# Patient Record
Sex: FEMALE | Race: BLACK OR AFRICAN AMERICAN | Employment: OTHER | URBAN - METROPOLITAN AREA
[De-identification: names, ages, dates, MRNs, and addresses within clinical notes are randomized per-mention and may not be internally consistent; named-entity substitution may affect disease eponyms.]

---

## 2017-12-03 ENCOUNTER — APPOINTMENT (OUTPATIENT)
Dept: GENERAL RADIOLOGY | Age: 52
DRG: 313 | End: 2017-12-03
Payer: MEDICARE

## 2017-12-03 ENCOUNTER — APPOINTMENT (OUTPATIENT)
Dept: NUCLEAR MEDICINE | Age: 52
DRG: 313 | End: 2017-12-03
Payer: MEDICARE

## 2017-12-03 ENCOUNTER — APPOINTMENT (OUTPATIENT)
Dept: ULTRASOUND IMAGING | Age: 52
DRG: 313 | End: 2017-12-03
Payer: MEDICARE

## 2017-12-03 ENCOUNTER — HOSPITAL ENCOUNTER (INPATIENT)
Age: 52
LOS: 2 days | Discharge: HOME | DRG: 313 | End: 2017-12-05
Attending: EMERGENCY MEDICINE | Admitting: FAMILY MEDICINE
Payer: MEDICARE

## 2017-12-03 DIAGNOSIS — R45.851 SUICIDAL IDEATION: ICD-10-CM

## 2017-12-03 DIAGNOSIS — R07.9 CHEST PAIN, UNSPECIFIED TYPE: Primary | ICD-10-CM

## 2017-12-03 DIAGNOSIS — E86.0 DEHYDRATION: ICD-10-CM

## 2017-12-03 DIAGNOSIS — N17.9 ACUTE RENAL FAILURE, UNSPECIFIED ACUTE RENAL FAILURE TYPE (HCC): ICD-10-CM

## 2017-12-03 DIAGNOSIS — E87.6 HYPOKALEMIA: ICD-10-CM

## 2017-12-03 LAB
-: NORMAL
ABSOLUTE EOS #: 0.17 K/UL (ref 0–0.44)
ABSOLUTE IMMATURE GRANULOCYTE: 0.03 K/UL (ref 0–0.3)
ABSOLUTE LYMPH #: 1.83 K/UL (ref 1.1–3.7)
ABSOLUTE MONO #: 0.43 K/UL (ref 0.1–1.2)
AMORPHOUS: NORMAL
AMPHETAMINE SCREEN URINE: NEGATIVE
ANION GAP SERPL CALCULATED.3IONS-SCNC: 15 MMOL/L (ref 9–17)
ANION GAP SERPL CALCULATED.3IONS-SCNC: 17 MMOL/L (ref 9–17)
BACTERIA: NORMAL
BARBITURATE SCREEN URINE: NEGATIVE
BASOPHILS # BLD: 0 % (ref 0–2)
BASOPHILS ABSOLUTE: <0.03 K/UL (ref 0–0.2)
BENZODIAZEPINE SCREEN, URINE: NEGATIVE
BILIRUBIN URINE: NEGATIVE
BNP INTERPRETATION: ABNORMAL
BNP INTERPRETATION: ABNORMAL
BUN BLDV-MCNC: 29 MG/DL (ref 6–20)
BUN BLDV-MCNC: 30 MG/DL (ref 6–20)
BUN/CREAT BLD: ABNORMAL (ref 9–20)
BUN/CREAT BLD: ABNORMAL (ref 9–20)
BUPRENORPHINE URINE: ABNORMAL
CALCIUM SERPL-MCNC: 8.1 MG/DL (ref 8.6–10.4)
CALCIUM SERPL-MCNC: 8.1 MG/DL (ref 8.6–10.4)
CANNABINOID SCREEN URINE: NEGATIVE
CASTS UA: NORMAL /LPF (ref 0–8)
CHLORIDE BLD-SCNC: 92 MMOL/L (ref 98–107)
CHLORIDE BLD-SCNC: 96 MMOL/L (ref 98–107)
CO2: 24 MMOL/L (ref 20–31)
CO2: 29 MMOL/L (ref 20–31)
COCAINE METABOLITE, URINE: NEGATIVE
COLOR: YELLOW
CREAT SERPL-MCNC: 1.71 MG/DL (ref 0.5–0.9)
CREAT SERPL-MCNC: 2.56 MG/DL (ref 0.5–0.9)
CREATININE URINE: 52.9 MG/DL (ref 28–217)
CRYSTALS, UA: NORMAL /HPF
D-DIMER QUANTITATIVE: 1.11 MG/L FEU
DIFFERENTIAL TYPE: ABNORMAL
EKG ATRIAL RATE: 70 BPM
EKG P AXIS: 55 DEGREES
EKG P-R INTERVAL: 160 MS
EKG Q-T INTERVAL: 460 MS
EKG QRS DURATION: 132 MS
EKG QTC CALCULATION (BAZETT): 496 MS
EKG R AXIS: -28 DEGREES
EKG T AXIS: -58 DEGREES
EKG VENTRICULAR RATE: 70 BPM
EOSINOPHILS RELATIVE PERCENT: 2 % (ref 1–4)
EPITHELIAL CELLS UA: NORMAL /HPF (ref 0–5)
GFR AFRICAN AMERICAN: 24 ML/MIN
GFR AFRICAN AMERICAN: 38 ML/MIN
GFR NON-AFRICAN AMERICAN: 20 ML/MIN
GFR NON-AFRICAN AMERICAN: 31 ML/MIN
GFR SERPL CREATININE-BSD FRML MDRD: ABNORMAL ML/MIN/{1.73_M2}
GLUCOSE BLD-MCNC: 103 MG/DL (ref 70–99)
GLUCOSE BLD-MCNC: 118 MG/DL (ref 70–99)
GLUCOSE URINE: NEGATIVE
HCT VFR BLD CALC: 32.2 % (ref 36.3–47.1)
HCT VFR BLD CALC: 34.8 % (ref 36.3–47.1)
HEMOGLOBIN: 10.8 G/DL (ref 11.9–15.1)
HEMOGLOBIN: 9.7 G/DL (ref 11.9–15.1)
IMMATURE GRANULOCYTES: 0 %
INR BLD: 0.9
KETONES, URINE: NEGATIVE
LACTIC ACID, WHOLE BLOOD: 2 MMOL/L (ref 0.7–2.1)
LEUKOCYTE ESTERASE, URINE: NEGATIVE
LYMPHOCYTES # BLD: 24 % (ref 24–43)
MCH RBC QN AUTO: 27.8 PG (ref 25.2–33.5)
MCH RBC QN AUTO: 27.9 PG (ref 25.2–33.5)
MCHC RBC AUTO-ENTMCNC: 30.1 G/DL (ref 28.4–34.8)
MCHC RBC AUTO-ENTMCNC: 31 G/DL (ref 28.4–34.8)
MCV RBC AUTO: 89.9 FL (ref 82.6–102.9)
MCV RBC AUTO: 92.3 FL (ref 82.6–102.9)
MDMA URINE: ABNORMAL
METHADONE SCREEN, URINE: NEGATIVE
METHAMPHETAMINE, URINE: ABNORMAL
MONOCYTES # BLD: 6 % (ref 3–12)
MUCUS: NORMAL
MYOGLOBIN: 35 NG/ML (ref 25–58)
MYOGLOBIN: 38 NG/ML (ref 25–58)
NITRITE, URINE: NEGATIVE
OPIATES, URINE: NEGATIVE
OTHER OBSERVATIONS UA: NORMAL
OXYCODONE SCREEN URINE: POSITIVE
PARTIAL THROMBOPLASTIN TIME: 24.2 SEC (ref 21.3–31.3)
PARTIAL THROMBOPLASTIN TIME: 33.7 SEC (ref 21.3–31.3)
PARTIAL THROMBOPLASTIN TIME: >120 SEC (ref 21.3–31.3)
PDW BLD-RTO: 15.9 % (ref 11.8–14.4)
PDW BLD-RTO: 16.1 % (ref 11.8–14.4)
PH UA: 5.5 (ref 5–8)
PHENCYCLIDINE, URINE: NEGATIVE
PLATELET # BLD: 245 K/UL (ref 138–453)
PLATELET # BLD: 267 K/UL (ref 138–453)
PLATELET ESTIMATE: ABNORMAL
PMV BLD AUTO: 10.6 FL (ref 8.1–13.5)
PMV BLD AUTO: 10.9 FL (ref 8.1–13.5)
POC TROPONIN I: 0 NG/ML (ref 0–0.1)
POC TROPONIN I: 0.03 NG/ML (ref 0–0.1)
POC TROPONIN INTERP: NORMAL
POC TROPONIN INTERP: NORMAL
POTASSIUM SERPL-SCNC: 3.2 MMOL/L (ref 3.7–5.3)
POTASSIUM SERPL-SCNC: 4.3 MMOL/L (ref 3.7–5.3)
PRO-BNP: 1141 PG/ML
PRO-BNP: 626 PG/ML
PROPOXYPHENE, URINE: ABNORMAL
PROTEIN UA: NEGATIVE
PROTHROMBIN TIME: 9.8 SEC (ref 9.4–12.6)
RBC # BLD: 3.49 M/UL (ref 3.95–5.11)
RBC # BLD: 3.87 M/UL (ref 3.95–5.11)
RBC # BLD: ABNORMAL 10*6/UL
RBC UA: NORMAL /HPF (ref 0–4)
RENAL EPITHELIAL, UA: NORMAL /HPF
SEG NEUTROPHILS: 68 % (ref 36–65)
SEGMENTED NEUTROPHILS ABSOLUTE COUNT: 5.03 K/UL (ref 1.5–8.1)
SODIUM BLD-SCNC: 136 MMOL/L (ref 135–144)
SODIUM BLD-SCNC: 137 MMOL/L (ref 135–144)
SODIUM,UR: 28 MMOL/L
SPECIFIC GRAVITY UA: 1.01 (ref 1–1.03)
TEST INFORMATION: ABNORMAL
TRICHOMONAS: NORMAL
TRICYCLIC ANTIDEPRESSANTS, UR: ABNORMAL
TROPONIN INTERP: NORMAL
TROPONIN T: <0.03 NG/ML
TURBIDITY: CLEAR
UREA NITROGEN, UR: 285 MG/DL
URINE HGB: NEGATIVE
UROBILINOGEN, URINE: NORMAL
WBC # BLD: 7.5 K/UL (ref 3.5–11.3)
WBC # BLD: 8.6 K/UL (ref 3.5–11.3)
WBC # BLD: ABNORMAL 10*3/UL
WBC UA: NORMAL /HPF (ref 0–5)
YEAST: NORMAL

## 2017-12-03 PROCEDURE — 85730 THROMBOPLASTIN TIME PARTIAL: CPT

## 2017-12-03 PROCEDURE — A9538 TC99M PYROPHOSPHATE: HCPCS | Performed by: FAMILY MEDICINE

## 2017-12-03 PROCEDURE — 71020 XR CHEST STANDARD TWO VW: CPT

## 2017-12-03 PROCEDURE — 80048 BASIC METABOLIC PNL TOTAL CA: CPT

## 2017-12-03 PROCEDURE — 99285 EMERGENCY DEPT VISIT HI MDM: CPT

## 2017-12-03 PROCEDURE — 81001 URINALYSIS AUTO W/SCOPE: CPT

## 2017-12-03 PROCEDURE — 2060000000 HC ICU INTERMEDIATE R&B

## 2017-12-03 PROCEDURE — 85025 COMPLETE CBC W/AUTO DIFF WBC: CPT

## 2017-12-03 PROCEDURE — 6370000000 HC RX 637 (ALT 250 FOR IP): Performed by: NURSE PRACTITIONER

## 2017-12-03 PROCEDURE — 83880 ASSAY OF NATRIURETIC PEPTIDE: CPT

## 2017-12-03 PROCEDURE — 83605 ASSAY OF LACTIC ACID: CPT

## 2017-12-03 PROCEDURE — 6360000002 HC RX W HCPCS: Performed by: EMERGENCY MEDICINE

## 2017-12-03 PROCEDURE — 36415 COLL VENOUS BLD VENIPUNCTURE: CPT

## 2017-12-03 PROCEDURE — 6360000002 HC RX W HCPCS: Performed by: FAMILY MEDICINE

## 2017-12-03 PROCEDURE — 2580000003 HC RX 258: Performed by: NURSE PRACTITIONER

## 2017-12-03 PROCEDURE — 78582 LUNG VENTILAT&PERFUS IMAGING: CPT

## 2017-12-03 PROCEDURE — 2580000003 HC RX 258: Performed by: EMERGENCY MEDICINE

## 2017-12-03 PROCEDURE — 94762 N-INVAS EAR/PLS OXIMTRY CONT: CPT

## 2017-12-03 PROCEDURE — 84300 ASSAY OF URINE SODIUM: CPT

## 2017-12-03 PROCEDURE — 93005 ELECTROCARDIOGRAM TRACING: CPT

## 2017-12-03 PROCEDURE — 80307 DRUG TEST PRSMV CHEM ANLYZR: CPT

## 2017-12-03 PROCEDURE — 84540 ASSAY OF URINE/UREA-N: CPT

## 2017-12-03 PROCEDURE — 84484 ASSAY OF TROPONIN QUANT: CPT

## 2017-12-03 PROCEDURE — 3430000000 HC RX DIAGNOSTIC RADIOPHARMACEUTICAL: Performed by: FAMILY MEDICINE

## 2017-12-03 PROCEDURE — 85610 PROTHROMBIN TIME: CPT

## 2017-12-03 PROCEDURE — 6370000000 HC RX 637 (ALT 250 FOR IP): Performed by: EMERGENCY MEDICINE

## 2017-12-03 PROCEDURE — 76770 US EXAM ABDO BACK WALL COMP: CPT

## 2017-12-03 PROCEDURE — 85027 COMPLETE CBC AUTOMATED: CPT

## 2017-12-03 PROCEDURE — 85379 FIBRIN DEGRADATION QUANT: CPT

## 2017-12-03 PROCEDURE — A9540 TC99M MAA: HCPCS | Performed by: FAMILY MEDICINE

## 2017-12-03 PROCEDURE — 99222 1ST HOSP IP/OBS MODERATE 55: CPT | Performed by: FAMILY MEDICINE

## 2017-12-03 PROCEDURE — 83874 ASSAY OF MYOGLOBIN: CPT

## 2017-12-03 PROCEDURE — 82570 ASSAY OF URINE CREATININE: CPT

## 2017-12-03 PROCEDURE — 6370000000 HC RX 637 (ALT 250 FOR IP): Performed by: FAMILY MEDICINE

## 2017-12-03 RX ORDER — SODIUM CHLORIDE 0.9 % (FLUSH) 0.9 %
10 SYRINGE (ML) INJECTION PRN
Status: DISCONTINUED | OUTPATIENT
Start: 2017-12-03 | End: 2017-12-05 | Stop reason: HOSPADM

## 2017-12-03 RX ORDER — BUMETANIDE 1 MG/1
4 TABLET ORAL 2 TIMES DAILY
Status: DISCONTINUED | OUTPATIENT
Start: 2017-12-03 | End: 2017-12-03

## 2017-12-03 RX ORDER — HEPARIN SODIUM 1000 [USP'U]/ML
10000 INJECTION, SOLUTION INTRAVENOUS; SUBCUTANEOUS ONCE
Status: COMPLETED | OUTPATIENT
Start: 2017-12-03 | End: 2017-12-03

## 2017-12-03 RX ORDER — OXYCODONE HYDROCHLORIDE 5 MG/1
15 TABLET ORAL EVERY 6 HOURS PRN
Status: DISCONTINUED | OUTPATIENT
Start: 2017-12-03 | End: 2017-12-05 | Stop reason: HOSPADM

## 2017-12-03 RX ORDER — HYDROCODONE BITARTRATE AND ACETAMINOPHEN 5; 325 MG/1; MG/1
1 TABLET ORAL EVERY 4 HOURS PRN
Status: DISCONTINUED | OUTPATIENT
Start: 2017-12-03 | End: 2017-12-05 | Stop reason: HOSPADM

## 2017-12-03 RX ORDER — SODIUM CHLORIDE 9 MG/ML
INJECTION, SOLUTION INTRAVENOUS CONTINUOUS
Status: DISCONTINUED | OUTPATIENT
Start: 2017-12-03 | End: 2017-12-03

## 2017-12-03 RX ORDER — BUMETANIDE 1 MG/1
4 TABLET ORAL 2 TIMES DAILY
Status: ON HOLD | COMMUNITY
End: 2017-12-05

## 2017-12-03 RX ORDER — TRAZODONE HYDROCHLORIDE 50 MG/1
TABLET ORAL NIGHTLY
COMMUNITY
End: 2019-04-09 | Stop reason: SDUPTHER

## 2017-12-03 RX ORDER — POTASSIUM CHLORIDE 20MEQ/15ML
40 LIQUID (ML) ORAL PRN
Status: DISCONTINUED | OUTPATIENT
Start: 2017-12-03 | End: 2017-12-05 | Stop reason: HOSPADM

## 2017-12-03 RX ORDER — HEPARIN SODIUM 5000 [USP'U]/ML
5000 INJECTION, SOLUTION INTRAVENOUS; SUBCUTANEOUS EVERY 8 HOURS SCHEDULED
Status: DISCONTINUED | OUTPATIENT
Start: 2017-12-03 | End: 2017-12-05 | Stop reason: HOSPADM

## 2017-12-03 RX ORDER — HYDRALAZINE HYDROCHLORIDE 25 MG/1
25 TABLET, FILM COATED ORAL 2 TIMES DAILY
COMMUNITY
End: 2019-04-09 | Stop reason: SDUPTHER

## 2017-12-03 RX ORDER — ONDANSETRON 2 MG/ML
4 INJECTION INTRAMUSCULAR; INTRAVENOUS EVERY 6 HOURS PRN
Status: DISCONTINUED | OUTPATIENT
Start: 2017-12-03 | End: 2017-12-05 | Stop reason: HOSPADM

## 2017-12-03 RX ORDER — PANTOPRAZOLE SODIUM 20 MG/1
20 TABLET, DELAYED RELEASE ORAL DAILY
Status: DISCONTINUED | OUTPATIENT
Start: 2017-12-03 | End: 2017-12-05 | Stop reason: HOSPADM

## 2017-12-03 RX ORDER — METOPROLOL TARTRATE 50 MG/1
50 TABLET, FILM COATED ORAL DAILY
COMMUNITY
End: 2019-04-09 | Stop reason: SDUPTHER

## 2017-12-03 RX ORDER — 0.9 % SODIUM CHLORIDE 0.9 %
1000 INTRAVENOUS SOLUTION INTRAVENOUS ONCE
Status: COMPLETED | OUTPATIENT
Start: 2017-12-03 | End: 2017-12-03

## 2017-12-03 RX ORDER — HEPARIN SODIUM 10000 [USP'U]/100ML
2100 INJECTION, SOLUTION INTRAVENOUS CONTINUOUS
Status: DISCONTINUED | OUTPATIENT
Start: 2017-12-03 | End: 2017-12-03

## 2017-12-03 RX ORDER — HEPARIN SODIUM 1000 [USP'U]/ML
5000 INJECTION, SOLUTION INTRAVENOUS; SUBCUTANEOUS PRN
Status: DISCONTINUED | OUTPATIENT
Start: 2017-12-03 | End: 2017-12-03

## 2017-12-03 RX ORDER — BISACODYL 10 MG
10 SUPPOSITORY, RECTAL RECTAL DAILY PRN
Status: DISCONTINUED | OUTPATIENT
Start: 2017-12-03 | End: 2017-12-05 | Stop reason: HOSPADM

## 2017-12-03 RX ORDER — ALBUTEROL SULFATE 90 UG/1
2 AEROSOL, METERED RESPIRATORY (INHALATION) EVERY 4 HOURS PRN
COMMUNITY
End: 2019-04-09 | Stop reason: SDUPTHER

## 2017-12-03 RX ORDER — ISOSORBIDE DINITRATE 10 MG/1
10 TABLET ORAL 2 TIMES DAILY
Status: DISCONTINUED | OUTPATIENT
Start: 2017-12-03 | End: 2017-12-05 | Stop reason: HOSPADM

## 2017-12-03 RX ORDER — MORPHINE SULFATE 4 MG/ML
4 INJECTION, SOLUTION INTRAMUSCULAR; INTRAVENOUS
Status: DISCONTINUED | OUTPATIENT
Start: 2017-12-03 | End: 2017-12-05 | Stop reason: HOSPADM

## 2017-12-03 RX ORDER — ALBUTEROL SULFATE 90 UG/1
2 AEROSOL, METERED RESPIRATORY (INHALATION) EVERY 4 HOURS PRN
Status: DISCONTINUED | OUTPATIENT
Start: 2017-12-03 | End: 2017-12-05 | Stop reason: HOSPADM

## 2017-12-03 RX ORDER — NITROGLYCERIN 0.4 MG/1
0.4 TABLET SUBLINGUAL EVERY 5 MIN PRN
Status: DISCONTINUED | OUTPATIENT
Start: 2017-12-03 | End: 2017-12-05 | Stop reason: HOSPADM

## 2017-12-03 RX ORDER — HEPARIN SODIUM 1000 [USP'U]/ML
80 INJECTION, SOLUTION INTRAVENOUS; SUBCUTANEOUS PRN
Status: DISCONTINUED | OUTPATIENT
Start: 2017-12-03 | End: 2017-12-03 | Stop reason: SDUPTHER

## 2017-12-03 RX ORDER — HEPARIN SODIUM 1000 [USP'U]/ML
80 INJECTION, SOLUTION INTRAVENOUS; SUBCUTANEOUS ONCE
Status: DISCONTINUED | OUTPATIENT
Start: 2017-12-03 | End: 2017-12-03

## 2017-12-03 RX ORDER — ISOSORBIDE DINITRATE 10 MG/1
10 TABLET ORAL 2 TIMES DAILY
COMMUNITY
End: 2019-04-09 | Stop reason: SDUPTHER

## 2017-12-03 RX ORDER — HEPARIN SODIUM 1000 [USP'U]/ML
10000 INJECTION, SOLUTION INTRAVENOUS; SUBCUTANEOUS PRN
Status: DISCONTINUED | OUTPATIENT
Start: 2017-12-03 | End: 2017-12-03

## 2017-12-03 RX ORDER — METOPROLOL TARTRATE 50 MG/1
50 TABLET, FILM COATED ORAL DAILY
Status: DISCONTINUED | OUTPATIENT
Start: 2017-12-03 | End: 2017-12-05 | Stop reason: HOSPADM

## 2017-12-03 RX ORDER — TIZANIDINE 4 MG/1
4 TABLET ORAL EVERY 8 HOURS PRN
COMMUNITY
End: 2019-04-09

## 2017-12-03 RX ORDER — POTASSIUM CHLORIDE 7.45 MG/ML
10 INJECTION INTRAVENOUS PRN
Status: DISCONTINUED | OUTPATIENT
Start: 2017-12-03 | End: 2017-12-05 | Stop reason: HOSPADM

## 2017-12-03 RX ORDER — FUROSEMIDE 40 MG/1
40 TABLET ORAL DAILY
Status: ON HOLD | COMMUNITY
End: 2017-12-03 | Stop reason: CLARIF

## 2017-12-03 RX ORDER — DOCUSATE SODIUM 100 MG/1
100 CAPSULE, LIQUID FILLED ORAL 2 TIMES DAILY
Status: DISCONTINUED | OUTPATIENT
Start: 2017-12-03 | End: 2017-12-05 | Stop reason: HOSPADM

## 2017-12-03 RX ORDER — CLOPIDOGREL BISULFATE 75 MG/1
75 TABLET ORAL DAILY
Status: DISCONTINUED | OUTPATIENT
Start: 2017-12-04 | End: 2017-12-05 | Stop reason: HOSPADM

## 2017-12-03 RX ORDER — POTASSIUM CHLORIDE 20 MEQ/1
40 TABLET, EXTENDED RELEASE ORAL ONCE
Status: COMPLETED | OUTPATIENT
Start: 2017-12-03 | End: 2017-12-03

## 2017-12-03 RX ORDER — HYDRALAZINE HYDROCHLORIDE 25 MG/1
25 TABLET, FILM COATED ORAL 2 TIMES DAILY
Status: DISCONTINUED | OUTPATIENT
Start: 2017-12-03 | End: 2017-12-05 | Stop reason: HOSPADM

## 2017-12-03 RX ORDER — POTASSIUM CHLORIDE 20 MEQ/1
40 TABLET, EXTENDED RELEASE ORAL PRN
Status: DISCONTINUED | OUTPATIENT
Start: 2017-12-03 | End: 2017-12-05 | Stop reason: HOSPADM

## 2017-12-03 RX ORDER — TIZANIDINE 4 MG/1
4 TABLET ORAL EVERY 8 HOURS PRN
Status: DISCONTINUED | OUTPATIENT
Start: 2017-12-03 | End: 2017-12-05 | Stop reason: HOSPADM

## 2017-12-03 RX ORDER — ACETAMINOPHEN 325 MG/1
650 TABLET ORAL EVERY 4 HOURS PRN
Status: DISCONTINUED | OUTPATIENT
Start: 2017-12-03 | End: 2017-12-05 | Stop reason: HOSPADM

## 2017-12-03 RX ORDER — SPIRONOLACTONE 25 MG/1
25 TABLET ORAL DAILY
COMMUNITY
End: 2019-04-09 | Stop reason: SDUPTHER

## 2017-12-03 RX ORDER — MORPHINE SULFATE 2 MG/ML
2 INJECTION, SOLUTION INTRAMUSCULAR; INTRAVENOUS
Status: DISCONTINUED | OUTPATIENT
Start: 2017-12-03 | End: 2017-12-05 | Stop reason: HOSPADM

## 2017-12-03 RX ORDER — PANTOPRAZOLE SODIUM 40 MG/1
40 TABLET, DELAYED RELEASE ORAL DAILY
COMMUNITY
End: 2019-04-09 | Stop reason: SDUPTHER

## 2017-12-03 RX ORDER — OXYCODONE HYDROCHLORIDE AND ACETAMINOPHEN 5; 325 MG/1; MG/1
1 TABLET ORAL EVERY 6 HOURS PRN
Status: ON HOLD | COMMUNITY
End: 2017-12-03 | Stop reason: CLARIF

## 2017-12-03 RX ORDER — TRAZODONE HYDROCHLORIDE 50 MG/1
50 TABLET ORAL NIGHTLY
Status: DISCONTINUED | OUTPATIENT
Start: 2017-12-03 | End: 2017-12-03

## 2017-12-03 RX ORDER — HEPARIN SODIUM 10000 [USP'U]/100ML
18 INJECTION, SOLUTION INTRAVENOUS CONTINUOUS
Status: DISCONTINUED | OUTPATIENT
Start: 2017-12-03 | End: 2017-12-03 | Stop reason: SDUPTHER

## 2017-12-03 RX ORDER — CLOPIDOGREL BISULFATE 75 MG/1
75 TABLET ORAL DAILY
COMMUNITY
End: 2019-04-09 | Stop reason: SDUPTHER

## 2017-12-03 RX ORDER — HEPARIN SODIUM 1000 [USP'U]/ML
40 INJECTION, SOLUTION INTRAVENOUS; SUBCUTANEOUS PRN
Status: DISCONTINUED | OUTPATIENT
Start: 2017-12-03 | End: 2017-12-03 | Stop reason: SDUPTHER

## 2017-12-03 RX ORDER — SPIRONOLACTONE 25 MG/1
25 TABLET ORAL DAILY
Status: DISCONTINUED | OUTPATIENT
Start: 2017-12-03 | End: 2017-12-03

## 2017-12-03 RX ORDER — ASPIRIN 81 MG/1
81 TABLET, CHEWABLE ORAL DAILY
Status: DISCONTINUED | OUTPATIENT
Start: 2017-12-04 | End: 2017-12-05 | Stop reason: HOSPADM

## 2017-12-03 RX ORDER — SODIUM CHLORIDE 0.9 % (FLUSH) 0.9 %
10 SYRINGE (ML) INJECTION EVERY 12 HOURS SCHEDULED
Status: DISCONTINUED | OUTPATIENT
Start: 2017-12-03 | End: 2017-12-05 | Stop reason: HOSPADM

## 2017-12-03 RX ORDER — OXYCODONE HYDROCHLORIDE 15 MG/1
15 TABLET ORAL EVERY 6 HOURS PRN
COMMUNITY
End: 2019-04-09 | Stop reason: ALTCHOICE

## 2017-12-03 RX ADMIN — Medication 10 ML: at 23:09

## 2017-12-03 RX ADMIN — BUMETANIDE 4 MG: 1 TABLET ORAL at 12:47

## 2017-12-03 RX ADMIN — TRAZODONE HYDROCHLORIDE 50 MG: 50 TABLET ORAL at 23:09

## 2017-12-03 RX ADMIN — HEPARIN SODIUM 5000 UNITS: 5000 INJECTION, SOLUTION INTRAVENOUS; SUBCUTANEOUS at 23:09

## 2017-12-03 RX ADMIN — HEPARIN SODIUM 5000 UNITS: 1000 INJECTION, SOLUTION INTRAVENOUS; SUBCUTANEOUS at 14:18

## 2017-12-03 RX ADMIN — HEPARIN SODIUM AND DEXTROSE 21 ML/HR: 10000; 5 INJECTION INTRAVENOUS at 04:58

## 2017-12-03 RX ADMIN — PANTOPRAZOLE SODIUM 20 MG: 20 TABLET, DELAYED RELEASE ORAL at 12:47

## 2017-12-03 RX ADMIN — HEPARIN SODIUM 5000 UNITS: 5000 INJECTION, SOLUTION INTRAVENOUS; SUBCUTANEOUS at 17:22

## 2017-12-03 RX ADMIN — HEPARIN SODIUM 10000 UNITS: 1000 INJECTION INTRAVENOUS; SUBCUTANEOUS at 04:58

## 2017-12-03 RX ADMIN — SPIRONOLACTONE 25 MG: 25 TABLET ORAL at 12:47

## 2017-12-03 RX ADMIN — SODIUM CHLORIDE: 9 INJECTION, SOLUTION INTRAVENOUS at 10:49

## 2017-12-03 RX ADMIN — ISOSORBIDE DINITRATE 10 MG: 10 TABLET ORAL at 12:46

## 2017-12-03 RX ADMIN — HYDROCODONE BITARTRATE AND ACETAMINOPHEN 1 TABLET: 5; 325 TABLET ORAL at 07:50

## 2017-12-03 RX ADMIN — HYDRALAZINE HYDROCHLORIDE 25 MG: 25 TABLET, FILM COATED ORAL at 12:47

## 2017-12-03 RX ADMIN — METOPROLOL TARTRATE 50 MG: 50 TABLET, FILM COATED ORAL at 12:47

## 2017-12-03 RX ADMIN — ASPIRIN 325 MG: 325 TABLET, COATED ORAL at 10:11

## 2017-12-03 RX ADMIN — Medication 40 MILLICURIE: at 11:45

## 2017-12-03 RX ADMIN — POTASSIUM CHLORIDE 40 MEQ: 1500 TABLET, EXTENDED RELEASE ORAL at 03:22

## 2017-12-03 RX ADMIN — HYDRALAZINE HYDROCHLORIDE 25 MG: 25 TABLET, FILM COATED ORAL at 17:28

## 2017-12-03 RX ADMIN — DOCUSATE SODIUM 100 MG: 100 CAPSULE ORAL at 10:13

## 2017-12-03 RX ADMIN — HEPARIN SODIUM AND DEXTROSE 16 UNITS/KG/HR: 10000; 5 INJECTION INTRAVENOUS at 14:19

## 2017-12-03 RX ADMIN — OXYCODONE HYDROCHLORIDE 15 MG: 5 TABLET ORAL at 21:36

## 2017-12-03 RX ADMIN — SODIUM CHLORIDE, PRESERVATIVE FREE 10 ML: 5 INJECTION INTRAVENOUS at 12:00

## 2017-12-03 RX ADMIN — Medication 6 MILLICURIE: at 12:00

## 2017-12-03 RX ADMIN — HYDROCODONE BITARTRATE AND ACETAMINOPHEN 1 TABLET: 5; 325 TABLET ORAL at 15:20

## 2017-12-03 RX ADMIN — SODIUM CHLORIDE 1000 ML: 9 INJECTION, SOLUTION INTRAVENOUS at 03:22

## 2017-12-03 ASSESSMENT — PAIN SCALES - GENERAL
PAINLEVEL_OUTOF10: 9
PAINLEVEL_OUTOF10: 10
PAINLEVEL_OUTOF10: 9
PAINLEVEL_OUTOF10: 10
PAINLEVEL_OUTOF10: 10
PAINLEVEL_OUTOF10: 5

## 2017-12-03 ASSESSMENT — ENCOUNTER SYMPTOMS
NAUSEA: 0
BACK PAIN: 1
RHINORRHEA: 0
COUGH: 0
ORTHOPNEA: 1
ABDOMINAL PAIN: 0
SHORTNESS OF BREATH: 1
VOMITING: 0

## 2017-12-03 ASSESSMENT — PAIN DESCRIPTION - LOCATION
LOCATION: GENERALIZED

## 2017-12-03 ASSESSMENT — PAIN DESCRIPTION - PAIN TYPE
TYPE: CHRONIC PAIN

## 2017-12-03 NOTE — ED NOTES
Pt resting in bed w/ rr even and unlabored w/ sitter at bedside. Will continue to monitor.      Dyllan Munoz RN  12/03/17 4765

## 2017-12-03 NOTE — PROGRESS NOTES
Pt admitted to room. Ambulatory from stretcher, pt connected to monitor and vitals recorded. Pt not complaining of any chest pain at this time.
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
## 2017-12-03 NOTE — ED NOTES
Pt resting in bed w/ rr even and unlabored and in NAD, sitter at bedside. Will continue to monitor.      Isaac Oconnor RN  12/03/17 4332

## 2017-12-03 NOTE — ED PROVIDER NOTES
[facility] ED  Emergency Department Encounter  Emergency Medicine Resident     Pt Name: Samuel Prajapati  MRN: [id number]  [place] 1965  Date of evaluation: 12/3/17  PCP:  No primary care provider on file. CHIEF COMPLAINT       Chief Complaint   Patient presents with    Chest Pain    Suicidal    Homicidal       HISTORY OF PRESENT ILLNESS  (Location/Symptom, Timing/Onset, Context/Setting, Quality, Duration, Modifying Factors, Severity.)      Samuel Prajapati is a 45 y.o. female who presents With complaints of sudden onset chest pain and a little after 11 PM tonight. Patient states she was resting when the pain came on. States it's a heavy pressure in the middle of her chest radiating to her right arm as well as her back. Has never had chest pain like this. Patient states pain has gotten slightly better since that time. Patient did receive 324 mg of aspirin from EMS. Patient states she had an NSTEMIthis past July. States she had a stress test that was negative and ended up not getting a catheterization. This was done in [place]. Patient is currently visiting family. Patient also states she believes is she is on some type of blood thinner but is unsure of what. Patient denies any diaphoresis, nausea, vomiting, short of breath, fever, chills, dull pain, dysuria, hematuria or any other complaints. Denies any history of PE or DVT. REVIEW OF SYSTEMS    (2-9 systems for level 4, 10 or more for level 5)      Review of Systems   Constitutional: Negative for chills and fever. HENT: Negative for congestion and rhinorrhea. Respiratory: Positive for shortness of breath. Negative for cough. Cardiovascular: Positive for chest pain. Negative for leg swelling. Gastrointestinal: Negative for abdominal pain, nausea and vomiting. Genitourinary: Negative for difficulty urinating and dysuria. Musculoskeletal: Positive for back pain and myalgias. Negative for neck pain. Skin: Negative for rash and wound. Neurological: Positive for numbness. Negative for weakness, light-headedness and headaches. Hematological: Negative for adenopathy. Does not bruise/bleed easily. Psychiatric/Behavioral: Negative for behavioral problems and confusion. PAST MEDICAL / SURGICAL / SOCIAL / FAMILY HISTORY      has no past medical history on file. has no past surgical history on file. Social History     Social History    Marital status: Single     Spouse name: N/A    Number of children: N/A    Years of education: N/A     Occupational History    Not on file. Social History Main Topics    Smoking status: Never Smoker    Smokeless tobacco: Never Used    Alcohol use No    Drug use:      Types: Marijuana    Sexual activity: Not on file     Other Topics Concern    Not on file     Social History Narrative    No narrative on file         History reviewed. No pertinent family history. Portions of the past medical history, surgical history, social history, and family history were discussed and reviewed with the patient/family and is included here or in HPI if pertinent. ALLERGIES / IMMUNIZATIONS / HOME MEDICATIONS       Allergies:  Tetracyclines & related      IMMUNIZATIONS      There is no immunization history on file for this patient. Home Medications:  Prior to Admission medications    Not on File         PHYSICAL EXAM   (up to 7 for level 4, 8 or more for level 5)      INITIAL VITALS:    height is 5' 8\" (1.727 m) and weight is 285 lb (129.3 kg). Her oral temperature is 98 °F (36.7 °C). Her blood pressure is 109/66 and her pulse is 67. Her respiration is 12 and oxygen saturation is 95%. Physical Exam   Constitutional: She is oriented to person, place, and time. She appears well-developed and well-nourished. No distress. HENT:   Head: Normocephalic and atraumatic. Eyes: Conjunctivae and EOM are normal.   Neck: Normal range of motion. Neck supple. placed or performed during the hospital encounter of 12/03/17   CBC WITH AUTO DIFFERENTIAL   Result Value Ref Range    WBC 7.5 3.5 - 11.3 k/uL    RBC 3.87 (L) 3.95 - 5.11 m/uL    Hemoglobin 10.8 (L) 11.9 - 15.1 g/dL    Hematocrit 34.8 (L) 36.3 - 47.1 %    MCV 89.9 82.6 - 102.9 fL    MCH 27.9 25.2 - 33.5 pg    MCHC 31.0 28.4 - 34.8 g/dL    RDW 15.9 (H) 11.8 - 14.4 %    Platelets 960 679 - 865 k/uL    MPV 10.6 8.1 - 13.5 fL    Differential Type NOT REPORTED     Seg Neutrophils 68 (H) 36 - 65 %    Lymphocytes 24 24 - 43 %    Monocytes 6 3 - 12 %    Eosinophils % 2 1 - 4 %    Basophils 0 0 - 2 %    Immature Granulocytes 0 0 %    Segs Absolute 5.03 1.50 - 8.10 k/uL    Absolute Lymph # 1.83 1.10 - 3.70 k/uL    Absolute Mono # 0.43 0.10 - 1.20 k/uL    Absolute Eos # 0.17 0.00 - 0.44 k/uL    Basophils # <0.03 0.00 - 0.20 k/uL    Absolute Immature Granulocyte 0.03 0.00 - 0.30 k/uL    WBC Morphology NOT REPORTED     RBC Morphology ANISOCYTOSIS PRESENT     Platelet Estimate NOT REPORTED    BASIC METABOLIC PANEL   Result Value Ref Range    Glucose 103 (H) 70 - 99 mg/dL    BUN 30 (H) 6 - 20 mg/dL    CREATININE 2.56 (H) 0.50 - 0.90 mg/dL    Bun/Cre Ratio NOT REPORTED 9 - 20    Calcium 8.1 (L) 8.6 - 10.4 mg/dL    Sodium 136 135 - 144 mmol/L    Potassium 3.2 (L) 3.7 - 5.3 mmol/L    Chloride 92 (L) 98 - 107 mmol/L    CO2 29 20 - 31 mmol/L    Anion Gap 15 9 - 17 mmol/L    GFR Non-African American 20 (L) >60 mL/min    GFR  24 (L) >60 mL/min    GFR Comment          GFR Staging NOT REPORTED    Brain Natriuretic Peptide   Result Value Ref Range    Pro-BNP 1,141 (H) <300 pg/mL    BNP Interpretation         PROTIME-INR   Result Value Ref Range    Protime 9.8 9.4 - 12.6 sec    INR 0.9    APTT   Result Value Ref Range    PTT 24.2 21.3 - 31.3 sec   D-DIMER, QUANTITATIVE   Result Value Ref Range    D-Dimer, Quant 1.11 mg/L FEU   POCT troponin   Result Value Ref Range    POC Troponin I 0.00 0.00 - 0.10 ng/mL    POC Troponin Interp       The Troponin-I (POC) results cannot be compared to the Troponin-T results. Labs Reviewed   CBC WITH AUTO DIFFERENTIAL - Abnormal; Notable for the following:        Result Value    RBC 3.87 (*)     Hemoglobin 10.8 (*)     Hematocrit 34.8 (*)     RDW 15.9 (*)     Seg Neutrophils 68 (*)     All other components within normal limits   BASIC METABOLIC PANEL - Abnormal; Notable for the following:     Glucose 103 (*)     BUN 30 (*)     CREATININE 2.56 (*)     Calcium 8.1 (*)     Potassium 3.2 (*)     Chloride 92 (*)     GFR Non- 20 (*)     GFR  24 (*)     All other components within normal limits   BRAIN NATRIURETIC PEPTIDE - Abnormal; Notable for the following:     Pro-BNP 1,141 (*)     All other components within normal limits   PROTIME-INR   APTT   D-DIMER, QUANTITATIVE   URINALYSIS WITH MICROSCOPIC   APTT   APTT   POCT TROPONIN   POCT TROPONIN       RADIOLOGY:  Xr Chest Standard (2 Vw)    Result Date: 12/3/2017  EXAMINATION: TWO VIEWS OF THE CHEST 12/3/2017 2:41 am COMPARISON: None. HISTORY: ORDERING SYSTEM PROVIDED HISTORY: cp TECHNOLOGIST PROVIDED HISTORY: Reason for exam:->cp FINDINGS: Enlarged cardiac silhouette. Elevated right hemidiaphragm. No focal lung consolidation. No pneumothorax or significant pleural effusion. Thoracic spondylosis present. Cardiomegaly. Elevated right hemidiaphragm, correlate for phrenic nerve palsy. No radiographic evidence of acute pulmonary disease. EKG  EKG Interpretation    Interpreted by me    Rhythm: normal sinus   Rate: normal, 70  Axis: normal  Ectopy: none  Conduction: normal  ST Segments: no acute change  T Waves: no acute change  Q Waves: none    Clinical Impression: normal sinus rhythm with likely left atrial enlargement and LVH with QRS widening repolarization abnormalities. Patient also with mild ST depression in leads V4 through V6 as well as nonspecific ST and T-wave abnormalities.   No prior EKG to compare to.    All EKG's are interpreted by the Emergency Department Physician who either signs or Co-signs this chart in the absence of a cardiologist.    ED BEDSIDE ULTRASOUND:   Not indicated      DIFFERENTIAL DX / [facility] / [facility]     DIFFERENTIAL DIAGNOSIS:  Emergent: ACS/NSTEMI/STEMI/angina, arrhythmia, trauma, aortic dissection,  PE, PNA, pneumothroax, esophageal rupture, tamponade, Cocaine use  Nonemergent: pneumonia, pericarditis, GERD, MSK, Endocarditis, anxiety     Evaluate for: diaphoresis, present chest pain, tachypnea, BP both arms, heart sounds, JVD, tender chest wall, wheezing      EMERGENCY DEPARTMENT COURSE/[facility]  Patient presents with chest pain radiating to the right side and back. Pain currently better. Patient appears comfortable this time. History of recent MI in the past year. No catheterization history. High-risk patient. we'll obtain chest pain workup, d-dimer to rule out possible PE given recent travel. .  Bilobed blood pressures. Plantar reassessment afterwards. Upon reassessment patient now stating that she is suicidal and has been feeling depressed. Patient does not currently have a plan. States she doesn't like any of her family. Patient placed on suicide watch. Patient found to be in acute renal failure with creatinine of 2.6. Patient states that she has never had any kidney problems before. D-dimer returning elevated at 1.1. Unable to obtain CT PE due to elevation in creatinine at this time. Patient hemodynamically stable. We will go ahead and start the patient on high-dose heparin and plan admitting the patient for acute renal failure and chest pain with T-wave inversions laterally concerning for myocardial ischemia. Patient also will require a VQ scan or CT PE if her kidney function returns. We'll give her a liter bolus in the ER. Patient also found to be hypokalemic and was given 40 of K-Dur.     Spoke with Fili who accepted the patient for admission. Plan for cardiology consult as well as possible nephrology consulted. Patient will require psychiatric consult once medical problems are improved. PROCEDURES:  Procedures    CONSULTS:  IP CONSULT TO HOSPITALIST  IP CONSULT TO NEPHROLOGY  IP CONSULT TO PSYCHIATRY    CRITICAL CARE:  See attending note    FINAL IMPRESSION      1. Chest pain, unspecified type    2. Acute renal failure, unspecified acute renal failure type ([facility] [address]    3. Suicidal ideation    4. Hypokalemia    5. Dehydration              DISPOSITION / PLAN     DISPOSITION Admitted        PATIENT REFERRED TO:  No follow-up provider specified.     DISCHARGE MEDICATIONS:  New Prescriptions    No medications on file       Fei Duarte DO  Emergency Medicine Resident    (Please note that portions of this note were completed with a voice recognition program.  Efforts were made to edit the dictations but occasionally words are mis-transcribed.)        Fei Duarte DO  12/03/17 0740

## 2017-12-03 NOTE — ED NOTES
[] Alix    [] [address]    [x]  One [facility] ASSESSMENT      Y  N     [x] [] In the past two weeks have you had thoughts of hurting yourself in any way? [x] [] In the past two weeks have you had thoughts that you would be better off dead? [] [x] Have you made a suicide attempt in the past two months? [] [x] Do you have a plan for hurting yourself or suicide? [] [x] Presence of hallucinations/voices related to hurting himself or herself or someone else. SUICIDE/SECURITY WATCH PRECAUTION CHECKLIST     Orders    [x]  Suicide/Security Watch Precautions initiated as checked below:   12/3/17 1:59 AM 16/16    [x] Notified physician:  Benitez Hannon MD  12/3/17 1:59 AM    [x] Orders obtained as appropriate:     [x] 1:1 Observer     [] Psych Consult     [] Psych Consult    Name:  Date:  Time:    [x] 1:1 Observer, Notified by:  Irish Decker    Contact Nurse Supervisor    [x] Remove all personal clothes from room and place in snap/paper gown/pants. Slipper only    [x] Remove all personal belongings from room and secured away from patient. Documentation    [x] Initiate Suicide/Security Watch Precaution Flow Sheet    [x] Initiate individualized Care Plan/Problem    [x] Document why precautions initiated on flow sheet (Initiate Nursing Care Plan/Problem)    [x] 1:1 Observer in place; instructions provided. Suicide precautions require observer be within arms length. [x] Nurse-Observer Communication Hand-off initiated by RN, reviewed with Observer. Subsequently used as Hand Off between Observers. [x] Initiate every 15 minute observations per observer as delegated by the RN.     [x] Initiate RN assessment and documentation    Environmental Scan  Search Criteria and Process: OPTIONAL, see Search Policy    [x] Reason for search:suicidal/homicidal     [x] Nursing in presence of second person to search patient    [x] Patient notified of reason for body assessment and belongings search:     Persons present during search: security    Results of search and disposition:       Searchers Name: security      These items or items similar should be removed from the room:   [x] Chairs   [x] Telephone   [x] Trash cans and liners   [x] Plastic utensils (order Patient Safety tray)   [x] Empty or remove Sharps containers   [x] All personal clothing/belongings removed   [x] All unnecessary lead wires, electrical cords, draw cords, etc.   [x] Flowers and plants   [x] Double check for lighters, matches, razors, any glass items etc that can be used as weapons. Person completing Checklist: Kathy Dennis       GENERAL INFORMATION     Y  N     [x] [] Has the patient been informed that they are on a watch and what that means? [x] [] Can the patient get out of Bed without nursing assistance? [x] [] Can the patient use the restroom without nursing assistance? [x] [] Can the patient walk the halls to [place] their legs? \"   [] [x] Does the patient have metal utensils? [x] [] Have the patient's belongings been placed out of control of the patient? [x] [] Have the patient and his/her belongings been checked for contraband? [] [x] Is the patient under any visitor restrictions? If Yes, explain:   [] [x] Is the patient under an alias? [place] [id number] Name:   Authorized visitors (no more than two are to be on the list)   Name/Relationship:   Name/Relationship:    Name of Staff member that you  Received this information from?:     General Description:    Jersey Schafer 16/16 female 45 y.o. Admission weight: 285 lb (129.3 kg) Height: 5' 8\" (172.7 cm)  Race: []  [x] Black  []   []   [] [place] [] Other  Facial Hair:  [] Yes  [x] No  If yes, please describe: Identifying Marks (i.e. Visible tattoos, scars, etc... ):     NURSING CARE PLAN    Nursing Diagnosis: Risk of Self Directed Harm  [] Actual  [x] Potential  Date Started: 12/3/17      Etiological Factors: (related to)  [x] Expressed or implied suicidal ideation/behavior  [x] Depression  [] Suicide attempt      [x] Low self-esteem  [] Hallucinations      [x] Feeling of Hopelessness  [] Substance abuse or withdrawal    [x] Dysfunctional family  [x] Major traumatic event, eg., divorce, etc   [x] Excessive stress/anxiety    12/3/17    Expected Outcomes    Patient will:   [x] Patient will remain safe for the duration of their stay   [x] Patient's environment will be safe, eg. Free of potential suicide weapons   [] Verbalize Recovery from suicidal episode and improvement in self-worth   [x] Discuss feeling that precipitated suicide attempt/thoughts/behavior   [] Will describe available resources for crisis prevention and management   [] Will verbalize positive coping skills     Nursing Intervention   [x] Assessment and Observations hourly   [x] Suicide Precautions implemented with patient, should be 1:1 observation   [x] Document observation [id number] and RN assessment hourly   [] Consult physician for:    [] Psychiatric consult    [] Pharmacological therapy    [] Other:    [x] Patient search completed by security   [x] Initiated appropriate safety protocols by removing from the patient's environment anything that could be used to inflict self injury, eg. Order safe tray, snap gown, etc   [x] Maintain open, warm, caring, non-judgmental attitude/manner towards patient   [] Discuss advantages and disadvantages of existing coping methods/skills   [x] Assist and educate patient with identifying present strengths and coping skills   [x] Keep patient informed regarding plan of care and provide clear concise explanations. Provide the patient/family education information as well as telephone numbers and other information about crisis centers, hot lines, and counselors.     Discharge Planning:   [] Referral  [] Groups [] Health agencies  [] Other:          Sue Grant RN  12/03/17 0200

## 2017-12-03 NOTE — H&P
she has never smoked. She has never used smokeless tobacco.  Alcohol:      reports that she does not drink alcohol. Drug Use:  reports that she does not use drugs. Family History:     Family History   Problem Relation Age of Onset    Other Mother     Diabetes Mother     Heart Disease Mother     Arthritis Mother     Kidney Disease Mother     Arthritis Sister     Diabetes Brother     Asthma Brother        Review of Systems:     Positive and Negative as described in HPI. Review of Systems   Constitutional: Negative for chills and fever. Respiratory: Positive for shortness of breath. Negative for cough. Cardiovascular: Positive for chest pain and orthopnea. Negative for leg swelling. Gastrointestinal: Negative for abdominal pain, nausea and vomiting. Genitourinary: Negative for dysuria (had episode of dysuria but resolved). Musculoskeletal: Positive for back pain, joint pain and myalgias. Neurological: Positive for headaches. Negative for dizziness and loss of consciousness. Endo/Heme/Allergies: Bruises/bleeds easily. Psychiatric/Behavioral: Positive for depression. Physical Exam:   BP (!) 97/53   Pulse 63   Temp 97.7 °F (36.5 °C) (Oral)   Resp 15   Ht 5' 8\" (1.727 m)   Wt 292 lb 5.3 oz (132.6 kg)   SpO2 97%   BMI 44.45 kg/m²   Temp (24hrs), Av.7 °F (36.5 °C), Min:97.3 °F (36.3 °C), Max:98 °F (36.7 °C)    No results for input(s): POCGLU in the last 72 hours. No intake or output data in the 24 hours ending 17 [id number]    Physical Exam   Constitutional: She is oriented to person, place, and time. No distress. HENT:   Head: Normocephalic and atraumatic. Cardiovascular: Normal rate and regular rhythm. Exam reveals no friction rub. No murmur heard. Pulmonary/Chest: Effort normal and breath sounds normal. No respiratory distress. She has no wheezes. She has no rales. She exhibits no tenderness. Abdominal: Soft. Bowel sounds are normal. She exhibits no distension. There is no tenderness. There is no rebound and no guarding. Musculoskeletal: She exhibits tenderness (left calf. ). She exhibits no edema. Neurological: She is alert and oriented to person, place, and time. Skin: Skin is warm and dry. She is not diaphoretic. No erythema.        Investigations:      Laboratory Testing:  Recent Results (from the past 24 hour(s))   CBC WITH AUTO DIFFERENTIAL    Collection Time: 12/03/17  1:37 AM   Result Value Ref Range    WBC 7.5 3.5 - 11.3 k/uL    RBC 3.87 (L) 3.95 - 5.11 m/uL    Hemoglobin 10.8 (L) 11.9 - 15.1 g/dL    Hematocrit 34.8 (L) 36.3 - 47.1 %    MCV 89.9 82.6 - 102.9 fL    MCH 27.9 25.2 - 33.5 pg    MCHC 31.0 28.4 - 34.8 g/dL    RDW 15.9 (H) 11.8 - 14.4 %    Platelets 842 824 - 987 k/uL    MPV 10.6 8.1 - 13.5 fL    Differential Type NOT REPORTED     Seg Neutrophils 68 (H) 36 - 65 %    Lymphocytes 24 24 - 43 %    Monocytes 6 3 - 12 %    Eosinophils % 2 1 - 4 %    Basophils 0 0 - 2 %    Immature Granulocytes 0 0 %    Segs Absolute 5.03 1.50 - 8.10 k/uL    Absolute Lymph # 1.83 1.10 - 3.70 k/uL    Absolute Mono # 0.43 0.10 - 1.20 k/uL    Absolute Eos # 0.17 0.00 - 0.44 k/uL    Basophils # <0.03 0.00 - 0.20 k/uL    Absolute Immature Granulocyte 0.03 0.00 - 0.30 k/uL    WBC Morphology NOT REPORTED     RBC Morphology ANISOCYTOSIS PRESENT     Platelet Estimate NOT REPORTED    BASIC METABOLIC PANEL    Collection Time: 12/03/17  1:37 AM   Result Value Ref Range    Glucose 103 (H) 70 - 99 mg/dL    BUN 30 (H) 6 - 20 mg/dL    CREATININE 2.56 (H) 0.50 - 0.90 mg/dL    Bun/Cre Ratio NOT REPORTED 9 - 20    Calcium 8.1 (L) 8.6 - 10.4 mg/dL    Sodium 136 135 - 144 mmol/L    Potassium 3.2 (L) 3.7 - 5.3 mmol/L    Chloride 92 (L) 98 - 107 mmol/L    CO2 29 20 - 31 mmol/L    Anion Gap 15 9 - 17 mmol/L    GFR Non-African American 20 (L) >60 mL/min    GFR  24 (L) >60 mL/min    GFR Comment          GFR Staging NOT REPORTED    Brain Natriuretic Peptide    Collection Time: exam:->cp FINDINGS: Enlarged cardiac silhouette. Elevated right hemidiaphragm. No focal lung consolidation. No pneumothorax or significant pleural effusion. Thoracic spondylosis present. Cardiomegaly. Elevated right hemidiaphragm, correlate for phrenic nerve palsy. No radiographic evidence of acute pulmonary disease. Nm Lung Vent/perfusion (vq)    Result Date: 12/3/2017  EXAMINATION: NUCLEAR MEDICINE VENTILATION PERFUSION SCAN. 12/3/2017 TECHNIQUE: 40 millicuries aerosolized Tc99m PYP was administered via mask prior to planar imaging of the lungs in multiple projections. Then, 6 millicuries of Tc 65J MAA was administered intravenously prior to planar imaging of the lungs in similar projections. COMPARISON: Chest radiograph . HISTORY: ORDERING SYSTEM PROVIDED HISTORY: CHEST PAIN, ACUTE, PULMONARY EMBOLISM SUSPECTED TECHNOLOGIST PROVIDED HISTORY: Ordering Physician Provided Reason for Exam: chest pain, short of breath, bilat leg pain, May 2017 back surg, nonsmoker, CHF FINDINGS: PERFUSION: Distribution of radiotracer is homogenous. No segmental defects identified. VENTILATION: Ventilation images are unremarkable. CHEST RADIOGRAPH: No focal areas of consolidation or significant effusions on recent chest radiograph. Low Probability for Pulmonary Embolus. Us Retroperitoneal Complete    Result Date: 12/3/2017  EXAMINATION: RETROPERITONEAL ULTRASOUND OF THE KIDNEYS AND URINARY BLADDER 12/3/2017 COMPARISON: None HISTORY: ORDERING SYSTEM PROVIDED HISTORY: RENAL FAILURE, ACUTE (KIDNEY INJURY) FINDINGS: Per technologist note, examination was technically difficult and limited due to patient body habitus and patient positioning. Kidneys: The right kidney measures 10.1 cm in length and the left kidney measures 15.8 cm in length. Kidneys demonstrate normal cortical echogenicity. No evidence of hydronephrosis or intrarenal stones.   There is a cystic lesion within the lower pole of the left kidney measuring 2.8 x 3.1 x 3.0 cm. There is a prominent column of Deepak versus duplicated collecting system on the right. Bladder: Patient was unable to void for the examination. The urinary bladder demonstrates no discrete abnormality. No evidence for intrarenal calculi or hydronephrosis. Left renal cyst measuring 3.1 cm in greatest dimension. Asymmetric renal size with the left kidney appearing larger than the right. Assessment :      Primary Problem  Chest pain    Active Hospital Problems    Diagnosis Date Noted    Chest pain [R07.9] 12/03/2017    SUSU (acute kidney injury) ([facility] [address] [N17.9] 12/03/2017    Suicidal ideation [R45.851] 12/03/2017       Plan:     Patient status Admit as inpatient in the  Progressive Unit/Step down    1. Atypical chest pain  - trop negative x1   - will trend trop 2 more times  - will need to obtain record from her cardiology     2. SUSU   - UA WNL, renal US unremarkable   - on bumex 4 mg bid. Also on hydralazine, isosorbid dinitrate, entresto, lopressor, aldactone. - likely prerenal : patient was started on BP meds and BP dropped to 97/53.   - hold bumex, entresto, aldactone. - will repeat bmp as pt received bolus in ED and was put on gentle hydration @ 50ml/hr   - nephrology consulted     3. Elevated d- dimer  - was on high dose heparin gtt   - VQ scan negative   - will stop heparin gtt, start heparin sq   - venous doppler ordered. 4. Suicidal ideation  - depression, PTSD, chronic pain  - continue home meds  - psychiatry consulted   - continue 1:1 for now. Consultations:   IP CONSULT TO HOSPITALIST  IP CONSULT TO NEPHROLOGY  IP CONSULT TO PSYCHIATRY     Patient is admitted as inpatient status because of co-morbidities listed above, severity of signs and symptoms as outlined, requirement for current medical therapies and most importantly because of direct risk to patient if care not provided in a hospital setting.     Greer Church MD  12/3/2017  3:48 PM    Copy sent to Dr. Desiree Hughes

## 2017-12-04 LAB
ALBUMIN SERPL-MCNC: 3.2 G/DL (ref 3.5–5.2)
ALBUMIN/GLOBULIN RATIO: 1 (ref 1–2.5)
ALP BLD-CCNC: 138 U/L (ref 35–104)
ALT SERPL-CCNC: 11 U/L (ref 5–33)
ANION GAP SERPL CALCULATED.3IONS-SCNC: 13 MMOL/L (ref 9–17)
AST SERPL-CCNC: 14 U/L
BILIRUB SERPL-MCNC: 0.26 MG/DL (ref 0.3–1.2)
BUN BLDV-MCNC: 25 MG/DL (ref 6–20)
BUN/CREAT BLD: ABNORMAL (ref 9–20)
CALCIUM SERPL-MCNC: 8.2 MG/DL (ref 8.6–10.4)
CHLORIDE BLD-SCNC: 101 MMOL/L (ref 98–107)
CHOLESTEROL/HDL RATIO: 4.2
CHOLESTEROL: 122 MG/DL
CO2: 29 MMOL/L (ref 20–31)
COMPLEMENT C3: 160 MG/DL (ref 90–180)
COMPLEMENT C4: 39 MG/DL (ref 10–40)
CREAT SERPL-MCNC: 1.45 MG/DL (ref 0.5–0.9)
FREE KAPPA/LAMBDA RATIO: 2.26 (ref 0.26–1.65)
GFR AFRICAN AMERICAN: 46 ML/MIN
GFR NON-AFRICAN AMERICAN: 38 ML/MIN
GFR SERPL CREATININE-BSD FRML MDRD: ABNORMAL ML/MIN/{1.73_M2}
GFR SERPL CREATININE-BSD FRML MDRD: ABNORMAL ML/MIN/{1.73_M2}
GLUCOSE BLD-MCNC: 101 MG/DL (ref 70–99)
HCT VFR BLD CALC: 30.2 % (ref 36.3–47.1)
HDLC SERPL-MCNC: 29 MG/DL
HEMOGLOBIN: 9.3 G/DL (ref 11.9–15.1)
KAPPA FREE LIGHT CHAINS QNT: 5.43 MG/DL (ref 0.37–1.94)
LAMBDA FREE LIGHT CHAINS QNT: 2.4 MG/DL (ref 0.57–2.63)
LDL CHOLESTEROL: 63 MG/DL (ref 0–130)
MCH RBC QN AUTO: 28 PG (ref 25.2–33.5)
MCHC RBC AUTO-ENTMCNC: 30.8 G/DL (ref 28.4–34.8)
MCV RBC AUTO: 91 FL (ref 82.6–102.9)
MYOGLOBIN: 62 NG/ML (ref 25–58)
PDW BLD-RTO: 16.1 % (ref 11.8–14.4)
PLATELET # BLD: 219 K/UL (ref 138–453)
PMV BLD AUTO: 10.8 FL (ref 8.1–13.5)
POTASSIUM SERPL-SCNC: 3.5 MMOL/L (ref 3.7–5.3)
RBC # BLD: 3.32 M/UL (ref 3.95–5.11)
SODIUM BLD-SCNC: 143 MMOL/L (ref 135–144)
TOTAL PROTEIN: 6.5 G/DL (ref 6.4–8.3)
TRIGL SERPL-MCNC: 149 MG/DL
TROPONIN INTERP: ABNORMAL
TROPONIN T: <0.03 NG/ML
VLDLC SERPL CALC-MCNC: ABNORMAL MG/DL (ref 1–30)
WBC # BLD: 4.9 K/UL (ref 3.5–11.3)

## 2017-12-04 PROCEDURE — 83874 ASSAY OF MYOGLOBIN: CPT

## 2017-12-04 PROCEDURE — 6370000000 HC RX 637 (ALT 250 FOR IP): Performed by: INTERNAL MEDICINE

## 2017-12-04 PROCEDURE — 86160 COMPLEMENT ANTIGEN: CPT

## 2017-12-04 PROCEDURE — 6370000000 HC RX 637 (ALT 250 FOR IP): Performed by: FAMILY MEDICINE

## 2017-12-04 PROCEDURE — 36415 COLL VENOUS BLD VENIPUNCTURE: CPT

## 2017-12-04 PROCEDURE — 6370000000 HC RX 637 (ALT 250 FOR IP): Performed by: NURSE PRACTITIONER

## 2017-12-04 PROCEDURE — 80053 COMPREHEN METABOLIC PANEL: CPT

## 2017-12-04 PROCEDURE — 99232 SBSQ HOSP IP/OBS MODERATE 35: CPT | Performed by: INTERNAL MEDICINE

## 2017-12-04 PROCEDURE — 6360000002 HC RX W HCPCS: Performed by: FAMILY MEDICINE

## 2017-12-04 PROCEDURE — 84156 ASSAY OF PROTEIN URINE: CPT

## 2017-12-04 PROCEDURE — G0008 ADMIN INFLUENZA VIRUS VAC: HCPCS | Performed by: FAMILY MEDICINE

## 2017-12-04 PROCEDURE — 94762 N-INVAS EAR/PLS OXIMTRY CONT: CPT

## 2017-12-04 PROCEDURE — 2580000003 HC RX 258: Performed by: NURSE PRACTITIONER

## 2017-12-04 PROCEDURE — 83883 ASSAY NEPHELOMETRY NOT SPEC: CPT

## 2017-12-04 PROCEDURE — 2060000000 HC ICU INTERMEDIATE R&B

## 2017-12-04 PROCEDURE — 84484 ASSAY OF TROPONIN QUANT: CPT

## 2017-12-04 PROCEDURE — 83516 IMMUNOASSAY NONANTIBODY: CPT

## 2017-12-04 PROCEDURE — 86038 ANTINUCLEAR ANTIBODIES: CPT

## 2017-12-04 PROCEDURE — 85027 COMPLETE CBC AUTOMATED: CPT

## 2017-12-04 PROCEDURE — 82570 ASSAY OF URINE CREATININE: CPT

## 2017-12-04 PROCEDURE — 80061 LIPID PANEL: CPT

## 2017-12-04 PROCEDURE — 93970 EXTREMITY STUDY: CPT

## 2017-12-04 PROCEDURE — 90686 IIV4 VACC NO PRSV 0.5 ML IM: CPT | Performed by: FAMILY MEDICINE

## 2017-12-04 RX ORDER — BUMETANIDE 1 MG/1
2 TABLET ORAL DAILY
Status: DISCONTINUED | OUTPATIENT
Start: 2017-12-04 | End: 2017-12-05 | Stop reason: HOSPADM

## 2017-12-04 RX ADMIN — INFLUENZA VIRUS VACCINE 0.5 ML: 15; 15; 15; 15 SUSPENSION INTRAMUSCULAR at 11:03

## 2017-12-04 RX ADMIN — BUMETANIDE 2 MG: 1 TABLET ORAL at 18:21

## 2017-12-04 RX ADMIN — HYDROCODONE BITARTRATE AND ACETAMINOPHEN 1 TABLET: 5; 325 TABLET ORAL at 16:01

## 2017-12-04 RX ADMIN — PANTOPRAZOLE SODIUM 20 MG: 20 TABLET, DELAYED RELEASE ORAL at 09:05

## 2017-12-04 RX ADMIN — CLOPIDOGREL 75 MG: 75 TABLET, FILM COATED ORAL at 09:05

## 2017-12-04 RX ADMIN — HYDRALAZINE HYDROCHLORIDE 25 MG: 25 TABLET, FILM COATED ORAL at 18:22

## 2017-12-04 RX ADMIN — HYDROCODONE BITARTRATE AND ACETAMINOPHEN 1 TABLET: 5; 325 TABLET ORAL at 05:24

## 2017-12-04 RX ADMIN — Medication 10 ML: at 09:06

## 2017-12-04 RX ADMIN — OXYCODONE HYDROCHLORIDE 15 MG: 5 TABLET ORAL at 09:05

## 2017-12-04 RX ADMIN — ISOSORBIDE DINITRATE 10 MG: 10 TABLET ORAL at 00:25

## 2017-12-04 RX ADMIN — ISOSORBIDE DINITRATE 10 MG: 10 TABLET ORAL at 20:45

## 2017-12-04 RX ADMIN — POTASSIUM CHLORIDE 40 MEQ: 20 TABLET, EXTENDED RELEASE ORAL at 09:05

## 2017-12-04 RX ADMIN — ASPIRIN 81 MG 81 MG: 81 TABLET ORAL at 09:05

## 2017-12-04 RX ADMIN — HEPARIN SODIUM 5000 UNITS: 5000 INJECTION, SOLUTION INTRAVENOUS; SUBCUTANEOUS at 05:23

## 2017-12-04 RX ADMIN — HEPARIN SODIUM 5000 UNITS: 5000 INJECTION, SOLUTION INTRAVENOUS; SUBCUTANEOUS at 15:10

## 2017-12-04 RX ADMIN — ISOSORBIDE DINITRATE 10 MG: 10 TABLET ORAL at 09:05

## 2017-12-04 RX ADMIN — Medication 10 ML: at 20:46

## 2017-12-04 RX ADMIN — TRAZODONE HYDROCHLORIDE 150 MG: 100 TABLET ORAL at 20:45

## 2017-12-04 RX ADMIN — HEPARIN SODIUM 5000 UNITS: 5000 INJECTION, SOLUTION INTRAVENOUS; SUBCUTANEOUS at 20:45

## 2017-12-04 RX ADMIN — HYDRALAZINE HYDROCHLORIDE 25 MG: 25 TABLET, FILM COATED ORAL at 09:05

## 2017-12-04 RX ADMIN — METOPROLOL TARTRATE 50 MG: 50 TABLET, FILM COATED ORAL at 09:05

## 2017-12-04 RX ADMIN — TRAZODONE HYDROCHLORIDE 100 MG: 100 TABLET ORAL at 01:11

## 2017-12-04 ASSESSMENT — PAIN SCALES - GENERAL
PAINLEVEL_OUTOF10: 9
PAINLEVEL_OUTOF10: 8
PAINLEVEL_OUTOF10: 9

## 2017-12-04 NOTE — CONSULTS
Nephrology Consult Note    Reason for Consult:  Acute renal failure  Requesting Physician:  Shanelle Mcgovern    Chief Complaint:  Chest pain, weakness tingling on left side    History Obtained From:  patient    History of Present Illness: This is a 45 y.o. female with history of HTN, NSTEMI in July 2017 who presented to the hospital for evaluation of  Chest pain. Sudden onset squeezing, couldn't describe pain, central, 10/10 chest pain radiating to back. Patient was resting at that time. Associated with SOB, palpitations. Drove for over 4 hours from [place] yesterday. Did not get CTA chest to r/o PE as creatinine is elevated. Received 324 mg aspirin via EMS. Also complained of weakness, tingling, numbness on right side, which improved upon arrival. Chest pain also improved upon arrival. Denied any nausea, vomiting, diarrhea, abdominal pain, fever, chills, symptoms of URTI. No h/o of cath as stress test was negative at that time. Creatinine upon arrival is 2.56, received 1 liter bolus in ER. VQ scan low probability for PE, USG retoperitoneum no hydronephrosis or intra renal calculi. Pt has hx of heavy or prolonged NSAID use for many years, stopped taking ibuprofen 800 mg this summer. Urine output decreased for past couple of days as per patient. She is on fluid restriction at home but doesn't remember how much it is. She takes bumex 4 mg at home, aldactone 25 mg, entresto, hydralazine 25 mg.  BP have been low in hospital.    There is no history of blood or bone marrow disorders. There is no hx of jaundice or hepatitis or sexually transmitted disease. Pt has no hx of collagen vascular disease or vasculitis. No history of dysuria or frequency. No recent procedures involving IV contrast. There is no hx of paraprotein disease. Pt denies any hx of recurrent UTI , incontinence or recurrent nephrolithiasis.  Denied any kidney disease or seeing a nephrologist in the past    Past Medical History: Diagnosis Date    Arthritis     [facility]F [facility]congest[facility] heart failure) ([facility] [address]     Hypertension        Past Surgical History:        Procedure Laterality Date    BACK SURGERY      CHOLECYSTECTOMY, LAPAROSCOPIC      HYSTERECTOMY      KNEE SURGERY Left 2009    SHOULDER SURGERY Right 2009       Current Medications:      traZODone (DESYREL) tablet 150 mg Nightly   sodium chloride flush 0.9 % injection 10 mL 2 times per day   sodium chloride flush 0.9 % injection 10 mL PRN   acetaminophen (TYLENOL) tablet 650 mg Q4H PRN   HYDROcodone-acetaminophen (NORCO) 5-325 MG per tablet 1 tablet Q4H PRN   morphine injection 2 mg Q2H PRN   Or    morphine (PF) injection 4 mg Q2H PRN   docusate sodium (COLACE) capsule 100 mg BID   bisacodyl (DULCOLAX) suppository 10 mg Daily PRN   ondansetron (ZOFRAN) injection 4 mg Q6H PRN   nitroGLYCERIN (NITROSTAT) SL tablet 0.4 mg Q5 Min PRN   potassium chloride (KLOR-CON M) extended release tablet 40 mEq PRN   Or    potassium chloride 20 MEQ/15ML (10%) oral solution 40 mEq PRN   Or    potassium chloride 10 mEq/100 mL IVPB (Peripheral Line) PRN   hydrALAZINE (APRESOLINE) tablet 25 mg BID   isosorbide dinitrate (ISORDIL) tablet 10 mg BID   metoprolol tartrate (LOPRESSOR) tablet 50 mg Daily   oxyCODONE (ROXICODONE) immediate release tablet 15 mg Q6H PRN   pantoprazole (PROTONIX) tablet 20 mg Daily   tiZANidine (ZANAFLEX) tablet 4 mg Q8H PRN   albuterol sulfate  (90 Base) MCG/ACT inhaler 2 puff Q4H PRN   aspirin chewable tablet 81 mg Daily   clopidogrel (PLAVIX) tablet 75 mg Daily   heparin (porcine) injection 5,000 Units 3 times per day       Allergies:  Food; Gabapentin; and Tetracyclines & related    Social History:   Social History     Social History    Marital status: Single     Spouse name: N/A    Number of children: N/A    Years of education: N/A     Occupational History    Not on file.      Social History Main Topics    Smoking status: Never Smoker    Smokeless tobacco: Never anicteric  ENT: :no thrush no pharyngeal congestion    Neck: no carotid bruit ,no  JVD,no carotid Lymphadenopathy, noThyromegaly Pulmonary: no wheezing or rhonchi. No rales heard. Cardiovascular: Normal S1 & S2,  No S3 or  S4, no Pericardial Rub no Murmur   Abdomen: soft nontender, bowel sounds present, no organomegaly,  no ascites  Extremities:no cyanosis, clubbing ++ edema  Labs:   CBC:  Recent Labs      12/03/17   0137  12/03/17   0812  12/04/17   0643   WBC  7.5  8.6  4.9   RBC  3.87*  3.49*  3.32*   HGB  10.8*  9.7*  9.3*   HCT  34.8*  32.2*  30.2*   MCV  89.9  92.3  91.0   MCH  27.9  27.8  28.0   MCHC  31.0  30.1  30.8   RDW  15.9*  16.1*  16.1*   PLT  267  245  219   MPV  10.6  10.9  10.8      BMP: Recent Labs      12/03/17   0137 12/03/17   1913  12/04/17   0643   NA  136  137  143   K  3.2*  4.3  3.5*   CL  92*  96*  101   CO2  29  24  29   BUN  30*  29*  25*   CREATININE  2.56*  1.71*  1.45*   GLUCOSE  103*  118*  101*   CALCIUM  8.1*  8.1*  8.2*        Phosphorus:  No results for input(s): PHOS in the last 72 hours. Magnesium: No results for input(s): MG in the last 72 hours. Albumin:   Recent Labs      12/04/17   0643   LABALBU  3.2*       IRON:  No results found for: IRON  Iron Saturation:  No components found for: PERCENTFE  TIBC:  No results found for: TIBC  FERRITIN:  No results found for: FERRITIN  SPEP: Lab Results   Component Value Date    PROT 6.5 12/04/2017     UPEP: No results found for: TPU     C3: No results found for: C3  C4: No results found for: C4  MPO ANCA:  No results found for: MPO .   PR3 ANCA:  No results found for: PR3  Urine Sodium:    Lab Results   Component Value Date    OSVALDO 28 12/03/2017      Urine Creatinine:    Lab Results   Component Value Date    LABCREA 52.9 12/03/2017     Urine Eosinophils: No results found for: UREO  Urine Protein:  No results found for: TPU  Urinalysis:  U/A: Lab Results   Component Value Date    NITRU NEGATIVE 12/03/2017    COLORU YELLOW 12/03/2017

## 2017-12-04 NOTE — CONSULTS
Psychiatry Consult  Note     Reason for consult            \"Suicidal Ideation\"        Alvin Olivar is a 45 y.o. female admitted due to chest pain. Reportedly, during admission process patient answered screening questions in a way to trigger guard/suicide precautions. She explains that she was asked if she ever has had thoughts of ending her life and she answered honestly \"yes\" but states she tried to explain that this was years ago. Denies any current pervasive depressive symptoms indicating biologic mood illness. She describes frustration with medical issues, managing health care system, and workers comp. She denies adamantly any recent thoughts of harming herself or not wanting to be alive. Denied symptoms consistent with episodes of elizabeth/hypomania. Denied hallucinations or paranoia. Past Medical History:   Diagnosis Date    Arthritis     [facility] heart failure) ([facility] [address]     Hypertension           History of Substance Abuse     Patient denies recent use of alcohol or illicit drugs. MENTAL STATUS EXAMINATION  Behavior and Cooperation: Cooperative  Appearance: stated age      Eye Contact: good  Psychomotor Agitation:  no,  Speech:  fluent and spontaneous without dysarthric features  Mood:  Denies depression  Affect: bright, smiling, appropriately reactive  Thought process Organized  Suicide Ideations: denies  Homicide Ideations:denies  Orientation: person place time   Memory: recent and remote memory intact       DIAGNOSTIC IMPRESSION     PTSD      Treatment Recommendations    There are no indications for psychotropic medications. I discontinued orders for suicide precautions    Patient does not need inpatient psychiatric hospitalization. Patient can be discharged when medically stable. Patient could benefit from  assistance in getting linked for talk therapy.      traZODone  150 mg Oral Nightly    sodium chloride flush  10 mL Intravenous 2 times per day    docusate sodium  100 mg Oral BID    influenza virus vaccine  0.5 mL Intramuscular Once    hydrALAZINE  25 mg Oral BID    isosorbide dinitrate  10 mg Oral BID    metoprolol tartrate  50 mg Oral Daily    pantoprazole  20 mg Oral Daily    aspirin  81 mg Oral Daily    clopidogrel  75 mg Oral Daily    heparin (porcine)  5,000 Units Subcutaneous 3 times per day     sodium chloride flush, acetaminophen, HYDROcodone 5 mg - acetaminophen, morphine **OR** morphine, bisacodyl, ondansetron, nitroGLYCERIN, potassium chloride **OR** potassium chloride **OR** potassium chloride, oxyCODONE, tiZANidine, albuterol sulfate HFA    Brandi Yuan MD  Psychiatrist.

## 2017-12-04 NOTE — PLAN OF CARE
Problem: Falls - Risk of  Goal: Absence of falls  Outcome: Ongoing      Problem: Pain:  Goal: Pain level will decrease  Pain level will decrease   Outcome: Ongoing      Problem: Suicide risk  Goal: Provide patient with safe environment  Provide patient with safe environment   Outcome: Ongoing  Pt resting with eyes closed. Guard at bedside. No injuries at this time. Will continue to monitor.

## 2017-12-04 NOTE — PROGRESS NOTES
Smoking Cessation - topics covered   []  Health Risks  []  Benefits of Quitting   []  Smoking Cessation  [x]  Patient has no history of tobacco use  []  Patient is former smoker. [x]  No need for tobacco cessation education. []  Booklet given  []  Patient verbalizes understanding. []  Patient denies need for tobacco cessation education.   Alejandro Aguilar  8:35 AM

## 2017-12-05 VITALS
TEMPERATURE: 97.8 F | OXYGEN SATURATION: 98 % | HEIGHT: 68 IN | DIASTOLIC BLOOD PRESSURE: 50 MMHG | BODY MASS INDEX: 44.41 KG/M2 | WEIGHT: 293 LBS | SYSTOLIC BLOOD PRESSURE: 112 MMHG | HEART RATE: 54 BPM | RESPIRATION RATE: 18 BRPM

## 2017-12-05 PROBLEM — R45.851 SUICIDAL IDEATION: Status: RESOLVED | Noted: 2017-12-03 | Resolved: 2017-12-05

## 2017-12-05 PROBLEM — R07.9 CHEST PAIN: Status: RESOLVED | Noted: 2017-12-03 | Resolved: 2017-12-05

## 2017-12-05 LAB
ABSOLUTE EOS #: 0.16 K/UL (ref 0–0.44)
ABSOLUTE IMMATURE GRANULOCYTE: <0.03 K/UL (ref 0–0.3)
ABSOLUTE LYMPH #: 1.99 K/UL (ref 1.1–3.7)
ABSOLUTE MONO #: 0.3 K/UL (ref 0.1–1.2)
ANION GAP SERPL CALCULATED.3IONS-SCNC: 12 MMOL/L (ref 9–17)
ANTI-NUCLEAR ANTIBODY (ANA): NEGATIVE
BASOPHILS # BLD: 0 % (ref 0–2)
BASOPHILS ABSOLUTE: <0.03 K/UL (ref 0–0.2)
BUN BLDV-MCNC: 23 MG/DL (ref 6–20)
BUN/CREAT BLD: ABNORMAL (ref 9–20)
CALCIUM SERPL-MCNC: 8.3 MG/DL (ref 8.6–10.4)
CHLORIDE BLD-SCNC: 103 MMOL/L (ref 98–107)
CO2: 29 MMOL/L (ref 20–31)
CREAT SERPL-MCNC: 1.27 MG/DL (ref 0.5–0.9)
CREATININE URINE: 37.3 MG/DL (ref 28–217)
DIFFERENTIAL TYPE: ABNORMAL
EOSINOPHILS RELATIVE PERCENT: 3 % (ref 1–4)
GFR AFRICAN AMERICAN: 53 ML/MIN
GFR NON-AFRICAN AMERICAN: 44 ML/MIN
GFR SERPL CREATININE-BSD FRML MDRD: ABNORMAL ML/MIN/{1.73_M2}
GFR SERPL CREATININE-BSD FRML MDRD: ABNORMAL ML/MIN/{1.73_M2}
GLUCOSE BLD-MCNC: 114 MG/DL (ref 70–99)
HCT VFR BLD CALC: 30.4 % (ref 36.3–47.1)
HEMOGLOBIN: 9.3 G/DL (ref 11.9–15.1)
IMMATURE GRANULOCYTES: 0 %
LYMPHOCYTES # BLD: 35 % (ref 24–43)
MAGNESIUM: 1.8 MG/DL (ref 1.6–2.6)
MCH RBC QN AUTO: 27.4 PG (ref 25.2–33.5)
MCHC RBC AUTO-ENTMCNC: 30.6 G/DL (ref 28.4–34.8)
MCV RBC AUTO: 89.7 FL (ref 82.6–102.9)
MONOCYTES # BLD: 5 % (ref 3–12)
PDW BLD-RTO: 16.3 % (ref 11.8–14.4)
PLATELET # BLD: 215 K/UL (ref 138–453)
PLATELET ESTIMATE: ABNORMAL
PMV BLD AUTO: 10.5 FL (ref 8.1–13.5)
POTASSIUM SERPL-SCNC: 3.3 MMOL/L (ref 3.7–5.3)
RBC # BLD: 3.39 M/UL (ref 3.95–5.11)
RBC # BLD: ABNORMAL 10*6/UL
SEG NEUTROPHILS: 57 % (ref 36–65)
SEGMENTED NEUTROPHILS ABSOLUTE COUNT: 3.22 K/UL (ref 1.5–8.1)
SODIUM BLD-SCNC: 144 MMOL/L (ref 135–144)
TOTAL PROTEIN, URINE: 5 MG/DL
WBC # BLD: 5.7 K/UL (ref 3.5–11.3)
WBC # BLD: ABNORMAL 10*3/UL

## 2017-12-05 PROCEDURE — 83735 ASSAY OF MAGNESIUM: CPT

## 2017-12-05 PROCEDURE — 99239 HOSP IP/OBS DSCHRG MGMT >30: CPT | Performed by: INTERNAL MEDICINE

## 2017-12-05 PROCEDURE — 6370000000 HC RX 637 (ALT 250 FOR IP): Performed by: FAMILY MEDICINE

## 2017-12-05 PROCEDURE — 80048 BASIC METABOLIC PNL TOTAL CA: CPT

## 2017-12-05 PROCEDURE — 6370000000 HC RX 637 (ALT 250 FOR IP): Performed by: NURSE PRACTITIONER

## 2017-12-05 PROCEDURE — 36415 COLL VENOUS BLD VENIPUNCTURE: CPT

## 2017-12-05 PROCEDURE — 6360000002 HC RX W HCPCS: Performed by: FAMILY MEDICINE

## 2017-12-05 PROCEDURE — 6370000000 HC RX 637 (ALT 250 FOR IP): Performed by: INTERNAL MEDICINE

## 2017-12-05 PROCEDURE — 85025 COMPLETE CBC W/AUTO DIFF WBC: CPT

## 2017-12-05 RX ORDER — BUMETANIDE 1 MG/1
2 TABLET ORAL DAILY
Qty: 30 TABLET | Refills: 3 | Status: SHIPPED | OUTPATIENT
Start: 2017-12-05 | End: 2019-04-09

## 2017-12-05 RX ADMIN — HYDROCODONE BITARTRATE AND ACETAMINOPHEN 1 TABLET: 5; 325 TABLET ORAL at 06:59

## 2017-12-05 RX ADMIN — BUMETANIDE 2 MG: 1 TABLET ORAL at 08:26

## 2017-12-05 RX ADMIN — POTASSIUM CHLORIDE 40 MEQ: 20 TABLET, EXTENDED RELEASE ORAL at 06:55

## 2017-12-05 RX ADMIN — DOCUSATE SODIUM 100 MG: 100 CAPSULE ORAL at 08:26

## 2017-12-05 RX ADMIN — PANTOPRAZOLE SODIUM 20 MG: 20 TABLET, DELAYED RELEASE ORAL at 08:26

## 2017-12-05 RX ADMIN — ASPIRIN 81 MG 81 MG: 81 TABLET ORAL at 08:26

## 2017-12-05 RX ADMIN — HYDRALAZINE HYDROCHLORIDE 25 MG: 25 TABLET, FILM COATED ORAL at 08:26

## 2017-12-05 RX ADMIN — HEPARIN SODIUM 5000 UNITS: 5000 INJECTION, SOLUTION INTRAVENOUS; SUBCUTANEOUS at 05:30

## 2017-12-05 RX ADMIN — CLOPIDOGREL 75 MG: 75 TABLET, FILM COATED ORAL at 08:26

## 2017-12-05 RX ADMIN — ISOSORBIDE DINITRATE 10 MG: 10 TABLET ORAL at 08:26

## 2017-12-05 RX ADMIN — METOPROLOL TARTRATE 50 MG: 50 TABLET, FILM COATED ORAL at 08:26

## 2017-12-05 RX ADMIN — HYDROCODONE BITARTRATE AND ACETAMINOPHEN 1 TABLET: 5; 325 TABLET ORAL at 02:34

## 2017-12-05 ASSESSMENT — PAIN SCALES - GENERAL
PAINLEVEL_OUTOF10: 8
PAINLEVEL_OUTOF10: 8

## 2017-12-05 NOTE — PLAN OF CARE
[facility]   [address]    Second Visit Note  For more detailed information please refer to the progress note of the day      12/4/2017    8:05 PM    Name:   Karo Storm  MRN:     [id number]     Royal:      [de-identified]   Room:   [facility]25-01   Day:  1  Admit Date:  12/3/2017  1:05 AM    PCP:   No primary care provider on file. Code Status:  Full Code        Pt vitals were reviewed   New labs were reviewed   Patient was seen    Updated plan :     1. Awaiting patient's B/L LE duplex U/S results (ordered as patient's D Dimer was elevated). 2. Changed diet to regular/general from cardiac, as per patient's request.  3. No further complaints at this time.          Tristan Tran MD  12/4/2017  8:05 PM

## 2017-12-05 NOTE — PROGRESS NOTES
Discharge instruction given to patient. Copies of discharge instructions given to patient  And outpatient pharmacy called regarding RX for bumex. Message left for outpatient pharmacy.   Primary RN update that discharged instructions given and still needs bumex RX

## 2017-12-05 NOTE — DISCHARGE SUMMARY
requires Bumex 4 mg bid to keep her weight down. Patient states she has been on bumex 4 mg bid since August.       Patient is on 1:1 as patient mentioned that she does think about hurting herself as she has PTSD and in chronic pain. Significant therapeutic interventions:     Atypical chest pain, likely 2/2 Coronary Vasospasm brought on by stress. Trop negative x 3. EKG unremarkable. Spoke with cardiologist, Dr. Luanne Lee, in [place], [place]. He was kind enough to review her cardiac history with me. According to Dr. Kd Hooker, no history of CAD/cardiac ischemia. No stent placement in past. According to him, he did not prescribe Plavix, but she was on this medication at the time of evaluation in October. She had a stress test in March, which was normal. She has a h/o of low EF in the past and may have been considered for PM placement, however, she had a MUGA scan in October, which showed normal EF, with no perfusion defects. As trop has been negative, EKG show no ischemic changes, and chest pain has resolved, patient is cleared in terms of her chest pain to follow up with Dr. Kd Hooker in 1-2 weeks upon return to [place]. She may consider CCB therapy at that time, if indicated.     SUSU, improving. Was likely pre-renal. UA WNL, Renal ultrasound - No evidence for intrarenal calculi or hydronephrosis. Left renal cyst measuring 3.1 cm in greatest dimension. Asymmetric renal size with the left kidney appearing larger than the right. At home, was on bumex 4 mg bid. Also on hydralazine, isosorbid dinitrate, entresto, lopressor, aldactone. Of note, patient was started on BP meds and BP dropped to 97/53. For this reason, bumex, entresto, and aldactone were held. She was given a bolus in ED and gentle hydration was started at 50ml/hr. Nephrology on board. Avoid nephrotoxic agents. Will d/c entresto on discharge.      Elevated d-dimer. Was initially on high dose heparin gtt. VQ scan negative. Heparin gtt discontinued.  Venous 12/05/2017    CREATININE 1.27 12/05/2017    ANIONGAP 12 12/05/2017    ALKPHOS 138 12/04/2017    ALT 11 12/04/2017    AST 14 12/04/2017    BILITOT 0.26 12/04/2017    LABALBU 3.2 12/04/2017    ALBUMIN 1.0 12/04/2017    LABGLOM 44 12/05/2017    GFRAA 53 12/05/2017    GFR      12/05/2017    GFR NOT REPORTED 12/05/2017    PROT 6.5 12/04/2017    CALCIUM 8.3 12/05/2017     PT/INR:  No results found for: PTINR  U/A:    Lab Results   Component Value Date    COLORU YELLOW 12/03/2017    TURBIDITY CLEAR 12/03/2017    SPECGRAV 1.006 12/03/2017    HGBUR NEGATIVE 12/03/2017    PHUR 5.5 12/03/2017    PROTEINU NEGATIVE 12/03/2017    GLUCOSEU NEGATIVE 12/03/2017    KETUA NEGATIVE 12/03/2017    BILIRUBINUR NEGATIVE 12/03/2017    UROBILINOGEN Normal 12/03/2017    NITRU NEGATIVE 12/03/2017    LEUKOCYTESUR NEGATIVE 12/03/2017       Radiology:    EXAMINATION: TWO VIEWS OF THE CHEST   12/3/2017 2:41 am  Impression Cardiomegaly.   Elevated right hemidiaphragm, correlate for phrenic nerve palsy.   No radiographic evidence of acute pulmonary disease.       EXAMINATION: RETROPERITONEAL ULTRASOUND OF THE KIDNEYS AND URINARY BLADDER   12/3/2017  Impression No evidence for intrarenal calculi or hydronephrosis.   Left renal cyst measuring 3.1 cm in greatest dimension.   Asymmetric renal size with the left kidney appearing larger than the right.     EXAMINATION: NUCLEAR MEDICINE VENTILATION PERFUSION SCAN. 12/3/2017    Impression Low Probability for Pulmonary Embolus. Vl Dup Lower Extremity Venous Bilateral    Result Date: 12/4/2017    [facility]  Vascular Lower Extremities DVT Study   Conclusions   Summary   Simultaneous real time imaging utilizing B-Mode, color doppler and  spectral waveform analysis was performed on bilateral lower extremities  for venous examination of the deep and superficial systems. Findings are:   No evidence of venous thrombus in the deep and superficial systems.          Consultations:    Consults:     Final on discharge is  31 mins in patient examination, evaluation, counseling as well as medication reconciliation, prescriptions for required medications, discharge plan and follow up. Electronically signed by   Debbie Lombardi MD  12/5/2017  3:32 PM      Thank you Dr. Aung Dykes primary care provider on file. for the opportunity to be involved in this patient's care.

## 2017-12-05 NOTE — PROGRESS NOTES
traZODone (DESYREL) tablet 150 mg Nightly   bumetanide (BUMEX) tablet 2 mg Daily   sodium chloride flush 0.9 % injection 10 mL 2 times per day   sodium chloride flush 0.9 % injection 10 mL PRN   acetaminophen (TYLENOL) tablet 650 mg Q4H PRN   HYDROcodone-acetaminophen (NORCO) 5-325 MG per tablet 1 tablet Q4H PRN   morphine injection 2 mg Q2H PRN   Or    morphine (PF) injection 4 mg Q2H PRN   docusate sodium (COLACE) capsule 100 mg BID   bisacodyl (DULCOLAX) suppository 10 mg Daily PRN   ondansetron (ZOFRAN) injection 4 mg Q6H PRN   nitroGLYCERIN (NITROSTAT) SL tablet 0.4 mg Q5 Min PRN   potassium chloride (KLOR-CON M) extended release tablet 40 mEq PRN   Or    potassium chloride 20 MEQ/15ML (10%) oral solution 40 mEq PRN   Or    potassium chloride 10 mEq/100 mL IVPB (Peripheral Line) PRN   hydrALAZINE (APRESOLINE) tablet 25 mg BID   isosorbide dinitrate (ISORDIL) tablet 10 mg BID   metoprolol tartrate (LOPRESSOR) tablet 50 mg Daily   oxyCODONE (ROXICODONE) immediate release tablet 15 mg Q6H PRN   pantoprazole (PROTONIX) tablet 20 mg Daily   tiZANidine (ZANAFLEX) tablet 4 mg Q8H PRN   albuterol sulfate  (90 Base) MCG/ACT inhaler 2 puff Q4H PRN   aspirin chewable tablet 81 mg Daily   clopidogrel (PLAVIX) tablet 75 mg Daily   heparin (porcine) injection 5,000 Units 3 times per day         LABS      CBC: Recent Labs      12/03/17   0812  12/04/17   0643  12/05/17   0529   WBC  8.6  4.9  5.7   RBC  3.49*  3.32*  3.39*   HGB  9.7*  9.3*  9.3*   HCT  32.2*  30.2*  30.4*   MCV  92.3  91.0  89.7   MCH  27.8  28.0  27.4   MCHC  30.1  30.8  30.6   RDW  16.1*  16.1*  16.3*   PLT  245  219  215   MPV  10.9  10.8  10.5      BMP: Recent Labs      12/03/17   1913  12/04/17   0643  12/05/17   0529   NA  137  143  144   K  4.3  3.5*  3.3*   CL  96*  101  103   CO2  24  29  29   BUN  29*  25*  23*   CREATININE  1.71*  1.45*  1.27*   GLUCOSE  118*  101*  114*   CALCIUM  8.1*  8.2*  8.3*      BNP:No results found for: 1. Restart home Aldactone dose   2. Keep off Entresto  3. Stable lower dose of Bumex to be continued as ordered  4. Follow up with PCP and Cardiologist  In [place] after discharge. She likely does not need a Nephrology follow up post discharge. Please do not hesitate to call with questions.     Electronically signed by Melia Shay MD on 12/5/2017 at 1:32 PM
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
## 2017-12-08 LAB
ANCA MYELOPEROXIDASE: 12 AU/ML
ANCA PROTEINASE 3: 12 AU/ML

## 2019-04-09 ENCOUNTER — OFFICE VISIT (OUTPATIENT)
Dept: FAMILY MEDICINE CLINIC | Age: 54
End: 2019-04-09
Payer: MEDICARE

## 2019-04-09 VITALS
BODY MASS INDEX: 47.09 KG/M2 | HEART RATE: 95 BPM | HEIGHT: 66 IN | OXYGEN SATURATION: 96 % | RESPIRATION RATE: 16 BRPM | SYSTOLIC BLOOD PRESSURE: 130 MMHG | WEIGHT: 293 LBS | DIASTOLIC BLOOD PRESSURE: 78 MMHG | TEMPERATURE: 97.3 F

## 2019-04-09 DIAGNOSIS — M54.5 CHRONIC MIDLINE LOW BACK PAIN, WITH SCIATICA PRESENCE UNSPECIFIED: ICD-10-CM

## 2019-04-09 DIAGNOSIS — J45.20 MILD INTERMITTENT ASTHMA WITHOUT COMPLICATION: ICD-10-CM

## 2019-04-09 DIAGNOSIS — Z13.0 SCREENING FOR DEFICIENCY ANEMIA: ICD-10-CM

## 2019-04-09 DIAGNOSIS — Z11.59 NEED FOR HEPATITIS C SCREENING TEST: ICD-10-CM

## 2019-04-09 DIAGNOSIS — Z11.4 SCREENING FOR HIV (HUMAN IMMUNODEFICIENCY VIRUS): ICD-10-CM

## 2019-04-09 DIAGNOSIS — G89.29 CHRONIC MIDLINE LOW BACK PAIN, WITH SCIATICA PRESENCE UNSPECIFIED: ICD-10-CM

## 2019-04-09 DIAGNOSIS — M19.90 CHRONIC OSTEOARTHRITIS: ICD-10-CM

## 2019-04-09 DIAGNOSIS — F11.982: ICD-10-CM

## 2019-04-09 DIAGNOSIS — K21.9 GASTROESOPHAGEAL REFLUX DISEASE, ESOPHAGITIS PRESENCE NOT SPECIFIED: ICD-10-CM

## 2019-04-09 DIAGNOSIS — K59.03 THERAPEUTIC OPIOID-INDUCED CONSTIPATION (OIC): ICD-10-CM

## 2019-04-09 DIAGNOSIS — J30.9 ALLERGIC RHINITIS, UNSPECIFIED SEASONALITY, UNSPECIFIED TRIGGER: ICD-10-CM

## 2019-04-09 DIAGNOSIS — I50.9 CHRONIC CONGESTIVE HEART FAILURE, UNSPECIFIED HEART FAILURE TYPE (HCC): Primary | ICD-10-CM

## 2019-04-09 DIAGNOSIS — Z13.29 SCREENING FOR THYROID DISORDER: ICD-10-CM

## 2019-04-09 DIAGNOSIS — T40.2X5A THERAPEUTIC OPIOID-INDUCED CONSTIPATION (OIC): ICD-10-CM

## 2019-04-09 DIAGNOSIS — E78.5 HYPERLIPIDEMIA, UNSPECIFIED HYPERLIPIDEMIA TYPE: ICD-10-CM

## 2019-04-09 DIAGNOSIS — F11.20 OPIOID DEPENDENCE, UNCOMPLICATED (HCC): ICD-10-CM

## 2019-04-09 DIAGNOSIS — F43.10 PTSD (POST-TRAUMATIC STRESS DISORDER): ICD-10-CM

## 2019-04-09 PROBLEM — M50.30 BULGE OF CERVICAL DISC WITHOUT MYELOPATHY: Status: ACTIVE | Noted: 2017-12-28

## 2019-04-09 PROBLEM — M54.50 LOW BACK PAIN: Status: ACTIVE | Noted: 2017-12-03

## 2019-04-09 PROBLEM — N17.9 AKI (ACUTE KIDNEY INJURY) (HCC): Status: RESOLVED | Noted: 2017-12-03 | Resolved: 2019-04-09

## 2019-04-09 PROCEDURE — 1036F TOBACCO NON-USER: CPT | Performed by: INTERNAL MEDICINE

## 2019-04-09 PROCEDURE — G8417 CALC BMI ABV UP PARAM F/U: HCPCS | Performed by: INTERNAL MEDICINE

## 2019-04-09 PROCEDURE — 3017F COLORECTAL CA SCREEN DOC REV: CPT | Performed by: INTERNAL MEDICINE

## 2019-04-09 PROCEDURE — 99203 OFFICE O/P NEW LOW 30 MIN: CPT | Performed by: INTERNAL MEDICINE

## 2019-04-09 PROCEDURE — G8427 DOCREV CUR MEDS BY ELIG CLIN: HCPCS | Performed by: INTERNAL MEDICINE

## 2019-04-09 RX ORDER — BUPROPION HYDROCHLORIDE 150 MG/1
150 TABLET ORAL EVERY MORNING
Qty: 90 TABLET | Refills: 1 | Status: SHIPPED | OUTPATIENT
Start: 2019-04-09 | End: 2019-10-26 | Stop reason: SDUPTHER

## 2019-04-09 RX ORDER — HYDRALAZINE HYDROCHLORIDE 25 MG/1
25 TABLET, FILM COATED ORAL 2 TIMES DAILY
Qty: 180 TABLET | Refills: 1 | Status: SHIPPED | OUTPATIENT
Start: 2019-04-09 | End: 2019-06-26 | Stop reason: DRUGHIGH

## 2019-04-09 RX ORDER — PANTOPRAZOLE SODIUM 40 MG/1
40 TABLET, DELAYED RELEASE ORAL DAILY
Qty: 90 TABLET | Refills: 1 | Status: SHIPPED | OUTPATIENT
Start: 2019-04-09 | End: 2019-07-16 | Stop reason: SDUPTHER

## 2019-04-09 RX ORDER — DULOXETIN HYDROCHLORIDE 60 MG/1
60 CAPSULE, DELAYED RELEASE ORAL DAILY
COMMUNITY
End: 2019-04-09 | Stop reason: SDUPTHER

## 2019-04-09 RX ORDER — OXYCODONE AND ACETAMINOPHEN 10; 325 MG/1; MG/1
1 TABLET ORAL EVERY 6 HOURS PRN
COMMUNITY
End: 2019-04-15 | Stop reason: SDUPTHER

## 2019-04-09 RX ORDER — TRAZODONE HYDROCHLORIDE 50 MG/1
50 TABLET ORAL NIGHTLY
Qty: 270 TABLET | Refills: 0 | Status: SHIPPED | OUTPATIENT
Start: 2019-04-09 | End: 2019-07-16 | Stop reason: SDUPTHER

## 2019-04-09 RX ORDER — BUMETANIDE 2 MG/1
4 TABLET ORAL 2 TIMES DAILY
COMMUNITY
End: 2019-04-09 | Stop reason: SDUPTHER

## 2019-04-09 RX ORDER — ATORVASTATIN CALCIUM 80 MG/1
80 TABLET, FILM COATED ORAL DAILY
Qty: 90 TABLET | Refills: 1 | Status: SHIPPED | OUTPATIENT
Start: 2019-04-09 | End: 2019-10-26 | Stop reason: SDUPTHER

## 2019-04-09 RX ORDER — DULOXETIN HYDROCHLORIDE 60 MG/1
60 CAPSULE, DELAYED RELEASE ORAL DAILY
Qty: 90 CAPSULE | Refills: 1 | Status: SHIPPED | OUTPATIENT
Start: 2019-04-09 | End: 2019-10-26 | Stop reason: SDUPTHER

## 2019-04-09 RX ORDER — CLOPIDOGREL BISULFATE 75 MG/1
75 TABLET ORAL DAILY
Qty: 90 TABLET | Refills: 1 | Status: SHIPPED | OUTPATIENT
Start: 2019-04-09 | End: 2019-09-23 | Stop reason: SDUPTHER

## 2019-04-09 RX ORDER — ATORVASTATIN CALCIUM 80 MG/1
80 TABLET, FILM COATED ORAL DAILY
COMMUNITY
End: 2019-04-09 | Stop reason: SDUPTHER

## 2019-04-09 RX ORDER — METOPROLOL TARTRATE 50 MG/1
50 TABLET, FILM COATED ORAL DAILY
Qty: 90 TABLET | Refills: 1 | Status: SHIPPED | OUTPATIENT
Start: 2019-04-09 | End: 2019-10-26 | Stop reason: SDUPTHER

## 2019-04-09 RX ORDER — BUMETANIDE 1 MG/1
2 TABLET ORAL DAILY
Qty: 360 TABLET | Refills: 1 | Status: CANCELLED | OUTPATIENT
Start: 2019-04-09

## 2019-04-09 RX ORDER — CYCLOBENZAPRINE HCL 10 MG
10 TABLET ORAL EVERY 8 HOURS
Qty: 90 TABLET | Refills: 2 | Status: SHIPPED | OUTPATIENT
Start: 2019-04-09 | End: 2019-07-16

## 2019-04-09 RX ORDER — CETIRIZINE HYDROCHLORIDE 10 MG/1
10 TABLET ORAL DAILY
Qty: 14 TABLET | Refills: 0 | Status: SHIPPED | OUTPATIENT
Start: 2019-04-09 | End: 2019-04-23

## 2019-04-09 RX ORDER — OXYCODONE AND ACETAMINOPHEN 10; 325 MG/1; MG/1
1 TABLET ORAL EVERY 6 HOURS PRN
Status: CANCELLED | OUTPATIENT
Start: 2019-04-09

## 2019-04-09 RX ORDER — BUPROPION HYDROCHLORIDE 150 MG/1
150 TABLET ORAL EVERY MORNING
COMMUNITY
End: 2019-04-09 | Stop reason: SDUPTHER

## 2019-04-09 RX ORDER — CYCLOBENZAPRINE HCL 10 MG
10 TABLET ORAL EVERY 8 HOURS
COMMUNITY
End: 2019-04-09 | Stop reason: SDUPTHER

## 2019-04-09 RX ORDER — SPIRONOLACTONE 25 MG/1
25 TABLET ORAL DAILY
Qty: 90 TABLET | Refills: 1 | Status: SHIPPED | OUTPATIENT
Start: 2019-04-09 | End: 2019-10-26 | Stop reason: SDUPTHER

## 2019-04-09 RX ORDER — BUMETANIDE 2 MG/1
2 TABLET ORAL DAILY
Qty: 90 TABLET | Refills: 1 | Status: SHIPPED | OUTPATIENT
Start: 2019-04-09 | End: 2019-11-04 | Stop reason: SDUPTHER

## 2019-04-09 RX ORDER — ISOSORBIDE DINITRATE 10 MG/1
10 TABLET ORAL 2 TIMES DAILY
Qty: 180 TABLET | Refills: 1 | Status: SHIPPED | OUTPATIENT
Start: 2019-04-09 | End: 2020-09-15 | Stop reason: SDUPTHER

## 2019-04-09 RX ORDER — ALBUTEROL SULFATE 90 UG/1
2 AEROSOL, METERED RESPIRATORY (INHALATION) EVERY 4 HOURS PRN
Qty: 1 INHALER | Refills: 5 | Status: SHIPPED | OUTPATIENT
Start: 2019-04-09 | End: 2019-09-16 | Stop reason: SDUPTHER

## 2019-04-09 ASSESSMENT — PATIENT HEALTH QUESTIONNAIRE - PHQ9
SUM OF ALL RESPONSES TO PHQ9 QUESTIONS 1 & 2: 0
2. FEELING DOWN, DEPRESSED OR HOPELESS: 0
1. LITTLE INTEREST OR PLEASURE IN DOING THINGS: 0
SUM OF ALL RESPONSES TO PHQ QUESTIONS 1-9: 0
SUM OF ALL RESPONSES TO PHQ QUESTIONS 1-9: 0

## 2019-04-09 NOTE — PROGRESS NOTES
Patient is present to [facility]. She recently moved back from [place]. Medications and pharmacy reviewed. Pt is unsure if she should be taking metoprolol. She has not been taking it. No questions or concerns at this time. Visit Information    Have you changed or started any medications since your last visit including any over-the-counter medicines, vitamins, or herbal medicines? no   Have you stopped taking any of your medications? Is so, why? -  no  Are you having any side effects from any of your medications? - no    Have you seen any other physician or provider since your last visit?  no   Have you had any other diagnostic tests since your last visit?  no   Have you been seen in the emergency room and/or had an admission in a hospital since we last saw you?  no   Have you had your routine dental cleaning in the past 6 months? No     Do you have an active MyChart account? If no, what is the barrier?   No: declined     Patient Care Team:  Marlon White MD as PCP - General (Internal Medicine)    Medical History Review  Past Medical, Family, and Social History reviewed and does not contribute to the patient presenting condition    Health Maintenance   Topic Date Due    Hepatitis C screen  1965    HIV screen  06/29/1978    DTaP/Tdap/Td vaccine (1 - Tdap) 06/29/1982    Cervical cancer screen  06/29/1984    Breast cancer screen  06/29/2013    Shingles Vaccine (1 of 2) 06/29/2013    Colon cancer screen colonoscopy  06/29/2013    Potassium monitoring  12/05/2018    Creatinine monitoring  12/05/2018    Flu vaccine (Season Ended) 09/01/2019    Lipid screen  12/04/2022    Pneumococcal 0-64 years Vaccine  Aged Out
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
## 2019-04-15 DIAGNOSIS — M50.20 DISPLACEMENT OF CERVICAL INTERVERTEBRAL DISC: ICD-10-CM

## 2019-04-15 DIAGNOSIS — M51.26 DISPLACEMENT OF LUMBAR INTERVERTEBRAL DISC WITHOUT MYELOPATHY: Primary | ICD-10-CM

## 2019-04-15 NOTE — TELEPHONE ENCOUNTER
Last visit 4/9/19  Next Visit Date:  5/7/19  Future Appointments   Date Time Provider Christiano Griffithsi   4/19/2019 10:20 AM DO KATHERINE KhannaIA [facility]   5/7/2019  2:15 PM Heather Kirby  [address] Maintenance   Topic Date Due    Hepatitis C screen  1965    Pneumococcal 0-64 years Vaccine (1 of 1 - PPSV23) 06/29/1969    HIV screen  06/29/1978    Cervical cancer screen  06/29/1984    Diabetes screen  06/29/2003    Breast cancer screen  06/29/2013    Colon cancer screen colonoscopy  06/29/2013    Potassium monitoring  12/05/2018    Creatinine monitoring  12/05/2018    DTaP/Tdap/Td vaccine (1 - Tdap) 04/09/2020 (Originally 6/29/1982)    Shingles Vaccine (1 of 2) 04/09/2020 (Originally 6/29/2013)    Flu vaccine (Season Ended) 09/01/2019    Lipid screen  12/04/2022       No results found for: LABA1C          ( goal A1C is < 7)   No results found for: LABMICR  LDL Cholesterol (mg/dL)   Date Value   12/04/2017 63       (goal LDL is <100)   AST (U/L)   Date Value   12/04/2017 14     ALT (U/L)   Date Value   12/04/2017 11     BUN (mg/dL)   Date Value   12/05/2017 23 (H)     BP Readings from Last 3 Encounters:   04/09/19 130/78   12/05/17 (!) 112/50          (goal 120/80)    All Future Testing planned in [facility]  Lab Frequency Next Occurrence   Lipid, Fasting Once 04/09/2019   Comprehensive Metabolic Panel Once 29/22/9583   CBC With Auto Differential Once 04/09/2019   TSH With Reflex Ft4 Once 04/09/2019   HIV Screen Once 07/09/2019   Hepatitis C Antibody Once 04/09/2019               Patient Active Problem List:     Low back pain     Therapeutic opioid-induced constipation (OIC)     Spondylosis of lumbar region without myelopathy or radiculopathy     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

## 2019-04-16 ENCOUNTER — HOSPITAL ENCOUNTER (OUTPATIENT)
Age: 54
Setting detail: SPECIMEN
Discharge: HOME | End: 2019-04-16
Payer: MEDICARE

## 2019-04-16 DIAGNOSIS — F11.90 CHRONIC, CONTINUOUS USE OF OPIOIDS: ICD-10-CM

## 2019-04-16 DIAGNOSIS — Z79.899 MEDICATION MANAGEMENT: ICD-10-CM

## 2019-04-16 DIAGNOSIS — Z79.899 MEDICATION MANAGEMENT: Primary | ICD-10-CM

## 2019-04-16 RX ORDER — OXYCODONE AND ACETAMINOPHEN 10; 325 MG/1; MG/1
1 TABLET ORAL EVERY 6 HOURS PRN
Qty: 120 TABLET | Refills: 0 | Status: SHIPPED | OUTPATIENT
Start: 2019-04-16 | End: 2019-05-16

## 2019-04-16 NOTE — TELEPHONE ENCOUNTER
Please call her pain management office and request records.  Reviewed with patient during visit that I need some kind of record before I can dispense her pain meds

## 2019-04-16 NOTE — TELEPHONE ENCOUNTER
Previous pain management records reviewed, medication to be prescribed according to previous records   She will need to contact Workman's comp to get a new pain management provider - I will refill her pain meds for 90 days total to allow her time to do this.    She will need to sign a pain med contract and provide UDS today - Rx signed

## 2019-04-18 DIAGNOSIS — M25.561 ACUTE PAIN OF RIGHT KNEE: Primary | ICD-10-CM

## 2019-04-18 LAB
6-ACETYLMORPHINE, UR: NOT DETECTED
7-AMINOCLONAZEPAM, URINE: NOT DETECTED
ALPHA-OH-ALPRAZ, URINE: NOT DETECTED
ALPRAZOLAM, URINE: NOT DETECTED
AMPHETAMINES, URINE: NOT DETECTED
BARBITURATES, URINE: NOT DETECTED
BENZOYLECGONINE, UR: NOT DETECTED
BUPRENORPHINE URINE: NOT DETECTED
CARISOPRODOL, UR: NOT DETECTED
CLONAZEPAM, URINE: NOT DETECTED
CODEINE, URINE: NOT DETECTED
CREATININE URINE: 312.8 MG/DL (ref 20–400)
DIAZEPAM, URINE: NOT DETECTED
EER PAIN MGT DRUG PANEL, HIGH RES/EMIT U: NORMAL
ETHYL GLUCURONIDE UR: NOT DETECTED
FENTANYL URINE: NOT DETECTED
HYDROCODONE, URINE: NOT DETECTED
HYDROMORPHONE, URINE: NOT DETECTED
LORAZEPAM, URINE: NOT DETECTED
MARIJUANA METAB, UR: NOT DETECTED
MDA, UR: NOT DETECTED
MDEA, EVE, UR: NOT DETECTED
MDMA URINE: NOT DETECTED
MEPERIDINE METAB, UR: NOT DETECTED
METHADONE, URINE: NOT DETECTED
METHAMPHETAMINE, URINE: NOT DETECTED
METHYLPHENIDATE: NOT DETECTED
MIDAZOLAM, URINE: NOT DETECTED
MORPHINE URINE: NOT DETECTED
NORBUPRENORPHINE, URINE: NOT DETECTED
NORDIAZEPAM, URINE: PRESENT
NORFENTANYL, URINE: NOT DETECTED
NORHYDROCODONE, URINE: NOT DETECTED
NOROXYCODONE, URINE: NOT DETECTED
NOROXYMORPHONE, URINE: NOT DETECTED
OXAZEPAM, URINE: PRESENT
OXYCODONE URINE: NOT DETECTED
OXYMORPHONE, URINE: NOT DETECTED
PAIN MGT DRUG PANEL, HI RES, UR: NORMAL
PCP,URINE: NOT DETECTED
PHENTERMINE, UR: NOT DETECTED
PROPOXYPHENE, URINE: NOT DETECTED
TAPENTADOL, URINE: NOT DETECTED
TAPENTADOL-O-SULFATE, URINE: NOT DETECTED
TEMAZEPAM, URINE: PRESENT
TRAMADOL, URINE: NOT DETECTED
ZOLPIDEM, URINE: NOT DETECTED

## 2019-04-19 ENCOUNTER — OFFICE VISIT (OUTPATIENT)
Dept: ORTHOPEDIC SURGERY | Age: 54
End: 2019-04-19
Payer: MEDICARE

## 2019-04-19 VITALS — WEIGHT: 293 LBS | BODY MASS INDEX: 47.09 KG/M2 | HEIGHT: 66 IN

## 2019-04-19 DIAGNOSIS — M17.11 ARTHRITIS OF RIGHT KNEE: Primary | ICD-10-CM

## 2019-04-19 PROCEDURE — G8417 CALC BMI ABV UP PARAM F/U: HCPCS | Performed by: ORTHOPAEDIC SURGERY

## 2019-04-19 PROCEDURE — 99203 OFFICE O/P NEW LOW 30 MIN: CPT | Performed by: ORTHOPAEDIC SURGERY

## 2019-04-19 PROCEDURE — G8427 DOCREV CUR MEDS BY ELIG CLIN: HCPCS | Performed by: ORTHOPAEDIC SURGERY

## 2019-04-19 ASSESSMENT — ENCOUNTER SYMPTOMS
COUGH: 0
CHEST TIGHTNESS: 0
VOMITING: 0
APNEA: 0
ABDOMINAL PAIN: 0

## 2019-04-19 NOTE — PROGRESS NOTES
[facility]  [facility]  [address] [place], [place] [address]y [address] [place] [address]  Dept: [phone]    Ambulatory Orthopedic Consult      CHIEF COMPLAINT:    Chief Complaint   Patient presents with    Knee Pain     right       HISTORY OF PRESENT ILLNESS:      The patient is a 52 y.o. female who is being seen at the request of  Sunshine Oh MD for consultation and evaluation of right knee pain. Emani Alonzo  presents for right knee pain that has been present for approximately 15 years. The patient does not recall a specific injury. The pain improves with  Resting and activity modification. The pain worsens with  stair climbing and arising from a seated position. Instability is  noted. The patient has had a previous corticosteroid injections as well as Visco injections. The patient has had previous physical therapy for this problem. The patient has tried oral NSAIDs for this problem previously. Patient takes oxycodone that is prescribed by PCP until she sees pain management. Left knee is currently under a Worker's Compensation case in [place]. The patient is having her records sent to the office to have them reviewed. The patient mentions that her  states that they will pay for a knee replacement. Past Medical History:    Past Medical History:   Diagnosis Date    Arthritis     CHF (congestive heart failure) ([facility] [address]     Hypertension        Past Surgical History:    Past Surgical History:   Procedure Laterality Date    BACK SURGERY      CHOLECYSTECTOMY, LAPAROSCOPIC      HYSTERECTOMY      KNEE ARTHROSCOPY Right     KNEE SURGERY Left 2009    SHOULDER SURGERY Right 2009       Current Medications:   Current Outpatient Medications   Medication Sig Dispense Refill    oxyCODONE-acetaminophen (PERCOCET)  MG per tablet Take 1 tablet by mouth every 6 hours as needed for Pain for up to 30 days.  120 tablet 0    atorvastatin (LIPITOR) 80 MG tablet Take 1 tablet by mouth daily 90 tablet 1    bumetanide (BUMEX) 2 MG tablet Take 1 tablet by mouth daily 90 tablet 1    DULoxetine (CYMBALTA) 60 MG extended release capsule Take 1 capsule by mouth daily 90 capsule 1    cyclobenzaprine (FLEXERIL) 10 MG tablet Take 1 tablet by mouth every 8 hours 90 tablet 2    buPROPion (WELLBUTRIN XL) 150 MG extended release tablet Take 1 tablet by mouth every morning 90 tablet 1    traZODone (DESYREL) 50 MG tablet Take 1 tablet by mouth nightly Indications: take 2-3 tablets by nouth at bedtime prn for insomnia 270 tablet 0    hydrALAZINE (APRESOLINE) 25 MG tablet Take 1 tablet by mouth 2 times daily 180 tablet 1    isosorbide dinitrate (ISORDIL) 10 MG tablet Take 1 tablet by mouth 2 times daily 180 tablet 1    pantoprazole (PROTONIX) 40 MG tablet Take 1 tablet by mouth daily 90 tablet 1    spironolactone (ALDACTONE) 25 MG tablet Take 1 tablet by mouth daily 90 tablet 1    albuterol sulfate  (90 Base) MCG/ACT inhaler Inhale 2 puffs into the lungs every 4 hours as needed for Wheezing 1 Inhaler 5    clopidogrel (PLAVIX) 75 MG tablet Take 1 tablet by mouth daily 90 tablet 1    metoprolol tartrate (LOPRESSOR) 50 MG tablet Take 1 tablet by mouth daily 90 tablet 1    cetirizine (ZYRTEC) 10 MG tablet Take 1 tablet by mouth daily for 14 days 14 tablet 0    aspirin 81 MG tablet Take 81 mg by mouth daily       No current facility-administered medications for this visit.         Allergies:    Food; Gabapentin; and Tetracyclines & related    Social History:   Social History     Socioeconomic History    Marital status: Single     Spouse name: Not on file    Number of children: Not on file    Years of education: Not on file    Highest education level: Not on file   Occupational History    Not on file   Social Needs    Financial resource strain: Not on file    Food insecurity:     Worry: Not on file     Inability: Not on file    Transportation needs:     Medical: Not on file Non-medical: Not on file   Tobacco Use    Smoking status: Never Smoker    Smokeless tobacco: Never Used   Substance and Sexual Activity    Alcohol use: No    Drug use: No    Sexual activity: Not on file   Lifestyle    Physical activity:     Days per week: Not on file     Minutes per session: Not on file    Stress: Not on file   Relationships    Social connections:     Talks on phone: Not on file     Gets together: Not on file     Attends Sikh service: Not on file     Active member of club or organization: Not on file     Attends meetings of clubs or organizations: Not on file     Relationship status: Not on file    Intimate partner violence:     Fear of current or ex partner: Not on file     Emotionally abused: Not on file     Physically abused: Not on file     Forced sexual activity: Not on file   Other Topics Concern    Not on file   Social History Narrative    Not on file       Family History:  Family History   Problem Relation Age of Onset    Other Mother     Diabetes Mother     Heart Disease Mother     Arthritis Mother     Kidney Disease Mother     Arthritis Sister     Diabetes Brother     Asthma Brother          REVIEW OF SYSTEMS:  Review of Systems   Constitutional: Negative for chills and fever. Respiratory: Negative for apnea, cough and chest tightness. Cardiovascular: Negative for chest pain and palpitations. Gastrointestinal: Negative for abdominal pain and vomiting. Genitourinary: Negative for difficulty urinating. Musculoskeletal: Positive for arthralgias (right knee). Negative for gait problem, joint swelling and myalgias. Neurological: Negative for dizziness, weakness and numbness. I have reviewed the CC, HPI, ROS, PMH, FHX, Social History, and if not present in this note, I have reviewed in the patient's chart.    I agree with the documentation provided by other staff and have reviewed their documentation prior to providing my signature indicating those of syntax and sound a like substitutions which may escape proof reading.  In such instances, actual meaning can be extrapolated by contextual diversion      Electronically signed by Pancho El DO, FAOAO on 4/21/2019 at 1:46 PM

## 2019-04-23 ENCOUNTER — HOSPITAL ENCOUNTER (OUTPATIENT)
Age: 54
Setting detail: SPECIMEN
Discharge: HOME | End: 2019-04-23
Payer: MEDICARE

## 2019-04-23 DIAGNOSIS — Z11.4 SCREENING FOR HIV (HUMAN IMMUNODEFICIENCY VIRUS): ICD-10-CM

## 2019-04-23 DIAGNOSIS — Z11.59 NEED FOR HEPATITIS C SCREENING TEST: ICD-10-CM

## 2019-04-23 DIAGNOSIS — Z13.29 SCREENING FOR THYROID DISORDER: ICD-10-CM

## 2019-04-23 DIAGNOSIS — E78.5 HYPERLIPIDEMIA, UNSPECIFIED HYPERLIPIDEMIA TYPE: ICD-10-CM

## 2019-04-23 DIAGNOSIS — I50.9 CHRONIC CONGESTIVE HEART FAILURE, UNSPECIFIED HEART FAILURE TYPE (HCC): ICD-10-CM

## 2019-04-23 DIAGNOSIS — Z13.0 SCREENING FOR DEFICIENCY ANEMIA: ICD-10-CM

## 2019-04-23 LAB
ABSOLUTE EOS #: 0.32 K/UL (ref 0–0.44)
ABSOLUTE IMMATURE GRANULOCYTE: 0.03 K/UL (ref 0–0.3)
ABSOLUTE LYMPH #: 2.56 K/UL (ref 1.1–3.7)
ABSOLUTE MONO #: 0.42 K/UL (ref 0.1–1.2)
ALBUMIN SERPL-MCNC: 4.1 G/DL (ref 3.5–5.2)
ALBUMIN/GLOBULIN RATIO: 1.1 (ref 1–2.5)
ALP BLD-CCNC: 172 U/L (ref 35–104)
ALT SERPL-CCNC: 13 U/L (ref 5–33)
ANION GAP SERPL CALCULATED.3IONS-SCNC: 17 MMOL/L (ref 9–17)
AST SERPL-CCNC: 14 U/L
BASOPHILS # BLD: 0 % (ref 0–2)
BASOPHILS ABSOLUTE: 0.03 K/UL (ref 0–0.2)
BILIRUB SERPL-MCNC: 0.34 MG/DL (ref 0.3–1.2)
BUN BLDV-MCNC: 17 MG/DL (ref 6–20)
BUN/CREAT BLD: ABNORMAL (ref 9–20)
CALCIUM SERPL-MCNC: 8.8 MG/DL (ref 8.6–10.4)
CHLORIDE BLD-SCNC: 105 MMOL/L (ref 98–107)
CHOLESTEROL, FASTING: 119 MG/DL
CHOLESTEROL/HDL RATIO: 4.1
CO2: 24 MMOL/L (ref 20–31)
CREAT SERPL-MCNC: 1.31 MG/DL (ref 0.5–0.9)
DIFFERENTIAL TYPE: ABNORMAL
EOSINOPHILS RELATIVE PERCENT: 4 % (ref 1–4)
GFR AFRICAN AMERICAN: 51 ML/MIN
GFR NON-AFRICAN AMERICAN: 42 ML/MIN
GFR SERPL CREATININE-BSD FRML MDRD: ABNORMAL ML/MIN/{1.73_M2}
GFR SERPL CREATININE-BSD FRML MDRD: ABNORMAL ML/MIN/{1.73_M2}
GLUCOSE BLD-MCNC: 99 MG/DL (ref 70–99)
HCT VFR BLD CALC: 37.7 % (ref 36.3–47.1)
HDLC SERPL-MCNC: 29 MG/DL
HEMOGLOBIN: 11.3 G/DL (ref 11.9–15.1)
HEPATITIS C ANTIBODY: NONREACTIVE
HIV AG/AB: NONREACTIVE
IMMATURE GRANULOCYTES: 0 %
LDL CHOLESTEROL: 52 MG/DL (ref 0–130)
LYMPHOCYTES # BLD: 32 % (ref 24–43)
MCH RBC QN AUTO: 27.2 PG (ref 25.2–33.5)
MCHC RBC AUTO-ENTMCNC: 30 G/DL (ref 28.4–34.8)
MCV RBC AUTO: 90.8 FL (ref 82.6–102.9)
MONOCYTES # BLD: 5 % (ref 3–12)
NRBC AUTOMATED: 0 PER 100 WBC
PDW BLD-RTO: 16.2 % (ref 11.8–14.4)
PLATELET # BLD: 279 K/UL (ref 138–453)
PLATELET ESTIMATE: ABNORMAL
PMV BLD AUTO: 11 FL (ref 8.1–13.5)
POTASSIUM SERPL-SCNC: 4.4 MMOL/L (ref 3.7–5.3)
RBC # BLD: 4.15 M/UL (ref 3.95–5.11)
RBC # BLD: ABNORMAL 10*6/UL
SEG NEUTROPHILS: 59 % (ref 36–65)
SEGMENTED NEUTROPHILS ABSOLUTE COUNT: 4.53 K/UL (ref 1.5–8.1)
SODIUM BLD-SCNC: 146 MMOL/L (ref 135–144)
THYROXINE, FREE: 0.87 NG/DL (ref 0.93–1.7)
TOTAL PROTEIN: 7.7 G/DL (ref 6.4–8.3)
TRIGLYCERIDE, FASTING: 188 MG/DL
TSH SERPL DL<=0.05 MIU/L-ACNC: 6.92 MIU/L (ref 0.3–5)
VLDLC SERPL CALC-MCNC: ABNORMAL MG/DL (ref 1–30)
WBC # BLD: 7.9 K/UL (ref 3.5–11.3)
WBC # BLD: ABNORMAL 10*3/UL

## 2019-05-07 ENCOUNTER — OFFICE VISIT (OUTPATIENT)
Dept: FAMILY MEDICINE CLINIC | Age: 54
End: 2019-05-07
Payer: MEDICARE

## 2019-05-07 ENCOUNTER — HOSPITAL ENCOUNTER (OUTPATIENT)
Age: 54
Setting detail: SPECIMEN
Discharge: HOME | End: 2019-05-07
Payer: MEDICARE

## 2019-05-07 VITALS
DIASTOLIC BLOOD PRESSURE: 84 MMHG | RESPIRATION RATE: 16 BRPM | TEMPERATURE: 97.1 F | OXYGEN SATURATION: 97 % | SYSTOLIC BLOOD PRESSURE: 128 MMHG | WEIGHT: 293 LBS | BODY MASS INDEX: 51.06 KG/M2 | HEART RATE: 108 BPM

## 2019-05-07 DIAGNOSIS — J45.30 MILD PERSISTENT ASTHMA WITHOUT COMPLICATION: ICD-10-CM

## 2019-05-07 DIAGNOSIS — R74.8 ELEVATED ALKALINE PHOSPHATASE LEVEL: ICD-10-CM

## 2019-05-07 DIAGNOSIS — R94.6 ABNORMAL FINDING ON THYROID FUNCTION TEST: Primary | ICD-10-CM

## 2019-05-07 DIAGNOSIS — J30.2 SEASONAL ALLERGIC RHINITIS, UNSPECIFIED TRIGGER: ICD-10-CM

## 2019-05-07 DIAGNOSIS — R94.6 ABNORMAL FINDING ON THYROID FUNCTION TEST: ICD-10-CM

## 2019-05-07 LAB
THYROXINE, FREE: 0.93 NG/DL (ref 0.93–1.7)
TSH SERPL DL<=0.05 MIU/L-ACNC: 7.77 MIU/L (ref 0.3–5)

## 2019-05-07 PROCEDURE — 1036F TOBACCO NON-USER: CPT | Performed by: INTERNAL MEDICINE

## 2019-05-07 PROCEDURE — 3017F COLORECTAL CA SCREEN DOC REV: CPT | Performed by: INTERNAL MEDICINE

## 2019-05-07 PROCEDURE — 99214 OFFICE O/P EST MOD 30 MIN: CPT | Performed by: INTERNAL MEDICINE

## 2019-05-07 PROCEDURE — G8427 DOCREV CUR MEDS BY ELIG CLIN: HCPCS | Performed by: INTERNAL MEDICINE

## 2019-05-07 PROCEDURE — G8417 CALC BMI ABV UP PARAM F/U: HCPCS | Performed by: INTERNAL MEDICINE

## 2019-05-07 RX ORDER — MONTELUKAST SODIUM 10 MG/1
10 TABLET ORAL DAILY
Qty: 30 TABLET | Refills: 3 | Status: SHIPPED | OUTPATIENT
Start: 2019-05-07 | End: 2019-07-24

## 2019-05-07 NOTE — PROGRESS NOTES
responding to internal stimuli. Prior to Visit Medications    Medication Sig Taking? Authorizing Provider   oxyCODONE-acetaminophen (PERCOCET)  MG per tablet Take 1 tablet by mouth every 6 hours as needed for Pain for up to 30 days. Yes Heather Rasmussen MD   atorvastatin (LIPITOR) 80 MG tablet Take 1 tablet by mouth daily Yes Heather Rasmussen MD   bumetanide (BUMEX) 2 MG tablet Take 1 tablet by mouth daily Yes Koby Saldaña MD   DULoxetine (CYMBALTA) 60 MG extended release capsule Take 1 capsule by mouth daily Yes Koby Saldaña MD   cyclobenzaprine (FLEXERIL) 10 MG tablet Take 1 tablet by mouth every 8 hours Yes Koby Saldaña MD   buPROPion (WELLBUTRIN XL) 150 MG extended release tablet Take 1 tablet by mouth every morning Yes Heather Rasmussen MD   traZODone (DESYREL) 50 MG tablet Take 1 tablet by mouth nightly Indications: take 2-3 tablets by nouth at bedtime prn for insomnia Yes Heather Rasmussen MD   hydrALAZINE (APRESOLINE) 25 MG tablet Take 1 tablet by mouth 2 times daily Yes Heather Rasmussen MD   isosorbide dinitrate (ISORDIL) 10 MG tablet Take 1 tablet by mouth 2 times daily Yes Heather Rasmussen MD   pantoprazole (PROTONIX) 40 MG tablet Take 1 tablet by mouth daily Yes Heather Rasmussen MD   spironolactone (ALDACTONE) 25 MG tablet Take 1 tablet by mouth daily Yes Heather Rasmussen MD   albuterol sulfate  (90 Base) MCG/ACT inhaler Inhale 2 puffs into the lungs every 4 hours as needed for Wheezing Yes Heather Rasmussen MD   clopidogrel (PLAVIX) 75 MG tablet Take 1 tablet by mouth daily Yes Heather Rasmussen MD   metoprolol tartrate (LOPRESSOR) 50 MG tablet Take 1 tablet by mouth daily Yes Koby Saldaña MD   aspirin 81 MG tablet Take 81 mg by mouth daily Yes Historical Provider, MD       Data Review limited data in care Everywhere, admission data from 12/2017 reviewed      Assessment/Plan:     1.  Abnormal finding on thyroid function test  - TSH With Reflex Ft4; Future    2. Elevated alkaline phosphatase level  - Alkaline Phosphatase, Isoenzymes; Future    3. Mild persistent asthma without complication  - Respiratory Therapy Supplies (NEBULIZER COMPRESSOR) KIT; 1 kit by Does not apply route once for 1 dose  Dispense: 1 kit; Refill: 0  - albuterol (PROVENTIL) (5 MG/ML) 0.5% nebulizer solution; Take 0.5 mLs by nebulization every 6 hours as needed for Wheezing  Dispense: 20 mL; Refill: 3    4. Seasonal allergic rhinitis, unspecified trigger  - montelukast (SINGULAIR) 10 MG tablet; Take 1 tablet by mouth daily  Dispense: 30 tablet;  Refill: 3    Pt is very upset that she cant get her pain meds here anymore - >10 min discussing this   She did not want to talk any more or about anything so visit was ended     Electronically signed by Angela Faulkner MD on 5/7/2019 at 2:50 PM

## 2019-05-10 LAB
ALK PHOS BONE SPECIFIC: 56 U/L (ref 0–55)
ALK PHOS OTHER CALC: 0 U/L
ALK PHOSPHATASE: 169 U/L (ref 40–120)
ALKALINE PHOSPHATASE LIVER FRACTION: 113 U/L (ref 0–94)

## 2019-05-15 ENCOUNTER — TELEPHONE (OUTPATIENT)
Dept: FAMILY MEDICINE CLINIC | Age: 54
End: 2019-05-15

## 2019-05-15 NOTE — TELEPHONE ENCOUNTER
Patient called wanting to know if you would send in her pain medication. Patient states diazepam showed in her drug screen because she took it before an MRI several months ago. Informed patient that there was a referral placed for pain management and she will need to go there.

## 2019-05-16 DIAGNOSIS — E03.8 SUBCLINICAL HYPOTHYROIDISM: Primary | ICD-10-CM

## 2019-05-16 RX ORDER — LEVOTHYROXINE SODIUM 0.03 MG/1
25 TABLET ORAL DAILY
Qty: 30 TABLET | Refills: 5 | Status: SHIPPED | OUTPATIENT
Start: 2019-05-16 | End: 2019-06-26

## 2019-06-11 DIAGNOSIS — M25.552 LEFT HIP PAIN: Primary | ICD-10-CM

## 2019-06-26 ENCOUNTER — OFFICE VISIT (OUTPATIENT)
Dept: PRIMARY CARE CLINIC | Age: 54
End: 2019-06-26
Payer: MEDICARE

## 2019-06-26 VITALS
SYSTOLIC BLOOD PRESSURE: 118 MMHG | DIASTOLIC BLOOD PRESSURE: 88 MMHG | BODY MASS INDEX: 50.83 KG/M2 | TEMPERATURE: 97.5 F | HEART RATE: 80 BPM | WEIGHT: 293 LBS

## 2019-06-26 DIAGNOSIS — J30.2 SEASONAL ALLERGIC RHINITIS, UNSPECIFIED TRIGGER: Primary | ICD-10-CM

## 2019-06-26 DIAGNOSIS — F11.20 OPIOID DEPENDENCE, UNCOMPLICATED (HCC): ICD-10-CM

## 2019-06-26 DIAGNOSIS — M50.30 BULGE OF CERVICAL DISC WITHOUT MYELOPATHY: ICD-10-CM

## 2019-06-26 DIAGNOSIS — E03.9 HYPOTHYROIDISM, UNSPECIFIED TYPE: ICD-10-CM

## 2019-06-26 DIAGNOSIS — M53.3 SACROILIAC JOINT DYSFUNCTION OF BOTH SIDES: ICD-10-CM

## 2019-06-26 DIAGNOSIS — G89.4 CHRONIC PAIN DISORDER: ICD-10-CM

## 2019-06-26 DIAGNOSIS — Z11.59 ENCOUNTER FOR SCREENING FOR OTHER VIRAL DISEASES: ICD-10-CM

## 2019-06-26 DIAGNOSIS — R74.8 ELEVATED LIVER ENZYMES: ICD-10-CM

## 2019-06-26 PROCEDURE — G8417 CALC BMI ABV UP PARAM F/U: HCPCS | Performed by: NURSE PRACTITIONER

## 2019-06-26 PROCEDURE — 99202 OFFICE O/P NEW SF 15 MIN: CPT | Performed by: NURSE PRACTITIONER

## 2019-06-26 PROCEDURE — 3017F COLORECTAL CA SCREEN DOC REV: CPT | Performed by: NURSE PRACTITIONER

## 2019-06-26 PROCEDURE — G8427 DOCREV CUR MEDS BY ELIG CLIN: HCPCS | Performed by: NURSE PRACTITIONER

## 2019-06-26 PROCEDURE — 1036F TOBACCO NON-USER: CPT | Performed by: NURSE PRACTITIONER

## 2019-06-26 RX ORDER — LEVOTHYROXINE SODIUM 0.03 MG/1
25 TABLET ORAL DAILY
Qty: 30 TABLET | Refills: 1 | Status: SHIPPED | OUTPATIENT
Start: 2019-06-26 | End: 2019-07-16 | Stop reason: SDUPTHER

## 2019-06-26 RX ORDER — OXYCODONE AND ACETAMINOPHEN 10; 325 MG/1; MG/1
TABLET ORAL
Refills: 0 | Status: ON HOLD | COMMUNITY
Start: 2019-06-03 | End: 2019-10-02

## 2019-06-26 RX ORDER — LORATADINE 10 MG/1
10 TABLET, ORALLY DISINTEGRATING ORAL DAILY
Qty: 30 TABLET | Refills: 1 | Status: SHIPPED | OUTPATIENT
Start: 2019-06-26 | End: 2019-07-16

## 2019-06-26 RX ORDER — HYDRALAZINE HYDROCHLORIDE 50 MG/1
1 TABLET, FILM COATED ORAL 2 TIMES DAILY
Refills: 0 | COMMUNITY
Start: 2019-06-03 | End: 2020-06-26

## 2019-06-26 ASSESSMENT — ENCOUNTER SYMPTOMS
COUGH: 0
ABDOMINAL PAIN: 0
SHORTNESS OF BREATH: 0
SINUS PAIN: 0
DIARRHEA: 0
BACK PAIN: 1
SORE THROAT: 0
VOMITING: 0
NAUSEA: 0

## 2019-06-26 NOTE — PROGRESS NOTES
Visit Information    Have you changed or started any medications since your last visit including any over-the-counter medicines, vitamins, or herbal medicines? yes -    Are you having any side effects from any of your medications? -  no  Have you stopped taking any of your medications? Is so, why? -  yes - not refilled     Have you seen any other physician or provider since your last visit? No  Have you had any other diagnostic tests since your last visit? No  Have you been seen in the emergency room and/or had an admission to a hospital since we last saw you? Yes  Have you had your routine dental cleaning in the past 6 months? no    Have you activated your [employer] account? If not, what are your barriers?  No: declined      Patient Care Team:  Staci Ahumada MD as PCP - General (Internal Medicine)  Staci Ahumada MD as PCP - [facility]    Medical History Review  Past Medical, Family, and Social History reviewed and does contribute to the patient presenting condition    Health Maintenance   Topic Date Due    Pneumococcal 0-64 years Vaccine (1 of 1 - PPSV23) 06/29/1969    Cervical cancer screen  06/29/1984    Breast cancer screen  06/29/2013    Colon cancer screen colonoscopy  06/29/2013    DTaP/Tdap/Td vaccine (1 - Tdap) 04/09/2020 (Originally 6/29/1982)    Shingles Vaccine (1 of 2) 04/09/2020 (Originally 6/29/2013)    Flu vaccine (Season Ended) 09/01/2019    Potassium monitoring  04/23/2020    Creatinine monitoring  04/23/2020    Lipid screen  04/23/2024    Hepatitis C screen  Completed    HIV screen  Completed

## 2019-06-26 NOTE — PROGRESS NOTES
[facility]  [address]  [address] [facility] [address]  Dept: [phone]  Dept Fax: [phone]    Kb Aguirre is a 52 y.o. female who presents to the urgent care today for her medicalconditions/complaints as noted below. Kb Aguirre is c/o of Medication Refill and Discuss Labs      HPI:       55-year-old female presents with complaint of medication refills. Patient describes that she has seasonal allergies and has been taking Zyrtec without significant relief. Patient describes that she has had thyroid levels which were consistent with hypothyroidism, multiple years prior had thyroidectomy due to growths on the thyroid. Denies taking any thyroid medication for the past several years. There was an order placed for the patient's previous PCP, patient states that the pharmacy did not receive this and needs a new order. Additionally patient is concerned about the elevated liver enzyme level. Additionally patient's is requesting refills on her oral pain medication Percocet tens which she treats her chronic pain to her neck, back, bilateral knees. Patient reports she has had multiple surgeries to both areas. Patient was seen her PCP who gave her an initial supply however after the patient's urine drug screen showed positive for benzos this was discontinued. Discussed the patient to seek a new PCP, was late for her PCP appointment today. Denies saddle anesthesia, numbness or tingling of lower extremities, loss of bladder or bowel control. Denies abdominal pain, nausea, vomiting, diarrhea. Denies fevers, chills. Denies chest pain, shortness of breath. Relieving factors include none. Worsening factors include none.       Past Medical History:   Diagnosis Date    Arthritis     CHF (congestive heart failure) (HCC)     Hypertension         Current Outpatient Medications   Medication Sig Dispense Refill    hydrALAZINE (APRESOLINE) 50 MG tablet Take 1 tablet by mouth 2 times daily  0    oxyCODONE-acetaminophen (PERCOCET)  MG per tablet TAKE 1 TABLET BY MOUTH EVERY 6 HOURS AS NEEDED FOR PAIN FOR 5 DAYS  0    loratadine (CLARITIN REDITABS) 10 MG dissolvable tablet Take 1 tablet by mouth daily 30 tablet 1    levothyroxine (SYNTHROID) 25 MCG tablet Take 1 tablet by mouth daily 30 tablet 1    albuterol (PROVENTIL) (5 MG/ML) 0.5% nebulizer solution Take 0.5 mLs by nebulization every 6 hours as needed for Wheezing 20 mL 3    atorvastatin (LIPITOR) 80 MG tablet Take 1 tablet by mouth daily 90 tablet 1    bumetanide (BUMEX) 2 MG tablet Take 1 tablet by mouth daily 90 tablet 1    DULoxetine (CYMBALTA) 60 MG extended release capsule Take 1 capsule by mouth daily 90 capsule 1    cyclobenzaprine (FLEXERIL) 10 MG tablet Take 1 tablet by mouth every 8 hours 90 tablet 2    buPROPion (WELLBUTRIN XL) 150 MG extended release tablet Take 1 tablet by mouth every morning 90 tablet 1    traZODone (DESYREL) 50 MG tablet Take 1 tablet by mouth nightly Indications: take 2-3 tablets by nouth at bedtime prn for insomnia 270 tablet 0    isosorbide dinitrate (ISORDIL) 10 MG tablet Take 1 tablet by mouth 2 times daily 180 tablet 1    pantoprazole (PROTONIX) 40 MG tablet Take 1 tablet by mouth daily 90 tablet 1    spironolactone (ALDACTONE) 25 MG tablet Take 1 tablet by mouth daily 90 tablet 1    albuterol sulfate  (90 Base) MCG/ACT inhaler Inhale 2 puffs into the lungs every 4 hours as needed for Wheezing 1 Inhaler 5    clopidogrel (PLAVIX) 75 MG tablet Take 1 tablet by mouth daily 90 tablet 1    metoprolol tartrate (LOPRESSOR) 50 MG tablet Take 1 tablet by mouth daily 90 tablet 1    aspirin 81 MG tablet Take 81 mg by mouth daily      Respiratory Therapy Supplies (NEBULIZER COMPRESSOR) KIT 1 kit by Does not apply route once for 1 dose 1 kit 0    montelukast (SINGULAIR) 10 MG tablet Take 1 tablet by mouth daily 30 tablet 3     No current facility-administered medications for this visit. Allergies   Allergen Reactions    Food Swelling     peaches    Gabapentin Swelling    Tetracyclines & Related        Subjective:      Review of Systems   Constitutional: Negative for chills and fever. HENT: Positive for congestion. Negative for ear pain, sinus pain and sore throat. Respiratory: Negative for cough and shortness of breath. Cardiovascular: Negative for chest pain and palpitations. Gastrointestinal: Negative for abdominal pain, diarrhea, nausea and vomiting. Musculoskeletal: Positive for arthralgias (bilat knees), back pain and neck pain. Neurological: Negative for dizziness and headaches. All other systems reviewed and are negative. Objective:     Physical Exam   Constitutional: She appears well-developed and well-nourished. HENT:   Head: Atraumatic. Nose: Nose normal.   Mouth/Throat: Oropharynx is clear and moist.   Cardiovascular: Normal rate. Pulmonary/Chest: Effort normal and breath sounds normal.   Musculoskeletal:        Cervical back: She exhibits tenderness and bony tenderness. Lumbar back: She exhibits tenderness and bony tenderness. Skin: Skin is warm and dry. Psychiatric: She has a normal mood and affect. Her behavior is normal.   Nursing note and vitals reviewed. /88 (Site: Left Upper Arm, Position: Sitting, Cuff Size: Large Adult)   Pulse 80   Temp 97.5 °F (36.4 °C) (Oral)   Wt (!) 314 lb 12.8 oz (142.8 kg)   BMI 50.83 kg/m²   Lab Review not applicable    Assessment:       Diagnosis Orders   1. Seasonal allergic rhinitis, unspecified trigger  loratadine (CLARITIN REDITABS) 10 MG dissolvable tablet   2. Hypothyroidism, unspecified type  levothyroxine (SYNTHROID) 25 MCG tablet   3. Elevated liver enzymes  Hepatitis C Antibody    Hepatitis B Surface Antigen   4. Sacroiliac joint dysfunction of both sides  Melissa Nielsen MD, Pain Management, [facility]   5.  Bulge of cervical disc without myelopathy  Melissa Latham MD, Pain Management, [facility]   6. Opioid dependence, uncomplicated [facility])  Chelo Puente MD, Pain Management, [facility]   7. Chronic pain disorder  Melissa Latham MD, Pain Management, [facility]   8. Encounter for screening for other viral diseases   Hepatitis B Surface Antigen       Plan:      Return if symptoms worsen or fail to improve. Orders Placed This Encounter   Medications    loratadine (CLARITIN REDITABS) 10 MG dissolvable tablet     Sig: Take 1 tablet by mouth daily     Dispense:  30 tablet     Refill:  1    levothyroxine (SYNTHROID) 25 MCG tablet     Sig: Take 1 tablet by mouth daily     Dispense:  30 tablet     Refill:  1     Alternative allergy medication prescribed. Discussed dose/duration  Refill on thyroid medication prescribed. Discussed dose/duration  Discussed labs to screen for possible liver disease given age and elevated liver enzyme. Discussed referral to pain management based on the findings of a previous UA which was positive for benzos which she did not admit to taking. Patient states the benzos can stay in her system for 90 days, and this was from a previous prescription. Discussed to follow up here or with pcp if sx worsen or persist.  Pt agreeable to plan. Patient given educational materials - see patient instructions. Discussed use, benefit, and side effects of prescribed medications. All patientquestions answered. Pt voiced understanding. This note was transcribed using dictation with Dragon services. Efforts were made to correct any errors but some words may be misinterpreted.      Electronically signed by ASA Johansen CNP on 6/26/2019at 11:48 AM

## 2019-06-26 NOTE — PATIENT INSTRUCTIONS
Patient Education        Allergies: Care Instructions  Your Care Instructions    Allergies occur when your body's defense system (immune system) overreacts to certain substances. The immune system treats a harmless substance as if it were a harmful germ or virus. Many things can cause this overreaction, including pollens, medicine, food, dust, animal dander, and mold. Allergies can be mild or severe. Mild allergies can be managed with home treatment. But medicine may be needed to prevent problems. Managing your allergies is an important part of staying healthy. Your doctor may suggest that you have allergy testing to help find out what is causing your allergies. When you know what things trigger your symptoms, you can avoid them. This can prevent allergy symptoms and other health problems. For severe allergies that cause reactions that affect your whole body (anaphylactic reactions), your doctor may prescribe a shot of epinephrine to carry with you in case you have a severe reaction. Learn how to give yourself the shot and keep it with you at all times. Make sure it is not . Follow-up care is a key part of your treatment and safety. Be sure to make and go to all appointments, and call your doctor if you are having problems. It's also a good idea to know your test results and keep a list of the medicines you take. How can you care for yourself at home? · If you have been told by your doctor that dust or dust mites are causing your allergy, decrease the dust around your bed:  ? Wash sheets, pillowcases, and other bedding in hot water every week. ? Use dust-proof covers for pillows, duvets, and mattresses. Avoid plastic covers because they tear easily and do not \"breathe. \" Wash as instructed on the label. ? Do not use any blankets and pillows that you do not need. ? Use blankets that you can wash in your washing machine. ?  Consider removing drapes and carpets, which attract and hold dust, from your bedroom. · If you are allergic to house dust and mites, do not use home humidifiers. Your doctor can suggest ways you can control dust and mites. · Look for signs of cockroaches. Cockroaches cause allergic reactions. Use cockroach baits to get rid of them. Then, clean your home well. Cockroaches like areas where grocery bags, newspapers, empty bottles, or cardboard boxes are stored. Do not keep these inside your home, and keep trash and food containers sealed. Seal off any spots where cockroaches might enter your home. · If you are allergic to mold, get rid of furniture, rugs, and drapes that smell musty. Check for mold in the bathroom. · If you are allergic to outdoor pollen or mold spores, use air-conditioning. Change or clean all filters every month. Keep windows closed. · If you are allergic to pollen, stay inside when pollen counts are high. Use a vacuum  with a HEPA filter or a double-thickness filter at least two times each week. · Stay inside when air pollution is bad. Avoid paint fumes, perfumes, and other strong odors. · Avoid conditions that make your allergies worse. Stay away from smoke. Do not smoke or let anyone else smoke in your house. Do not use fireplaces or wood-burning stoves. · If you are allergic to your pets, change the air filter in your furnace every month. Use high-efficiency filters. · If you are allergic to pet dander, keep pets outside or out of your bedroom. Old carpet and cloth furniture can hold a lot of animal dander. You may need to replace them. When should you call for help? Give an epinephrine shot if:    · You think you are having a severe allergic reaction.     · You have symptoms in more than one body area, such as mild nausea and an itchy mouth.    After giving an epinephrine shot call 911, even if you feel better.   Call 911 if:    · You have symptoms of a severe allergic reaction.  These may include:  ? Sudden raised, red areas (hives) all over your body.  ? Swelling of the throat, mouth, lips, or tongue. ? Trouble breathing. ? Passing out (losing consciousness). Or you may feel very lightheaded or suddenly feel weak, confused, or restless.     · You have been given an epinephrine shot, even if you feel better.    Call your doctor now or seek immediate medical care if:    · You have symptoms of an allergic reaction, such as:  ? A rash or hives (raised, red areas on the skin). ? Itching. ? Swelling. ? Belly pain, nausea, or vomiting.    Watch closely for changes in your health, and be sure to contact your doctor if:    · You do not get better as expected. Where can you learn more? Go to https://[employer]. org and sign in to your [employer] account. Enter F235 in the Joognu box to learn more about \"Allergies: Care Instructions. \"     If you do not have an account, please click on the \"Sign Up Now\" link. Current as of: June 27, 2018  Content Version: 12.0  © 2079-4994 [employer], Incorporated. Care instructions adapted under license by [facility] [place] ([facility]). If you have questions about a medical condition or this instruction, always ask your healthcare professional. [place] [address] any warranty or liability for your use of this information.

## 2019-06-27 ENCOUNTER — OFFICE VISIT (OUTPATIENT)
Dept: ORTHOPEDIC SURGERY | Age: 54
End: 2019-06-27
Payer: MEDICARE

## 2019-06-27 VITALS — HEIGHT: 68 IN | BODY MASS INDEX: 44.41 KG/M2 | WEIGHT: 293 LBS

## 2019-06-27 DIAGNOSIS — M17.11 ARTHRITIS OF RIGHT KNEE: Primary | ICD-10-CM

## 2019-06-27 DIAGNOSIS — M16.12 ARTHRITIS OF LEFT HIP: ICD-10-CM

## 2019-06-27 PROCEDURE — G8417 CALC BMI ABV UP PARAM F/U: HCPCS | Performed by: ORTHOPAEDIC SURGERY

## 2019-06-27 PROCEDURE — 1036F TOBACCO NON-USER: CPT | Performed by: ORTHOPAEDIC SURGERY

## 2019-06-27 PROCEDURE — 99214 OFFICE O/P EST MOD 30 MIN: CPT | Performed by: ORTHOPAEDIC SURGERY

## 2019-06-27 PROCEDURE — 3017F COLORECTAL CA SCREEN DOC REV: CPT | Performed by: ORTHOPAEDIC SURGERY

## 2019-06-27 PROCEDURE — G8427 DOCREV CUR MEDS BY ELIG CLIN: HCPCS | Performed by: ORTHOPAEDIC SURGERY

## 2019-06-27 ASSESSMENT — ENCOUNTER SYMPTOMS
BACK PAIN: 0
SHORTNESS OF BREATH: 0
WHEEZING: 0

## 2019-06-27 NOTE — PROGRESS NOTES
[address] [place] [address]  Dept: [phone]  Dept Fax: [phone]        Ambulatory Follow Up      Subjective:   Emani Alonzo is a 62y.o. year old female who presents to our office today for routine followup regarding her   1. Arthritis of right knee    2. Arthritis of left hip    . Chief Complaint   Patient presents with    Knee Pain     right    Hip Pain     left       HPI Emani Alonzo  is a 62 y.o. female who presents today in follow for right knee pain and left hip pain. The patient was last seen on 4/19/2019 and underwent treatment in the form of physical therapy. The patient notes little improvement with the previous treatment. The patient is trying to lose weight and is requesting aqua therapy for her right knee and left hip. Her current BMI is 48.05 and her last BMI 49.90 she is making progress. She notes taking care of her grandson helps he keep her mind off the pain. Review of Systems   Constitutional: Negative for chills, diaphoresis and fever. Respiratory: Negative for shortness of breath and wheezing. Cardiovascular: Negative for chest pain, palpitations and leg swelling. Musculoskeletal: Positive for arthralgias (right knee- left hip). Negative for back pain, gait problem, joint swelling, myalgias, neck pain and neck stiffness. Neurological: Negative for weakness and numbness. I have reviewed the CC, HPI, ROS, PMH, FHX, Social History, and if not present in this note, I have reviewed in the patient's chart. I agree with the documentation provided by other staff and have reviewed their documentation prior to providing my signature indicating agreement. Objective :   Ht 5' 8\" (1.727 m)   Wt (!) 316 lb (143.3 kg)   BMI 48.05 kg/m²  Body mass index is 48.05 kg/m². General: Emani Alonzo is a 62 y.o. female who is alert and oriented and sitting comfortably in our office.   Ortho Exam  MS: advanced imaging, activity modification, physical therapy and/or surgical intervention. We discussed that we should continue with weight loss and physical therapy before we discuss surgery. The patient would like to proceed with formal aquatic physical therapy. The patient will follow up in 6-8 weeks. We discussed that the patient should call us with any concerns or questions. Follow up:Return in about 8 weeks (around 8/22/2019). No orders of the defined types were placed in this encounter. Orders Placed This Encounter   Procedures    Ambulatory referral to Physical Therapy     Referral Priority:   Routine     Referral Type:   Eval and Treat     Referral Reason:   Specialty Services Required     Requested Specialty:   Physical Therapy     Number of Visits Requested:   1     IKaleigh RMA am scribing for and in the presence of Dr. Na Hull. 6/30/2019 7:28 PM    I have reviewed and made changes accordingly to the work scribed by Kaleigh Bella, [facility]. The documentation accurately reflects work and decisions made by me. I have also reviewed documentation completed by clinical staff.     Na Hull DO, [facility]  6/30/2019 7:28 PM    This note is created with the assistance of a speech recognition program.  While intending to generate a document that actually reflects the content of the visit, the document can still have some errors including those of syntax and sound a like substitutions which may escape proof reading.  In such instances, actual meaning can be extrapolated by contextual diversion      Electronically signed by Abdullahi Turner DO, FAOAO on 6/30/2019 at 7:28 PM

## 2019-07-02 ENCOUNTER — HOSPITAL ENCOUNTER (OUTPATIENT)
Dept: PHYSICAL THERAPY | Age: 54
Setting detail: THERAPIES SERIES
Discharge: HOME | End: 2019-07-02
Payer: MEDICARE

## 2019-07-02 ENCOUNTER — HOSPITAL ENCOUNTER (OUTPATIENT)
Age: 54
Setting detail: SPECIMEN
Discharge: HOME | End: 2019-07-02
Payer: MEDICARE

## 2019-07-02 DIAGNOSIS — Z11.59 ENCOUNTER FOR SCREENING FOR OTHER VIRAL DISEASES: ICD-10-CM

## 2019-07-02 DIAGNOSIS — R74.8 ELEVATED LIVER ENZYMES: ICD-10-CM

## 2019-07-02 PROCEDURE — 97162 PT EVAL MOD COMPLEX 30 MIN: CPT

## 2019-07-02 ASSESSMENT — PAIN DESCRIPTION - FREQUENCY: FREQUENCY: CONTINUOUS

## 2019-07-02 ASSESSMENT — PAIN DESCRIPTION - ORIENTATION
ORIENTATION: LEFT
ORIENTATION_2: RIGHT

## 2019-07-02 ASSESSMENT — PAIN DESCRIPTION - INTENSITY: RATING_2: 9

## 2019-07-02 ASSESSMENT — PAIN DESCRIPTION - ONSET
ONSET: ON-GOING
ONSET_2: ON-GOING

## 2019-07-02 ASSESSMENT — PAIN DESCRIPTION - LOCATION
LOCATION: HIP
LOCATION_2: KNEE

## 2019-07-02 ASSESSMENT — PAIN DESCRIPTION - PROGRESSION
CLINICAL_PROGRESSION: GRADUALLY WORSENING
CLINICAL_PROGRESSION_2: GRADUALLY WORSENING

## 2019-07-02 ASSESSMENT — PAIN DESCRIPTION - PAIN TYPE
TYPE_2: CHRONIC PAIN
TYPE: CHRONIC PAIN

## 2019-07-02 ASSESSMENT — PAIN SCALES - GENERAL: PAINLEVEL_OUTOF10: 9

## 2019-07-02 ASSESSMENT — PAIN DESCRIPTION - DURATION: DURATION_2: CONTINUOUS

## 2019-07-02 NOTE — PROGRESS NOTES
Physical Therapy      [facility] Services Exercise Log  Aquatic, Hip & DLS Program- Phase 1    Date of Eval:      7/2/19                          Primary PT: Julian HOOD  Diagnosis: Arthritis L hip and R knee  Things to Focus On (goals): dec pain, inc endurance, inc flexibility  Surgical Precautions:  Medical Precautions:  [] C-9 dates  [] Occ Med   [x] Medicare       Date        Visit #        Walk F/L/R        Marching        Squats        Step-Ups F/L        Heel-toe raises        SLR F/L/R        Knee/Flex/Ext        F/L Lunges        Kickboard Ex. Iso Abd. Push-pull        Paddling                Balance                        Stretches        Achllies        Hamstring                                .                 Pain Rating

## 2019-07-03 LAB
HEPATITIS B SURFACE ANTIGEN: NONREACTIVE
HEPATITIS C ANTIBODY: NONREACTIVE

## 2019-07-08 ENCOUNTER — HOSPITAL ENCOUNTER (OUTPATIENT)
Dept: PHYSICAL THERAPY | Age: 54
Setting detail: THERAPIES SERIES
End: 2019-07-08
Payer: MEDICARE

## 2019-07-10 ENCOUNTER — APPOINTMENT (OUTPATIENT)
Dept: PHYSICAL THERAPY | Age: 54
End: 2019-07-10
Payer: MEDICARE

## 2019-07-15 ENCOUNTER — APPOINTMENT (OUTPATIENT)
Dept: PHYSICAL THERAPY | Age: 54
End: 2019-07-15
Payer: MEDICARE

## 2019-07-16 ENCOUNTER — OFFICE VISIT (OUTPATIENT)
Dept: PRIMARY CARE CLINIC | Age: 54
End: 2019-07-16
Payer: MEDICARE

## 2019-07-16 VITALS
TEMPERATURE: 98.8 F | SYSTOLIC BLOOD PRESSURE: 127 MMHG | WEIGHT: 293 LBS | DIASTOLIC BLOOD PRESSURE: 85 MMHG | OXYGEN SATURATION: 98 % | BODY MASS INDEX: 47.71 KG/M2 | HEART RATE: 88 BPM

## 2019-07-16 DIAGNOSIS — I10 ESSENTIAL HYPERTENSION: ICD-10-CM

## 2019-07-16 DIAGNOSIS — J06.9 URI WITH COUGH AND CONGESTION: Primary | ICD-10-CM

## 2019-07-16 DIAGNOSIS — M47.816 SPONDYLOSIS OF LUMBAR REGION WITHOUT MYELOPATHY OR RADICULOPATHY: ICD-10-CM

## 2019-07-16 DIAGNOSIS — J45.30 MILD PERSISTENT ASTHMA WITHOUT COMPLICATION: ICD-10-CM

## 2019-07-16 DIAGNOSIS — R07.81 RIB PAIN: ICD-10-CM

## 2019-07-16 DIAGNOSIS — K21.9 GASTROESOPHAGEAL REFLUX DISEASE, ESOPHAGITIS PRESENCE NOT SPECIFIED: ICD-10-CM

## 2019-07-16 DIAGNOSIS — E83.51 HYPOCALCEMIA: ICD-10-CM

## 2019-07-16 DIAGNOSIS — E03.9 HYPOTHYROIDISM, UNSPECIFIED TYPE: ICD-10-CM

## 2019-07-16 DIAGNOSIS — Z76.89 ENCOUNTER TO ESTABLISH CARE: ICD-10-CM

## 2019-07-16 DIAGNOSIS — F43.10 PTSD (POST-TRAUMATIC STRESS DISORDER): ICD-10-CM

## 2019-07-16 PROCEDURE — 1036F TOBACCO NON-USER: CPT | Performed by: PHYSICIAN ASSISTANT

## 2019-07-16 PROCEDURE — 99215 OFFICE O/P EST HI 40 MIN: CPT | Performed by: PHYSICIAN ASSISTANT

## 2019-07-16 PROCEDURE — G8417 CALC BMI ABV UP PARAM F/U: HCPCS | Performed by: PHYSICIAN ASSISTANT

## 2019-07-16 PROCEDURE — G8427 DOCREV CUR MEDS BY ELIG CLIN: HCPCS | Performed by: PHYSICIAN ASSISTANT

## 2019-07-16 PROCEDURE — 3017F COLORECTAL CA SCREEN DOC REV: CPT | Performed by: PHYSICIAN ASSISTANT

## 2019-07-16 RX ORDER — PREDNISONE 10 MG/1
10 TABLET ORAL 2 TIMES DAILY
Qty: 10 TABLET | Refills: 0 | Status: SHIPPED | OUTPATIENT
Start: 2019-07-16 | End: 2019-07-21

## 2019-07-16 RX ORDER — LEVOTHYROXINE SODIUM 0.03 MG/1
25 TABLET ORAL DAILY
Qty: 30 TABLET | Refills: 1 | Status: SHIPPED | OUTPATIENT
Start: 2019-07-16 | End: 2019-09-16 | Stop reason: SDUPTHER

## 2019-07-16 RX ORDER — TRAZODONE HYDROCHLORIDE 50 MG/1
50 TABLET ORAL NIGHTLY
Qty: 270 TABLET | Refills: 0 | Status: SHIPPED | OUTPATIENT
Start: 2019-07-16 | End: 2019-10-24 | Stop reason: SDUPTHER

## 2019-07-16 RX ORDER — ALBUTEROL SULFATE 2.5 MG/3ML
2.5 SOLUTION RESPIRATORY (INHALATION) EVERY 6 HOURS PRN
Qty: 120 EACH | Refills: 3 | Status: SHIPPED | OUTPATIENT
Start: 2019-07-16 | End: 2020-10-15 | Stop reason: SDUPTHER

## 2019-07-16 RX ORDER — BENZONATATE 100 MG/1
100 CAPSULE ORAL 3 TIMES DAILY
Qty: 15 CAPSULE | Refills: 0 | Status: SHIPPED | OUTPATIENT
Start: 2019-07-16 | End: 2019-07-21

## 2019-07-16 RX ORDER — AZITHROMYCIN 250 MG/1
TABLET, FILM COATED ORAL
Qty: 8 TABLET | Refills: 0 | Status: SHIPPED | OUTPATIENT
Start: 2019-07-16 | End: 2019-07-23

## 2019-07-16 RX ORDER — PANTOPRAZOLE SODIUM 40 MG/1
40 TABLET, DELAYED RELEASE ORAL DAILY
Qty: 90 TABLET | Refills: 1 | Status: SHIPPED | OUTPATIENT
Start: 2019-07-16 | End: 2020-08-13 | Stop reason: SDUPTHER

## 2019-07-16 RX ORDER — TIZANIDINE 4 MG/1
4 TABLET ORAL 2 TIMES DAILY PRN
Qty: 60 TABLET | Refills: 2 | Status: ON HOLD | OUTPATIENT
Start: 2019-07-16 | End: 2019-10-02

## 2019-07-16 ASSESSMENT — ENCOUNTER SYMPTOMS
CHEST TIGHTNESS: 1
SHORTNESS OF BREATH: 1
RHINORRHEA: 0
VOMITING: 1
BACK PAIN: 1
WHEEZING: 1
DIARRHEA: 1
NAUSEA: 0
SORE THROAT: 0
COUGH: 1

## 2019-07-16 NOTE — PROGRESS NOTES
Visit Information    Have you changed or started any medications since your last visit including any over-the-counter medicines, vitamins, or herbal medicines? no   Are you having any side effects from any of your medications? -  no  Have you stopped taking any of your medications? Is so, why? -  yes - need refills     Have you seen any other physician or provider since your last visit? Yes - Records Obtained  Have you had any other diagnostic tests since your last visit? Yes - Records Obtained  Have you been seen in the emergency room and/or had an admission to a hospital since we last saw you? No  Have you had your routine dental cleaning in the past 6 months? no    Have you activated your [employer] account? If not, what are your barriers?  No: declined      Patient Care Team:  Ricky Baires MD as PCP - General (Internal Medicine)  Ricky Baires MD as PCP - [facility]    Medical History Review  Past Medical, Family, and Social History reviewed and does contribute to the patient presenting condition    Health Maintenance   Topic Date Due    Pneumococcal 0-64 years Vaccine (1 of 1 - PPSV23) 06/29/1969    Cervical cancer screen  06/29/1984    Breast cancer screen  06/29/2013    Colon cancer screen colonoscopy  06/29/2013    DTaP/Tdap/Td vaccine (1 - Tdap) 04/09/2020 (Originally 6/29/1982)    Shingles Vaccine (1 of 2) 04/09/2020 (Originally 6/29/2013)    Flu vaccine (1) 09/01/2019    Potassium monitoring  04/23/2020    Creatinine monitoring  04/23/2020    Lipid screen  04/23/2024    Hepatitis C screen  Completed    HIV screen  Completed

## 2019-07-18 ENCOUNTER — APPOINTMENT (OUTPATIENT)
Dept: PHYSICAL THERAPY | Age: 54
End: 2019-07-18
Payer: MEDICARE

## 2019-07-22 ENCOUNTER — APPOINTMENT (OUTPATIENT)
Dept: PHYSICAL THERAPY | Age: 54
End: 2019-07-22
Payer: MEDICARE

## 2019-07-24 ENCOUNTER — HOSPITAL ENCOUNTER (OUTPATIENT)
Dept: PAIN MANAGEMENT | Age: 54
Discharge: HOME | End: 2019-07-24
Payer: MEDICARE

## 2019-07-24 VITALS
SYSTOLIC BLOOD PRESSURE: 124 MMHG | BODY MASS INDEX: 44.41 KG/M2 | RESPIRATION RATE: 15 BRPM | HEART RATE: 72 BPM | HEIGHT: 68 IN | TEMPERATURE: 98.1 F | DIASTOLIC BLOOD PRESSURE: 68 MMHG | WEIGHT: 293 LBS

## 2019-07-24 DIAGNOSIS — M54.16 LUMBAR RADICULOPATHY: ICD-10-CM

## 2019-07-24 DIAGNOSIS — Z98.1 STATUS POST LUMBAR SPINAL FUSION: ICD-10-CM

## 2019-07-24 DIAGNOSIS — M51.36 DDD (DEGENERATIVE DISC DISEASE), LUMBAR: Primary | ICD-10-CM

## 2019-07-24 DIAGNOSIS — E66.01 MORBID OBESITY (HCC): ICD-10-CM

## 2019-07-24 DIAGNOSIS — Z98.1 STATUS POST CERVICAL SPINAL FUSION: ICD-10-CM

## 2019-07-24 DIAGNOSIS — M96.1 FAILED BACK SYNDROME OF CERVICAL SPINE: ICD-10-CM

## 2019-07-24 DIAGNOSIS — M47.817 LUMBOSACRAL SPONDYLOSIS WITHOUT MYELOPATHY: ICD-10-CM

## 2019-07-24 DIAGNOSIS — M47.22 CERVICAL SPONDYLOSIS WITH RADICULOPATHY: ICD-10-CM

## 2019-07-24 DIAGNOSIS — M96.1 FAILED BACK SYNDROME OF LUMBAR SPINE: ICD-10-CM

## 2019-07-24 PROCEDURE — 80307 DRUG TEST PRSMV CHEM ANLYZR: CPT

## 2019-07-24 PROCEDURE — 99204 OFFICE O/P NEW MOD 45 MIN: CPT | Performed by: PAIN MEDICINE

## 2019-07-24 PROCEDURE — 99203 OFFICE O/P NEW LOW 30 MIN: CPT

## 2019-07-24 ASSESSMENT — ENCOUNTER SYMPTOMS
BACK PAIN: 1
BOWEL INCONTINENCE: 0
ALLERGIC/IMMUNOLOGIC NEGATIVE: 1
CHOKING: 0
EYES NEGATIVE: 1
SINUS PRESSURE: 0
SORE THROAT: 0
ABDOMINAL PAIN: 0
CONSTIPATION: 1
NAUSEA: 0
PHOTOPHOBIA: 0
ANAL BLEEDING: 0
SHORTNESS OF BREATH: 1

## 2019-07-24 ASSESSMENT — PAIN DESCRIPTION - ONSET: ONSET: ON-GOING

## 2019-07-24 ASSESSMENT — PAIN DESCRIPTION - PROGRESSION: CLINICAL_PROGRESSION: GRADUALLY WORSENING

## 2019-07-24 ASSESSMENT — PAIN DESCRIPTION - FREQUENCY: FREQUENCY: CONTINUOUS

## 2019-07-24 ASSESSMENT — PAIN SCALES - GENERAL: PAINLEVEL_OUTOF10: 9

## 2019-07-24 ASSESSMENT — PAIN DESCRIPTION - ORIENTATION: ORIENTATION: RIGHT;LEFT;LOWER;UPPER

## 2019-07-24 ASSESSMENT — PAIN DESCRIPTION - PAIN TYPE: TYPE: CHRONIC PAIN

## 2019-07-24 NOTE — PROGRESS NOTES
pain is worse during the night. The symptoms are aggravated by bending, standing, twisting and position (walking, lifting, ADLs). Stiffness is present in the morning. Associated symptoms include chest pain, headaches, leg pain, numbness, tingling and weakness. Pertinent negatives include no abdominal pain, bladder incontinence, bowel incontinence or dysuria. Risk factors include lack of exercise and obesity. Neck Pain    This is a chronic problem. The current episode started more than 1 year ago. The problem occurs constantly. The problem has been unchanged. The pain is associated with nothing. The pain is present in the left side, midline and right side. The quality of the pain is described as aching (sharp,throbbing). The pain is at a severity of 5/10. The pain is moderate. The symptoms are aggravated by position, twisting, bending and coughing. Associated symptoms include chest pain, headaches, leg pain, numbness, tingling and weakness. Pertinent negatives include no photophobia. RX Monitoring 7/24/2019   Attestation -   Periodic Controlled Substance Monitoring No signs of potential drug abuse or diversion identified. ;Random urine drug screen sent today.    Chronic Pain > 80 MEDD -   Chronic Pain > 120 MEDD (historical values) -               Current Pain Assessment  Pain Assessment  Pain Assessment: 0-10  Pain Level: 9  Patient's Stated Pain Goal: 4(Decrease pain and increase acti vity)  Pain Type: Chronic pain  Pain Location: Leg, Back, Arm, Neck, Knee  Pain Orientation: Right, Left, Lower, Upper  Pain Radiating Towards: both legs and both arms  Pain Descriptors: Constant, Burning, Shooting, Spasm, Tightness, Stabbing, Numbness, Tingling  Pain Frequency: Continuous  Pain Onset: On-going  Clinical Progression: Gradually worsening  Non-Pharmaceutical Pain Intervention(s): Rest, Repositioned, Heat applied                    ADVERSE MEDICATION EFFECTS:   Constipation: yes  Bowel Regimen: Yes  Diet: Tetracyclines & related    Family History  family history includes Arthritis in her mother and sister; Asthma in her brother; Breast Cancer in her niece and sister; Cancer in her maternal grandfather; Diabetes in her brother and mother; Heart Disease in her mother; Hypertension in her sister; Kidney Disease in her mother; Lupus in her mother; Other in her mother. Social History  Social History     Socioeconomic History    Marital status: Single     Spouse name: None    Number of children: None    Years of education: None    Highest education level: None   Occupational History    None   Social Needs    Financial resource strain: None    Food insecurity:     Worry: None     Inability: None    Transportation needs:     Medical: None     Non-medical: None   Tobacco Use    Smoking status: Never Smoker    Smokeless tobacco: Never Used   Substance and Sexual Activity    Alcohol use: No    Drug use: No    Sexual activity: None   Lifestyle    Physical activity:     Days per week: None     Minutes per session: None    Stress: None   Relationships    Social connections:     Talks on phone: None     Gets together: None     Attends Hoahaoism service: None     Active member of club or organization: None     Attends meetings of clubs or organizations: None     Relationship status: None    Intimate partner violence:     Fear of current or ex partner: None     Emotionally abused: None     Physically abused: None     Forced sexual activity: None   Other Topics Concern    None   Social History Narrative    None      reports that she does not use drugs. REVIEW OF SYSTEMS:  Review of Systems   Constitutional: Negative. Negative for activity change, appetite change, diaphoresis and unexpected weight change. HENT: Negative. Negative for congestion, dental problem, hearing loss, sinus pressure and sore throat. Eyes: Negative. Negative for photophobia and visual disturbance.    Respiratory: Positive for Reflex Scores:       Patellar reflexes are 1+ on the right side and 1+ on the left side. Achilles reflexes are 1+ on the right side and 1+ on the left side. Uses cane   Skin: Skin is warm and dry. Psychiatric: She has a normal mood and affect. Her speech is normal and behavior is normal. Judgment and thought content normal. Cognition and memory are normal.    Right Ankle Exam     Muscle Strength   The patient has normal right ankle strength. Left Ankle Exam     Muscle Strength   The patient has normal left ankle strength. Right Knee Exam     Muscle Strength   The patient has normal right knee strength. Left Knee Exam     Muscle Strength   The patient has normal left knee strength. Right Hip Exam     Muscle Strength   The patient has normal right hip strength. Left Hip Exam     Tenderness   The patient is experiencing tenderness in the anterior, greater trochanter, lateral and posterior. Range of Motion   Left hip abduction: Somewhat limited and painful. Muscle Strength   The patient has normal left hip strength. Back Exam     Tenderness   The patient is experiencing tenderness in the lumbar, sacroiliac and cervical.    Range of Motion   Back extension: Limited and painful. Back flexion: Limited and painful. Back lateral bend right: Limited and painful. Back lateral bend left: Limited and painful. Back rotation right: Limited and painful. Back rotation left: Limited and painful. Tests   Straight leg raise right: positive  Straight leg raise left: positive    Other   Sensation: normal  Gait: antalgic   Erythema: no back redness  Scars: absent (Patient has a midline scar in the lower anterior abdominal wall as well as there is a small scar in the lumbar region.)    Comments:  Examination of cervical spine revealed loss of cervical lordosis with slight flexion at the cervicothoracic junction.   There is some paraspinal muscle spasm bilaterally in the

## 2019-07-25 ENCOUNTER — APPOINTMENT (OUTPATIENT)
Dept: PHYSICAL THERAPY | Age: 54
End: 2019-07-25
Payer: MEDICARE

## 2019-07-28 LAB
6-ACETYLMORPHINE, UR: NOT DETECTED
7-AMINOCLONAZEPAM, URINE: NOT DETECTED
ALPHA-OH-ALPRAZ, URINE: NOT DETECTED
ALPRAZOLAM, URINE: NOT DETECTED
AMPHETAMINES, URINE: NOT DETECTED
BARBITURATES, URINE: NOT DETECTED
BENZOYLECGONINE, UR: NOT DETECTED
BUPRENORPHINE URINE: NOT DETECTED
CARISOPRODOL, UR: NOT DETECTED
CLONAZEPAM, URINE: NOT DETECTED
CODEINE, URINE: NOT DETECTED
CREATININE URINE: 172 MG/DL (ref 20–400)
DIAZEPAM, URINE: NOT DETECTED
DRUGS EXPECTED, UR: NORMAL
EER HI RES INTERP UR: NORMAL
ETHYL GLUCURONIDE UR: NOT DETECTED
FENTANYL URINE: NOT DETECTED
HYDROCODONE, URINE: NOT DETECTED
HYDROMORPHONE, URINE: NOT DETECTED
LORAZEPAM, URINE: NOT DETECTED
MARIJUANA METAB, UR: NOT DETECTED
MDA, UR: NOT DETECTED
MDEA, EVE, UR: NOT DETECTED
MDMA URINE: NOT DETECTED
MEPERIDINE METAB, UR: NOT DETECTED
METHADONE, URINE: NOT DETECTED
METHAMPHETAMINE, URINE: NOT DETECTED
METHYLPHENIDATE: NOT DETECTED
MIDAZOLAM, URINE: NOT DETECTED
MORPHINE URINE: NOT DETECTED
NORBUPRENORPHINE, URINE: NOT DETECTED
NORDIAZEPAM, URINE: NOT DETECTED
NORFENTANYL, URINE: NOT DETECTED
NORHYDROCODONE, URINE: NOT DETECTED
NOROXYCODONE, URINE: NOT DETECTED
NOROXYMORPHONE, URINE: NOT DETECTED
OXAZEPAM, URINE: NOT DETECTED
OXYCODONE URINE: NOT DETECTED
OXYMORPHONE, URINE: NOT DETECTED
PAIN MANAGEMENT DRUG PANEL INTERP, URINE: NORMAL
PAIN MGT DRUG PANEL, HI RES, UR: NORMAL
PCP,URINE: NOT DETECTED
PHENTERMINE, UR: NOT DETECTED
PROPOXYPHENE, URINE: NOT DETECTED
TAPENTADOL, URINE: NOT DETECTED
TAPENTADOL-O-SULFATE, URINE: NOT DETECTED
TEMAZEPAM, URINE: NOT DETECTED
TRAMADOL, URINE: NOT DETECTED
ZOLPIDEM, URINE: NOT DETECTED

## 2019-07-28 ASSESSMENT — ENCOUNTER SYMPTOMS
ABDOMINAL PAIN: 0
CONSTIPATION: 0

## 2019-08-16 ENCOUNTER — TELEPHONE (OUTPATIENT)
Dept: PRIMARY CARE CLINIC | Age: 54
End: 2019-08-16

## 2019-08-16 ENCOUNTER — OFFICE VISIT (OUTPATIENT)
Dept: PRIMARY CARE CLINIC | Age: 54
End: 2019-08-16
Payer: MEDICARE

## 2019-08-16 VITALS
OXYGEN SATURATION: 97 % | WEIGHT: 293 LBS | BODY MASS INDEX: 48.05 KG/M2 | HEART RATE: 68 BPM | SYSTOLIC BLOOD PRESSURE: 122 MMHG | DIASTOLIC BLOOD PRESSURE: 73 MMHG

## 2019-08-16 DIAGNOSIS — J20.9 ACUTE BRONCHITIS, UNSPECIFIED ORGANISM: Primary | ICD-10-CM

## 2019-08-16 PROCEDURE — G8417 CALC BMI ABV UP PARAM F/U: HCPCS | Performed by: NURSE PRACTITIONER

## 2019-08-16 PROCEDURE — G8427 DOCREV CUR MEDS BY ELIG CLIN: HCPCS | Performed by: NURSE PRACTITIONER

## 2019-08-16 PROCEDURE — 99213 OFFICE O/P EST LOW 20 MIN: CPT | Performed by: NURSE PRACTITIONER

## 2019-08-16 PROCEDURE — 1036F TOBACCO NON-USER: CPT | Performed by: NURSE PRACTITIONER

## 2019-08-16 PROCEDURE — 3017F COLORECTAL CA SCREEN DOC REV: CPT | Performed by: NURSE PRACTITIONER

## 2019-08-16 RX ORDER — LEVOFLOXACIN 500 MG/1
500 TABLET, FILM COATED ORAL DAILY
Qty: 7 TABLET | Refills: 0 | Status: SHIPPED | OUTPATIENT
Start: 2019-08-16 | End: 2019-08-23

## 2019-08-16 ASSESSMENT — ENCOUNTER SYMPTOMS
SORE THROAT: 0
SHORTNESS OF BREATH: 0
WHEEZING: 0
COUGH: 1
SINUS PRESSURE: 0

## 2019-08-16 NOTE — PROGRESS NOTES
[address] [facility]  [address] [facility] [address]  Dept: [phone]  Dept Fax: [phone]    Raymon Blackmon is a 62 y.o. female who presents to the urgent care today for her medicalconditions/complaints as noted below. Raymon Blackmon is c/o of Cough (Green phlem) and Congestion      HPI:       49-year-old female patient presents with complaint of continued cough. Patient describes that she was seen roughly 1 month ago for similar symptoms. Patient was given Z-Aron, prednisone, Tessalon and diagnosed with bronchitis. Patient describes that she had partial relief without resolution. Patient denies history of COPD, asthma, smoker. Patient describes continued symptoms of cough, productive of yellow mucus. Describes mild chest congestion. Denies sinus pressure, nasal congestion, ear pain, sore throat. Denies fevers, chills. Denies chest pain, shortness of breath. Relieving factors include none.   Worsening factors include none      Past Medical History:   Diagnosis Date    Arthritis     CHF (congestive heart failure) (HCC)     GERD (gastroesophageal reflux disease)     History of heart attack     HLD (hyperlipidemia)     Hypertension     Insomnia     Osteoarthritis         Current Outpatient Medications   Medication Sig Dispense Refill    levofloxacin (LEVAQUIN) 500 MG tablet Take 1 tablet by mouth daily for 7 days 7 tablet 0    pantoprazole (PROTONIX) 40 MG tablet Take 1 tablet by mouth daily 90 tablet 1    levothyroxine (SYNTHROID) 25 MCG tablet Take 1 tablet by mouth daily 30 tablet 1    traZODone (DESYREL) 50 MG tablet Take 1 tablet by mouth nightly Indications: take 2-3 tablets by nouth at bedtime prn for insomnia 270 tablet 0    tiZANidine (ZANAFLEX) 4 MG tablet Take 1 tablet by mouth 2 times daily as needed (Myalgia) 60 tablet 2    albuterol (PROVENTIL) (2.5 MG/3ML) 0.083% nebulizer solution Take 3 mLs by nebulization

## 2019-08-21 ENCOUNTER — HOSPITAL ENCOUNTER (OUTPATIENT)
Dept: GENERAL RADIOLOGY | Facility: CLINIC | Age: 54
Discharge: HOME | End: 2019-08-23
Payer: MEDICARE

## 2019-08-21 ENCOUNTER — HOSPITAL ENCOUNTER (OUTPATIENT)
Facility: CLINIC | Age: 54
Discharge: HOME | End: 2019-08-23
Payer: MEDICARE

## 2019-08-21 DIAGNOSIS — J20.9 ACUTE BRONCHITIS, UNSPECIFIED ORGANISM: ICD-10-CM

## 2019-08-21 DIAGNOSIS — R07.81 RIB PAIN: ICD-10-CM

## 2019-08-21 PROCEDURE — 71111 X-RAY EXAM RIBS/CHEST4/> VWS: CPT

## 2019-08-21 PROCEDURE — 71046 X-RAY EXAM CHEST 2 VIEWS: CPT

## 2019-09-04 ENCOUNTER — HOSPITAL ENCOUNTER (OUTPATIENT)
Dept: PAIN MANAGEMENT | Age: 54
Discharge: HOME | End: 2019-09-04
Payer: MEDICARE

## 2019-09-04 VITALS
RESPIRATION RATE: 16 BRPM | BODY MASS INDEX: 44.41 KG/M2 | OXYGEN SATURATION: 97 % | HEIGHT: 68 IN | DIASTOLIC BLOOD PRESSURE: 99 MMHG | SYSTOLIC BLOOD PRESSURE: 181 MMHG | HEART RATE: 86 BPM | WEIGHT: 293 LBS | TEMPERATURE: 98.2 F

## 2019-09-04 DIAGNOSIS — E66.01 MORBID OBESITY (HCC): ICD-10-CM

## 2019-09-04 DIAGNOSIS — M47.22 CERVICAL SPONDYLOSIS WITH RADICULOPATHY: ICD-10-CM

## 2019-09-04 DIAGNOSIS — M54.16 LUMBAR RADICULOPATHY: ICD-10-CM

## 2019-09-04 DIAGNOSIS — Z98.1 STATUS POST LUMBAR SPINAL FUSION: ICD-10-CM

## 2019-09-04 DIAGNOSIS — M96.1 FAILED BACK SYNDROME OF CERVICAL SPINE: Primary | ICD-10-CM

## 2019-09-04 DIAGNOSIS — M50.30 BULGE OF CERVICAL DISC WITHOUT MYELOPATHY: ICD-10-CM

## 2019-09-04 DIAGNOSIS — Z98.1 STATUS POST CERVICAL SPINAL FUSION: ICD-10-CM

## 2019-09-04 DIAGNOSIS — M51.36 DDD (DEGENERATIVE DISC DISEASE), LUMBAR: ICD-10-CM

## 2019-09-04 DIAGNOSIS — M53.3 SACROILIAC JOINT DYSFUNCTION OF BOTH SIDES: ICD-10-CM

## 2019-09-04 DIAGNOSIS — M47.816 SPONDYLOSIS OF LUMBAR REGION WITHOUT MYELOPATHY OR RADICULOPATHY: ICD-10-CM

## 2019-09-04 PROCEDURE — 99213 OFFICE O/P EST LOW 20 MIN: CPT

## 2019-09-04 PROCEDURE — 99214 OFFICE O/P EST MOD 30 MIN: CPT | Performed by: PAIN MEDICINE

## 2019-09-04 RX ORDER — POLYETHYLENE GLYCOL 3350 17 G/17G
POWDER, FOR SOLUTION ORAL
COMMUNITY
End: 2021-05-11 | Stop reason: SDUPTHER

## 2019-09-04 RX ORDER — OXYCODONE AND ACETAMINOPHEN 7.5; 325 MG/1; MG/1
1 TABLET ORAL EVERY 6 HOURS PRN
Qty: 120 TABLET | Refills: 0 | Status: ON HOLD | OUTPATIENT
Start: 2019-09-04 | End: 2019-10-03 | Stop reason: SDUPTHER

## 2019-09-04 ASSESSMENT — PAIN DESCRIPTION - ONSET: ONSET: ON-GOING

## 2019-09-04 ASSESSMENT — ENCOUNTER SYMPTOMS
CONSTIPATION: 1
RESPIRATORY NEGATIVE: 1
ALLERGIC/IMMUNOLOGIC NEGATIVE: 1
EYES NEGATIVE: 1
BACK PAIN: 1

## 2019-09-04 ASSESSMENT — PAIN DESCRIPTION - FREQUENCY: FREQUENCY: CONTINUOUS

## 2019-09-04 ASSESSMENT — PAIN DESCRIPTION - PAIN TYPE: TYPE: CHRONIC PAIN

## 2019-09-04 ASSESSMENT — PAIN SCALES - GENERAL: PAINLEVEL_OUTOF10: 9

## 2019-09-04 ASSESSMENT — PAIN DESCRIPTION - PROGRESSION: CLINICAL_PROGRESSION: NOT CHANGED

## 2019-09-04 ASSESSMENT — PAIN DESCRIPTION - ORIENTATION: ORIENTATION: RIGHT;LEFT;LOWER;UPPER

## 2019-09-04 NOTE — PROGRESS NOTES
[facility] Pain Management  Patient Pain Assessment  RECHECK - Dr. Julia Dillon    Primary Care Physician: Vera Huddleston PA-C    Chief complaint:   Chief Complaint   Patient presents with    Lower Back Pain    Neck Pain   . HISTORY OF PRESENT ILLNESS:  Sg Hamilton is 62 y.o. female with    Patient returned to the pain clinic with a chief complaint of pain involving the cervical region as well as in the low back. Patient reports she has neck pain of about 15 years duration. She had undergone surgery in her neck about 15 years ago and again in October 2018. Her neck pain is somewhat better after surgery. She had undergone interventions in the past without improvement in the pain. Patient also has pain in the low back and had undergone interventions without much improvement and had undergone surgery for 3 years ago. She moved to [facility] in January and is referred to this pain clinic for pain control as her pain is not under good control at this time. She was apparently getting Percocet 10/3/2025 every 6 hours for pain control the past.  Patient reports there is not much change in her back pain or neck pain since her last visit. Patient has been without her medications for a while  Back Pain   This is a chronic problem. The current episode started more than 1 year ago. The problem occurs constantly. The problem is unchanged. The pain is present in the lumbar spine and sacro-iliac. The quality of the pain is described as aching, burning, cramping and stabbing. The pain is at a severity of 9/10 (4-10). The pain is severe. The pain is worse during the night. The symptoms are aggravated by bending, standing, twisting and position (walking, lifting, ADLs). Stiffness is present in the morning. Associated symptoms include chest pain, headaches, leg pain, numbness, tingling and weakness.  Pertinent negatives include no abdominal pain, bladder incontinence, bowel incontinence, dysuria or fever. Risk factors include lack of exercise and obesity. Neck Pain    This is a chronic problem. The current episode started more than 1 year ago. The problem occurs constantly. The problem has been unchanged. The pain is associated with nothing. The pain is present in the left side, midline and right side. The quality of the pain is described as aching (sharp,throbbing). The pain is at a severity of 8/10. The pain is severe. The symptoms are aggravated by position, twisting, bending and coughing. Stiffness is present in the morning. Associated symptoms include chest pain, headaches, leg pain, numbness, tingling and weakness. Pertinent negatives include no fever, photophobia or syncope. RX Monitoring 9/4/2019   Attestation -   Periodic Controlled Substance Monitoring Possible medication side effects, risk of tolerance/dependence & alternative treatments discussed. ;No signs of potential drug abuse or diversion identified. ;Assessed functional status.    Chronic Pain > 80 MEDD -   Chronic Pain > 120 MEDD (historical values) -       Current Pain Assessment  Pain Assessment  Pain Assessment: 0-10  Pain Level: 9  Patient's Stated Pain Goal: (Decrease pain and increase activity)  Pain Type: Chronic pain  Pain Location: Arm, Back, Leg, Knee, Neck  Pain Orientation: Right, Left, Lower, Upper  Pain Radiating Towards: both legs and both arms  Pain Descriptors: Burning, Constant, Numbness, Shooting, Spasm, Tightness, Stabbing, Tingling  Pain Frequency: Continuous  Pain Onset: On-going  Clinical Progression: Not changed  Non-Pharmaceutical Pain Intervention(s): Repositioned, Relaxation techniques, Heat applied                    ADVERSE MEDICATION EFFECTS:   Constipation: yes  Bowel Regimen: Yes  Diet: common adult  Appetite:  ok  Sedation:  no  Urinary Retention: no    FOCUSED PAINSCALE:  Highest : 10  Lowest :10  Average: Range-10  When and What  was your last procedure:   Bilateral knee injections  Was your procedure effective:  no    ACTIVITY/SOCIAL/EMOTIONAL:  Sleep Pattern: 2 hours per night. nightime awakenings and difficulty falling back asleep if awakened  Energy Level:  Tired/Fatigued  Currently attending Physical Therapy:  No  Home Exercises: rarely no regular exercise  Mobility: Walking and standing increases pain   Do you use assistive devices? Yes a cane and rolling walker  Have you fallen in the last 30 days?:  No  Currently seeing a Psychiatrist or Psychologist:  No  Emotional Issues: normal   Mood: appropriate     ABERRANT BEHAVIORS SINCE LAST VISIT:  Have you ever been treated in another Pain Clinic yes [facility] for prescriptions appropriate: not applicable  Lost rx/pills:not applicable  Taking more medication than prescribed:  not applicable  Are you receiving PAIN medications from  other doctors: not applicable  Last Urine/Serum Drug Screen : 7/24/19  Was Serum/UDS as anticipated? yes  Brought pill bottles in :not applicable   Was Pill count appropriate? :not applicable   Are currently pregnant?not applicable  Recent ER visits: No             Past Medical History      Diagnosis Date    Arthritis     CHF (congestive heart failure) (HCC)     GERD (gastroesophageal reflux disease)     History of heart attack     HLD (hyperlipidemia)     Hypertension     Insomnia     Osteoarthritis        Surgical History  Past Surgical History:   Procedure Laterality Date    BACK SURGERY      CHOLECYSTECTOMY, LAPAROSCOPIC      HYSTERECTOMY, TOTAL ABDOMINAL      KNEE ARTHROSCOPY Right     KNEE SURGERY Left 2009    NECK SURGERY      SHOULDER SURGERY Right 2009       Medications  Current Outpatient Medications   Medication Sig Dispense Refill    oxyCODONE-acetaminophen (PERCOCET) 7.5-325 MG per tablet Take 1 tablet by mouth every 6 hours as needed for Pain for up to 30 days.  Intended supply: 30 days 120 tablet 0    pantoprazole (PROTONIX) 40 MG tablet Take 1 tablet by mouth daily 90 tablet 1    levothyroxine (SYNTHROID) 25 MCG tablet Take 1 tablet by mouth daily 30 tablet 1    traZODone (DESYREL) 50 MG tablet Take 1 tablet by mouth nightly Indications: take 2-3 tablets by nouth at bedtime prn for insomnia 270 tablet 0    tiZANidine (ZANAFLEX) 4 MG tablet Take 1 tablet by mouth 2 times daily as needed (Myalgia) 60 tablet 2    albuterol (PROVENTIL) (2.5 MG/3ML) 0.083% nebulizer solution Take 3 mLs by nebulization every 6 hours as needed for Wheezing 120 each 3    hydrALAZINE (APRESOLINE) 50 MG tablet Take 1 tablet by mouth 2 times daily  0    atorvastatin (LIPITOR) 80 MG tablet Take 1 tablet by mouth daily 90 tablet 1    bumetanide (BUMEX) 2 MG tablet Take 1 tablet by mouth daily 90 tablet 1    DULoxetine (CYMBALTA) 60 MG extended release capsule Take 1 capsule by mouth daily 90 capsule 1    buPROPion (WELLBUTRIN XL) 150 MG extended release tablet Take 1 tablet by mouth every morning 90 tablet 1    isosorbide dinitrate (ISORDIL) 10 MG tablet Take 1 tablet by mouth 2 times daily 180 tablet 1    spironolactone (ALDACTONE) 25 MG tablet Take 1 tablet by mouth daily 90 tablet 1    albuterol sulfate  (90 Base) MCG/ACT inhaler Inhale 2 puffs into the lungs every 4 hours as needed for Wheezing 1 Inhaler 5    clopidogrel (PLAVIX) 75 MG tablet Take 1 tablet by mouth daily 90 tablet 1    metoprolol tartrate (LOPRESSOR) 50 MG tablet Take 1 tablet by mouth daily 90 tablet 1    aspirin 81 MG tablet Take 81 mg by mouth daily      polyethylene glycol (GLYCOLAX) packet polyethylene glycol 3350 17 gram/dose oral powder      Respiratory Therapy Supplies (NEBULIZER COMPRESSOR) KIT 1 kit by Does not apply route once for 1 dose 1 kit 0    oxyCODONE-acetaminophen (PERCOCET)  MG per tablet TAKE 1 TABLET BY MOUTH EVERY 6 HOURS AS NEEDED FOR PAIN FOR 5 DAYS  0     No current facility-administered medications for this encounter.         Allergies  Food; Gabapentin; and Tetracyclines & related    Family swelling. Negative for syncope. Gastrointestinal: Positive for constipation. Negative for abdominal pain, bowel incontinence, nausea and vomiting. Endocrine: Negative. Negative for cold intolerance and polyphagia. Genitourinary: Negative. Negative for bladder incontinence, dysuria and hematuria. Musculoskeletal: Positive for back pain and neck pain. Skin: Negative. Negative for rash. Allergic/Immunologic: Negative. Neurological: Positive for dizziness, tingling, weakness, numbness and headaches. Hematological: Bruises/bleeds easily. Plavix and aspirin therapy   Psychiatric/Behavioral: Positive for sleep disturbance. Negative for confusion. GENERAL PHYSICAL EXAM:  Vitals: BP (!) 181/99   Pulse 86   Temp 98.2 °F (36.8 °C) (Oral)   Resp 16   Ht 5' 8\" (1.727 m)   Wt (!) 316 lb (143.3 kg)   SpO2 97%   BMI 48.05 kg/m² , Body mass index is 48.05 kg/m². Physical Exam   Constitutional: She is oriented to person, place, and time. She appears well-developed and well-nourished. HENT:   Head: Normocephalic and atraumatic. Eyes: Pupils are equal, round, and reactive to light. Conjunctivae and EOM are normal.   Neck: Normal range of motion. Neck supple. No JVD present. No tracheal deviation present. No thyromegaly present. Cardiovascular: Normal rate and regular rhythm. Pulmonary/Chest: Effort normal and breath sounds normal. No apnea. No respiratory distress. Abdominal: Soft. Bowel sounds are normal. She exhibits no distension. Musculoskeletal: She exhibits no edema. Neurological: She is alert and oriented to person, place, and time. She has normal strength. She displays no atrophy and no tremor. No cranial nerve deficit or sensory deficit. She exhibits normal muscle tone. Coordination normal.   Reflex Scores:       Patellar reflexes are 1+ on the right side and 1+ on the left side. Achilles reflexes are 1+ on the right side and 1+ on the left side.   Uses cane Skin: Skin is warm, dry and intact. Capillary refill takes less than 2 seconds. No rash noted. Psychiatric: She has a normal mood and affect. Her speech is normal and behavior is normal. Judgment and thought content normal. Cognition and memory are normal.    Right Ankle Exam     Muscle Strength   The patient has normal right ankle strength. Left Ankle Exam     Muscle Strength   The patient has normal left ankle strength. Right Knee Exam     Muscle Strength   The patient has normal right knee strength. Left Knee Exam     Muscle Strength   The patient has normal left knee strength. Right Hip Exam     Muscle Strength   The patient has normal right hip strength. Left Hip Exam     Muscle Strength   The patient has normal left hip strength. Back Exam     Tenderness   The patient is experiencing tenderness in the lumbar, sacroiliac, cervical and thoracic. Range of Motion   Back extension: Limited and painful. Back flexion: Limited and painful. Back lateral bend right: Limited and painful. Back lateral bend left: Limited and painful. Back rotation right: Limited and painful. Back rotation left: Limited and painful. Tests   Straight leg raise right: positive  Straight leg raise left: positive    Other   Sensation: normal  Gait: antalgic   Erythema: no back redness  Scars: present    Comments:  Surgical Scar --present the lower abdomen lumbar area as well as in the cervical region-well-healed  Examination of the cervical spine revealed loss of cervical lordosis. Movements of the neck are limited and painful. Facet loading is positive bilaterally. Spurling's maneuver produced pain bilaterally. Nurses Notes and Vital Signs reviewed.     DATA  Labs:  Benzodiazepine Screen, Urine   Date Value Ref Range Status   12/03/2017 NEGATIVE NEG Final     Comment:           (Positive cutoff 200 ng/mL)                  7/28/2019 12:59 AM - [facility] Incoming Lab Results

## 2019-09-05 ASSESSMENT — ENCOUNTER SYMPTOMS
PHOTOPHOBIA: 0
COUGH: 0
NAUSEA: 0
CHOKING: 0
EYE PAIN: 0
ABDOMINAL PAIN: 0
SHORTNESS OF BREATH: 0
VOMITING: 0
BOWEL INCONTINENCE: 0

## 2019-09-16 ENCOUNTER — OFFICE VISIT (OUTPATIENT)
Dept: PRIMARY CARE CLINIC | Age: 54
End: 2019-09-16
Payer: MEDICARE

## 2019-09-16 VITALS
HEIGHT: 68 IN | OXYGEN SATURATION: 98 % | SYSTOLIC BLOOD PRESSURE: 120 MMHG | TEMPERATURE: 97.2 F | DIASTOLIC BLOOD PRESSURE: 73 MMHG | BODY MASS INDEX: 44.41 KG/M2 | WEIGHT: 293 LBS | HEART RATE: 68 BPM | RESPIRATION RATE: 20 BRPM

## 2019-09-16 DIAGNOSIS — I25.2 HISTORY OF HEART ATTACK: ICD-10-CM

## 2019-09-16 DIAGNOSIS — M79.10 MYALGIA: ICD-10-CM

## 2019-09-16 DIAGNOSIS — Z12.31 ENCOUNTER FOR SCREENING MAMMOGRAM FOR BREAST CANCER: ICD-10-CM

## 2019-09-16 DIAGNOSIS — N39.41 URGE INCONTINENCE: ICD-10-CM

## 2019-09-16 DIAGNOSIS — J45.30 MILD PERSISTENT ASTHMA WITHOUT COMPLICATION: Primary | ICD-10-CM

## 2019-09-16 DIAGNOSIS — E03.9 HYPOTHYROIDISM, UNSPECIFIED TYPE: ICD-10-CM

## 2019-09-16 DIAGNOSIS — Z12.11 SCREENING FOR COLON CANCER: ICD-10-CM

## 2019-09-16 DIAGNOSIS — Z82.69 FAMILY HISTORY OF SYSTEMIC LUPUS ERYTHEMATOSUS: ICD-10-CM

## 2019-09-16 DIAGNOSIS — E66.01 CLASS 3 SEVERE OBESITY DUE TO EXCESS CALORIES WITH SERIOUS COMORBIDITY AND BODY MASS INDEX (BMI) OF 45.0 TO 49.9 IN ADULT (HCC): ICD-10-CM

## 2019-09-16 DIAGNOSIS — Z23 NEEDS FLU SHOT: ICD-10-CM

## 2019-09-16 PROCEDURE — 3017F COLORECTAL CA SCREEN DOC REV: CPT | Performed by: PHYSICIAN ASSISTANT

## 2019-09-16 PROCEDURE — 90686 IIV4 VACC NO PRSV 0.5 ML IM: CPT | Performed by: PHYSICIAN ASSISTANT

## 2019-09-16 PROCEDURE — 1036F TOBACCO NON-USER: CPT | Performed by: PHYSICIAN ASSISTANT

## 2019-09-16 PROCEDURE — G0008 ADMIN INFLUENZA VIRUS VAC: HCPCS | Performed by: PHYSICIAN ASSISTANT

## 2019-09-16 PROCEDURE — 99215 OFFICE O/P EST HI 40 MIN: CPT | Performed by: PHYSICIAN ASSISTANT

## 2019-09-16 PROCEDURE — G8417 CALC BMI ABV UP PARAM F/U: HCPCS | Performed by: PHYSICIAN ASSISTANT

## 2019-09-16 PROCEDURE — G8427 DOCREV CUR MEDS BY ELIG CLIN: HCPCS | Performed by: PHYSICIAN ASSISTANT

## 2019-09-16 RX ORDER — LEVOTHYROXINE SODIUM 0.03 MG/1
25 TABLET ORAL DAILY
Qty: 30 TABLET | Refills: 3 | Status: SHIPPED | OUTPATIENT
Start: 2019-09-16 | End: 2020-05-28 | Stop reason: SDUPTHER

## 2019-09-16 RX ORDER — MONTELUKAST SODIUM 10 MG/1
10 TABLET ORAL NIGHTLY
Qty: 30 TABLET | Refills: 3 | Status: SHIPPED | OUTPATIENT
Start: 2019-09-16 | End: 2019-11-04 | Stop reason: SDUPTHER

## 2019-09-16 RX ORDER — ORPHENADRINE CITRATE 100 MG/1
100 TABLET, EXTENDED RELEASE ORAL 2 TIMES DAILY PRN
Qty: 60 TABLET | Refills: 0 | Status: SHIPPED | OUTPATIENT
Start: 2019-09-16 | End: 2019-10-24 | Stop reason: SDUPTHER

## 2019-09-16 RX ORDER — ALBUTEROL SULFATE 90 UG/1
2 AEROSOL, METERED RESPIRATORY (INHALATION) EVERY 4 HOURS PRN
Qty: 1 INHALER | Refills: 5 | Status: SHIPPED | OUTPATIENT
Start: 2019-09-16 | End: 2020-07-29 | Stop reason: SDUPTHER

## 2019-09-16 RX ORDER — OXYBUTYNIN CHLORIDE 5 MG/1
5 TABLET ORAL 2 TIMES DAILY
Qty: 60 TABLET | Refills: 0 | Status: SHIPPED | OUTPATIENT
Start: 2019-09-16 | End: 2019-10-24 | Stop reason: SDUPTHER

## 2019-09-16 ASSESSMENT — ENCOUNTER SYMPTOMS
CONSTIPATION: 1
COUGH: 1
BACK PAIN: 1
SHORTNESS OF BREATH: 1

## 2019-09-17 ENCOUNTER — TELEPHONE (OUTPATIENT)
Dept: GASTROENTEROLOGY | Age: 54
End: 2019-09-17

## 2019-09-20 ENCOUNTER — OFFICE VISIT (OUTPATIENT)
Dept: ORTHOPEDIC SURGERY | Age: 54
End: 2019-09-20
Payer: MEDICARE

## 2019-09-20 VITALS — HEIGHT: 68 IN | WEIGHT: 293 LBS | BODY MASS INDEX: 44.41 KG/M2

## 2019-09-20 DIAGNOSIS — G89.4 CHRONIC PAIN DISORDER: ICD-10-CM

## 2019-09-20 DIAGNOSIS — M17.11 ARTHRITIS OF RIGHT KNEE: Primary | ICD-10-CM

## 2019-09-20 DIAGNOSIS — I50.9 CHRONIC CONGESTIVE HEART FAILURE, UNSPECIFIED HEART FAILURE TYPE (HCC): ICD-10-CM

## 2019-09-20 DIAGNOSIS — M47.816 SPONDYLOSIS OF LUMBAR REGION WITHOUT MYELOPATHY OR RADICULOPATHY: Primary | ICD-10-CM

## 2019-09-20 DIAGNOSIS — M16.12 ARTHRITIS OF LEFT HIP: ICD-10-CM

## 2019-09-20 PROCEDURE — 99213 OFFICE O/P EST LOW 20 MIN: CPT | Performed by: ORTHOPAEDIC SURGERY

## 2019-09-20 PROCEDURE — 1036F TOBACCO NON-USER: CPT | Performed by: ORTHOPAEDIC SURGERY

## 2019-09-20 PROCEDURE — G8427 DOCREV CUR MEDS BY ELIG CLIN: HCPCS | Performed by: ORTHOPAEDIC SURGERY

## 2019-09-20 PROCEDURE — 3017F COLORECTAL CA SCREEN DOC REV: CPT | Performed by: ORTHOPAEDIC SURGERY

## 2019-09-20 PROCEDURE — G8417 CALC BMI ABV UP PARAM F/U: HCPCS | Performed by: ORTHOPAEDIC SURGERY

## 2019-09-20 ASSESSMENT — ENCOUNTER SYMPTOMS
BACK PAIN: 0
SHORTNESS OF BREATH: 0
WHEEZING: 0

## 2019-09-20 NOTE — TELEPHONE ENCOUNTER
Pt called in requesting a handicapped placard.        Health Maintenance   Topic Date Due    Pneumococcal 0-64 years Vaccine (1 of 1 - PPSV23) 06/29/1969    Breast cancer screen  06/29/2003    Colon cancer screen colonoscopy  06/29/2013    Annual Wellness Visit (AWV)  05/29/2019    DTaP/Tdap/Td vaccine (1 - Tdap) 04/09/2020 (Originally 6/29/1982)    Shingles Vaccine (1 of 2) 04/09/2020 (Originally 6/29/2013)    Potassium monitoring  04/23/2020    Creatinine monitoring  04/23/2020    Lipid screen  04/23/2024    Flu vaccine  Completed    Hepatitis C screen  Completed    HIV screen  Completed             (applicable per patient's age: Cancer Screenings, Depression Screening, Fall Risk Screening, Immunizations)    LDL Cholesterol (mg/dL)   Date Value   04/23/2019 52     AST (U/L)   Date Value   04/23/2019 14     ALT (U/L)   Date Value   04/23/2019 13     BUN (mg/dL)   Date Value   04/23/2019 17      (goal A1C is < 7)   (goal LDL is <100) need 30-50% reduction from baseline     BP Readings from Last 3 Encounters:   09/16/19 120/73   09/04/19 (!) 181/99   08/16/19 122/73    (goal /80)      All Future Testing planned in [facility]:  Lab Frequency Next Occurrence   Comprehensive Metabolic Panel Once 04/74/3167   TSH With Reflex Ft4 Once 07/16/2020   Vitamin D 25 Hydroxy Once 07/16/2020   PT eval and treat Once 09/04/2019   PT aquatic therapy Once 09/04/2019   CHERY Digital Screen Bilateral [[id number]] Once 10/16/2019   JV Screen With Reflex Once 09/16/2019   Rheumatoid Factor Once 58/68/8104   Cyclic Citrul Peptide Antibody, IgG Once 09/16/2019       Next Visit Date:  Future Appointments   Date Time Provider Christiano Wren   10/3/2019  9:00 AM Neymar Saenz DO [address] [place] [address]   10/3/2019 11:30 AM ASA Garland - CNP [address]   11/14/2019  1:20 PM THOMAS Cameron [facility] W[facility] IN [facility]   11/21/2019  1:00 PM Neymar Saenz DO [address] [address]

## 2019-09-23 RX ORDER — CLOPIDOGREL BISULFATE 75 MG/1
75 TABLET ORAL DAILY
Qty: 90 TABLET | Refills: 1 | Status: SHIPPED | OUTPATIENT
Start: 2019-09-23 | End: 2020-05-28 | Stop reason: SDUPTHER

## 2019-09-26 DIAGNOSIS — M25.562 ACUTE PAIN OF LEFT KNEE: Primary | ICD-10-CM

## 2019-09-30 ENCOUNTER — OFFICE VISIT (OUTPATIENT)
Dept: PRIMARY CARE CLINIC | Age: 54
End: 2019-09-30
Payer: MEDICARE

## 2019-09-30 VITALS
DIASTOLIC BLOOD PRESSURE: 85 MMHG | WEIGHT: 293 LBS | BODY MASS INDEX: 49.12 KG/M2 | HEART RATE: 79 BPM | TEMPERATURE: 97.7 F | SYSTOLIC BLOOD PRESSURE: 123 MMHG

## 2019-09-30 DIAGNOSIS — M10.072 ACUTE IDIOPATHIC GOUT INVOLVING TOE OF LEFT FOOT: Primary | ICD-10-CM

## 2019-09-30 PROCEDURE — G8427 DOCREV CUR MEDS BY ELIG CLIN: HCPCS | Performed by: INTERNAL MEDICINE

## 2019-09-30 PROCEDURE — 99213 OFFICE O/P EST LOW 20 MIN: CPT | Performed by: INTERNAL MEDICINE

## 2019-09-30 PROCEDURE — 1036F TOBACCO NON-USER: CPT | Performed by: INTERNAL MEDICINE

## 2019-09-30 PROCEDURE — G8417 CALC BMI ABV UP PARAM F/U: HCPCS | Performed by: INTERNAL MEDICINE

## 2019-09-30 PROCEDURE — 3017F COLORECTAL CA SCREEN DOC REV: CPT | Performed by: INTERNAL MEDICINE

## 2019-09-30 RX ORDER — COLCHICINE 0.6 MG/1
0.6 TABLET ORAL 2 TIMES DAILY
Qty: 30 TABLET | Refills: 0 | Status: SHIPPED | OUTPATIENT
Start: 2019-09-30 | End: 2019-10-15 | Stop reason: SDUPTHER

## 2019-09-30 NOTE — PROGRESS NOTES
Visit Information    Have you changed or started any medications since your last visit including any over-the-counter medicines, vitamins, or herbal medicines? no   Are you having any side effects from any of your medications? -  no  Have you stopped taking any of your medications? Is so, why? -  no    Have you seen any other physician or provider since your last visit? No  Have you had any other diagnostic tests since your last visit? No  Have you been seen in the emergency room and/or had an admission to a hospital since we last saw you? No  Have you had your routine dental cleaning in the past 6 months? no    Have you activated your [employer] account? If not, what are your barriers?  No: declined      Patient Care Team:  Vanita Marcos PA-C as PCP - General (Physician Assistant)  Vanita Marcos PA-C as PCP - [facility]    Medical History Review  Past Medical, Family, and Social History reviewed and does not contribute to the patient presenting condition    Health Maintenance   Topic Date Due    Pneumococcal 0-64 years Vaccine (1 of 1 - PPSV23) 06/29/1969    Breast cancer screen  06/29/2003    Colon cancer screen colonoscopy  06/29/2013    Annual Wellness Visit (AWV)  05/29/2019    DTaP/Tdap/Td vaccine (1 - Tdap) 04/09/2020 (Originally 6/29/1982)    Shingles Vaccine (1 of 2) 04/09/2020 (Originally 6/29/2013)    Lipid screen  04/23/2020    Potassium monitoring  04/23/2020    Creatinine monitoring  04/23/2020    Flu vaccine  Completed    Hepatitis C screen  Completed    HIV screen  Completed
understanding.     Electronically signed by Pippa Chicas MD on 9/30/2019at 5:52 PM

## 2019-10-02 ENCOUNTER — APPOINTMENT (OUTPATIENT)
Dept: CT IMAGING | Age: 54
End: 2019-10-02
Payer: MEDICARE

## 2019-10-02 ENCOUNTER — APPOINTMENT (OUTPATIENT)
Dept: ULTRASOUND IMAGING | Age: 54
End: 2019-10-02
Payer: MEDICARE

## 2019-10-02 ENCOUNTER — APPOINTMENT (OUTPATIENT)
Dept: GENERAL RADIOLOGY | Age: 54
End: 2019-10-02
Payer: MEDICARE

## 2019-10-02 ENCOUNTER — HOSPITAL ENCOUNTER (OUTPATIENT)
Age: 54
Setting detail: OBSERVATION
Discharge: HOME | End: 2019-10-03
Attending: EMERGENCY MEDICINE | Admitting: EMERGENCY MEDICINE
Payer: MEDICARE

## 2019-10-02 DIAGNOSIS — R07.9 CHEST PAIN, UNSPECIFIED TYPE: Primary | ICD-10-CM

## 2019-10-02 DIAGNOSIS — E04.1 THYROID NODULE: ICD-10-CM

## 2019-10-02 LAB
ABSOLUTE EOS #: 0.19 K/UL (ref 0–0.44)
ABSOLUTE IMMATURE GRANULOCYTE: <0.03 K/UL (ref 0–0.3)
ABSOLUTE LYMPH #: 2.17 K/UL (ref 1.1–3.7)
ABSOLUTE MONO #: 0.5 K/UL (ref 0.1–1.2)
ANION GAP SERPL CALCULATED.3IONS-SCNC: 14 MMOL/L (ref 9–17)
BASOPHILS # BLD: 0 % (ref 0–2)
BASOPHILS ABSOLUTE: 0.03 K/UL (ref 0–0.2)
BUN BLDV-MCNC: 11 MG/DL (ref 6–20)
BUN/CREAT BLD: ABNORMAL (ref 9–20)
CALCIUM SERPL-MCNC: 8.6 MG/DL (ref 8.6–10.4)
CHLORIDE BLD-SCNC: 106 MMOL/L (ref 98–107)
CO2: 21 MMOL/L (ref 20–31)
CREAT SERPL-MCNC: 0.99 MG/DL (ref 0.5–0.9)
D-DIMER QUANTITATIVE: 1.66 MG/L FEU
DIFFERENTIAL TYPE: ABNORMAL
EOSINOPHILS RELATIVE PERCENT: 2 % (ref 1–4)
GFR AFRICAN AMERICAN: >60 ML/MIN
GFR NON-AFRICAN AMERICAN: 58 ML/MIN
GFR SERPL CREATININE-BSD FRML MDRD: ABNORMAL ML/MIN/{1.73_M2}
GFR SERPL CREATININE-BSD FRML MDRD: ABNORMAL ML/MIN/{1.73_M2}
GLUCOSE BLD-MCNC: 112 MG/DL (ref 70–99)
HCT VFR BLD CALC: 37 % (ref 36.3–47.1)
HEMOGLOBIN: 11 G/DL (ref 11.9–15.1)
IMMATURE GRANULOCYTES: 0 %
LV EF: 42 %
LVEF MODALITY: NORMAL
LYMPHOCYTES # BLD: 28 % (ref 24–43)
MCH RBC QN AUTO: 26.7 PG (ref 25.2–33.5)
MCHC RBC AUTO-ENTMCNC: 29.7 G/DL (ref 28.4–34.8)
MCV RBC AUTO: 89.8 FL (ref 82.6–102.9)
MONOCYTES # BLD: 6 % (ref 3–12)
NRBC AUTOMATED: 0 PER 100 WBC
PDW BLD-RTO: 15.7 % (ref 11.8–14.4)
PLATELET # BLD: 258 K/UL (ref 138–453)
PLATELET ESTIMATE: ABNORMAL
PMV BLD AUTO: 10.5 FL (ref 8.1–13.5)
POTASSIUM SERPL-SCNC: 4.5 MMOL/L (ref 3.7–5.3)
RBC # BLD: 4.12 M/UL (ref 3.95–5.11)
RBC # BLD: ABNORMAL 10*6/UL
SEG NEUTROPHILS: 64 % (ref 36–65)
SEGMENTED NEUTROPHILS ABSOLUTE COUNT: 4.97 K/UL (ref 1.5–8.1)
SODIUM BLD-SCNC: 141 MMOL/L (ref 135–144)
TROPONIN INTERP: NORMAL
TROPONIN INTERP: NORMAL
TROPONIN T: NORMAL NG/ML
TROPONIN T: NORMAL NG/ML
TROPONIN, HIGH SENSITIVITY: 14 NG/L (ref 0–14)
TROPONIN, HIGH SENSITIVITY: 14 NG/L (ref 0–14)
TSH SERPL DL<=0.05 MIU/L-ACNC: 4.32 MIU/L (ref 0.3–5)
URIC ACID: 7.1 MG/DL (ref 2.4–5.7)
WBC # BLD: 7.9 K/UL (ref 3.5–11.3)
WBC # BLD: ABNORMAL 10*3/UL

## 2019-10-02 PROCEDURE — 71260 CT THORAX DX C+: CPT

## 2019-10-02 PROCEDURE — G0378 HOSPITAL OBSERVATION PER HR: HCPCS

## 2019-10-02 PROCEDURE — 71046 X-RAY EXAM CHEST 2 VIEWS: CPT

## 2019-10-02 PROCEDURE — 76536 US EXAM OF HEAD AND NECK: CPT

## 2019-10-02 PROCEDURE — 85025 COMPLETE CBC W/AUTO DIFF WBC: CPT

## 2019-10-02 PROCEDURE — 84484 ASSAY OF TROPONIN QUANT: CPT

## 2019-10-02 PROCEDURE — 2500000003 HC RX 250 WO HCPCS: Performed by: STUDENT IN AN ORGANIZED HEALTH CARE EDUCATION/TRAINING PROGRAM

## 2019-10-02 PROCEDURE — 93306 TTE W/DOPPLER COMPLETE: CPT

## 2019-10-02 PROCEDURE — 6360000004 HC RX CONTRAST MEDICATION: Performed by: EMERGENCY MEDICINE

## 2019-10-02 PROCEDURE — 6370000000 HC RX 637 (ALT 250 FOR IP): Performed by: STUDENT IN AN ORGANIZED HEALTH CARE EDUCATION/TRAINING PROGRAM

## 2019-10-02 PROCEDURE — 93005 ELECTROCARDIOGRAM TRACING: CPT | Performed by: STUDENT IN AN ORGANIZED HEALTH CARE EDUCATION/TRAINING PROGRAM

## 2019-10-02 PROCEDURE — 80048 BASIC METABOLIC PNL TOTAL CA: CPT

## 2019-10-02 PROCEDURE — 99285 EMERGENCY DEPT VISIT HI MDM: CPT

## 2019-10-02 PROCEDURE — 84443 ASSAY THYROID STIM HORMONE: CPT

## 2019-10-02 PROCEDURE — 93005 ELECTROCARDIOGRAM TRACING: CPT | Performed by: EMERGENCY MEDICINE

## 2019-10-02 PROCEDURE — 85379 FIBRIN DEGRADATION QUANT: CPT

## 2019-10-02 PROCEDURE — 84550 ASSAY OF BLOOD/URIC ACID: CPT

## 2019-10-02 PROCEDURE — 96374 THER/PROPH/DIAG INJ IV PUSH: CPT

## 2019-10-02 RX ORDER — SPIRONOLACTONE 25 MG/1
25 TABLET ORAL DAILY
Status: DISCONTINUED | OUTPATIENT
Start: 2019-10-02 | End: 2019-10-03 | Stop reason: HOSPADM

## 2019-10-02 RX ORDER — ISOSORBIDE DINITRATE 10 MG/1
10 TABLET ORAL 2 TIMES DAILY
Status: DISCONTINUED | OUTPATIENT
Start: 2019-10-02 | End: 2019-10-03 | Stop reason: HOSPADM

## 2019-10-02 RX ORDER — IBUPROFEN 800 MG/1
800 TABLET ORAL ONCE
Status: COMPLETED | OUTPATIENT
Start: 2019-10-02 | End: 2019-10-02

## 2019-10-02 RX ORDER — ATORVASTATIN CALCIUM 80 MG/1
80 TABLET, FILM COATED ORAL DAILY
Status: DISCONTINUED | OUTPATIENT
Start: 2019-10-02 | End: 2019-10-03 | Stop reason: HOSPADM

## 2019-10-02 RX ORDER — HYDRALAZINE HYDROCHLORIDE 50 MG/1
50 TABLET, FILM COATED ORAL 2 TIMES DAILY
Status: DISCONTINUED | OUTPATIENT
Start: 2019-10-02 | End: 2019-10-03 | Stop reason: HOSPADM

## 2019-10-02 RX ORDER — CLOPIDOGREL BISULFATE 75 MG/1
75 TABLET ORAL DAILY
Status: DISCONTINUED | OUTPATIENT
Start: 2019-10-02 | End: 2019-10-03 | Stop reason: HOSPADM

## 2019-10-02 RX ORDER — TIZANIDINE 4 MG/1
4 TABLET ORAL 2 TIMES DAILY PRN
Status: DISCONTINUED | OUTPATIENT
Start: 2019-10-02 | End: 2019-10-03 | Stop reason: HOSPADM

## 2019-10-02 RX ORDER — OXYBUTYNIN CHLORIDE 5 MG/1
5 TABLET ORAL 2 TIMES DAILY
Status: DISCONTINUED | OUTPATIENT
Start: 2019-10-02 | End: 2019-10-03 | Stop reason: HOSPADM

## 2019-10-02 RX ORDER — NITROGLYCERIN 0.4 MG/1
0.4 TABLET SUBLINGUAL EVERY 5 MIN PRN
Status: DISCONTINUED | OUTPATIENT
Start: 2019-10-02 | End: 2019-10-03 | Stop reason: HOSPADM

## 2019-10-02 RX ORDER — METOPROLOL TARTRATE 50 MG/1
50 TABLET, FILM COATED ORAL DAILY
Status: DISCONTINUED | OUTPATIENT
Start: 2019-10-02 | End: 2019-10-03 | Stop reason: HOSPADM

## 2019-10-02 RX ORDER — ORPHENADRINE CITRATE 100 MG/1
100 TABLET, EXTENDED RELEASE ORAL 2 TIMES DAILY PRN
Status: DISCONTINUED | OUTPATIENT
Start: 2019-10-02 | End: 2019-10-03 | Stop reason: HOSPADM

## 2019-10-02 RX ORDER — MAGNESIUM HYDROXIDE/ALUMINUM HYDROXICE/SIMETHICONE 120; 1200; 1200 MG/30ML; MG/30ML; MG/30ML
30 SUSPENSION ORAL ONCE
Status: COMPLETED | OUTPATIENT
Start: 2019-10-02 | End: 2019-10-02

## 2019-10-02 RX ORDER — MONTELUKAST SODIUM 10 MG/1
10 TABLET ORAL NIGHTLY
Status: DISCONTINUED | OUTPATIENT
Start: 2019-10-02 | End: 2019-10-03 | Stop reason: HOSPADM

## 2019-10-02 RX ORDER — ASPIRIN 81 MG/1
81 TABLET ORAL DAILY
Status: DISCONTINUED | OUTPATIENT
Start: 2019-10-02 | End: 2019-10-03 | Stop reason: HOSPADM

## 2019-10-02 RX ORDER — ACETAMINOPHEN 325 MG/1
650 TABLET ORAL ONCE
Status: COMPLETED | OUTPATIENT
Start: 2019-10-02 | End: 2019-10-02

## 2019-10-02 RX ORDER — OXYCODONE AND ACETAMINOPHEN 10; 325 MG/1; MG/1
1 TABLET ORAL EVERY 6 HOURS PRN
Status: DISCONTINUED | OUTPATIENT
Start: 2019-10-02 | End: 2019-10-02 | Stop reason: CLARIF

## 2019-10-02 RX ORDER — DULOXETIN HYDROCHLORIDE 30 MG/1
60 CAPSULE, DELAYED RELEASE ORAL DAILY
Status: DISCONTINUED | OUTPATIENT
Start: 2019-10-02 | End: 2019-10-03 | Stop reason: HOSPADM

## 2019-10-02 RX ORDER — OXYCODONE HYDROCHLORIDE 5 MG/1
5 TABLET ORAL EVERY 6 HOURS PRN
Status: DISCONTINUED | OUTPATIENT
Start: 2019-10-02 | End: 2019-10-03 | Stop reason: HOSPADM

## 2019-10-02 RX ORDER — LEVOTHYROXINE SODIUM 0.03 MG/1
25 TABLET ORAL DAILY
Status: DISCONTINUED | OUTPATIENT
Start: 2019-10-02 | End: 2019-10-03 | Stop reason: HOSPADM

## 2019-10-02 RX ORDER — TRAZODONE HYDROCHLORIDE 50 MG/1
50 TABLET ORAL NIGHTLY
Status: DISCONTINUED | OUTPATIENT
Start: 2019-10-02 | End: 2019-10-03 | Stop reason: HOSPADM

## 2019-10-02 RX ORDER — OXYCODONE HYDROCHLORIDE AND ACETAMINOPHEN 5; 325 MG/1; MG/1
1 TABLET ORAL EVERY 6 HOURS PRN
Status: DISCONTINUED | OUTPATIENT
Start: 2019-10-02 | End: 2019-10-03 | Stop reason: HOSPADM

## 2019-10-02 RX ORDER — PANTOPRAZOLE SODIUM 40 MG/1
40 TABLET, DELAYED RELEASE ORAL DAILY
Status: DISCONTINUED | OUTPATIENT
Start: 2019-10-02 | End: 2019-10-03 | Stop reason: HOSPADM

## 2019-10-02 RX ORDER — BUPROPION HYDROCHLORIDE 150 MG/1
150 TABLET ORAL EVERY MORNING
Status: DISCONTINUED | OUTPATIENT
Start: 2019-10-02 | End: 2019-10-03 | Stop reason: HOSPADM

## 2019-10-02 RX ORDER — BUMETANIDE 1 MG/1
2 TABLET ORAL DAILY
Status: DISCONTINUED | OUTPATIENT
Start: 2019-10-02 | End: 2019-10-03 | Stop reason: HOSPADM

## 2019-10-02 RX ORDER — COLCHICINE 0.6 MG/1
0.6 TABLET ORAL 2 TIMES DAILY
Status: DISCONTINUED | OUTPATIENT
Start: 2019-10-02 | End: 2019-10-03 | Stop reason: HOSPADM

## 2019-10-02 RX ADMIN — COLCHICINE 0.6 MG: 0.6 TABLET, FILM COATED ORAL at 21:16

## 2019-10-02 RX ADMIN — OXYCODONE HYDROCHLORIDE AND ACETAMINOPHEN 1 TABLET: 5; 325 TABLET ORAL at 10:11

## 2019-10-02 RX ADMIN — COLCHICINE 0.6 MG: 0.6 TABLET, FILM COATED ORAL at 14:36

## 2019-10-02 RX ADMIN — ATORVASTATIN CALCIUM 80 MG: 80 TABLET, FILM COATED ORAL at 14:35

## 2019-10-02 RX ADMIN — HYDRALAZINE HYDROCHLORIDE 50 MG: 50 TABLET, FILM COATED ORAL at 14:36

## 2019-10-02 RX ADMIN — OXYCODONE HYDROCHLORIDE 5 MG: 5 TABLET ORAL at 16:57

## 2019-10-02 RX ADMIN — BUPROPION HYDROCHLORIDE 150 MG: 150 TABLET, EXTENDED RELEASE ORAL at 14:35

## 2019-10-02 RX ADMIN — OXYBUTYNIN CHLORIDE 5 MG: 5 TABLET ORAL at 21:16

## 2019-10-02 RX ADMIN — METOPROLOL TARTRATE 50 MG: 50 TABLET, FILM COATED ORAL at 14:36

## 2019-10-02 RX ADMIN — MONTELUKAST SODIUM 10 MG: 10 TABLET, FILM COATED ORAL at 21:16

## 2019-10-02 RX ADMIN — OXYCODONE HYDROCHLORIDE 5 MG: 5 TABLET ORAL at 23:35

## 2019-10-02 RX ADMIN — TRAZODONE HYDROCHLORIDE 50 MG: 50 TABLET ORAL at 22:22

## 2019-10-02 RX ADMIN — FAMOTIDINE 20 MG: 10 INJECTION, SOLUTION INTRAVENOUS at 07:09

## 2019-10-02 RX ADMIN — ALUMINUM HYDROXIDE, MAGNESIUM HYDROXIDE, AND SIMETHICONE 30 ML: 200; 200; 20 SUSPENSION ORAL at 07:09

## 2019-10-02 RX ADMIN — PANTOPRAZOLE SODIUM 40 MG: 40 TABLET, DELAYED RELEASE ORAL at 14:37

## 2019-10-02 RX ADMIN — OXYCODONE HYDROCHLORIDE AND ACETAMINOPHEN 1 TABLET: 5; 325 TABLET ORAL at 16:57

## 2019-10-02 RX ADMIN — ACETAMINOPHEN 650 MG: 325 TABLET ORAL at 07:09

## 2019-10-02 RX ADMIN — HYDRALAZINE HYDROCHLORIDE 50 MG: 50 TABLET, FILM COATED ORAL at 21:16

## 2019-10-02 RX ADMIN — Medication 81 MG: at 14:35

## 2019-10-02 RX ADMIN — CLOPIDOGREL 75 MG: 75 TABLET, FILM COATED ORAL at 14:35

## 2019-10-02 RX ADMIN — LEVOTHYROXINE SODIUM 25 MCG: 25 TABLET ORAL at 14:36

## 2019-10-02 RX ADMIN — IOVERSOL 75 ML: 741 INJECTION INTRA-ARTERIAL; INTRAVENOUS at 08:40

## 2019-10-02 RX ADMIN — ISOSORBIDE DINITRATE 10 MG: 10 TABLET ORAL at 19:08

## 2019-10-02 RX ADMIN — OXYCODONE HYDROCHLORIDE 5 MG: 5 TABLET ORAL at 10:11

## 2019-10-02 RX ADMIN — IBUPROFEN 800 MG: 800 TABLET ORAL at 07:51

## 2019-10-02 RX ADMIN — OXYCODONE HYDROCHLORIDE AND ACETAMINOPHEN 1 TABLET: 5; 325 TABLET ORAL at 23:35

## 2019-10-02 RX ADMIN — DULOXETINE HYDROCHLORIDE 60 MG: 30 CAPSULE, DELAYED RELEASE ORAL at 14:36

## 2019-10-02 ASSESSMENT — ENCOUNTER SYMPTOMS
COUGH: 0
ABDOMINAL PAIN: 0
VOMITING: 0
DIARRHEA: 0
EYE ITCHING: 0
NAUSEA: 0
EYE REDNESS: 0
SHORTNESS OF BREATH: 0
RHINORRHEA: 0
BACK PAIN: 0

## 2019-10-02 ASSESSMENT — PAIN SCALES - GENERAL
PAINLEVEL_OUTOF10: 8
PAINLEVEL_OUTOF10: 6
PAINLEVEL_OUTOF10: 8
PAINLEVEL_OUTOF10: 6
PAINLEVEL_OUTOF10: 10
PAINLEVEL_OUTOF10: 5
PAINLEVEL_OUTOF10: 8
PAINLEVEL_OUTOF10: 7
PAINLEVEL_OUTOF10: 8

## 2019-10-02 ASSESSMENT — PAIN DESCRIPTION - ORIENTATION
ORIENTATION: MID
ORIENTATION: LEFT

## 2019-10-02 ASSESSMENT — PAIN DESCRIPTION - PAIN TYPE
TYPE: CHRONIC PAIN
TYPE: CHRONIC PAIN;ACUTE PAIN

## 2019-10-02 ASSESSMENT — PAIN DESCRIPTION - LOCATION
LOCATION: FOOT
LOCATION: CHEST

## 2019-10-02 ASSESSMENT — PAIN DESCRIPTION - DESCRIPTORS
DESCRIPTORS: PRESSURE
DESCRIPTORS: SHARP;CONSTANT

## 2019-10-02 ASSESSMENT — PAIN DESCRIPTION - FREQUENCY: FREQUENCY: CONTINUOUS

## 2019-10-03 ENCOUNTER — TELEPHONE (OUTPATIENT)
Dept: PAIN MANAGEMENT | Age: 54
End: 2019-10-03

## 2019-10-03 VITALS
HEART RATE: 51 BPM | OXYGEN SATURATION: 98 % | RESPIRATION RATE: 18 BRPM | TEMPERATURE: 97.4 F | SYSTOLIC BLOOD PRESSURE: 130 MMHG | WEIGHT: 293 LBS | BODY MASS INDEX: 44.41 KG/M2 | DIASTOLIC BLOOD PRESSURE: 74 MMHG | HEIGHT: 68 IN

## 2019-10-03 LAB
EKG ATRIAL RATE: 71 BPM
EKG ATRIAL RATE: 92 BPM
EKG P AXIS: 66 DEGREES
EKG P AXIS: 68 DEGREES
EKG P-R INTERVAL: 138 MS
EKG P-R INTERVAL: 146 MS
EKG Q-T INTERVAL: 398 MS
EKG Q-T INTERVAL: 468 MS
EKG QRS DURATION: 104 MS
EKG QRS DURATION: 116 MS
EKG QTC CALCULATION (BAZETT): 492 MS
EKG QTC CALCULATION (BAZETT): 508 MS
EKG R AXIS: -35 DEGREES
EKG R AXIS: -42 DEGREES
EKG T AXIS: 53 DEGREES
EKG T AXIS: 64 DEGREES
EKG VENTRICULAR RATE: 71 BPM
EKG VENTRICULAR RATE: 92 BPM

## 2019-10-03 PROCEDURE — 97530 THERAPEUTIC ACTIVITIES: CPT

## 2019-10-03 PROCEDURE — G0378 HOSPITAL OBSERVATION PER HR: HCPCS

## 2019-10-03 PROCEDURE — 93010 ELECTROCARDIOGRAM REPORT: CPT | Performed by: INTERNAL MEDICINE

## 2019-10-03 PROCEDURE — 97166 OT EVAL MOD COMPLEX 45 MIN: CPT

## 2019-10-03 PROCEDURE — 97535 SELF CARE MNGMENT TRAINING: CPT

## 2019-10-03 PROCEDURE — 97162 PT EVAL MOD COMPLEX 30 MIN: CPT

## 2019-10-03 PROCEDURE — 6370000000 HC RX 637 (ALT 250 FOR IP): Performed by: STUDENT IN AN ORGANIZED HEALTH CARE EDUCATION/TRAINING PROGRAM

## 2019-10-03 RX ORDER — COLCHICINE 0.6 MG/1
0.6 TABLET ORAL DAILY
Qty: 20 TABLET | Refills: 0 | Status: SHIPPED | OUTPATIENT
Start: 2019-10-03 | End: 2019-10-15

## 2019-10-03 RX ORDER — OXYCODONE AND ACETAMINOPHEN 7.5; 325 MG/1; MG/1
1 TABLET ORAL EVERY 6 HOURS PRN
Qty: 120 TABLET | Refills: 0 | Status: SHIPPED | OUTPATIENT
Start: 2019-10-04 | End: 2019-10-29 | Stop reason: SDUPTHER

## 2019-10-03 RX ADMIN — LEVOTHYROXINE SODIUM 25 MCG: 25 TABLET ORAL at 12:05

## 2019-10-03 RX ADMIN — OXYCODONE HYDROCHLORIDE AND ACETAMINOPHEN 1 TABLET: 5; 325 TABLET ORAL at 05:35

## 2019-10-03 RX ADMIN — CLOPIDOGREL 75 MG: 75 TABLET, FILM COATED ORAL at 12:05

## 2019-10-03 RX ADMIN — BUPROPION HYDROCHLORIDE 150 MG: 150 TABLET, EXTENDED RELEASE ORAL at 12:05

## 2019-10-03 RX ADMIN — OXYBUTYNIN CHLORIDE 5 MG: 5 TABLET ORAL at 12:05

## 2019-10-03 RX ADMIN — BUMETANIDE 2 MG: 1 TABLET ORAL at 12:06

## 2019-10-03 RX ADMIN — METOPROLOL TARTRATE 50 MG: 50 TABLET, FILM COATED ORAL at 12:05

## 2019-10-03 RX ADMIN — ATORVASTATIN CALCIUM 80 MG: 80 TABLET, FILM COATED ORAL at 12:06

## 2019-10-03 RX ADMIN — OXYCODONE HYDROCHLORIDE 5 MG: 5 TABLET ORAL at 12:21

## 2019-10-03 RX ADMIN — OXYCODONE HYDROCHLORIDE 5 MG: 5 TABLET ORAL at 05:35

## 2019-10-03 RX ADMIN — OXYCODONE HYDROCHLORIDE AND ACETAMINOPHEN 1 TABLET: 5; 325 TABLET ORAL at 12:21

## 2019-10-03 RX ADMIN — ISOSORBIDE DINITRATE 10 MG: 10 TABLET ORAL at 12:06

## 2019-10-03 RX ADMIN — PANTOPRAZOLE SODIUM 40 MG: 40 TABLET, DELAYED RELEASE ORAL at 12:05

## 2019-10-03 RX ADMIN — HYDRALAZINE HYDROCHLORIDE 50 MG: 50 TABLET, FILM COATED ORAL at 12:05

## 2019-10-03 RX ADMIN — Medication 81 MG: at 12:06

## 2019-10-03 RX ADMIN — COLCHICINE 0.6 MG: 0.6 TABLET, FILM COATED ORAL at 12:04

## 2019-10-03 RX ADMIN — DULOXETINE HYDROCHLORIDE 60 MG: 30 CAPSULE, DELAYED RELEASE ORAL at 12:05

## 2019-10-03 RX ADMIN — SPIRONOLACTONE 25 MG: 25 TABLET ORAL at 12:05

## 2019-10-03 ASSESSMENT — PAIN DESCRIPTION - PAIN TYPE
TYPE: CHRONIC PAIN;ACUTE PAIN
TYPE: ACUTE PAIN;CHRONIC PAIN
TYPE: CHRONIC PAIN;ACUTE PAIN
TYPE: CHRONIC PAIN;ACUTE PAIN

## 2019-10-03 ASSESSMENT — PAIN SCALES - GENERAL
PAINLEVEL_OUTOF10: 8

## 2019-10-03 ASSESSMENT — PAIN DESCRIPTION - LOCATION
LOCATION: GENERALIZED;FOOT
LOCATION: GENERALIZED;CHEST

## 2019-10-03 ASSESSMENT — PAIN DESCRIPTION - ONSET
ONSET: ON-GOING
ONSET: ON-GOING

## 2019-10-03 ASSESSMENT — PAIN DESCRIPTION - ORIENTATION
ORIENTATION: OTHER (COMMENT)
ORIENTATION: LEFT

## 2019-10-03 ASSESSMENT — PAIN DESCRIPTION - FREQUENCY
FREQUENCY: CONTINUOUS
FREQUENCY: CONTINUOUS

## 2019-10-03 ASSESSMENT — PAIN DESCRIPTION - DESCRIPTORS
DESCRIPTORS: CONSTANT;SHARP
DESCRIPTORS: SHARP;CONSTANT

## 2019-10-04 PROBLEM — E66.01 CLASS 3 SEVERE OBESITY DUE TO EXCESS CALORIES WITH SERIOUS COMORBIDITY AND BODY MASS INDEX (BMI) OF 45.0 TO 49.9 IN ADULT (HCC): Status: ACTIVE | Noted: 2019-10-04

## 2019-10-04 PROBLEM — E66.813 CLASS 3 SEVERE OBESITY DUE TO EXCESS CALORIES WITH SERIOUS COMORBIDITY AND BODY MASS INDEX (BMI) OF 45.0 TO 49.9 IN ADULT: Status: ACTIVE | Noted: 2019-10-04

## 2019-10-04 ASSESSMENT — ENCOUNTER SYMPTOMS
WHEEZING: 0
NAUSEA: 0
DIARRHEA: 0
VOMITING: 0

## 2019-10-11 ENCOUNTER — TELEPHONE (OUTPATIENT)
Dept: PRIMARY CARE CLINIC | Age: 54
End: 2019-10-11

## 2019-10-15 ENCOUNTER — OFFICE VISIT (OUTPATIENT)
Dept: PRIMARY CARE CLINIC | Age: 54
End: 2019-10-15
Payer: MEDICARE

## 2019-10-15 ENCOUNTER — HOSPITAL ENCOUNTER (OUTPATIENT)
Age: 54
Setting detail: SPECIMEN
Discharge: HOME | End: 2019-10-15
Payer: MEDICARE

## 2019-10-15 VITALS
HEART RATE: 84 BPM | SYSTOLIC BLOOD PRESSURE: 139 MMHG | BODY MASS INDEX: 48.81 KG/M2 | OXYGEN SATURATION: 97 % | DIASTOLIC BLOOD PRESSURE: 94 MMHG | TEMPERATURE: 97.9 F | WEIGHT: 293 LBS

## 2019-10-15 DIAGNOSIS — M10.072 ACUTE IDIOPATHIC GOUT INVOLVING TOE OF LEFT FOOT: Primary | ICD-10-CM

## 2019-10-15 DIAGNOSIS — I10 ESSENTIAL HYPERTENSION: ICD-10-CM

## 2019-10-15 DIAGNOSIS — E03.9 HYPOTHYROIDISM, UNSPECIFIED TYPE: ICD-10-CM

## 2019-10-15 DIAGNOSIS — E83.51 HYPOCALCEMIA: ICD-10-CM

## 2019-10-15 DIAGNOSIS — M10.072 ACUTE IDIOPATHIC GOUT INVOLVING TOE OF LEFT FOOT: ICD-10-CM

## 2019-10-15 DIAGNOSIS — Z82.69 FAMILY HISTORY OF SYSTEMIC LUPUS ERYTHEMATOSUS: ICD-10-CM

## 2019-10-15 PROCEDURE — 1111F DSCHRG MED/CURRENT MED MERGE: CPT | Performed by: INTERNAL MEDICINE

## 2019-10-15 PROCEDURE — 99214 OFFICE O/P EST MOD 30 MIN: CPT | Performed by: INTERNAL MEDICINE

## 2019-10-15 RX ORDER — ALLOPURINOL 300 MG/1
300 TABLET ORAL DAILY
Qty: 30 TABLET | Refills: 1 | Status: SHIPPED | OUTPATIENT
Start: 2019-10-15 | End: 2020-05-28 | Stop reason: SDUPTHER

## 2019-10-15 RX ORDER — COLCHICINE 0.6 MG/1
0.6 TABLET ORAL 2 TIMES DAILY
Qty: 30 TABLET | Refills: 0 | Status: ON HOLD | OUTPATIENT
Start: 2019-10-15 | End: 2020-09-09 | Stop reason: HOSPADM

## 2019-10-15 RX ORDER — NAPROXEN 500 MG/1
500 TABLET ORAL 2 TIMES DAILY WITH MEALS
Qty: 40 TABLET | Refills: 0 | Status: SHIPPED | OUTPATIENT
Start: 2019-10-15 | End: 2020-09-17

## 2019-10-16 LAB
ALBUMIN SERPL-MCNC: 4.1 G/DL (ref 3.5–5.2)
ALBUMIN/GLOBULIN RATIO: 1 (ref 1–2.5)
ALP BLD-CCNC: 165 U/L (ref 35–104)
ALT SERPL-CCNC: 11 U/L (ref 5–33)
ANA REFERENCE RANGE:: ABNORMAL
ANION GAP SERPL CALCULATED.3IONS-SCNC: 22 MMOL/L (ref 9–17)
ANTI DNA DOUBLE STRANDED: 0 IU/ML
ANTI JO-1 IGG: 11 U/ML
ANTI RNP AB: 328 U/ML
ANTI SSA: 115 U/ML
ANTI SSB: 16 U/ML
ANTI-CENTROMERE: 8 U/ML
ANTI-NUCLEAR ANTIBODY (ANA): POSITIVE
ANTI-SCLERODERMA: 7 U/ML
ANTI-SMITH: 16 U/ML
AST SERPL-CCNC: 17 U/L
BILIRUB SERPL-MCNC: 0.31 MG/DL (ref 0.3–1.2)
BUN BLDV-MCNC: 17 MG/DL (ref 6–20)
BUN/CREAT BLD: ABNORMAL (ref 9–20)
CALCIUM SERPL-MCNC: 9.7 MG/DL (ref 8.6–10.4)
CCP IGG ANTIBODIES: <1.5 U/ML
CHLORIDE BLD-SCNC: 110 MMOL/L (ref 98–107)
CO2: 18 MMOL/L (ref 20–31)
CREAT SERPL-MCNC: 1.13 MG/DL (ref 0.5–0.9)
GFR AFRICAN AMERICAN: >60 ML/MIN
GFR NON-AFRICAN AMERICAN: 50 ML/MIN
GFR SERPL CREATININE-BSD FRML MDRD: ABNORMAL ML/MIN/{1.73_M2}
GFR SERPL CREATININE-BSD FRML MDRD: ABNORMAL ML/MIN/{1.73_M2}
GLUCOSE BLD-MCNC: 81 MG/DL (ref 70–99)
HISTONE ANTIBODY: 16 U/ML
POTASSIUM SERPL-SCNC: 4.4 MMOL/L (ref 3.7–5.3)
RHEUMATOID FACTOR: <10 IU/ML
SODIUM BLD-SCNC: 150 MMOL/L (ref 135–144)
TOTAL PROTEIN: 8.2 G/DL (ref 6.4–8.3)
TSH SERPL DL<=0.05 MIU/L-ACNC: 4.41 MIU/L (ref 0.3–5)
URIC ACID: 3.2 MG/DL (ref 2.4–5.7)
VITAMIN D 25-HYDROXY: 30 NG/ML (ref 30–100)

## 2019-10-17 ENCOUNTER — TELEPHONE (OUTPATIENT)
Dept: PRIMARY CARE CLINIC | Age: 54
End: 2019-10-17

## 2019-10-25 DIAGNOSIS — F43.10 PTSD (POST-TRAUMATIC STRESS DISORDER): ICD-10-CM

## 2019-10-25 DIAGNOSIS — E78.5 HYPERLIPIDEMIA, UNSPECIFIED HYPERLIPIDEMIA TYPE: ICD-10-CM

## 2019-10-25 DIAGNOSIS — I50.9 CHRONIC CONGESTIVE HEART FAILURE, UNSPECIFIED HEART FAILURE TYPE (HCC): ICD-10-CM

## 2019-10-26 RX ORDER — METOPROLOL TARTRATE 50 MG/1
50 TABLET, FILM COATED ORAL DAILY
Qty: 90 TABLET | Refills: 0 | Status: SHIPPED | OUTPATIENT
Start: 2019-10-26 | End: 2019-11-04 | Stop reason: DRUGHIGH

## 2019-10-26 RX ORDER — SPIRONOLACTONE 25 MG/1
25 TABLET ORAL DAILY
Qty: 90 TABLET | Refills: 0 | Status: SHIPPED | OUTPATIENT
Start: 2019-10-26 | End: 2020-05-28 | Stop reason: SDUPTHER

## 2019-10-26 RX ORDER — BUPROPION HYDROCHLORIDE 150 MG/1
150 TABLET ORAL EVERY MORNING
Qty: 90 TABLET | Refills: 0 | Status: SHIPPED | OUTPATIENT
Start: 2019-10-26 | End: 2020-05-28 | Stop reason: SDUPTHER

## 2019-10-26 RX ORDER — DULOXETIN HYDROCHLORIDE 60 MG/1
60 CAPSULE, DELAYED RELEASE ORAL DAILY
Qty: 90 CAPSULE | Refills: 0 | Status: SHIPPED | OUTPATIENT
Start: 2019-10-26 | End: 2020-05-28 | Stop reason: SDUPTHER

## 2019-10-26 RX ORDER — ATORVASTATIN CALCIUM 80 MG/1
80 TABLET, FILM COATED ORAL DAILY
Qty: 90 TABLET | Refills: 0 | Status: SHIPPED | OUTPATIENT
Start: 2019-10-26 | End: 2020-05-28 | Stop reason: SDUPTHER

## 2019-10-29 ENCOUNTER — HOSPITAL ENCOUNTER (OUTPATIENT)
Dept: PAIN MANAGEMENT | Age: 54
Discharge: HOME | End: 2019-10-29
Payer: MEDICARE

## 2019-10-29 VITALS
DIASTOLIC BLOOD PRESSURE: 94 MMHG | WEIGHT: 293 LBS | TEMPERATURE: 98.8 F | OXYGEN SATURATION: 99 % | SYSTOLIC BLOOD PRESSURE: 140 MMHG | BODY MASS INDEX: 44.41 KG/M2 | RESPIRATION RATE: 16 BRPM | HEART RATE: 72 BPM | HEIGHT: 68 IN

## 2019-10-29 DIAGNOSIS — E66.01 MORBID OBESITY (HCC): ICD-10-CM

## 2019-10-29 DIAGNOSIS — M96.1 FAILED BACK SYNDROME OF CERVICAL SPINE: ICD-10-CM

## 2019-10-29 DIAGNOSIS — Z98.1 STATUS POST CERVICAL SPINAL FUSION: ICD-10-CM

## 2019-10-29 DIAGNOSIS — M50.30 BULGE OF CERVICAL DISC WITHOUT MYELOPATHY: ICD-10-CM

## 2019-10-29 DIAGNOSIS — M53.3 SACROILIAC JOINT DYSFUNCTION OF BOTH SIDES: ICD-10-CM

## 2019-10-29 DIAGNOSIS — G89.29 CHRONIC MIDLINE LOW BACK PAIN, UNSPECIFIED WHETHER SCIATICA PRESENT: ICD-10-CM

## 2019-10-29 DIAGNOSIS — M47.816 SPONDYLOSIS OF LUMBAR REGION WITHOUT MYELOPATHY OR RADICULOPATHY: ICD-10-CM

## 2019-10-29 DIAGNOSIS — M54.50 CHRONIC MIDLINE LOW BACK PAIN, UNSPECIFIED WHETHER SCIATICA PRESENT: ICD-10-CM

## 2019-10-29 DIAGNOSIS — M54.16 LUMBAR RADICULOPATHY: ICD-10-CM

## 2019-10-29 DIAGNOSIS — M51.36 DDD (DEGENERATIVE DISC DISEASE), LUMBAR: Primary | ICD-10-CM

## 2019-10-29 DIAGNOSIS — Z98.1 STATUS POST LUMBAR SPINAL FUSION: ICD-10-CM

## 2019-10-29 DIAGNOSIS — M47.22 CERVICAL SPONDYLOSIS WITH RADICULOPATHY: ICD-10-CM

## 2019-10-29 PROCEDURE — 99213 OFFICE O/P EST LOW 20 MIN: CPT | Performed by: NURSE PRACTITIONER

## 2019-10-29 PROCEDURE — 99213 OFFICE O/P EST LOW 20 MIN: CPT

## 2019-10-29 RX ORDER — OXYCODONE AND ACETAMINOPHEN 7.5; 325 MG/1; MG/1
1 TABLET ORAL EVERY 6 HOURS PRN
Qty: 120 TABLET | Refills: 0 | Status: SHIPPED | OUTPATIENT
Start: 2019-11-03 | End: 2019-11-25 | Stop reason: SDUPTHER

## 2019-10-29 ASSESSMENT — ENCOUNTER SYMPTOMS
BACK PAIN: 1
RESPIRATORY NEGATIVE: 1
HEARTBURN: 1
CONSTIPATION: 1

## 2019-11-04 ENCOUNTER — OFFICE VISIT (OUTPATIENT)
Dept: PRIMARY CARE CLINIC | Age: 54
End: 2019-11-04
Payer: MEDICARE

## 2019-11-04 VITALS
SYSTOLIC BLOOD PRESSURE: 163 MMHG | TEMPERATURE: 97.3 F | HEART RATE: 78 BPM | WEIGHT: 293 LBS | BODY MASS INDEX: 49.2 KG/M2 | DIASTOLIC BLOOD PRESSURE: 102 MMHG

## 2019-11-04 DIAGNOSIS — R76.8 ANTI-RNP ANTIBODIES PRESENT: ICD-10-CM

## 2019-11-04 DIAGNOSIS — J45.30 MILD PERSISTENT ASTHMA WITHOUT COMPLICATION: ICD-10-CM

## 2019-11-04 DIAGNOSIS — R74.8 ALKALINE PHOSPHATASE ELEVATION: ICD-10-CM

## 2019-11-04 DIAGNOSIS — E66.01 CLASS 3 SEVERE OBESITY DUE TO EXCESS CALORIES WITH SERIOUS COMORBIDITY AND BODY MASS INDEX (BMI) OF 45.0 TO 49.9 IN ADULT (HCC): ICD-10-CM

## 2019-11-04 DIAGNOSIS — R76.8 POSITIVE ANA (ANTINUCLEAR ANTIBODY): ICD-10-CM

## 2019-11-04 DIAGNOSIS — I10 ESSENTIAL HYPERTENSION: Primary | ICD-10-CM

## 2019-11-04 DIAGNOSIS — Z76.0 MEDICATION REFILL: ICD-10-CM

## 2019-11-04 DIAGNOSIS — R76.8 SS-A ANTIBODY POSITIVE: ICD-10-CM

## 2019-11-04 DIAGNOSIS — I50.9 CHRONIC CONGESTIVE HEART FAILURE, UNSPECIFIED HEART FAILURE TYPE (HCC): ICD-10-CM

## 2019-11-04 PROCEDURE — 3017F COLORECTAL CA SCREEN DOC REV: CPT | Performed by: PHYSICIAN ASSISTANT

## 2019-11-04 PROCEDURE — 99214 OFFICE O/P EST MOD 30 MIN: CPT | Performed by: PHYSICIAN ASSISTANT

## 2019-11-04 PROCEDURE — G8427 DOCREV CUR MEDS BY ELIG CLIN: HCPCS | Performed by: PHYSICIAN ASSISTANT

## 2019-11-04 PROCEDURE — 1036F TOBACCO NON-USER: CPT | Performed by: PHYSICIAN ASSISTANT

## 2019-11-04 PROCEDURE — G8417 CALC BMI ABV UP PARAM F/U: HCPCS | Performed by: PHYSICIAN ASSISTANT

## 2019-11-04 PROCEDURE — G8482 FLU IMMUNIZE ORDER/ADMIN: HCPCS | Performed by: PHYSICIAN ASSISTANT

## 2019-11-04 RX ORDER — BUMETANIDE 2 MG/1
2 TABLET ORAL DAILY
Qty: 90 TABLET | Refills: 1 | Status: SHIPPED | OUTPATIENT
Start: 2019-11-04 | End: 2020-05-28 | Stop reason: SDUPTHER

## 2019-11-04 RX ORDER — IBUPROFEN 800 MG/1
800 TABLET ORAL EVERY 8 HOURS PRN
Qty: 90 TABLET | Refills: 2 | Status: SHIPPED | OUTPATIENT
Start: 2019-11-04 | End: 2020-07-29 | Stop reason: SDUPTHER

## 2019-11-04 RX ORDER — MONTELUKAST SODIUM 10 MG/1
10 TABLET ORAL NIGHTLY
Qty: 30 TABLET | Refills: 0 | Status: SHIPPED | OUTPATIENT
Start: 2019-11-04 | End: 2020-05-28 | Stop reason: SDUPTHER

## 2019-11-04 RX ORDER — METOPROLOL TARTRATE 50 MG/1
50 TABLET, FILM COATED ORAL 2 TIMES DAILY
Qty: 180 TABLET | Refills: 0 | Status: SHIPPED | OUTPATIENT
Start: 2019-11-04 | End: 2020-05-28 | Stop reason: SDUPTHER

## 2019-11-04 ASSESSMENT — ENCOUNTER SYMPTOMS
CONSTIPATION: 1
WHEEZING: 1
BACK PAIN: 1

## 2019-11-20 ASSESSMENT — ENCOUNTER SYMPTOMS
VOMITING: 0
NAUSEA: 0
SHORTNESS OF BREATH: 0
DIARRHEA: 0
COUGH: 0

## 2019-11-25 ENCOUNTER — HOSPITAL ENCOUNTER (OUTPATIENT)
Dept: PAIN MANAGEMENT | Age: 54
Discharge: HOME | End: 2019-11-25
Payer: MEDICARE

## 2019-11-25 VITALS
HEART RATE: 80 BPM | RESPIRATION RATE: 20 BRPM | BODY MASS INDEX: 44.41 KG/M2 | DIASTOLIC BLOOD PRESSURE: 74 MMHG | OXYGEN SATURATION: 96 % | HEIGHT: 68 IN | WEIGHT: 293 LBS | SYSTOLIC BLOOD PRESSURE: 124 MMHG

## 2019-11-25 DIAGNOSIS — M54.50 CHRONIC MIDLINE LOW BACK PAIN, UNSPECIFIED WHETHER SCIATICA PRESENT: ICD-10-CM

## 2019-11-25 DIAGNOSIS — Z98.1 STATUS POST CERVICAL SPINAL FUSION: ICD-10-CM

## 2019-11-25 DIAGNOSIS — Z98.1 STATUS POST LUMBAR SPINAL FUSION: ICD-10-CM

## 2019-11-25 DIAGNOSIS — G89.29 CHRONIC MIDLINE LOW BACK PAIN, UNSPECIFIED WHETHER SCIATICA PRESENT: ICD-10-CM

## 2019-11-25 DIAGNOSIS — M47.816 SPONDYLOSIS OF LUMBAR REGION WITHOUT MYELOPATHY OR RADICULOPATHY: ICD-10-CM

## 2019-11-25 DIAGNOSIS — M54.16 LUMBAR RADICULOPATHY: ICD-10-CM

## 2019-11-25 DIAGNOSIS — M51.36 DDD (DEGENERATIVE DISC DISEASE), LUMBAR: Primary | ICD-10-CM

## 2019-11-25 DIAGNOSIS — M96.1 FAILED BACK SYNDROME OF CERVICAL SPINE: ICD-10-CM

## 2019-11-25 DIAGNOSIS — E66.01 MORBID OBESITY (HCC): ICD-10-CM

## 2019-11-25 DIAGNOSIS — M47.22 CERVICAL SPONDYLOSIS WITH RADICULOPATHY: ICD-10-CM

## 2019-11-25 PROCEDURE — 99213 OFFICE O/P EST LOW 20 MIN: CPT

## 2019-11-25 PROCEDURE — 99213 OFFICE O/P EST LOW 20 MIN: CPT | Performed by: NURSE PRACTITIONER

## 2019-11-25 RX ORDER — OXYCODONE AND ACETAMINOPHEN 7.5; 325 MG/1; MG/1
1 TABLET ORAL EVERY 6 HOURS PRN
Qty: 120 TABLET | Refills: 0 | Status: SHIPPED | OUTPATIENT
Start: 2019-12-03 | End: 2020-01-07 | Stop reason: SDUPTHER

## 2019-11-25 ASSESSMENT — ENCOUNTER SYMPTOMS
NAUSEA: 1
BACK PAIN: 1
SHORTNESS OF BREATH: 1

## 2020-01-02 ENCOUNTER — HOSPITAL ENCOUNTER (OUTPATIENT)
Dept: PAIN MANAGEMENT | Age: 55
Discharge: HOME | End: 2020-01-02

## 2020-01-07 DIAGNOSIS — E66.01 MORBID OBESITY (HCC): ICD-10-CM

## 2020-01-07 DIAGNOSIS — Z98.1 STATUS POST LUMBAR SPINAL FUSION: ICD-10-CM

## 2020-01-07 DIAGNOSIS — M47.22 CERVICAL SPONDYLOSIS WITH RADICULOPATHY: ICD-10-CM

## 2020-01-07 DIAGNOSIS — M47.816 SPONDYLOSIS OF LUMBAR REGION WITHOUT MYELOPATHY OR RADICULOPATHY: ICD-10-CM

## 2020-01-07 DIAGNOSIS — M54.16 LUMBAR RADICULOPATHY: ICD-10-CM

## 2020-01-07 DIAGNOSIS — Z98.1 STATUS POST CERVICAL SPINAL FUSION: ICD-10-CM

## 2020-01-07 DIAGNOSIS — M96.1 FAILED BACK SYNDROME OF CERVICAL SPINE: ICD-10-CM

## 2020-01-07 RX ORDER — OXYCODONE AND ACETAMINOPHEN 7.5; 325 MG/1; MG/1
1 TABLET ORAL EVERY 6 HOURS PRN
Qty: 120 TABLET | Refills: 0 | Status: SHIPPED | OUTPATIENT
Start: 2020-01-07 | End: 2020-06-01 | Stop reason: SDUPTHER

## 2020-02-04 ENCOUNTER — HOSPITAL ENCOUNTER (OUTPATIENT)
Dept: PAIN MANAGEMENT | Age: 55
Discharge: HOME | End: 2020-02-04
Payer: MEDICARE

## 2020-05-28 ENCOUNTER — VIRTUAL VISIT (OUTPATIENT)
Dept: INTERNAL MEDICINE | Age: 55
End: 2020-05-28
Payer: MEDICARE

## 2020-05-28 VITALS — WEIGHT: 293 LBS | BODY MASS INDEX: 51.82 KG/M2

## 2020-05-28 PROCEDURE — 99214 OFFICE O/P EST MOD 30 MIN: CPT | Performed by: PHYSICIAN ASSISTANT

## 2020-05-28 RX ORDER — METOPROLOL TARTRATE 50 MG/1
50 TABLET, FILM COATED ORAL 2 TIMES DAILY
Qty: 180 TABLET | Refills: 0 | Status: SHIPPED | OUTPATIENT
Start: 2020-05-28 | End: 2020-08-13 | Stop reason: SDUPTHER

## 2020-05-28 RX ORDER — BUMETANIDE 2 MG/1
2 TABLET ORAL DAILY
Qty: 90 TABLET | Refills: 0 | Status: SHIPPED | OUTPATIENT
Start: 2020-05-28 | End: 2020-08-13 | Stop reason: SDUPTHER

## 2020-05-28 RX ORDER — CLOPIDOGREL BISULFATE 75 MG/1
75 TABLET ORAL DAILY
Qty: 90 TABLET | Refills: 0 | Status: SHIPPED | OUTPATIENT
Start: 2020-05-28 | End: 2020-08-13 | Stop reason: SDUPTHER

## 2020-05-28 RX ORDER — BACLOFEN 10 MG/1
10 TABLET ORAL 3 TIMES DAILY
Qty: 30 TABLET | Refills: 0 | Status: SHIPPED | OUTPATIENT
Start: 2020-05-28 | End: 2020-06-26 | Stop reason: SDUPTHER

## 2020-05-28 RX ORDER — SPIRONOLACTONE 25 MG/1
25 TABLET ORAL DAILY
Qty: 90 TABLET | Refills: 0 | Status: SHIPPED | OUTPATIENT
Start: 2020-05-28 | End: 2020-08-13 | Stop reason: SDUPTHER

## 2020-05-28 RX ORDER — LEVOTHYROXINE SODIUM 0.03 MG/1
25 TABLET ORAL DAILY
Qty: 30 TABLET | Refills: 2 | Status: SHIPPED | OUTPATIENT
Start: 2020-05-28 | End: 2020-08-13 | Stop reason: SDUPTHER

## 2020-05-28 RX ORDER — BUPROPION HYDROCHLORIDE 150 MG/1
150 TABLET ORAL EVERY MORNING
Qty: 90 TABLET | Refills: 0 | Status: SHIPPED | OUTPATIENT
Start: 2020-05-28 | End: 2020-08-13 | Stop reason: SDUPTHER

## 2020-05-28 RX ORDER — MONTELUKAST SODIUM 10 MG/1
10 TABLET ORAL NIGHTLY
Qty: 30 TABLET | Refills: 2 | Status: SHIPPED | OUTPATIENT
Start: 2020-05-28 | End: 2020-08-13 | Stop reason: SDUPTHER

## 2020-05-28 RX ORDER — ALLOPURINOL 300 MG/1
300 TABLET ORAL DAILY
Qty: 30 TABLET | Refills: 2 | Status: SHIPPED | OUTPATIENT
Start: 2020-05-28 | End: 2020-08-13 | Stop reason: SDUPTHER

## 2020-05-28 RX ORDER — DULOXETIN HYDROCHLORIDE 60 MG/1
60 CAPSULE, DELAYED RELEASE ORAL DAILY
Qty: 90 CAPSULE | Refills: 0 | Status: SHIPPED | OUTPATIENT
Start: 2020-05-28 | End: 2020-08-13 | Stop reason: SDUPTHER

## 2020-05-28 RX ORDER — PREDNISONE 20 MG/1
20 TABLET ORAL 2 TIMES DAILY
Qty: 10 TABLET | Refills: 0 | Status: SHIPPED | OUTPATIENT
Start: 2020-05-28 | End: 2020-06-02

## 2020-05-28 RX ORDER — ATORVASTATIN CALCIUM 80 MG/1
80 TABLET, FILM COATED ORAL DAILY
Qty: 90 TABLET | Refills: 0 | Status: SHIPPED | OUTPATIENT
Start: 2020-05-28 | End: 2020-08-13 | Stop reason: SDUPTHER

## 2020-05-28 ASSESSMENT — ENCOUNTER SYMPTOMS
SPUTUM PRODUCTION: 0
SHORTNESS OF BREATH: 1
RHINORRHEA: 0
WHEEZING: 1
SORE THROAT: 0
COUGH: 0

## 2020-05-28 ASSESSMENT — PATIENT HEALTH QUESTIONNAIRE - PHQ9
1. LITTLE INTEREST OR PLEASURE IN DOING THINGS: 0
SUM OF ALL RESPONSES TO PHQ QUESTIONS 1-9: 2
2. FEELING DOWN, DEPRESSED OR HOPELESS: 2
SUM OF ALL RESPONSES TO PHQ QUESTIONS 1-9: 2
SUM OF ALL RESPONSES TO PHQ9 QUESTIONS 1 & 2: 2

## 2020-05-28 NOTE — PATIENT INSTRUCTIONS
· Contact insurance for rheumatology in coverage, contact PCP for referral  · Take oral steroid for 5 days to help improve wheezing  · Contact PCP office to update on symptoms after finishing medication, Baclofen  · Get labs completed before follow-up with PCP  · Call and schedule mammogram to assess breast pain  · Call and schedule appointment with GI to schedule colonoscopy    [facility]  [address], [address] [facility], [address]  [facility] AMW VISIT IN 2 MONTHS  MAIL AMW QUESTIONNAIRE TO PT  MAIL LABS TO PT  PROVIDE SCHEDULING NUMBER  PROVIDE GI OFFICE INFO

## 2020-05-28 NOTE — PROGRESS NOTES
Visit Information    Have you changed or started any medications since your last visit including any over-the-counter medicines, vitamins, or herbal medicines? no   Are you having any side effects from any of your medications? -  no  Have you stopped taking any of your medications? Is so, why? -  no    Have you seen any other physician or provider since your last visit? No  Have you had any other diagnostic tests since your last visit? No  Have you been seen in the emergency room and/or had an admission to a hospital since we last saw you? No  Have you had your routine dental cleaning in the past 6 months? no    Have you activated your [employer] account? If not, what are your barriers?  Yes     Patient Care Team:  Nawaf Mccain PA-C as PCP - General (Physician Assistant)  Nawaf Mccain PA-C as PCP - [facility]    Medical History Review  Past Medical, Family, and Social History reviewed and does contribute to the patient presenting condition    Health Maintenance   Topic Date Due    Pneumococcal 0-64 years Vaccine (1 of 1 - PPSV23) 06/29/1969    DTaP/Tdap/Td vaccine (1 - Tdap) 06/29/1982    Breast cancer screen  06/29/2003    Shingles Vaccine (1 of 2) 06/29/2013    Colon cancer screen colonoscopy  06/29/2013    Annual Wellness Visit (AWV)  05/29/2019    Lipid screen  04/23/2020    Potassium monitoring  10/15/2020    Creatinine monitoring  10/15/2020    Flu vaccine  Completed    Hepatitis C screen  Completed    HIV screen  Completed    Hepatitis A vaccine  Aged Out    Hepatitis B vaccine  Aged Out    Hib vaccine  Aged Out    Meningococcal (ACWY) vaccine  Aged Out

## 2020-05-28 NOTE — PROGRESS NOTES
Catrina Mueller is a 62 y.o. female evaluated via telephone on 2020. Consent:  She and/or health care decision maker is aware that that she may receive a bill for this telephone service, depending on her insurance coverage, and has provided verbal consent to proceed: Yes      Documentation:  I communicated with the patient and/or health care decision maker about breast pain, asthma, HTN, thyroid disease, myalgia. Details of this discussion including any medical advice provided: below      I affirm this is a Patient Initiated Episode with a Patient who has not had a related appointment within my department in the past 7 days or scheduled within the next 24 hours. Patient identification was verified at the start of the visit: Yes    Total Time: minutes: 21-30 minutes    Note: not billable if this call serves to triage the patient into an appointment for the relevant concern      Steffany Joshi                 2020    TELEHEALTH EVALUATION -- Audio/Visual (During ARMDP-42 public health emergency)    Patient and physician are located in their individual homes    HPI:    Catrina Mueller (:  1965) has requested an audio only/video evaluation for the following concern(s):    Patient is here for virtual visit via telephone for follow-up for asthma, myalgia, thyroid disease, High blood pressure, HLD. Patient voiced concern concern with breast pain, \"on and off for the last 3 months\", mammogram was order in 10/2019, patient never had completed. Instructed patient to call to schedule mammogram to evaluate breast pain, patient voiced understanding. Wheezing     The current episode started more than 1 month ago. Associated symptoms include shortness of breath. Pertinent negatives include no chest pain, chills, coughing, diarrhea, fever, headaches, neck pain, rhinorrhea, sore throat, sputum production or vomiting. Her past medical history is significant for asthma.      Patient voiced increasing wheezing, \"has not been taking Singulair\", off for the last 2 months. Discussed asthma, wheezing, med in depth with patient, informed patient we will restart medication and prescribe her oral steroid to take for 5 days, encourage patient to take medication as prescribed, patient voiced understanding. Patient states \"muscle relaxer did not help\" for myalgia. Patient was referred to rheumatology but \"was not in insurance\". Discussed myalgia, medications in depth with patient, informed patient she will be prescribed another muscle relaxer, baclofen, to take the next 10 days, instructed patient to contact PCP office in 7 days to provide update on symptoms, instructed patient to contact insurance for another rheumatologist and she will be referred, patient voiced understanding. Discussed hypothyroidism in depth with patient, stable, patient requested medication refill. Discussed HLD in depth with patient, stable, patient requesting medication refill. Discussed high blood pressure in depth with patient, patient requested medication refill. Patient reported her weight to be 340.8 pounds. Discussed weight loss in depth with patient, encourage patient make changes in diet and the importance of physical activity by exercising multiple times weekly. Patient requesting additional medication refills. Labs reviewed, informed patient labs will be ordered and to have completed before follow-up appointment, patient voiced understanding. Health maintenance reviewed, discussed annual Medicare wellness visit in depth with patient, patient scheduled for visit. Medications reviewed, prescribed baclofen 10 mg 3 times daily for 10 days, prednisone 20 mg twice daily for 5 days, refilled Zyloprim 300 mg, Lipitor 80 mg, Bumex 2 mg, Wellbutrin 150 mg, Plavix 75 mg, Cymbalta 60 mg, Synthroid 25 mcg, metoprolol 50 mg, Singulair 10 mg, spironolactone 25 mg.     HPI    Review of Systems   Constitutional: Negative for MG tablet TAKE 2 TO 3 TABLETS BY MOUTH NIGHTLY AT BEDTIME AS NEEDED FOR INSOMNIA Yes Melyssa Vega PA-C   oxybutynin (DITROPAN) 5 MG tablet TAKE 1 TABLET BY MOUTH TWICE DAILY Yes Melyssa Vega PA-C   colchicine (COLCRYS) 0.6 MG tablet Take 1 tablet by mouth 2 times daily Yes Armida Calderon MD   naproxen (NAPROSYN) 500 MG tablet Take 1 tablet by mouth 2 times daily (with meals) Yes Armida Calderon MD   Handicatiti [place] MISC by Does not apply route Yes Melyssa Vega PA-C   Respiratory Therapy Supplies (NEBULIZER COMPRESSOR) KIT Provide insurance covered nebulizer, use daily as needed Yes Melyssa Vega PA-C   albuterol sulfate  (90 Base) MCG/ACT inhaler Inhale 2 puffs into the lungs every 4 hours as needed for Wheezing Yes Melyssa Vega PA-C   polyethylene glycol (GLYCOLAX) packet polyethylene glycol 3350 17 gram/dose oral powder Yes Historical Provider, MD   pantoprazole (PROTONIX) 40 MG tablet Take 1 tablet by mouth daily Yes Melyssa Vega PA-C   albuterol (PROVENTIL) (2.5 MG/3ML) 0.083% nebulizer solution Take 3 mLs by nebulization every 6 hours as needed for Wheezing Yes Mleyssa Vega PA-C   hydrALAZINE (APRESOLINE) 50 MG tablet Take 1 tablet by mouth 2 times daily Yes Historical Provider, MD   isosorbide dinitrate (ISORDIL) 10 MG tablet Take 1 tablet by mouth 2 times daily Yes Elizabeth Cerna MD   aspirin 81 MG tablet Take 81 mg by mouth daily Yes Historical Provider, MD   orphenadrine (NORFLEX) 100 MG extended release tablet TAKE 1 TABLET BY MOUTH TWICE DAILY AS NEEDED FOR MUSCLE SPASM  Melyssa Vega PA-C       Social History     Tobacco Use    Smoking status: Never Smoker    Smokeless tobacco: Never Used   Substance Use Topics    Alcohol use: No    Drug use: No        Past Medical History:   Diagnosis Date    Arthritis     CHF [facility]congestive heart failure) ([facility] [address]     GERD (gastroesophageal reflux disease)     Gout 10/2019    History of heart attack     HLD (hyperlipidemia)     Hypertension     Insomnia     Osteoarthritis               RECORD REVIEW: Previous medical records were reviewed at today's visit. PHYSICAL EXAMINATION:    Vital Signs: (As obtained by patient/caregiver at home)  There were no vitals filed for this visit. Physical Exam  Constitutional:       General: She is awake. Neurological:      Mental Status: She is alert and oriented to person, place, and time. Psychiatric:         Speech: Speech normal.         Behavior: Behavior is cooperative. Thought Content: Thought content normal.         Cognition and Memory: Cognition normal.         Judgment: Judgment normal.           Other pertinent observable physical exam findings:-     RECORD REVIEW: Previous medical records were reviewed at today's visit. The past family, medical and social histories were reviewed and unchanged with the exceptions of what is mentioned in this note. Due to this being a TeleHealth encounter, evaluation of the following organ systems is limited: Vitals/Constitutional/EENT/Resp/CV/GI//MS/Neuro/Skin/Heme-Lymph-Imm. ASSESSMENT/PLAN:  Amber Prado was seen today for wheezing, check-up and breast pain. Diagnoses and all orders for this visit:    Mild persistent asthma without complication  -     montelukast (SINGULAIR) 10 MG tablet; Take 1 tablet by mouth nightly  -     predniSONE (DELTASONE) 20 MG tablet; Take 1 tablet by mouth 2 times daily for 5 days    Acute breast pain  -     CHERY DIGITAL DIAGNOSTIC W OR WO CAD BILATERAL; Future    Myalgia  -     baclofen (LIORESAL) 10 MG tablet; Take 1 tablet by mouth 3 times daily for 10 days    Hypothyroidism, unspecified type  -     levothyroxine (SYNTHROID) 25 MCG tablet; Take 1 tablet by mouth daily  -     TSH without Reflex; Future  -     T4, Free; Future    Essential hypertension  -     bumetanide (BUMEX) 2 MG tablet; Take 1 tablet by mouth daily  -     metoprolol tartrate (LOPRESSOR) 50 MG tablet;  Take

## 2020-05-31 ASSESSMENT — ENCOUNTER SYMPTOMS
NAUSEA: 0
BACK PAIN: 0
CONSTIPATION: 0
DIARRHEA: 0
VOMITING: 0

## 2020-06-01 ENCOUNTER — HOSPITAL ENCOUNTER (OUTPATIENT)
Dept: PAIN MANAGEMENT | Age: 55
Discharge: HOME | End: 2020-06-01
Payer: MEDICARE

## 2020-06-01 PROBLEM — M25.562 CHRONIC PAIN OF BOTH KNEES: Chronic | Status: ACTIVE | Noted: 2020-06-01

## 2020-06-01 PROBLEM — G89.29 CHRONIC PAIN OF BOTH KNEES: Chronic | Status: ACTIVE | Noted: 2020-06-01

## 2020-06-01 PROBLEM — M25.561 CHRONIC PAIN OF BOTH KNEES: Chronic | Status: ACTIVE | Noted: 2020-06-01

## 2020-06-01 PROCEDURE — 99213 OFFICE O/P EST LOW 20 MIN: CPT | Performed by: NURSE PRACTITIONER

## 2020-06-01 PROCEDURE — 99213 OFFICE O/P EST LOW 20 MIN: CPT

## 2020-06-01 RX ORDER — OXYCODONE AND ACETAMINOPHEN 7.5; 325 MG/1; MG/1
1 TABLET ORAL EVERY 6 HOURS PRN
Qty: 120 TABLET | Refills: 0 | Status: SHIPPED | OUTPATIENT
Start: 2020-06-01 | End: 2020-06-26 | Stop reason: SDUPTHER

## 2020-06-01 ASSESSMENT — ENCOUNTER SYMPTOMS
CONSTIPATION: 0
COUGH: 0
SHORTNESS OF BREATH: 0
BACK PAIN: 1

## 2020-06-01 NOTE — PROGRESS NOTES
Date    Arthritis     CHF (congestive heart failure) (HCC)     GERD (gastroesophageal reflux disease)     Gout 10/2019    History of heart attack     HLD (hyperlipidemia)     Hypertension     Insomnia     Osteoarthritis        Past Surgical History:   Procedure Laterality Date    BACK SURGERY      CHOLECYSTECTOMY, LAPAROSCOPIC      HYSTERECTOMY, TOTAL ABDOMINAL      KNEE ARTHROSCOPY Right     KNEE SURGERY Left 2009    NECK SURGERY      SHOULDER SURGERY Right 2009       Allergies   Allergen Reactions    Entresto [Sacubitril-Valsartan] Other (See Comments)     Acute kidney injury    Food Swelling     peaches    Gabapentin Swelling    Tetracyclines & Related          Current Outpatient Medications:     ELDERBERRY PO, Take by mouth, Disp: , Rfl:     montelukast (SINGULAIR) 10 MG tablet, Take 1 tablet by mouth nightly, Disp: 30 tablet, Rfl: 2    allopurinol (ZYLOPRIM) 300 MG tablet, Take 1 tablet by mouth daily, Disp: 30 tablet, Rfl: 2    levothyroxine (SYNTHROID) 25 MCG tablet, Take 1 tablet by mouth daily, Disp: 30 tablet, Rfl: 2    predniSONE (DELTASONE) 20 MG tablet, Take 1 tablet by mouth 2 times daily for 5 days, Disp: 10 tablet, Rfl: 0    bumetanide (BUMEX) 2 MG tablet, Take 1 tablet by mouth daily, Disp: 90 tablet, Rfl: 0    metoprolol tartrate (LOPRESSOR) 50 MG tablet, Take 1 tablet by mouth 2 times daily, Disp: 180 tablet, Rfl: 0    atorvastatin (LIPITOR) 80 MG tablet, Take 1 tablet by mouth daily, Disp: 90 tablet, Rfl: 0    DULoxetine (CYMBALTA) 60 MG extended release capsule, Take 1 capsule by mouth daily, Disp: 90 capsule, Rfl: 0    buPROPion (WELLBUTRIN XL) 150 MG extended release tablet, Take 1 tablet by mouth every morning, Disp: 90 tablet, Rfl: 0    spironolactone (ALDACTONE) 25 MG tablet, Take 1 tablet by mouth daily, Disp: 90 tablet, Rfl: 0    clopidogrel (PLAVIX) 75 MG tablet, Take 1 tablet by mouth daily, Disp: 90 tablet, Rfl: 0    baclofen (LIORESAL) 10 MG tablet, Take 1 tablet by mouth 3 times daily for 10 days, Disp: 30 tablet, Rfl: 0    ibuprofen (ADVIL;MOTRIN) 800 MG tablet, Take 1 tablet by mouth every 8 hours as needed for Pain, Disp: 90 tablet, Rfl: 2    traZODone (DESYREL) 50 MG tablet, TAKE 2 TO 3 TABLETS BY MOUTH NIGHTLY AT BEDTIME AS NEEDED FOR INSOMNIA, Disp: 270 tablet, Rfl: 2    oxybutynin (DITROPAN) 5 MG tablet, TAKE 1 TABLET BY MOUTH TWICE DAILY, Disp: 60 tablet, Rfl: 1    colchicine (COLCRYS) 0.6 MG tablet, Take 1 tablet by mouth 2 times daily, Disp: 30 tablet, Rfl: 0    naproxen (NAPROSYN) 500 MG tablet, Take 1 tablet by mouth 2 times daily (with meals), Disp: 40 tablet, Rfl: 0    Handicap Placard MISC, by Does not apply route, Disp: 1 each, Rfl: 0    Respiratory Therapy Supplies (NEBULIZER COMPRESSOR) KIT, Provide insurance covered nebulizer, use daily as needed, Disp: 1 kit, Rfl: 0    albuterol sulfate  (90 Base) MCG/ACT inhaler, Inhale 2 puffs into the lungs every 4 hours as needed for Wheezing, Disp: 1 Inhaler, Rfl: 5    polyethylene glycol (GLYCOLAX) packet, polyethylene glycol 3350 17 gram/dose oral powder, Disp: , Rfl:     pantoprazole (PROTONIX) 40 MG tablet, Take 1 tablet by mouth daily, Disp: 90 tablet, Rfl: 1    albuterol (PROVENTIL) (2.5 MG/3ML) 0.083% nebulizer solution, Take 3 mLs by nebulization every 6 hours as needed for Wheezing, Disp: 120 each, Rfl: 3    hydrALAZINE (APRESOLINE) 50 MG tablet, Take 1 tablet by mouth 2 times daily, Disp: , Rfl: 0    isosorbide dinitrate (ISORDIL) 10 MG tablet, Take 1 tablet by mouth 2 times daily, Disp: 180 tablet, Rfl: 1    aspirin 81 MG tablet, Take 81 mg by mouth daily, Disp: , Rfl:     Family History   Problem Relation Age of Onset    Other Mother     Diabetes Mother     Heart Disease Mother     Arthritis Mother     Kidney Disease Mother     Lupus Mother     Arthritis Sister     Diabetes Brother     Asthma Brother     Cancer Maternal Grandfather     Hypertension Sister

## 2020-06-08 RX ORDER — TRAZODONE HYDROCHLORIDE 50 MG/1
TABLET ORAL
Qty: 270 TABLET | Refills: 0 | Status: SHIPPED | OUTPATIENT
Start: 2020-06-08 | End: 2020-09-15 | Stop reason: SDUPTHER

## 2020-06-08 NOTE — TELEPHONE ENCOUNTER
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)     Cervical spondylosis with radiculopathy     Status post cervical spinal fusion     Status post lumbar spinal fusion     Lumbar radiculopathy     Morbid obesity (HCC)     Chronic pain of both knees

## 2020-06-09 ENCOUNTER — TELEPHONE (OUTPATIENT)
Dept: PRIMARY CARE CLINIC | Age: 55
End: 2020-06-09

## 2020-06-09 NOTE — TELEPHONE ENCOUNTER
Patient called, stated she needs a new nebulizer machine, she has been using her sisters and she needs it back. Patient would like the referral to be mailed to her.  Please advise      Health Maintenance   Topic Date Due    Pneumococcal 0-64 years Vaccine (1 of 1 - PPSV23) 06[birth year]29/1969    DTaP/Tdap/Td vaccine (1 - Tdap) 06/29/1982    Breast cancer screen  06/29/2003    Shingles Vaccine (1 of 2) 06/29/2013    Colon cancer screen colonoscopy  06/29/2013    Annual Wellness Visit (AWV)  05/29/2019    Lipid screen  04/23/2020    Potassium monitoring  10/15/2020    Creatinine monitoring  10/15/2020    Flu vaccine  Completed    Hepatitis C screen  Completed    HIV screen  Completed    Hepatitis A vaccine  Aged Out    Hepatitis B vaccine  Aged Out    Hib vaccine  Aged Out    Meningococcal (ACWY) vaccine  Aged Out             (applicable per patient's age: Cancer Screenings, Depression Screening, Fall Risk Screening, Immunizations)    LDL Cholesterol (mg/dL)   Date Value   04/23/2019 52     AST (U/L)   Date Value   10/15/2019 17     ALT (U/L)   Date Value   10/15/2019 11     BUN (mg/dL)   Date Value   10/15/2019 17      (goal A1C is < 7)   (goal LDL is <100) need 30-50% reduction from baseline     BP Readings from Last 3 Encounters:   11/25/19 124/74   11/04/19 (!) 163/102   10/29/19 (!) 140/94    (goal /80)      All Future Testing planned in [facility]:  Lab Frequency Next Occurrence   PT eval and treat Once 09/04/2019   PT aquatic therapy Once 09/04/2019   XR KNEE LEFT (MIN 4 VIEWS) Once 10/17/2019   Comprehensive Metabolic Panel Once 57/49/0833   Lipid Panel Once 09/05/2020   Hemoglobin A1C Once 09/05/2020   CBC Once 09/05/2020   CHERY DIGITAL DIAGNOSTIC W OR WO CAD BILATERAL Once 05/28/2020   TSH without Reflex Once 09/05/2020   T4, Free Once 09/05/2020   Uric Acid Once 11/24/2020       Next Visit Date:  Future Appointments   Date Time Provider Christiano Wren   6/26/2020  4:00 PM Luisana Jordan

## 2020-06-26 ENCOUNTER — HOSPITAL ENCOUNTER (OUTPATIENT)
Dept: PAIN MANAGEMENT | Age: 55
Discharge: HOME | End: 2020-06-26
Payer: MEDICARE

## 2020-06-26 ENCOUNTER — TELEPHONE (OUTPATIENT)
Dept: PRIMARY CARE CLINIC | Age: 55
End: 2020-06-26

## 2020-06-26 PROCEDURE — 99213 OFFICE O/P EST LOW 20 MIN: CPT

## 2020-06-26 PROCEDURE — 99214 OFFICE O/P EST MOD 30 MIN: CPT | Performed by: NURSE PRACTITIONER

## 2020-06-26 RX ORDER — OXYCODONE AND ACETAMINOPHEN 7.5; 325 MG/1; MG/1
1 TABLET ORAL EVERY 6 HOURS PRN
Qty: 120 TABLET | Refills: 0 | Status: SHIPPED | OUTPATIENT
Start: 2020-07-09 | End: 2020-07-24 | Stop reason: SDUPTHER

## 2020-06-26 RX ORDER — BACLOFEN 10 MG/1
10 TABLET ORAL 3 TIMES DAILY
Qty: 90 TABLET | Refills: 0 | Status: SHIPPED | OUTPATIENT
Start: 2020-06-26 | End: 2020-07-24 | Stop reason: SDUPTHER

## 2020-06-26 ASSESSMENT — ENCOUNTER SYMPTOMS
CONSTIPATION: 0
SHORTNESS OF BREATH: 0
COUGH: 0

## 2020-06-26 NOTE — PROGRESS NOTES
equivalent: 45    Periodic Controlled Substance Monitoring: Possible medication side effects, risk of tolerance/dependence & alternative treatments discussed., No signs of potential drug abuse or diversion identified., Obtaining appropriate analgesic effect of treatment. ASA Stevens - CNP)      Past Medical History:   Diagnosis Date    Arthritis     [facility]est[facility] heart failure) ([facility] [address]     GERD (gastroesophageal reflux disease)     Gout 10/2019    History of heart attack     HLD (hyperlipidemia)     Hypertension     Insomnia     Osteoarthritis        Past Surgical History:   Procedure Laterality Date    BACK SURGERY      CHOLECYSTECTOMY, LAPAROSCOPIC      HYSTERECTOMY, TOTAL ABDOMINAL      KNEE ARTHROSCOPY Right     KNEE SURGERY Left 2009    NECK SURGERY      SHOULDER SURGERY Right 2009       Allergies   Allergen Reactions    Entresto [Sacubitril-Valsartan] Other (See Comments)     Acute kidney injury    Food Swelling     peaches    Gabapentin Swelling    Tetracyclines & Related          Current Outpatient Medications:     traZODone (DESYREL) 50 MG tablet, TAKE 2 TO 3 TABLETS BY MOUTH NIGHTLY AT BEDTIME AS NEEDED FOR INSOMNIA, Disp: 270 tablet, Rfl: 0    ELDERBERRY PO, Take by mouth, Disp: , Rfl:     oxyCODONE-acetaminophen (PERCOCET) 7.5-325 MG per tablet, Take 1 tablet by mouth every 6 hours as needed for Pain for up to 30 days.  Intended supply: 30 days, Disp: 120 tablet, Rfl: 0    montelukast (SINGULAIR) 10 MG tablet, Take 1 tablet by mouth nightly, Disp: 30 tablet, Rfl: 2    allopurinol (ZYLOPRIM) 300 MG tablet, Take 1 tablet by mouth daily, Disp: 30 tablet, Rfl: 2    levothyroxine (SYNTHROID) 25 MCG tablet, Take 1 tablet by mouth daily, Disp: 30 tablet, Rfl: 2    bumetanide (BUMEX) 2 MG tablet, Take 1 tablet by mouth daily, Disp: 90 tablet, Rfl: 0    metoprolol tartrate (LOPRESSOR) 50 MG tablet, Take 1 tablet by mouth 2 times daily, Disp: 180 tablet, Rfl: 0   atorvastatin (LIPITOR) 80 MG tablet, Take 1 tablet by mouth daily, Disp: 90 tablet, Rfl: 0    DULoxetine (CYMBALTA) 60 MG extended release capsule, Take 1 capsule by mouth daily, Disp: 90 capsule, Rfl: 0    buPROPion (WELLBUTRIN XL) 150 MG extended release tablet, Take 1 tablet by mouth every morning, Disp: 90 tablet, Rfl: 0    spironolactone (ALDACTONE) 25 MG tablet, Take 1 tablet by mouth daily, Disp: 90 tablet, Rfl: 0    clopidogrel (PLAVIX) 75 MG tablet, Take 1 tablet by mouth daily, Disp: 90 tablet, Rfl: 0    ibuprofen (ADVIL;MOTRIN) 800 MG tablet, Take 1 tablet by mouth every 8 hours as needed for Pain, Disp: 90 tablet, Rfl: 2    oxybutynin (DITROPAN) 5 MG tablet, TAKE 1 TABLET BY MOUTH TWICE DAILY, Disp: 60 tablet, Rfl: 1    colchicine (COLCRYS) 0.6 MG tablet, Take 1 tablet by mouth 2 times daily, Disp: 30 tablet, Rfl: 0    naproxen (NAPROSYN) 500 MG tablet, Take 1 tablet by mouth 2 times daily (with meals), Disp: 40 tablet, Rfl: 0    Handicap Placard MISC, by Does not apply route, Disp: 1 each, Rfl: 0    Respiratory Therapy Supplies (NEBULIZER COMPRESSOR) KIT, Provide insurance covered nebulizer, use daily as needed, Disp: 1 kit, Rfl: 0    albuterol sulfate  (90 Base) MCG/ACT inhaler, Inhale 2 puffs into the lungs every 4 hours as needed for Wheezing, Disp: 1 Inhaler, Rfl: 5    polyethylene glycol (GLYCOLAX) packet, polyethylene glycol 3350 17 gram/dose oral powder, Disp: , Rfl:     pantoprazole (PROTONIX) 40 MG tablet, Take 1 tablet by mouth daily, Disp: 90 tablet, Rfl: 1    albuterol (PROVENTIL) (2.5 MG/3ML) 0.083% nebulizer solution, Take 3 mLs by nebulization every 6 hours as needed for Wheezing, Disp: 120 each, Rfl: 3    hydrALAZINE (APRESOLINE) 50 MG tablet, Take 1 tablet by mouth 2 times daily, Disp: , Rfl: 0    isosorbide dinitrate (ISORDIL) 10 MG tablet, Take 1 tablet by mouth 2 times daily, Disp: 180 tablet, Rfl: 1    aspirin 81 MG tablet, Take 81 mg by mouth daily, Disp: , (and/or legal guardian's) consent, to reduce the patient's risk of exposure to COVID-19 and provide necessary medical care. The patient (and/or legal guardian) has also been advised to contact this office for worsening conditions or problems, and seek emergency medical treatment and/or call 911 if deemed necessary. Services were provided through a video synchronous discussion virtually to substitute for in-person clinic visit. Patient and provider were located at their individual homes. --ASA Kim CNP on 6/26/2020 at 2:00 PM    An electronic signature was used to authenticate this note.

## 2020-07-14 ENCOUNTER — HOSPITAL ENCOUNTER (OUTPATIENT)
Dept: PHYSICAL THERAPY | Age: 55
Setting detail: THERAPIES SERIES
Discharge: HOME | End: 2020-07-14
Payer: MEDICARE

## 2020-07-14 PROCEDURE — 97162 PT EVAL MOD COMPLEX 30 MIN: CPT

## 2020-07-14 PROCEDURE — 97110 THERAPEUTIC EXERCISES: CPT

## 2020-07-14 ASSESSMENT — PAIN DESCRIPTION - ORIENTATION: ORIENTATION: RIGHT

## 2020-07-14 ASSESSMENT — PAIN DESCRIPTION - ONSET: ONSET: ON-GOING

## 2020-07-14 ASSESSMENT — PAIN - FUNCTIONAL ASSESSMENT: PAIN_FUNCTIONAL_ASSESSMENT: PREVENTS OR INTERFERES SOME ACTIVE ACTIVITIES AND ADLS

## 2020-07-14 ASSESSMENT — PAIN DESCRIPTION - FREQUENCY: FREQUENCY: CONTINUOUS

## 2020-07-14 ASSESSMENT — PAIN DESCRIPTION - LOCATION: LOCATION: KNEE

## 2020-07-14 ASSESSMENT — PAIN DESCRIPTION - PAIN TYPE: TYPE: CHRONIC PAIN

## 2020-07-14 ASSESSMENT — PAIN DESCRIPTION - PROGRESSION: CLINICAL_PROGRESSION: GRADUALLY WORSENING

## 2020-07-14 ASSESSMENT — PAIN SCALES - GENERAL: PAINLEVEL_OUTOF10: 9

## 2020-07-14 NOTE — PROGRESS NOTES
[facility] Services Exercise Log  Aquatic Knee phase 1    Date of Eval:      7/14/2020                          Primary PT: GENESIS  Diagnosis: Chronic R knee pain  Things to Focus On (goals): Improve gait, ROM, strength , add trunk stabilization ex., also with L knee pain, Fibromyalgia  Surgical Precautions: lumbar fusion  Medical Precautions:  [] C-9 dates  [] Occ Med   [x] Medicare       Date        Visit #        Walk F/L/R        Marching        Squats        Heel toe raises        SLR F/L/R        HS Curl        Step-Ups F/L        Knee/Flex /Ext                Kickboard Ex.         Iso Abd. verticle        Push-pull        Paddling                Balance        SLS        DEEP H2O        Cycling                Stretches        Achllies        Hamstring        Psoas        Quad                                                                                Pain Rating

## 2020-07-14 NOTE — PROGRESS NOTES
Vicky Fall Risk Assessment    Risk Factor Scale  Score   History of Falls [] Yes  [] No 25  0 25   Secondary Diagnosis [] Yes  [] No 15  0 0   Ambulatory Aid [] Furniture  [] Crutches/cane/walker  [] None/bedrest/wheelchair/nurse 30  15  0 15   IV/Heparin Lock [] Yes  [] No 20  0 0   Gait/Transferring [] Impaired  [] Weak  [] Normal/bedrest/immobile 20  10  0 10   Mental Status [] Forgets limitations  [] Oriented to own ability 15  0 0      Total:50     Based on the Assessment score: check the appropriate box. []  No intervention needed   Low =   Score of 0-24    []  Use standard prevention interventions Moderate =  Score of 24-44   [] Give patient handout and discuss fall prevention strategies   [] Establish goal of education for patient/family RE: fall prevention strategies    [x]  Use high risk prevention interventions High = Score of 45 and higher   [x] Give patient handout and discuss fall prevention strategies   [x] Establish goal of education for patient/family Re: fall prevention strategies   [] Discuss lifeline / other resources    Electronically signed by:    Mary Cheng  Date: 7/14/2020
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
## 2020-07-21 ENCOUNTER — HOSPITAL ENCOUNTER (OUTPATIENT)
Dept: PHYSICAL THERAPY | Age: 55
Setting detail: THERAPIES SERIES
Discharge: HOME | End: 2020-07-21
Payer: MEDICARE

## 2020-07-21 PROCEDURE — 97113 AQUATIC THERAPY/EXERCISES: CPT

## 2020-07-21 ASSESSMENT — PAIN DESCRIPTION - PAIN TYPE: TYPE: CHRONIC PAIN

## 2020-07-21 ASSESSMENT — PAIN DESCRIPTION - LOCATION: LOCATION: KNEE

## 2020-07-21 ASSESSMENT — PAIN DESCRIPTION - ORIENTATION: ORIENTATION: RIGHT

## 2020-07-21 ASSESSMENT — PAIN SCALES - GENERAL: PAINLEVEL_OUTOF10: 10

## 2020-07-21 NOTE — PROGRESS NOTES
knee    Assessment: Body structures, Functions, Activity limitations: Decreased endurance;Decreased ADL status; Decreased functional mobility ; Decreased ROM; Decreased strength;Decreased posture; Increased pain  Treatment Diagnosis: Pain M25.561  Difficulty walking R26.2 NEC   Stiffness M25.661  Weakness M62.561   Effusion M25.461  Prognosis: Fair;Good  REQUIRES PT FOLLOW UP: Yes  Activity Tolerance: Patient limited by pain  Comments: Patient very limited due to pain and fearful in water. Noted increased lower back spasms during exercise- especially squats.  Did not tolerated kickboard exercise due to pain; too fearful this date to walk at rail Required extra time to complete program- cues throughout for technique    Plan:  Plan: Continue with current plan(Progress as symptoms allow)    Therapy Time:  Time In: 1433  Time Out: [address] 52  Minutes: 24       Treatment Charges: Minutes Units   []  Ultrasound     []  Electrical-Stim     []  Iontophoresis     []  Traction     []  Massage       []  Eval     []  Gait     []  Vasopneumatic Device     []  Ther Exercise       []  Manual Therapy       []  Ther Activities       [x]  Aquatics 24 2   []  Neuro Re-Ed       []  Other       Total Treatment Time: 24 2       Sharath Miller, PTA

## 2020-07-24 ENCOUNTER — HOSPITAL ENCOUNTER (OUTPATIENT)
Dept: PHYSICAL THERAPY | Age: 55
Setting detail: THERAPIES SERIES
Discharge: HOME | End: 2020-07-24
Payer: MEDICARE

## 2020-07-24 ENCOUNTER — HOSPITAL ENCOUNTER (OUTPATIENT)
Age: 55
Setting detail: SPECIMEN
Discharge: HOME | End: 2020-07-24
Payer: MEDICARE

## 2020-07-24 ENCOUNTER — HOSPITAL ENCOUNTER (OUTPATIENT)
Dept: PAIN MANAGEMENT | Age: 55
Discharge: HOME | End: 2020-07-24
Payer: MEDICARE

## 2020-07-24 PROBLEM — Z79.891 ENCOUNTER FOR LONG-TERM OPIATE ANALGESIC USE: Chronic | Status: ACTIVE | Noted: 2020-07-24

## 2020-07-24 LAB
ALBUMIN SERPL-MCNC: 4 G/DL (ref 3.5–5.2)
ALBUMIN/GLOBULIN RATIO: 1.2 (ref 1–2.5)
ALP BLD-CCNC: 153 U/L (ref 35–104)
ALT SERPL-CCNC: 11 U/L (ref 5–33)
ANION GAP SERPL CALCULATED.3IONS-SCNC: 15 MMOL/L (ref 9–17)
AST SERPL-CCNC: 17 U/L
BILIRUB SERPL-MCNC: 0.32 MG/DL (ref 0.3–1.2)
BUN BLDV-MCNC: 15 MG/DL (ref 6–20)
BUN/CREAT BLD: ABNORMAL (ref 9–20)
CALCIUM SERPL-MCNC: 8.7 MG/DL (ref 8.6–10.4)
CHLORIDE BLD-SCNC: 107 MMOL/L (ref 98–107)
CHOLESTEROL/HDL RATIO: 4.8
CHOLESTEROL: 158 MG/DL
CO2: 22 MMOL/L (ref 20–31)
CREAT SERPL-MCNC: 0.99 MG/DL (ref 0.5–0.9)
ESTIMATED AVERAGE GLUCOSE: 128 MG/DL
GFR AFRICAN AMERICAN: >60 ML/MIN
GFR NON-AFRICAN AMERICAN: 58 ML/MIN
GFR SERPL CREATININE-BSD FRML MDRD: ABNORMAL ML/MIN/{1.73_M2}
GFR SERPL CREATININE-BSD FRML MDRD: ABNORMAL ML/MIN/{1.73_M2}
GLUCOSE BLD-MCNC: 102 MG/DL (ref 70–99)
HBA1C MFR BLD: 6.1 % (ref 4–6)
HCT VFR BLD CALC: 36.8 % (ref 36.3–47.1)
HDLC SERPL-MCNC: 33 MG/DL
HEMOGLOBIN: 10.7 G/DL (ref 11.9–15.1)
LDL CHOLESTEROL: 95 MG/DL (ref 0–130)
MCH RBC QN AUTO: 26.6 PG (ref 25.2–33.5)
MCHC RBC AUTO-ENTMCNC: 29.1 G/DL (ref 28.4–34.8)
MCV RBC AUTO: 91.3 FL (ref 82.6–102.9)
NRBC AUTOMATED: 0 PER 100 WBC
PDW BLD-RTO: 16.9 % (ref 11.8–14.4)
PLATELET # BLD: 261 K/UL (ref 138–453)
PMV BLD AUTO: 10.9 FL (ref 8.1–13.5)
POTASSIUM SERPL-SCNC: 4.5 MMOL/L (ref 3.7–5.3)
RBC # BLD: 4.03 M/UL (ref 3.95–5.11)
SODIUM BLD-SCNC: 144 MMOL/L (ref 135–144)
THYROXINE, FREE: 0.81 NG/DL (ref 0.93–1.7)
TOTAL PROTEIN: 7.3 G/DL (ref 6.4–8.3)
TRIGL SERPL-MCNC: 149 MG/DL
TSH SERPL DL<=0.05 MIU/L-ACNC: 4.81 MIU/L (ref 0.3–5)
URIC ACID: 6 MG/DL (ref 2.4–5.7)
VLDLC SERPL CALC-MCNC: ABNORMAL MG/DL (ref 1–30)
WBC # BLD: 5.9 K/UL (ref 3.5–11.3)

## 2020-07-24 PROCEDURE — 97113 AQUATIC THERAPY/EXERCISES: CPT

## 2020-07-24 PROCEDURE — 99213 OFFICE O/P EST LOW 20 MIN: CPT

## 2020-07-24 PROCEDURE — 99213 OFFICE O/P EST LOW 20 MIN: CPT | Performed by: NURSE PRACTITIONER

## 2020-07-24 RX ORDER — OXYCODONE AND ACETAMINOPHEN 7.5; 325 MG/1; MG/1
1 TABLET ORAL EVERY 6 HOURS PRN
Qty: 120 TABLET | Refills: 0 | Status: ON HOLD | OUTPATIENT
Start: 2020-08-07 | End: 2020-09-09 | Stop reason: HOSPADM

## 2020-07-24 RX ORDER — BACLOFEN 10 MG/1
10 TABLET ORAL 3 TIMES DAILY
Qty: 90 TABLET | Refills: 0 | Status: SHIPPED | OUTPATIENT
Start: 2020-07-24 | End: 2020-09-17 | Stop reason: SDUPTHER

## 2020-07-24 ASSESSMENT — PAIN DESCRIPTION - LOCATION: LOCATION: KNEE

## 2020-07-24 ASSESSMENT — PAIN SCALES - GENERAL: PAINLEVEL_OUTOF10: 10

## 2020-07-24 ASSESSMENT — PAIN DESCRIPTION - PAIN TYPE: TYPE: CHRONIC PAIN

## 2020-07-24 ASSESSMENT — ENCOUNTER SYMPTOMS
SHORTNESS OF BREATH: 0
COUGH: 0
CONSTIPATION: 0

## 2020-07-24 ASSESSMENT — PAIN DESCRIPTION - DESCRIPTORS: DESCRIPTORS: ACHING;BURNING;CONSTANT;SHARP;SHOOTING

## 2020-07-24 ASSESSMENT — PAIN DESCRIPTION - ORIENTATION: ORIENTATION: RIGHT

## 2020-07-24 ASSESSMENT — PAIN DESCRIPTION - FREQUENCY: FREQUENCY: CONTINUOUS

## 2020-07-24 NOTE — FLOWSHEET NOTE
[address]   Outpatient Physical Therapy  [address] [facility]. [address]  Phone: [phone]  Fax: [phone]  Daily Progress Note    Date: 20    Patient Name: Alison Humphrey        MRN: [id number]  Account: [de-identified] : 1965      General Information:  Referring Practitioner: JESUS ALBERTO Zendejas  Referral Date : 20  Diagnosis: Status post lumbar spinal fusion Z98.1 ;  Chronic pain R knee M25.561, G89.29 ( Main complaint )  Follows Commands: Within Functional Limits  Onset Date: 10/01/16  PT Insurance Information: [facility] Medicare  Total # of Visits Approved: 12  Total # of Visits to Date: 3  No Show: 0  Canceled Appointment: 0    Subjective:  Subjective: Pt reports some increased pain after last visit.  states Right knee has really been hurting all week. did not sleep well last night due to increased pain. Pain:  Patient Currently in Pain: Yes  Pain Assessment: 0-10  Pain Level: 10  Pain Type: Chronic pain  Pain Location: Knee  Pain Orientation: Right  Pain Descriptors: Aching;Burning;Constant; Louretta Mynor; Shooting  Pain Frequency: Continuous       Objective:  [address] Services Exercise Log  Aquatic Knee phase 1     Date of Eval:      2020                          Primary PT: GENESIS  Diagnosis: Chronic R knee pain  Things to Focus On (goals): Improve gait, ROM, strength , add trunk stabilization ex., also with L knee pain, Fibromyalgia  Surgical Precautions: lumbar fusion  Medical Precautions:  []? C-9 dates  []? Occ Med   [x]? Medicare      **Fearful of water**  Date 20         Visit # 2/12  3         Walk F/L/R NT  NT         Marching 10x  10x         Squats 8x5\"  10x5\"         Heel toe raises 10x  10x         SLR F/L/R 10x  10x         HS Curl 10x  10x         Step-Ups F/L             Knee/Flex /Ext                           Kickboard Ex.  Unable with Small  Unable with small         Iso Abd. verticle             Push-pull             Paddling                           Balance             SLS             DEEP H2O             Cycling                           Stretches             Achllies             Hamstring             Psoas             Quad                                                       Pain Rating 10  10            Assessment: Body structures, Functions, Activity limitations: Decreased endurance;Decreased ADL status; Decreased functional mobility ; Decreased ROM; Decreased strength;Decreased posture; Increased pain  Treatment Diagnosis: Pain M25.561  Difficulty walking R26.2 NEC   Stiffness M25.661  Weakness M62.561   Effusion M25.461  Prognosis: Fair;Good  REQUIRES PT FOLLOW UP: Yes  Activity Tolerance: Patient limited by pain  Comments: No changes made today due to high pain and pain after last visit. No spasms with exercise today. .  fearful of water and walking.     Plan:  Plan: Continue with current plan    Therapy Time:  Time In: 1105  Time Out: 1135  Minutes: 30  Timed Code Treatment Minutes: 25 Minutes    Treatment Charges: Minutes Units   []  Ultrasound     []  Electrical-Stim     []  Iontophoresis     []  Traction     []  Massage       []  Eval     []  Gait     []  Vasopneumatic Device     []  Ther Exercise       []  Manual Therapy       []  Ther Activities       [x]  Aquatics 25 2   []  Neuro Re-Ed       []  Other       Total Treatment Time: 25 2       Mychal Macdonald PTA
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
## 2020-07-28 ENCOUNTER — APPOINTMENT (OUTPATIENT)
Dept: PHYSICAL THERAPY | Age: 55
End: 2020-07-28
Payer: MEDICARE

## 2020-07-28 ENCOUNTER — HOSPITAL ENCOUNTER (OUTPATIENT)
Dept: PHYSICAL THERAPY | Age: 55
Setting detail: THERAPIES SERIES
Discharge: HOME | End: 2020-07-28
Payer: MEDICARE

## 2020-07-28 PROCEDURE — 97113 AQUATIC THERAPY/EXERCISES: CPT

## 2020-07-28 ASSESSMENT — PAIN DESCRIPTION - ORIENTATION: ORIENTATION: RIGHT

## 2020-07-28 ASSESSMENT — PAIN SCALES - GENERAL: PAINLEVEL_OUTOF10: 10

## 2020-07-28 ASSESSMENT — PAIN DESCRIPTION - LOCATION: LOCATION: KNEE

## 2020-07-28 ASSESSMENT — PAIN DESCRIPTION - PAIN TYPE: TYPE: CHRONIC PAIN

## 2020-07-28 NOTE — FLOWSHEET NOTE
[address]   Outpatient Physical Therapy  [address] [facility]. [address]  Phone: [phone]  Fax: [phone]  Daily Progress Note    Date: 20    Patient Name: Jhon Frost        MRN: [id number]  Account: [de-identified] : 1965      General Information:  Additional Pertinent Hx: Spinal Fusion ; L knee and shoulder all work injury related  Referring Practitioner: JESUS ALBERTO Kaba  Referral Date : 20  Diagnosis:  Chronic pain R knee M25.561, G89.29 ( Main complaint )  Onset Date: 10/01/16  PT Insurance Information: [employer] Medicare  Total # of Visits Approved: 12  Total # of Visits to Date: 4  No Show: 0  Canceled Appointment: 0    Subjective:  Subjective: Continues with high pain in R knee and throughout entire body- states some exercise bothers lower back but feels a little more comfortable. Feels back is sore from trying to stand up straighter. Pain:  Patient Currently in Pain: Yes  Pain Assessment: 0-10  Pain Level: 10  Pain Type: Chronic pain  Pain Location: Knee  Pain Orientation: Right       Objective:  [address] [facility] Exercise Log  Aquatic Knee phase 1     Date of Eval:      2020                          Primary PT: GENESIS  Diagnosis: Chronic R knee pain  Things to Focus On (goals): Improve gait, ROM, strength , add trunk stabilization ex., also with L knee pain, Fibromyalgia  Surgical Precautions: lumbar fusion  Medical Precautions:  []? C-9 dates  []? Occ Med   [x]? Medicare      **Fearful of water**  Date 20        Visit # 2/12  3/12  4/12       Walk F/L/R NT  NT NT       Marching 10x  10x 10x        Squats 8x5\"  10x5\" 10x5\"        Heel toe raises 10x  10x  10x       SLR F/L/R 10x  10x  10x F/L Only       HS Curl 10x  10x  10x       Step-Ups F/L             Knee/Flex /Ext     10x                      Kickboard Ex.  Unable with Small  Unable with small         Iso Abd. verticle             Push-pull             Paddling                           Balance             SLS             DEEP H2O             Cycling                           Stretches             Achllies             Hamstring     2x30\"   1st Step        Psoas             Quad                                                       Pain Rating 10  10  10          Assessment: Body structures, Functions, Activity limitations: Decreased endurance;Decreased ADL status; Decreased functional mobility ; Decreased ROM; Decreased strength;Decreased posture; Increased pain  Treatment Diagnosis: Pain M25.561  Difficulty walking R26.2 NEC   Stiffness M25.661  Weakness M62.561   Effusion M25.461  Prognosis: Fair;Good  REQUIRES PT FOLLOW UP: Yes  Activity Tolerance: Patient limited by pain  Comments: Pain with hip extension this date in lower back- held exercise    Plan:  Plan: Continue with current plan(Progress as pain allows)    Therapy Time:  Time In: 1525  Time Out: 1550  Minutes: 25       Treatment Charges: Minutes Units   []  Ultrasound     []  Electrical-Stim     []  Iontophoresis     []  Traction     []  Massage       []  Eval     []  Gait     []  Vasopneumatic Device     []  Ther Exercise       []  Manual Therapy       []  Ther Activities       [x]  Aquatics 25 2   []  Neuro Re-Ed       []  Other       Total Treatment Time: 25 2       Dorinda Mancuso, PTA

## 2020-07-29 RX ORDER — IBUPROFEN 800 MG/1
800 TABLET ORAL EVERY 8 HOURS PRN
Qty: 90 TABLET | Refills: 1 | Status: SHIPPED | OUTPATIENT
Start: 2020-07-29 | End: 2020-10-17

## 2020-07-29 RX ORDER — ALBUTEROL SULFATE 90 UG/1
2 AEROSOL, METERED RESPIRATORY (INHALATION) EVERY 4 HOURS PRN
Qty: 1 INHALER | Refills: 1 | Status: SHIPPED | OUTPATIENT
Start: 2020-07-29 | End: 2020-10-08 | Stop reason: SDUPTHER

## 2020-07-29 NOTE — RESULT ENCOUNTER NOTE
Improve liver and kidney function, hyperglycemia, slightly decreased thyroid hormone, slightly elevated uric acid. Informed patient screen for diabetes with hemoglobin A1c which was 6.1, which places her in the prediabetic range. Instruct patient to increase water intake to better improve kidney function and uric acid levels, instruct patient to monitor sugar and carb intake to better control blood sugars.   Reschedule patient missed appointment in the next 6 weeks

## 2020-07-30 ENCOUNTER — APPOINTMENT (OUTPATIENT)
Dept: PHYSICAL THERAPY | Age: 55
End: 2020-07-30
Payer: MEDICARE

## 2020-07-30 ENCOUNTER — HOSPITAL ENCOUNTER (OUTPATIENT)
Dept: PHYSICAL THERAPY | Age: 55
Setting detail: THERAPIES SERIES
Discharge: HOME | End: 2020-07-30
Payer: MEDICARE

## 2020-07-30 PROCEDURE — 97113 AQUATIC THERAPY/EXERCISES: CPT

## 2020-07-30 ASSESSMENT — PAIN DESCRIPTION - ORIENTATION: ORIENTATION: RIGHT

## 2020-07-30 ASSESSMENT — PAIN SCALES - GENERAL: PAINLEVEL_OUTOF10: 10

## 2020-07-30 ASSESSMENT — PAIN DESCRIPTION - LOCATION: LOCATION: KNEE

## 2020-07-30 ASSESSMENT — PAIN DESCRIPTION - PAIN TYPE: TYPE: CHRONIC PAIN

## 2020-07-30 NOTE — PROGRESS NOTES
[address]   Outpatient Physical Therapy  [address] [facility]. [address]  Phone: [phone]  Fax: [phone]  Daily Progress Note    Date: 20    Patient Name: Georgie Limon        MRN: [id number]  Account: [de-identified] : 1965      General Information:  Additional Pertinent Hx: Spinal Fusion ; L knee and shoulder all work injury related  Referring Practitioner: JESUS ALBERTO Rodrigues  Referral Date : 20  Diagnosis:  Chronic pain R knee M25.561, G89.29 ( Main complaint )  Onset Date: 10/01/16  PT Insurance Information: [facility] Medicare  Total # of Visits Approved: 12  Total # of Visits to Date: 5  No Show: 0  Canceled Appointment: 0    Subjective:  Subjective: Patient continues to report hgih pain levels making it difficult for her to function     Pain:  Patient Currently in Pain: Yes  Pain Assessment: 0-10  Pain Level: 10  Pain Type: Chronic pain  Pain Location: Knee  Pain Orientation: Right       Objective:  [address] [facility] Exercise Log  Aquatic Knee phase 1     Date of Eval:      2020                          Primary PT: GENESIS  Diagnosis: Chronic R knee pain  Things to Focus On (goals): Improve gait, ROM, strength , add trunk stabilization ex., also with L knee pain, Fibromyalgia  Surgical Precautions: lumbar fusion  Medical Precautions:  []?? C-9 dates  []? ? Occ Med   [x]? ? Medicare      **Fearful of water**  Date 20      Visit # 2/12  3/12  4/12 5/12      Walk F/L/R NT  NT NT NT      Marching 10x  10x 10x  10x      Squats 8x5\"  10x5\" 10x5\"  10x5\"       Heel toe raises 10x  10x  10x  10x      SLR F/L/R 10x  10x  10x F/L Only NT      HS Curl 10x  10x  10x  10x      Step-Ups F/L             Knee/Flex /Ext     10x   10x                    Kickboard Ex.  Unable with Small  Unable with small         Iso Abd. verticle             Push-pull             Paddling                           Balance             SLS

## 2020-08-04 ENCOUNTER — HOSPITAL ENCOUNTER (OUTPATIENT)
Dept: PHYSICAL THERAPY | Age: 55
Setting detail: THERAPIES SERIES
Discharge: HOME | End: 2020-08-04
Payer: MEDICARE

## 2020-08-04 ENCOUNTER — APPOINTMENT (OUTPATIENT)
Dept: PHYSICAL THERAPY | Age: 55
End: 2020-08-04
Payer: MEDICARE

## 2020-08-04 PROCEDURE — 97113 AQUATIC THERAPY/EXERCISES: CPT

## 2020-08-04 ASSESSMENT — PAIN DESCRIPTION - FREQUENCY: FREQUENCY: CONTINUOUS

## 2020-08-04 ASSESSMENT — PAIN DESCRIPTION - PAIN TYPE: TYPE: CHRONIC PAIN

## 2020-08-04 ASSESSMENT — PAIN DESCRIPTION - DESCRIPTORS: DESCRIPTORS: ACHING;BURNING

## 2020-08-04 ASSESSMENT — PAIN SCALES - GENERAL: PAINLEVEL_OUTOF10: 10

## 2020-08-04 ASSESSMENT — PAIN DESCRIPTION - ORIENTATION: ORIENTATION: RIGHT

## 2020-08-04 ASSESSMENT — PAIN DESCRIPTION - LOCATION: LOCATION: KNEE

## 2020-08-04 NOTE — FLOWSHEET NOTE
[address]   Outpatient Physical Therapy  [address] [facility]. [address]  Phone: [phone]  Fax: [phone]  Daily Progress Note    Date: 20    Patient Name: Mary Sousa        MRN: [id number]  Account: [de-identified] : 1965      General Information:  Additional Pertinent Hx: Spinal Fusion ; L knee and shoulder all work injury related  Referring Practitioner: JESUS ALBERTO Prieto  Referral Date : 20  Diagnosis:  Chronic pain R knee M25.561, G89.29 ( Main complaint )  Onset Date: 10/01/16  PT Insurance Information: [facility] Medicare  Total # of Visits Approved: 12  Total # of Visits to Date: 6  No Show: 0  Canceled Appointment: 0    Subjective:  Subjective: Patient reports increased pain all weekend and yesterday. R knee pain very high this afternoon. Nots \"burning\" all over body yesterday. Pain:  Patient Currently in Pain: Yes  Pain Assessment: 0-10  Pain Level: 10(\"15\")  Pain Type: Chronic pain  Pain Location: Knee  Pain Orientation: Right  Pain Descriptors: Aching;Burning  Pain Frequency: Continuous       Objective:    [address] [facility] Exercise Log  Aquatic Knee phase 1     Date of Eval:      2020                          Primary PT: GENESIS  Diagnosis: Chronic R knee pain  Things to Focus On (goals): Improve gait, ROM, strength , add trunk stabilization ex., also with L knee pain, Fibromyalgia  Surgical Precautions: lumbar fusion  Medical Precautions:  []??? C-9 dates  []??? Occ Med   [x]? ?? Medicare      **Fearful of water**  Date 20   Visit # 2/12  3/12  4/12 5/12   6/12   Walk F/L/R NT  NT NT NT   NT   Marching 10x  10x 10x  10x   10x   Squats 8x5\"  10x5\" 10x5\"  10x5\"    10x5\"   Heel toe raises 10x  10x  10x  10x  15x   SLR F/L/R 10x  10x  10x F/L Only NT      HS Curl 10x  10x  10x  10x  NT painful   Step-Ups F/L             Knee/Flex /Ext     10x   10x  10x                 Kickboard Ex.

## 2020-08-06 ENCOUNTER — HOSPITAL ENCOUNTER (OUTPATIENT)
Dept: PHYSICAL THERAPY | Age: 55
Setting detail: THERAPIES SERIES
Discharge: HOME | End: 2020-08-06
Payer: MEDICARE

## 2020-08-06 PROCEDURE — 97113 AQUATIC THERAPY/EXERCISES: CPT

## 2020-08-06 PROCEDURE — 97110 THERAPEUTIC EXERCISES: CPT

## 2020-08-06 ASSESSMENT — PAIN DESCRIPTION - FREQUENCY: FREQUENCY: CONTINUOUS

## 2020-08-06 ASSESSMENT — PAIN DESCRIPTION - ORIENTATION
ORIENTATION: RIGHT
ORIENTATION: RIGHT

## 2020-08-06 ASSESSMENT — PAIN SCALES - GENERAL
PAINLEVEL_OUTOF10: 10
PAINLEVEL_OUTOF10: 10

## 2020-08-06 ASSESSMENT — PAIN DESCRIPTION - DESCRIPTORS
DESCRIPTORS: CONSTANT;BURNING
DESCRIPTORS: CONSTANT

## 2020-08-06 ASSESSMENT — PAIN DESCRIPTION - LOCATION
LOCATION: BACK;KNEE
LOCATION: BACK;KNEE

## 2020-08-06 ASSESSMENT — PAIN DESCRIPTION - PAIN TYPE
TYPE: CHRONIC PAIN
TYPE: CHRONIC PAIN

## 2020-08-06 ASSESSMENT — PAIN DESCRIPTION - PROGRESSION: CLINICAL_PROGRESSION: NOT CHANGED

## 2020-08-06 NOTE — PROGRESS NOTES
Physical Therapy  Progress Note  Date: 2020  Patient Name: Ubaldo Cruz  MRN: [id number]     :   1965    Subjective:   General  Additional Pertinent Hx: Spinal Fusion ; L knee and shoulder all work injury related  Referring Practitioner: JESUS ALBERTO Carvajal  PT Visit Information  Onset Date: 10/01/16  PT Insurance Information: [facility] Medicare  Total # of Visits Approved: 12  Total # of Visits to Date: 7  No Show: 0  Canceled Appointment: 0  Subjective  Subjective: Patient reports that she lost 8# since she started in [address] [place] [address]. However, pain is still about the same. Unable to sleep last night  Pain Screening  Patient Currently in Pain: Yes  Pain Assessment  Pain Assessment: 0-10  Pain Level: 10(Just shoot me, more like a 25)  Pain Type: Chronic pain  Pain Location: Back;Knee  Pain Orientation: Right  Pain Descriptors: Constant  Clinical Progression: Not changed  Vital Signs  Patient Currently in Pain: Yes       Treatment Activities:    Ambulation 1  Surface: level tile  Device: Rollator  Assistance: Independent  Quality of Gait: Decreased stance, rollator low for her height, at its highest position, trunk bent forward. Noted putting her weight mainly on R toes, too painful to bear weight on heel as she can't straighten R knee. Gait Deviations: Slow Fior;Decreased step length; Increased EDILBERTO; Decreased step height  Comments: Timed up and go 41 sec. ( NOW 33 SEC )  Difficulty initiating steps after getting up from chair. AROM RLE (degrees)  RLE General AROM: R knee : -40 ext/ 75 flex, with severe pain, in supine. ( NOW -30/ 65 with pain, done in sitting, as patient can lay in supine )  Spine  Lumbar: Unable to test due to pain and can't  erect position  Strength RLE  Comment: Patient declined MT on RLE due to increased pain  Strength LLE  Comment: Done in sitting, unable to lay flat in bed. Limited MMT due to pain. Grossly 3+-4/5 L hip flex, Quads/ hams, 4/5 ant. tib.   Assessment: Conditions Requiring Skilled Therapeutic Intervention  Body structures, Functions, Activity limitations: Decreased endurance;Decreased ADL status; Decreased functional mobility ; Decreased ROM; Decreased strength;Decreased posture; Increased pain  Assessment: Unable to do a full reassessment on RLE due to pain, patient declined to test RLE as she still has to go to Aquatics after. Reviewed HEP, reports tries to do some but there are days pain is so severe, unable to do anything. Treatment Diagnosis: Pain M25.561  Difficulty walking R26.2 NEC   Stiffness M25.661  Weakness M62.561   Effusion M25.461  Prognosis: Fair;Good  REQUIRES PT FOLLOW UP: Yes    OutComes Score  LEFS Total Score: 9 (08/06/20 1259)   Goals:  Short term goals  Time Frame for Short term goals: 6 visits  Short term goal 1: Decrease pain to 6/10 to be able to tolerate increase in activities and improve sleep - ONGOING  Short term goal 2: Decrease effusion by 1 cm or better to be able to improve mobility of R knee - PARTLY MET  Short term goal 3: Improve ROM by 10 degrees or better to be able to get in and out of car, shower with less difficulty - ONGOING  Short term goal 4:  Independent with HEP, fall prevention - MET  Long term goals  Time Frame for Long term goals : 12 visits  Long term goal 1: Improve timed up and go by 10 sec to decrease fall risk - PARTLY  MET  Long term goal 2: Improve strength of LE's by 1/2 grade to be able to do sit to stand and vice versa with less difficulty and increase tolerance to standing and walking - ONGOING  Long term goal 3: Improve functional score in LEFS by 10 points from 9 = 89% impairment - ONGOING  Patient Goals   Patient goals : \" To lose weight to be able to have surgery on her knee \"  Plan:    Plan  Times per week: Script: 2x/wk for 12 visits  Current Treatment Recommendations: Strengthening, Home Exercise Program, Aquatics, ROM, Patient/Caregiver Education & Training, Gait Training, Modalities  Plan Comment:

## 2020-08-06 NOTE — FLOWSHEET NOTE
[address]   Outpatient Physical Therapy  [address] [facility]. [address]  Phone: [phone]  Fax: [phone]  Daily Progress Note    Date: 20    Patient Name: Cecily Barnhart        MRN: [id number]  Account: [de-identified] : 1965      General Information:  Additional Pertinent Hx: Spinal Fusion ; L knee and shoulder all work injury related  Referring Practitioner: JESUS ALBERTO Burkett  Referral Date : 20  Diagnosis:  Chronic pain R knee M25.561, G89.29 ( Main complaint )  Follows Commands: Within Functional Limits  Onset Date: 10/01/16  PT Insurance Information: [facility] Medicare  Total # of Visits Approved: 12  Total # of Visits to Date: 7  No Show: 0  Canceled Appointment: 0    Subjective:  Subjective: Pt reports very high pain in Right knee upon arrival.  states back and left knee hurt but right is the worst.     Pain:  Patient Currently in Pain: Yes  Pain Assessment: 0-10  Pain Level: 10(25)  Pain Type: Chronic pain  Pain Location: Back;Knee  Pain Orientation: Right  Pain Descriptors: Constant;Burning  Pain Frequency: Continuous       Objective:      [address] [facility] Exercise Log  Aquatic Knee phase 1     Date of Eval:      2020                          Primary PT: GENESIS  Diagnosis: Chronic R knee pain  Things to Focus On (goals): Improve gait, ROM, strength , add trunk stabilization ex., also with L knee pain, Fibromyalgia  Surgical Precautions: lumbar fusion  Medical Precautions:  []???? C-9 dates  []???? Occ Med   [x]???? Medicare      **Fearful of water**  Date 20   Visit #     Walk F/L/R NT NT   NT NT   Marching 10x  10x   10x 10x   Squats 10x5\"  10x5\"    10x5\" 10x5\"   Heel toe raises  10x  10x  15x 15x   SLR F/L/R  10x F/L Only NT    10x F/L   HS Curl  10x  10x  NT painful NT   Step-Ups F/L          Knee/Flex /Ext 10x   10x  10x 10x              Kickboard Ex.           Iso Abd. verticle          Push-pull          Paddling                     Balance          SLS          DEEP H2O          Arms on Rail     3'  3'              Stretches          Achllies          Hamstring 2x30\"   1st Step  NT  2x30\"  1st step 2x30\"   1st step   Psoas          Quad                                           Pain Rating  10 10   10 10+      Assessment: Body structures, Functions, Activity limitations: Decreased endurance;Decreased ADL status; Decreased functional mobility ; Decreased ROM; Decreased strength;Decreased posture; Increased pain  Treatment Diagnosis: Pain M25.561  Difficulty walking R26.2 NEC   Stiffness M25.661  Weakness M62.561   Effusion M25.461  Prognosis: Fair;Good  REQUIRES PT FOLLOW UP: Yes  Activity Tolerance: Patient limited by pain  Comments: limited exercises due to right knee pain. unable to perform exercises listed above. Plan:  Plan: Continue with current plan    Therapy Time:  Time In: 1335  Time Out: 1410  Minutes: 35  Timed Code Treatment Minutes: 30 Minutes    Physical therapy treatment completed today in part by John Calderon physical therapist assistant (PTA). Treatment Charges: Minutes Units   []  Ultrasound     []  Electrical-Stim     []  Iontophoresis     []  Traction     []  Massage       []  Eval     []  Gait     []  Vasopneumatic Device     [x]  Ther Exercise (PT) 15  1    []  Manual Therapy       []  Ther Activities       [x]  Aquatics 25 2   []  Neuro Re-Ed       []  Other       Total Treatment Time: 40  3   Treatment times reflect total treatment time combined by both PT and John Calderon PTA (25 minutes) for today's service.     Hanna Chacko PTA

## 2020-08-13 ENCOUNTER — APPOINTMENT (OUTPATIENT)
Dept: PHYSICAL THERAPY | Age: 55
End: 2020-08-13
Payer: MEDICARE

## 2020-08-13 ENCOUNTER — OFFICE VISIT (OUTPATIENT)
Dept: PRIMARY CARE CLINIC | Age: 55
End: 2020-08-13
Payer: MEDICARE

## 2020-08-13 VITALS
BODY MASS INDEX: 53.22 KG/M2 | SYSTOLIC BLOOD PRESSURE: 144 MMHG | DIASTOLIC BLOOD PRESSURE: 84 MMHG | TEMPERATURE: 98.1 F | WEIGHT: 293 LBS | HEART RATE: 90 BPM | OXYGEN SATURATION: 96 %

## 2020-08-13 PROCEDURE — 3017F COLORECTAL CA SCREEN DOC REV: CPT | Performed by: PHYSICIAN ASSISTANT

## 2020-08-13 PROCEDURE — G0438 PPPS, INITIAL VISIT: HCPCS | Performed by: PHYSICIAN ASSISTANT

## 2020-08-13 RX ORDER — ALLOPURINOL 300 MG/1
300 TABLET ORAL DAILY
Qty: 90 TABLET | Refills: 1 | Status: SHIPPED | OUTPATIENT
Start: 2020-08-13 | End: 2021-02-23 | Stop reason: SDUPTHER

## 2020-08-13 RX ORDER — SUCRALFATE 1 G/1
1 TABLET ORAL 3 TIMES DAILY
Qty: 21 TABLET | Refills: 0 | Status: SHIPPED | OUTPATIENT
Start: 2020-08-13 | End: 2020-09-15 | Stop reason: SDUPTHER

## 2020-08-13 RX ORDER — SPIRONOLACTONE 25 MG/1
25 TABLET ORAL DAILY
Qty: 90 TABLET | Refills: 1 | Status: SHIPPED | OUTPATIENT
Start: 2020-08-13 | End: 2021-02-23 | Stop reason: SDUPTHER

## 2020-08-13 RX ORDER — ATORVASTATIN CALCIUM 80 MG/1
80 TABLET, FILM COATED ORAL DAILY
Qty: 90 TABLET | Refills: 1 | Status: SHIPPED | OUTPATIENT
Start: 2020-08-13 | End: 2021-02-23 | Stop reason: SDUPTHER

## 2020-08-13 RX ORDER — CLOPIDOGREL BISULFATE 75 MG/1
75 TABLET ORAL DAILY
Qty: 90 TABLET | Refills: 1 | Status: SHIPPED | OUTPATIENT
Start: 2020-08-13 | End: 2021-02-23 | Stop reason: SDUPTHER

## 2020-08-13 RX ORDER — METOPROLOL TARTRATE 50 MG/1
50 TABLET, FILM COATED ORAL 2 TIMES DAILY
Qty: 180 TABLET | Refills: 1 | Status: SHIPPED | OUTPATIENT
Start: 2020-08-13 | End: 2021-02-23 | Stop reason: SDUPTHER

## 2020-08-13 RX ORDER — PANTOPRAZOLE SODIUM 40 MG/1
40 TABLET, DELAYED RELEASE ORAL DAILY
Qty: 90 TABLET | Refills: 1 | Status: SHIPPED | OUTPATIENT
Start: 2020-08-13 | End: 2021-02-23 | Stop reason: SDUPTHER

## 2020-08-13 RX ORDER — BUMETANIDE 2 MG/1
2 TABLET ORAL DAILY
Qty: 90 TABLET | Refills: 1 | Status: ON HOLD | OUTPATIENT
Start: 2020-08-13 | End: 2020-09-09 | Stop reason: HOSPADM

## 2020-08-13 RX ORDER — MONTELUKAST SODIUM 10 MG/1
10 TABLET ORAL NIGHTLY
Qty: 90 TABLET | Refills: 1 | Status: SHIPPED | OUTPATIENT
Start: 2020-08-13 | End: 2021-02-23 | Stop reason: SDUPTHER

## 2020-08-13 RX ORDER — LEVOTHYROXINE SODIUM 0.03 MG/1
25 TABLET ORAL DAILY
Qty: 30 TABLET | Refills: 2 | Status: SHIPPED | OUTPATIENT
Start: 2020-08-13 | End: 2020-09-15 | Stop reason: SDUPTHER

## 2020-08-13 RX ORDER — BUPROPION HYDROCHLORIDE 150 MG/1
150 TABLET ORAL EVERY MORNING
Qty: 90 TABLET | Refills: 1 | Status: SHIPPED | OUTPATIENT
Start: 2020-08-13 | End: 2021-02-23 | Stop reason: SDUPTHER

## 2020-08-13 RX ORDER — DULOXETIN HYDROCHLORIDE 60 MG/1
60 CAPSULE, DELAYED RELEASE ORAL DAILY
Qty: 90 CAPSULE | Refills: 1 | Status: SHIPPED | OUTPATIENT
Start: 2020-08-13 | End: 2021-02-23 | Stop reason: SDUPTHER

## 2020-08-13 ASSESSMENT — LIFESTYLE VARIABLES: HOW OFTEN DO YOU HAVE A DRINK CONTAINING ALCOHOL: 0

## 2020-08-13 ASSESSMENT — PATIENT HEALTH QUESTIONNAIRE - PHQ9
SUM OF ALL RESPONSES TO PHQ QUESTIONS 1-9: 1
SUM OF ALL RESPONSES TO PHQ QUESTIONS 1-9: 1

## 2020-08-13 NOTE — PATIENT INSTRUCTIONS
· Call angelia castro [facility], [phone]  · Call and schedule appointment with GI for colon cancer screening  · Get thyroid lab done in Sept. 2020  · Contact PCP office to update on symptoms after finishing medication, Sucralfate  Personalized Preventive Plan for Brionna Tee - 8/13/2020  Medicare offers a range of preventive health benefits. Some of the tests and screenings are paid in full while other may be subject to a deductible, co-insurance, and/or copay. Some of these benefits include a comprehensive review of your medical history including lifestyle, illnesses that may run in your family, and various assessments and screenings as appropriate. After reviewing your medical record and screening and assessments performed today your provider may have ordered immunizations, labs, imaging, and/or referrals for you. A list of these orders (if applicable) as well as your Preventive Care list are included within your After Visit Summary for your review. Other Preventive Recommendations:    A preventive eye exam performed by an eye specialist is recommended every 1-2 years to screen for glaucoma; cataracts, macular degeneration, and other eye disorders. A preventive dental visit is recommended every 6 months. Try to get at least 150 minutes of exercise per week or 10,000 steps per day on a pedometer . Order or download the FREE \"Exercise & Physical Activity: Your Everyday Guide\" from [employer]. Call 5-387.603.4953 or search [employer] online. You need 5264-8401 mg of calcium and 1273-6391 IU of vitamin D per day. It is possible to meet your calcium requirement with diet alone, but a vitamin D supplement is usually necessary to meet this goal.  When exposed to the sun, use a sunscreen that protects against both UVA and UVB radiation with an SPF of 30 or greater. Reapply every 2 to 3 hours or after sweating, drying off with a towel, or swimming.   Always wear a seat belt when traveling in a car. Always wear a helmet when riding a bicycle or motorcycle.

## 2020-08-13 NOTE — LETTER
[address] [facility]  [facility] [place] [address]  Phone: [phone]  Fax: [phone]    Ct Godwin PA-C        August 13, 2020     Patient: Porter Rust   YOB: 1965   Date of Visit: 8/13/2020       To Whom It May Concern: It is my medical opinion that Porter Rust requires a disability parking placard for the following reasons:  She cannot walk 200 feet without stopping to rest.  She has limited walking ability due to an arthritic condition. Duration of need: 2 years    If you have any questions or concerns, please don't hesitate to call.     Sincerely,            Ct Godwin PA-C

## 2020-08-13 NOTE — PROGRESS NOTES
Medicare Annual Wellness Visit  Name: Carolina Husbands Date: 2020   MRN: [id number] Sex: Female   Age: 60 y.o. Ethnicity: Non-/Non    : 1965 Race: Black      Porter Rust is here for Medicare AWV    Screenings for behavioral, psychosocial and functional/safety risks, and cognitive dysfunction are all negative except as indicated below. These results, as well as other patient data from the [address] form, are documented in Flowsheets linked to this Encounter. Allergies   Allergen Reactions    Entresto [Sacubitril-Valsartan] Other (See Comments)     Acute kidney injury    Food Swelling     peaches    Gabapentin Swelling    Tetracyclines & Related          Prior to Visit Medications    Medication Sig Taking?  Authorizing Provider   sucralfate (CARAFATE) 1 GM tablet Take 1 tablet by mouth 3 times daily for 7 days Yes Ct Godwin PA-C   metoprolol tartrate (LOPRESSOR) 50 MG tablet Take 1 tablet by mouth 2 times daily Yes Ct Godwin PA-C   atorvastatin (LIPITOR) 80 MG tablet Take 1 tablet by mouth daily Yes Ct Godwin PA-C   DULoxetine (CYMBALTA) 60 MG extended release capsule Take 1 capsule by mouth daily Yes Ct Godwin PA-C   buPROPion (WELLBUTRIN XL) 150 MG extended release tablet Take 1 tablet by mouth every morning Yes Ct Godwin PA-C   spironolactone (ALDACTONE) 25 MG tablet Take 1 tablet by mouth daily Yes Ct Godwin PA-C   bumetanide (BUMEX) 2 MG tablet Take 1 tablet by mouth daily Yes Ct Godwin PA-C   clopidogrel (PLAVIX) 75 MG tablet Take 1 tablet by mouth daily Yes Ct Godwin PA-C   pantoprazole (PROTONIX) 40 MG tablet Take 1 tablet by mouth daily Yes Ct Godwin PA-C   levothyroxine (SYNTHROID) 25 MCG tablet Take 1 tablet by mouth daily Yes Ct Godwin PA-C   montelukast (SINGULAIR) 10 MG tablet Take 1 tablet by mouth nightly Yes Ct Godwin PA-C   allopurinol (ZYLOPRIM) 300 MG tablet Take 1 tablet by mouth daily Yes Lei Mitchell PA-C   Handicap Placard MISC by Does not apply route 2 years Yes Lei Mitchell PA-C   albuterol sulfate  (90 Base) MCG/ACT inhaler Inhale 2 puffs into the lungs every 4 hours as needed for Wheezing Yes Lei Mitchell PA-C   ibuprofen (ADVIL;MOTRIN) 800 MG tablet Take 1 tablet by mouth every 8 hours as needed for Pain Yes Lei Mitchell PA-C   oxyCODONE-acetaminophen (PERCOCET) 7.5-325 MG per tablet Take 1 tablet by mouth every 6 hours as needed for Pain for up to 30 days.  Intended supply: 30 days Yes ASA Connors CNP   baclofen (LIORESAL) 10 MG tablet Take 1 tablet by mouth 3 times daily Yes ASA Connors CNP   traZODone (DESYREL) 50 MG tablet TAKE 2 TO 3 TABLETS BY MOUTH NIGHTLY AT BEDTIME AS NEEDED FOR INSOMNIA Yes Lei Mitchell PA-C   oxybutynin (DITROPAN) 5 MG tablet TAKE 1 TABLET BY MOUTH TWICE DAILY Yes Lei Mitchell PA-C   colchicine (COLCRYS) 0.6 MG tablet Take 1 tablet by mouth 2 times daily Yes Elsy Pillai MD   Respiratory Therapy Supplies (NEBULIZER COMPRESSOR) KIT Provide insurance covered nebulizer, use daily as needed Yes Lei Mitchell PA-C   polyethylene glycol (GLYCOLAX) packet polyethylene glycol 3350 17 gram/dose oral powder Yes Historical Provider, MD   albuterol (PROVENTIL) (2.5 MG/3ML) 0.083% nebulizer solution Take 3 mLs by nebulization every 6 hours as needed for Wheezing Yes Lei Mitchell PA-C   isosorbide dinitrate (ISORDIL) 10 MG tablet Take 1 tablet by mouth 2 times daily Yes Geraldo Charles MD   aspirin 81 MG tablet Take 81 mg by mouth daily Yes Historical Provider, MD COCHRAN PO Take by mouth  Historical Provider, MD   naproxen (NAPROSYN) 500 MG tablet Take 1 tablet by mouth 2 times daily (with meals)  Patient not taking: Reported on 8/13/2020  Elsy Pillai MD         Past Medical History:   Diagnosis Date    Arthritis     CHF (congestive heart failure) (HCC)     GERD (gastroesophageal reflux disease)     Gout 10/2019    History of heart attack     HLD (hyperlipidemia)     Hypertension     Insomnia     Osteoarthritis        Past Surgical History:   Procedure Laterality Date    BACK SURGERY      CHOLECYSTECTOMY, LAPAROSCOPIC      HYSTERECTOMY, TOTAL ABDOMINAL      KNEE ARTHROSCOPY Right     KNEE SURGERY Left 2009    NECK SURGERY      SHOULDER SURGERY Right 2009         Family History   Problem Relation Age of Onset    Other Mother     Diabetes Mother     Heart Disease Mother     Arthritis Mother     Kidney Disease Mother    [place] Lupus Mother     Arthritis Sister     Diabetes Brother     Asthma Brother     Cancer Maternal Grandfather     Hypertension Sister     Breast Cancer Sister         28s    Breast Cancer Niece         30-35       CareTeam (Including outside providers/suppliers regularly involved in providing care):   Patient Care Team:  Forrest Hall PA-C as PCP - General (Physician Assistant)  Forrest Hall PA-C as PCP - [facility] Empaneled Provider    Wt Readings from Last 3 Encounters:   08/13/20 (!) 350 lb (158.8 kg)   05/28/20 (!) 340 lb 12.8 oz (154.6 kg)   11/25/19 (!) 323 lb (146.5 kg)     Vitals:    08/13/20 1316 08/13/20 1423   BP: (!) 143/87 (!) 144/84   Site: Left Lower Arm    Position: Sitting    Cuff Size: Large Adult    Pulse: 90    Temp: 98.1 °F (36.7 °C)    SpO2: 96%    Weight: (!) 350 lb (158.8 kg)      Body mass index is 53.22 kg/m². Based upon direct observation of the patient, evaluation of cognition reveals recent and remote memory intact.     General Appearance: alert and oriented to person, place and time, well-developed and well-nourished, in no acute distress  Skin: warm and dry, no rash or erythema  Head: normocephalic and atraumatic  Eyes: extraocular eye movements intact and conjunctivae normal  Pulmonary/Chest: clear to auscultation bilaterally- no wheezes, rales or rhonchi, normal air movement, no respiratory distress  Cardiovascular: normal rate, normal S1 and S2 and no gallops  Abdomen: soft, non-tender and non-distended  Extremities: no cyanosis and no clubbing  Musculoskeletal: normal range of motion, no joint swelling, deformity or tenderness  Neurologic: gait and coordination normal and speech normal    Patient's complete Health Risk Assessment and screening values have been reviewed and are found in Flowsheets. The following problems were reviewed today and where indicated follow up appointments were made and/or referrals ordered. Patient is currently going to aquatic physical therapy for chronic lower back pain. Patient states \"pain management instructed patient to request handicap from PCP\". Patient states GERD symptoms have \"worsened\", currently taking PPI, Protonix daily. Discussed GERD, medications in depth with patient, informed patient she will be prescribed another GERD medication, Carafate, to take for the next 7 days, instructed patient to contact PCP office update on symptoms, patient voiced understanding. Patient blood pressure is elevated in office. Discussed elevated blood pressure and risk in depth with patient, patient requested medication refill. Patient requesting additional medication refills. Labs reviewed, patient had lab work completed, elevated alkaline phosphatase, A1c is 6.1, decreased free T4, otherwise unremarkable. Discussed hypothyroidism, decreased free T4 in depth with patient, informed patient we will recheck thyroid hormone level in 1 month if still abnormal will adjust medication if needed, patient voiced understanding, patient requested medication refill. Positive Risk Factor Screenings with Interventions:     Fall Risk:  Timed Up and Go Test > 12 seconds?  (Complete if either Fall Risk answers are Yes): no  2 or more falls in past year?: (!) yes  Fall with injury in past year?: no  Fall Risk Interventions:    · Home dentist    Hearing/Vision:  No exam data present  Hearing/Vision  Do you or your family notice any trouble with your hearing?: (!) Yes  Do you have difficulty driving, watching TV, or doing any of your daily activities because of your eyesight?: No  Have you had an eye exam within the past year?: (!) No  Hearing/Vision Interventions:  · Hearing concerns:  patient declines any further evaluation/treatment for hearing issues  · Vision concerns:  patient encouraged to make appointment with his/her eye specialist    Safety:  Safety  Do you have working smoke detectors?: Yes  Have all throw rugs been removed or fastened?: (!) No(no rugs)  Do you have non-slip mats or surfaces in all bathtubs/showers?: Yes  Do all of your stairways have a railing or banister?: (!) No(no stairs)  Do you always fasten your seatbelt when you are in a car?: Yes  Safety Interventions:  · Home safety tips provided    ADL:  ADLs  In the past 7 days, did you need help from others to perform any of the following everyday activities? Eating, dressing, grooming, bathing, toileting, or walking/balance?: None  In the past 7 days, did you need help from others to take care of any of the following?  Laundry, housekeeping, banking/finances, shopping, telephone use, food preparation, transportation, or taking medications?: [employer], Housekeeping, Shopping, Transportation, Taking Medications  ADL Interventions:  · Pt will follow up with  for possible home health aide    Personalized Preventive Plan   Current Health Maintenance Status  Immunization History   Administered Date(s) Administered    Influenza, Quadv, IM, PF (6 mo and older Fluzone, Flulaval, Fluarix, and 3 yrs and older Afluria) 12/04/2017, 09/16/2019        Health Maintenance   Topic Date Due    Pneumococcal 0-64 years Vaccine (1 of 1 - PPSV23) 06/29/1969    DTaP/Tdap/Td vaccine (1 - Tdap) 06/29/1982    Breast cancer screen  06/29/2003    Shingles Vaccine (1 of 2) 06/29/2013    Colon cancer screen colonoscopy  06/29/2013    Annual Wellness Visit (AWV)  05/29/2019    Flu vaccine (1) 09/01/2020    A1C test (Diabetic or Prediabetic)  07/24/2021    Lipid screen  07/24/2021    Potassium monitoring  07/24/2021    Creatinine monitoring  07/24/2021    Hepatitis C screen  Completed    HIV screen  Completed    Hepatitis A vaccine  Aged Out    Hepatitis B vaccine  Aged Out    Hib vaccine  Aged Out    Meningococcal (ACWY) vaccine  Aged Out     Recommendations for [employer] Due: see orders and patient instructions/AVS.  . Recommended screening schedule for the next 5-10 years is provided to the patient in written form: see Patient Stiven Giles was seen today for medicare awv. Diagnoses and all orders for this visit:    Routine general medical examination at a health care facility    Essential hypertension  -     metoprolol tartrate (LOPRESSOR) 50 MG tablet; Take 1 tablet by mouth 2 times daily  -     spironolactone (ALDACTONE) 25 MG tablet; Take 1 tablet by mouth daily  -     bumetanide (BUMEX) 2 MG tablet; Take 1 tablet by mouth daily  -     clopidogrel (PLAVIX) 75 MG tablet; Take 1 tablet by mouth daily    Gastroesophageal reflux disease, esophagitis presence not specified  -     sucralfate (CARAFATE) 1 GM tablet; Take 1 tablet by mouth 3 times daily for 7 days  -     pantoprazole (PROTONIX) 40 MG tablet; Take 1 tablet by mouth daily    Hypothyroidism, unspecified type  -     levothyroxine (SYNTHROID) 25 MCG tablet; Take 1 tablet by mouth daily  -     T4, Free; Future    Chronic pain disorder  -     Handicap Placard MISC; by Does not apply route 2 years    Medication refill  -     atorvastatin (LIPITOR) 80 MG tablet; Take 1 tablet by mouth daily  -     DULoxetine (CYMBALTA) 60 MG extended release capsule; Take 1 capsule by mouth daily  -     buPROPion (WELLBUTRIN XL) 150 MG extended release tablet;  Take 1 tablet by mouth every morning  -     montelukast (SINGULAIR) 10 MG tablet; Take 1 tablet by mouth nightly  -     allopurinol (ZYLOPRIM) 300 MG tablet; Take 1 tablet by mouth daily         Advance Care Planning   Advanced Care Planning: Discussed the patients choices for care and treatment in case of a health event that adversely affects decision-making abilities. Also discussed the patients long-term treatment options. Reviewed with the patient the [address] [place] [address] Declaration forms  Reviewed the process of designating a competent adult as an Agent (or -in-fact) that could take make health care decisions for the patient if incompetent. Patient was asked to complete the declaration forms, either acknowledge the forms by a public notary or an eligible witness and provide a signed copy to the practice office.   Time spent (minutes): 7 mins

## 2020-08-18 ENCOUNTER — HOSPITAL ENCOUNTER (OUTPATIENT)
Dept: PHYSICAL THERAPY | Age: 55
Setting detail: THERAPIES SERIES
Discharge: HOME | End: 2020-08-18
Payer: MEDICARE

## 2020-08-18 NOTE — FLOWSHEET NOTE
[address]   Outpatient Physical Therapy  Cancel/No Show Note    Date: 20    Patient Name: Remberto Fernández        MRN: [id number]  Account: [de-identified] : 1965      General Information:  Additional Pertinent Hx: Spinal Fusion ; L knee and shoulder all work injury related  Referring Practitioner: JESUS ALBERTO Rodriguez  Referral Date : 20  Diagnosis:  Chronic pain R knee M25.561, G89.29 ( Main complaint )  Onset Date: 10/01/16  PT Insurance Information: [facility] Medicare  Total # of Visits Approved: 12  Total # of Visits to Date: 7  No Show: 0  Canceled Appointment: 1        Comments: Pt cancelled appointment today due loose stool.     Marialuisa Swann, PTA

## 2020-08-20 ENCOUNTER — HOSPITAL ENCOUNTER (OUTPATIENT)
Dept: PHYSICAL THERAPY | Age: 55
Setting detail: THERAPIES SERIES
Discharge: HOME | End: 2020-08-20
Payer: MEDICARE

## 2020-08-20 PROCEDURE — 97113 AQUATIC THERAPY/EXERCISES: CPT

## 2020-08-20 ASSESSMENT — PAIN DESCRIPTION - FREQUENCY: FREQUENCY: CONTINUOUS

## 2020-08-20 ASSESSMENT — PAIN DESCRIPTION - PAIN TYPE: TYPE: CHRONIC PAIN

## 2020-08-20 ASSESSMENT — PAIN DESCRIPTION - ORIENTATION: ORIENTATION: RIGHT

## 2020-08-20 ASSESSMENT — PAIN DESCRIPTION - DESCRIPTORS: DESCRIPTORS: CONSTANT;BURNING

## 2020-08-20 ASSESSMENT — PAIN DESCRIPTION - LOCATION: LOCATION: BACK;KNEE

## 2020-08-20 ASSESSMENT — PAIN SCALES - GENERAL: PAINLEVEL_OUTOF10: 10

## 2020-08-20 NOTE — FLOWSHEET NOTE
[facility]   Outpatient Physical Therapy  [address] [place] [address]. [address]  Phone: [phone]  Fax: [phone]  Daily Progress Note    Date: 20    Patient Name: Edelmira Ruano        MRN: [id number]  Account: [de-identified] : 1965      General Information:  Additional Pertinent Hx: Spinal Fusion ; L knee and shoulder all work injury related  Referring Practitioner: JESUS ALBERTO Li  Referral Date : 20  Diagnosis:  Chronic pain R knee M25.561, G89.29 ( Main complaint )  Follows Commands: Within Functional Limits  Onset Date: 10/01/16  PT Insurance Information: [facility] Medicare  Total # of Visits Approved: 12  Total # of Visits to Date: 8  Canceled Appointment: 1    Subjective:  Subjective: Pt reports some increased soreness after last visit.  having a good day today. Pain:  Patient Currently in Pain: Yes  Pain Assessment: 0-10  Pain Level: 10  Pain Type: Chronic pain  Pain Location: Back;Knee  Pain Orientation: Right  Pain Descriptors: Constant;Burning  Pain Frequency: Continuous       Objective:    Rehabilitation Services Exercise Log  Aquatic Knee phase 1     Date of Eval:      2020                          Primary PT: GENESIS  Diagnosis: Chronic R knee pain  Things to Focus On (goals): Improve gait, ROM, strength , add trunk stabilization ex., also with L knee pain, Fibromyalgia  Surgical Precautions: lumbar fusion  Medical Precautions:  []????? C-9 dates  []????? Occ Med   [x]????? Medicare      **Fearful of water**  Date 2020   Visit #     Walk F/L/R  NT NT NT   Marching  10x 10x 20x   Squats  10x5\" 10x5\" 15x5\"   Heel toe raises 15x 15x 20x   SLR F/L/R   10x F/L 10x   HS Curl NT painful NT    Step-Ups F/L        Knee/Flex /Ext 10x 10x 20x            Kickboard Ex.         Iso Abd. verticle        Push-pull        Paddling                 Balance        SLS        DEEP H2O        Arms on Rail 3'  3' 3'            Stretches     Achllies        Hamstring 2x30\"  1st step 2x30\"   1st step 2x30\"  1st step   Psoas        Quad                                   Pain Rating  10 10+ 10+      Assessment: Body structures, Functions, Activity limitations: Decreased endurance;Decreased ADL status; Decreased functional mobility ; Decreased ROM; Decreased strength;Decreased posture; Increased pain  Treatment Diagnosis: Pain M25.561  Difficulty walking R26.2 NEC   Stiffness M25.661  Weakness M62.561   Effusion M25.461  Prognosis: Fair;Good  REQUIRES PT FOLLOW UP: Yes  Activity Tolerance: Patient limited by pain; Patient Tolerated treatment well  Comments: able to tolerate increased reps of exercises but still limted with which exercises can be performed due to pain.     Plan:  Plan: Continue with current plan    Therapy Time:  Time In: 1117  Time Out: 1150  Minutes: 33  Timed Code Treatment Minutes: 25 Minutes    Treatment Charges: Minutes Units   []  Ultrasound     []  Electrical-Stim     []  Iontophoresis     []  Traction     []  Massage       []  Eval     []  Gait     []  Vasopneumatic Device     []  Ther Exercise       []  Manual Therapy       []  Ther Activities       [x]  Aquatics 25 2   []  Neuro Re-Ed       []  Other       Total Treatment Time: 25  2     Janet Edwards PTA

## 2020-08-26 ENCOUNTER — INITIAL CONSULT (OUTPATIENT)
Dept: SURGERY | Age: 55
End: 2020-08-26

## 2020-08-26 VITALS
DIASTOLIC BLOOD PRESSURE: 72 MMHG | BODY MASS INDEX: 44.41 KG/M2 | HEIGHT: 68 IN | HEART RATE: 85 BPM | SYSTOLIC BLOOD PRESSURE: 144 MMHG | OXYGEN SATURATION: 92 % | WEIGHT: 293 LBS

## 2020-08-26 PROCEDURE — 99999 PR OFFICE/OUTPT VISIT,PROCEDURE ONLY: CPT | Performed by: SURGERY

## 2020-08-26 RX ORDER — SODIUM, POTASSIUM,MAG SULFATES 17.5-3.13G
SOLUTION, RECONSTITUTED, ORAL ORAL
Qty: 1 KIT | Refills: 0 | Status: SHIPPED | OUTPATIENT
Start: 2020-08-26 | End: 2021-05-11 | Stop reason: ALTCHOICE

## 2020-08-26 ASSESSMENT — ENCOUNTER SYMPTOMS
GASTROINTESTINAL NEGATIVE: 1
RESPIRATORY NEGATIVE: 1

## 2020-08-26 NOTE — H&P
 levothyroxine (SYNTHROID) 25 MCG tablet Take 1 tablet by mouth daily 30 tablet 2    montelukast (SINGULAIR) 10 MG tablet Take 1 tablet by mouth nightly 90 tablet 1    allopurinol (ZYLOPRIM) 300 MG tablet Take 1 tablet by mouth daily 90 tablet 1    Handicap Placard MISC by Does not apply route 2 years 1 each 0    albuterol sulfate  (90 Base) MCG/ACT inhaler Inhale 2 puffs into the lungs every 4 hours as needed for Wheezing 1 Inhaler 1    ibuprofen (ADVIL;MOTRIN) 800 MG tablet Take 1 tablet by mouth every 8 hours as needed for Pain 90 tablet 1    oxyCODONE-acetaminophen (PERCOCET) 7.5-325 MG per tablet Take 1 tablet by mouth every 6 hours as needed for Pain for up to 30 days. Intended supply: 30 days 120 tablet 0    traZODone (DESYREL) 50 MG tablet TAKE 2 TO 3 TABLETS BY MOUTH NIGHTLY AT BEDTIME AS NEEDED FOR INSOMNIA 270 tablet 0    oxybutynin (DITROPAN) 5 MG tablet TAKE 1 TABLET BY MOUTH TWICE DAILY 60 tablet 1    colchicine (COLCRYS) 0.6 MG tablet Take 1 tablet by mouth 2 times daily 30 tablet 0    Respiratory Therapy Supplies (NEBULIZER COMPRESSOR) KIT Provide insurance covered nebulizer, use daily as needed 1 kit 0    polyethylene glycol (GLYCOLAX) packet polyethylene glycol 3350 17 gram/dose oral powder      albuterol (PROVENTIL) (2.5 MG/3ML) 0.083% nebulizer solution Take 3 mLs by nebulization every 6 hours as needed for Wheezing 120 each 3    isosorbide dinitrate (ISORDIL) 10 MG tablet Take 1 tablet by mouth 2 times daily 180 tablet 1    aspirin 81 MG tablet Take 81 mg by mouth daily      ELDERBERRY PO Take by mouth      naproxen (NAPROSYN) 500 MG tablet Take 1 tablet by mouth 2 times daily (with meals) (Patient not taking: Reported on 8/26/2020) 40 tablet 0     No current facility-administered medications for this visit.         Problem List:  Patient Active Problem List   Diagnosis    Low back pain    Therapeutic opioid-induced constipation (OIC)    Spondylosis of lumbar region strain: None    Food insecurity     Worry: None     Inability: None    Transportation needs     Medical: None     Non-medical: None   Tobacco Use    Smoking status: Never Smoker    Smokeless tobacco: Never Used   Substance and Sexual Activity    Alcohol use: No    Drug use: No    Sexual activity: None   Lifestyle    Physical activity     Days per week: None     Minutes per session: None    Stress: None   Relationships    Social connections     Talks on phone: None     Gets together: None     Attends [employer] service: None     Active member of club or organization: None     Attends meetings of clubs or organizations: None     Relationship status: None    Intimate partner violence     Fear of current or ex partner: None     Emotionally abused: None     Physically abused: None     Forced sexual activity: None   Other Topics Concern    None   Social History Narrative    None        Physical Examination:  BP (!) 144/72 (Site: Left Lower Arm, Position: Sitting, Cuff Size: Medium Adult)   Pulse 85   Ht 5' 8\" (1.727 m)   Wt (!) 353 lb (160.1 kg)   SpO2 92%   BMI 53.67 kg/m²     Neck: Supple  Respiratory:  Lungs are clear to auscultation  Cardiovascular:  Normal Rate and Rhythm  Gastrointestinal:    Abdomen:  Soft, non-tender, no masses   Rectum/Anus:  Deferred to procedure  Integumentary:  Warm and dry  Neurologic:  Alert     Impression and Plan:    Diagnoses:     Diagnosis Orders   1. Encounter for screening colonoscopy        Plan:  Colonoscopy. The risks, benefits, options and potential complications of the procedure were discussed in detail with the patient and they agreed to proceed and consent was signed. Preoperative bowel prep instructions given to the patient.       Electronically signed by Valentin Matias MD on 8/26/20 at 3:21 PM EDT

## 2020-08-27 ENCOUNTER — HOSPITAL ENCOUNTER (OUTPATIENT)
Dept: PHYSICAL THERAPY | Age: 55
Setting detail: THERAPIES SERIES
Discharge: HOME | End: 2020-08-27
Payer: MEDICARE

## 2020-08-27 PROCEDURE — 97113 AQUATIC THERAPY/EXERCISES: CPT

## 2020-08-27 ASSESSMENT — PAIN DESCRIPTION - LOCATION: LOCATION: KNEE

## 2020-08-27 ASSESSMENT — PAIN DESCRIPTION - ORIENTATION: ORIENTATION: RIGHT

## 2020-08-27 ASSESSMENT — PAIN DESCRIPTION - PAIN TYPE: TYPE: CHRONIC PAIN

## 2020-08-27 ASSESSMENT — PAIN SCALES - GENERAL: PAINLEVEL_OUTOF10: 10

## 2020-08-27 NOTE — FLOWSHEET NOTE
[facility]   Outpatient Physical Therapy  [address] [place] [address]. [address]  Phone: [phone]  Fax: [phone]  Daily Progress Note    Date: 20    Patient Name: Ulysses Alvine        MRN: [id number]  Account: [de-identified] : 1965      General Information:  Additional Pertinent Hx: Spinal Fusion ; L knee and shoulder all work injury related  Referring Practitioner: JESUS ALBERTO Sosa Asa  Referral Date : 20  Diagnosis:  Chronic pain R knee M25.561, G89.29 ( Main complaint )  Follows Commands: Within Functional Limits  Onset Date: 10/01/16  PT Insurance Information: [facility] Medicare  Total # of Visits Approved: 12  Total # of Visits to Date: 9  No Show: 1  Canceled Appointment: 1    Subjective:  Subjective: States pain is always constant- says her pain meds were decreased and pain is unmanageable. Feels therapy helps overall but still remains very limited by pain. States R LE is the most painful- especially with Open chain     Pain:  Patient Currently in Pain: Yes  Pain Assessment: 0-10  Pain Level: 10  Pain Type: Chronic pain  Pain Location: Knee  Pain Orientation: Right(< Left)       Objective:    Rehabilitation Services Exercise Log  Aquatic Knee phase 1     Date of Eval:      2020                          Primary PT: GENESIS  Diagnosis: Chronic R knee pain  Things to Focus On (goals): Improve gait, ROM, strength , add trunk stabilization ex., also with L knee pain, Fibromyalgia  Surgical Precautions: lumbar fusion  Medical Precautions:  []????? C-9 dates  []????? Occ Med   [x]????? Medicare      **Fearful of water**  Date 2020   Visit #     Walk F/L/R  NT NT NT    Marching  10x 10x 20x 20x   Squats  10x5\" 10x5\" 15x5\" 20x5\"   Heel toe raises 15x 15x 20x 20x   SLR F/L/R   10x F/L 10x 20x   HS Curl NT painful NT  Refuses   Step-Ups F/L         Knee/Flex /Ext 10x 10x 20x 20x             Kickboard Ex.          Iso Abd. verticle

## 2020-09-01 ENCOUNTER — HOSPITAL ENCOUNTER (OUTPATIENT)
Dept: PHYSICAL THERAPY | Age: 55
Setting detail: THERAPIES SERIES
Discharge: HOME | End: 2020-09-01
Payer: MEDICARE

## 2020-09-02 ENCOUNTER — APPOINTMENT (OUTPATIENT)
Dept: GENERAL RADIOLOGY | Age: 55
DRG: 177 | End: 2020-09-02
Payer: MEDICARE

## 2020-09-02 ENCOUNTER — APPOINTMENT (OUTPATIENT)
Dept: CT IMAGING | Age: 55
DRG: 177 | End: 2020-09-02
Payer: MEDICARE

## 2020-09-02 ENCOUNTER — HOSPITAL ENCOUNTER (INPATIENT)
Age: 55
LOS: 7 days | Discharge: HOME HEALTH | DRG: 177 | End: 2020-09-09
Attending: EMERGENCY MEDICINE | Admitting: INTERNAL MEDICINE
Payer: MEDICARE

## 2020-09-02 PROBLEM — U07.1 LOWER RESPIRATORY TRACT INFECTION DUE TO COVID-19 VIRUS: Status: ACTIVE | Noted: 2020-09-02

## 2020-09-02 PROBLEM — R80.8 OTHER PROTEINURIA: Status: ACTIVE | Noted: 2020-09-02

## 2020-09-02 PROBLEM — I50.42 CHRONIC COMBINED SYSTOLIC AND DIASTOLIC HEART FAILURE (HCC): Chronic | Status: ACTIVE | Noted: 2020-09-02

## 2020-09-02 PROBLEM — M10.9 GOUT: Status: ACTIVE | Noted: 2019-10-01

## 2020-09-02 PROBLEM — J22 LOWER RESPIRATORY TRACT INFECTION DUE TO COVID-19 VIRUS: Status: ACTIVE | Noted: 2020-09-02

## 2020-09-02 LAB
-: ABNORMAL
ABSOLUTE EOS #: 0 K/UL (ref 0–0.44)
ABSOLUTE IMMATURE GRANULOCYTE: 0.05 K/UL (ref 0–0.3)
ABSOLUTE LYMPH #: 0.49 K/UL (ref 1.1–3.7)
ABSOLUTE MONO #: 0.16 K/UL (ref 0.1–1.2)
ALBUMIN SERPL-MCNC: 3.8 G/DL (ref 3.5–5.2)
ALBUMIN/GLOBULIN RATIO: 1 (ref 1–2.5)
ALP BLD-CCNC: 141 U/L (ref 35–104)
ALT SERPL-CCNC: 18 U/L (ref 5–33)
AMORPHOUS: ABNORMAL
ANION GAP SERPL CALCULATED.3IONS-SCNC: 10 MMOL/L (ref 9–17)
AST SERPL-CCNC: 22 U/L
BACTERIA: ABNORMAL
BASOPHILS # BLD: 0 % (ref 0–2)
BASOPHILS ABSOLUTE: 0 K/UL (ref 0–0.2)
BILIRUB SERPL-MCNC: 0.34 MG/DL (ref 0.3–1.2)
BILIRUBIN URINE: NEGATIVE
BNP INTERPRETATION: ABNORMAL
BUN BLDV-MCNC: 15 MG/DL (ref 6–20)
BUN/CREAT BLD: ABNORMAL (ref 9–20)
C-REACTIVE PROTEIN: 88.6 MG/L (ref 0–5)
CALCIUM SERPL-MCNC: 8.5 MG/DL (ref 8.6–10.4)
CASTS UA: ABNORMAL /LPF (ref 0–8)
CHLORIDE BLD-SCNC: 101 MMOL/L (ref 98–107)
CO2: 23 MMOL/L (ref 20–31)
COLOR: YELLOW
CREAT SERPL-MCNC: 0.96 MG/DL (ref 0.5–0.9)
CRYSTALS, UA: ABNORMAL /HPF
D-DIMER QUANTITATIVE: 1.11 MG/L FEU
DIFFERENTIAL TYPE: ABNORMAL
EOSINOPHILS RELATIVE PERCENT: 0 % (ref 1–4)
EPITHELIAL CELLS UA: ABNORMAL /HPF (ref 0–5)
FERRITIN: 144 UG/L (ref 13–150)
FIBRINOGEN: 574 MG/DL (ref 140–420)
GFR AFRICAN AMERICAN: >60 ML/MIN
GFR NON-AFRICAN AMERICAN: 60 ML/MIN
GFR SERPL CREATININE-BSD FRML MDRD: ABNORMAL ML/MIN/{1.73_M2}
GFR SERPL CREATININE-BSD FRML MDRD: ABNORMAL ML/MIN/{1.73_M2}
GLUCOSE BLD-MCNC: 142 MG/DL (ref 70–99)
GLUCOSE URINE: NEGATIVE
HCT VFR BLD CALC: 37.5 % (ref 36.3–47.1)
HEMOGLOBIN: 11.3 G/DL (ref 11.9–15.1)
IMMATURE GRANULOCYTES: 1 %
KETONES, URINE: NEGATIVE
LACTATE DEHYDROGENASE: 221 U/L (ref 135–214)
LEUKOCYTE ESTERASE, URINE: NEGATIVE
LIPASE: 19 U/L (ref 13–60)
LYMPHOCYTES # BLD: 9 % (ref 24–43)
MCH RBC QN AUTO: 26.7 PG (ref 25.2–33.5)
MCHC RBC AUTO-ENTMCNC: 30.1 G/DL (ref 28.4–34.8)
MCV RBC AUTO: 88.4 FL (ref 82.6–102.9)
MONOCYTES # BLD: 3 % (ref 3–12)
MORPHOLOGY: ABNORMAL
MUCUS: ABNORMAL
NITRITE, URINE: NEGATIVE
NRBC AUTOMATED: 0 PER 100 WBC
OTHER OBSERVATIONS UA: ABNORMAL
PDW BLD-RTO: 16.3 % (ref 11.8–14.4)
PH UA: 6 (ref 5–8)
PLATELET # BLD: 211 K/UL (ref 138–453)
PLATELET ESTIMATE: ABNORMAL
PMV BLD AUTO: 10.9 FL (ref 8.1–13.5)
POTASSIUM SERPL-SCNC: 4.2 MMOL/L (ref 3.7–5.3)
PRO-BNP: 341 PG/ML
PROTEIN UA: ABNORMAL
RBC # BLD: 4.24 M/UL (ref 3.95–5.11)
RBC # BLD: ABNORMAL 10*6/UL
RBC UA: ABNORMAL /HPF (ref 0–4)
RENAL EPITHELIAL, UA: ABNORMAL /HPF
SARS-COV-2, PCR: ABNORMAL
SARS-COV-2, RAPID: DETECTED
SARS-COV-2: ABNORMAL
SEG NEUTROPHILS: 87 % (ref 36–65)
SEGMENTED NEUTROPHILS ABSOLUTE COUNT: 4.7 K/UL (ref 1.5–8.1)
SODIUM BLD-SCNC: 134 MMOL/L (ref 135–144)
SOURCE: ABNORMAL
SPECIFIC GRAVITY UA: 1.06 (ref 1–1.03)
TOTAL PROTEIN: 7.5 G/DL (ref 6.4–8.3)
TRICHOMONAS: ABNORMAL
TROPONIN INTERP: ABNORMAL
TROPONIN INTERP: ABNORMAL
TROPONIN T: ABNORMAL NG/ML
TROPONIN T: ABNORMAL NG/ML
TROPONIN, HIGH SENSITIVITY: 22 NG/L (ref 0–14)
TROPONIN, HIGH SENSITIVITY: 24 NG/L (ref 0–14)
TURBIDITY: CLEAR
URINE HGB: NEGATIVE
UROBILINOGEN, URINE: NORMAL
WBC # BLD: 5.4 K/UL (ref 3.5–11.3)
WBC # BLD: ABNORMAL 10*3/UL
WBC UA: ABNORMAL /HPF (ref 0–5)
YEAST: ABNORMAL

## 2020-09-02 PROCEDURE — 71045 X-RAY EXAM CHEST 1 VIEW: CPT

## 2020-09-02 PROCEDURE — 93005 ELECTROCARDIOGRAM TRACING: CPT | Performed by: EMERGENCY MEDICINE

## 2020-09-02 PROCEDURE — 36415 COLL VENOUS BLD VENIPUNCTURE: CPT

## 2020-09-02 PROCEDURE — 2580000003 HC RX 258: Performed by: CLINICAL NURSE SPECIALIST

## 2020-09-02 PROCEDURE — 84484 ASSAY OF TROPONIN QUANT: CPT

## 2020-09-02 PROCEDURE — 6370000000 HC RX 637 (ALT 250 FOR IP): Performed by: CLINICAL NURSE SPECIALIST

## 2020-09-02 PROCEDURE — 6360000004 HC RX CONTRAST MEDICATION: Performed by: EMERGENCY MEDICINE

## 2020-09-02 PROCEDURE — 94640 AIRWAY INHALATION TREATMENT: CPT

## 2020-09-02 PROCEDURE — 80053 COMPREHEN METABOLIC PANEL: CPT

## 2020-09-02 PROCEDURE — 96374 THER/PROPH/DIAG INJ IV PUSH: CPT

## 2020-09-02 PROCEDURE — 85379 FIBRIN DEGRADATION QUANT: CPT

## 2020-09-02 PROCEDURE — 82728 ASSAY OF FERRITIN: CPT

## 2020-09-02 PROCEDURE — 6360000002 HC RX W HCPCS: Performed by: CLINICAL NURSE SPECIALIST

## 2020-09-02 PROCEDURE — 6370000000 HC RX 637 (ALT 250 FOR IP): Performed by: EMERGENCY MEDICINE

## 2020-09-02 PROCEDURE — 1200000000 HC SEMI PRIVATE

## 2020-09-02 PROCEDURE — 85025 COMPLETE CBC W/AUTO DIFF WBC: CPT

## 2020-09-02 PROCEDURE — 85384 FIBRINOGEN ACTIVITY: CPT

## 2020-09-02 PROCEDURE — 99222 1ST HOSP IP/OBS MODERATE 55: CPT | Performed by: CLINICAL NURSE SPECIALIST

## 2020-09-02 PROCEDURE — 71260 CT THORAX DX C+: CPT

## 2020-09-02 PROCEDURE — 6360000002 HC RX W HCPCS: Performed by: EMERGENCY MEDICINE

## 2020-09-02 PROCEDURE — 86140 C-REACTIVE PROTEIN: CPT

## 2020-09-02 PROCEDURE — 83615 LACTATE (LD) (LDH) ENZYME: CPT

## 2020-09-02 PROCEDURE — 99222 1ST HOSP IP/OBS MODERATE 55: CPT | Performed by: INTERNAL MEDICINE

## 2020-09-02 PROCEDURE — 99285 EMERGENCY DEPT VISIT HI MDM: CPT

## 2020-09-02 PROCEDURE — 94664 DEMO&/EVAL PT USE INHALER: CPT

## 2020-09-02 PROCEDURE — 81001 URINALYSIS AUTO W/SCOPE: CPT

## 2020-09-02 PROCEDURE — U0002 COVID-19 LAB TEST NON-CDC: HCPCS

## 2020-09-02 PROCEDURE — 83880 ASSAY OF NATRIURETIC PEPTIDE: CPT

## 2020-09-02 PROCEDURE — 83690 ASSAY OF LIPASE: CPT

## 2020-09-02 RX ORDER — ALBUTEROL SULFATE 90 UG/1
2 AEROSOL, METERED RESPIRATORY (INHALATION) EVERY 6 HOURS PRN
Status: DISCONTINUED | OUTPATIENT
Start: 2020-09-02 | End: 2020-09-09 | Stop reason: HOSPADM

## 2020-09-02 RX ORDER — ACETAMINOPHEN 325 MG/1
650 TABLET ORAL EVERY 6 HOURS PRN
Status: DISCONTINUED | OUTPATIENT
Start: 2020-09-02 | End: 2020-09-09 | Stop reason: HOSPADM

## 2020-09-02 RX ORDER — METOPROLOL TARTRATE 50 MG/1
50 TABLET, FILM COATED ORAL 2 TIMES DAILY
Status: DISCONTINUED | OUTPATIENT
Start: 2020-09-02 | End: 2020-09-09 | Stop reason: HOSPADM

## 2020-09-02 RX ORDER — METHYLPREDNISOLONE SODIUM SUCCINATE 125 MG/2ML
125 INJECTION, POWDER, LYOPHILIZED, FOR SOLUTION INTRAMUSCULAR; INTRAVENOUS ONCE
Status: COMPLETED | OUTPATIENT
Start: 2020-09-02 | End: 2020-09-02

## 2020-09-02 RX ORDER — PROMETHAZINE HYDROCHLORIDE 25 MG/1
12.5 TABLET ORAL EVERY 6 HOURS PRN
Status: DISCONTINUED | OUTPATIENT
Start: 2020-09-02 | End: 2020-09-09 | Stop reason: HOSPADM

## 2020-09-02 RX ORDER — POLYETHYLENE GLYCOL 3350 17 G/17G
17 POWDER, FOR SOLUTION ORAL DAILY
Status: DISCONTINUED | OUTPATIENT
Start: 2020-09-02 | End: 2020-09-09 | Stop reason: HOSPADM

## 2020-09-02 RX ORDER — MORPHINE SULFATE 2 MG/ML
2 INJECTION, SOLUTION INTRAMUSCULAR; INTRAVENOUS EVERY 4 HOURS PRN
Status: DISCONTINUED | OUTPATIENT
Start: 2020-09-02 | End: 2020-09-09 | Stop reason: HOSPADM

## 2020-09-02 RX ORDER — ASPIRIN 81 MG/1
81 TABLET, CHEWABLE ORAL DAILY
Status: DISCONTINUED | OUTPATIENT
Start: 2020-09-02 | End: 2020-09-09 | Stop reason: HOSPADM

## 2020-09-02 RX ORDER — ALLOPURINOL 300 MG/1
300 TABLET ORAL DAILY
Status: DISCONTINUED | OUTPATIENT
Start: 2020-09-02 | End: 2020-09-09 | Stop reason: HOSPADM

## 2020-09-02 RX ORDER — SODIUM CHLORIDE 9 MG/ML
INJECTION, SOLUTION INTRAVENOUS CONTINUOUS
Status: DISCONTINUED | OUTPATIENT
Start: 2020-09-02 | End: 2020-09-09 | Stop reason: HOSPADM

## 2020-09-02 RX ORDER — ALBUTEROL SULFATE 90 UG/1
2 AEROSOL, METERED RESPIRATORY (INHALATION) EVERY 4 HOURS PRN
Status: DISCONTINUED | OUTPATIENT
Start: 2020-09-02 | End: 2020-09-09 | Stop reason: HOSPADM

## 2020-09-02 RX ORDER — BUMETANIDE 1 MG/1
2 TABLET ORAL DAILY
Status: DISCONTINUED | OUTPATIENT
Start: 2020-09-02 | End: 2020-09-03

## 2020-09-02 RX ORDER — POTASSIUM CHLORIDE 7.45 MG/ML
10 INJECTION INTRAVENOUS PRN
Status: DISCONTINUED | OUTPATIENT
Start: 2020-09-02 | End: 2020-09-09 | Stop reason: HOSPADM

## 2020-09-02 RX ORDER — MAGNESIUM SULFATE 1 G/100ML
1 INJECTION INTRAVENOUS PRN
Status: DISCONTINUED | OUTPATIENT
Start: 2020-09-02 | End: 2020-09-09 | Stop reason: HOSPADM

## 2020-09-02 RX ORDER — OXYCODONE HYDROCHLORIDE AND ACETAMINOPHEN 5; 325 MG/1; MG/1
1 TABLET ORAL EVERY 6 HOURS PRN
Status: DISCONTINUED | OUTPATIENT
Start: 2020-09-02 | End: 2020-09-09 | Stop reason: HOSPADM

## 2020-09-02 RX ORDER — SPIRONOLACTONE 25 MG/1
25 TABLET ORAL DAILY
Status: CANCELLED | OUTPATIENT
Start: 2020-09-02

## 2020-09-02 RX ORDER — MONTELUKAST SODIUM 10 MG/1
10 TABLET ORAL NIGHTLY
Status: DISCONTINUED | OUTPATIENT
Start: 2020-09-02 | End: 2020-09-09 | Stop reason: HOSPADM

## 2020-09-02 RX ORDER — CLOPIDOGREL BISULFATE 75 MG/1
75 TABLET ORAL DAILY
Status: DISCONTINUED | OUTPATIENT
Start: 2020-09-02 | End: 2020-09-09 | Stop reason: HOSPADM

## 2020-09-02 RX ORDER — ONDANSETRON 2 MG/ML
4 INJECTION INTRAMUSCULAR; INTRAVENOUS EVERY 6 HOURS PRN
Status: DISCONTINUED | OUTPATIENT
Start: 2020-09-02 | End: 2020-09-09 | Stop reason: HOSPADM

## 2020-09-02 RX ORDER — SODIUM CHLORIDE 0.9 % (FLUSH) 0.9 %
10 SYRINGE (ML) INJECTION EVERY 12 HOURS SCHEDULED
Status: DISCONTINUED | OUTPATIENT
Start: 2020-09-02 | End: 2020-09-09 | Stop reason: HOSPADM

## 2020-09-02 RX ORDER — BISACODYL 10 MG
10 SUPPOSITORY, RECTAL RECTAL DAILY PRN
Status: DISCONTINUED | OUTPATIENT
Start: 2020-09-02 | End: 2020-09-09 | Stop reason: HOSPADM

## 2020-09-02 RX ORDER — ISOSORBIDE DINITRATE 10 MG/1
10 TABLET ORAL 2 TIMES DAILY
Status: DISCONTINUED | OUTPATIENT
Start: 2020-09-02 | End: 2020-09-09 | Stop reason: HOSPADM

## 2020-09-02 RX ORDER — PANTOPRAZOLE SODIUM 40 MG/1
40 TABLET, DELAYED RELEASE ORAL DAILY
Status: DISCONTINUED | OUTPATIENT
Start: 2020-09-02 | End: 2020-09-09 | Stop reason: HOSPADM

## 2020-09-02 RX ORDER — COLCHICINE 0.6 MG/1
0.6 TABLET ORAL 2 TIMES DAILY
Status: DISCONTINUED | OUTPATIENT
Start: 2020-09-02 | End: 2020-09-09 | Stop reason: HOSPADM

## 2020-09-02 RX ORDER — LEVOTHYROXINE SODIUM 0.03 MG/1
25 TABLET ORAL DAILY
Status: DISCONTINUED | OUTPATIENT
Start: 2020-09-02 | End: 2020-09-09 | Stop reason: HOSPADM

## 2020-09-02 RX ORDER — OXYBUTYNIN CHLORIDE 5 MG/1
5 TABLET ORAL 2 TIMES DAILY
Status: DISCONTINUED | OUTPATIENT
Start: 2020-09-02 | End: 2020-09-09 | Stop reason: HOSPADM

## 2020-09-02 RX ORDER — POTASSIUM CHLORIDE 20 MEQ/1
40 TABLET, EXTENDED RELEASE ORAL PRN
Status: DISCONTINUED | OUTPATIENT
Start: 2020-09-02 | End: 2020-09-09 | Stop reason: HOSPADM

## 2020-09-02 RX ORDER — ACETAMINOPHEN 650 MG/1
650 SUPPOSITORY RECTAL EVERY 6 HOURS PRN
Status: DISCONTINUED | OUTPATIENT
Start: 2020-09-02 | End: 2020-09-09 | Stop reason: HOSPADM

## 2020-09-02 RX ORDER — ASPIRIN 81 MG/1
324 TABLET, CHEWABLE ORAL ONCE
Status: COMPLETED | OUTPATIENT
Start: 2020-09-02 | End: 2020-09-02

## 2020-09-02 RX ORDER — MORPHINE SULFATE 4 MG/ML
4 INJECTION, SOLUTION INTRAMUSCULAR; INTRAVENOUS ONCE
Status: COMPLETED | OUTPATIENT
Start: 2020-09-02 | End: 2020-09-02

## 2020-09-02 RX ORDER — BUPROPION HYDROCHLORIDE 150 MG/1
150 TABLET ORAL EVERY MORNING
Status: DISCONTINUED | OUTPATIENT
Start: 2020-09-03 | End: 2020-09-09 | Stop reason: HOSPADM

## 2020-09-02 RX ORDER — SODIUM CHLORIDE 0.9 % (FLUSH) 0.9 %
10 SYRINGE (ML) INJECTION PRN
Status: DISCONTINUED | OUTPATIENT
Start: 2020-09-02 | End: 2020-09-09 | Stop reason: HOSPADM

## 2020-09-02 RX ORDER — TRAZODONE HYDROCHLORIDE 100 MG/1
100 TABLET ORAL NIGHTLY PRN
Status: DISCONTINUED | OUTPATIENT
Start: 2020-09-02 | End: 2020-09-09 | Stop reason: HOSPADM

## 2020-09-02 RX ORDER — 0.9 % SODIUM CHLORIDE 0.9 %
30 INTRAVENOUS SOLUTION INTRAVENOUS PRN
Status: DISCONTINUED | OUTPATIENT
Start: 2020-09-02 | End: 2020-09-09 | Stop reason: HOSPADM

## 2020-09-02 RX ORDER — DULOXETIN HYDROCHLORIDE 30 MG/1
60 CAPSULE, DELAYED RELEASE ORAL DAILY
Status: DISCONTINUED | OUTPATIENT
Start: 2020-09-02 | End: 2020-09-09 | Stop reason: HOSPADM

## 2020-09-02 RX ORDER — ONDANSETRON 2 MG/ML
4 INJECTION INTRAMUSCULAR; INTRAVENOUS ONCE
Status: COMPLETED | OUTPATIENT
Start: 2020-09-02 | End: 2020-09-02

## 2020-09-02 RX ORDER — NAPROXEN 250 MG/1
500 TABLET ORAL 2 TIMES DAILY WITH MEALS
Status: DISCONTINUED | OUTPATIENT
Start: 2020-09-02 | End: 2020-09-03

## 2020-09-02 RX ORDER — ATORVASTATIN CALCIUM 80 MG/1
80 TABLET, FILM COATED ORAL NIGHTLY
Status: DISCONTINUED | OUTPATIENT
Start: 2020-09-02 | End: 2020-09-09 | Stop reason: HOSPADM

## 2020-09-02 RX ADMIN — METOPROLOL TARTRATE 50 MG: 50 TABLET, FILM COATED ORAL at 20:40

## 2020-09-02 RX ADMIN — SODIUM CHLORIDE, PRESERVATIVE FREE 10 ML: 5 INJECTION INTRAVENOUS at 20:40

## 2020-09-02 RX ADMIN — OXYCODONE HYDROCHLORIDE AND ACETAMINOPHEN 1 TABLET: 5; 325 TABLET ORAL at 22:34

## 2020-09-02 RX ADMIN — ATORVASTATIN CALCIUM 80 MG: 80 TABLET, FILM COATED ORAL at 20:40

## 2020-09-02 RX ADMIN — ONDANSETRON 4 MG: 2 INJECTION INTRAMUSCULAR; INTRAVENOUS at 07:35

## 2020-09-02 RX ADMIN — PANTOPRAZOLE SODIUM 40 MG: 40 TABLET, DELAYED RELEASE ORAL at 18:18

## 2020-09-02 RX ADMIN — OXYBUTYNIN CHLORIDE 5 MG: 5 TABLET ORAL at 20:40

## 2020-09-02 RX ADMIN — IOHEXOL 75 ML: 350 INJECTION, SOLUTION INTRAVENOUS at 08:34

## 2020-09-02 RX ADMIN — MORPHINE SULFATE 4 MG: 4 INJECTION INTRAVENOUS at 07:35

## 2020-09-02 RX ADMIN — MORPHINE SULFATE 2 MG: 2 INJECTION, SOLUTION INTRAMUSCULAR; INTRAVENOUS at 18:35

## 2020-09-02 RX ADMIN — ENOXAPARIN SODIUM 40 MG: 40 INJECTION SUBCUTANEOUS at 20:40

## 2020-09-02 RX ADMIN — ASPIRIN 81 MG: 81 TABLET, CHEWABLE ORAL at 18:20

## 2020-09-02 RX ADMIN — MONTELUKAST SODIUM 10 MG: 10 TABLET, FILM COATED ORAL at 20:40

## 2020-09-02 RX ADMIN — TRAZODONE HYDROCHLORIDE 100 MG: 100 TABLET ORAL at 20:40

## 2020-09-02 RX ADMIN — CLOPIDOGREL 75 MG: 75 TABLET, FILM COATED ORAL at 18:17

## 2020-09-02 RX ADMIN — COLCHICINE 0.6 MG: 0.6 TABLET, FILM COATED ORAL at 20:40

## 2020-09-02 RX ADMIN — ALBUTEROL SULFATE 2 PUFF: 90 AEROSOL, METERED RESPIRATORY (INHALATION) at 08:44

## 2020-09-02 RX ADMIN — ALLOPURINOL 300 MG: 300 TABLET ORAL at 18:17

## 2020-09-02 RX ADMIN — ISOSORBIDE DINITRATE 10 MG: 10 TABLET ORAL at 20:40

## 2020-09-02 RX ADMIN — DULOXETINE HYDROCHLORIDE 60 MG: 30 CAPSULE, DELAYED RELEASE ORAL at 18:17

## 2020-09-02 RX ADMIN — ASPIRIN 324 MG: 81 TABLET, CHEWABLE ORAL at 07:35

## 2020-09-02 RX ADMIN — ONDANSETRON 4 MG: 2 INJECTION INTRAMUSCULAR; INTRAVENOUS at 18:35

## 2020-09-02 RX ADMIN — METHYLPREDNISOLONE SODIUM SUCCINATE 125 MG: 125 INJECTION, POWDER, FOR SOLUTION INTRAMUSCULAR; INTRAVENOUS at 07:35

## 2020-09-02 ASSESSMENT — ENCOUNTER SYMPTOMS
WHEEZING: 0
ABDOMINAL PAIN: 1
ABDOMINAL PAIN: 0
NAUSEA: 1
DIARRHEA: 0
SHORTNESS OF BREATH: 1
ORTHOPNEA: 0
VOMITING: 1
BACK PAIN: 0
COUGH: 0
SORE THROAT: 0
COUGH: 1
TROUBLE SWALLOWING: 0
BLOOD IN STOOL: 0
DIARRHEA: 1

## 2020-09-02 ASSESSMENT — PAIN SCALES - GENERAL
PAINLEVEL_OUTOF10: 6
PAINLEVEL_OUTOF10: 9
PAINLEVEL_OUTOF10: 10
PAINLEVEL_OUTOF10: 9

## 2020-09-02 NOTE — ED NOTES
61 yo female to the ed with a cc of nausea, vomiting, mild sob, and chest pressure that has been persistent over the past three days. Patient states that the symptoms have been increasing in severity over the past 24 hours. Patient denies abd pain. Patient reports that she has not been eating a whole lot over the past few days, and that her bowel movements have been scarce. Patient noted with + msp x 4, pupils PERRLA @ 3 and lung sounds that are clear and equil bilaterally. Patient placed on telemetry, pulse ox and BP. PIV established to right A/C with labs drawn and sent. Vital signs noted as recorded.       Jennifer Wallace RN  09/02/20 2211

## 2020-09-02 NOTE — ED NOTES
Pt resting on cot, on monitor, updated on plan of care. Denies needs.      Eric Manuel, DEVEN  09/02/20 1423

## 2020-09-02 NOTE — CARE COORDINATION
Case Management Initial Discharge Plan  Betty Singh,             Met with:patient over the phone to discuss discharge plans. Information verified: address, contacts, phone number, , insurance Yes    Emergency Contact/Next of Kin name & number: Ada Kaufman (sister) [phone]    PCP: Lilia Davis PA-C  Date of last visit:     Insurance Provider: [facility] Medicare, Medicaid    Discharge Planning    Living Arrangements:  Alone   Support Systems:  Children, Family Members    Home has 1 stories  0 stairs to climb to get into front door,  bedroom/bathroom in home johnathan    Patient able to perform ADL's:Independent    Current Services (outpatient & in home) none  DME equipment: rolling walker  DME provider:     Receiving oral anticoagulation therapy? No    If indicated:   Physician managing anticoagulation treatment:   Where does patient obtain lab work for ATC treatment? Potential Assistance Needed:  N/A    Patient agreeable to home care: No  [place] of choice provided:  no    Prior SNF/Rehab Placement and Facility: yes  Agreeable to SNF/Rehab: No  [place] of choice provided: no     Evaluation: no    Expected Discharge date:       Patient expects to be discharged to:  home  Follow Up Appointment: Best Day/ Time:      Transportation provider: family  Transportation arrangements needed for discharge: No    Readmission Risk              Risk of Unplanned Readmission:        18             Does patient have a readmission risk score greater than 14?: Yes  If yes, follow-up appointment must be made within 7 days of discharge.      Goals of Care:       Discharge Plan: home independently, watch for O2 needs          Electronically signed by Jacob Gupta RN on 20 at 5:24 PM EDT

## 2020-09-02 NOTE — ED PROVIDER NOTES
[facility] ED  EMERGENCY DEPARTMENT ENCOUNTER      Pt Name: Zhanna Carey  MRN: [id number]  [place] 1965  Date of evaluation: 9/2/20  PCP:  Erwin Greco PA-C    CHIEF COMPLAINT:   Chief Complaint   Patient presents with    Nausea    Emesis    Chest Pain     chest heavy x past 3 days     HISTORY OF PRESENT ILLNESS   Zhanna Carey is a 60 y.o. female whopresents with with a 3-day history of chest pain nausea vomiting and diaphoresis. Patient has a history of MI and states for the past 3 days she has had some fevers, nausea some nonbilious nonbloody vomiting some mild shortness of breath and chest pressure. Chest pressure is substernal nature and does not radiate. Pain has been worsening over the past 24 hours. There is no abdominal pain. She denies any back pain. There is no numbness or tingling or stroke symptoms. REVIEW OF SYSTEMS       Review of Systems   Constitutional: Negative for chills and fever. HENT: Negative for nosebleeds. Respiratory: Positive for shortness of breath. Negative for cough and wheezing. Cardiovascular: Positive for chest pain. Negative for palpitations and orthopnea. Gastrointestinal: Positive for nausea and vomiting. Negative for abdominal pain and diarrhea. Genitourinary: Negative for dysuria and urgency. Musculoskeletal: Negative for back pain and neck pain. Skin: Negative for rash. Neurological: Negative for dizziness, loss of consciousness and headaches. 10 essential systems negative except as stated above and in the HPI. PAST MEDICAL HISTORY   PMH:  has a past medical history of Arthritis, CHF (congestive heart failure) ([address] GERD (gastroesophageal reflux disease), Gout, History of heart attack, HLD (hyperlipidemia), Hypertension, Insomnia, and Osteoarthritis. SurgicalHistory:  has a past surgical history that includes back surgery; shoulder surgery (Right, 2009); knee surgery (Left, 2009);  Cholecystectomy, laparoscopic; Knee arthroscopy (Right); Hysterectomy, total abdominal; and Neck surgery. Social History:  reports that she has never smoked. She has never used smokeless tobacco. She reports that she does not drink alcohol or use drugs. Family History: Noncontributory at this time  Psychiatric History: Noncontributory at this time    Allergies:is allergic to entresto [sacubitril-valsartan]; food; gabapentin; and tetracyclines & related. PHYSICAL EXAM     INITIAL VITALS: BP (!) 139/59   Pulse 113   Temp 98.7 °F (37.1 °C)   Resp 15   Ht 5' 8\" (1.727 m)   Wt (!) 350 lb (158.8 kg)   SpO2 94%   BMI 53.22 kg/m²      Physical Exam  Constitutional:       General: She is not in acute distress. Appearance: She is well-developed. She is not diaphoretic. HENT:      Head: Normocephalic and atraumatic. Eyes:      General: No scleral icterus. Right eye: No discharge. Left eye: No discharge. Conjunctiva/sclera: Conjunctivae normal.      Pupils: Pupils are equal, round, and reactive to light. Neck:      Musculoskeletal: Normal range of motion. Cardiovascular:      Rate and Rhythm: Normal rate and regular rhythm. Heart sounds: Normal heart sounds. No murmur. No friction rub. No gallop. Pulmonary:      Effort: Pulmonary effort is normal. No respiratory distress. Breath sounds: Normal breath sounds. No wheezing or rales. Abdominal:      General: There is no distension. Palpations: Abdomen is soft. There is no mass. Tenderness: There is no abdominal tenderness. There is no guarding or rebound. Hernia: No hernia is present. Musculoskeletal: Normal range of motion. Skin:     General: Skin is warm. Findings: No rash. Neurological:      Mental Status: She is alert and oriented to person, place, and time.    Psychiatric:         Behavior: Behavior normal.           EMERGENCY DEPARTMENT COURSE:     LABS: Labs reviewed by myselfshow:   Labs Reviewed   CBC Redemonstration of a heterogeneous soft tissue nodule inferior to the thyroid   bed on the left that is not significantly changed in size or characteristics   compared to prior examination. XR CHEST PORTABLE   Final Result   Moderate vascular congestion               CONSULTS:  IP CONSULT TO HOSPITALIST  IP CONSULT TO INTERNAL MEDICINE    MDM:    Patient CT is suspicion of appropriate swab confirms this. She is requiring oxygen to maintain her sats but has no respiratory distress on 3 L of oxygen. Discussed case with [facility] who agreed to admit I feel she stable to admit to [facility] at this time does not need ICU. FINAL IMPRESSION:     1. Nausea and vomiting, intractability of vomiting not specified, unspecified vomiting type    2. Chest pain, unspecified type    3. Combined systolic and diastolic congestive heart failure, unspecified HF chronicity ([address]          DISPOSITION:  DISPOSITION        PATIENT REFERRED TO:  No follow-up provider specified. DISCHARGEMEDICATIONS:  New Prescriptions    No medications on file       (Please note that portions of this note were completed with a voice recognition program.  Efforts were made to edit thedictations but occasionally words are mis-transcribed. )    Gaetano Nails MD Attending Emergency Medicine Physician          Brandi Lopez MD  09/02/20 5804

## 2020-09-02 NOTE — ED NOTES
Pt respirations are even and unlabored, pt is oriented X 4, speaking in complete sentences, bed is in the lowest position, call light is within reach. Will continue to monitor.        Maine Last RN  09/02/20 0104

## 2020-09-03 ENCOUNTER — HOSPITAL ENCOUNTER (OUTPATIENT)
Dept: PHYSICAL THERAPY | Age: 55
Setting detail: THERAPIES SERIES
Discharge: HOME | End: 2020-09-03
Payer: MEDICARE

## 2020-09-03 ENCOUNTER — APPOINTMENT (OUTPATIENT)
Dept: ULTRASOUND IMAGING | Age: 55
DRG: 177 | End: 2020-09-03
Payer: MEDICARE

## 2020-09-03 LAB
ABO/RH: NORMAL
ALBUMIN SERPL-MCNC: 3.6 G/DL (ref 3.5–5.2)
ALBUMIN/GLOBULIN RATIO: 1 (ref 1–2.5)
ALP BLD-CCNC: 118 U/L (ref 35–104)
ALT SERPL-CCNC: 16 U/L (ref 5–33)
ANION GAP SERPL CALCULATED.3IONS-SCNC: 14 MMOL/L (ref 9–17)
AST SERPL-CCNC: 20 U/L
BILIRUB SERPL-MCNC: 0.36 MG/DL (ref 0.3–1.2)
BUN BLDV-MCNC: 29 MG/DL (ref 6–20)
BUN/CREAT BLD: ABNORMAL (ref 9–20)
CALCIUM SERPL-MCNC: 8.3 MG/DL (ref 8.6–10.4)
CHLORIDE BLD-SCNC: 101 MMOL/L (ref 98–107)
CO2: 23 MMOL/L (ref 20–31)
COMPLEMENT C3: 137 MG/DL (ref 90–180)
COMPLEMENT C4: 43 MG/DL (ref 10–40)
CREAT SERPL-MCNC: 1.56 MG/DL (ref 0.5–0.9)
CREATININE URINE: 247.8 MG/DL (ref 28–217)
EOSINOPHIL,URINE: NORMAL
FREE KAPPA/LAMBDA RATIO: 1.99 (ref 0.26–1.65)
GFR AFRICAN AMERICAN: 41 ML/MIN
GFR NON-AFRICAN AMERICAN: 34 ML/MIN
GFR SERPL CREATININE-BSD FRML MDRD: ABNORMAL ML/MIN/{1.73_M2}
GFR SERPL CREATININE-BSD FRML MDRD: ABNORMAL ML/MIN/{1.73_M2}
GLUCOSE BLD-MCNC: 119 MG/DL (ref 70–99)
HCT VFR BLD CALC: 34.2 % (ref 36.3–47.1)
HEMOGLOBIN: 10.3 G/DL (ref 11.9–15.1)
INR BLD: 0.9
KAPPA FREE LIGHT CHAINS QNT: 10.59 MG/DL (ref 0.37–1.94)
LAMBDA FREE LIGHT CHAINS QNT: 5.32 MG/DL (ref 0.57–2.63)
MCH RBC QN AUTO: 27 PG (ref 25.2–33.5)
MCHC RBC AUTO-ENTMCNC: 30.1 G/DL (ref 28.4–34.8)
MCV RBC AUTO: 89.8 FL (ref 82.6–102.9)
NRBC AUTOMATED: 0 PER 100 WBC
PDW BLD-RTO: 16.4 % (ref 11.8–14.4)
PLATELET # BLD: 238 K/UL (ref 138–453)
PMV BLD AUTO: 10.4 FL (ref 8.1–13.5)
POTASSIUM SERPL-SCNC: 4.9 MMOL/L (ref 3.7–5.3)
PROTHROMBIN TIME: 9.3 SEC (ref 9–12)
RBC # BLD: 3.81 M/UL (ref 3.95–5.11)
SODIUM BLD-SCNC: 138 MMOL/L (ref 135–144)
SODIUM,UR: 38 MMOL/L
TOTAL PROTEIN, URINE: 74 MG/DL
TOTAL PROTEIN: 7.2 G/DL (ref 6.4–8.3)
TROPONIN INTERP: ABNORMAL
TROPONIN T: ABNORMAL NG/ML
TROPONIN, HIGH SENSITIVITY: 23 NG/L (ref 0–14)
TSH SERPL DL<=0.05 MIU/L-ACNC: 1.87 MIU/L (ref 0.3–5)
WBC # BLD: 6.7 K/UL (ref 3.5–11.3)

## 2020-09-03 PROCEDURE — 87205 SMEAR GRAM STAIN: CPT

## 2020-09-03 PROCEDURE — 85027 COMPLETE CBC AUTOMATED: CPT

## 2020-09-03 PROCEDURE — 97162 PT EVAL MOD COMPLEX 30 MIN: CPT

## 2020-09-03 PROCEDURE — 84156 ASSAY OF PROTEIN URINE: CPT

## 2020-09-03 PROCEDURE — 99233 SBSQ HOSP IP/OBS HIGH 50: CPT | Performed by: INTERNAL MEDICINE

## 2020-09-03 PROCEDURE — 2500000003 HC RX 250 WO HCPCS: Performed by: INTERNAL MEDICINE

## 2020-09-03 PROCEDURE — 86160 COMPLEMENT ANTIGEN: CPT

## 2020-09-03 PROCEDURE — 80053 COMPREHEN METABOLIC PANEL: CPT

## 2020-09-03 PROCEDURE — 76937 US GUIDE VASCULAR ACCESS: CPT

## 2020-09-03 PROCEDURE — 97530 THERAPEUTIC ACTIVITIES: CPT

## 2020-09-03 PROCEDURE — 1200000000 HC SEMI PRIVATE

## 2020-09-03 PROCEDURE — 84443 ASSAY THYROID STIM HORMONE: CPT

## 2020-09-03 PROCEDURE — 85610 PROTHROMBIN TIME: CPT

## 2020-09-03 PROCEDURE — 86334 IMMUNOFIX E-PHORESIS SERUM: CPT

## 2020-09-03 PROCEDURE — 99232 SBSQ HOSP IP/OBS MODERATE 35: CPT | Performed by: INTERNAL MEDICINE

## 2020-09-03 PROCEDURE — 82570 ASSAY OF URINE CREATININE: CPT

## 2020-09-03 PROCEDURE — 84155 ASSAY OF PROTEIN SERUM: CPT

## 2020-09-03 PROCEDURE — 6360000002 HC RX W HCPCS: Performed by: CLINICAL NURSE SPECIALIST

## 2020-09-03 PROCEDURE — 84484 ASSAY OF TROPONIN QUANT: CPT

## 2020-09-03 PROCEDURE — 76770 US EXAM ABDO BACK WALL COMP: CPT

## 2020-09-03 PROCEDURE — 83883 ASSAY NEPHELOMETRY NOT SPEC: CPT

## 2020-09-03 PROCEDURE — 93005 ELECTROCARDIOGRAM TRACING: CPT | Performed by: CLINICAL NURSE SPECIALIST

## 2020-09-03 PROCEDURE — 84165 PROTEIN E-PHORESIS SERUM: CPT

## 2020-09-03 PROCEDURE — 86900 BLOOD TYPING SEROLOGIC ABO: CPT

## 2020-09-03 PROCEDURE — 84300 ASSAY OF URINE SODIUM: CPT

## 2020-09-03 PROCEDURE — 2580000003 HC RX 258: Performed by: INTERNAL MEDICINE

## 2020-09-03 PROCEDURE — 2580000003 HC RX 258: Performed by: CLINICAL NURSE SPECIALIST

## 2020-09-03 PROCEDURE — 6370000000 HC RX 637 (ALT 250 FOR IP): Performed by: CLINICAL NURSE SPECIALIST

## 2020-09-03 PROCEDURE — 36415 COLL VENOUS BLD VENIPUNCTURE: CPT

## 2020-09-03 PROCEDURE — 86901 BLOOD TYPING SEROLOGIC RH(D): CPT

## 2020-09-03 PROCEDURE — 99222 1ST HOSP IP/OBS MODERATE 55: CPT | Performed by: INTERNAL MEDICINE

## 2020-09-03 RX ORDER — 0.9 % SODIUM CHLORIDE 0.9 %
20 INTRAVENOUS SOLUTION INTRAVENOUS ONCE
Status: COMPLETED | OUTPATIENT
Start: 2020-09-03 | End: 2020-09-03

## 2020-09-03 RX ADMIN — SODIUM CHLORIDE: 9 INJECTION, SOLUTION INTRAVENOUS at 11:53

## 2020-09-03 RX ADMIN — PROMETHAZINE HYDROCHLORIDE 12.5 MG: 25 TABLET ORAL at 10:56

## 2020-09-03 RX ADMIN — MORPHINE SULFATE 2 MG: 2 INJECTION, SOLUTION INTRAMUSCULAR; INTRAVENOUS at 19:47

## 2020-09-03 RX ADMIN — SODIUM CHLORIDE 20 ML: 0.9 INJECTION, SOLUTION INTRAVENOUS at 10:53

## 2020-09-03 RX ADMIN — CLOPIDOGREL 75 MG: 75 TABLET, FILM COATED ORAL at 09:34

## 2020-09-03 RX ADMIN — REMDESIVIR 200 MG: 100 INJECTION, POWDER, LYOPHILIZED, FOR SOLUTION INTRAVENOUS at 10:53

## 2020-09-03 RX ADMIN — ASPIRIN 81 MG: 81 TABLET, CHEWABLE ORAL at 09:33

## 2020-09-03 RX ADMIN — COLCHICINE 0.6 MG: 0.6 TABLET, FILM COATED ORAL at 19:46

## 2020-09-03 RX ADMIN — SODIUM CHLORIDE, PRESERVATIVE FREE 10 ML: 5 INJECTION INTRAVENOUS at 09:39

## 2020-09-03 RX ADMIN — OXYCODONE HYDROCHLORIDE AND ACETAMINOPHEN 1 TABLET: 5; 325 TABLET ORAL at 09:37

## 2020-09-03 RX ADMIN — OXYCODONE HYDROCHLORIDE AND ACETAMINOPHEN 1 TABLET: 5; 325 TABLET ORAL at 15:53

## 2020-09-03 RX ADMIN — MONTELUKAST SODIUM 10 MG: 10 TABLET, FILM COATED ORAL at 19:46

## 2020-09-03 RX ADMIN — METOPROLOL TARTRATE 50 MG: 50 TABLET, FILM COATED ORAL at 09:33

## 2020-09-03 RX ADMIN — LEVOTHYROXINE SODIUM 25 MCG: 25 TABLET ORAL at 09:34

## 2020-09-03 RX ADMIN — ALLOPURINOL 300 MG: 300 TABLET ORAL at 09:34

## 2020-09-03 RX ADMIN — PANTOPRAZOLE SODIUM 40 MG: 40 TABLET, DELAYED RELEASE ORAL at 09:33

## 2020-09-03 RX ADMIN — ENOXAPARIN SODIUM 40 MG: 40 INJECTION SUBCUTANEOUS at 09:34

## 2020-09-03 RX ADMIN — BUPROPION HYDROCHLORIDE 150 MG: 150 TABLET, EXTENDED RELEASE ORAL at 09:34

## 2020-09-03 RX ADMIN — ISOSORBIDE DINITRATE 10 MG: 10 TABLET ORAL at 09:33

## 2020-09-03 RX ADMIN — COLCHICINE 0.6 MG: 0.6 TABLET, FILM COATED ORAL at 09:34

## 2020-09-03 RX ADMIN — OXYBUTYNIN CHLORIDE 5 MG: 5 TABLET ORAL at 19:46

## 2020-09-03 RX ADMIN — TRAZODONE HYDROCHLORIDE 100 MG: 100 TABLET ORAL at 19:46

## 2020-09-03 RX ADMIN — ENOXAPARIN SODIUM 40 MG: 40 INJECTION SUBCUTANEOUS at 21:00

## 2020-09-03 RX ADMIN — NAPROXEN 500 MG: 250 TABLET ORAL at 09:38

## 2020-09-03 RX ADMIN — OXYBUTYNIN CHLORIDE 5 MG: 5 TABLET ORAL at 09:33

## 2020-09-03 RX ADMIN — ISOSORBIDE DINITRATE 10 MG: 10 TABLET ORAL at 19:46

## 2020-09-03 RX ADMIN — ATORVASTATIN CALCIUM 80 MG: 80 TABLET, FILM COATED ORAL at 19:46

## 2020-09-03 RX ADMIN — DULOXETINE HYDROCHLORIDE 60 MG: 30 CAPSULE, DELAYED RELEASE ORAL at 09:33

## 2020-09-03 ASSESSMENT — PAIN SCALES - GENERAL
PAINLEVEL_OUTOF10: 10
PAINLEVEL_OUTOF10: 10
PAINLEVEL_OUTOF10: 8
PAINLEVEL_OUTOF10: 10

## 2020-09-03 ASSESSMENT — PAIN DESCRIPTION - LOCATION
LOCATION: GENERALIZED
LOCATION: GENERALIZED

## 2020-09-03 ASSESSMENT — PAIN DESCRIPTION - PAIN TYPE
TYPE: CHRONIC PAIN
TYPE: CHRONIC PAIN

## 2020-09-03 NOTE — PLAN OF CARE
Problem: Airway Clearance - Ineffective  Goal: Achieve or maintain patent airway  Outcome: Ongoing     Problem: Gas Exchange - Impaired  Goal: Absence of hypoxia  Outcome: Ongoing  Goal: Promote optimal lung function  Outcome: Ongoing     Problem: Breathing Pattern - Ineffective  Goal: Ability to achieve and maintain a regular respiratory rate  Outcome: Ongoing     Problem:  Body Temperature -  Risk of, Imbalanced  Goal: Ability to maintain a body temperature within defined limits  Outcome: Ongoing  Goal: Will regain or maintain usual level of consciousness  Outcome: Ongoing  Goal: Complications related to the disease process, condition or treatment will be avoided or minimized  Outcome: Ongoing     Problem: Isolation Precautions - Risk of Spread of Infection  Goal: Prevent transmission of infection  Outcome: Ongoing     Problem: Nutrition Deficits  Goal: Optimize nutrtional status  Outcome: Ongoing     Problem: Risk for Fluid Volume Deficit  Goal: Maintain normal heart rhythm  Outcome: Ongoing  Goal: Maintain absence of muscle cramping  Outcome: Ongoing  Goal: Maintain normal serum potassium, sodium, calcium, phosphorus, and pH  Outcome: Ongoing     Problem: Loneliness or Risk for Loneliness  Goal: Demonstrate positive use of time alone when socialization is not possible  Outcome: Ongoing     Problem: Fatigue  Goal: Verbalize increase energy and improved vitality  Outcome: Ongoing     Problem: Patient Education: Go to Patient Education Activity  Goal: Patient/Family Education  Outcome: Ongoing     Problem: Pain:  Goal: Pain level will decrease  Description: Pain level will decrease  Outcome: Ongoing  Goal: Control of acute pain  Description: Control of acute pain  Outcome: Ongoing  Goal: Control of chronic pain  Description: Control of chronic pain  Outcome: Ongoing

## 2020-09-03 NOTE — PROGRESS NOTES
wheezing  Cardiovascular:  negative for chest pain, chest pressure/discomfort, lower extremity edema, palpitations  Gastrointestinal:  negative for abdominal pain, constipation, diarrhea, nausea, vomiting  Neurological:  negative for dizziness, headache    Medications: Allergies: Allergies   Allergen Reactions    Entresto [Sacubitril-Valsartan] Other (See Comments)     Acute kidney injury    Food Swelling     peaches    Gabapentin Swelling    Tetracyclines & Related        Current Meds:   Scheduled Meds:    allopurinol  300 mg Oral Daily    aspirin  81 mg Oral Daily    atorvastatin  80 mg Oral Nightly    bumetanide  2 mg Oral Daily    buPROPion  150 mg Oral QAM    clopidogrel  75 mg Oral Daily    colchicine  0.6 mg Oral BID    DULoxetine  60 mg Oral Daily    isosorbide dinitrate  10 mg Oral BID    levothyroxine  25 mcg Oral Daily    metoprolol tartrate  50 mg Oral BID    montelukast  10 mg Oral Nightly    naproxen  500 mg Oral BID WC    oxybutynin  5 mg Oral BID    pantoprazole  40 mg Oral Daily    polyethylene glycol  17 g Oral Daily    sodium chloride flush  10 mL Intravenous 2 times per day    enoxaparin  40 mg Subcutaneous BID    remdesivir IVPB  200 mg Intravenous Once    Followed by   [employer] remdesivir IVPB  100 mg Intravenous Q24H     Continuous Infusions:    sodium chloride       PRN Meds: albuterol sulfate HFA, albuterol sulfate HFA, oxyCODONE-acetaminophen, traZODone, sodium chloride flush, potassium chloride **OR** potassium alternative oral replacement **OR** potassium chloride, magnesium sulfate, acetaminophen **OR** acetaminophen, promethazine **OR** ondansetron, bisacodyl, morphine, sodium chloride    Data:     Past Medical History:   has a past medical history of Arthritis, CHF (congestive heart failure) ([facility] [address] GERD (gastroesophageal reflux disease), Gout, History of heart attack, HLD (hyperlipidemia), Hypertension, Insomnia, and Osteoarthritis.     Social History:   reports that she has never smoked. She has never used smokeless tobacco. She reports that she does not drink alcohol or use drugs. Family History:   Family History   Problem Relation Age of Onset    Other Mother     Diabetes Mother     Heart Disease Mother     Arthritis Mother     Kidney Disease Mother     Lupus Mother     Arthritis Sister     Diabetes Brother     Asthma Brother     Cancer Maternal Grandfather     Hypertension Sister     Breast Cancer Sister         28s    Breast Cancer Niece         30-35       Vitals:  /61   Pulse 70   Temp 98.8 °F (37.1 °C)   Resp 23   Ht 5' 8\" (1.727 m)   Wt (!) 350 lb (158.8 kg)   SpO2 100%   BMI 53.22 kg/m²   Temp (24hrs), Av.5 °F (36.9 °C), Min:98.1 °F (36.7 °C), Max:98.8 °F (37.1 °C)    No results for input(s): POCGLU in the last 72 hours. I/O (24Hr):   No intake or output data in the 24 hours ending 20 0746    Labs:  Hematology:  Recent Labs     20  1603   WBC 5.4  --    RBC 4.24  --    HGB 11.3*  --    HCT 37.5  --    MCV 88.4  --    MCH 26.7  --    MCHC 30.1  --    RDW 16.3*  --      --    MPV 10.9  --    CRP  --  88.6*   DDIMER 1.11  --      Chemistry:  Recent Labs     20  1603   *  --    K 4.2  --      --    CO2 23  --    GLUCOSE 142*  --    BUN 15  --    CREATININE 0.96*  --    ANIONGAP 10  --    LABGLOM 60*  --    GFRAA >60  --    CALCIUM 8.5*  --    PROBNP 341*  --    TROPHS 24* 22*     Recent Labs     20  1603   PROT 7.5  --    LABALBU 3.8  --    AST 22  --    ALT 18  --    LDH  --  221*   ALKPHOS 141*  --    BILITOT 0.34  --    LIPASE 19  --      ABG:No results found for: POCPH, PHART, PH, POCPCO2, UXN2KII, PCO2, POCPO2, PO2ART, PO2, POCHCO3, YGS5KEH, HCO3, NBEA, PBEA, BEART, BE, THGBART, THB, UIQ1OVJ, FACM6IAL, N2SKTYMN, O2SAT, FIO2  No results found for: SPECIAL  No results found for: CULTURE    Radiology:  Xr Chest Portable    Result Date: 9/2/2020  Moderate vascular congestion     Ct Chest Pulmonary Embolism W Contrast    Result Date: 9/2/2020  No definite acute or chronic pulmonary embolism. Scattered pulmonary opacities throughout the lungs bilaterally consistent with pneumonia. Redemonstration of a heterogeneous soft tissue nodule inferior to the thyroid bed on the left that is not significantly changed in size or characteristics compared to prior examination.        Physical Examination:        General appearance:  alert, cooperative and no distress  Mental Status:  oriented to person, place and time and normal affect  Lungs:  clear to auscultation bilaterally, normal effort  Heart:  regular rate and rhythm, no murmur  Abdomen:  soft, nontender, nondistended, normal bowel sounds, no masses, hepatomegaly, splenomegaly  Extremities:  no edema, redness, tenderness in the calves  Skin:  no gross lesions, rashes, induration    Assessment:        Hospital Problems           Last Modified POA    * (Principal) Lower respiratory tract infection due to COVID-19 virus 9/2/2020 Yes    Therapeutic opioid-induced constipation (OIC) 9/2/2020 Yes    Opioid dependence, uncomplicated ([facility] [address] 5/1/1843 Yes    Chronic pain disorder 9/2/2020 Yes    Class 3 severe obesity due to excess calories with serious comorbidity and body mass index (BMI) of 45.0 to 49.9 in adult ([address] 9/2/2020 Yes    Morbid obesity ([address] 9/2/2020 Yes    Hypertension 9/2/2020 Yes    Gout 9/2/2020 Yes    GERD (gastroesophageal reflux disease) 9/2/2020 Yes    Chronic combined systolic and diastolic heart failure (HCC) (Chronic) 9/2/2020 Yes    Other proteinuria 9/2/2020 Yes    Nausea and vomiting 9/2/2020 Yes          Plan:        COVID-19 pneumonia  -ID on board  -Remdisivir started  -Continue oxygen support    CAD  -Continue aspirin, statin, Plavix, isosorbide dinitrate, metoprolol tartrate    Depression  -Continue Cymbalta and Wellbutrin    CHF without exacerbation  -Continue Bumex    Hypothyroidism  -Continue Synthroid    GI and DVT prophylaxis  Johanna Frank MD  9/3/2020  7:46 AM

## 2020-09-03 NOTE — PROGRESS NOTES
Pt gets sinus bradycardia during sleep and is asymptomatic. The lowest her HR has dropped is to 48. Writer notified NP on call with internal medicine. Writer requested that the monitor tech let writer know when HR dropped below 45. Will cont to monitor.

## 2020-09-03 NOTE — FLOWSHEET NOTE
[address]   Outpatient Physical Therapy  Cancel/No Show Note    Date: 9/3/20    Patient Name: Cecy Asher        MRN: [id number]  Account: [de-identified] : 1965      General Information:  Referring Practitioner: JESUS ALBERTO Pruett  Referral Date : 20  Diagnosis:  Chronic pain R knee M25.561, G89.29 ( Main complaint )  Onset Date: 10/01/16  PT Insurance Information: [facility] Medicare  Total # of Visits Approved: 12  Total # of Visits to Date: 9  No Show: 2  Canceled Appointment: 2        Comments: Pt did not show for therapy today. upon openning chart, pt is currently in [facility] 's  COVID-19 unit currently.     Hanna Chacko PTA

## 2020-09-03 NOTE — CONSULTS
Renal Consult Note    Patient :  Alison Humphrey; 60 y.o. MRN# [id number]  Location:  4239/4365-62  Attending:  Janine Palomo MD  Admit Date:  9/2/2020   Hospital Day: 1    Reason for Consult:     Asked by Dr Janine Palomo MD to see for SUSU/Elevated Creatinine. History Obtained From:     patient    History of Present Illness:     Alison Humphrey; 60 y.o. female with past medical history as mentioned below. Known history of morbid obesity, essential hypertension, asthma, congestive heart failure with echocardiogram showing dilated left ventricle with ejection fraction of 40% although no history of coronary artery disease. She has no known history of kidney disease. Baseline creatinine 0.9-1.0. Renal function fluctuates at time based on volume status. She does not use oxygen on a regular basis and is physically active according to her. She does have degenerative joint disease and takes NSAIDs on a regular basis. She now presents to the hospital with 3 to 4-day history of nausea, decreased appetite, decreased energy, altered taste in mouth. In the last 1 to 2 days she also noticed onset of cough with streaky blood in the sputum. Also noted shortness of breath on exertion. The symptoms continue to get worse so she came into the ER for evaluation. Was found to be somewhat hypoxic with a sat of 80%. CT angiogram was done to rule out PE which was negative for PE but did show evidence of infiltrates in the parenchyma of both sides. Her creatinine on presentation was 0.9 which is gone up to 1.6 today. COVID-19 test came back positive. She is currently in isolation, and remedesivir dose has been given to her. Nephrology has been consulted for acute kidney injury. She has not made much urine since she came in. IV fluids were started however IV line went bad so no fluids have been given to her. She has refused her Bumex this morning as she feels she is dehydrated.   She denies any hematuria hesitancy urgency frequency. No history of fever or chills or night sweats. No history of recent contrast exposure, No h/o prolonged NSAIDs use in the past, No h/o nephrolithiasis, No recent skin rashes or arthralgias, No hematuria or pyuria noticed in the recent past. Doesn't report any reduction in the urine output recently. Non report of any obstructive urinary symptoms (urgency, frequency, weak stream, straining while urination). No h/o recurrent UTIs in the past.    Past History/Allergies? Social History:     Past Medical History:   Diagnosis Date    Arthritis     CHF (congestive heart failure) (HCC)     GERD (gastroesophageal reflux disease)     Gout 10/2019    History of heart attack     HLD (hyperlipidemia)     Hypertension     Insomnia     Osteoarthritis        Allergies   Allergen Reactions    Entresto [Sacubitril-Valsartan] Other (See Comments)     Acute kidney injury    Food Swelling     peaches    Gabapentin Swelling    Tetracyclines & Related        Social History     Socioeconomic History    Marital status: Single     Spouse name: Not on file    Number of children: Not on file    Years of education: Not on file    Highest education level: Not on file   Occupational History    Not on file   Social Needs    Financial resource strain: Not on file    Food insecurity     Worry: Not on file     Inability: Not on file    Transportation needs     Medical: Not on file     Non-medical: Not on file   Tobacco Use    Smoking status: Never Smoker    Smokeless tobacco: Never Used   Substance and Sexual Activity    Alcohol use: No    Drug use: No    Sexual activity: Not on file   Lifestyle    Physical activity     Days per week: Not on file     Minutes per session: Not on file    Stress: Not on file   Relationships    Social connections     Talks on phone: Not on file     Gets together: Not on file     Attends Nondenominational service: Not on file     Active member of club or organization: Not on file Attends meetings of clubs or organizations: Not on file     Relationship status: Not on file    Intimate partner violence     Fear of current or ex partner: Not on file     Emotionally abused: Not on file     Physically abused: Not on file     Forced sexual activity: Not on file   Other Topics Concern    Not on file   Social History Narrative    Not on file       Family History:        Family History   Problem Relation Age of Onset    Other Mother     Diabetes Mother     Heart Disease Mother     Arthritis Mother     Kidney Disease Mother     Lupus Mother     Arthritis Sister     Diabetes Brother     Asthma Brother     Cancer Maternal Grandfather     Hypertension Sister     Breast Cancer Sister         28s    Breast Cancer Niece         30-35       Outpatient Medications:     Medications Prior to Admission: SUPREP BOWEL PREP KIT 17.5-3.13-1.6 GM/177ML SOLN, Take as directed - dispense one Kit  sucralfate (CARAFATE) 1 GM tablet, Take 1 tablet by mouth 3 times daily for 7 days  metoprolol tartrate (LOPRESSOR) 50 MG tablet, Take 1 tablet by mouth 2 times daily  atorvastatin (LIPITOR) 80 MG tablet, Take 1 tablet by mouth daily  DULoxetine (CYMBALTA) 60 MG extended release capsule, Take 1 capsule by mouth daily  buPROPion (WELLBUTRIN XL) 150 MG extended release tablet, Take 1 tablet by mouth every morning  spironolactone (ALDACTONE) 25 MG tablet, Take 1 tablet by mouth daily  bumetanide (BUMEX) 2 MG tablet, Take 1 tablet by mouth daily  clopidogrel (PLAVIX) 75 MG tablet, Take 1 tablet by mouth daily  pantoprazole (PROTONIX) 40 MG tablet, Take 1 tablet by mouth daily  levothyroxine (SYNTHROID) 25 MCG tablet, Take 1 tablet by mouth daily  montelukast (SINGULAIR) 10 MG tablet, Take 1 tablet by mouth nightly  allopurinol (ZYLOPRIM) 300 MG tablet, Take 1 tablet by mouth daily  Handicap Placard MISC, by Does not apply route 2 years  albuterol sulfate  (90 Base) MCG/ACT inhaler, Inhale 2 puffs into the lungs every 4 hours as needed for Wheezing  ibuprofen (ADVIL;MOTRIN) 800 MG tablet, Take 1 tablet by mouth every 8 hours as needed for Pain  oxyCODONE-acetaminophen (PERCOCET) 7.5-325 MG per tablet, Take 1 tablet by mouth every 6 hours as needed for Pain for up to 30 days.  Intended supply: 30 days  traZODone (DESYREL) 50 MG tablet, TAKE 2 TO 3 TABLETS BY MOUTH NIGHTLY AT BEDTIME AS NEEDED FOR INSOMNIA  ELDERBERRY PO, Take by mouth  oxybutynin (DITROPAN) 5 MG tablet, TAKE 1 TABLET BY MOUTH TWICE DAILY  colchicine (COLCRYS) 0.6 MG tablet, Take 1 tablet by mouth 2 times daily  naproxen (NAPROSYN) 500 MG tablet, Take 1 tablet by mouth 2 times daily (with meals) (Patient not taking: Reported on 8/26/2020)  Respiratory Therapy Supplies (NEBULIZER COMPRESSOR) KIT, Provide insurance covered nebulizer, use daily as needed  polyethylene glycol (GLYCOLAX) packet, polyethylene glycol 3350 17 gram/dose oral powder  albuterol (PROVENTIL) (2.5 MG/3ML) 0.083% nebulizer solution, Take 3 mLs by nebulization every 6 hours as needed for Wheezing  isosorbide dinitrate (ISORDIL) 10 MG tablet, Take 1 tablet by mouth 2 times daily  aspirin 81 MG tablet, Take 81 mg by mouth daily    Current Medications:     Scheduled Meds:    allopurinol  300 mg Oral Daily    aspirin  81 mg Oral Daily    atorvastatin  80 mg Oral Nightly    buPROPion  150 mg Oral QAM    clopidogrel  75 mg Oral Daily    colchicine  0.6 mg Oral BID    DULoxetine  60 mg Oral Daily    isosorbide dinitrate  10 mg Oral BID    levothyroxine  25 mcg Oral Daily    metoprolol tartrate  50 mg Oral BID    montelukast  10 mg Oral Nightly    oxybutynin  5 mg Oral BID    pantoprazole  40 mg Oral Daily    polyethylene glycol  17 g Oral Daily    sodium chloride flush  10 mL Intravenous 2 times per day    enoxaparin  40 mg Subcutaneous BID    [START ON 9/4/2020] remdesivir IVPB  100 mg Intravenous Q24H     Continuous Infusions:    sodium chloride Stopped (09/03/20 8346) PRN Meds:  albuterol sulfate HFA, albuterol sulfate HFA, oxyCODONE-acetaminophen, traZODone, sodium chloride flush, potassium chloride **OR** potassium alternative oral replacement **OR** potassium chloride, magnesium sulfate, acetaminophen **OR** acetaminophen, promethazine **OR** ondansetron, bisacodyl, morphine, sodium chloride    Review of Systems:     Constitutional: No fever, no chills, no lethargy, no weakness. HEENT:  No headache, otalgia, itchy eyes, nasal discharge or sore throat. Cardiac:  No chest pain, dyspnea, orthopnea or PND. Chest:  No cough, phlegm or wheezing. Abdomen:  No abdominal pain, nausea or vomiting. Neuro:  No focal weakness, abnormal movements orseizure like activity. Skin:   No rashes, no itching. :   No hematuria, no pyuria, no dysuria, no flank pain. Extremities:  No swelling or joint pains. ROS was otherwise negative except as mentioned in the [address] Input/Output:       No intake/output data recorded. Patient Vitals for the past 96 hrs (Last 3 readings):   Weight   20 0644 (!) 350 lb (158.8 kg)     Vital Signs:   Temperature:  Temp: 98.1 °F (36.7 °C)  TMax:   Temp (24hrs), Av.5 °F (36.9 °C), Min:98.1 °F (36.7 °C), Max:98.8 °F (37.1 °C)    Respirations:  Resp: 16  Pulse:   Pulse: 72  BP:    BP: 122/69  BP Range: Systolic (05JFA), GXA:790 , Min:114 , TQX:069       Diastolic (74OYA), IFB:34, Min:61, Max:69      Physical Examination:     General:  AAO x 3, speaking in full sentences, no accessory muscle use. HEENT: Atraumatic, normocephalic, no throat congestion, moist mucosa. Eyes:   Pupils equal, round and reactive to light, EOMI. Neck:   No JVD, no thyromegaly, no lymphadenopathy. Chest:  Bilateral vesicular breath sounds, basal crackles both sides  Cardiac:  S1 S2 RR, no murmurs, gallops or rubs, JVP not raised. Abdomen: Soft, non-tender, no masses or organomegaly, BS audible. :   No suprapubic or flank tenderness.   Neuro:  AAO x 3, No FND.  SKIN:  No rashes, good skin turgor. Extremities:  trace edema, palpable peripheral pulses, no calf tenderness. Labs:       Recent Labs     09/02/20  0728 09/03/20  1007   WBC 5.4 6.7   RBC 4.24 3.81*   HGB 11.3* 10.3*   HCT 37.5 34.2*   MCV 88.4 89.8   MCH 26.7 27.0   MCHC 30.1 30.1   RDW 16.3* 16.4*    238   MPV 10.9 10.4      BMP:   Recent Labs     09/02/20  0728 09/03/20  1007   * 138   K 4.2 4.9    101   CO2 23 23   BUN 15 29*   CREATININE 0.96* 1.56*   GLUCOSE 142* 119*   CALCIUM 8.5* 8.3*      Phosphorus:   No results for input(s): PHOS in the last 72 hours. Magnesium:  No results for input(s): MG in the last 72 hours.   Albumin:    Recent Labs     09/02/20 0728 09/03/20  1007   LABALBU 3.8 3.6     BNP:    No results found for: BNP  JV:      Lab Results   Component Value Date    JV POSITIVE 10/15/2019     SPEP:  Lab Results   Component Value Date    PROT 7.2 09/03/2020     UPEP:   No results found for: LABPE  C3:     Lab Results   Component Value Date    C3 160 12/04/2017     C4:     Lab Results   Component Value Date    C4 39 12/04/2017     MPO ANCA:     Lab Results   Component Value Date    MPO 12 12/04/2017     PR3 ANCA:     Lab Results   Component Value Date    PR3 12 12/04/2017     Anti-GBM:   No results found for: GBMABIGG  Hep BsAg:         Lab Results   Component Value Date    HEPBSAG NONREACTIVE 07/02/2019     Hep C AB:          Lab Results   Component Value Date    HEPCAB NONREACTIVE 07/02/2019       Urinalysis/Chemistries:      Lab Results   Component Value Date    NITRU NEGATIVE 09/02/2020    COLORU YELLOW 09/02/2020    PHUR 6.0 09/02/2020    WBCUA 0 TO 2 09/02/2020    RBCUA 2 TO 5 09/02/2020    MUCUS NOT REPORTED 09/02/2020    TRICHOMONAS NOT REPORTED 09/02/2020    YEAST NOT REPORTED 09/02/2020    BACTERIA NOT REPORTED 09/02/2020    SPECGRAV 1.056 09/02/2020    LEUKOCYTESUR NEGATIVE 09/02/2020    UROBILINOGEN Normal 09/02/2020    BILIRUBINUR NEGATIVE 09/02/2020 GLUCOSEU NEGATIVE 09/02/2020    KETUA NEGATIVE 09/02/2020    AMORPHOUS NOT REPORTED 09/02/2020     Urine Sodium:     Lab Results   Component Value Date    OSVALDO 28 12/03/2017     Urine Potassium:  No results found for: KUR  Urine Chloride:  No results found for: CLUR  Urine Osmolarity: No results found for: OSMOU  Urine Protein:   No results found for: TPU  Urine Creatinine:     Lab Results   Component Value Date    LABCREA 172.0 07/24/2019    LABCREA 37.3 12/04/2017     UPC:     Urine Eosinophils:  No components found for: UEOS    Radiology:     CXR:     Assessment:     1. Acute Kidney Injury: Secondary to nephrotoxic ATN from contrast exposure. Patient underwent CT angiogram yesterday, creatinine prior to procedure was 0.9, subsequently dropped to 1.6. Urine output has declined although not being measured accurately. Possibility of NSAID-induced ATN in setting of volume depletion being considered as well. 2.  COVID-19 pneumonia with some respiratory difficulty although no evidence of cytokine storm  3. Morbid obesity  4. Degenerative joint disease with chronic NSAID use  5. Asthma/COPD  6. Dilated cardiomyopathy ejection fraction 40%    Plan:   1. Discontinue NSAIDs and Bumex for now  2. Place [place]  3. Will Check Renal Ultrasound to r/o element of obstruction and to assess the kidney size/echotexture. 4. Comprehensive urine testing including Urinalysis, Urine sodium, potassium, chloride, Urine protein and creatinine to quantify the proteinuria if any at all. Will check urinary eosinophils as well. 5. Will Order serum and urine protein electrophoresis to r/o element to occult paraprotein disease. 6. Will order Hepatitis B and C, JV, ANCA, Complement levels. 7.  Keep systolic pressure more than 110  8. Careful hydration normal saline at 50 mL an hour for 24 hours, insensible losses high and oral intake has been poor  9.   Await ID recommendations for COVID-19 therapy    Nutrition   Please ensure that

## 2020-09-03 NOTE — PROGRESS NOTES
[facility] - Progress Note COVID 19 Patient  Today's Date and Time: 9/3/2020, 11:33 AM    Impression :   · COVID 19 Suspect  · COVID 19 Confirmed Infection 9-2-20  · COVID multifocal pneumonia  · Nausea   · Vomiting    Recommendations:   · Monitor off antibiotics  · Clinical Research will approach patient to explore if she qualifies for any of the COVID 19 treatment protocols. · Remdesivir started 9-3-20  · Will likely benefit from plasma. Discussed with patient. FACT sheet provided. Verbal consent obtained. · Suggest Renal consult for SUSU    Medical Decision Making/Summary/Discussion:9/3/2020     · Patient admitted with suspected COVID 19 infection  · Covid test confirmed positive. ·   Infection Control Recommendations   · Universal Precautions  · Airborne isolation  · Droplet Isolation    Antimicrobial Stewardship Recommendations     · Discontinuation of therapy  Coordination of Outpatient Care:   · Estimated Length of IV antimicrobials:TBD  · Patient will need Midline Catheter Insertion: TBD  · Patient will need PICC line Insertion: No  · Patient will need: Home IV , [place],  SNF,  LTAC:TBD  · Patient will need outpatient wound care:No    Chief complaint/reason for consultation:   · Concern for COVID infection      History of Present Illness:   Katherine Sainz is a 60y.o.-year-old  female who was initially admitted on 9/2/2020. Patient seen at the request of Amy Segura. INITIAL HISTORY:    Patient presented through ER with complaints of having a heavy sensation in the chest for three days. Se reported chest pressure, SOB, cough with blood tinged sputum for three days and one day of nausea and emesis. She also described generalized bodyaches. She has a prior Hx of chronic pain syndrome following an accident in 2011. In the ER her 02 sat was in the low 80's. She required 02 supplementation. Her CXR showed moderate vascular congestion. Her chest CT showed scattered infiltrates suggestive of pneumonia. Her COVID 19 test was positive. Patient admitted because of concerns with COVID 19.    CURRENT EVALUATION : 9/3/2020    Afebrile  VS stable, Mild HTN    Discussed Remdesivir and plasma treatment. Patient gave oral consent. She is developing SUSU  Suggest Renal evaluation    Patient exhibiting respiratory distress. Yes  Respiratory secretions: Yes    Patient receiving supplemental oxygen. 4 -->2.5 L/min NC  02 sat 91-->95-->97 %  RR 17 -->16    QTc:           NEWS Score: 0-4 Low risk group; 5-6: Medium risk group; 7 or above: High risk group  Parameters 3 2 1 0 1 2 3   Age    < 65   ? 65   RR ? 8  9-11 12-20  21-24 ? 25   O2 Sats ? 91 92-93 94-95 ? 96      Suppl O2  Yes  No      SBP ? 90  101-110 111-219   ? 220   HR ? 40  41-50 51-90  111-130 ? 131   Consciousness    Alert   Drowsiness, lethargy, or confusion   Temperature ? 35.0 C (95.0 F)  35.1-36.0 C 95.1-96.9 F 36.1-38.0 C 97.0-100.4 F 38.1-39.0 C 100.5-102.3 F ? 39.1 C ? 102.4 F      NEWS Score:   9-2-20: 5 Moderate risk    Overall Daily Picture:    Worsening    Presence of secondary bacterial Infection:  Yes  No   Additional antibiotics:     Labs, X rays reviewed: 9/3/2020    BUN: 15-->29  Cr:0.96-->1.56    WBC:5.4-->6.7  Hb:11.3-->10.3  Plat: 211-->238    Absolute Neutrophils:4.7  Absolute Lymphocytes:0.49  Neutrophil/Lymphocyte Ratio: 5.2 High risk    CRP:88.6  Ferritin:  LDH: 221    Pro Calcitonin:  Pro   Troponin HS 24-->22    Cultures:  Urine:  ·   Blood:  ·   Sputum :  ·   Wound:       CXR:   · 9-2-20: Moderate vascular congestion  CAT:      Discussed with patient, RN, CC, IM. I have personally reviewed the past medical history, past surgical history, medications, social history, and family history, and I have updated the database accordingly.   Past Medical History:     Past Medical History:   Diagnosis Date    Arthritis     [facility]  GERD (gastroesophageal reflux disease)     Gout 10/2019    History of heart attack     HLD (hyperlipidemia)     Hypertension     Insomnia     Osteoarthritis        Past Surgical  History:     Past Surgical History:   Procedure Laterality Date    BACK SURGERY      CHOLECYSTECTOMY, LAPAROSCOPIC      HYSTERECTOMY, TOTAL ABDOMINAL      KNEE ARTHROSCOPY Right     KNEE SURGERY Left 2009    NECK SURGERY      SHOULDER SURGERY Right 2009       Medications:      allopurinol  300 mg Oral Daily    aspirin  81 mg Oral Daily    atorvastatin  80 mg Oral Nightly    bumetanide  2 mg Oral Daily    buPROPion  150 mg Oral QAM    clopidogrel  75 mg Oral Daily    colchicine  0.6 mg Oral BID    DULoxetine  60 mg Oral Daily    isosorbide dinitrate  10 mg Oral BID    levothyroxine  25 mcg Oral Daily    metoprolol tartrate  50 mg Oral BID    montelukast  10 mg Oral Nightly    naproxen  500 mg Oral BID WC    oxybutynin  5 mg Oral BID    pantoprazole  40 mg Oral Daily    polyethylene glycol  17 g Oral Daily    sodium chloride flush  10 mL Intravenous 2 times per day    enoxaparin  40 mg Subcutaneous BID    [START ON 9/4/2020] remdesivir IVPB  100 mg Intravenous Q24H       Social History:     Social History     Socioeconomic History    Marital status: Single     Spouse name: Not on file    Number of children: Not on file    Years of education: Not on file    Highest education level: Not on file   Occupational History    Not on file   Social Needs    Financial resource strain: Not on file    Food insecurity     Worry: Not on file     Inability: Not on file   [employer] needs     Medical: Not on file     Non-medical: Not on file   Tobacco Use    Smoking status: Never Smoker    Smokeless tobacco: Never Used   Substance and Sexual Activity    Alcohol use: No    Drug use: No    Sexual activity: Not on file   Lifestyle    Physical activity     Days per week: Not on file     Minutes per session: Not on file    Stress: Not on file   Relationships    Social connections     Talks on phone: Not on file     Gets together: Not on file     Attends Denominational service: Not on file     Active member of club or organization: Not on file     Attends meetings of clubs or organizations: Not on file     Relationship status: Not on file    Intimate partner violence     Fear of current or ex partner: Not on file     Emotionally abused: Not on file     Physically abused: Not on file     Forced sexual activity: Not on file   Other Topics Concern    Not on file   Social History Narrative    Not on file       Family History:     Family History   Problem Relation Age of Onset    Other Mother     Diabetes Mother     Heart Disease Mother     Arthritis Mother     Kidney Disease Mother     Lupus Mother     Arthritis Sister     Diabetes Brother     Asthma Brother     Cancer Maternal Grandfather     Hypertension Sister     Breast Cancer Sister         28s    Breast Cancer Niece         30-35        Allergies:   Entresto [sacubitril-valsartan]; Food; Gabapentin; and Tetracyclines & related     Review of Systems:     Constitutional: fevers,no  chills. Muscle aches  Head: No headaches  Eyes: No double vision or blurry vision. No conjunctival inflammation. ENT: No sore throat or runny nose. . No hearing loss, tinnitus or vertigo. Cardiovascular: chest pain. No palpitations. Has shortness of breath. COFFEY  Lung: shortness of breath, cough. Blood tinged sputum production  Abdomen: Nausea, vomiting, No diarrhea, or abdominal pain. Hernando Kahn No cramps. Genitourinary: No increased urinary frequency, or dysuria. No hematuria. No suprapubic or CVA pain  Musculoskeletal: Generalized muscle aches. No joint effusions, swelling or deformities  Hematologic: No bleeding or bruising. Neurologic: No headache, weakness, numbness, or tingling. Integument: No rash, no ulcers. Psychiatric: No depression.    Endocrine: No polyuria, no polydipsia, results for input(s): HIV in the last 72 hours. No results for input(s): BC in the last 72 hours. Lab Results   Component Value Date    MUCUS NOT REPORTED 09/02/2020    RBC 3.81 09/03/2020    TRICHOMONAS NOT REPORTED 09/02/2020    WBC 6.7 09/03/2020    YEAST NOT REPORTED 09/02/2020    TURBIDITY CLEAR 09/02/2020     Lab Results   Component Value Date    CREATININE 1.56 09/03/2020    GLUCOSE 119 09/03/2020       Medical Decision Making-Imaging:     EXAMINATION:    ONE XRAY VIEW OF THE CHEST         9/2/2020 7:34 am         COMPARISON:    October 2, 2019, chest examination         HISTORY:    ORDERING SYSTEM PROVIDED HISTORY: cp    TECHNOLOGIST PROVIDED HISTORY:    cp    Reason for Exam: chest pain         FINDINGS:    Stable mild cardiomegaly         Now moderate prominence and indistinctness of the pulmonary    vascularity/interstitial markings         Mild tortuosity of the thoracic aorta         Degenerative changes of the thoracic spine and shoulders              Impression    Moderate vascular congestion      EXAMINATION:    CTA OF THE CHEST 9/2/2020 8:15 am         TECHNIQUE:    CTA of the chest was performed after the administration of intravenous    contrast.  Multiplanar reformatted images are provided for review.  MIP    images are provided for review.  Dose modulation, iterative reconstruction,    and/or weight based adjustment of the mA/kV was utilized to reduce the    radiation dose to as low as reasonably achievable.         COMPARISON:    CT chest performed 10/02/2019.         HISTORY:    ORDERING SYSTEM PROVIDED HISTORY: sob    TECHNOLOGIST PROVIDED HISTORY:    sob    Reason for Exam: sob    Acuity: Acute    Type of Exam: Initial         FINDINGS:    Pulmonary Arteries: Pulmonary arteries are adequately opacified for    evaluation.  No evidence of intraluminal filling defect to suggest pulmonary    embolism.  Main pulmonary artery is normal in caliber.         Mediastinum: No evidence of mediastinal lymphadenopathy.  The heart is    enlarged.  There is no pericardial effusion. [place] Senia is no acute abnormality    of the thoracic aorta.  There is redemonstration of prior thyroidectomy with    a heterogeneous soft tissue nodule inferior to the thyroid bed on the left    this measures approximately 3.4 x 2.9 cm and is not significantly changed    compared to prior examination.         Lungs/pleura: There are scattered pulmonary opacities throughout the lungs    bilaterally.  There is no effusion. [place] Senia is no pneumothorax.  The    tracheobronchial tree is patent.         Upper Abdomen: Limited images of the upper abdomen are unremarkable.         Soft Tissues/Bones: No acute bone or soft tissue abnormality.              Impression    No definite acute or chronic pulmonary embolism.         Scattered pulmonary opacities throughout the lungs bilaterally consistent    with pneumonia.         Redemonstration of a heterogeneous soft tissue nodule inferior to the thyroid    bed on the left that is not significantly changed in size or characteristics    compared to prior examination.                   Order Details     Medical Decision [id number]:       Medical Decision Making-Other:     Note:  · Labs, medications, radiologic studies were reviewed with personal review of films  · Large amounts of data were reviewed  · Discussed with nursing Staff, Discharge planner  · Infection Control and Prevention measures reviewed  · All prior entries were reviewed  · Administer medications as ordered  · Prognosis: Guarded  · Discharge planning reviewed  · Follow up as outpatient. Thank you for allowing us to participate in the care of this patient. Please call with questions.     Estrellita Suazo MD  Pager: [phone] - Office: [phone]

## 2020-09-03 NOTE — PROGRESS NOTES
Physical Therapy    Facility/Department: [facility]  Initial Assessment    NAME: Lucy Kirby  : 1965  MRN: [id number]    Chief Complaint   Patient presents with    Nausea    Emesis    Chest Pain     chest heavy x past 3 days       Date of Service: 9/3/2020    Discharge Recommendations:    Further therapy recommended at discharge. PT Equipment Recommendations  Equipment Needed: No  Other: Pt has rollator    Assessment   Body structures, Functions, Activity limitations: Decreased functional mobility ; Decreased strength;Decreased balance;Decreased endurance  Assessment: Pt grossly CGA for mobility, amb 3', seated rest break, then 20' RW CGA. Pt would benefit from continued acute PT to address deficits. Prognosis: Good  Decision Making: Medium Complexity  PT Education: Plan of Care;PT Role;General Safety  REQUIRES PT FOLLOW UP: Yes  Activity Tolerance  Activity Tolerance: Patient limited by fatigue;Patient limited by endurance; Patient Tolerated treatment well       Patient Diagnosis(es): The primary encounter diagnosis was Nausea and vomiting, intractability of vomiting not specified, unspecified vomiting type. Diagnoses of Chest pain, unspecified type and Combined systolic and diastolic congestive heart failure, unspecified HF chronicity ([address] were also pertinent to this visit. has a past medical history of Arthritis, CHF (congestive heart failure) ([address] GERD (gastroesophageal reflux disease), Gout, History of heart attack, HLD (hyperlipidemia), Hypertension, Insomnia, and Osteoarthritis. has a past surgical history that includes back surgery; shoulder surgery (Right, ); knee surgery (Left, ); Cholecystectomy, laparoscopic; Knee arthroscopy (Right); Hysterectomy, total abdominal; and Neck surgery.     Restrictions  Restrictions/Precautions  Restrictions/Precautions: General Precautions, Fall Risk(Droplet Plus)  Required Braces or Orthoses?: No  Position Activity Restriction  Other position/activity restrictions: up with assist, COVID+ 9/2. Vision/Hearing  Vision: Impaired  Vision Exceptions: Wears glasses for distance;Wears glasses for reading  Hearing: Within functional limits     Subjective  General  Chart Reviewed: Yes  Patient assessed for rehabilitation services?: Yes  Response To Previous Treatment: Not applicable  Family / Caregiver Present: No  Follows Commands: Within Functional Limits  Subjective  Subjective: RN and pt agreeable to PT. Pt alert long sitting in bed upon arrival.  Pain Screening  Patient Currently in Pain: Yes  Pain Assessment  Pain Assessment: 0-10  Pain Level: 10(Pt states pain is a lot better, but confirms 10/10 currently, states it was a lot worse. Edu on pain scale, pt confirmed 10/10.)  Pain Type: Chronic pain  Pain Location: Generalized  Non-Pharmaceutical Pain Intervention(s): Ambulation/Increased Activity; Distraction; Emotional support  Response to Pain Intervention: Patient Satisfied  Vital Signs  Patient Currently in Pain: Yes  Pre Treatment Pain Screening  Intervention List: Patient able to continue with treatment    Orientation  Orientation  Overall Orientation Status: Within Functional Limits  Social/Functional History  Social/Functional History  Lives With: Alone  Type of Home: Apartment(senior apartments, on main floor. laundry in room)  Home Layout: One level  Home Access: Level entry  Bathroom Shower/Tub: Walk-in shower  Bathroom Toilet: Handicap height  Bathroom Equipment: Grab bars in shower, Shower chair, Hand-held shower, Grab bars around toilet(uses shower chair)  Bathroom Accessibility: [facility]: Cane(rollator, used at all times since fall in march 2020.  Hx B TKA)  ADL Assistance: Independent  Homemaking Assistance: Independent  Homemaking Responsibilities: Yes  Ambulation Assistance: Independent(with rollator)  Transfer Assistance: Independent  Active : No  Patient's  Info: uncle  Mode of Transportation: risk for falls, Left in chair, All fall risk precautions in place  Restraints  Initially in place: No    AM-PAC Score  AM-PAC Inpatient Mobility Raw Score : 18 (09/03/20 1231)  AM-PAC Inpatient T-Scale Score : 43.63 (09/03/20 1231)  Mobility Inpatient CMS 0-100% Score: 46.58 (09/03/20 1231)  Mobility Inpatient CMS G-Code Modifier : CK (09/03/20 1231)          Goals  Short term goals  Time Frame for Short term goals: 14 visits  Short term goal 1: Pt will be Misa bed mobility  Short term goal 2: Pt will be Misa transfers  Short term goal 3: Pt will be Misa amb 200' RW       Therapy Time   Individual Concurrent Group Co-treatment   Time In 1012         Time Out 1044         Minutes 32         Timed Code Treatment Minutes: 21 Minutes       Alexandra Campbell PT

## 2020-09-03 NOTE — CONSULTS
admitted because of concerns with COVID 19.    CURRENT EVALUATION : 9/2/2020    Afebrile  VS stable, Mild HTN    Patient exhibiting respiratory distress. Yes  Respiratory secretions: Yes    Patient receiving supplemental oxygen. 4 L/min NC  02 sat 91-->95 %  RR 17     QTc:           NEWS Score: 0-4 Low risk group; 5-6: Medium risk group; 7 or above: High risk group  Parameters 3 2 1 0 1 2 3   Age    < 65   ? 65   RR ? 8  9-11 12-20  21-24 ? 25   O2 Sats ? 91 92-93 94-95 ? 96      Suppl O2  Yes  No      SBP ? 90  101-110 111-219   ? 220   HR ? 40  41-50 51-90  111-130 ? 131   Consciousness    Alert   Drowsiness, lethargy, or confusion   Temperature ? 35.0 C (95.0 F)  35.1-36.0 C 95.1-96.9 F 36.1-38.0 C 97.0-100.4 F 38.1-39.0 C 100.5-102.3 F ? 39.1 C ? 102.4 F      NEWS Score:   9-2-20: 5 Moderate risk    Overall Daily Picture:    Worsening    Presence of secondary bacterial Infection:  Yes  No   Additional antibiotics:     Labs, X rays reviewed: 9/2/2020    BUN: 15  Cr:0.96    WBC:5.4  Hb:11.3  Plat: 211    Absolute Neutrophils:4.7  Absolute Lymphocytes:0.49  Neutrophil/Lymphocyte Ratio: 5.2 High risk    CRP:88.6  Ferritin:  LDH: 221    Pro Calcitonin:  Pro   Troponin HS 24-->22    Cultures:  Urine:  ·   Blood:  ·   Sputum :  ·   Wound:       CXR:   · 9-2-20: Moderate vascular congestion  CAT:      Discussed with patient, RN, CC, IM. I have personally reviewed the past medical history, past surgical history, medications, social history, and family history, and I have updated the database accordingly.   Past Medical History:     Past Medical History:   Diagnosis Date    Arthritis     CHF [facility]congestive heart failure) ([facility] [address]     GERD (gastroesophageal reflux disease)     Gout 10/2019    History of heart attack     HLD (hyperlipidemia)     Hypertension     Insomnia     Osteoarthritis        Past Surgical  History:     Past Surgical History:   Procedure Laterality Date    BACK SURGERY      CHOLECYSTECTOMY, LAPAROSCOPIC      HYSTERECTOMY, TOTAL ABDOMINAL      KNEE ARTHROSCOPY Right     KNEE SURGERY Left 2009    NECK SURGERY      SHOULDER SURGERY Right 2009       Medications:      allopurinol  300 mg Oral Daily    aspirin  81 mg Oral Daily    atorvastatin  80 mg Oral Nightly    bumetanide  2 mg Oral Daily    [START ON 9/3/2020] buPROPion  150 mg Oral QAM    clopidogrel  75 mg Oral Daily    colchicine  0.6 mg Oral BID    DULoxetine  60 mg Oral Daily    isosorbide dinitrate  10 mg Oral BID    levothyroxine  25 mcg Oral Daily    metoprolol tartrate  50 mg Oral BID    montelukast  10 mg Oral Nightly    naproxen  500 mg Oral BID WC    oxybutynin  5 mg Oral BID    pantoprazole  40 mg Oral Daily    polyethylene glycol  17 g Oral Daily    sodium chloride flush  10 mL Intravenous 2 times per day    enoxaparin  40 mg Subcutaneous BID       Social History:     Social History     Socioeconomic History    Marital status: Single     Spouse name: Not on file    Number of children: Not on file    Years of education: Not on file    Highest education level: Not on file   Occupational History    Not on file   Social Needs    Financial resource strain: Not on file    Food insecurity     Worry: Not on file     Inability: Not on file    Transportation needs     Medical: Not on file     Non-medical: Not on file   Tobacco Use    Smoking status: Never Smoker    Smokeless tobacco: Never Used   Substance and Sexual Activity    Alcohol use: No    Drug use: No    Sexual activity: Not on file   Lifestyle    Physical activity     Days per week: Not on file     Minutes per session: Not on file    Stress: Not on file   Relationships    Social connections     Talks on phone: Not on file     Gets together: Not on file     Attends [employer] service: Not on file     Active member of club or organization: Not on file     Attends meetings of clubs or organizations: Not on file     Relationship status: Not on file    Intimate partner violence     Fear of current or ex partner: Not on file     Emotionally abused: Not on file     Physically abused: Not on file     Forced sexual activity: Not on file   Other Topics Concern    Not on file   Social History Narrative    Not on file       Family History:     Family History   Problem Relation Age of Onset    Other Mother     Diabetes Mother     Heart Disease Mother     Arthritis Mother     Kidney Disease Mother     Lupus Mother     Arthritis Sister     Diabetes Brother     Asthma Brother     Cancer Maternal Grandfather     Hypertension Sister     Breast Cancer Sister         28s    Breast Cancer Niece         30-35        Allergies:   Entresto [sacubitril-valsartan]; Food; Gabapentin; and Tetracyclines & related     Review of Systems:     Constitutional: fevers,no  chills. Muscle aches  Head: No headaches  Eyes: No double vision or blurry vision. No conjunctival inflammation. ENT: No sore throat or runny nose. . No hearing loss, tinnitus or vertigo. Cardiovascular: chest pain. No palpitations. Has shortness of breath. COFFEY  Lung: shortness of breath, cough. Blood tinged sputum production  Abdomen: Nausea, vomiting, No diarrhea, or abdominal pain. [place] No cramps. Genitourinary: No increased urinary frequency, or dysuria. No hematuria. No suprapubic or CVA pain  Musculoskeletal: Generalized muscle aches. No joint effusions, swelling or deformities  Hematologic: No bleeding or bruising. Neurologic: No headache, weakness, numbness, or tingling. Integument: No rash, no ulcers. Psychiatric: No depression. Endocrine: No polyuria, no polydipsia, no polyphagia. Physical Examination :     Patient Vitals for the past 8 hrs:   BP Temp Temp src Pulse Resp SpO2   09/02/20 2040 (!) 149/66 98.6 °F (37 °C) Oral 87 17 95 %   09/02/20 1300 (!) 149/87 98.1 °F (36.7 °C) Oral 94 -- 96 %     General Appearance: Awake, alert, and in apparent distress from back pain. Obese  Head:  Normocephalic, no trauma  Eyes: Pupils equal, round, reactive to light and accommodation; extraocular movements intact; sclera anicteric; conjunctivae pink. No embolic phenomena. ENT: Oropharynx clear, without erythema, exudate, or thrush. No tenderness of sinuses. Mouth/throat: mucosa pink and moist. No lesions. Dentition in good repair. Neck:Supple, without lymphadenopathy. Thyroid normal, No bruits. Pulmonary/Chest: Clear to auscultation, without wheezes, rales, or rhonchi. No dullness to percussion. Cardiovascular: Regular rate and rhythm without murmurs, rubs, or gallops. Abdomen: Soft, non tender. Bowel sounds normal. No organomegaly  All four Extremities: No cyanosis, clubbing, edema, or effusions. Neurologic: No gross sensory or motor deficits. Skin: Warm and dry with good turgor. No signs of peripheral arterial or venous insufficiency. No ulcerations. No open wounds. Medical Decision Making -Laboratory:   I have independently reviewed/ordered the following labs:    CBC with Differential:   Recent Labs     09/02/20  0728   WBC 5.4   HGB 11.3*   HCT 37.5      LYMPHOPCT 9*   MONOPCT 3     BMP:   Recent Labs     09/02/20  0728   *   K 4.2      CO2 23   BUN 15   CREATININE 0.96*     Hepatic Function Panel:   Recent Labs     09/02/20  0728   PROT 7.5   LABALBU 3.8   BILITOT 0.34   ALKPHOS 141*   ALT 18   AST 22     No results for input(s): RPR in the last 72 hours. No results for input(s): HIV in the last 72 hours. No results for input(s): BC in the last 72 hours.   Lab Results   Component Value Date    MUCUS NOT REPORTED 09/02/2020    RBC 4.24 09/02/2020    TRICHOMONAS NOT REPORTED 09/02/2020    WBC 5.4 09/02/2020    YEAST NOT REPORTED 09/02/2020    TURBIDITY CLEAR 09/02/2020     Lab Results   Component Value Date    CREATININE 0.96 09/02/2020    GLUCOSE 142 09/02/2020       Medical Decision Making-Imaging:     EXAMINATION:    ONE XRAY VIEW OF THE CHEST      9/2/2020 7:34 am         COMPARISON:    October 2, 2019, chest examination         HISTORY:    ORDERING SYSTEM PROVIDED HISTORY: cp    TECHNOLOGIST PROVIDED HISTORY:    cp    Reason for Exam: chest pain         FINDINGS:    Stable mild cardiomegaly         Now moderate prominence and indistinctness of the pulmonary    vascularity/interstitial markings         Mild tortuosity of the thoracic aorta         Degenerative changes of the thoracic spine and shoulders              Impression    Moderate vascular congestion      EXAMINATION:    CTA OF THE CHEST 9/2/2020 8:15 am         TECHNIQUE:    CTA of the chest was performed after the administration of intravenous    contrast.  Multiplanar reformatted images are provided for review.  MIP    images are provided for review. Dose modulation, iterative reconstruction,    and/or weight based adjustment of the mA/kV was utilized to reduce the    radiation dose to as low as reasonably achievable.         COMPARISON:    CT chest performed 10/02/2019.         HISTORY:    ORDERING SYSTEM PROVIDED HISTORY: sob    TECHNOLOGIST PROVIDED HISTORY:    sob    Reason for Exam: sob    Acuity: Acute    Type of Exam: Initial         FINDINGS:    Pulmonary Arteries: Pulmonary arteries are adequately opacified for    evaluation.  No evidence of intraluminal filling defect to suggest pulmonary    embolism.  Main pulmonary artery is normal in caliber.         Mediastinum: No evidence of mediastinal lymphadenopathy.  The heart is    enlarged.  There is no pericardial effusion.  There is no acute abnormality    of the thoracic aorta.  There is redemonstration of prior thyroidectomy with    a heterogeneous soft tissue nodule inferior to the thyroid bed on the left    this measures approximately 3.4 x 2.9 cm and is not significantly changed    compared to prior examination.         Lungs/pleura: There are scattered pulmonary opacities throughout the lungs    bilaterally.  There is no effusion.

## 2020-09-04 LAB
ABSOLUTE EOS #: <0.03 K/UL (ref 0–0.44)
ABSOLUTE IMMATURE GRANULOCYTE: 0.04 K/UL (ref 0–0.3)
ABSOLUTE LYMPH #: 0.8 K/UL (ref 1.1–3.7)
ABSOLUTE MONO #: 0.31 K/UL (ref 0.1–1.2)
ALBUMIN (CALCULATED): 3.8 G/DL (ref 3.2–5.2)
ALBUMIN PERCENT: 56 % (ref 45–65)
ALPHA 1 PERCENT: 3 % (ref 3–6)
ALPHA 2 PERCENT: 13 % (ref 6–13)
ALPHA-1-GLOBULIN: 0.2 G/DL (ref 0.1–0.4)
ALPHA-2-GLOBULIN: 0.8 G/DL (ref 0.5–0.9)
ANION GAP SERPL CALCULATED.3IONS-SCNC: 12 MMOL/L (ref 9–17)
BASOPHILS # BLD: 0 % (ref 0–2)
BASOPHILS ABSOLUTE: <0.03 K/UL (ref 0–0.2)
BETA GLOBULIN: 0.7 G/DL (ref 0.5–1.1)
BETA PERCENT: 11 % (ref 11–19)
BUN BLDV-MCNC: 39 MG/DL (ref 6–20)
BUN/CREAT BLD: ABNORMAL (ref 9–20)
CALCIUM SERPL-MCNC: 7.8 MG/DL (ref 8.6–10.4)
CHLORIDE BLD-SCNC: 100 MMOL/L (ref 98–107)
CO2: 23 MMOL/L (ref 20–31)
CREAT SERPL-MCNC: 1.59 MG/DL (ref 0.5–0.9)
DIFFERENTIAL TYPE: ABNORMAL
EKG ATRIAL RATE: 112 BPM
EKG ATRIAL RATE: 70 BPM
EKG P AXIS: 61 DEGREES
EKG P AXIS: 67 DEGREES
EKG P-R INTERVAL: 126 MS
EKG P-R INTERVAL: 160 MS
EKG Q-T INTERVAL: 352 MS
EKG Q-T INTERVAL: 438 MS
EKG QRS DURATION: 114 MS
EKG QRS DURATION: 124 MS
EKG QTC CALCULATION (BAZETT): 473 MS
EKG QTC CALCULATION (BAZETT): 480 MS
EKG R AXIS: -20 DEGREES
EKG R AXIS: -48 DEGREES
EKG T AXIS: -27 DEGREES
EKG T AXIS: 99 DEGREES
EKG VENTRICULAR RATE: 112 BPM
EKG VENTRICULAR RATE: 70 BPM
EOSINOPHILS RELATIVE PERCENT: 0 % (ref 1–4)
GAMMA GLOBULIN %: 17 % (ref 9–20)
GAMMA GLOBULIN: 1.1 G/DL (ref 0.5–1.5)
GFR AFRICAN AMERICAN: 41 ML/MIN
GFR NON-AFRICAN AMERICAN: 33 ML/MIN
GFR SERPL CREATININE-BSD FRML MDRD: ABNORMAL ML/MIN/{1.73_M2}
GFR SERPL CREATININE-BSD FRML MDRD: ABNORMAL ML/MIN/{1.73_M2}
GLUCOSE BLD-MCNC: 100 MG/DL (ref 65–105)
GLUCOSE BLD-MCNC: 111 MG/DL (ref 70–99)
HCT VFR BLD CALC: 34.2 % (ref 36.3–47.1)
HEMOGLOBIN: 10.1 G/DL (ref 11.9–15.1)
IMMATURE GRANULOCYTES: 1 %
LYMPHOCYTES # BLD: 10 % (ref 24–43)
MCH RBC QN AUTO: 26.5 PG (ref 25.2–33.5)
MCHC RBC AUTO-ENTMCNC: 29.5 G/DL (ref 28.4–34.8)
MCV RBC AUTO: 89.8 FL (ref 82.6–102.9)
MONOCYTES # BLD: 4 % (ref 3–12)
NRBC AUTOMATED: 0 PER 100 WBC
PATHOLOGIST: NORMAL
PATHOLOGIST: NORMAL
PDW BLD-RTO: 16.7 % (ref 11.8–14.4)
PLATELET # BLD: 266 K/UL (ref 138–453)
PLATELET ESTIMATE: ABNORMAL
PMV BLD AUTO: 10.7 FL (ref 8.1–13.5)
POTASSIUM SERPL-SCNC: 4.7 MMOL/L (ref 3.7–5.3)
PROTEIN ELECTROPHORESIS, SERUM: NORMAL
RBC # BLD: 3.81 M/UL (ref 3.95–5.11)
RBC # BLD: ABNORMAL 10*6/UL
SEG NEUTROPHILS: 85 % (ref 36–65)
SEGMENTED NEUTROPHILS ABSOLUTE COUNT: 6.86 K/UL (ref 1.5–8.1)
SERUM IFX INTERP: NORMAL
SODIUM BLD-SCNC: 135 MMOL/L (ref 135–144)
TOTAL PROT. SUM,%: 100 % (ref 98–102)
TOTAL PROT. SUM: 6.6 G/DL (ref 6.3–8.2)
TOTAL PROTEIN: 6.7 G/DL (ref 6.4–8.3)
WBC # BLD: 8 K/UL (ref 3.5–11.3)
WBC # BLD: ABNORMAL 10*3/UL

## 2020-09-04 PROCEDURE — 2580000003 HC RX 258: Performed by: CLINICAL NURSE SPECIALIST

## 2020-09-04 PROCEDURE — 36415 COLL VENOUS BLD VENIPUNCTURE: CPT

## 2020-09-04 PROCEDURE — 1200000000 HC SEMI PRIVATE

## 2020-09-04 PROCEDURE — 2500000003 HC RX 250 WO HCPCS: Performed by: INTERNAL MEDICINE

## 2020-09-04 PROCEDURE — 80048 BASIC METABOLIC PNL TOTAL CA: CPT

## 2020-09-04 PROCEDURE — 97116 GAIT TRAINING THERAPY: CPT

## 2020-09-04 PROCEDURE — 99233 SBSQ HOSP IP/OBS HIGH 50: CPT | Performed by: INTERNAL MEDICINE

## 2020-09-04 PROCEDURE — 6360000002 HC RX W HCPCS: Performed by: CLINICAL NURSE SPECIALIST

## 2020-09-04 PROCEDURE — 76937 US GUIDE VASCULAR ACCESS: CPT

## 2020-09-04 PROCEDURE — 99232 SBSQ HOSP IP/OBS MODERATE 35: CPT | Performed by: INTERNAL MEDICINE

## 2020-09-04 PROCEDURE — 97110 THERAPEUTIC EXERCISES: CPT

## 2020-09-04 PROCEDURE — 2580000003 HC RX 258: Performed by: INTERNAL MEDICINE

## 2020-09-04 PROCEDURE — 85025 COMPLETE CBC W/AUTO DIFF WBC: CPT

## 2020-09-04 PROCEDURE — 82947 ASSAY GLUCOSE BLOOD QUANT: CPT

## 2020-09-04 PROCEDURE — 6370000000 HC RX 637 (ALT 250 FOR IP): Performed by: CLINICAL NURSE SPECIALIST

## 2020-09-04 RX ADMIN — MORPHINE SULFATE 2 MG: 2 INJECTION, SOLUTION INTRAMUSCULAR; INTRAVENOUS at 02:33

## 2020-09-04 RX ADMIN — CLOPIDOGREL 75 MG: 75 TABLET, FILM COATED ORAL at 08:24

## 2020-09-04 RX ADMIN — MONTELUKAST SODIUM 10 MG: 10 TABLET, FILM COATED ORAL at 20:18

## 2020-09-04 RX ADMIN — COLCHICINE 0.6 MG: 0.6 TABLET, FILM COATED ORAL at 20:18

## 2020-09-04 RX ADMIN — MORPHINE SULFATE 2 MG: 2 INJECTION, SOLUTION INTRAMUSCULAR; INTRAVENOUS at 22:46

## 2020-09-04 RX ADMIN — SODIUM CHLORIDE: 9 INJECTION, SOLUTION INTRAVENOUS at 05:05

## 2020-09-04 RX ADMIN — ISOSORBIDE DINITRATE 10 MG: 10 TABLET ORAL at 20:18

## 2020-09-04 RX ADMIN — ASPIRIN 81 MG: 81 TABLET, CHEWABLE ORAL at 08:24

## 2020-09-04 RX ADMIN — LEVOTHYROXINE SODIUM 25 MCG: 25 TABLET ORAL at 08:25

## 2020-09-04 RX ADMIN — REMDESIVIR 100 MG: 100 INJECTION, POWDER, LYOPHILIZED, FOR SOLUTION INTRAVENOUS at 12:06

## 2020-09-04 RX ADMIN — DULOXETINE HYDROCHLORIDE 60 MG: 30 CAPSULE, DELAYED RELEASE ORAL at 08:24

## 2020-09-04 RX ADMIN — ONDANSETRON 4 MG: 2 INJECTION INTRAMUSCULAR; INTRAVENOUS at 18:46

## 2020-09-04 RX ADMIN — BUPROPION HYDROCHLORIDE 150 MG: 150 TABLET, EXTENDED RELEASE ORAL at 08:24

## 2020-09-04 RX ADMIN — MORPHINE SULFATE 2 MG: 2 INJECTION, SOLUTION INTRAMUSCULAR; INTRAVENOUS at 08:25

## 2020-09-04 RX ADMIN — OXYBUTYNIN CHLORIDE 5 MG: 5 TABLET ORAL at 20:18

## 2020-09-04 RX ADMIN — METOPROLOL TARTRATE 50 MG: 50 TABLET, FILM COATED ORAL at 08:25

## 2020-09-04 RX ADMIN — ISOSORBIDE DINITRATE 10 MG: 10 TABLET ORAL at 08:25

## 2020-09-04 RX ADMIN — ONDANSETRON 4 MG: 2 INJECTION INTRAMUSCULAR; INTRAVENOUS at 02:33

## 2020-09-04 RX ADMIN — PANTOPRAZOLE SODIUM 40 MG: 40 TABLET, DELAYED RELEASE ORAL at 08:25

## 2020-09-04 RX ADMIN — ATORVASTATIN CALCIUM 80 MG: 80 TABLET, FILM COATED ORAL at 20:17

## 2020-09-04 RX ADMIN — SODIUM CHLORIDE, PRESERVATIVE FREE 10 ML: 5 INJECTION INTRAVENOUS at 08:33

## 2020-09-04 RX ADMIN — COLCHICINE 0.6 MG: 0.6 TABLET, FILM COATED ORAL at 08:24

## 2020-09-04 RX ADMIN — ALLOPURINOL 300 MG: 300 TABLET ORAL at 08:24

## 2020-09-04 RX ADMIN — ONDANSETRON 4 MG: 2 INJECTION INTRAMUSCULAR; INTRAVENOUS at 08:24

## 2020-09-04 RX ADMIN — OXYBUTYNIN CHLORIDE 5 MG: 5 TABLET ORAL at 08:25

## 2020-09-04 RX ADMIN — OXYCODONE HYDROCHLORIDE AND ACETAMINOPHEN 1 TABLET: 5; 325 TABLET ORAL at 14:27

## 2020-09-04 RX ADMIN — TRAZODONE HYDROCHLORIDE 100 MG: 100 TABLET ORAL at 20:18

## 2020-09-04 RX ADMIN — ENOXAPARIN SODIUM 40 MG: 40 INJECTION SUBCUTANEOUS at 08:24

## 2020-09-04 RX ADMIN — MORPHINE SULFATE 2 MG: 2 INJECTION, SOLUTION INTRAMUSCULAR; INTRAVENOUS at 18:46

## 2020-09-04 RX ADMIN — ENOXAPARIN SODIUM 40 MG: 40 INJECTION SUBCUTANEOUS at 20:17

## 2020-09-04 ASSESSMENT — PAIN SCALES - GENERAL
PAINLEVEL_OUTOF10: 10
PAINLEVEL_OUTOF10: 9
PAINLEVEL_OUTOF10: 10
PAINLEVEL_OUTOF10: 9

## 2020-09-04 NOTE — PLAN OF CARE
Problem: Airway Clearance - Ineffective  Goal: Achieve or maintain patent airway  Outcome: Ongoing     Problem: Gas Exchange - Impaired  Goal: Absence of hypoxia  Outcome: Ongoing  Goal: Promote optimal lung function  Outcome: Ongoing     Problem: Breathing Pattern - Ineffective  Goal: Ability to achieve and maintain a regular respiratory rate  Outcome: Ongoing     Problem:  Body Temperature -  Risk of, Imbalanced  Goal: Ability to maintain a body temperature within defined limits  Outcome: Ongoing  Goal: Will regain or maintain usual level of consciousness  Outcome: Ongoing  Goal: Complications related to the disease process, condition or treatment will be avoided or minimized  Outcome: Ongoing     Problem: Isolation Precautions - Risk of Spread of Infection  Goal: Prevent transmission of infection  Outcome: Ongoing     Problem: Nutrition Deficits  Goal: Optimize nutrtional status  Outcome: Ongoing     Problem: Risk for Fluid Volume Deficit  Goal: Maintain normal heart rhythm  Outcome: Ongoing  Goal: Maintain absence of muscle cramping  Outcome: Ongoing  Goal: Maintain normal serum potassium, sodium, calcium, phosphorus, and pH  Outcome: Ongoing     Problem: Loneliness or Risk for Loneliness  Goal: Demonstrate positive use of time alone when socialization is not possible  Outcome: Ongoing     Problem: Fatigue  Goal: Verbalize increase energy and improved vitality  Outcome: Ongoing     Problem: Patient Education: Go to Patient Education Activity  Goal: Patient/Family Education  Outcome: Ongoing     Problem: Pain:  Goal: Pain level will decrease  Description: Pain level will decrease  Outcome: Ongoing  Goal: Control of acute pain  Description: Control of acute pain  Outcome: Ongoing  Goal: Control of chronic pain  Description: Control of chronic pain  Outcome: Ongoing     Problem: Falls - Risk of:  Goal: Will remain free from falls  Description: Will remain free from falls  Outcome: Ongoing  Goal: Absence of physical injury  Description: Absence of physical injury  Outcome: Ongoing

## 2020-09-04 NOTE — PLAN OF CARE
Problem: Airway Clearance - Ineffective  Goal: Achieve or maintain patent airway  Outcome: Met This Shift     Problem: Gas Exchange - Impaired  Goal: Absence of hypoxia  Outcome: Met This Shift  Goal: Promote optimal lung function  Outcome: Met This Shift     Problem: Breathing Pattern - Ineffective  Goal: Ability to achieve and maintain a regular respiratory rate  Outcome: Met This Shift     Problem: Body Temperature -  Risk of, Imbalanced  Goal: Ability to maintain a body temperature within defined limits  Outcome: Met This Shift  Goal: Will regain or maintain usual level of consciousness  Outcome: Met This Shift  Goal: Complications related to the disease process, condition or treatment will be avoided or minimized  Outcome: Met This Shift     Problem: Isolation Precautions - Risk of Spread of Infection  Goal: Prevent transmission of infection  Outcome: Met This Shift     Problem: Nutrition Deficits  Goal: Optimize nutrtional status  Outcome: Met This Shift     Problem: Risk for Fluid Volume Deficit  Goal: Maintain normal heart rhythm  Outcome: Met This Shift  Goal: Maintain absence of muscle cramping  Outcome: Met This Shift  Goal: Maintain normal serum potassium, sodium, calcium, phosphorus, and pH  Outcome: Met This Shift     Problem: Loneliness or Risk for Loneliness  Goal: Demonstrate positive use of time alone when socialization is not possible  Outcome: Met This Shift     Problem: Fatigue  Goal: Verbalize increase energy and improved vitality  Outcome: Met This Shift     Problem: Patient Education: Go to Patient Education Activity  Goal: Patient/Family Education  Outcome: Met This Shift     Problem: Pain:  Description: Pain management should include both nonpharmacologic and pharmacologic interventions.   Goal: Pain level will decrease  Description: Pain level will decrease  Outcome: Met This Shift  Goal: Control of acute pain  Description: Control of acute pain  Outcome: Met This Shift  Goal: Control of chronic pain  Description: Control of chronic pain  Outcome: Met This Shift     Problem: Falls - Risk of:  Goal: Will remain free from falls  Description: Will remain free from falls  Outcome: Met This Shift  Goal: Absence of physical injury  Description: Absence of physical injury  Outcome: Met This Shift

## 2020-09-04 NOTE — PROGRESS NOTES
Physical Therapy  Facility/Department: [facility]  Daily Treatment Note  NAME: Helena Fang  : 1965  MRN: [id number]    Date of Service: 2020    Discharge Recommendations:  Patient would benefit from continued therapy after discharge   PT Equipment Recommendations  Equipment Needed: No  Other: Pt has rollator    Assessment   Body structures, Functions, Activity limitations: Decreased functional mobility ; Decreased strength;Decreased balance;Decreased endurance  Assessment: Pt grossly SBA for mobility, amb 16' w/RW, Increased RR into the 30's with minimal activity. O2 maintained 90% or > on 2L, per pt mobility limited at baseline d/t painful knees, pt would benefit from continued acute PT to address deficits. Prognosis: Good  Decision Making: Medium Complexity  PT Education: Plan of Care;PT Role;General Safety;Gait Training;Home Exercise Program  Patient Education: Breathing exercises  REQUIRES PT FOLLOW UP: Yes  Activity Tolerance  Activity Tolerance: Patient limited by fatigue;Patient limited by endurance     Patient Diagnosis(es): The primary encounter diagnosis was Nausea and vomiting, intractability of vomiting not specified, unspecified vomiting type. Diagnoses of Chest pain, unspecified type and Combined systolic and diastolic congestive heart failure, unspecified HF chronicity ([address] were also pertinent to this visit. has a past medical history of Arthritis, CHF (congestive heart failure) ([address] GERD (gastroesophageal reflux disease), Gout, History of heart attack, HLD (hyperlipidemia), Hypertension, Insomnia, and Osteoarthritis. has a past surgical history that includes back surgery; shoulder surgery (Right, ); knee surgery (Left, ); Cholecystectomy, laparoscopic; Knee arthroscopy (Right); Hysterectomy, total abdominal; and Neck surgery.     Restrictions  Restrictions/Precautions  Restrictions/Precautions: General Precautions, Fall Risk(Droplet Plus)  Required Braces or Orthoses?: Education & Training, Equipment Evaluation, Education, & procurement  Safety Devices  Type of devices: Call light within reach, Nurse notified, Gait belt, Patient at risk for falls, Left in chair, All fall risk precautions in place  Restraints  Initially in place: No     Therapy Time   Individual Concurrent Group Co-treatment   Time In 1008         Time Out 1037         Minutes 29          Time coded minutes Vidkjoycelyn [place] [facility], PTA

## 2020-09-04 NOTE — PROGRESS NOTES
[facility]  Office: [phone]  Barbara Flores DO, Trip Teresa DO, Jefferson Ramírez DO, Deatrice Oppenheim Blood, DO, Aurea Velasquez MD, Bradford Dc MD, Dejuan Fofana MD, Donna Juan MD, Juan C Norieag MD, Arnoldo Bailey MD, Tatum Diez MD, Nica Armstrong MD, Juana Galeas MD, Estephanie Wayne DO, Manish Paz MD, Marissa Cat MD, Ivon Hooker DO, Amanda James MD,  Ashley Mckeon DO, Vandana Hernandez MD, Rafael Prado MD, Sury Lee, DEIDRA, [facility], CNP, Cheo Cordova CNP, Radha Monte, CNS, Татьяна De León, CNP, Jin Berger, CNP, Carly Carnes, CNP, Rhina Vitale, CNP, Jacqueline Akins, CNP, Sury Hardy PA-C, Robert Lopez DNP, Galilea Galindo, CNP, Clementina Kulkarni, CNP, Symone Ramsey, CNP, Summer Fonseca, CNP, Jeff Hernandez, CNP         [facility]   [address] [facility]    Progress Note    9/4/2020    8:07 AM    Name:   Bryan Marquez  MRN:     [id number]     Acct:      [de-identified]   Room:   [facility] [place] [address] Day:  2  Admit Date:  9/2/2020  6:38 AM    PCP:   Jayshree Enciso PA-C  Code Status:  Full Code    Subjective:     C/C:   Chief Complaint   Patient presents with    Nausea    Emesis    Chest Pain     chest heavy x past 3 days     Interval History Status: improved. Patient was seen and evaluated at bedside this morning. Patient reports that she had a rough night. She still feeling short of breath. She still complains of loss of taste and smell. Brief History: This is a 68-year-old female with past medical history of CHF, CAD, hypertension who came in with complaints of nausea vomiting and chest pain for past 3 days. Patient was noted to have COVID-19 pneumonia. Patient was noted to be hypoxic with oxygen saturation in low 80s. CT of the chest was consistent with pulmonary opacities. Patient is being followed by infectious disease.     Review of Systems:     Constitutional:  negative for chills, fevers, sweats  Respiratory:  negative for cough, dyspnea on exertion, shortness of breath, wheezing  Cardiovascular:  negative for chest pain, chest pressure/discomfort, lower extremity edema, palpitations  Gastrointestinal:  negative for abdominal pain, constipation, diarrhea, nausea, vomiting  Neurological:  negative for dizziness, headache    Medications: Allergies: Allergies   Allergen Reactions    Entresto [Sacubitril-Valsartan] Other (See Comments)     Acute kidney injury    Food Swelling     peaches    Gabapentin Swelling    Tetracyclines & Related        Current Meds:   Scheduled Meds:    allopurinol  300 mg Oral Daily    aspirin  81 mg Oral Daily    atorvastatin  80 mg Oral Nightly    buPROPion  150 mg Oral QAM    clopidogrel  75 mg Oral Daily    colchicine  0.6 mg Oral BID    DULoxetine  60 mg Oral Daily    isosorbide dinitrate  10 mg Oral BID    levothyroxine  25 mcg Oral Daily    metoprolol tartrate  50 mg Oral BID    montelukast  10 mg Oral Nightly    oxybutynin  5 mg Oral BID    pantoprazole  40 mg Oral Daily    polyethylene glycol  17 g Oral Daily    sodium chloride flush  10 mL Intravenous 2 times per day    enoxaparin  40 mg Subcutaneous BID    remdesivir IVPB  100 mg Intravenous Q24H     Continuous Infusions:    sodium chloride 50 mL/hr at 09/04/20 0505     PRN Meds: albuterol sulfate HFA, albuterol sulfate HFA, oxyCODONE-acetaminophen, traZODone, sodium chloride flush, potassium chloride **OR** potassium alternative oral replacement **OR** potassium chloride, magnesium sulfate, acetaminophen **OR** acetaminophen, promethazine **OR** ondansetron, bisacodyl, morphine, sodium chloride    Data:     Past Medical History:   has a past medical history of Arthritis, CHF (congestive heart failure) ([facility] [address] GERD (gastroesophageal reflux disease), Gout, History of heart attack, HLD (hyperlipidemia), Hypertension, Insomnia, and Osteoarthritis.     Social History:   reports that she has never smoked. She has never used smokeless tobacco. She reports that she does not drink alcohol or use drugs. Family History:   Family History   Problem Relation Age of Onset   [facility] Other Mother     Diabetes Mother     Heart Disease Mother     Arthritis Mother     Kidney Disease Mother     Lupus Mother     Arthritis Sister     Diabetes Brother     Asthma Brother     Cancer Maternal Grandfather     Hypertension Sister     Breast Cancer Sister         35s    Breast Cancer Niece         30-35       Vitals:  /72   Pulse 89   Temp 98 °F (36.7 °C) (Oral)   Resp 28   Ht 5' 8\" (1.727 m)   Wt (!) 350 lb (158.8 kg)   SpO2 93%   BMI 53.22 kg/m²   Temp (24hrs), Av.1 °F (36.7 °C), Min:97.6 °F (36.4 °C), Max:98.6 °F (37 °C)    No results for input(s): POCGLU in the last 72 hours. I/O (24Hr):     Intake/Output Summary (Last 24 hours) at 2020 0807  Last data filed at 2020 0504  Gross per 24 hour   Intake 2323 ml   Output 1225 ml   Net 1098 ml       Labs:  Hematology:  Recent Labs     20  1603 09/03/20  1007   WBC 5.4  --  6.7   RBC 4.24  --  3.81*   HGB 11.3*  --  10.3*   HCT 37.5  --  34.2*   MCV 88.4  --  89.8   MCH 26.7  --  27.0   MCHC 30.1  --  30.1   RDW 16.3*  --  16.4*     --  238   MPV 10.9  --  10.4   CRP  --  88.6*  --    INR  --   --  0.9   DDIMER 1.11  --   --      Chemistry:  Recent Labs     20  1603 09/03/20  1007   *  --  138   K 4.2  --  4.9     --  101   CO2 23  --  23   GLUCOSE 142*  --  119*   BUN 15  --  29*   CREATININE 0.96*  --  1.56*   ANIONGAP 10  --  14   LABGLOM 60*  --  34*   GFRAA >60  --  41*   CALCIUM 8.5*  --  8.3*   PROBNP 341*  --   --    TROPHS 24* 22* 23*     Recent Labs     20  0728 20  1603 20  1007 20  1600   PROT 7.5  --  7.2 6.7   LABALBU 3.8  --  3.6  --    TSH  --   --  1.87  --    AST 22  --  20  --    ALT 18  --  16  --    LDH  --  221*  --   --    ALKPHOS 141* --  118*  --    BILITOT 0.34  --  0.36  --    LIPASE 19  --   --   --      ABG:No results found for: POCPH, PHART, PH, POCPCO2, FOV1SDG, PCO2, POCPO2, PO2ART, PO2, POCHCO3, MGS0CLR, HCO3, NBEA, PBEA, BEART, BE, THGBART, THB, IEY8GGM, MGOT3WJN, I6WSCVKZ, O2SAT, FIO2  No results found for: SPECIAL  No results found for: CULTURE    Radiology:  Xr Chest Portable    Result Date: 9/2/2020  Moderate vascular congestion     Ct Chest Pulmonary Embolism W Contrast    Result Date: 9/2/2020  No definite acute or chronic pulmonary embolism. Scattered pulmonary opacities throughout the lungs bilaterally consistent with pneumonia. Redemonstration of a heterogeneous soft tissue nodule inferior to the thyroid bed on the left that is not significantly changed in size or characteristics compared to prior examination.        Physical Examination:        General appearance:  alert, cooperative and no distress  Mental Status:  oriented to person, place and time and normal affect  Lungs:  clear to auscultation bilaterally, normal effort  Heart:  regular rate and rhythm, no murmur  Abdomen:  soft, nontender, nondistended, normal bowel sounds, no masses, hepatomegaly, splenomegaly  Extremities:  no edema, redness, tenderness in the calves  Skin:  no gross lesions, rashes, induration    Assessment:        Hospital Problems           Last Modified POA    * (Principal) Lower respiratory tract infection due to COVID-19 virus 9/2/2020 Yes    Therapeutic opioid-induced constipation (OIC) 9/2/2020 Yes    Opioid dependence, uncomplicated ([address] 2/0/9079 Yes    Chronic pain disorder 9/2/2020 Yes    Class 3 severe obesity due to excess calories with serious comorbidity and body mass index (BMI) of 45.0 to 49.9 in adult [facility]) 9/2/2020 Yes    Morbid obesity ([address] 9/2/2020 Yes    Hypertension 9/2/2020 Yes    Gout 9/2/2020 Yes    GERD (gastroesophageal reflux disease) 9/2/2020 Yes    Chronic combined systolic and diastolic heart failure (HCC) (Chronic)

## 2020-09-04 NOTE — PROGRESS NOTES
Occupational Therapy Not Seen Note    DATE: 2020  Name: Cecy Asher  : 1965  MRN: [id number]    Patient not available for Occupational Therapy due to:    Patient Declined: pt reports increased pain from headache    Next Scheduled Treatment: Re-check 2020    Electronically signed by CATHLEEN Pool on 2020 at 2:53 PM

## 2020-09-04 NOTE — PROGRESS NOTES
[facility] - Progress Note COVID 19 Patient  Today's Date and Time: 9/4/2020, 6:28 PM    Impression :   · COVID 19 Suspect  · COVID 19 Confirmed Infection 9-2-20  · COVID multifocal pneumonia  · Nausea   · Vomiting    Recommendations:   · Monitor off antibiotics  · Clinical Research will approach patient to explore if she qualifies for any of the COVID 19 treatment protocols. · Remdesivir started 9-3-20  · Will likely benefit from plasma. Discussed with patient. FACT sheet provided. Verbal consent obtained 9-3-20    Medical Decision Making/Summary/Discussion:9/4/2020     · Patient admitted with suspected COVID 19 infection  · Covid test confirmed positive. ·   Infection Control Recommendations   · Universal Precautions  · Airborne isolation  · Droplet Isolation    Antimicrobial Stewardship Recommendations     · Discontinuation of therapy  Coordination of Outpatient Care:   · Estimated Length of IV antimicrobials:TBD  · Patient will need Midline Catheter Insertion: TBD  · Patient will need PICC line Insertion: No  · Patient will need: Home IV , [place],  SNF,  LTAC:TBD  · Patient will need outpatient wound care:No    Chief complaint/reason for consultation:   · Concern for COVID infection      History of Present Illness:   Edelmira Ruano is a 60y.o.-year-old  female who was initially admitted on 9/2/2020. Patient seen at the request of Alan Smith. INITIAL HISTORY:    Patient presented through ER with complaints of having a heavy sensation in the chest for three days. Se reported chest pressure, SOB, cough with blood tinged sputum for three days and one day of nausea and emesis. She also described generalized bodyaches. She has a prior Hx of chronic pain syndrome following an accident in 2011. In the ER her 02 sat was in the low 80's. She required 02 supplementation. Her CXR showed moderate vascular congestion. Her chest CT showed scattered infiltrates suggestive of pneumonia. Her COVID 19 test was positive. Patient admitted because of concerns with COVID 19.    CURRENT EVALUATION : 9/4/2020    Afebrile  VS stable, Mild HTN    Discussed Remdesivir and plasma treatment. Patient gave oral consent. She is developing SUSU  Renal evaluation by Dr. Chriss roach. Patient receiving additional hydration leaving the Terrell catheter in place. Patient exhibiting respiratory distress. Yes  Respiratory secretions: Yes    Patient receiving supplemental oxygen. 4 -->2.5 L/min NC  02 sat 91-->95-->97 %  RR 17 -->16    QTc:           NEWS Score: 0-4 Low risk group; 5-6: Medium risk group; 7 or above: High risk group  Parameters 3 2 1 0 1 2 3   Age    < 65   ? 65   RR ? 8  9-11 12-20  21-24 ? 25   O2 Sats ? 91 92-93 94-95 ? 96      Suppl O2  Yes  No      SBP ? 90  101-110 111-219   ? 220   HR ? 40  41-50 51-90  111-130 ? 131   Consciousness    Alert   Drowsiness, lethargy, or confusion   Temperature ? 35.0 C (95.0 F)  35.1-36.0 C 95.1-96.9 F 36.1-38.0 C 97.0-100.4 F 38.1-39.0 C 100.5-102.3 F ? 39.1 C ? 102.4 F      NEWS Score:   9-2-20: 5 Moderate risk    Overall Daily Picture:    Worsening    Presence of secondary bacterial Infection:  Yes  No   Additional antibiotics:     Labs, X rays reviewed: 9/4/2020    BUN: 15-->29-->39  Cr:0.96-->1.56-->1.59    WBC:5.4-->6.7  Hb:11.3-->10.3  Plat: 211-->238    Absolute Neutrophils:4.7  Absolute Lymphocytes:0.49  Neutrophil/Lymphocyte Ratio: 5.2 High risk    CRP:88.6  Ferritin:  LDH: 221    Pro Calcitonin:  Pro   Troponin HS 24-->22    Cultures:  Urine:  ·   Blood:  ·   Sputum :  ·   Wound:       CXR:   · 9-2-20: Moderate vascular congestion  CAT:      Discussed with patient, RN, CC, IM. I have personally reviewed the past medical history, past surgical history, medications, social history, and family history, and I have updated the database accordingly.   Past Medical History:     Past Medical on file    Stress: Not on file   Relationships    Social connections     Talks on phone: Not on file     Gets together: Not on file     Attends Rastafarian service: Not on file     Active member of club or organization: Not on file     Attends meetings of clubs or organizations: Not on file     Relationship status: Not on file    Intimate partner violence     Fear of current or ex partner: Not on file     Emotionally abused: Not on file     Physically abused: Not on file     Forced sexual activity: Not on file   Other Topics Concern    Not on file   Social History Narrative    Not on file       Family History:     Family History   Problem Relation Age of Onset    Other Mother     Diabetes Mother     Heart Disease Mother     Arthritis Mother     Kidney Disease Mother     Lupus Mother     Arthritis Sister     Diabetes Brother     Asthma Brother     Cancer Maternal Grandfather     Hypertension Sister     Breast Cancer Sister         28s    Breast Cancer Niece         30-35        Allergies:   Entresto [sacubitril-valsartan]; Food; Gabapentin; and Tetracyclines & related     Review of Systems:     Constitutional: fevers,no  chills. Muscle aches  Head: No headaches  Eyes: No double vision or blurry vision. No conjunctival inflammation. ENT: No sore throat or runny nose. . No hearing loss, tinnitus or vertigo. Cardiovascular: chest pain. No palpitations. Has shortness of breath. COFFEY  Lung: shortness of breath, cough. Blood tinged sputum production  Abdomen: Nausea, vomiting, No diarrhea, or abdominal pain. Delwyn Scarce No cramps. Genitourinary: No increased urinary frequency, or dysuria. No hematuria. No suprapubic or CVA pain  Musculoskeletal: Generalized muscle aches. No joint effusions, swelling or deformities  Hematologic: No bleeding or bruising. Neurologic: No headache, weakness, numbness, or tingling. Integument: No rash, no ulcers. Psychiatric: No depression.    Endocrine: No polyuria, no polydipsia, no polyphagia. Physical Examination :     Patient Vitals for the past 8 hrs:   BP Temp Temp src Pulse Resp SpO2   09/04/20 1455 135/71 98.2 °F (36.8 °C) Oral 75 22 94 %   09/04/20 1205 117/71 98.9 °F (37.2 °C) Oral 71 28 95 %     General Appearance: Awake, alert, and in apparent distress from back pain. Obese  Head:  Normocephalic, no trauma  Eyes: Pupils equal, round, reactive to light and accommodation; extraocular movements intact; sclera anicteric; conjunctivae pink. No embolic phenomena. ENT: Oropharynx clear, without erythema, exudate, or thrush. No tenderness of sinuses. Mouth/throat: mucosa pink and moist. No lesions. Dentition in good repair. Neck:Supple, without lymphadenopathy. Thyroid normal, No bruits. Pulmonary/Chest: Clear to auscultation, without wheezes, rales, or rhonchi. No dullness to percussion. Cardiovascular: Regular rate and rhythm without murmurs, rubs, or gallops. Abdomen: Soft, non tender. Bowel sounds normal. No organomegaly  All four Extremities: No cyanosis, clubbing, edema, or effusions. Neurologic: No gross sensory or motor deficits. Skin: Warm and dry with good turgor. No signs of peripheral arterial or venous insufficiency. No ulcerations. No open wounds.     Medical Decision Making -Laboratory:   I have independently reviewed/ordered the following labs:    CBC with Differential:   Recent Labs     09/02/20 0728 09/03/20 1007 09/04/20  1131   WBC 5.4 6.7 8.0   HGB 11.3* 10.3* 10.1*   HCT 37.5 34.2* 34.2*    238 266   LYMPHOPCT 9*  --  10*   MONOPCT 3  --  4     BMP:   Recent Labs     09/03/20 1007 09/04/20  1131    135   K 4.9 4.7    100   CO2 23 23   BUN 29* 39*   CREATININE 1.56* 1.59*     Hepatic Function Panel:   Recent Labs     09/02/20  0728 09/03/20 1007 09/03/20  1600   PROT 7.5 7.2 6.7   LABALBU 3.8 3.6  --    BILITOT 0.34 0.36  --    ALKPHOS 141* 118*  --    ALT 18 16  --    AST 22 20  --      No results for input(s): RPR in the last 72 hours. No results for input(s): HIV in the last 72 hours. No results for input(s): BC in the last 72 hours. Lab Results   Component Value Date    MUCUS NOT REPORTED 09/02/2020    RBC 3.81 09/04/2020    TRICHOMONAS NOT REPORTED 09/02/2020    WBC 8.0 09/04/2020    YEAST NOT REPORTED 09/02/2020    TURBIDITY CLEAR 09/02/2020     Lab Results   Component Value Date    CREATININE 1.59 09/04/2020    GLUCOSE 111 09/04/2020       Medical Decision Making-Imaging:     EXAMINATION:    ONE XRAY VIEW OF THE CHEST         9/2/2020 7:34 am         COMPARISON:    October 2, 2019, chest examination         HISTORY:    ORDERING SYSTEM PROVIDED HISTORY: cp    TECHNOLOGIST PROVIDED HISTORY:    cp    Reason for Exam: chest pain         FINDINGS:    Stable mild cardiomegaly         Now moderate prominence and indistinctness of the pulmonary    vascularity/interstitial markings         Mild tortuosity of the thoracic aorta         Degenerative changes of the thoracic spine and shoulders              Impression    Moderate vascular congestion      EXAMINATION:    CTA OF THE CHEST 9/2/2020 8:15 am         TECHNIQUE:    CTA of the chest was performed after the administration of intravenous    contrast.  Multiplanar reformatted images are provided for review.  MIP    images are provided for review.  Dose modulation, iterative reconstruction,    and/or weight based adjustment of the mA/kV was utilized to reduce the    radiation dose to as low as reasonably achievable.         COMPARISON:    CT chest performed 10/02/2019.         HISTORY:    ORDERING SYSTEM PROVIDED HISTORY: sob    TECHNOLOGIST PROVIDED HISTORY:    sob    Reason for Exam: sob    Acuity: Acute    Type of Exam: Initial         FINDINGS:    Pulmonary Arteries: Pulmonary arteries are adequately opacified for    evaluation.  No evidence of intraluminal filling defect to suggest pulmonary    embolism.  Main pulmonary artery is normal in caliber.         Mediastinum: No evidence of mediastinal lymphadenopathy.  The heart is    enlarged.  There is no pericardial effusion. Gearline Andrew is no acute abnormality    of the thoracic aorta.  There is redemonstration of prior thyroidectomy with    a heterogeneous soft tissue nodule inferior to the thyroid bed on the left    this measures approximately 3.4 x 2.9 cm and is not significantly changed    compared to prior examination.         Lungs/pleura: There are scattered pulmonary opacities throughout the lungs    bilaterally.  There is no effusion. Gearline Andrew is no pneumothorax.  The    tracheobronchial tree is patent.         Upper Abdomen: Limited images of the upper abdomen are unremarkable.         Soft Tissues/Bones: No acute bone or soft tissue abnormality.              Impression    No definite acute or chronic pulmonary embolism.         Scattered pulmonary opacities throughout the lungs bilaterally consistent    with pneumonia.         Redemonstration of a heterogeneous soft tissue nodule inferior to the thyroid    bed on the left that is not significantly changed in size or characteristics    compared to prior examination.                   Order Details     Medical Decision [id number]:       Medical Decision Making-Other:     Note:  · Labs, medications, radiologic studies were reviewed with personal review of films  · Large amounts of data were reviewed  · Discussed with nursing Staff, Discharge planner  · Infection Control and Prevention measures reviewed  · All prior entries were reviewed  · Administer medications as ordered  · Prognosis: Guarded  · Discharge planning reviewed  · Follow up as outpatient. Thank you for allowing us to participate in the care of this patient. Please call with questions.     Harley Ambrose MD  Pager: [phone] - Office: [phone]

## 2020-09-04 NOTE — PROGRESS NOTES
Renal Progress Note    Patient :  Urban Pradeep; 60 y.o. MRN# [id number]  Location:  [facility]/6706-58  Attending:  Amparo Chang MD  Admit Date:  9/2/2020   Hospital Day: 2      Subjective:     Oral intake good. Still complaining of loss of taste. IV fluids continue, Terrell draining well. Urine output good. Hemodynamically stable. Continues on remedesvir and convalescent plasma. No shortness of breath orthopnea. No fever or chills. Labs from today pending. Hemoglobin stable white count stable. No headaches. No cough or phlegm or hemoptysis.     Outpatient Medications:     Medications Prior to Admission: SUPREP BOWEL PREP KIT 17.5-3.13-1.6 GM/177ML SOLN, Take as directed - dispense one Kit  sucralfate (CARAFATE) 1 GM tablet, Take 1 tablet by mouth 3 times daily for 7 days  metoprolol tartrate (LOPRESSOR) 50 MG tablet, Take 1 tablet by mouth 2 times daily  atorvastatin (LIPITOR) 80 MG tablet, Take 1 tablet by mouth daily  DULoxetine (CYMBALTA) 60 MG extended release capsule, Take 1 capsule by mouth daily  buPROPion (WELLBUTRIN XL) 150 MG extended release tablet, Take 1 tablet by mouth every morning  spironolactone (ALDACTONE) 25 MG tablet, Take 1 tablet by mouth daily  bumetanide (BUMEX) 2 MG tablet, Take 1 tablet by mouth daily  clopidogrel (PLAVIX) 75 MG tablet, Take 1 tablet by mouth daily  pantoprazole (PROTONIX) 40 MG tablet, Take 1 tablet by mouth daily  levothyroxine (SYNTHROID) 25 MCG tablet, Take 1 tablet by mouth daily  montelukast (SINGULAIR) 10 MG tablet, Take 1 tablet by mouth nightly  allopurinol (ZYLOPRIM) 300 MG tablet, Take 1 tablet by mouth daily  Handicap Placard MISC, by Does not apply route 2 years  albuterol sulfate  (90 Base) MCG/ACT inhaler, Inhale 2 puffs into the lungs every 4 hours as needed for Wheezing  ibuprofen (ADVIL;MOTRIN) 800 MG tablet, Take 1 tablet by mouth every 8 hours as needed for Pain  oxyCODONE-acetaminophen (PERCOCET) 7.5-325 MG per tablet, Take 1 tablet by mouth every 6 hours as needed for Pain for up to 30 days.  Intended supply: 30 days  traZODone (DESYREL) 50 MG tablet, TAKE 2 TO 3 TABLETS BY MOUTH NIGHTLY AT BEDTIME AS NEEDED FOR INSOMNIA  ELDERBERRY PO, Take by mouth  oxybutynin (DITROPAN) 5 MG tablet, TAKE 1 TABLET BY MOUTH TWICE DAILY  colchicine (COLCRYS) 0.6 MG tablet, Take 1 tablet by mouth 2 times daily  naproxen (NAPROSYN) 500 MG tablet, Take 1 tablet by mouth 2 times daily (with meals) (Patient not taking: Reported on 8/26/2020)  Respiratory Therapy Supplies (NEBULIZER COMPRESSOR) KIT, Provide insurance covered nebulizer, use daily as needed  polyethylene glycol (GLYCOLAX) packet, polyethylene glycol 3350 17 gram/dose oral powder  albuterol (PROVENTIL) (2.5 MG/3ML) 0.083% nebulizer solution, Take 3 mLs by nebulization every 6 hours as needed for Wheezing  isosorbide dinitrate (ISORDIL) 10 MG tablet, Take 1 tablet by mouth 2 times daily  aspirin 81 MG tablet, Take 81 mg by mouth daily    Current Medications:     Scheduled Meds:    allopurinol  300 mg Oral Daily    aspirin  81 mg Oral Daily    atorvastatin  80 mg Oral Nightly    buPROPion  150 mg Oral QAM    clopidogrel  75 mg Oral Daily    colchicine  0.6 mg Oral BID    DULoxetine  60 mg Oral Daily    isosorbide dinitrate  10 mg Oral BID    levothyroxine  25 mcg Oral Daily    metoprolol tartrate  50 mg Oral BID    montelukast  10 mg Oral Nightly    oxybutynin  5 mg Oral BID    pantoprazole  40 mg Oral Daily    polyethylene glycol  17 g Oral Daily    sodium chloride flush  10 mL Intravenous 2 times per day    enoxaparin  40 mg Subcutaneous BID    remdesivir IVPB  100 mg Intravenous Q24H     Continuous Infusions:    sodium chloride 50 mL/hr at 09/04/20 0505     PRN Meds:  albuterol sulfate HFA, albuterol sulfate HFA, oxyCODONE-acetaminophen, traZODone, sodium chloride flush, potassium chloride **OR** potassium alternative oral replacement **OR** potassium chloride, magnesium sulfate, acetaminophen **OR** acetaminophen, promethazine **OR** ondansetron, bisacodyl, morphine, sodium chloride    Input/Output:       I/O last 3 completed shifts: In: 9163 [P.O.:1425; I.V.:898]  Out: 1225 [Urine:1225]. Patient Vitals for the past 96 hrs (Last 3 readings):   Weight   20 0644 (!) 350 lb (158.8 kg)       Vital Signs:   Temperature:  Temp: 98 °F (36.7 °C)  TMax:   Temp (24hrs), Av.1 °F (36.7 °C), Min:97.6 °F (36.4 °C), Max:98.6 °F (37 °C)    Respirations:  Resp: 28  Pulse:   Pulse: 88  BP:    BP: (!) 146/70  BP Range: Systolic (60AAS), HFW:989 , Min:93 , HHS:116       Diastolic (42SZN), LZ, Min:41, Max:72      Physical Examination:     General:  AAO x 3, speaking in full sentences, no accessory muscle use. HEENT: Atraumatic, normocephalic, no throat congestion, moist mucosa. Eyes:   Pupils equal, round and reactive to light, EOMI. Neck:   No JVD, no thyromegaly, no lymphadenopathy. Chest:  Bilateral vesicular breath sounds, no rales or wheezes. Cardiac:  S1 S2 RR, no murmurs, gallops or rubs, JVP not raised. Abdomen: Soft, non-tender, no masses or organomegaly, BS audible. :   No suprapubic or flank tenderness. Neuro:  AAO x 3, No FND. SKIN:  No rashes, good skin turgor. Extremities:  No edema, palpable peripheral pulses, no calf tenderness. Labs:       Recent Labs     20  0728 20  1007 20  1131   WBC 5.4 6.7 8.0   RBC 4.24 3.81* 3.81*   HGB 11.3* 10.3* 10.1*   HCT 37.5 34.2* 34.2*   MCV 88.4 89.8 89.8   MCH 26.7 27.0 26.5   MCHC 30.1 30.1 29.5   RDW 16.3* 16.4* 16.7*    238 266   MPV 10.9 10.4 10.7      BMP:   Recent Labs     20  1007   * 138   K 4.2 4.9    101   CO2 23 23   BUN 15 29*   CREATININE 0.96* 1.56*   GLUCOSE 142* 119*   CALCIUM 8.5* 8.3*      Phosphorus:   No results for input(s): PHOS in the last 72 hours. Magnesium:  No results for input(s): MG in the last 72 hours.   Albumin:    Recent Labs     20 Creatinine:     Lab Results   Component Value Date    LABCREA 247.8 09/03/2020     Urine Eosinophils:  No components found for: UEOS    Radiology:     CXR:     Assessment:     1. Acute Kidney Injury: Secondary to nephrotoxic ATN from contrast exposure. Patient underwent CT angiogram 9/2/2020. Baseline 0.9 peaked up to 1.6, nonoliguric appears to be resolving labs from today pending. Patient also was on NSAIDs underlying ATN or prerenal azotemia from NSAIDs being considered as well. 2.  COVID-19 pneumonia with some respiratory frequency no evidence of cytokine storm  3. Morbid obesity  4. Dilated cardiomyopathy ejection fraction 40%    Plan:   1. Continue IV fluids normal saline at 50 mils an hour  2. Leave Terrell in for now  3. No need for diuretics  4. Daily BMP  5. We will follow with you    Nutrition   Please ensure that patient is on a renal diet/TF. Avoid nephrotoxic drugs/contrast exposure. We will continue to follow along with you.

## 2020-09-05 LAB
ABSOLUTE EOS #: <0.03 K/UL (ref 0–0.44)
ABSOLUTE IMMATURE GRANULOCYTE: <0.03 K/UL (ref 0–0.3)
ABSOLUTE LYMPH #: 1 K/UL (ref 1.1–3.7)
ABSOLUTE MONO #: 0.19 K/UL (ref 0.1–1.2)
ANION GAP SERPL CALCULATED.3IONS-SCNC: 7 MMOL/L (ref 9–17)
BASOPHILS # BLD: 0 % (ref 0–2)
BASOPHILS ABSOLUTE: <0.03 K/UL (ref 0–0.2)
BUN BLDV-MCNC: 25 MG/DL (ref 6–20)
BUN/CREAT BLD: ABNORMAL (ref 9–20)
CALCIUM SERPL-MCNC: 7.7 MG/DL (ref 8.6–10.4)
CHLORIDE BLD-SCNC: 102 MMOL/L (ref 98–107)
CO2: 25 MMOL/L (ref 20–31)
CREAT SERPL-MCNC: 1.02 MG/DL (ref 0.5–0.9)
DIFFERENTIAL TYPE: ABNORMAL
EOSINOPHILS RELATIVE PERCENT: 1 % (ref 1–4)
GFR AFRICAN AMERICAN: >60 ML/MIN
GFR NON-AFRICAN AMERICAN: 56 ML/MIN
GFR SERPL CREATININE-BSD FRML MDRD: ABNORMAL ML/MIN/{1.73_M2}
GFR SERPL CREATININE-BSD FRML MDRD: ABNORMAL ML/MIN/{1.73_M2}
GLUCOSE BLD-MCNC: 109 MG/DL (ref 70–99)
HCT VFR BLD CALC: 31.9 % (ref 36.3–47.1)
HEMOGLOBIN: 9.3 G/DL (ref 11.9–15.1)
IMMATURE GRANULOCYTES: 0 %
LYMPHOCYTES # BLD: 31 % (ref 24–43)
MCH RBC QN AUTO: 26.7 PG (ref 25.2–33.5)
MCHC RBC AUTO-ENTMCNC: 29.2 G/DL (ref 28.4–34.8)
MCV RBC AUTO: 91.7 FL (ref 82.6–102.9)
MONOCYTES # BLD: 6 % (ref 3–12)
NRBC AUTOMATED: 0 PER 100 WBC
PDW BLD-RTO: 16.8 % (ref 11.8–14.4)
PLATELET # BLD: 191 K/UL (ref 138–453)
PLATELET ESTIMATE: ABNORMAL
PMV BLD AUTO: 10.5 FL (ref 8.1–13.5)
POTASSIUM SERPL-SCNC: 4.5 MMOL/L (ref 3.7–5.3)
RBC # BLD: 3.48 M/UL (ref 3.95–5.11)
RBC # BLD: ABNORMAL 10*6/UL
SEG NEUTROPHILS: 62 % (ref 36–65)
SEGMENTED NEUTROPHILS ABSOLUTE COUNT: 1.97 K/UL (ref 1.5–8.1)
SODIUM BLD-SCNC: 134 MMOL/L (ref 135–144)
WBC # BLD: 3.2 K/UL (ref 3.5–11.3)
WBC # BLD: ABNORMAL 10*3/UL

## 2020-09-05 PROCEDURE — 99232 SBSQ HOSP IP/OBS MODERATE 35: CPT | Performed by: INTERNAL MEDICINE

## 2020-09-05 PROCEDURE — 85025 COMPLETE CBC W/AUTO DIFF WBC: CPT

## 2020-09-05 PROCEDURE — 6370000000 HC RX 637 (ALT 250 FOR IP): Performed by: CLINICAL NURSE SPECIALIST

## 2020-09-05 PROCEDURE — 1200000000 HC SEMI PRIVATE

## 2020-09-05 PROCEDURE — 99233 SBSQ HOSP IP/OBS HIGH 50: CPT | Performed by: INTERNAL MEDICINE

## 2020-09-05 PROCEDURE — 2700000000 HC OXYGEN THERAPY PER DAY

## 2020-09-05 PROCEDURE — 6360000002 HC RX W HCPCS: Performed by: CLINICAL NURSE SPECIALIST

## 2020-09-05 PROCEDURE — 80048 BASIC METABOLIC PNL TOTAL CA: CPT

## 2020-09-05 PROCEDURE — 2500000003 HC RX 250 WO HCPCS: Performed by: INTERNAL MEDICINE

## 2020-09-05 PROCEDURE — 94761 N-INVAS EAR/PLS OXIMETRY MLT: CPT

## 2020-09-05 PROCEDURE — 36430 TRANSFUSION BLD/BLD COMPNT: CPT

## 2020-09-05 PROCEDURE — 2580000003 HC RX 258: Performed by: INTERNAL MEDICINE

## 2020-09-05 PROCEDURE — 36415 COLL VENOUS BLD VENIPUNCTURE: CPT

## 2020-09-05 RX ORDER — BUMETANIDE 1 MG/1
1 TABLET ORAL DAILY
Status: DISCONTINUED | OUTPATIENT
Start: 2020-09-06 | End: 2020-09-09 | Stop reason: HOSPADM

## 2020-09-05 RX ADMIN — ONDANSETRON 4 MG: 2 INJECTION INTRAMUSCULAR; INTRAVENOUS at 12:09

## 2020-09-05 RX ADMIN — ISOSORBIDE DINITRATE 10 MG: 10 TABLET ORAL at 09:46

## 2020-09-05 RX ADMIN — COLCHICINE 0.6 MG: 0.6 TABLET, FILM COATED ORAL at 09:46

## 2020-09-05 RX ADMIN — OXYBUTYNIN CHLORIDE 5 MG: 5 TABLET ORAL at 21:40

## 2020-09-05 RX ADMIN — LEVOTHYROXINE SODIUM 25 MCG: 25 TABLET ORAL at 09:46

## 2020-09-05 RX ADMIN — ISOSORBIDE DINITRATE 10 MG: 10 TABLET ORAL at 21:40

## 2020-09-05 RX ADMIN — ATORVASTATIN CALCIUM 80 MG: 80 TABLET, FILM COATED ORAL at 21:40

## 2020-09-05 RX ADMIN — ENOXAPARIN SODIUM 40 MG: 40 INJECTION SUBCUTANEOUS at 09:45

## 2020-09-05 RX ADMIN — COLCHICINE 0.6 MG: 0.6 TABLET, FILM COATED ORAL at 21:41

## 2020-09-05 RX ADMIN — PANTOPRAZOLE SODIUM 40 MG: 40 TABLET, DELAYED RELEASE ORAL at 09:46

## 2020-09-05 RX ADMIN — CLOPIDOGREL 75 MG: 75 TABLET, FILM COATED ORAL at 09:46

## 2020-09-05 RX ADMIN — BUPROPION HYDROCHLORIDE 150 MG: 150 TABLET, EXTENDED RELEASE ORAL at 09:46

## 2020-09-05 RX ADMIN — MONTELUKAST SODIUM 10 MG: 10 TABLET, FILM COATED ORAL at 21:42

## 2020-09-05 RX ADMIN — OXYBUTYNIN CHLORIDE 5 MG: 5 TABLET ORAL at 09:46

## 2020-09-05 RX ADMIN — METOPROLOL TARTRATE 50 MG: 50 TABLET, FILM COATED ORAL at 09:46

## 2020-09-05 RX ADMIN — MORPHINE SULFATE 2 MG: 2 INJECTION, SOLUTION INTRAMUSCULAR; INTRAVENOUS at 06:09

## 2020-09-05 RX ADMIN — ONDANSETRON 4 MG: 2 INJECTION INTRAMUSCULAR; INTRAVENOUS at 21:49

## 2020-09-05 RX ADMIN — POLYETHYLENE GLYCOL 3350 17 G: 17 POWDER, FOR SOLUTION ORAL at 09:45

## 2020-09-05 RX ADMIN — DULOXETINE HYDROCHLORIDE 60 MG: 30 CAPSULE, DELAYED RELEASE ORAL at 09:46

## 2020-09-05 RX ADMIN — ENOXAPARIN SODIUM 40 MG: 40 INJECTION SUBCUTANEOUS at 21:42

## 2020-09-05 RX ADMIN — ALLOPURINOL 300 MG: 300 TABLET ORAL at 09:45

## 2020-09-05 RX ADMIN — ASPIRIN 81 MG: 81 TABLET, CHEWABLE ORAL at 09:45

## 2020-09-05 RX ADMIN — OXYCODONE HYDROCHLORIDE AND ACETAMINOPHEN 1 TABLET: 5; 325 TABLET ORAL at 21:49

## 2020-09-05 RX ADMIN — MORPHINE SULFATE 2 MG: 2 INJECTION, SOLUTION INTRAMUSCULAR; INTRAVENOUS at 12:32

## 2020-09-05 RX ADMIN — ONDANSETRON 4 MG: 2 INJECTION INTRAMUSCULAR; INTRAVENOUS at 06:09

## 2020-09-05 RX ADMIN — TRAZODONE HYDROCHLORIDE 100 MG: 100 TABLET ORAL at 21:41

## 2020-09-05 RX ADMIN — REMDESIVIR 100 MG: 100 INJECTION, POWDER, LYOPHILIZED, FOR SOLUTION INTRAVENOUS at 13:35

## 2020-09-05 ASSESSMENT — PAIN SCALES - GENERAL
PAINLEVEL_OUTOF10: 10
PAINLEVEL_OUTOF10: 9
PAINLEVEL_OUTOF10: 9

## 2020-09-05 NOTE — PROGRESS NOTES
[facility] - Progress Note COVID 19 Patient  Today's Date and Time: 9/5/2020, 6:20 PM    Impression :   · COVID 19 Suspect  · COVID 19 Confirmed Infection 9-2-20  · COVID multifocal pneumonia  · Nausea   · Vomiting    Recommendations:   · Monitor off antibiotics  · Clinical Research will approach patient to explore if she qualifies for any of the COVID 19 treatment protocols. · Remdesivir started 9-3-20  · Will likely benefit from plasma. Discussed with patient. FACT sheet provided. Verbal consent obtained 9-3-20    Medical Decision Making/Summary/Discussion:9/5/2020     · Patient admitted with suspected COVID 19 infection  · Covid test confirmed positive. ·   Infection Control Recommendations   · Universal Precautions  · Airborne isolation  · Droplet Isolation    Antimicrobial Stewardship Recommendations     · Discontinuation of therapy  Coordination of Outpatient Care:   · Estimated Length of IV antimicrobials:TBD  · Patient will need Midline Catheter Insertion: TBD  · Patient will need PICC line Insertion: No  · Patient will need: Home IV , [place],  SNF,  LTAC:TBD  · Patient will need outpatient wound care:No    Chief complaint/reason for consultation:   · Concern for COVID infection      History of Present Illness:   Porter Rust is a 60y.o.-year-old  female who was initially admitted on 9/2/2020. Patient seen at the request of Jorge Starkey. INITIAL HISTORY:    Patient presented through ER with complaints of having a heavy sensation in the chest for three days. Se reported chest pressure, SOB, cough with blood tinged sputum for three days and one day of nausea and emesis. She also described generalized bodyaches. She has a prior Hx of chronic pain syndrome following an accident in 2011. In the ER her 02 sat was in the low 80's. She required 02 supplementation. Her CXR showed moderate vascular congestion. Her chest CT showed scattered infiltrates suggestive of pneumonia. Her COVID 19 test was positive. Patient admitted because of concerns with COVID 19.    CURRENT EVALUATION : 9/5/2020    Afebrile  VS stable, Mild HTN    Discussed Remdesivir and plasma treatment. Patient gave oral consent on 9-4-20  She is developing SUSU  Renal evaluation by Dr. Akbar Lopez appreciated. Patient receiving additional hydration leaving the Terrell catheter in place. Patient exhibiting respiratory distress. Yes  Respiratory secretions: Yes    Patient receiving supplemental oxygen. 4 -->2.5 L/min NC  02 sat 91-->95-->97 %  RR 17 -->16    QTc:           NEWS Score: 0-4 Low risk group; 5-6: Medium risk group; 7 or above: High risk group  Parameters 3 2 1 0 1 2 3   Age    < 65   ? 65   RR ? 8  9-11 12-20  21-24 ? 25   O2 Sats ? 91 92-93 94-95 ? 96      Suppl O2  Yes  No      SBP ? 90  101-110 111-219   ? 220   HR ? 40  41-50 51-90  111-130 ? 131   Consciousness    Alert   Drowsiness, lethargy, or confusion   Temperature ? 35.0 C (95.0 F)  35.1-36.0 C 95.1-96.9 F 36.1-38.0 C 97.0-100.4 F 38.1-39.0 C 100.5-102.3 F ? 39.1 C ? 102.4 F      NEWS Score:   9-2-20: 5 Moderate risk    Overall Daily Picture:    Worsening    Presence of secondary bacterial Infection:  Yes  No   Additional antibiotics:     Labs, X rays reviewed: 9/5/2020    BUN: 15-->29-->39  Cr:0.96-->1.56-->1.59    WBC:5.4-->6.7  Hb:11.3-->10.3  Plat: 211-->238    Absolute Neutrophils:4.7  Absolute Lymphocytes:0.49  Neutrophil/Lymphocyte Ratio: 5.2 High risk    CRP:88.6  Ferritin:  LDH: 221    Pro Calcitonin:  Pro   Troponin HS 24-->22    Cultures:  Urine:  ·   Blood:  ·   Sputum :  ·   Wound:       CXR:   · 9-2-20: Moderate vascular congestion  CAT:      Discussed with patient, RN, CC, IM. I have personally reviewed the past medical history, past surgical history, medications, social history, and family history, and I have updated the database accordingly.   Past Medical History:     Past Medical History:   Diagnosis Date    Arthritis     CHF (congestive heart failure) (HCC)     GERD (gastroesophageal reflux disease)     Gout 10/2019    History of heart attack     HLD (hyperlipidemia)     Hypertension     Insomnia     Osteoarthritis        Past Surgical  History:     Past Surgical History:   Procedure Laterality Date    BACK SURGERY      CHOLECYSTECTOMY, LAPAROSCOPIC      HYSTERECTOMY, TOTAL ABDOMINAL      KNEE ARTHROSCOPY Right     KNEE SURGERY Left 2009    NECK SURGERY      SHOULDER SURGERY Right 2009       Medications:      [START ON 9/6/2020] bumetanide  1 mg Oral Daily    allopurinol  300 mg Oral Daily    aspirin  81 mg Oral Daily    atorvastatin  80 mg Oral Nightly    buPROPion  150 mg Oral QAM    clopidogrel  75 mg Oral Daily    colchicine  0.6 mg Oral BID    DULoxetine  60 mg Oral Daily    isosorbide dinitrate  10 mg Oral BID    levothyroxine  25 mcg Oral Daily    metoprolol tartrate  50 mg Oral BID    montelukast  10 mg Oral Nightly    oxybutynin  5 mg Oral BID    pantoprazole  40 mg Oral Daily    polyethylene glycol  17 g Oral Daily    sodium chloride flush  10 mL Intravenous 2 times per day    enoxaparin  40 mg Subcutaneous BID    remdesivir IVPB  100 mg Intravenous Q24H       Social History:     Social History     Socioeconomic History    Marital status: Single     Spouse name: Not on file    Number of children: Not on file    Years of education: Not on file    Highest education level: Not on file   Occupational History    Not on file   Social Needs    Financial resource strain: Not on file    Food insecurity     Worry: Not on file     Inability: Not on file    Transportation needs     Medical: Not on file     Non-medical: Not on file   Tobacco Use    Smoking status: Never Smoker    Smokeless tobacco: Never Used   Substance and Sexual Activity    Alcohol use: No    Drug use: No    Sexual activity: Not on file   Lifestyle    Physical activity Days per week: Not on file     Minutes per session: Not on file    Stress: Not on file   Relationships    Social connections     Talks on phone: Not on file     Gets together: Not on file     Attends Confucianist service: Not on file     Active member of club or organization: Not on file     Attends meetings of clubs or organizations: Not on file     Relationship status: Not on file    Intimate partner violence     Fear of current or ex partner: Not on file     Emotionally abused: Not on file     Physically abused: Not on file     Forced sexual activity: Not on file   Other Topics Concern    Not on file   Social History Narrative    Not on file       Family History:     Family History   Problem Relation Age of Onset    Other Mother     Diabetes Mother     Heart Disease Mother     Arthritis Mother     Kidney Disease Mother     Lupus Mother     Arthritis Sister     Diabetes Brother     Asthma Brother     Cancer Maternal Grandfather     Hypertension Sister     Breast Cancer Sister         28s    Breast Cancer Niece         30-35        Allergies:   Entresto [sacubitril-valsartan]; Food; Gabapentin; and Tetracyclines & related     Review of Systems:     Constitutional: fevers,no  chills. Muscle aches  Head: No headaches  Eyes: No double vision or blurry vision. No conjunctival inflammation. ENT: No sore throat or runny nose. . No hearing loss, tinnitus or vertigo. Cardiovascular: chest pain. No palpitations. Has shortness of breath. COFFEY  Lung: shortness of breath, cough. Blood tinged sputum production  Abdomen: Nausea, vomiting, No diarrhea, or abdominal pain. [place] No cramps. Genitourinary: No increased urinary frequency, or dysuria. No hematuria. No suprapubic or CVA pain  Musculoskeletal: Generalized muscle aches. No joint effusions, swelling or deformities  Hematologic: No bleeding or bruising. Neurologic: No headache, weakness, numbness, or tingling. Integument: No rash, no ulcers.   Psychiatric: No depression. Endocrine: No polyuria, no polydipsia, no polyphagia. Physical Examination :     Patient Vitals for the past 8 hrs:   BP Temp Temp src Pulse Resp SpO2   09/05/20 1326 122/71 98.1 °F (36.7 °C) Oral 68 17 97 %   09/05/20 1115 (!) 109/53 -- -- 61 22 --   09/05/20 1111 (!) 109/53 97.6 °F (36.4 °C) Oral 61 21 96 %   09/05/20 1051 (!) 101/53 98.2 °F (36.8 °C) -- 61 22 94 %     General Appearance: Awake, alert, and in apparent distress from back pain. Obese  Head:  Normocephalic, no trauma  Eyes: Pupils equal, round, reactive to light and accommodation; extraocular movements intact; sclera anicteric; conjunctivae pink. No embolic phenomena. ENT: Oropharynx clear, without erythema, exudate, or thrush. No tenderness of sinuses. Mouth/throat: mucosa pink and moist. No lesions. Dentition in good repair. Neck:Supple, without lymphadenopathy. Thyroid normal, No bruits. Pulmonary/Chest: Clear to auscultation, without wheezes, rales, or rhonchi. No dullness to percussion. Cardiovascular: Regular rate and rhythm without murmurs, rubs, or gallops. Abdomen: Soft, non tender. Bowel sounds normal. No organomegaly  All four Extremities: No cyanosis, clubbing, edema, or effusions. Neurologic: No gross sensory or motor deficits. Skin: Warm and dry with good turgor. No signs of peripheral arterial or venous insufficiency. No ulcerations. No open wounds.     Medical Decision Making -Laboratory:   I have independently reviewed/ordered the following labs:    CBC with Differential:   Recent Labs     09/04/20  1131 09/05/20  0652   WBC 8.0 3.2*   HGB 10.1* 9.3*   HCT 34.2* 31.9*    191   LYMPHOPCT 10* 31   MONOPCT 4 6     BMP:   Recent Labs     09/04/20  1131 09/05/20  0652    134*   K 4.7 4.5    102   CO2 23 25   BUN 39* 25*   CREATININE 1.59* 1.02*     Hepatic Function Panel:   Recent Labs     09/03/20  1007 09/03/20  1600   PROT 7.2 6.7   LABALBU 3.6  --    BILITOT 0.36  --    ALKPHOS 118*  -- pulmonary artery is normal in caliber.         Mediastinum: No evidence of mediastinal lymphadenopathy.  The heart is    enlarged.  There is no pericardial effusion. Brena Sovereign is no acute abnormality    of the thoracic aorta.  There is redemonstration of prior thyroidectomy with    a heterogeneous soft tissue nodule inferior to the thyroid bed on the left    this measures approximately 3.4 x 2.9 cm and is not significantly changed    compared to prior examination.         Lungs/pleura: There are scattered pulmonary opacities throughout the lungs    bilaterally.  There is no effusion. Brena Sovereign is no pneumothorax.  The    tracheobronchial tree is patent.         Upper Abdomen: Limited images of the upper abdomen are unremarkable.         Soft Tissues/Bones: No acute bone or soft tissue abnormality.              Impression    No definite acute or chronic pulmonary embolism.         Scattered pulmonary opacities throughout the lungs bilaterally consistent    with pneumonia.         Redemonstration of a heterogeneous soft tissue nodule inferior to the thyroid    bed on the left that is not significantly changed in size or characteristics    compared to prior examination.                   Order Details     Medical Decision [id number]:       Medical Decision Making-Other:     Note:  · Labs, medications, radiologic studies were reviewed with personal review of films  · Large amounts of data were reviewed  · Discussed with nursing Staff, Discharge planner  · Infection Control and Prevention measures reviewed  · All prior entries were reviewed  · Administer medications as ordered  · Prognosis: Guarded  · Discharge planning reviewed  · Follow up as outpatient. Thank you for allowing us to participate in the care of this patient. Please call with questions.     Nichelle Roberson MD  Pager: [phone] - Office: [phone]

## 2020-09-05 NOTE — PLAN OF CARE
Problem: Loneliness or Risk for Loneliness  Goal: Demonstrate positive use of time alone when socialization is not possible  9/5/2020 0258 by Michaela Kaiser RN  Outcome: Ongoing   Patient is resting in room with TV available.   No complaints of loneliness at this time,

## 2020-09-05 NOTE — PROGRESS NOTES
BY MOUTH NIGHTLY AT BEDTIME AS NEEDED FOR INSOMNIA  ELDERBERRY PO, Take by mouth  oxybutynin (DITROPAN) 5 MG tablet, TAKE 1 TABLET BY MOUTH TWICE DAILY  colchicine (COLCRYS) 0.6 MG tablet, Take 1 tablet by mouth 2 times daily  naproxen (NAPROSYN) 500 MG tablet, Take 1 tablet by mouth 2 times daily (with meals) (Patient not taking: Reported on 8/26/2020)  Respiratory Therapy Supplies (NEBULIZER COMPRESSOR) KIT, Provide insurance covered nebulizer, use daily as needed  polyethylene glycol (GLYCOLAX) packet, polyethylene glycol 3350 17 gram/dose oral powder  albuterol (PROVENTIL) (2.5 MG/3ML) 0.083% nebulizer solution, Take 3 mLs by nebulization every 6 hours as needed for Wheezing  isosorbide dinitrate (ISORDIL) 10 MG tablet, Take 1 tablet by mouth 2 times daily  aspirin 81 MG tablet, Take 81 mg by mouth daily    Current Medications:     Scheduled Meds:    allopurinol  300 mg Oral Daily    aspirin  81 mg Oral Daily    atorvastatin  80 mg Oral Nightly    buPROPion  150 mg Oral QAM    clopidogrel  75 mg Oral Daily    colchicine  0.6 mg Oral BID    DULoxetine  60 mg Oral Daily    isosorbide dinitrate  10 mg Oral BID    levothyroxine  25 mcg Oral Daily    metoprolol tartrate  50 mg Oral BID    montelukast  10 mg Oral Nightly    oxybutynin  5 mg Oral BID    pantoprazole  40 mg Oral Daily    polyethylene glycol  17 g Oral Daily    sodium chloride flush  10 mL Intravenous 2 times per day    enoxaparin  40 mg Subcutaneous BID    remdesivir IVPB  100 mg Intravenous Q24H     Continuous Infusions:    sodium chloride 50 mL/hr at 09/04/20 0505     PRN Meds:  albuterol sulfate HFA, albuterol sulfate HFA, oxyCODONE-acetaminophen, traZODone, sodium chloride flush, potassium chloride **OR** potassium alternative oral replacement **OR** potassium chloride, magnesium sulfate, acetaminophen **OR** acetaminophen, promethazine **OR** ondansetron, bisacodyl, morphine, sodium chloride    Input/Output:       I/O last 3 completed shifts: In: 1250 [P.O.:500; I.V.:750]  Out: 3100 [Urine:3100]. Patient Vitals for the past 96 hrs (Last 3 readings):   Weight   20 0644 (!) 350 lb (158.8 kg)       Vital Signs:   Temperature:  Temp: 98 °F (36.7 °C)  TMax:   Temp (24hrs), Av.3 °F (36.8 °C), Min:97.9 °F (36.6 °C), Max:98.9 °F (37.2 °C)    Respirations:  Resp: 19  Pulse:   Pulse: 77  BP:    BP: (!) 147/79  BP Range: Systolic (20CWC), DJ , Min:117 , AIY:353       Diastolic (96FAK), HPU:59, Min:70, Max:79      Physical Examination:     Did not examine patient in order to decrease exposure risk and conserve PPE    Labs:       Recent Labs     20  1007 20  1131   WBC 5.4 6.7 8.0   RBC 4.24 3.81* 3.81*   HGB 11.3* 10.3* 10.1*   HCT 37.5 34.2* 34.2*   MCV 88.4 89.8 89.8   MCH 26.7 27.0 26.5   MCHC 30.1 30.1 29.5   RDW 16.3* 16.4* 16.7*    238 266   MPV 10.9 10.4 10.7      BMP:   Recent Labs     20  0720  1007 20  1131   * 138 135   K 4.2 4.9 4.7    101 100   CO2 23 23 23   BUN 15 29* 39*   CREATININE 0.96* 1.56* 1.59*   GLUCOSE 142* 119* 111*   CALCIUM 8.5* 8.3* 7.8*      Albumin:    Recent Labs     20  1007   LABALBU 3.8 3.6     JV:      Lab Results   Component Value Date    JV POSITIVE 10/15/2019     SPEP:  Lab Results   Component Value Date    PROT 6.7 2020    ALBCAL 3.8 2020    ALBPCT 56 2020    LABALPH 0.2 2020    LABALPH 0.8 2020    A1PCT 3 2020    A2PCT 13 2020    LABBETA 0.7 2020    BETAPCT 11 2020    GAMGLOB 1.1 2020    GGPCT 17 2020    PATH ELECTRONICALLY SIGNED. Rocky Mercado M.D. 2020    PATH ELECTRONICALLY SIGNED.  Rocky Mercado M.D. 2020   C3:     Lab Results   Component Value Date    C3 137 2020     C4:     Lab Results   Component Value Date    C4 43 2020     MPO ANCA:     Lab Results   Component Value Date    MPO 12 2017 assessment, plan and orders as documented.     Edil Kirkpatrick MD, MRCP Reny Hernández), FACP   9/5/2020 3:29 PM    Nephrology [address] [place] [address] Drive

## 2020-09-05 NOTE — PLAN OF CARE
Problem: Airway Clearance - Ineffective  Goal: Achieve or maintain patent airway  9/5/2020 0258 by Kezia Lofton RN  Outcome: Ongoing  9/4/2020 1758 by Lindy Reyes RN  Outcome: Met This Shift     Problem: Gas Exchange - Impaired  Goal: Absence of hypoxia  9/5/2020 0258 by Kezia Lofton RN  Outcome: Ongoing  9/4/2020 1758 by Lindy Reyes RN  Outcome: Met This Shift  Goal: Promote optimal lung function  9/5/2020 0258 by Kezia Lofton RN  Outcome: Ongoing  9/4/2020 1758 by Lindy Reyes RN  Outcome: Met This Shift     Problem: Breathing Pattern - Ineffective  Goal: Ability to achieve and maintain a regular respiratory rate  9/5/2020 0258 by Kezia Lofton RN  Outcome: Ongoing  9/4/2020 1758 by Lindy Reyes RN  Outcome: Met This Shift     Problem:  Body Temperature -  Risk of, Imbalanced  Goal: Ability to maintain a body temperature within defined limits  9/5/2020 0258 by Kezia Lofton RN  Outcome: Ongoing  9/4/2020 1758 by Lindy Reyes RN  Outcome: Met This Shift  Goal: Will regain or maintain usual level of consciousness  9/5/2020 0258 by Kezia Lofton RN  Outcome: Ongoing  9/4/2020 1758 by Lindy Reyes RN  Outcome: Met This Shift  Goal: Complications related to the disease process, condition or treatment will be avoided or minimized  9/5/2020 0258 by Kezia Lofton RN  Outcome: Ongoing  9/4/2020 1758 by Lindy Reyes RN  Outcome: Met This Shift     Problem: Isolation Precautions - Risk of Spread of Infection  Goal: Prevent transmission of infection  9/5/2020 0258 by Kezia Lofton RN  Outcome: Ongoing  9/4/2020 1758 by Lindy Reyes RN  Outcome: Met This Shift     Problem: Nutrition Deficits  Goal: Optimize nutrtional status  9/5/2020 0258 by Kezia Lofton RN  Outcome: Ongoing  9/4/2020 1758 by Lindy Reyes RN  Outcome: Met This Shift     Problem: Risk for Fluid Volume Deficit  Goal: Maintain normal heart rhythm  9/5/2020 0258 by Kezia Lofton RN  Outcome: Ongoing  9/4/2020 1758 by Parris Salcido RN  Outcome: Met This Shift  Goal: Maintain absence of muscle cramping  9/5/2020 0258 by Sarai Toussaint RN  Outcome: Ongoing  9/4/2020 1758 by Parris Salcido RN  Outcome: Met This Shift  Goal: Maintain normal serum potassium, sodium, calcium, phosphorus, and pH  9/5/2020 0258 by Sarai Toussaint RN  Outcome: Ongoing  9/4/2020 1758 by Parris Salcido RN  Outcome: Met This Shift     Problem: Loneliness or Risk for Loneliness  Goal: Demonstrate positive use of time alone when socialization is not possible  9/5/2020 0258 by Sarai Toussaint RN  Outcome: Ongoing  9/4/2020 1758 by Parris Salcido RN  Outcome: Met This Shift     Problem: Fatigue  Goal: Verbalize increase energy and improved vitality  9/5/2020 0258 by Sarai Toussaint RN  Outcome: Ongoing  9/4/2020 1758 by Parris Salcido RN  Outcome: Met This Shift     Problem: Patient Education: Go to Patient Education Activity  Goal: Patient/Family Education  9/5/2020 0258 by Sarai Toussaint RN  Outcome: Ongoing  9/4/2020 1758 by Parris Salcido RN  Outcome: Met This Shift     Problem: Pain:  Goal: Pain level will decrease  Description: Pain level will decrease  9/5/2020 0258 by Sarai Toussaint RN  Outcome: Ongoing  9/4/2020 1758 by Parris Salcido RN  Outcome: Met This Shift  Goal: Control of acute pain  Description: Control of acute pain  9/5/2020 0258 by Sarai Toussaint RN  Outcome: Ongoing  9/4/2020 1758 by Parris Salcido RN  Outcome: Met This Shift  Goal: Control of chronic pain  Description: Control of chronic pain  9/5/2020 0258 by Sarai Toussaint RN  Outcome: Ongoing  9/4/2020 1758 by Parris Salcido RN  Outcome: Met This Shift     Problem: Falls - Risk of:  Goal: Will remain free from falls  Description: Will remain free from falls  9/5/2020 0258 by Sarai Toussaint RN  Outcome: Ongoing  9/4/2020 1758 by Parris Salcido RN  Outcome: Met This Shift  Goal: Absence of physical injury  Description: Absence of physical injury  9/5/2020 0258 by Jennifer Gann RN  Outcome: Ongoing  9/4/2020 1758 by Hakan Khanna RN  Outcome: Met This Shift

## 2020-09-05 NOTE — PROGRESS NOTES
wheezing  Cardiovascular:  negative for chest pain, chest pressure/discomfort, lower extremity edema, palpitations  Gastrointestinal:  negative for abdominal pain, constipation, diarrhea, nausea, vomiting  Neurological:  negative for dizziness, headache    Medications: Allergies: Allergies   Allergen Reactions    Entresto [Sacubitril-Valsartan] Other (See Comments)     Acute kidney injury    Food Swelling     peaches    Gabapentin Swelling    Tetracyclines & Related        Current Meds:   Scheduled Meds:    [START ON 9/6/2020] bumetanide  1 mg Oral Daily    allopurinol  300 mg Oral Daily    aspirin  81 mg Oral Daily    atorvastatin  80 mg Oral Nightly    buPROPion  150 mg Oral QAM    clopidogrel  75 mg Oral Daily    colchicine  0.6 mg Oral BID    DULoxetine  60 mg Oral Daily    isosorbide dinitrate  10 mg Oral BID    levothyroxine  25 mcg Oral Daily    metoprolol tartrate  50 mg Oral BID    montelukast  10 mg Oral Nightly    oxybutynin  5 mg Oral BID    pantoprazole  40 mg Oral Daily    polyethylene glycol  17 g Oral Daily    sodium chloride flush  10 mL Intravenous 2 times per day    enoxaparin  40 mg Subcutaneous BID    remdesivir IVPB  100 mg Intravenous Q24H     Continuous Infusions:    sodium chloride 50 mL/hr at 09/04/20 0505     PRN Meds: albuterol sulfate HFA, albuterol sulfate HFA, oxyCODONE-acetaminophen, traZODone, sodium chloride flush, potassium chloride **OR** potassium alternative oral replacement **OR** potassium chloride, magnesium sulfate, acetaminophen **OR** acetaminophen, promethazine **OR** ondansetron, bisacodyl, morphine, sodium chloride    Data:     Past Medical History:   has a past medical history of Arthritis, CHF (congestive heart failure) ([facility] [address] GERD (gastroesophageal reflux disease), Gout, History of heart attack, HLD (hyperlipidemia), Hypertension, Insomnia, and Osteoarthritis. Social History:   reports that she has never smoked.  She has never used smokeless tobacco. She reports that she does not drink alcohol or use drugs. Family History:   Family History   Problem Relation Age of Onset    Other Mother     Diabetes Mother     Heart Disease Mother     Arthritis Mother     Kidney Disease Mother     Lupus Mother     Arthritis Sister     Diabetes Brother     Asthma Brother     Cancer Maternal Grandfather     Hypertension Sister     Breast Cancer Sister         35s    Breast Cancer Niece         30-35       Vitals:  /71   Pulse 68   Temp 98.1 °F (36.7 °C) (Oral)   Resp 17   Ht 5' 8\" (1.727 m)   Wt (!) 350 lb (158.8 kg)   SpO2 97%   BMI 53.22 kg/m²   Temp (24hrs), Av °F (36.7 °C), Min:97.6 °F (36.4 °C), Max:98.2 °F (36.8 °C)    Recent Labs     20  0821   POCGLU 100       I/O (24Hr):     Intake/Output Summary (Last 24 hours) at 2020 1501  Last data filed at 2020 1326  Gross per 24 hour   Intake 1508.33 ml   Output 3500 ml   Net -1991.67 ml       Labs:  Hematology:  Recent Labs     20  1603 09/03/20  1007 09/04/20  11320  0652   WBC  --  6.7 8.0 3.2*   RBC  --  3.81* 3.81* 3.48*   HGB  --  10.3* 10.1* 9.3*   HCT  --  34.2* 34.2* 31.9*   MCV  --  89.8 89.8 91.7   MCH  --  27.0 26.5 26.7   MCHC  --  30.1 29.5 29.2   RDW  --  16.4* 16.7* 16.8*   PLT  --  238 266 191   MPV  --  10.4 10.7 10.5   CRP 88.6*  --   --   --    INR  --  0.9  --   --      Chemistry:  Recent Labs     20  1603 20  11320  0652   NA  --  138 135 134*   K  --  4.9 4.7 4.5   CL  --  101 100 102   CO2  --  23 23 25   GLUCOSE  --  119* 111* 109*   BUN  --  29* 39* 25*   CREATININE  --  1.56* 1.59* 1.02*   ANIONGAP  --  14 12 7*   LABGLOM  --  34* 33* 56*   GFRAA  --  41* 41* >60   CALCIUM  --  8.3* 7.8* 7.7*   TROPHS 22* 23*  --   --      Recent Labs     20  1603 20  1007 20  1600 20  0821   PROT  --  7.2 6.7  --    LABALBU  --  3.6  --   --    TSH  --  1.87  --   --    AST  --  20  -- --    ALT  --  16  --   --    *  --   --   --    ALKPHOS  --  118*  --   --    BILITOT  --  0.36  --   --    POCGLU  --   --   --  100     ABG:No results found for: POCPH, PHART, PH, POCPCO2, XXY4OEI, PCO2, POCPO2, PO2ART, PO2, POCHCO3, WWU9MAH, HCO3, NBEA, PBEA, BEART, BE, THGBART, THB, HFU6PZA, EXSN6WYO, O6ZIWDLW, O2SAT, FIO2  No results found for: SPECIAL  No results found for: CULTURE    Radiology:  Xr Chest Portable    Result Date: 9/2/2020  Moderate vascular congestion     Ct Chest Pulmonary Embolism W Contrast    Result Date: 9/2/2020  No definite acute or chronic pulmonary embolism. Scattered pulmonary opacities throughout the lungs bilaterally consistent with pneumonia. Redemonstration of a heterogeneous soft tissue nodule inferior to the thyroid bed on the left that is not significantly changed in size or characteristics compared to prior examination.        Physical Examination:        General appearance:  alert, cooperative and no distress  Mental Status:  oriented to person, place and time and normal affect  Lungs:  clear to auscultation bilaterally, normal effort  Heart:  regular rate and rhythm, no murmur  Abdomen:  soft, nontender, nondistended, normal bowel sounds, no masses, hepatomegaly, splenomegaly  Extremities:  no edema, redness, tenderness in the calves  Skin:  no gross lesions, rashes, induration    Assessment:        Hospital Problems           Last Modified POA    * (Principal) Lower respiratory tract infection due to COVID-19 virus 9/2/2020 Yes    Therapeutic opioid-induced constipation (OIC) 9/2/2020 Yes    Opioid dependence, uncomplicated ([address] 8/9/4912 Yes    Chronic pain disorder 9/2/2020 Yes    Class 3 severe obesity due to excess calories with serious comorbidity and body mass index (BMI) of 45.0 to 49.9 in adult [facility]) 9/2/2020 Yes    Morbid obesity ([address] 9/2/2020 Yes    Hypertension 9/2/2020 Yes    Gout 9/2/2020 Yes    GERD (gastroesophageal reflux disease) 9/2/2020 Yes

## 2020-09-06 LAB
ABSOLUTE EOS #: 0.1 K/UL (ref 0–0.44)
ABSOLUTE IMMATURE GRANULOCYTE: <0.03 K/UL (ref 0–0.3)
ABSOLUTE LYMPH #: 1.25 K/UL (ref 1.1–3.7)
ABSOLUTE MONO #: 0.24 K/UL (ref 0.1–1.2)
ANION GAP SERPL CALCULATED.3IONS-SCNC: 7 MMOL/L (ref 9–17)
BASOPHILS # BLD: 0 % (ref 0–2)
BASOPHILS ABSOLUTE: <0.03 K/UL (ref 0–0.2)
BLD PROD TYP BPU: NORMAL
BUN BLDV-MCNC: 16 MG/DL (ref 6–20)
BUN/CREAT BLD: ABNORMAL (ref 9–20)
CALCIUM SERPL-MCNC: 7.9 MG/DL (ref 8.6–10.4)
CHLORIDE BLD-SCNC: 104 MMOL/L (ref 98–107)
CO2: 28 MMOL/L (ref 20–31)
CREAT SERPL-MCNC: 0.81 MG/DL (ref 0.5–0.9)
DIFFERENTIAL TYPE: ABNORMAL
DISPENSE STATUS BLOOD BANK: NORMAL
EOSINOPHILS RELATIVE PERCENT: 3 % (ref 1–4)
GFR AFRICAN AMERICAN: >60 ML/MIN
GFR NON-AFRICAN AMERICAN: >60 ML/MIN
GFR SERPL CREATININE-BSD FRML MDRD: ABNORMAL ML/MIN/{1.73_M2}
GFR SERPL CREATININE-BSD FRML MDRD: ABNORMAL ML/MIN/{1.73_M2}
GLUCOSE BLD-MCNC: 81 MG/DL (ref 70–99)
HCT VFR BLD CALC: 30.5 % (ref 36.3–47.1)
HEMOGLOBIN: 9.3 G/DL (ref 11.9–15.1)
IMMATURE GRANULOCYTES: 1 %
LYMPHOCYTES # BLD: 35 % (ref 24–43)
MCH RBC QN AUTO: 27.4 PG (ref 25.2–33.5)
MCHC RBC AUTO-ENTMCNC: 30.5 G/DL (ref 28.4–34.8)
MCV RBC AUTO: 89.7 FL (ref 82.6–102.9)
MONOCYTES # BLD: 7 % (ref 3–12)
NRBC AUTOMATED: 0 PER 100 WBC
PDW BLD-RTO: 16.6 % (ref 11.8–14.4)
PLATELET # BLD: 152 K/UL (ref 138–453)
PLATELET ESTIMATE: ABNORMAL
PMV BLD AUTO: 10.7 FL (ref 8.1–13.5)
POTASSIUM SERPL-SCNC: 4.7 MMOL/L (ref 3.7–5.3)
RBC # BLD: 3.4 M/UL (ref 3.95–5.11)
RBC # BLD: ABNORMAL 10*6/UL
SEG NEUTROPHILS: 54 % (ref 36–65)
SEGMENTED NEUTROPHILS ABSOLUTE COUNT: 1.97 K/UL (ref 1.5–8.1)
SODIUM BLD-SCNC: 139 MMOL/L (ref 135–144)
TRANSFUSION STATUS: NORMAL
UNIT DIVISION: 0
UNIT NUMBER: NORMAL
WBC # BLD: 3.6 K/UL (ref 3.5–11.3)
WBC # BLD: ABNORMAL 10*3/UL

## 2020-09-06 PROCEDURE — 2500000003 HC RX 250 WO HCPCS: Performed by: INTERNAL MEDICINE

## 2020-09-06 PROCEDURE — 6370000000 HC RX 637 (ALT 250 FOR IP): Performed by: NURSE PRACTITIONER

## 2020-09-06 PROCEDURE — 99232 SBSQ HOSP IP/OBS MODERATE 35: CPT | Performed by: INTERNAL MEDICINE

## 2020-09-06 PROCEDURE — 1200000000 HC SEMI PRIVATE

## 2020-09-06 PROCEDURE — 6370000000 HC RX 637 (ALT 250 FOR IP): Performed by: CLINICAL NURSE SPECIALIST

## 2020-09-06 PROCEDURE — 85025 COMPLETE CBC W/AUTO DIFF WBC: CPT

## 2020-09-06 PROCEDURE — 2580000003 HC RX 258: Performed by: INTERNAL MEDICINE

## 2020-09-06 PROCEDURE — 80048 BASIC METABOLIC PNL TOTAL CA: CPT

## 2020-09-06 PROCEDURE — 99233 SBSQ HOSP IP/OBS HIGH 50: CPT | Performed by: INTERNAL MEDICINE

## 2020-09-06 PROCEDURE — 6360000002 HC RX W HCPCS: Performed by: CLINICAL NURSE SPECIALIST

## 2020-09-06 RX ADMIN — MORPHINE SULFATE 2 MG: 2 INJECTION, SOLUTION INTRAMUSCULAR; INTRAVENOUS at 06:10

## 2020-09-06 RX ADMIN — LEVOTHYROXINE SODIUM 25 MCG: 25 TABLET ORAL at 09:06

## 2020-09-06 RX ADMIN — ENOXAPARIN SODIUM 40 MG: 40 INJECTION SUBCUTANEOUS at 09:07

## 2020-09-06 RX ADMIN — OXYCODONE HYDROCHLORIDE AND ACETAMINOPHEN 1 TABLET: 5; 325 TABLET ORAL at 11:55

## 2020-09-06 RX ADMIN — ALLOPURINOL 300 MG: 300 TABLET ORAL at 09:07

## 2020-09-06 RX ADMIN — METOPROLOL TARTRATE 50 MG: 50 TABLET, FILM COATED ORAL at 09:06

## 2020-09-06 RX ADMIN — ISOSORBIDE DINITRATE 10 MG: 10 TABLET ORAL at 09:06

## 2020-09-06 RX ADMIN — OXYBUTYNIN CHLORIDE 5 MG: 5 TABLET ORAL at 09:05

## 2020-09-06 RX ADMIN — ISOSORBIDE DINITRATE 10 MG: 10 TABLET ORAL at 22:24

## 2020-09-06 RX ADMIN — ENOXAPARIN SODIUM 40 MG: 40 INJECTION SUBCUTANEOUS at 22:23

## 2020-09-06 RX ADMIN — OXYBUTYNIN CHLORIDE 5 MG: 5 TABLET ORAL at 22:24

## 2020-09-06 RX ADMIN — BUPROPION HYDROCHLORIDE 150 MG: 150 TABLET, EXTENDED RELEASE ORAL at 09:07

## 2020-09-06 RX ADMIN — MORPHINE SULFATE 2 MG: 2 INJECTION, SOLUTION INTRAMUSCULAR; INTRAVENOUS at 17:05

## 2020-09-06 RX ADMIN — COLCHICINE 0.6 MG: 0.6 TABLET, FILM COATED ORAL at 09:07

## 2020-09-06 RX ADMIN — MONTELUKAST SODIUM 10 MG: 10 TABLET, FILM COATED ORAL at 22:24

## 2020-09-06 RX ADMIN — BUMETANIDE 1 MG: 1 TABLET ORAL at 09:07

## 2020-09-06 RX ADMIN — MORPHINE SULFATE 2 MG: 2 INJECTION, SOLUTION INTRAMUSCULAR; INTRAVENOUS at 00:17

## 2020-09-06 RX ADMIN — CLOPIDOGREL 75 MG: 75 TABLET, FILM COATED ORAL at 09:07

## 2020-09-06 RX ADMIN — COLCHICINE 0.6 MG: 0.6 TABLET, FILM COATED ORAL at 22:24

## 2020-09-06 RX ADMIN — ONDANSETRON 4 MG: 2 INJECTION INTRAMUSCULAR; INTRAVENOUS at 09:26

## 2020-09-06 RX ADMIN — TRAZODONE HYDROCHLORIDE 100 MG: 100 TABLET ORAL at 22:24

## 2020-09-06 RX ADMIN — REMDESIVIR 100 MG: 100 INJECTION, POWDER, LYOPHILIZED, FOR SOLUTION INTRAVENOUS at 12:01

## 2020-09-06 RX ADMIN — PANTOPRAZOLE SODIUM 40 MG: 40 TABLET, DELAYED RELEASE ORAL at 09:07

## 2020-09-06 RX ADMIN — ASPIRIN 81 MG: 81 TABLET, CHEWABLE ORAL at 09:05

## 2020-09-06 RX ADMIN — POLYETHYLENE GLYCOL 3350 17 G: 17 POWDER, FOR SOLUTION ORAL at 09:07

## 2020-09-06 RX ADMIN — DULOXETINE HYDROCHLORIDE 60 MG: 30 CAPSULE, DELAYED RELEASE ORAL at 09:07

## 2020-09-06 RX ADMIN — MORPHINE SULFATE 2 MG: 2 INJECTION, SOLUTION INTRAMUSCULAR; INTRAVENOUS at 22:25

## 2020-09-06 RX ADMIN — ATORVASTATIN CALCIUM 80 MG: 80 TABLET, FILM COATED ORAL at 22:24

## 2020-09-06 ASSESSMENT — PAIN SCALES - GENERAL
PAINLEVEL_OUTOF10: 10
PAINLEVEL_OUTOF10: 10
PAINLEVEL_OUTOF10: 9
PAINLEVEL_OUTOF10: 10
PAINLEVEL_OUTOF10: 9
PAINLEVEL_OUTOF10: 9

## 2020-09-06 ASSESSMENT — PAIN DESCRIPTION - PAIN TYPE
TYPE: CHRONIC PAIN

## 2020-09-06 ASSESSMENT — PAIN DESCRIPTION - FREQUENCY
FREQUENCY: CONTINUOUS

## 2020-09-06 ASSESSMENT — PAIN DESCRIPTION - ONSET
ONSET: ON-GOING

## 2020-09-06 ASSESSMENT — PAIN DESCRIPTION - LOCATION
LOCATION: GENERALIZED

## 2020-09-06 ASSESSMENT — PAIN DESCRIPTION - PROGRESSION: CLINICAL_PROGRESSION: NOT CHANGED

## 2020-09-06 ASSESSMENT — PAIN DESCRIPTION - DESCRIPTORS
DESCRIPTORS: BURNING;CONSTANT
DESCRIPTORS: ACHING;DISCOMFORT

## 2020-09-06 NOTE — PLAN OF CARE
Problem: Falls - Risk of:  Goal: Will remain free from falls  Description: Will remain free from falls  Outcome: Met This Shift  Goal: Absence of physical injury  Description: Absence of physical injury  Outcome: Met This Shift   Patient remained free of falls during shift. Bed in lowest position. Bed breaks locked. 2/4 side rails up. Call light and bed side table within reach. Patient calls out appropriately. Continue to monitor.

## 2020-09-06 NOTE — PROGRESS NOTES
[facility]  Office: [address], DO, Kassie Emmanuel, DO, Leonel Plain, DO, Nahid Mckeon Blood, DO, Jennifer Dacosta MD, Ivone Agustin MD, Aleks Morris MD, Michelle Mckeon MD, Morenita Stover MD, Zain Davis MD, Nabila Cordon MD, Manolo Hoover MD, Juana Thornton MD, Louann Bhagat DO, Alona Bonilla MD, Jeniffer Quiñones MD, Mimi Lind DO, Payal Garcia MD,  Sole Hawley DO, Gracie Ram MD, Ayaan Baker MD, Macie Hernandez, [facility], [facility] Hospital[facility] Pickering, CNP, Ron Betancourt, CNP, Chi Valenzuela, CNS, Kinjal Day, CNP, Armando Elliott, CNP, Arnulfo Anaya, CNP, Kaushal Mathur, CNP, Stepan Jimenez, CNP, Kota Omer PA-C, Bala Craig, YANVI, Valarie Knott, CNP, Case, CNP, Sadie Gilman, CNP, Saurabh Sue, CNP, Meredith Caldwell, [facility]   [address] [facility]    Progress Note    9/6/2020    10:44 AM    Name:   Angela Cordero  MRN:     [id number]     Acct:      [de-identified]   Room:   302/3023-01   Day:  4  Admit Date:  9/2/2020  6:38 AM    PCP:   Maverick Walker PA-C  Code Status:  Full Code    Subjective:     C/C:   Chief Complaint   Patient presents with   Mery Buck Nausea    Emesis    Chest Pain     chest heavy x past 3 days     Interval History Status: improved. Patient was seen and evaluated at bedside this morning. Patient reports feeling better this morning. Brief History: This is a 64-year-old female with past medical history of CHF, CAD, hypertension who came in with complaints of nausea vomiting and chest pain for past 3 days. Patient was noted to have COVID-19 pneumonia. Patient was noted to be hypoxic with oxygen saturation in low 80s. CT of the chest was consistent with pulmonary opacities. Patient is being followed by infectious disease.     Review of Systems:     Constitutional:  negative for chills, fevers, sweats  Respiratory:  negative for cough, dyspnea on exertion, shortness of breath, wheezing  Cardiovascular:  negative for chest pain, chest pressure/discomfort, lower extremity edema, palpitations  Gastrointestinal:  negative for abdominal pain, constipation, diarrhea, nausea, vomiting  Neurological:  negative for dizziness, headache    Medications: Allergies: Allergies   Allergen Reactions    Entresto [Sacubitril-Valsartan] Other (See Comments)     Acute kidney injury    Food Swelling     peaches    Gabapentin Swelling    Tetracyclines & Related        Current Meds:   Scheduled Meds:    bumetanide  1 mg Oral Daily    allopurinol  300 mg Oral Daily    aspirin  81 mg Oral Daily    atorvastatin  80 mg Oral Nightly    buPROPion  150 mg Oral QAM    clopidogrel  75 mg Oral Daily    colchicine  0.6 mg Oral BID    DULoxetine  60 mg Oral Daily    isosorbide dinitrate  10 mg Oral BID    levothyroxine  25 mcg Oral Daily    metoprolol tartrate  50 mg Oral BID    montelukast  10 mg Oral Nightly    oxybutynin  5 mg Oral BID    pantoprazole  40 mg Oral Daily    polyethylene glycol  17 g Oral Daily    sodium chloride flush  10 mL Intravenous 2 times per day    enoxaparin  40 mg Subcutaneous BID    remdesivir IVPB  100 mg Intravenous Q24H     Continuous Infusions:    sodium chloride 50 mL/hr at 09/04/20 0505     PRN Meds: albuterol sulfate HFA, albuterol sulfate HFA, oxyCODONE-acetaminophen, traZODone, sodium chloride flush, potassium chloride **OR** potassium alternative oral replacement **OR** potassium chloride, magnesium sulfate, acetaminophen **OR** acetaminophen, promethazine **OR** ondansetron, bisacodyl, morphine, sodium chloride    Data:     Past Medical History:   has a past medical history of Arthritis, CHF (congestive heart failure) ([facility] [address] GERD (gastroesophageal reflux disease), Gout, History of heart attack, HLD (hyperlipidemia), Hypertension, Insomnia, and Osteoarthritis. Social History:   reports that she has never smoked.  She has never used smokeless tobacco. She reports that she does not drink alcohol or use drugs. Family History:   Family History   Problem Relation Age of Onset    Other Mother     Diabetes Mother     Heart Disease Mother     Arthritis Mother     Kidney Disease Mother     Lupus Mother     Arthritis Sister     Diabetes Brother     Asthma Brother     Cancer Maternal Grandfather     Hypertension Sister     Breast Cancer Sister         35s    Breast Cancer Niece         30-35       Vitals:  /60   Pulse 63   Temp 97.6 °F (36.4 °C) (Oral)   Resp 20   Ht 5' 8\" (1.727 m)   Wt (!) 350 lb (158.8 kg)   SpO2 93%   BMI 53.22 kg/m²   Temp (24hrs), Av.9 °F (36.6 °C), Min:97.6 °F (36.4 °C), Max:98.2 °F (36.8 °C)    Recent Labs     20  0821   POCGLU 100       I/O (24Hr):     Intake/Output Summary (Last 24 hours) at 2020 1044  Last data filed at 2020  Gross per 24 hour   Intake 2312.33 ml   Output 1125 ml   Net 1187.33 ml       Labs:  Hematology:  Recent Labs     20  1131 20  0652 20  0712   WBC 8.0 3.2* 3.6   RBC 3.81* 3.48* 3.40*   HGB 10.1* 9.3* 9.3*   HCT 34.2* 31.9* 30.5*   MCV 89.8 91.7 89.7   MCH 26.5 26.7 27.4   MCHC 29.5 29.2 30.5   RDW 16.7* 16.8* 16.6*    191 152   MPV 10.7 10.5 10.7     Chemistry:  Recent Labs     20  1131 20  0652 20  0712    134* 139   K 4.7 4.5 4.7    102 104   CO2 23 25 28   GLUCOSE 111* 109* 81   BUN 39* 25* 16   CREATININE 1.59* 1.02* 0.81   ANIONGAP 12 7* 7*   LABGLOM 33* 56* >60   GFRAA 41* >60 >60   CALCIUM 7.8* 7.7* 7.9*     Recent Labs     20  1600 20  0821   PROT 6.7  --    POCGLU  --  100     ABG:No results found for: POCPH, PHART, PH, POCPCO2, FQK5OOP, PCO2, POCPO2, PO2ART, PO2, POCHCO3, CPZ7UFE, HCO3, NBEA, PBEA, BEART, BE, THGBART, THB, HWV4COH, UHMG1UJC, Q0KKBZSY, O2SAT, FIO2  No results found for: SPECIAL  No results found for: CULTURE    Radiology:  Xr Chest Portable    Result Date: 2020  Moderate vascular congestion     Ct Chest Pulmonary Embolism W Contrast    Result Date: 9/2/2020  No definite acute or chronic pulmonary embolism. Scattered pulmonary opacities throughout the lungs bilaterally consistent with pneumonia. Redemonstration of a heterogeneous soft tissue nodule inferior to the thyroid bed on the left that is not significantly changed in size or characteristics compared to prior examination.        Physical Examination:        General appearance:  alert, cooperative and no distress  Mental Status:  oriented to person, place and time and normal affect  Lungs:  clear to auscultation bilaterally, normal effort  Heart:  regular rate and rhythm, no murmur  Abdomen:  soft, nontender, nondistended, normal bowel sounds, no masses, hepatomegaly, splenomegaly  Extremities:  no edema, redness, tenderness in the calves  Skin:  no gross lesions, rashes, induration    Assessment:        Hospital Problems           Last Modified POA    * (Principal) Lower respiratory tract infection due to COVID-19 virus 9/2/2020 Yes    Therapeutic opioid-induced constipation (OIC) 9/2/2020 Yes    Opioid dependence, uncomplicated ([address] 0/4/7506 Yes    Chronic pain disorder 9/2/2020 Yes    Class 3 severe obesity due to excess calories with serious comorbidity and body mass index (BMI) of 45.0 to 49.9 in adult ([address] 9/2/2020 Yes    Morbid obesity ([address] 9/2/2020 Yes    Hypertension 9/2/2020 Yes    Gout 9/2/2020 Yes    GERD (gastroesophageal reflux disease) 9/2/2020 Yes    Chronic combined systolic and diastolic heart failure (HCC) (Chronic) 9/2/2020 Yes    Other proteinuria 9/2/2020 Yes    Nausea and vomiting 9/2/2020 Yes          Plan:        COVID-19 pneumonia  -ID on board  -Remdisivir started 9/3  -Continue oxygen support    SUSU  - Nephrology on board  -Creatinine improving.  -Resolved    CAD  -Continue aspirin, statin, Plavix, isosorbide dinitrate, metoprolol tartrate    Depression  -Continue Cymbalta and Wellbutrin    CHF without

## 2020-09-06 NOTE — CARE COORDINATION
TRANSITIONAL CARE PLANNING/ 2 Rehab Ralf Day: 4    Reason for Admission: Lower respiratory tract infection due to COVID-19 virus [U07.1, J22]     Treatment Plan of Care: full code, ow 2l nc          Readmission Risk            Risk of Unplanned Readmission:  18            Patient goals/Treatment Preferences/Transitional Plan: Called patient to discuss transition planning o2 2l nc. Reviewed pt eval recommending continued therapy after dc. Patient lives alone and is concerned about her fatigue & returning home safely. Discussed hc and o2 as possible needs vs snf reviewed list agreeable to referrals.      Referrals Made: [facility], [facility] spoke to leigh ann, [facility]    17:30 spoke to leigh ann with [facility] can accept updated patient

## 2020-09-06 NOTE — PLAN OF CARE
Problem: Airway Clearance - Ineffective  Goal: Achieve or maintain patent airway  Outcome: Ongoing     Problem: Gas Exchange - Impaired  Goal: Absence of hypoxia  Outcome: Ongoing  Goal: Promote optimal lung function  Outcome: Ongoing     Problem: Breathing Pattern - Ineffective  Goal: Ability to achieve and maintain a regular respiratory rate  Outcome: Ongoing     Problem:  Body Temperature -  Risk of, Imbalanced  Goal: Ability to maintain a body temperature within defined limits  Outcome: Ongoing  Goal: Will regain or maintain usual level of consciousness  Outcome: Ongoing  Goal: Complications related to the disease process, condition or treatment will be avoided or minimized  Outcome: Ongoing     Problem: Isolation Precautions - Risk of Spread of Infection  Goal: Prevent transmission of infection  Outcome: Ongoing     Problem: Risk for Fluid Volume Deficit  Goal: Maintain normal heart rhythm  Outcome: Ongoing  Goal: Maintain absence of muscle cramping  Outcome: Ongoing  Goal: Maintain normal serum potassium, sodium, calcium, phosphorus, and pH  Outcome: Ongoing     Problem: Loneliness or Risk for Loneliness  Goal: Demonstrate positive use of time alone when socialization is not possible  Outcome: Ongoing     Problem: Fatigue  Goal: Verbalize increase energy and improved vitality  Outcome: Ongoing     Problem: Patient Education: Go to Patient Education Activity  Goal: Patient/Family Education  Outcome: Ongoing     Problem: Pain:  Goal: Pain level will decrease  Description: Pain level will decrease  Outcome: Ongoing  Goal: Control of acute pain  Description: Control of acute pain  Outcome: Ongoing  Goal: Control of chronic pain  Description: Control of chronic pain  Outcome: Ongoing

## 2020-09-06 NOTE — PROGRESS NOTES
[facility] - Progress Note COVID 19 Patient  Today's Date and Time: 9/6/2020, 10:20 AM    Impression :   · COVID 19 Suspect  · COVID 19 Confirmed Infection 9-2-20  · COVID multifocal pneumonia  · Nausea   · Vomiting    Recommendations:   · Monitor off antibiotics  · Clinical Research will approach patient to explore if she qualifies for any of the COVID 19 treatment protocols. · Remdesivir started 9-3-20  · Will likely benefit from plasma. Discussed with patient. FACT sheet provided. Verbal consent obtained 9-3-20. Awaiting availability of plasma for transfusion    Medical Decision Making/Summary/Discussion:9/6/2020     · Patient admitted with suspected COVID 19 infection  · Covid test confirmed positive. ·   Infection Control Recommendations   · Universal Precautions  · Airborne isolation  · Droplet Isolation    Antimicrobial Stewardship Recommendations     · Discontinuation of therapy  Coordination of Outpatient Care:   · Estimated Length of IV antimicrobials:TBD  · Patient will need Midline Catheter Insertion: TBD  · Patient will need PICC line Insertion: No  · Patient will need: Home IV , [place],  SNF,  LTAC:TBD  · Patient will need outpatient wound care:No    Chief complaint/reason for consultation:   · Concern for COVID infection      History of Present Illness:   Rodolfo Bermudez is a 60y.o.-year-old  female who was initially admitted on 9/2/2020. Patient seen at the request of Lisbet Beard. INITIAL HISTORY:    Patient presented through ER with complaints of having a heavy sensation in the chest for three days. Se reported chest pressure, SOB, cough with blood tinged sputum for three days and one day of nausea and emesis. She also described generalized bodyaches. She has a prior Hx of chronic pain syndrome following an accident in 2011. In the ER her 02 sat was in the low 80's. She required 02 supplementation.  Her CXR showed moderate vascular congestion. Her chest CT showed scattered infiltrates suggestive of pneumonia. Her COVID 19 test was positive. Patient admitted because of concerns with COVID 19.    CURRENT EVALUATION : 9/6/2020    Patient evaluated and examined in the ICU. Afebrile  VS stable    Discussed Remdesivir and plasma treatment. Patient gave oral consent on 9-4-20    She was developing SUSU  Renal evaluation by Dr. Akbar Lopez appreciated. Patient receiving additional hydration leaving the Terrell catheter in place. Renal function has improved    Patient exhibiting respiratory distress. Yes  Respiratory secretions: Yes    Patient receiving supplemental oxygen. 4 -->2 L/min NC  02 sat 91-->95-->97 %  RR 17 -->16-->18    QTc:           NEWS Score: 0-4 Low risk group; 5-6: Medium risk group; 7 or above: High risk group  Parameters 3 2 1 0 1 2 3   Age    < 65   ? 65   RR ? 8  9-11 12-20  21-24 ? 25   O2 Sats ? 91 92-93 94-95 ? 96      Suppl O2  Yes  No      SBP ? 90  101-110 111-219   ? 220   HR ? 40  41-50 51-90  111-130 ? 131   Consciousness    Alert   Drowsiness, lethargy, or confusion   Temperature ? 35.0 C (95.0 F)  35.1-36.0 C 95.1-96.9 F 36.1-38.0 C 97.0-100.4 F 38.1-39.0 C 100.5-102.3 F ? 39.1 C ? 102.4 F      NEWS Score:   9-2-20: 5 Moderate risk    Overall Daily Picture:    Stable    Presence of secondary bacterial Infection:  No   Additional antibiotics: No    Labs, X rays reviewed: 9/6/2020    BUN: 15-->29-->39-->16  Cr:0.96-->1.56-->1.59-->0.81    WBC:5.4-->6.7-->3.6  Hb:11.3-->10.3-->9.3  Plat: 211-->238-->152    Absolute Neutrophils:4.7  Absolute Lymphocytes:0.49  Neutrophil/Lymphocyte Ratio: 5.2 High risk    CRP:88.6  Ferritin:  LDH: 221    Pro Calcitonin:  Pro   Troponin HS 24-->22    Cultures:  Urine:  ·   Blood:  ·   Sputum :  ·   Wound:       CXR:   · 9-2-20: Moderate vascular congestion  CAT:      Discussed with patient, RN, CC, IM.       I have personally reviewed the past medical history, past surgical history, medications, social history, and family history, and I have updated the database accordingly.   Past Medical History:     Past Medical History:   Diagnosis Date    Arthritis     CHF (congestive heart failure) ([address]     GERD (gastroesophageal reflux disease)     Gout 10/2019    History of heart attack     HLD (hyperlipidemia)     Hypertension     Insomnia     Osteoarthritis        Past Surgical  History:     Past Surgical History:   Procedure Laterality Date    BACK SURGERY      CHOLECYSTECTOMY, LAPAROSCOPIC      HYSTERECTOMY, TOTAL ABDOMINAL      KNEE ARTHROSCOPY Right     KNEE SURGERY Left 2009    NECK SURGERY      SHOULDER SURGERY Right 2009       Medications:      bumetanide  1 mg Oral Daily    allopurinol  300 mg Oral Daily    aspirin  81 mg Oral Daily    atorvastatin  80 mg Oral Nightly    buPROPion  150 mg Oral QAM    clopidogrel  75 mg Oral Daily    colchicine  0.6 mg Oral BID    DULoxetine  60 mg Oral Daily    isosorbide dinitrate  10 mg Oral BID    levothyroxine  25 mcg Oral Daily    metoprolol tartrate  50 mg Oral BID    montelukast  10 mg Oral Nightly    oxybutynin  5 mg Oral BID    pantoprazole  40 mg Oral Daily    polyethylene glycol  17 g Oral Daily    sodium chloride flush  10 mL Intravenous 2 times per day    enoxaparin  40 mg Subcutaneous BID    remdesivir IVPB  100 mg Intravenous Q24H       Social History:     Social History     Socioeconomic History    Marital status: Single     Spouse name: Not on file    Number of children: Not on file    Years of education: Not on file    Highest education level: Not on file   Occupational History    Not on file   Social Needs    Financial resource strain: Not on file    Food insecurity     Worry: Not on file     Inability: Not on file    Transportation needs     Medical: Not on file     Non-medical: Not on file   Tobacco Use    Smoking status: Never Smoker    Smokeless tobacco: Never Used   Substance and Sexual Activity    Alcohol use: No    Drug use: No    Sexual activity: Not on file   Lifestyle    Physical activity     Days per week: Not on file     Minutes per session: Not on file    Stress: Not on file   Relationships    Social connections     Talks on phone: Not on file     Gets together: Not on file     Attends [employer] service: Not on file     Active member of club or organization: Not on file     Attends meetings of clubs or organizations: Not on file     Relationship status: Not on file    Intimate partner violence     Fear of current or ex partner: Not on file     Emotionally abused: Not on file     Physically abused: Not on file     Forced sexual activity: Not on file   Other Topics Concern    Not on file   Social History Narrative    Not on file       Family History:     Family History   Problem Relation Age of Onset    Other Mother     Diabetes Mother     Heart Disease Mother     Arthritis Mother     Kidney Disease Mother     Lupus Mother     Arthritis Sister     Diabetes Brother     Asthma Brother     Cancer Maternal Grandfather     Hypertension Sister     Breast Cancer Sister         28s    Breast Cancer Niece         30-35        Allergies:   Entresto [sacubitril-valsartan]; Food; Gabapentin; and Tetracyclines & related     Review of Systems:     Constitutional: fevers,no  chills. Muscle aches  Head: No headaches  Eyes: No double vision or blurry vision. No conjunctival inflammation. ENT: No sore throat or runny nose. . No hearing loss, tinnitus or vertigo. Cardiovascular: chest pain. No palpitations. Has shortness of breath. COFFEY  Lung: shortness of breath, cough. Blood tinged sputum production  Abdomen: Nausea, vomiting, No diarrhea, or abdominal pain. Tasha Sleet No cramps. Genitourinary: No increased urinary frequency, or dysuria. No hematuria. No suprapubic or CVA pain  Musculoskeletal: Generalized muscle aches.    No joint effusions, swelling or deformities  Hematologic: No bleeding or bruising. Neurologic: No headache, weakness, numbness, or tingling. Integument: No rash, no ulcers. Psychiatric: No depression. Endocrine: No polyuria, no polydipsia, no polyphagia. Physical Examination :     Patient Vitals for the past 8 hrs:   BP Temp Temp src Pulse Resp   09/06/20 0905 133/60 -- -- 63 --   09/06/20 0545 (!) 105/51 97.6 °F (36.4 °C) Oral -- 20     General Appearance: Awake, alert, and in apparent distress from back pain. Obese  Head:  Normocephalic, no trauma  Eyes: Pupils equal, round, reactive to light and accommodation; extraocular movements intact; sclera anicteric; conjunctivae pink. No embolic phenomena. ENT: Oropharynx clear, without erythema, exudate, or thrush. No tenderness of sinuses. Mouth/throat: mucosa pink and moist. No lesions. Dentition in good repair. Neck:Supple, without lymphadenopathy. Thyroid normal, No bruits. Pulmonary/Chest: Clear to auscultation, without wheezes, rales, or rhonchi. No dullness to percussion. Cardiovascular: Regular rate and rhythm without murmurs, rubs, or gallops. Abdomen: Soft, non tender. Bowel sounds normal. No organomegaly  All four Extremities: No cyanosis, clubbing, edema, or effusions. Neurologic: No gross sensory or motor deficits. Skin: Warm and dry with good turgor. No signs of peripheral arterial or venous insufficiency. No ulcerations. No open wounds. Medical Decision Making -Laboratory:   I have independently reviewed/ordered the following labs:    CBC with Differential:   Recent Labs     09/05/20 0652 09/06/20  0712   WBC 3.2* 3.6   HGB 9.3* 9.3*   HCT 31.9* 30.5*    152   LYMPHOPCT 31 35   MONOPCT 6 7     BMP:   Recent Labs     09/05/20  0652 09/06/20  0712   * 139   K 4.5 4.7    104   CO2 25 28   BUN 25* 16   CREATININE 1.02* 0.81     Hepatic Function Panel:   Recent Labs     09/03/20  1600   PROT 6.7     No results for input(s): RPR in the last 72 hours.   No results for input(s): HIV in the last 72 hours. No results for input(s): BC in the last 72 hours. Lab Results   Component Value Date    MUCUS NOT REPORTED 09/02/2020    RBC 3.40 09/06/2020    TRICHOMONAS NOT REPORTED 09/02/2020    WBC 3.6 09/06/2020    YEAST NOT REPORTED 09/02/2020    TURBIDITY CLEAR 09/02/2020     Lab Results   Component Value Date    CREATININE 0.81 09/06/2020    GLUCOSE 81 09/06/2020       Medical Decision Making-Imaging:     EXAMINATION:    ONE XRAY VIEW OF THE CHEST         9/2/2020 7:34 am         COMPARISON:    October 2, 2019, chest examination         HISTORY:    ORDERING SYSTEM PROVIDED HISTORY: cp    TECHNOLOGIST PROVIDED HISTORY:    cp    Reason for Exam: chest pain         FINDINGS:    Stable mild cardiomegaly         Now moderate prominence and indistinctness of the pulmonary    vascularity/interstitial markings         Mild tortuosity of the thoracic aorta         Degenerative changes of the thoracic spine and shoulders              Impression    Moderate vascular congestion      EXAMINATION:    CTA OF THE CHEST 9/2/2020 8:15 am         TECHNIQUE:    CTA of the chest was performed after the administration of intravenous    contrast.  Multiplanar reformatted images are provided for review.  MIP    images are provided for review.  Dose modulation, iterative reconstruction,    and/or weight based adjustment of the mA/kV was utilized to reduce the    radiation dose to as low as reasonably achievable.         COMPARISON:    CT chest performed 10/02/2019.         HISTORY:    ORDERING SYSTEM PROVIDED HISTORY: sob    TECHNOLOGIST PROVIDED HISTORY:    sob    Reason for Exam: sob    Acuity: Acute    Type of Exam: Initial         FINDINGS:    Pulmonary Arteries: Pulmonary arteries are adequately opacified for    evaluation.  No evidence of intraluminal filling defect to suggest pulmonary    embolism.  Main pulmonary artery is normal in caliber.         Mediastinum: No evidence of mediastinal lymphadenopathy.  The heart is enlarged.  There is no pericardial effusion. Aura Kail is no acute abnormality    of the thoracic aorta.  There is redemonstration of prior thyroidectomy with    a heterogeneous soft tissue nodule inferior to the thyroid bed on the left    this measures approximately 3.4 x 2.9 cm and is not significantly changed    compared to prior examination.         Lungs/pleura: There are scattered pulmonary opacities throughout the lungs    bilaterally.  There is no effusion. Aura Kail is no pneumothorax.  The    tracheobronchial tree is patent.         Upper Abdomen: Limited images of the upper abdomen are unremarkable.         Soft Tissues/Bones: No acute bone or soft tissue abnormality.              Impression    No definite acute or chronic pulmonary embolism.         Scattered pulmonary opacities throughout the lungs bilaterally consistent    with pneumonia.         Redemonstration of a heterogeneous soft tissue nodule inferior to the thyroid    bed on the left that is not significantly changed in size or characteristics    compared to prior examination.                   Order Details     Medical Decision [id number]:       Medical Decision Making-Other:     Note:  · Labs, medications, radiologic studies were reviewed with personal review of films  · Large amounts of data were reviewed  · Discussed with nursing Staff, Discharge planner  · Infection Control and Prevention measures reviewed  · All prior entries were reviewed  · Administer medications as ordered  · Prognosis: Guarded  · Discharge planning reviewed  · Follow up as outpatient. Thank you for allowing us to participate in the care of this patient. Please call with questions.     Escobar Sweeney MD  Pager: [phone] - Office: [phone]

## 2020-09-07 LAB
ABSOLUTE EOS #: 0.16 K/UL (ref 0–0.44)
ABSOLUTE IMMATURE GRANULOCYTE: 0.03 K/UL (ref 0–0.3)
ABSOLUTE LYMPH #: 1.36 K/UL (ref 1.1–3.7)
ABSOLUTE MONO #: 0.3 K/UL (ref 0.1–1.2)
ANION GAP SERPL CALCULATED.3IONS-SCNC: 9 MMOL/L (ref 9–17)
BASOPHILS # BLD: 0 % (ref 0–2)
BASOPHILS ABSOLUTE: <0.03 K/UL (ref 0–0.2)
BUN BLDV-MCNC: 14 MG/DL (ref 6–20)
BUN/CREAT BLD: ABNORMAL (ref 9–20)
CALCIUM SERPL-MCNC: 8.4 MG/DL (ref 8.6–10.4)
CHLORIDE BLD-SCNC: 100 MMOL/L (ref 98–107)
CO2: 29 MMOL/L (ref 20–31)
CREAT SERPL-MCNC: 0.92 MG/DL (ref 0.5–0.9)
DIFFERENTIAL TYPE: ABNORMAL
EOSINOPHILS RELATIVE PERCENT: 5 % (ref 1–4)
GFR AFRICAN AMERICAN: >60 ML/MIN
GFR NON-AFRICAN AMERICAN: >60 ML/MIN
GFR SERPL CREATININE-BSD FRML MDRD: ABNORMAL ML/MIN/{1.73_M2}
GFR SERPL CREATININE-BSD FRML MDRD: ABNORMAL ML/MIN/{1.73_M2}
GLUCOSE BLD-MCNC: 106 MG/DL (ref 70–99)
HCT VFR BLD CALC: 32.4 % (ref 36.3–47.1)
HEMOGLOBIN: 9.7 G/DL (ref 11.9–15.1)
IMMATURE GRANULOCYTES: 1 %
LYMPHOCYTES # BLD: 38 % (ref 24–43)
MCH RBC QN AUTO: 27.2 PG (ref 25.2–33.5)
MCHC RBC AUTO-ENTMCNC: 29.9 G/DL (ref 28.4–34.8)
MCV RBC AUTO: 91 FL (ref 82.6–102.9)
MONOCYTES # BLD: 8 % (ref 3–12)
NRBC AUTOMATED: 0 PER 100 WBC
PDW BLD-RTO: 16.3 % (ref 11.8–14.4)
PLATELET # BLD: 258 K/UL (ref 138–453)
PLATELET ESTIMATE: ABNORMAL
PMV BLD AUTO: 10.8 FL (ref 8.1–13.5)
POTASSIUM SERPL-SCNC: 4.2 MMOL/L (ref 3.7–5.3)
RBC # BLD: 3.56 M/UL (ref 3.95–5.11)
RBC # BLD: ABNORMAL 10*6/UL
SEG NEUTROPHILS: 48 % (ref 36–65)
SEGMENTED NEUTROPHILS ABSOLUTE COUNT: 1.73 K/UL (ref 1.5–8.1)
SODIUM BLD-SCNC: 138 MMOL/L (ref 135–144)
WBC # BLD: 3.6 K/UL (ref 3.5–11.3)
WBC # BLD: ABNORMAL 10*3/UL

## 2020-09-07 PROCEDURE — 6360000002 HC RX W HCPCS: Performed by: CLINICAL NURSE SPECIALIST

## 2020-09-07 PROCEDURE — 6370000000 HC RX 637 (ALT 250 FOR IP): Performed by: NURSE PRACTITIONER

## 2020-09-07 PROCEDURE — 2580000003 HC RX 258: Performed by: INTERNAL MEDICINE

## 2020-09-07 PROCEDURE — 1200000000 HC SEMI PRIVATE

## 2020-09-07 PROCEDURE — 6370000000 HC RX 637 (ALT 250 FOR IP): Performed by: CLINICAL NURSE SPECIALIST

## 2020-09-07 PROCEDURE — 99233 SBSQ HOSP IP/OBS HIGH 50: CPT | Performed by: INTERNAL MEDICINE

## 2020-09-07 PROCEDURE — 2580000003 HC RX 258: Performed by: CLINICAL NURSE SPECIALIST

## 2020-09-07 PROCEDURE — 80048 BASIC METABOLIC PNL TOTAL CA: CPT

## 2020-09-07 PROCEDURE — 99232 SBSQ HOSP IP/OBS MODERATE 35: CPT | Performed by: INTERNAL MEDICINE

## 2020-09-07 PROCEDURE — 2500000003 HC RX 250 WO HCPCS: Performed by: INTERNAL MEDICINE

## 2020-09-07 PROCEDURE — 97116 GAIT TRAINING THERAPY: CPT

## 2020-09-07 PROCEDURE — 85025 COMPLETE CBC W/AUTO DIFF WBC: CPT

## 2020-09-07 PROCEDURE — 36415 COLL VENOUS BLD VENIPUNCTURE: CPT

## 2020-09-07 RX ADMIN — SODIUM CHLORIDE, PRESERVATIVE FREE 10 ML: 5 INJECTION INTRAVENOUS at 07:46

## 2020-09-07 RX ADMIN — ENOXAPARIN SODIUM 40 MG: 40 INJECTION SUBCUTANEOUS at 22:04

## 2020-09-07 RX ADMIN — COLCHICINE 0.6 MG: 0.6 TABLET, FILM COATED ORAL at 08:03

## 2020-09-07 RX ADMIN — MORPHINE SULFATE 2 MG: 2 INJECTION, SOLUTION INTRAMUSCULAR; INTRAVENOUS at 22:04

## 2020-09-07 RX ADMIN — OXYCODONE HYDROCHLORIDE AND ACETAMINOPHEN 1 TABLET: 5; 325 TABLET ORAL at 11:40

## 2020-09-07 RX ADMIN — BUMETANIDE 1 MG: 1 TABLET ORAL at 08:03

## 2020-09-07 RX ADMIN — LEVOTHYROXINE SODIUM 25 MCG: 25 TABLET ORAL at 08:03

## 2020-09-07 RX ADMIN — ENOXAPARIN SODIUM 40 MG: 40 INJECTION SUBCUTANEOUS at 08:03

## 2020-09-07 RX ADMIN — ATORVASTATIN CALCIUM 80 MG: 80 TABLET, FILM COATED ORAL at 22:03

## 2020-09-07 RX ADMIN — PANTOPRAZOLE SODIUM 40 MG: 40 TABLET, DELAYED RELEASE ORAL at 08:03

## 2020-09-07 RX ADMIN — MONTELUKAST SODIUM 10 MG: 10 TABLET, FILM COATED ORAL at 22:03

## 2020-09-07 RX ADMIN — COLCHICINE 0.6 MG: 0.6 TABLET, FILM COATED ORAL at 22:03

## 2020-09-07 RX ADMIN — MORPHINE SULFATE 2 MG: 2 INJECTION, SOLUTION INTRAMUSCULAR; INTRAVENOUS at 08:03

## 2020-09-07 RX ADMIN — CLOPIDOGREL 75 MG: 75 TABLET, FILM COATED ORAL at 08:03

## 2020-09-07 RX ADMIN — POLYETHYLENE GLYCOL 3350 17 G: 17 POWDER, FOR SOLUTION ORAL at 07:46

## 2020-09-07 RX ADMIN — OXYBUTYNIN CHLORIDE 5 MG: 5 TABLET ORAL at 22:03

## 2020-09-07 RX ADMIN — METOPROLOL TARTRATE 50 MG: 50 TABLET, FILM COATED ORAL at 22:03

## 2020-09-07 RX ADMIN — ISOSORBIDE DINITRATE 10 MG: 10 TABLET ORAL at 08:03

## 2020-09-07 RX ADMIN — MORPHINE SULFATE 2 MG: 2 INJECTION, SOLUTION INTRAMUSCULAR; INTRAVENOUS at 03:11

## 2020-09-07 RX ADMIN — DULOXETINE HYDROCHLORIDE 60 MG: 30 CAPSULE, DELAYED RELEASE ORAL at 08:03

## 2020-09-07 RX ADMIN — REMDESIVIR 100 MG: 100 INJECTION, POWDER, LYOPHILIZED, FOR SOLUTION INTRAVENOUS at 11:40

## 2020-09-07 RX ADMIN — TRAZODONE HYDROCHLORIDE 100 MG: 100 TABLET ORAL at 22:04

## 2020-09-07 RX ADMIN — ISOSORBIDE DINITRATE 10 MG: 10 TABLET ORAL at 22:04

## 2020-09-07 RX ADMIN — MORPHINE SULFATE 2 MG: 2 INJECTION, SOLUTION INTRAMUSCULAR; INTRAVENOUS at 17:51

## 2020-09-07 RX ADMIN — BUPROPION HYDROCHLORIDE 150 MG: 150 TABLET, EXTENDED RELEASE ORAL at 08:03

## 2020-09-07 RX ADMIN — OXYBUTYNIN CHLORIDE 5 MG: 5 TABLET ORAL at 08:03

## 2020-09-07 RX ADMIN — MORPHINE SULFATE 2 MG: 2 INJECTION, SOLUTION INTRAMUSCULAR; INTRAVENOUS at 13:30

## 2020-09-07 RX ADMIN — ALLOPURINOL 300 MG: 300 TABLET ORAL at 08:03

## 2020-09-07 RX ADMIN — ASPIRIN 81 MG: 81 TABLET, CHEWABLE ORAL at 08:03

## 2020-09-07 RX ADMIN — ONDANSETRON 4 MG: 2 INJECTION INTRAMUSCULAR; INTRAVENOUS at 08:07

## 2020-09-07 ASSESSMENT — PAIN SCALES - GENERAL
PAINLEVEL_OUTOF10: 10

## 2020-09-07 ASSESSMENT — PAIN DESCRIPTION - PAIN TYPE: TYPE: CHRONIC PAIN

## 2020-09-07 ASSESSMENT — PAIN DESCRIPTION - LOCATION: LOCATION: GENERALIZED

## 2020-09-07 NOTE — PROGRESS NOTES
[facility] [address] 19 Patient  Today's Date and Time: 9/7/2020, 10:34 AM    Impression :   · COVID 19 Suspect  · COVID 19 Confirmed Infection 9-2-20  · COVID multifocal pneumonia  · Nausea   · Vomiting  · Status post transfusion of convalescent plasma on 9/5/2020 without any difficulties    Recommendations:   · Monitor off antibiotics  · Clinical Research will approach patient to explore if she qualifies for any of the COVID 19 treatment protocols. · Remdesivir started 9-3-20  · Convalescent plasma transfusion received 9/5/2020    Medical Decision Making/Summary/Discussion:9/7/2020     · Patient admitted with suspected COVID 19 infection  · Covid test confirmed positive. ·   Infection Control Recommendations   · Universal Precautions  · Airborne isolation  · Droplet Isolation    Antimicrobial Stewardship Recommendations     · Discontinuation of therapy  Coordination of Outpatient Care:   · Estimated Length of IV antimicrobials:TBD  · Patient will need Midline Catheter Insertion: TBD  · Patient will need PICC line Insertion: No  · Patient will need: Home IV , [place],  SNF,  LTAC:TBD  · Patient will need outpatient wound care:No    Chief complaint/reason for consultation:   · Concern for COVID infection      History of Present Illness:   Cecily Barnhart is a 60y.o.-year-old  female who was initially admitted on 9/2/2020. Patient seen at the request of Lindy Millan. INITIAL HISTORY:    Patient presented through ER with complaints of having a heavy sensation in the chest for three days. Se reported chest pressure, SOB, cough with blood tinged sputum for three days and one day of nausea and emesis. She also described generalized bodyaches. She has a prior Hx of chronic pain syndrome following an accident in 2011. In the ER her 02 sat was in the low 80's. She required 02 supplementation. Her CXR showed moderate vascular congestion. Her updated the database accordingly.   Past Medical History:     Past Medical History:   Diagnosis Date    Arthritis     [address]estive heart failure) ([facility] [address]     GERD (gastroesophageal reflux disease)     Gout 10/2019    History of heart attack     HLD (hyperlipidemia)     Hypertension     Insomnia     Osteoarthritis        Past Surgical  History:     Past Surgical History:   Procedure Laterality Date    BACK SURGERY      CHOLECYSTECTOMY, LAPAROSCOPIC      HYSTERECTOMY, TOTAL ABDOMINAL      KNEE ARTHROSCOPY Right     KNEE SURGERY Left 2009    NECK SURGERY      SHOULDER SURGERY Right 2009       Medications:      bumetanide  1 mg Oral Daily    allopurinol  300 mg Oral Daily    aspirin  81 mg Oral Daily    atorvastatin  80 mg Oral Nightly    buPROPion  150 mg Oral QAM    clopidogrel  75 mg Oral Daily    colchicine  0.6 mg Oral BID    DULoxetine  60 mg Oral Daily    isosorbide dinitrate  10 mg Oral BID    levothyroxine  25 mcg Oral Daily    metoprolol tartrate  50 mg Oral BID    montelukast  10 mg Oral Nightly    oxybutynin  5 mg Oral BID    pantoprazole  40 mg Oral Daily    polyethylene glycol  17 g Oral Daily    sodium chloride flush  10 mL Intravenous 2 times per day    enoxaparin  40 mg Subcutaneous BID    remdesivir IVPB  100 mg Intravenous Q24H       Social History:     Social History     Socioeconomic History    Marital status: Single     Spouse name: Not on file    Number of children: Not on file    Years of education: Not on file    Highest education level: Not on file   Occupational History    Not on file   Social Needs    Financial resource strain: Not on file    Food insecurity     Worry: Not on file     Inability: Not on file    Transportation needs     Medical: Not on file     Non-medical: Not on file   Tobacco Use    Smoking status: Never Smoker    Smokeless tobacco: Never Used   Substance and Sexual Activity    Alcohol use: No    Drug use: No    Sexual activity: input(s): BC in the last 72 hours. Lab Results   Component Value Date    MUCUS NOT REPORTED 09/02/2020    RBC 3.40 09/06/2020    TRICHOMONAS NOT REPORTED 09/02/2020    WBC 3.6 09/06/2020    YEAST NOT REPORTED 09/02/2020    TURBIDITY CLEAR 09/02/2020     Lab Results   Component Value Date    CREATININE 0.81 09/06/2020    GLUCOSE 81 09/06/2020       Medical Decision Making-Imaging:     EXAMINATION:    ONE XRAY VIEW OF THE CHEST         9/2/2020 7:34 am         COMPARISON:    October 2, 2019, chest examination         HISTORY:    ORDERING SYSTEM PROVIDED HISTORY: cp    TECHNOLOGIST PROVIDED HISTORY:    cp    Reason for Exam: chest pain         FINDINGS:    Stable mild cardiomegaly         Now moderate prominence and indistinctness of the pulmonary    vascularity/interstitial markings         Mild tortuosity of the thoracic aorta         Degenerative changes of the thoracic spine and shoulders              Impression    Moderate vascular congestion      EXAMINATION:    CTA OF THE CHEST 9/2/2020 8:15 am         TECHNIQUE:    CTA of the chest was performed after the administration of intravenous    contrast.  Multiplanar reformatted images are provided for review.  MIP    images are provided for review.  Dose modulation, iterative reconstruction,    and/or weight based adjustment of the mA/kV was utilized to reduce the    radiation dose to as low as reasonably achievable.         COMPARISON:    CT chest performed 10/02/2019.         HISTORY:    ORDERING SYSTEM PROVIDED HISTORY: sob    TECHNOLOGIST PROVIDED HISTORY:    sob    Reason for Exam: sob    Acuity: Acute    Type of Exam: Initial         FINDINGS:    Pulmonary Arteries: Pulmonary arteries are adequately opacified for    evaluation.  No evidence of intraluminal filling defect to suggest pulmonary    embolism.  Main pulmonary artery is normal in caliber.         Mediastinum: No evidence of mediastinal lymphadenopathy.  The heart is    enlarged.  There is no pericardial effusion. Jadon Dancer is no acute abnormality    of the thoracic aorta.  There is redemonstration of prior thyroidectomy with    a heterogeneous soft tissue nodule inferior to the thyroid bed on the left    this measures approximately 3.4 x 2.9 cm and is not significantly changed    compared to prior examination.         Lungs/pleura: There are scattered pulmonary opacities throughout the lungs    bilaterally.  There is no effusion. Jadon Dancer is no pneumothorax.  The    tracheobronchial tree is patent.         Upper Abdomen: Limited images of the upper abdomen are unremarkable.         Soft Tissues/Bones: No acute bone or soft tissue abnormality.              Impression    No definite acute or chronic pulmonary embolism.         Scattered pulmonary opacities throughout the lungs bilaterally consistent    with pneumonia.         Redemonstration of a heterogeneous soft tissue nodule inferior to the thyroid    bed on the left that is not significantly changed in size or characteristics    compared to prior examination.                   Order Details     Medical Decision [id number]:       Medical Decision Making-Other:     Note:  · Labs, medications, radiologic studies were reviewed with personal review of films  · Large amounts of data were reviewed  · Discussed with nursing Staff, Discharge planner  · Infection Control and Prevention measures reviewed  · All prior entries were reviewed  · Administer medications as ordered  · Prognosis: Guarded  · Discharge planning reviewed  · Follow up as outpatient. Thank you for allowing us to participate in the care of this patient. Please call with questions.     James Ch MD  Pager: [phone] - Office: [phone]

## 2020-09-07 NOTE — PROGRESS NOTES
wheezing  Cardiovascular:  negative for chest pain, chest pressure/discomfort, lower extremity edema, palpitations  Gastrointestinal:  negative for abdominal pain, constipation, diarrhea, nausea, vomiting  Neurological:  negative for dizziness, headache    Medications: Allergies: Allergies   Allergen Reactions    Entresto [Sacubitril-Valsartan] Other (See Comments)     Acute kidney injury    Food Swelling     peaches    Gabapentin Swelling    Tetracyclines & Related        Current Meds:   Scheduled Meds:    bumetanide  1 mg Oral Daily    allopurinol  300 mg Oral Daily    aspirin  81 mg Oral Daily    atorvastatin  80 mg Oral Nightly    buPROPion  150 mg Oral QAM    clopidogrel  75 mg Oral Daily    colchicine  0.6 mg Oral BID    DULoxetine  60 mg Oral Daily    isosorbide dinitrate  10 mg Oral BID    levothyroxine  25 mcg Oral Daily    metoprolol tartrate  50 mg Oral BID    montelukast  10 mg Oral Nightly    oxybutynin  5 mg Oral BID    pantoprazole  40 mg Oral Daily    polyethylene glycol  17 g Oral Daily    sodium chloride flush  10 mL Intravenous 2 times per day    enoxaparin  40 mg Subcutaneous BID    remdesivir IVPB  100 mg Intravenous Q24H     Continuous Infusions:    sodium chloride 50 mL/hr at 09/04/20 0505     PRN Meds: albuterol sulfate HFA, albuterol sulfate HFA, oxyCODONE-acetaminophen, traZODone, sodium chloride flush, potassium chloride **OR** potassium alternative oral replacement **OR** potassium chloride, magnesium sulfate, acetaminophen **OR** acetaminophen, promethazine **OR** ondansetron, bisacodyl, morphine, sodium chloride    Data:     Past Medical History:   has a past medical history of Arthritis, CHF (congestive heart failure) ([facility] [address] GERD (gastroesophageal reflux disease), Gout, History of heart attack, HLD (hyperlipidemia), Hypertension, Insomnia, and Osteoarthritis. Social History:   reports that she has never smoked.  She has never used smokeless tobacco. She reports that she does not drink alcohol or use drugs. Family History:   Family History   Problem Relation Age of Onset   [employer] Other Mother     Diabetes Mother     Heart Disease Mother     Arthritis Mother     Kidney Disease Mother     Lupus Mother     Arthritis Sister     Diabetes Brother     Asthma Brother     Cancer Maternal Grandfather     Hypertension Sister     Breast Cancer Sister         28s    Breast Cancer Niece         30-35       Vitals:  /72   Pulse 60   Temp 98.1 °F (36.7 °C) (Oral)   Resp 19   Ht 5' 8\" (1.727 m)   Wt (!) 350 lb (158.8 kg)   SpO2 97%   BMI 53.22 kg/m²   Temp (24hrs), Av.1 °F (36.7 °C), Min:97.7 °F (36.5 °C), Max:98.4 °F (36.9 °C)    No results for input(s): POCGLU in the last 72 hours. I/O (24Hr): Intake/Output Summary (Last 24 hours) at 2020 1138  Last data filed at 2020 0800  Gross per 24 hour   Intake 1045 ml   Output 3450 ml   Net -2405 ml       Labs:  Hematology:  Recent Labs     20  0652 20  0712 20  1112   WBC 3.2* 3.6 3.6   RBC 3.48* 3.40* 3.56*   HGB 9.3* 9.3* 9.7*   HCT 31.9* 30.5* 32.4*   MCV 91.7 89.7 91.0   MCH 26.7 27.4 27.2   MCHC 29.2 30.5 29.9   RDW 16.8* 16.6* 16.3*    152 258   MPV 10.5 10.7 10.8     Chemistry:  Recent Labs     20  0652 20  0712 20  1112   * 139 138   K 4.5 4.7 4.2    104 100   CO2 25 28 29   GLUCOSE 109* 81 106*   BUN 25* 16 14   CREATININE 1.02* 0.81 0.92*   ANIONGAP 7* 7* 9   LABGLOM 56* >60 >60   GFRAA >60 >60 >60   CALCIUM 7.7* 7.9* 8.4*     No results for input(s): PROT, LABALBU, LABA1C, X7QZRWX, M5GBZMF, FT4, TSH, AST, ALT, LDH, GGT, ALKPHOS, LABGGT, BILITOT, BILIDIR, AMMONIA, AMYLASE, LIPASE, LACTATE, CHOL, HDL, LDLCHOLESTEROL, CHOLHDLRATIO, TRIG, VLDL, MJI84SA, PHENYTOIN, PHENYF, URICACID, POCGLU in the last 72 hours.   ABG:No results found for: POCPH, PHART, PH, POCPCO2, HLP1SPM, PCO2, POCPO2, PO2ART, PO2, POCHCO3, KOC5APW, HCO3, NBEA, PBEA, BEART, BE, THGBART, THB, KEU4HGH, MXXV6TVX, Z7JYBHTK, O2SAT, FIO2  No results found for: SPECIAL  No results found for: CULTURE    Radiology:  Xr Chest Portable    Result Date: 9/2/2020  Moderate vascular congestion     Ct Chest Pulmonary Embolism W Contrast    Result Date: 9/2/2020  No definite acute or chronic pulmonary embolism. Scattered pulmonary opacities throughout the lungs bilaterally consistent with pneumonia. Redemonstration of a heterogeneous soft tissue nodule inferior to the thyroid bed on the left that is not significantly changed in size or characteristics compared to prior examination.        Physical Examination:        General appearance:  alert, cooperative and no distress  Mental Status:  oriented to person, place and time and normal affect  Lungs:  clear to auscultation bilaterally, normal effort  Heart:  regular rate and rhythm, no murmur  Abdomen:  soft, nontender, nondistended, normal bowel sounds, no masses, hepatomegaly, splenomegaly  Extremities:  no edema, redness, tenderness in the calves  Skin:  no gross lesions, rashes, induration    Assessment:        Hospital Problems           Last Modified POA    * (Principal) Lower respiratory tract infection due to COVID-19 virus 9/2/2020 Yes    Therapeutic opioid-induced constipation (OIC) 9/2/2020 Yes    Opioid dependence, uncomplicated ([facility] [address] 0/7/4740 Yes    Chronic pain disorder 9/2/2020 Yes    Class 3 severe obesity due to excess calories with serious comorbidity and body mass index (BMI) of 45.0 to 49.9 in adult ([facility] [address] 9/2/2020 Yes    Morbid obesity ([facility] [address] 9/2/2020 Yes    Hypertension 9/2/2020 Yes    Gout 9/2/2020 Yes    GERD (gastroesophageal reflux disease) 9/2/2020 Yes    Chronic combined systolic and diastolic heart failure (HCC) (Chronic) 9/2/2020 Yes    Other proteinuria 9/2/2020 Yes    Nausea and vomiting 9/2/2020 Yes          Plan:        COVID-19 pneumonia  -ID on board  -Remdisivir started 9/3  -Continue oxygen support    SUSU  -

## 2020-09-07 NOTE — PROGRESS NOTES
Physical Therapy  Facility/Department: [facility] 3  Daily Treatment Note  NAME: Lucy   : 1965  MRN: [id number]    Date of Service: 2020    Discharge Recommendations:  Patient would benefit from continued therapy after discharge   PT Equipment Recommendations  Equipment Needed: No  Other: Pt has rollator    Assessment   Body structures, Functions, Activity limitations: Decreased functional mobility ; Decreased strength;Decreased balance;Decreased endurance  Assessment: Pt grossly CGA to SBA for mobility, amb 30' w/RW in room, Mild SOB noted, per pt mobility limited at baseline d/t painful knees but currently more limited than normal, pt would benefit from continued acute PT to address deficits. Prognosis: Good  Decision Making: Medium Complexity  PT Education: Plan of Care;PT Role;General Safety  REQUIRES PT FOLLOW UP: Yes  Activity Tolerance  Activity Tolerance: Patient limited by endurance; Patient Tolerated treatment well  Activity Tolerance: Pt notes she normally ambulated longer distances at baseline     Patient Diagnosis(es): The primary encounter diagnosis was Nausea and vomiting, intractability of vomiting not specified, unspecified vomiting type. Diagnoses of Chest pain, unspecified type and Combined systolic and diastolic congestive heart failure, unspecified HF chronicity ([address] were also pertinent to this visit. has a past medical history of Arthritis, CHF (congestive heart failure) ([address] GERD (gastroesophageal reflux disease), Gout, History of heart attack, HLD (hyperlipidemia), Hypertension, Insomnia, and Osteoarthritis. has a past surgical history that includes back surgery; shoulder surgery (Right, ); knee surgery (Left, ); Cholecystectomy, laparoscopic; Knee arthroscopy (Right); Hysterectomy, total abdominal; and Neck surgery.     Restrictions  Restrictions/Precautions  Restrictions/Precautions: General Precautions, Fall Risk(Droplet Plus)  Required Braces or Orthoses?: No  Position Activity Restriction  Other position/activity restrictions: up with assist, COVID+ 9/2. Subjective   General  Response To Previous Treatment: Patient with no complaints from previous session. Family / Caregiver Present: No  Subjective  Subjective: RN and pt agreed to PT, pt awake sitting EOB with RN upon arrival, pt on supplemental O2 which she doesn't normally need at baseline  Pain Screening  Patient Currently in Pain: Yes  Vital Signs  Patient Currently in Pain: Yes       Orientation  Orientation  Overall Orientation Status: Within Functional Limits       Objective      Transfers  Sit to Stand: Stand by assistance;Contact guard assistance  Stand to sit: Stand by assistance;Contact guard assistance  Ambulation  Ambulation?: Yes  More Ambulation?: No  Ambulation 1  Surface: level tile  Device: Rolling Walker  Assistance: Stand by assistance;Contact guard assistance  Quality of Gait: Flexed posture, wide EDILBERTO, slow julia, Increased effort needed to complete short distance  Gait Deviations: Slow Julia;Decreased step length;Decreased step height; Increased EDILBERTO  Distance: 30ft in room  Comments: SOB noted  Stairs/Curb  Stairs?: No        Exercises  Knee Long Arc Quad: BLE x 10  Ankle Pumps: BLE x 20                       Goals  Short term goals  Time Frame for Short term goals: 14 visits  Short term goal 1: Pt will be Misa bed mobility  Short term goal 2: Pt will be Misa transfers  Short term goal 3: Pt will be Misa amb 200' RW    Plan    Plan  Times per week: 5-6x/wk  Current Treatment Recommendations: Strengthening, Balance Training, Endurance Training, Functional Mobility Training, Transfer Training, Gait Training, Home Exercise Program, Safety Education & Training, Patient/Caregiver Education & Training, Equipment Evaluation, Education, & procurement  Safety Devices  Type of devices: Nurse notified, Left in chair(Pt left in shower chair with RN in room)  Restraints  Initially in place: No Therapy Time   Individual Concurrent Group Co-treatment   Time In 1341         Time Out 1356         Minutes 15          Time coded minutes 15       Briana Gerber, PTA

## 2020-09-08 ENCOUNTER — HOSPITAL ENCOUNTER (OUTPATIENT)
Dept: PHYSICAL THERAPY | Age: 55
Setting detail: THERAPIES SERIES
Discharge: HOME | End: 2020-09-08
Payer: MEDICARE

## 2020-09-08 LAB
ABSOLUTE EOS #: 0.24 K/UL (ref 0–0.44)
ABSOLUTE IMMATURE GRANULOCYTE: 0.06 K/UL (ref 0–0.3)
ABSOLUTE LYMPH #: 1.64 K/UL (ref 1.1–3.7)
ABSOLUTE MONO #: 0.35 K/UL (ref 0.1–1.2)
ANION GAP SERPL CALCULATED.3IONS-SCNC: 10 MMOL/L (ref 9–17)
BASOPHILS # BLD: 0 % (ref 0–2)
BASOPHILS ABSOLUTE: <0.03 K/UL (ref 0–0.2)
BUN BLDV-MCNC: 15 MG/DL (ref 6–20)
BUN/CREAT BLD: ABNORMAL (ref 9–20)
CALCIUM SERPL-MCNC: 8.1 MG/DL (ref 8.6–10.4)
CHLORIDE BLD-SCNC: 101 MMOL/L (ref 98–107)
CO2: 29 MMOL/L (ref 20–31)
CREAT SERPL-MCNC: 0.93 MG/DL (ref 0.5–0.9)
DIFFERENTIAL TYPE: ABNORMAL
EOSINOPHILS RELATIVE PERCENT: 6 % (ref 1–4)
GFR AFRICAN AMERICAN: >60 ML/MIN
GFR NON-AFRICAN AMERICAN: >60 ML/MIN
GFR SERPL CREATININE-BSD FRML MDRD: ABNORMAL ML/MIN/{1.73_M2}
GFR SERPL CREATININE-BSD FRML MDRD: ABNORMAL ML/MIN/{1.73_M2}
GLUCOSE BLD-MCNC: 86 MG/DL (ref 70–99)
HCT VFR BLD CALC: 33.1 % (ref 36.3–47.1)
HEMOGLOBIN: 9.5 G/DL (ref 11.9–15.1)
IMMATURE GRANULOCYTES: 2 %
LYMPHOCYTES # BLD: 43 % (ref 24–43)
MCH RBC QN AUTO: 26.2 PG (ref 25.2–33.5)
MCHC RBC AUTO-ENTMCNC: 28.7 G/DL (ref 28.4–34.8)
MCV RBC AUTO: 91.4 FL (ref 82.6–102.9)
MONOCYTES # BLD: 9 % (ref 3–12)
NRBC AUTOMATED: 0 PER 100 WBC
PDW BLD-RTO: 15.9 % (ref 11.8–14.4)
PLATELET # BLD: 277 K/UL (ref 138–453)
PLATELET ESTIMATE: ABNORMAL
PMV BLD AUTO: 9.9 FL (ref 8.1–13.5)
POTASSIUM SERPL-SCNC: 4.3 MMOL/L (ref 3.7–5.3)
RBC # BLD: 3.62 M/UL (ref 3.95–5.11)
RBC # BLD: ABNORMAL 10*6/UL
SEG NEUTROPHILS: 40 % (ref 36–65)
SEGMENTED NEUTROPHILS ABSOLUTE COUNT: 1.56 K/UL (ref 1.5–8.1)
SODIUM BLD-SCNC: 140 MMOL/L (ref 135–144)
WBC # BLD: 3.9 K/UL (ref 3.5–11.3)
WBC # BLD: ABNORMAL 10*3/UL

## 2020-09-08 PROCEDURE — 6360000002 HC RX W HCPCS: Performed by: CLINICAL NURSE SPECIALIST

## 2020-09-08 PROCEDURE — 6370000000 HC RX 637 (ALT 250 FOR IP): Performed by: CLINICAL NURSE SPECIALIST

## 2020-09-08 PROCEDURE — 97166 OT EVAL MOD COMPLEX 45 MIN: CPT

## 2020-09-08 PROCEDURE — 36415 COLL VENOUS BLD VENIPUNCTURE: CPT

## 2020-09-08 PROCEDURE — 97535 SELF CARE MNGMENT TRAINING: CPT

## 2020-09-08 PROCEDURE — 99233 SBSQ HOSP IP/OBS HIGH 50: CPT | Performed by: INTERNAL MEDICINE

## 2020-09-08 PROCEDURE — 85025 COMPLETE CBC W/AUTO DIFF WBC: CPT

## 2020-09-08 PROCEDURE — 97116 GAIT TRAINING THERAPY: CPT

## 2020-09-08 PROCEDURE — 6370000000 HC RX 637 (ALT 250 FOR IP): Performed by: NURSE PRACTITIONER

## 2020-09-08 PROCEDURE — 99232 SBSQ HOSP IP/OBS MODERATE 35: CPT | Performed by: INTERNAL MEDICINE

## 2020-09-08 PROCEDURE — 80048 BASIC METABOLIC PNL TOTAL CA: CPT

## 2020-09-08 PROCEDURE — 1200000000 HC SEMI PRIVATE

## 2020-09-08 PROCEDURE — 97110 THERAPEUTIC EXERCISES: CPT

## 2020-09-08 PROCEDURE — 2580000003 HC RX 258: Performed by: CLINICAL NURSE SPECIALIST

## 2020-09-08 RX ADMIN — METOPROLOL TARTRATE 50 MG: 50 TABLET, FILM COATED ORAL at 21:56

## 2020-09-08 RX ADMIN — COLCHICINE 0.6 MG: 0.6 TABLET, FILM COATED ORAL at 21:57

## 2020-09-08 RX ADMIN — ALLOPURINOL 300 MG: 300 TABLET ORAL at 08:14

## 2020-09-08 RX ADMIN — ENOXAPARIN SODIUM 40 MG: 40 INJECTION SUBCUTANEOUS at 08:14

## 2020-09-08 RX ADMIN — ATORVASTATIN CALCIUM 80 MG: 80 TABLET, FILM COATED ORAL at 21:57

## 2020-09-08 RX ADMIN — POLYETHYLENE GLYCOL 3350 17 G: 17 POWDER, FOR SOLUTION ORAL at 07:53

## 2020-09-08 RX ADMIN — OXYCODONE HYDROCHLORIDE AND ACETAMINOPHEN 1 TABLET: 5; 325 TABLET ORAL at 18:01

## 2020-09-08 RX ADMIN — ONDANSETRON 4 MG: 2 INJECTION INTRAMUSCULAR; INTRAVENOUS at 08:13

## 2020-09-08 RX ADMIN — ENOXAPARIN SODIUM 40 MG: 40 INJECTION SUBCUTANEOUS at 21:57

## 2020-09-08 RX ADMIN — DULOXETINE HYDROCHLORIDE 60 MG: 30 CAPSULE, DELAYED RELEASE ORAL at 08:14

## 2020-09-08 RX ADMIN — MONTELUKAST SODIUM 10 MG: 10 TABLET, FILM COATED ORAL at 21:56

## 2020-09-08 RX ADMIN — PANTOPRAZOLE SODIUM 40 MG: 40 TABLET, DELAYED RELEASE ORAL at 08:14

## 2020-09-08 RX ADMIN — MORPHINE SULFATE 2 MG: 2 INJECTION, SOLUTION INTRAMUSCULAR; INTRAVENOUS at 08:14

## 2020-09-08 RX ADMIN — BUPROPION HYDROCHLORIDE 150 MG: 150 TABLET, EXTENDED RELEASE ORAL at 08:14

## 2020-09-08 RX ADMIN — OXYCODONE HYDROCHLORIDE AND ACETAMINOPHEN 1 TABLET: 5; 325 TABLET ORAL at 02:08

## 2020-09-08 RX ADMIN — LEVOTHYROXINE SODIUM 25 MCG: 25 TABLET ORAL at 08:14

## 2020-09-08 RX ADMIN — ISOSORBIDE DINITRATE 10 MG: 10 TABLET ORAL at 21:57

## 2020-09-08 RX ADMIN — COLCHICINE 0.6 MG: 0.6 TABLET, FILM COATED ORAL at 08:14

## 2020-09-08 RX ADMIN — ISOSORBIDE DINITRATE 10 MG: 10 TABLET ORAL at 08:14

## 2020-09-08 RX ADMIN — OXYBUTYNIN CHLORIDE 5 MG: 5 TABLET ORAL at 21:56

## 2020-09-08 RX ADMIN — SODIUM CHLORIDE, PRESERVATIVE FREE 10 ML: 5 INJECTION INTRAVENOUS at 07:53

## 2020-09-08 RX ADMIN — MORPHINE SULFATE 2 MG: 2 INJECTION, SOLUTION INTRAMUSCULAR; INTRAVENOUS at 12:25

## 2020-09-08 RX ADMIN — OXYBUTYNIN CHLORIDE 5 MG: 5 TABLET ORAL at 08:14

## 2020-09-08 RX ADMIN — SODIUM CHLORIDE: 9 INJECTION, SOLUTION INTRAVENOUS at 12:26

## 2020-09-08 RX ADMIN — ASPIRIN 81 MG: 81 TABLET, CHEWABLE ORAL at 08:14

## 2020-09-08 RX ADMIN — BUMETANIDE 1 MG: 1 TABLET ORAL at 08:14

## 2020-09-08 RX ADMIN — CLOPIDOGREL 75 MG: 75 TABLET, FILM COATED ORAL at 08:14

## 2020-09-08 RX ADMIN — TRAZODONE HYDROCHLORIDE 100 MG: 100 TABLET ORAL at 22:30

## 2020-09-08 RX ADMIN — BISACODYL 10 MG: 10 SUPPOSITORY RECTAL at 22:30

## 2020-09-08 ASSESSMENT — PAIN DESCRIPTION - FREQUENCY: FREQUENCY: CONTINUOUS

## 2020-09-08 ASSESSMENT — PAIN - FUNCTIONAL ASSESSMENT: PAIN_FUNCTIONAL_ASSESSMENT: PREVENTS OR INTERFERES SOME ACTIVE ACTIVITIES AND ADLS

## 2020-09-08 ASSESSMENT — PAIN DESCRIPTION - PROGRESSION: CLINICAL_PROGRESSION: NOT CHANGED

## 2020-09-08 ASSESSMENT — PAIN DESCRIPTION - ONSET: ONSET: ON-GOING

## 2020-09-08 ASSESSMENT — PAIN DESCRIPTION - PAIN TYPE
TYPE: CHRONIC PAIN

## 2020-09-08 ASSESSMENT — PAIN SCALES - GENERAL
PAINLEVEL_OUTOF10: 9
PAINLEVEL_OUTOF10: 10
PAINLEVEL_OUTOF10: 8
PAINLEVEL_OUTOF10: 8
PAINLEVEL_OUTOF10: 9
PAINLEVEL_OUTOF10: 8
PAINLEVEL_OUTOF10: 10
PAINLEVEL_OUTOF10: 10
PAINLEVEL_OUTOF10: 9

## 2020-09-08 ASSESSMENT — PAIN DESCRIPTION - DESCRIPTORS: DESCRIPTORS: ACHING;DISCOMFORT

## 2020-09-08 ASSESSMENT — PAIN DESCRIPTION - LOCATION
LOCATION: GENERALIZED

## 2020-09-08 NOTE — PLAN OF CARE
Problem: Airway Clearance - Ineffective  Goal: Achieve or maintain patent airway  9/8/2020 0937 by Cyrus Eisenberg RN  Outcome: Ongoing  9/8/2020 0539 by Fredo Redmond RN  Outcome: Ongoing     Problem: Gas Exchange - Impaired  Goal: Absence of hypoxia  9/8/2020 0937 by Cyrus Eisenberg RN  Outcome: Ongoing  9/8/2020 0539 by Fredo Redmond RN  Outcome: Ongoing  Goal: Promote optimal lung function  9/8/2020 0937 by Cyrus Eisenberg RN  Outcome: Ongoing  9/8/2020 0539 by Fredo Redmond RN  Outcome: Ongoing     Problem: Breathing Pattern - Ineffective  Goal: Ability to achieve and maintain a regular respiratory rate  9/8/2020 0937 by Cyrus Eisenberg RN  Outcome: Ongoing  9/8/2020 0539 by Fredo Redmond RN  Outcome: Ongoing     Problem:  Body Temperature -  Risk of, Imbalanced  Goal: Ability to maintain a body temperature within defined limits  9/8/2020 0937 by Cyrus Eisenberg RN  Outcome: Ongoing  9/8/2020 0539 by Fredo Redmond RN  Outcome: Ongoing  Goal: Will regain or maintain usual level of consciousness  9/8/2020 0937 by Cyrus Eisenberg RN  Outcome: Ongoing  9/8/2020 0539 by Fredo Redmond RN  Outcome: Ongoing  Goal: Complications related to the disease process, condition or treatment will be avoided or minimized  9/8/2020 0937 by Cyrus Eisenberg RN  Outcome: Ongoing  9/8/2020 0539 by Fredo Redmond RN  Outcome: Ongoing     Problem: Isolation Precautions - Risk of Spread of Infection  Goal: Prevent transmission of infection  9/8/2020 0937 by Cyrus Eisenberg RN  Outcome: Ongoing  9/8/2020 0539 by Fredo Redmond RN  Outcome: Ongoing     Problem: Nutrition Deficits  Goal: Optimize nutrtional status  9/8/2020 0937 by Cyrus Eisenberg RN  Outcome: Ongoing  9/8/2020 0539 by Fredo Redmond RN  Outcome: Ongoing     Problem: Risk for Fluid Volume Deficit  Goal: Maintain normal heart rhythm  9/8/2020 0937 by Cyrus Eisenberg RN  Outcome: Ongoing  9/8/2020 0539 by Radha Harrington Joey Ram RN  Outcome: Ongoing  Goal: Maintain absence of muscle cramping  9/8/2020 0937 by Victoriano Coffey RN  Outcome: Ongoing  9/8/2020 0539 by Mynor Lopez RN  Outcome: Ongoing  Goal: Maintain normal serum potassium, sodium, calcium, phosphorus, and pH  9/8/2020 0937 by Victoriano Coffey RN  Outcome: Ongoing  9/8/2020 0539 by Mynor Lopez RN  Outcome: Ongoing     Problem: Loneliness or Risk for Loneliness  Goal: Demonstrate positive use of time alone when socialization is not possible  9/8/2020 0937 by Victoriano Coffey RN  Outcome: Ongoing  9/8/2020 0539 by Mynor Lopez RN  Outcome: Ongoing     Problem: Fatigue  Goal: Verbalize increase energy and improved vitality  9/8/2020 0937 by Victoriano Coffey RN  Outcome: Ongoing  9/8/2020 0539 by Mynor Lopez RN  Outcome: Ongoing     Problem: Patient Education: Go to Patient Education Activity  Goal: Patient/Family Education  9/8/2020 1500 by Victoriano Coffey RN  Outcome: Ongoing  9/8/2020 0539 by Mynor Lopez RN  Outcome: Ongoing     Problem: Pain:  Goal: Pain level will decrease  Description: Pain level will decrease  9/8/2020 0937 by Victoriano Coffey RN  Outcome: Ongoing  9/8/2020 0539 by Mynor Lopez RN  Outcome: Ongoing  Goal: Control of acute pain  Description: Control of acute pain  9/8/2020 0937 by Victoriano Coffey RN  Outcome: Ongoing  9/8/2020 0539 by Mynor Lopez RN  Outcome: Ongoing  Goal: Control of chronic pain  Description: Control of chronic pain  9/8/2020 0937 by Victoriano Coffey RN  Outcome: Ongoing  9/8/2020 0539 by Mynor Lopez RN  Outcome: Ongoing     Problem: Falls - Risk of:  Goal: Will remain free from falls  Description: Will remain free from falls  9/8/2020 0937 by Victoriano Coffey RN  Outcome: Ongoing  9/8/2020 0539 by Mynor Lopez RN  Outcome: Ongoing  Goal: Absence of physical injury  Description: Absence of physical injury  9/8/2020 0937 by Victoriano Coffey RN  Outcome: Ongoing  9/8/2020 0539 by Rosalinda Caldera RN  Outcome: Ongoing

## 2020-09-08 NOTE — PROGRESS NOTES
Occupational Therapy   Occupational Therapy Initial Assessment  Date: 2020   Patient Name: Chicho Moe  MRN: [id number]     : 1965    Chief Complaint   Patient presents with    Nausea    Emesis    Chest Pain     chest heavy x past 3 days     Date of Service: 2020    Discharge Recommendations:  Patient would benefit from continued therapy after discharge  OT Equipment Recommendations  Other: CTA pending progress    Assessment   Performance deficits / Impairments: Decreased functional mobility ; Decreased ADL status; Decreased endurance  Assessment: Pt would benefit from continued acute care and post acute care OT to address moderate deficits in ADL/ functional activities, endurance and functional transfers/ mobility following admission. Pt demo increased fatigue with activity, SOB. Pt required CGA for functional transfers/ mobility for safety, cues for proper pursed lipped breathing tech. Treatment Diagnosis: COVID  Prognosis: Good  Decision Making: Medium Complexity  OT Education: OT Role;Plan of Care;Transfer Training;Energy Conservation  Patient Education: purpose of evaluation, proper pursed lipped breathing, good return  REQUIRES OT FOLLOW UP: Yes  Activity Tolerance  Activity Tolerance: Patient limited by fatigue  Activity Tolerance: SOB with increased activity  Safety Devices  Safety Devices in place: Yes  Type of devices: Left in chair;Nurse notified;Call light within reach;Gait belt;Patient at risk for falls(PTA present on exit)         Patient Diagnosis(es): The primary encounter diagnosis was Nausea and vomiting, intractability of vomiting not specified, unspecified vomiting type. Diagnoses of Chest pain, unspecified type and Combined systolic and diastolic congestive heart failure, unspecified HF chronicity ([address] were also pertinent to this visit.      has a past medical history of Arthritis, CHF (congestive heart failure) ([address] GERD (gastroesophageal reflux disease), Gout, History of heart attack, HLD (hyperlipidemia), Hypertension, Insomnia, and Osteoarthritis. has a past surgical history that includes back surgery; shoulder surgery (Right, 2009); knee surgery (Left, 2009); Cholecystectomy, laparoscopic; Knee arthroscopy (Right); Hysterectomy, total abdominal; and Neck surgery. Treatment Diagnosis: COVID      Restrictions  Restrictions/Precautions  Restrictions/Precautions: General Precautions, Fall Risk(Droplet +)  Required Braces or Orthoses?: No  Position Activity Restriction  Other position/activity restrictions: up with assist, COVID+ 9/2. Subjective   General  Patient assessed for rehabilitation services?: Yes  Family / Caregiver Present: No  Patient Currently in Pain: Yes  Pain Assessment  Pain Assessment: 0-10  Pain Level: 8  Pain Type: Chronic pain  Pain Location: Generalized  Pain Descriptors: Aching;Discomfort  Pain Frequency: Continuous  Pain Onset: On-going  Clinical Progression: Not changed  Functional Pain Assessment: Prevents or interferes some active activities and ADLs  Non-Pharmaceutical Pain Intervention(s): Ambulation/Increased Activity;Repositioned  Vital Signs  Patient Currently in Pain: Yes     Social/Functional History  Social/Functional History  Lives With: Alone  Type of Home: Apartment(senior apartments on 1st floor. Laundry in room)  Home Layout: One level  Home Access: Level entry  Bathroom Shower/Tub: Walk-in shower  Bathroom Toilet: Handicap height  Bathroom Equipment: Grab bars in shower, Shower chair, Hand-held shower, Grab bars around toilet(use of shower chair)  Bathroom Accessibility: [facility]: Cane(rollator, since fall in March 2020, Hx B TKA)  ADL Assistance: Independent  Homemaking Assistance: Independent  Homemaking Responsibilities: Yes  Ambulation Assistance: Independent(use of rollator)  Transfer Assistance: Independent  Active : No  Patient's  Info: uncle  Mode of Transportation: Car  Occupation:  On disability  Type of occupation:   Additional Comments: uncle does grocery shopping, pt unable to amb at grocery d/t pain     Objective   Vision: Impaired  Vision Exceptions: Wears glasses for distance;Wears glasses for reading  Hearing: Within functional limits    Orientation  Overall Orientation Status: Within Functional Limits  Observation/Palpation  Posture: Fair  Observation: forward flexed posture  Balance  Sitting Balance: Stand by assistance(seated EOB)  Standing Balance: Contact guard assistance(use of RW)  Standing Balance  Time: ~2 min  Activity: sit <> stand transfer, functional mobility to chair  Comment: use of RW  Functional Mobility  Functional - Mobility Device: Rolling Walker  Activity: Other  Assist Level: Contact guard assistance  Functional Mobility Comments: VCs for hand placement on RW during session     ADL  Feeding: Independent  Grooming: Contact guard assistance;Setup  UE Bathing: Setup; Increased time to complete;Minimal assistance  LE Bathing: Setup; Increased time to complete; Moderate assistance  UE Dressing: Setup; Increased time to complete;Minimal assistance  LE Dressing: Setup; Moderate assistance; Increased time to complete(Pt able to don R sock seated EOB, assist to don L sock)  Toileting: Minimal assistance  Additional Comments: Pt seated in bed on arrival. Pt completed bed mobility and sat at EOB. Pt assisted to complete sit > stand transfer and functional mobility to chair with use of RW. Pt significantly SOB, cues for proper pursed lipped breathing tech, hand placement during functional activities. Pt left up in recliner with PTA present on exit.      Tone RUE  RUE Tone: Normotonic  Tone LUE  LUE Tone: Normotonic  Coordination  Movements Are Fluid And Coordinated: Yes        Bed mobility  Supine to Sit: Unable to assess  Sit to Supine: Unable to assess  Scooting: Stand by assistance  Comment: Pt left up in recliner on exit, sitting in bed on arrival     Transfers  Stand Step treatment and patient is agreeable for therapy. All lines intact and patient positioned comfortably at end of treatment. All patient needs addressed prior to ending therapy session.       Rita White, OTR/L

## 2020-09-08 NOTE — PROGRESS NOTES
Physical Therapy  Facility/Department: [facility] 3  Daily Treatment Note  NAME: Lynn Sanchez  : 1965  MRN: [id number]    Date of Service: 2020    Discharge Recommendations:  Patient would benefit from continued therapy after discharge   PT Equipment Recommendations  Equipment Needed: No  Other: Pt has rollator    Assessment   Body structures, Functions, Activity limitations: Decreased functional mobility ; Decreased strength;Decreased balance;Decreased endurance  Assessment: Pt grossly CGA to SBA for mobility, amb 10' w/RW in room, Mild SOB noted, per pt mobility limited at baseline d/t painful knees but currently more limited than normal, pt would benefit from continued acute PT to address deficits. Prognosis: Good  PT Education: Plan of Care;PT Role;General Safety;Home Exercise Program;Transfer Training;Functional Mobility Training  REQUIRES PT FOLLOW UP: Yes  Activity Tolerance  Activity Tolerance: Patient limited by endurance; Patient Tolerated treatment well     Patient Diagnosis(es): The primary encounter diagnosis was Nausea and vomiting, intractability of vomiting not specified, unspecified vomiting type. Diagnoses of Chest pain, unspecified type and Combined systolic and diastolic congestive heart failure, unspecified HF chronicity ([address] were also pertinent to this visit. has a past medical history of Arthritis, CHF (congestive heart failure) ([address] GERD (gastroesophageal reflux disease), Gout, History of heart attack, HLD (hyperlipidemia), Hypertension, Insomnia, and Osteoarthritis. has a past surgical history that includes back surgery; shoulder surgery (Right, ); knee surgery (Left, ); Cholecystectomy, laparoscopic; Knee arthroscopy (Right); Hysterectomy, total abdominal; and Neck surgery.     Restrictions  Restrictions/Precautions  Restrictions/Precautions: General Precautions, Fall Risk(Droplet Plus)  Required Braces or Orthoses?: No  Position Activity Restriction  Other position/activity restrictions: up with assist, COVID+ 9/2. Subjective   General  Response To Previous Treatment: Patient reporting fatigue but able to participate. Family / Caregiver Present: No  Subjective  Subjective: Pt and RN agreeable to PT. Pt sitting up in bed upon arrival, very pleasant and cooperative with treatment. Pt c/o generalized fatigue and pain.   General Comment  Comments: Pt left seated in recliner with call light within reach  Pain Screening  Patient Currently in Pain: Yes  Pain Assessment  Pain Assessment: 0-10  Pain Level: 8  Pain Type: Chronic pain  Pain Location: Generalized  Pain Descriptors: Aching;Discomfort  Pain Frequency: Continuous  Pain Onset: On-going  Clinical Progression: Not changed  Functional Pain Assessment: Prevents or interferes some active activities and ADLs  Non-Pharmaceutical Pain Intervention(s): Ambulation/Increased Activity;Repositioned  Vital Signs  Patient Currently in Pain: Yes       Orientation  Orientation  Overall Orientation Status: Within Functional Limits  Cognition      Objective   Bed mobility  Supine to Sit: (pt sitting up in bed upon arrival)  Sit to Supine: (pt left seated in recliner)  Scooting: Stand by assistance  Comment: increased time to complete  Transfers  Sit to Stand: Contact guard assistance  Stand to sit: Contact guard assistance  Comment: vc's for UE placement with good return demonstration  Ambulation  Ambulation?: Yes  Ambulation 1  Surface: level tile  Device: Rolling Walker(bariatric)  Assistance: Contact guard assistance  Quality of Gait: Flexed posture, wide EDILBERTO, slow julia, Increased effort needed to complete short distance  Gait Deviations: Slow Julia;Decreased step length  Distance: 10ft in room  Comments: SOB noted  Stairs/Curb  Stairs?: No     Balance  Posture: Fair  Sitting - Static: Good  Sitting - Dynamic: Good  Standing - Static: Good;-  Standing - Dynamic: Fair;+  Comments: RW used while assessing standing balance  Exercises  Quad Sets: 10x bilat  Gluteal Sets: 10x bilat  Hip Flexion: 10x bilat  Hip Abduction: 10x bilat  Knee Long Arc Quad: 10x bilat  Ankle Pumps: 10x bilat     Goals  Short term goals  Time Frame for Short term goals: 14 visits  Short term goal 1: Pt will be Misa bed mobility  Short term goal 2: Pt will be Mias transfers  Short term goal 3: Pt will be Misa amb 200' RW    Plan    Plan  Times per week: 5-6x/wk  Current Treatment Recommendations: Strengthening, Balance Training, Endurance Training, Functional Mobility Training, Transfer Training, Gait Training, Home Exercise Program, Safety Education & Training, Patient/Caregiver Education & Training, Equipment Evaluation, Education, & procurement  Safety Devices  Type of devices: Nurse notified, Left in chair, All fall risk precautions in place, Call light within reach, Gait belt  Restraints  Initially in place: No     Therapy Time   Individual Concurrent Group Co-treatment   Time In 1335         Time Out 1400         Minutes 25         Timed Code Treatment Minutes: [address]

## 2020-09-08 NOTE — PLAN OF CARE
RN  Outcome: Ongoing  9/7/2020 1749 by Kathy Zhang RN  Outcome: Met This Shift  Goal: Absence of physical injury  Description: Absence of physical injury  9/8/2020 0539 by Chandan Romero RN  Outcome: Ongoing  9/7/2020 1749 by Kathy Zhang RN  Outcome: Met This Shift

## 2020-09-08 NOTE — CARE COORDINATION
TRANSITIONAL CARE PLANNING/ 2 Rehab Ralf Day: 6    Reason for Admission: Lower respiratory tract infection due to COVID-19 virus [U07.1, J22]     Treatment Plan of Care: full code, O2 1L          Readmission Risk            Risk of Unplanned Readmission:    19            Patient goals/Treatment Preferences/Transitional Plan: Josie spoke to [facility] today reviewed insurance information working on [facility] following. 10:30 updated continuing healthcare that patient has been accepted by another snf  17:07 spoke to [facility] with josie have precert ps dr Yves Hanley to see if ready to dc-response tomorrow am  15:28 called patient to update said she is doesn't [facility] to [facility] to [facility] wants to go home with [facility].  Updated emeterio with [facility]

## 2020-09-08 NOTE — PROGRESS NOTES
[facility]  Office: [address], DO, Desiree Schwarz, DO, Gigi Watts, DO, Sabi Lock, DO, Dorie Spear MD, Trinity Chicas MD, Jefe Muñoz MD, Nga Wei MD, Marino Angelo MD, Mayuri Griffin MD, Cleveland Doshi MD, Marcelle López MD, Mbonu Claryce Mcardle, MD, Izabella Sifuentes DO, Kishor Rocha MD, Emerson Zacarias MD, Helen Adorno DO, Emmanuel Randolph MD,  Chase Rabago DO, Jinny Lipscomb MD, Johanna Frank MD, Gloria Mccartney CNP, [facility] Radha, CNP, Lottie Pillai, CNP, Compa Patel, CNS, Ravi Wren, CNP, Ralf Tim, CNP, Josie Perry, CNP, Edgar Rasmussen, CNP, Candida Vernon, CNP, Jack Burt PA-C, Alberto Trimble, YANIV, Jovanny Hurtado, CNP, Gurinder Sprague, CNP, Bret Cameron, CNP, Ankush Forte, CNP, Courtney Castrejon, CNP         [facility]   [address] [facility]    Progress Note    9/8/2020    5:58 PM    Name:   Porter Rust  MRN:     [id number]     Acct:      [de-identified]   Room:   [facility] Day:  6  Admit Date:  9/2/2020  6:38 AM    PCP:   Ct Godwin PA-C  Code Status:  Full Code    Subjective:     C/C:   Chief Complaint   Patient presents with    Nausea    Emesis    Chest Pain     chest heavy x past 3 days     Interval History Status: improved. Patient still feeling tired and short of breath but feels better than before. Oxygen requirements are still decreasing, patient initially had concerns regarding going home by herself secondary to her fatigue and shortness of breath. Had requested a short stay in rehab    Brief History: This is a 26-year-old female with past medical history of CHF, CAD, hypertension who came in with complaints of nausea vomiting and chest pain for past 3 days. Patient was noted to have COVID-19 pneumonia. Patient was noted to be hypoxic with oxygen saturation in low 80s. CT of the chest was consistent with pulmonary opacities.   Patient is being followed by infectious disease. Review of Systems:     Constitutional: Positive for fatigue  Respiratory:  negative for cough, dyspnea on exertion, shortness of breath, wheezing  Cardiovascular:  negative for chest pain, chest pressure/discomfort, lower extremity edema, palpitations  Gastrointestinal:  negative for abdominal pain, constipation, diarrhea, nausea, vomiting  Neurological:  negative for dizziness, headache    Medications: Allergies:     Allergies   Allergen Reactions    Entresto [Sacubitril-Valsartan] Other (See Comments)     Acute kidney injury    Food Swelling     peaches    Gabapentin Swelling    Tetracyclines & Related        Current Meds:   Scheduled Meds:    bumetanide  1 mg Oral Daily    allopurinol  300 mg Oral Daily    aspirin  81 mg Oral Daily    atorvastatin  80 mg Oral Nightly    buPROPion  150 mg Oral QAM    clopidogrel  75 mg Oral Daily    colchicine  0.6 mg Oral BID    DULoxetine  60 mg Oral Daily    isosorbide dinitrate  10 mg Oral BID    levothyroxine  25 mcg Oral Daily    metoprolol tartrate  50 mg Oral BID    montelukast  10 mg Oral Nightly    oxybutynin  5 mg Oral BID    pantoprazole  40 mg Oral Daily    polyethylene glycol  17 g Oral Daily    sodium chloride flush  10 mL Intravenous 2 times per day    enoxaparin  40 mg Subcutaneous BID     Continuous Infusions:    sodium chloride 50 mL/hr at 09/08/20 1226     PRN Meds: albuterol sulfate HFA, albuterol sulfate HFA, oxyCODONE-acetaminophen, traZODone, sodium chloride flush, potassium chloride **OR** potassium alternative oral replacement **OR** potassium chloride, magnesium sulfate, acetaminophen **OR** acetaminophen, promethazine **OR** ondansetron, bisacodyl, morphine, sodium chloride    Data:     Past Medical History:   has a past medical history of Arthritis, CHF (congestive heart failure) ([facility] GERD (gastroesophageal reflux disease), Gout, History of heart attack, HLD (hyperlipidemia), Hypertension, Insomnia, and Osteoarthritis. Social History:   reports that she has never smoked. She has never used smokeless tobacco. She reports that she does not drink alcohol or use drugs. Family History:   Family History   Problem Relation Age of Onset   Yash Ji Other Mother     Diabetes Mother     Heart Disease Mother     Arthritis Mother     Kidney Disease Mother     Lupus Mother     Arthritis Sister     Diabetes Brother     Asthma Brother     Cancer Maternal Grandfather     Hypertension Sister     Breast Cancer Sister         28s    Breast Cancer Niece         30-35       Vitals:  BP (!) 111/50   Pulse 57   Temp 97.7 °F (36.5 °C) (Oral)   Resp 19   Ht 5' 8\" (1.727 m)   Wt (!) 350 lb (158.8 kg)   SpO2 99%   BMI 53.22 kg/m²   Temp (24hrs), Av.7 °F (36.5 °C), Min:97.7 °F (36.5 °C), Max:97.7 °F (36.5 °C)    No results for input(s): POCGLU in the last 72 hours. I/O (24Hr): Intake/Output Summary (Last 24 hours) at 2020 1758  Last data filed at 2020 1600  Gross per 24 hour   Intake 1685.53 ml   Output 2950 ml   Net -1264.47 ml       Labs:  Hematology:  Recent Labs     20  0720  1112 20  0441   WBC 3.6 3.6 3.9   RBC 3.40* 3.56* 3.62*   HGB 9.3* 9.7* 9.5*   HCT 30.5* 32.4* 33.1*   MCV 89.7 91.0 91.4   MCH 27.4 27.2 26.2   MCHC 30.5 29.9 28.7   RDW 16.6* 16.3* 15.9*    258 277   MPV 10.7 10.8 9.9     Chemistry:  Recent Labs     20  0720  1112 20  0441    138 140   K 4.7 4.2 4.3    100 101   CO2 28 29 29   GLUCOSE 81 106* 86   BUN 16 14 15   CREATININE 0.81 0.92* 0.93*   ANIONGAP 7* 9 10   LABGLOM >60 >60 >60   GFRAA >60 >60 >60   CALCIUM 7.9* 8.4* 8.1*     No results for input(s): PROT, LABALBU, LABA1C, M7NNZHY, C7GETWL, FT4, TSH, AST, ALT, LDH, GGT, ALKPHOS, LABGGT, BILITOT, BILIDIR, AMMONIA, AMYLASE, LIPASE, LACTATE, CHOL, HDL, LDLCHOLESTEROL, CHOLHDLRATIO, TRIG, VLDL, NFA00VM, PHENYTOIN, PHENYF, URICACID, POCGLU in the last 72 hours.   ABG:No Plan:        COVID-19 pneumonia -infectious disease following,  Remdesivir started on 9/3/2020, convalescent plasma transfusion received on 9/5/2020.   If continues to improve tomorrow, possible discharge pending clearance from infectious disease    SUSU -Resolved    CAD -Continue aspirin, statin, Plavix, isosorbide dinitrate, metoprolol tartrate    Depression  -Continue Cymbalta and Wellbutrin    CHF without exacerbation  -Continue Bumex    Hypothyroidism  -Continue Synthroid    GI and DVT prophylaxis  Shameka Betancourt DO  9/8/2020  5:58 PM

## 2020-09-08 NOTE — PLAN OF CARE
Problem: Falls - Risk of:  Goal: Will remain free from falls  Description: Will remain free from falls  9/8/2020 1813 by Carolyn Posadas RN  Outcome: Met This Shift  9/8/2020 0937 by Carolyn Posadas RN  Outcome: Ongoing  9/8/2020 0539 by Kings Mendieta RN  Outcome: Ongoing  Goal: Absence of physical injury  Description: Absence of physical injury  9/8/2020 1813 by Carolyn Posadas RN  Outcome: Met This Shift  9/8/2020 0937 by Carolyn Posadas RN  Outcome: Ongoing  9/8/2020 0539 by Kings Mendieta RN  Outcome: Ongoing       Pt remained free of falls this shift. Bed remains in lowest position, with 2/4 side rails up and all wheels locked. Non skid socks on. Bedside table and call light within reach. Pt calls out appropriately. Will continue to monitor.     Electronically signed by Carolyn Posadas RN on 9/8/2020 at 6:13 PM

## 2020-09-09 VITALS
SYSTOLIC BLOOD PRESSURE: 105 MMHG | WEIGHT: 293 LBS | HEART RATE: 59 BPM | BODY MASS INDEX: 44.41 KG/M2 | DIASTOLIC BLOOD PRESSURE: 53 MMHG | OXYGEN SATURATION: 96 % | RESPIRATION RATE: 21 BRPM | HEIGHT: 68 IN | TEMPERATURE: 98.7 F

## 2020-09-09 LAB
ABSOLUTE EOS #: 0.25 K/UL (ref 0–0.44)
ABSOLUTE IMMATURE GRANULOCYTE: 0.1 K/UL (ref 0–0.3)
ABSOLUTE LYMPH #: 1.75 K/UL (ref 1.1–3.7)
ABSOLUTE MONO #: 0.36 K/UL (ref 0.1–1.2)
ANION GAP SERPL CALCULATED.3IONS-SCNC: 9 MMOL/L (ref 9–17)
BASOPHILS # BLD: 1 % (ref 0–2)
BASOPHILS ABSOLUTE: 0.03 K/UL (ref 0–0.2)
BUN BLDV-MCNC: 16 MG/DL (ref 6–20)
BUN/CREAT BLD: ABNORMAL (ref 9–20)
CALCIUM SERPL-MCNC: 8.5 MG/DL (ref 8.6–10.4)
CHLORIDE BLD-SCNC: 101 MMOL/L (ref 98–107)
CO2: 32 MMOL/L (ref 20–31)
CREAT SERPL-MCNC: 0.92 MG/DL (ref 0.5–0.9)
DIFFERENTIAL TYPE: ABNORMAL
EOSINOPHILS RELATIVE PERCENT: 6 % (ref 1–4)
GFR AFRICAN AMERICAN: >60 ML/MIN
GFR NON-AFRICAN AMERICAN: >60 ML/MIN
GFR SERPL CREATININE-BSD FRML MDRD: ABNORMAL ML/MIN/{1.73_M2}
GFR SERPL CREATININE-BSD FRML MDRD: ABNORMAL ML/MIN/{1.73_M2}
GLUCOSE BLD-MCNC: 88 MG/DL (ref 70–99)
HCT VFR BLD CALC: 33 % (ref 36.3–47.1)
HEMOGLOBIN: 9.6 G/DL (ref 11.9–15.1)
IMMATURE GRANULOCYTES: 2 %
LYMPHOCYTES # BLD: 41 % (ref 24–43)
MCH RBC QN AUTO: 26.2 PG (ref 25.2–33.5)
MCHC RBC AUTO-ENTMCNC: 29.1 G/DL (ref 28.4–34.8)
MCV RBC AUTO: 90.2 FL (ref 82.6–102.9)
MONOCYTES # BLD: 8 % (ref 3–12)
NRBC AUTOMATED: 0 PER 100 WBC
PDW BLD-RTO: 15.9 % (ref 11.8–14.4)
PLATELET # BLD: 313 K/UL (ref 138–453)
PLATELET ESTIMATE: ABNORMAL
PMV BLD AUTO: 10.3 FL (ref 8.1–13.5)
POTASSIUM SERPL-SCNC: 4.3 MMOL/L (ref 3.7–5.3)
RBC # BLD: 3.66 M/UL (ref 3.95–5.11)
RBC # BLD: ABNORMAL 10*6/UL
SEG NEUTROPHILS: 42 % (ref 36–65)
SEGMENTED NEUTROPHILS ABSOLUTE COUNT: 1.78 K/UL (ref 1.5–8.1)
SODIUM BLD-SCNC: 142 MMOL/L (ref 135–144)
WBC # BLD: 4.3 K/UL (ref 3.5–11.3)
WBC # BLD: ABNORMAL 10*3/UL

## 2020-09-09 PROCEDURE — 36415 COLL VENOUS BLD VENIPUNCTURE: CPT

## 2020-09-09 PROCEDURE — 80048 BASIC METABOLIC PNL TOTAL CA: CPT

## 2020-09-09 PROCEDURE — 6360000002 HC RX W HCPCS: Performed by: CLINICAL NURSE SPECIALIST

## 2020-09-09 PROCEDURE — 6370000000 HC RX 637 (ALT 250 FOR IP): Performed by: NURSE PRACTITIONER

## 2020-09-09 PROCEDURE — 99238 HOSP IP/OBS DSCHRG MGMT 30/<: CPT | Performed by: INTERNAL MEDICINE

## 2020-09-09 PROCEDURE — 6370000000 HC RX 637 (ALT 250 FOR IP): Performed by: CLINICAL NURSE SPECIALIST

## 2020-09-09 PROCEDURE — 85025 COMPLETE CBC W/AUTO DIFF WBC: CPT

## 2020-09-09 PROCEDURE — 2700000000 HC OXYGEN THERAPY PER DAY

## 2020-09-09 PROCEDURE — 94761 N-INVAS EAR/PLS OXIMETRY MLT: CPT

## 2020-09-09 RX ORDER — BUMETANIDE 1 MG/1
1 TABLET ORAL DAILY
Qty: 30 TABLET | Refills: 3 | Status: SHIPPED | OUTPATIENT
Start: 2020-09-10 | End: 2021-01-21 | Stop reason: SDUPTHER

## 2020-09-09 RX ADMIN — BUMETANIDE 1 MG: 1 TABLET ORAL at 08:36

## 2020-09-09 RX ADMIN — OXYCODONE HYDROCHLORIDE AND ACETAMINOPHEN 1 TABLET: 5; 325 TABLET ORAL at 00:29

## 2020-09-09 RX ADMIN — PANTOPRAZOLE SODIUM 40 MG: 40 TABLET, DELAYED RELEASE ORAL at 08:34

## 2020-09-09 RX ADMIN — OXYBUTYNIN CHLORIDE 5 MG: 5 TABLET ORAL at 08:35

## 2020-09-09 RX ADMIN — OXYCODONE HYDROCHLORIDE AND ACETAMINOPHEN 1 TABLET: 5; 325 TABLET ORAL at 16:50

## 2020-09-09 RX ADMIN — POLYETHYLENE GLYCOL 3350 17 G: 17 POWDER, FOR SOLUTION ORAL at 08:34

## 2020-09-09 RX ADMIN — OXYCODONE HYDROCHLORIDE AND ACETAMINOPHEN 1 TABLET: 5; 325 TABLET ORAL at 06:35

## 2020-09-09 RX ADMIN — COLCHICINE 0.6 MG: 0.6 TABLET, FILM COATED ORAL at 08:35

## 2020-09-09 RX ADMIN — ASPIRIN 81 MG: 81 TABLET, CHEWABLE ORAL at 08:36

## 2020-09-09 RX ADMIN — LEVOTHYROXINE SODIUM 25 MCG: 25 TABLET ORAL at 08:35

## 2020-09-09 RX ADMIN — BUPROPION HYDROCHLORIDE 150 MG: 150 TABLET, EXTENDED RELEASE ORAL at 08:36

## 2020-09-09 RX ADMIN — ONDANSETRON 4 MG: 2 INJECTION INTRAMUSCULAR; INTRAVENOUS at 08:38

## 2020-09-09 RX ADMIN — ISOSORBIDE DINITRATE 10 MG: 10 TABLET ORAL at 08:35

## 2020-09-09 RX ADMIN — DULOXETINE HYDROCHLORIDE 60 MG: 30 CAPSULE, DELAYED RELEASE ORAL at 08:35

## 2020-09-09 RX ADMIN — ENOXAPARIN SODIUM 40 MG: 40 INJECTION SUBCUTANEOUS at 08:35

## 2020-09-09 RX ADMIN — ALLOPURINOL 300 MG: 300 TABLET ORAL at 08:36

## 2020-09-09 RX ADMIN — CLOPIDOGREL 75 MG: 75 TABLET, FILM COATED ORAL at 08:36

## 2020-09-09 ASSESSMENT — PAIN DESCRIPTION - PAIN TYPE
TYPE: CHRONIC PAIN

## 2020-09-09 ASSESSMENT — PAIN SCALES - GENERAL
PAINLEVEL_OUTOF10: 10
PAINLEVEL_OUTOF10: 9
PAINLEVEL_OUTOF10: 9
PAINLEVEL_OUTOF10: 10
PAINLEVEL_OUTOF10: 9
PAINLEVEL_OUTOF10: 10
PAINLEVEL_OUTOF10: 9

## 2020-09-09 ASSESSMENT — PAIN DESCRIPTION - LOCATION
LOCATION: GENERALIZED

## 2020-09-09 NOTE — PLAN OF CARE
Problem: Airway Clearance - Ineffective  Goal: Achieve or maintain patent airway  9/9/2020 1558 by Kota Omer RN  Outcome: Met This Shift  9/9/2020 1127 by Ankita Noriega RCP  Outcome: Ongoing     Problem: Gas Exchange - Impaired  Goal: Absence of hypoxia  9/9/2020 1558 by Kota Omer RN  Outcome: Met This Shift  9/9/2020 1127 by Ankita Noriega RCP  Outcome: Ongoing  Goal: Promote optimal lung function  9/9/2020 1558 by Kota Omer RN  Outcome: Met This Shift  9/9/2020 1127 by Ankita Noriega RCP  Outcome: Ongoing     Problem: Breathing Pattern - Ineffective  Goal: Ability to achieve and maintain a regular respiratory rate  9/9/2020 1558 by Kota Omer RN  Outcome: Met This Shift  9/9/2020 1127 by Ankita Noriega RCP  Outcome: Ongoing     Problem:  Body Temperature -  Risk of, Imbalanced  Goal: Ability to maintain a body temperature within defined limits  Outcome: Met This Shift  Goal: Will regain or maintain usual level of consciousness  Outcome: Met This Shift  Goal: Complications related to the disease process, condition or treatment will be avoided or minimized  Outcome: Met This Shift     Problem: Isolation Precautions - Risk of Spread of Infection  Goal: Prevent transmission of infection  Outcome: Met This Shift     Problem: Nutrition Deficits  Goal: Optimize nutrtional status  Outcome: Met This Shift     Problem: Risk for Fluid Volume Deficit  Goal: Maintain normal heart rhythm  Outcome: Met This Shift  Goal: Maintain absence of muscle cramping  Outcome: Met This Shift  Goal: Maintain normal serum potassium, sodium, calcium, phosphorus, and pH  Outcome: Met This Shift     Problem: Loneliness or Risk for Loneliness  Goal: Demonstrate positive use of time alone when socialization is not possible  Outcome: Met This Shift     Problem: Fatigue  Goal: Verbalize increase energy and improved vitality  Outcome: Met This Shift     Problem: Patient Education: Go to Patient Education Activity  Goal: Patient/Family Education  Outcome: Met This Shift     Problem: Pain:  Description: Pain management should include both nonpharmacologic and pharmacologic interventions.   Goal: Pain level will decrease  Description: Pain level will decrease  Outcome: Met This Shift  Goal: Control of acute pain  Description: Control of acute pain  Outcome: Met This Shift  Goal: Control of chronic pain  Description: Control of chronic pain  Outcome: Met This Shift     Problem: Falls - Risk of:  Goal: Will remain free from falls  Description: Will remain free from falls  Outcome: Met This Shift  Goal: Absence of physical injury  Description: Absence of physical injury  Outcome: Met This Shift

## 2020-09-09 NOTE — CARE COORDINATION
[place] [facility]  Written by: Lawyer Heriberto RN    Patient Name: Cecy Asher  Attending Provider: Sara Salazar DO  Admit Date: 2020  6:38 AM  MRN: [id number]  Account: [de-identified]                     : 1965  Discharge Date: 20  Tested and qualified for home O2 called patient to discuss ordering/delivery process. agreeable to referral to [facility]. Met with dr Bert Ivey requested order and face to face.      Disposition: home with [facility] care, called office spoke to [facility] will retrieve dc paperwork from [address] [place] [address] , RN

## 2020-09-09 NOTE — PROGRESS NOTES
Writer messaged NP on call about the fact that Patient is receiving metoprolol 50 mg 2 times daily, it was held this morning due to her low blood pressures and then I did give it tonight cause her vitals met the parameters- HR was 67 and blood pressure was 128/64 but her Blood pressure have come down to 105/42 and then 104/40. Patient states she feels fine and is resting comfortably, it seems to be what happened to her yesterday as well. NP thanked me for letting her know and told me to pass it on to days as well. Will continue to monitor.

## 2020-09-09 NOTE — PROGRESS NOTES
chest CT showed scattered infiltrates suggestive of pneumonia. Her COVID 19 test was positive. Patient admitted because of concerns with COVID 19.    CURRENT EVALUATION : 9/8/2020    Patient evaluated and examined in the ICU. Afebrile  VS stable     Discussed Remdesivir and plasma treatment. Patient gave oral consent on 9-4-20  Remdesivir started 9-4-20. Patient received transfusion of convalescent plasma on 9/5/2020 without any difficulties. SUSU seems to have resolved    Patient exhibiting respiratory distress. Yes  Respiratory secretions: Yes    Patient receiving supplemental oxygen. 4 -->2 L/min NC  RR 17 -->16-->19-->22  02 sat 91-->95-->97-->94 %      QTc:           NEWS Score: 0-4 Low risk group; 5-6: Medium risk group; 7 or above: High risk group  Parameters 3 2 1 0 1 2 3   Age    < 65   ? 65   RR ? 8  9-11 12-20  21-24 ? 25   O2 Sats ? 91 92-93 94-95 ? 96      Suppl O2  Yes  No      SBP ? 90  101-110 111-219   ? 220   HR ? 40  41-50 51-90  111-130 ? 131   Consciousness    Alert   Drowsiness, lethargy, or confusion   Temperature ? 35.0 C (95.0 F)  35.1-36.0 C 95.1-96.9 F 36.1-38.0 C 97.0-100.4 F 38.1-39.0 C 100.5-102.3 F ? 39.1 C ? 102.4 F      NEWS Score:   9-2-20: 5 Moderate risk    Overall Daily Picture:    Stable    Presence of secondary bacterial Infection:  No   Additional antibiotics: No    Labs, X rays reviewed: 9/8/2020    BUN: 15-->29-->39-->15  Cr:1.56-->1.59-->0.81-->0.93    WBC:5.4-->6.7-->3.9  Hb:11.3-->10.3-->9.5  Plat: 238-->152-->277    Absolute Neutrophils:4.7  Absolute Lymphocytes:0.49  Neutrophil/Lymphocyte Ratio: 5.2 High risk    CRP:88.6  Ferritin:  LDH: 221    Pro Calcitonin:  Pro   Troponin HS 24-->22    Cultures:  Urine:  ·   Blood:  ·   Sputum :  ·   Wound:       CXR:   · 9-2-20: Moderate vascular congestion  CAT:      Discussed with patient, RN, CC, IM.       I have personally reviewed the past medical history, past surgical history, medications, social on file   Lifestyle    Physical activity     Days per week: Not on file     Minutes per session: Not on file    Stress: Not on file   Relationships    Social connections     Talks on phone: Not on file     Gets together: Not on file     Attends Sikhism service: Not on file     Active member of club or organization: Not on file     Attends meetings of clubs or organizations: Not on file     Relationship status: Not on file    Intimate partner violence     Fear of current or ex partner: Not on file     Emotionally abused: Not on file     Physically abused: Not on file     Forced sexual activity: Not on file   Other Topics Concern    Not on file   Social History Narrative    Not on file       Family History:     Family History   Problem Relation Age of Onset    Other Mother     Diabetes Mother     Heart Disease Mother     Arthritis Mother     Kidney Disease Mother     Lupus Mother     Arthritis Sister     Diabetes Brother     Asthma Brother     Cancer Maternal Grandfather     Hypertension Sister     Breast Cancer Sister         28s    Breast Cancer Niece         30-35        Allergies:   Entresto [sacubitril-valsartan]; Food; Gabapentin; and Tetracyclines & related     Review of Systems:     Constitutional: fevers,no  chills. Muscle aches  Head: No headaches  Eyes: No double vision or blurry vision. No conjunctival inflammation. ENT: No sore throat or runny nose. . No hearing loss, tinnitus or vertigo. Cardiovascular: chest pain. No palpitations. Has shortness of breath. COFFEY  Lung: shortness of breath, cough. Blood tinged sputum production  Abdomen: Nausea, vomiting, No diarrhea, or abdominal pain. Rosaline Courtney No cramps. Genitourinary: No increased urinary frequency, or dysuria. No hematuria. No suprapubic or CVA pain  Musculoskeletal: Generalized muscle aches. No joint effusions, swelling or deformities  Hematologic: No bleeding or bruising.   Neurologic: No headache, weakness, numbness, or tingling. Integument: No rash, no ulcers. Psychiatric: No depression. Endocrine: No polyuria, no polydipsia, no polyphagia. Physical Examination :     No data found. General Appearance: Awake, alert, and in apparent distress from back pain. Obese  Head:  Normocephalic, no trauma  Eyes: Pupils equal, round, reactive to light and accommodation; extraocular movements intact; sclera anicteric; conjunctivae pink. No embolic phenomena. ENT: Oropharynx clear, without erythema, exudate, or thrush. No tenderness of sinuses. Mouth/throat: mucosa pink and moist. No lesions. Dentition in good repair. Neck:Supple, without lymphadenopathy. Thyroid normal, No bruits. Pulmonary/Chest: Clear to auscultation, without wheezes, rales, or rhonchi. No dullness to percussion. Cardiovascular: Regular rate and rhythm without murmurs, rubs, or gallops. Abdomen: Soft, non tender. Bowel sounds normal. No organomegaly  All four Extremities: No cyanosis, clubbing, edema, or effusions. Neurologic: No gross sensory or motor deficits. Skin: Warm and dry with good turgor. No signs of peripheral arterial or venous insufficiency. No ulcerations. No open wounds. Medical Decision Making -Laboratory:   I have independently reviewed/ordered the following labs:    CBC with Differential:   Recent Labs     09/07/20  1112 09/08/20  0441   WBC 3.6 3.9   HGB 9.7* 9.5*   HCT 32.4* 33.1*    277   LYMPHOPCT 38 43   MONOPCT 8 9     BMP:   Recent Labs     09/07/20  1112 09/08/20  0441    140   K 4.2 4.3    101   CO2 29 29   BUN 14 15   CREATININE 0.92* 0.93*     Hepatic Function Panel:   No results for input(s): PROT, LABALBU, BILIDIR, IBILI, BILITOT, ALKPHOS, ALT, AST in the last 72 hours. No results for input(s): RPR in the last 72 hours. No results for input(s): HIV in the last 72 hours. No results for input(s): BC in the last 72 hours.   Lab Results   Component Value Date    MUCUS NOT REPORTED 09/02/2020    RBC 3.62 09/08/2020    TRICHOMONAS NOT REPORTED 09/02/2020    WBC 3.9 09/08/2020    YEAST NOT REPORTED 09/02/2020    TURBIDITY CLEAR 09/02/2020     Lab Results   Component Value Date    CREATININE 0.93 09/08/2020    GLUCOSE 86 09/08/2020       Medical Decision Making-Imaging:     EXAMINATION:    ONE XRAY VIEW OF THE CHEST         9/2/2020 7:34 am         COMPARISON:    October 2, 2019, chest examination         HISTORY:    ORDERING SYSTEM PROVIDED HISTORY: cp    TECHNOLOGIST PROVIDED HISTORY:    cp    Reason for Exam: chest pain         FINDINGS:    Stable mild cardiomegaly         Now moderate prominence and indistinctness of the pulmonary    vascularity/interstitial markings         Mild tortuosity of the thoracic aorta         Degenerative changes of the thoracic spine and shoulders              Impression    Moderate vascular congestion      EXAMINATION:    CTA OF THE CHEST 9/2/2020 8:15 am         TECHNIQUE:    CTA of the chest was performed after the administration of intravenous    contrast.  Multiplanar reformatted images are provided for review.  MIP    images are provided for review.  Dose modulation, iterative reconstruction,    and/or weight based adjustment of the mA/kV was utilized to reduce the    radiation dose to as low as reasonably achievable.         COMPARISON:    CT chest performed 10/02/2019.         HISTORY:    ORDERING SYSTEM PROVIDED HISTORY: sob    TECHNOLOGIST PROVIDED HISTORY:    sob    Reason for Exam: sob    Acuity: Acute    Type of Exam: Initial         FINDINGS:    Pulmonary Arteries: Pulmonary arteries are adequately opacified for    evaluation.  No evidence of intraluminal filling defect to suggest pulmonary    embolism.  Main pulmonary artery is normal in caliber.         Mediastinum: No evidence of mediastinal lymphadenopathy.  The heart is    enlarged.  There is no pericardial effusion.  There is no acute abnormality    of the thoracic aorta.  There is redemonstration of prior thyroidectomy with    a heterogeneous soft tissue nodule inferior to the thyroid bed on the left    this measures approximately 3.4 x 2.9 cm and is not significantly changed    compared to prior examination.         Lungs/pleura: There are scattered pulmonary opacities throughout the lungs    bilaterally.  There is no effusion. Jadon Dancer is no pneumothorax.  The    tracheobronchial tree is patent.         Upper Abdomen: Limited images of the upper abdomen are unremarkable.         Soft Tissues/Bones: No acute bone or soft tissue abnormality.              Impression    No definite acute or chronic pulmonary embolism.         Scattered pulmonary opacities throughout the lungs bilaterally consistent    with pneumonia.         Redemonstration of a heterogeneous soft tissue nodule inferior to the thyroid    bed on the left that is not significantly changed in size or characteristics    compared to prior examination.                   Order Details     Medical Decision Xizear-Rpmyyqat-Nkrsw:       Medical Decision Making-Other:     Note:  · Labs, medications, radiologic studies were reviewed with personal review of films  · Large amounts of data were reviewed  · Discussed with nursing Staff, Discharge planner  · Infection Control and Prevention measures reviewed  · All prior entries were reviewed  · Administer medications as ordered  · Prognosis: Guarded  · Discharge planning reviewed  · Follow up as outpatient. Thank you for allowing us to participate in the care of this patient. Please call with questions.     James Ch MD  Pager: [phone] - Office: [phone]

## 2020-09-09 NOTE — PROGRESS NOTES
Patient was evaluated today for the diagnosis of COVID pneumonia. I entered a DME order for home oxygen because the diagnosis and testing requires the patient to have supplemental oxygen. Condition will improve or be benefited by oxygen use. The patient is not able to perform good mobility in a home setting and therefore does require the use of a portable oxygen system. The need for this equipment was discussed with the patient and she understands and is in agreement.     Nabor Davidson

## 2020-09-09 NOTE — PLAN OF CARE
Problem: Airway Clearance - Ineffective  Goal: Achieve or maintain patent airway  9/9/2020 1127 by Austen Garcia RCP  Outcome: Ongoing  9/9/2020 0002 by Wing Svitlana RN  Outcome: Ongoing     Problem: Gas Exchange - Impaired  Goal: Absence of hypoxia  9/9/2020 1127 by Austen Garcia RCP  Outcome: Ongoing  9/9/2020 0002 by Wing Svitlana RN  Outcome: Ongoing  Goal: Promote optimal lung function  9/9/2020 1127 by Austen Garcia RCP  Outcome: Ongoing  9/9/2020 0002 by Wing Svitlana RN  Outcome: Ongoing     Problem: Breathing Pattern - Ineffective  Goal: Ability to achieve and maintain a regular respiratory rate  9/9/2020 1127 by Austen Garcia RCP  Outcome: Ongoing  9/9/2020 0002 by Wing Svitlana RN  Outcome: Ongoing

## 2020-09-09 NOTE — DISCHARGE INSTR - COC
FAX           [address], [address]  [address]       Phone: [phone]       Fax: [phone]        ·   · Address:  · Phone:  · Fax:    Dialysis Facility (if applicable)   · Name:  · Address:  · Dialysis Schedule:  · Phone:  · Fax:    / signature: Electronically signed by Lorenzo Whitley RN on 9/9/20 at 2:39 PM EDT    PHYSICIAN SECTION    Prognosis: Fair    Condition at Discharge: Stable    Rehab Potential (if transferring to Rehab): Fair    Recommended Labs or Other Treatments After Discharge: none    Physician Certification: I certify the above information and transfer of Sirena Becerra  is necessary for the continuing treatment of the diagnosis listed and that she requires Home Care for greater 30 days.      Update Admission H&P: No change in H&P    PHYSICIAN SIGNATURE:  Electronically signed by Rakesh De La Torre DO on 9/9/20 at 11:12 AM EDT

## 2020-09-09 NOTE — DISCHARGE SUMMARY
Discharge Summary     Date:9/9/2020        Patient Lucio Mendoza     YOB: 1965     Age:55 y.o. Admit Date:9/2/2020   Admission Condition:fair   Discharged Condition:fair  Discharge Date: 09/09/20     Discharge Diagnoses   Principal Problem:    Lower respiratory tract infection due to COVID-19 virus  Active Problems:    Therapeutic opioid-induced constipation (OIC)    Opioid dependence, uncomplicated (HCC)    Chronic pain disorder    Class 3 severe obesity due to excess calories with serious comorbidity and body mass index (BMI) of 45.0 to 49.9 in adult [facility])    Morbid obesity (HCC)    Hypertension    Gout    GERD (gastroesophageal reflux disease)    Chronic combined systolic and diastolic heart failure (HCC)    Other proteinuria    Nausea and vomiting  Resolved Problems:    * No resolved hospital problems. [facility] Stay   Narrative of Hospital Course:     This is a 52-year-old female with past medical history of CHF, CAD, hypertension who came in with complaints of nausea vomiting and chest pain for past 3 days. Patient was noted to have COVID-19 pneumonia. Patient was noted to be hypoxic with oxygen saturation in low 80s. CT of the chest was consistent with pulmonary opacities. He was admitted to COVID unit and monitored for 6 days. Oxygen requirements during hospitalization has decreased and she is currently a 1 to 2 L nasal cannula. She is stable for discharge at this time. She has worked with physical therapy recommending home care which she will be provided.   All questions were answered, patient will be discharged today    Consultants:   IP CONSULT TO HOSPITALIST  IP CONSULT TO INTERNAL MEDICINE  IP CONSULT TO INFECTIOUS DISEASES  IP CONSULT TO IV TEAM  IP CONSULT TO NEPHROLOGY  IP CONSULT TO IV TEAM  IP CONSULT TO IV TEAM    Time Spent on Discharge:  20 minutes were spent in patient examination, evaluation, counseling as well as medication reconciliation, prescriptions for required medications, discharge plan and follow up. Surgeries/Procedures Performed:        Treatments:   IV hydration    Significant Diagnostic Studies:   Recent Labs:  CBC:   Lab Results   Component Value Date    WBC 4.3 09/09/2020    RBC 3.66 09/09/2020    HGB 9.6 09/09/2020    HCT 33.0 09/09/2020    MCV 90.2 09/09/2020    MCH 26.2 09/09/2020    MCHC 29.1 09/09/2020    RDW 15.9 09/09/2020     09/09/2020     BMP:    Lab Results   Component Value Date    GLUCOSE 88 09/09/2020     09/09/2020    K 4.3 09/09/2020     09/09/2020    CO2 32 09/09/2020    ANIONGAP 9 09/09/2020    BUN 16 09/09/2020    CREATININE 0.92 09/09/2020    BUNCRER NOT REPORTED 09/09/2020    CALCIUM 8.5 09/09/2020    LABGLOM >60 09/09/2020    GFRAA >60 09/09/2020    GFR      09/09/2020    GFR NOT REPORTED 09/09/2020     CMP:    Lab Results   Component Value Date    GLUCOSE 88 09/09/2020     09/09/2020    K 4.3 09/09/2020     09/09/2020    CO2 32 09/09/2020    BUN 16 09/09/2020    CREATININE 0.92 09/09/2020    ANIONGAP 9 09/09/2020    ALKPHOS 118 09/03/2020    ALT 16 09/03/2020    AST 20 09/03/2020    BILITOT 0.36 09/03/2020    LABALBU 3.6 09/03/2020    ALBUMIN 1.0 09/03/2020    LABGLOM >60 09/09/2020    GFRAA >60 09/09/2020    GFR      09/09/2020    GFR NOT REPORTED 09/09/2020    PROT 6.7 09/03/2020    CALCIUM 8.5 09/09/2020     PT/INR:    Lab Results   Component Value Date    PROTIME 9.3 09/03/2020    INR 0.9 09/03/2020       Radiology Last 7 Days:  Us Renal Complete    Result Date: 9/3/2020  Unremarkable ultrasound of the kidneys. Terrell catheter decompresses the urinary bladder.        Discharge Plan   Disposition: Home    Provider Follow-Up:   Osvaldo Block PA-C  [address] [facility] [address]  [phone]    Schedule an appointment as soon as possible for a visit in 1 week  hospital followup    MAGNOLIA Layton at [address] [facility] [facility].  [address] [address] [phone]           Hospital/Incidental Findings Requiring Follow-Up:  none    Patient Instructions   Diet: regular diet    Activity: activity as tolerated    Other Instructions:   none    Discharge Medications         Medication List      CHANGE how you take these medications    bumetanide 1 MG tablet  Commonly known as:  BUMEX  Take 1 tablet by mouth daily  Start taking on:  September 10, 2020  What changed:    · medication strength  · how much to take        CONTINUE taking these medications    * albuterol (2.5 MG/3ML) 0.083% nebulizer solution  Commonly known as:  PROVENTIL  Take 3 mLs by nebulization every 6 hours as needed for Wheezing     * albuterol sulfate  (90 Base) MCG/ACT inhaler  Inhale 2 puffs into the lungs every 4 hours as needed for Wheezing     allopurinol 300 MG tablet  Commonly known as:  ZYLOPRIM  Take 1 tablet by mouth daily     aspirin 81 MG tablet     atorvastatin 80 MG tablet  Commonly known as:  LIPITOR  Take 1 tablet by mouth daily     buPROPion 150 MG extended release tablet  Commonly known as:  WELLBUTRIN XL  Take 1 tablet by mouth every morning     clopidogrel 75 MG tablet  Commonly known as:  PLAVIX  Take 1 tablet by mouth daily     DULoxetine 60 MG extended release capsule  Commonly known as:  CYMBALTA  Take 1 capsule by mouth daily     ELDERBERRY PO     Handicap Placard Misc  by Does not apply route 2 years     ibuprofen 800 MG tablet  Commonly known as:  ADVIL;MOTRIN  Take 1 tablet by mouth every 8 hours as needed for Pain     isosorbide dinitrate 10 MG tablet  Commonly known as:  ISORDIL  Take 1 tablet by mouth 2 times daily     levothyroxine 25 MCG tablet  Commonly known as:  SYNTHROID  Take 1 tablet by mouth daily     metoprolol tartrate 50 MG tablet  Commonly known as:  LOPRESSOR  Take 1 tablet by mouth 2 times daily     montelukast 10 MG tablet  Commonly known as:  SINGULAIR  Take 1 tablet by mouth nightly     naproxen 500 MG tablet  Commonly known as:  Naprosyn  Take 1 tablet by mouth 2 times daily (with meals)     Nebulizer Compressor Kit  Provide insurance covered nebulizer, use daily as needed     oxybutynin 5 MG tablet  Commonly known as:  DITROPAN  TAKE 1 TABLET BY MOUTH TWICE DAILY     pantoprazole 40 MG tablet  Commonly known as:  PROTONIX  Take 1 tablet by mouth daily     polyethylene glycol 17 g packet  Commonly known as:  GLYCOLAX     spironolactone 25 MG tablet  Commonly known as:  ALDACTONE  Take 1 tablet by mouth daily     sucralfate 1 GM tablet  Commonly known as:  Carafate  Take 1 tablet by mouth 3 times daily for 7 days     Suprep Bowel Prep Kit 17.5-3.13-1.6 GM/177ML Soln  Generic drug:  Na Sulfate-K Sulfate-Mg Sulf  Take as directed - dispense one Kit     traZODone 50 MG tablet  Commonly known as:  DESYREL  TAKE 2 TO 3 TABLETS BY MOUTH NIGHTLY AT BEDTIME AS NEEDED FOR INSOMNIA         * This list has 2 medication(s) that are the same as other medications prescribed for you. Read the directions carefully, and ask your doctor or other care provider to review them with you.             STOP taking these medications    colchicine 0.6 MG tablet  Commonly known as:  Colcrys     oxyCODONE-acetaminophen 7.5-325 MG per tablet  Commonly known as:  Percocet           Where to Get Your Medications      These medications were sent to [facility], [address] [place] [address], [address]    Phone:  [phone]   · bumetanide 1 MG tablet         Electronically signed by Helen Adorno DO on 9/9/20 at 11:26 AM EDT

## 2020-09-09 NOTE — PROGRESS NOTES
Home Oxygen Evaluation    Home Oxygen Evaluation completed. Patient is on 2 liters per minute via nasal cannula. Resting SpO2 = 96%  Resting SpO2 on room air = 88%    SpO2 on room air with exercise = na%  SpO2 on oxygen as above with exercise = na%    Nocturnal Oximetry with patient on room air is recommended is SpO2 is between 89% and 95% (requires additional order).     Romain Woodward  11:24 AM

## 2020-09-09 NOTE — PLAN OF CARE
Problem: Airway Clearance - Ineffective  Goal: Achieve or maintain patent airway  Outcome: Ongoing     Problem: Gas Exchange - Impaired  Goal: Absence of hypoxia  Outcome: Ongoing  Goal: Promote optimal lung function  Outcome: Ongoing     Problem: Breathing Pattern - Ineffective  Goal: Ability to achieve and maintain a regular respiratory rate  Outcome: Ongoing     Problem:  Body Temperature -  Risk of, Imbalanced  Goal: Ability to maintain a body temperature within defined limits  Outcome: Ongoing  Goal: Will regain or maintain usual level of consciousness  Outcome: Ongoing  Goal: Complications related to the disease process, condition or treatment will be avoided or minimized  Outcome: Ongoing     Problem: Isolation Precautions - Risk of Spread of Infection  Goal: Prevent transmission of infection  Outcome: Ongoing     Problem: Nutrition Deficits  Goal: Optimize nutrtional status  Outcome: Ongoing     Problem: Risk for Fluid Volume Deficit  Goal: Maintain normal heart rhythm  Outcome: Ongoing  Goal: Maintain absence of muscle cramping  Outcome: Ongoing  Goal: Maintain normal serum potassium, sodium, calcium, phosphorus, and pH  Outcome: Ongoing     Problem: Loneliness or Risk for Loneliness  Goal: Demonstrate positive use of time alone when socialization is not possible  Outcome: Ongoing     Problem: Fatigue  Goal: Verbalize increase energy and improved vitality  Outcome: Ongoing     Problem: Patient Education: Go to Patient Education Activity  Goal: Patient/Family Education  Outcome: Ongoing     Problem: Pain:  Goal: Pain level will decrease  Description: Pain level will decrease  Outcome: Ongoing  Goal: Control of acute pain  Description: Control of acute pain  Outcome: Ongoing  Goal: Control of chronic pain  Description: Control of chronic pain  Outcome: Ongoing     Problem: Falls - Risk of:  Goal: Will remain free from falls  Description: Will remain free from falls  9/9/2020 0002 by Wan Burton RN  Outcome: Ongoing  9/8/2020 1813 by Rukhsana Mtz RN  Outcome: Met This Shift  Goal: Absence of physical injury  Description: Absence of physical injury  9/9/2020 0002 by Ozzie Espinoza RN  Outcome: Ongoing  9/8/2020 1813 by Rukhsana Mtz RN  Outcome: Met This Shift

## 2020-09-09 NOTE — PROGRESS NOTES
[facility]  Office: [phone]  Darrius Wheat, DO, New Almazan DO, Willie Lea DO, Olena Lock DO, Hermelindo Flowers MD, Bernie Shay MD, Alma Bob MD, Guerita Scales MD, Zayda Mena MD, Lucio Jorge MD, Thom Molina MD, Rosario Camejo MD, Juana Salcido MD, Akil Post DO, Jud Martin MD, Emiliano Mcleod MD, Shameka Betancourt DO, Dre Mcguire MD,  Tonny Guerrero DO, Jean-Claude Nails MD, Valery Yeager MD, Debra Shepard CNP, [facility], CNP, J Luis Rowe, CNP, Barron Tee, CNS, Roly Hale, CNP, Bry Simmons, CNP, Mariposa Daigle, CNP, Nancy Huerta, CNP, Sabi Fajardo CNP, Clinton Coombs PA-C, Carly Rogers DNP, Jaclyn Sheikh, CNP, Sav Montesinos, CNP, Chin Dan, CNP, Red Child, CNP, Reina Kong, CNP         [facility]   [address] [facility]    Progress Note    9/9/2020    11:07 AM    Name:   Katherine Sainz  MRN:     [id number]     [place]:      [de-identified]   Room:   [address] [place] [address] Day:  7  Admit Date:  9/2/2020  6:38 AM    PCP:   Carmen Buitrago PA-C  Code Status:  Full Code    Subjective:     C/C:   Chief Complaint   Patient presents with   [facility] Nausea    Emesis    Chest Pain     chest heavy x past 3 days     Interval History Status: improved. She states her fatigue has improved, she is eager to go home today and feels she is comfortable going home. She has no questions, understands she will undergo an oxygen evaluation for home O2. Brief History: This is a 61-year-old female with past medical history of CHF, CAD, hypertension who came in with complaints of nausea vomiting and chest pain for past 3 days. Patient was noted to have COVID-19 pneumonia. Patient was noted to be hypoxic with oxygen saturation in low 80s. CT of the chest was consistent with pulmonary opacities. Patient is being followed by infectious disease.     Review of Systems:     Constitutional: Positive for fatigue  Respiratory:  negative for cough, dyspnea on exertion, shortness of breath, wheezing  Cardiovascular:  negative for chest pain, chest pressure/discomfort, lower extremity edema, palpitations  Gastrointestinal:  negative for abdominal pain, constipation, diarrhea, nausea, vomiting  Neurological:  negative for dizziness, headache    Medications: Allergies: Allergies   Allergen Reactions    Entresto [Sacubitril-Valsartan] Other (See Comments)     Acute kidney injury    Food Swelling     peaches    Gabapentin Swelling    Tetracyclines & Related        Current Meds:   Scheduled Meds:    bumetanide  1 mg Oral Daily    allopurinol  300 mg Oral Daily    aspirin  81 mg Oral Daily    atorvastatin  80 mg Oral Nightly    buPROPion  150 mg Oral QAM    clopidogrel  75 mg Oral Daily    colchicine  0.6 mg Oral BID    DULoxetine  60 mg Oral Daily    isosorbide dinitrate  10 mg Oral BID    levothyroxine  25 mcg Oral Daily    metoprolol tartrate  50 mg Oral BID    montelukast  10 mg Oral Nightly    oxybutynin  5 mg Oral BID    pantoprazole  40 mg Oral Daily    polyethylene glycol  17 g Oral Daily    sodium chloride flush  10 mL Intravenous 2 times per day    enoxaparin  40 mg Subcutaneous BID     Continuous Infusions:    sodium chloride 50 mL/hr at 09/08/20 1226     PRN Meds: albuterol sulfate HFA, albuterol sulfate HFA, oxyCODONE-acetaminophen, traZODone, sodium chloride flush, potassium chloride **OR** potassium alternative oral replacement **OR** potassium chloride, magnesium sulfate, acetaminophen **OR** acetaminophen, promethazine **OR** ondansetron, bisacodyl, morphine, sodium chloride    Data:     Past Medical History:   has a past medical history of Arthritis, CHF (congestive heart failure) ([facility] [address] GERD (gastroesophageal reflux disease), Gout, History of heart attack, HLD (hyperlipidemia), Hypertension, Insomnia, and Osteoarthritis. Social History:   reports that she has never smoked. She has never used smokeless tobacco. She reports that she does not drink alcohol or use drugs. Family History:   Family History   Problem Relation Age of Onset   Mery Buck Other Mother     Diabetes Mother     Heart Disease Mother     Arthritis Mother     Kidney Disease Mother     Lupus Mother     Arthritis Sister     Diabetes Brother     Asthma Brother     Cancer Maternal Grandfather     Hypertension Sister     Breast Cancer Sister         35s    Breast Cancer Niece         30-35       Vitals:  BP (!) 105/53   Pulse 61   Temp 98.7 °F (37.1 °C) (Oral)   Resp 18   Ht 5' 8\" (1.727 m)   Wt (!) 350 lb (158.8 kg)   SpO2 97%   BMI 53.22 kg/m²   Temp (24hrs), Av.3 °F (36.8 °C), Min:98.1 °F (36.7 °C), Max:98.7 °F (37.1 °C)    No results for input(s): POCGLU in the last 72 hours. I/O (24Hr): Intake/Output Summary (Last 24 hours) at 2020 1107  Last data filed at 2020 0655  Gross per 24 hour   Intake 1480.01 ml   Output 2650 ml   Net -1169.99 ml       Labs:  Hematology:  Recent Labs     20  1112 20  0441 20  0516   WBC 3.6 3.9 4.3   RBC 3.56* 3.62* 3.66*   HGB 9.7* 9.5* 9.6*   HCT 32.4* 33.1* 33.0*   MCV 91.0 91.4 90.2   MCH 27.2 26.2 26.2   MCHC 29.9 28.7 29.1   RDW 16.3* 15.9* 15.9*    277 313   MPV 10.8 9.9 10.3     Chemistry:  Recent Labs     20  1112 20  0441 20  0516    140 142   K 4.2 4.3 4.3    101 101   CO2 29 29 32*   GLUCOSE 106* 86 88   BUN 14 15 16   CREATININE 0.92* 0.93* 0.92*   ANIONGAP 9 10 9   LABGLOM >60 >60 >60   GFRAA >60 >60 >60   CALCIUM 8.4* 8.1* 8.5*     No results for input(s): PROT, LABALBU, LABA1C, X2ASFWV, V8MHTTD, FT4, TSH, AST, ALT, LDH, GGT, ALKPHOS, LABGGT, BILITOT, BILIDIR, AMMONIA, AMYLASE, LIPASE, LACTATE, CHOL, HDL, LDLCHOLESTEROL, CHOLHDLRATIO, TRIG, VLDL, XNM51VJ, PHENYTOIN, PHENYF, URICACID, POCGLU in the last 72 hours.   ABG:No results found for: POCPH, PHART, PH, POCPCO2, MLA2RTD, PCO2, POCPO2, PO2ART, PO2, POCHCO3, CYE7JCF, HCO3, NBEA, PBEA, BEART, BE, THGBART, THB, EZH3PFD, HIKG9UEH, P9CGSQJW, O2SAT, FIO2  No results found for: SPECIAL  No results found for: CULTURE    Radiology:  Xr Chest Portable    Result Date: 9/2/2020  Moderate vascular congestion     Ct Chest Pulmonary Embolism W Contrast    Result Date: 9/2/2020  No definite acute or chronic pulmonary embolism. Scattered pulmonary opacities throughout the lungs bilaterally consistent with pneumonia. Redemonstration of a heterogeneous soft tissue nodule inferior to the thyroid bed on the left that is not significantly changed in size or characteristics compared to prior examination.        Physical Examination:        General appearance:  alert, cooperative and no distress  Mental Status:  oriented to person, place and time and normal affect  Lungs:  clear to auscultation bilaterally, normal effort  Heart:  regular rate and rhythm, no murmur  Abdomen:  soft, nontender, nondistended, normal bowel sounds, no masses, hepatomegaly, splenomegaly  Extremities:  no edema, redness, tenderness in the calves  Skin:  no gross lesions, rashes, induration    Assessment:        Hospital Problems           Last Modified POA    * (Principal) Lower respiratory tract infection due to COVID-19 virus 9/2/2020 Yes    Therapeutic opioid-induced constipation (OIC) 9/2/2020 Yes    Opioid dependence, uncomplicated ([facility] [address] 2/5/5637 Yes    Chronic pain disorder 9/2/2020 Yes    Class 3 severe obesity due to excess calories with serious comorbidity and body mass index (BMI) of 45.0 to 49.9 in adult ([facility] [address] 9/2/2020 Yes    Morbid obesity ([facility] [address] 9/2/2020 Yes    Hypertension 9/2/2020 Yes    Gout 9/2/2020 Yes    GERD (gastroesophageal reflux disease) 9/2/2020 Yes    Chronic combined systolic and diastolic heart failure (HCC) (Chronic) 9/2/2020 Yes    Other proteinuria 9/2/2020 Yes    Nausea and vomiting 9/2/2020 Yes          Plan:        COVID-19 pneumonia -infectious disease following, Remdesivir started on 9/3/2020, convalescent plasma transfusion received on 9/5/2020. It does appear completed on 9/7/2020. Creatinine has been stable, no fevers overnight, white count has been stable. She will undergo home O2 evaluation and then will be discharged home later this afternoon.     CAD -Continue aspirin, statin, Plavix, isosorbide dinitrate, metoprolol tartrate    Depression  -Continue Cymbalta and Wellbutrin    CHF without exacerbation  -Continue Bumex    Hypothyroidism  -Continue Synthroid    GI and DVT prophylaxis  Chris Roberson DO  9/9/2020  11:07 AM

## 2020-09-10 ENCOUNTER — CARE COORDINATION (OUTPATIENT)
Dept: CASE MANAGEMENT | Age: 55
End: 2020-09-10

## 2020-09-10 NOTE — CARE COORDINATION
Nick 45 Transitions Initial Follow Up Call    Call within 2 business days of discharge: Yes    Patient: Bj Eldridge Patient : 1965   MRN: [id number]  Reason for Admission: Nausea and vomiting/Covid-19 monitoring  Discharge Date: 20 RARS: Readmission Risk Score: 20      Last Discharge [facility]       Complaint Diagnosis Description Type Department Provider    20 Nausea; Emesis; Chest Pain Nausea and vomiting, intractability of vomiting not specified, unspecified vomiting type . .. ED to Hosp-Admission (Discharged) (ADMITTED) ST[facility] CAR 3 Barrett Fail, DO; Eufemia Bolus Bruss,...           1st attempt to reach patient for Covid-19 monitoring. [facility] requesting return call. Contact information provided.  [phone]    Facility: [facility]    Non-face-to-face services provided:  Communication with home health agencies or other community services the patient is currently using-[facility]- unable to reach pt to set up [facility],  [facility] did deliver oxygen    Care Transitions 24 Hour Call    Care Transitions Interventions         Follow Up  Future Appointments   Date Time Provider Christiano Wren   2020  8:20 AM STA PAT RM 4 [facility] PAT [facility]   2020 11:40 AM THOMAS Ontiveros, DEVNE

## 2020-09-11 ENCOUNTER — TELEPHONE (OUTPATIENT)
Dept: PRIMARY CARE CLINIC | Age: 55
End: 2020-09-11

## 2020-09-11 ENCOUNTER — CARE COORDINATION (OUTPATIENT)
Dept: CASE MANAGEMENT | Age: 55
End: 2020-09-11

## 2020-09-11 RX ORDER — ONDANSETRON 4 MG/1
4 TABLET, FILM COATED ORAL 2 TIMES DAILY PRN
Qty: 20 TABLET | Refills: 0 | Status: SHIPPED | OUTPATIENT
Start: 2020-09-11 | End: 2020-10-02 | Stop reason: SDUPTHER

## 2020-09-11 NOTE — CARE COORDINATION
Nick 45 Transitions Initial Follow Up Call    Call within 2 business days of discharge: Yes    Patient: Chicho Moe Patient : 1965   MRN: [id number]  Reason for Admission: Nausea/vomiting/ Covid-19 monitoring  Discharge Date: 20 RARS: Readmission Risk Score: 20      Last Discharge [address] [address]       Complaint Diagnosis Description Type Department Provider    20 Nausea; Emesis; Chest Pain Nausea and vomiting, intractability of vomiting not specified, unspecified vomiting type . .. ED to Hosp-Admission (Discharged) (ADMITTED) [facility] CAR 3 Dayanara Siddiqi DO; Derrick Zamorano,... Final attempt to reach patient for Covid-19 monitoring. LM requesting return call. Contact information provided. Episode resolved at this time. [phone]    Facility: [facility] returned call. She stated that she is just so fatigued. She stated that she is still having the nausea, no taste, no smell, and body aches. She denied fever/chills or cough. She is self quarantining her self,but has assistance if needed. She will be getting her new medication today and is aware of the discontinued medication. She was appreciative of offer to assist with scheduling VV with PCP. Pt also asked if PCP could order her something for the nausea. Routed message to provider. Patient contacted regarding BJEKQ-05 diagnosis\". Discussed COVID-19 related testing which was available at this time. Test results were positive. Patient informed of results, if available? Yes    Care Transition Nurse/ Ambulatory Care Manager contacted the patient by telephone to perform post discharge assessment. Call within 2 business days of discharge: Yes. Verified name and  with patient as identifiers. Provided introduction to self, and explanation of the CTN/ACM role, and reason for call due to risk factors for infection and/or exposure to COVID-19.      Symptoms reviewed with patient who verbalized the following symptoms: fatigue, pain or aching joints, loss of taste or smell, nausea, no new symptoms and no worsening symptoms. Due to no new or worsening symptoms encounter was not routed to provider for escalation. Discussed follow-up appointments. If no appointment was previously scheduled, appointment scheduling offered: Yes  [facility] follow up appointment(s):   Future Appointments   Date Time Provider Christiano Wren   9/14/2020  8:20 AM STA PAT RM 4 [facility] PAT [facility]   11/2/2020 11:40 AM Lolis Noel PA-C [address] [place] [address]         Non-face-to-face services provided:  Scheduled appointment with PCP-9/15 VV  Obtained and reviewed discharge summary and/or continuity of care documents     Advance Care Planning:   Does patient have an Advance Directive:  reviewed and current. Patient has following risk factors of: heart failure and Covid+. CTN/ACM reviewed discharge instructions, medical action plan and red flags such as increased shortness of breath, increasing fever and signs of decompensation with patient who verbalized understanding. Discussed exposure protocols and quarantine with CDC Guidelines What to do if you are sick with coronavirus disease 2019.  Patient was given an opportunity for questions and concerns. The patient agrees to contact the [facility] exposure line [phone], local [facility] department [place] Department of Health: ([phone]) and PCP office for questions related to their healthcare. CTN/ACM provided contact information for future needs. Reviewed and educated patient on any new and changed medications related to discharge diagnosis     Patient/family/caregiver given information for GetWell Loop and agrees to enroll yes  Patient's preferred e-mail: [email or web address]   Patient's preferred phone number: [phone]  Based on Loop alert triggers, patient will be contacted by nurse care manager for worsening symptoms.     Pt will be further monitored by COVID Loop Team based on severity of symptoms and risk factors.     Care Transitions 24 Hour Call    Care Transitions Interventions         Follow Up  Future Appointments   Date Time Provider Christiano Wren   9/14/2020  8:20 AM STA PAT RM 4 [facility] PAT [facility]   11/2/2020 11:40 AM THOMAS Garcia V WALK IN [address] [place] [address] Kimberly Hernandez RN

## 2020-09-14 ENCOUNTER — HOSPITAL ENCOUNTER (OUTPATIENT)
Dept: PREADMISSION TESTING | Age: 55
Setting detail: SPECIMEN
Discharge: HOME | End: 2020-09-18
Payer: MEDICARE

## 2020-09-14 ENCOUNTER — CARE COORDINATION (OUTPATIENT)
Dept: CARE COORDINATION | Age: 55
End: 2020-09-14

## 2020-09-15 ENCOUNTER — VIRTUAL VISIT (OUTPATIENT)
Dept: PRIMARY CARE CLINIC | Age: 55
End: 2020-09-15
Payer: MEDICARE

## 2020-09-15 PROCEDURE — G8427 DOCREV CUR MEDS BY ELIG CLIN: HCPCS | Performed by: PHYSICIAN ASSISTANT

## 2020-09-15 PROCEDURE — 3017F COLORECTAL CA SCREEN DOC REV: CPT | Performed by: PHYSICIAN ASSISTANT

## 2020-09-15 PROCEDURE — 99214 OFFICE O/P EST MOD 30 MIN: CPT | Performed by: PHYSICIAN ASSISTANT

## 2020-09-15 PROCEDURE — 1111F DSCHRG MED/CURRENT MED MERGE: CPT | Performed by: PHYSICIAN ASSISTANT

## 2020-09-15 RX ORDER — LEVOTHYROXINE SODIUM 0.03 MG/1
25 TABLET ORAL DAILY
Qty: 90 TABLET | Refills: 0 | Status: SHIPPED | OUTPATIENT
Start: 2020-09-15 | End: 2020-12-09 | Stop reason: SDUPTHER

## 2020-09-15 RX ORDER — GUAIFENESIN 600 MG/1
600 TABLET, EXTENDED RELEASE ORAL 2 TIMES DAILY PRN
Qty: 20 TABLET | Refills: 0 | Status: SHIPPED | OUTPATIENT
Start: 2020-09-15 | End: 2020-09-25

## 2020-09-15 RX ORDER — ISOSORBIDE DINITRATE 10 MG/1
10 TABLET ORAL 2 TIMES DAILY
Qty: 180 TABLET | Refills: 0 | Status: SHIPPED | OUTPATIENT
Start: 2020-09-15 | End: 2020-12-09 | Stop reason: SDUPTHER

## 2020-09-15 RX ORDER — TRAZODONE HYDROCHLORIDE 150 MG/1
150 TABLET ORAL NIGHTLY
Qty: 90 TABLET | Refills: 0 | Status: SHIPPED | OUTPATIENT
Start: 2020-09-15 | End: 2020-12-09 | Stop reason: SDUPTHER

## 2020-09-15 RX ORDER — SUCRALFATE 1 G/1
TABLET ORAL
Qty: 90 TABLET | Refills: 0 | Status: SHIPPED | OUTPATIENT
Start: 2020-09-15 | End: 2020-12-09 | Stop reason: SDUPTHER

## 2020-09-15 ASSESSMENT — ENCOUNTER SYMPTOMS
NAUSEA: 1
DIARRHEA: 0
SHORTNESS OF BREATH: 1
VOMITING: 0
CONSTIPATION: 0

## 2020-09-15 NOTE — PATIENT INSTRUCTIONS
· Take decongestant, Mucinex, twice daily as needed for sputum production  · Take 1 tablet of Carafate in the morning, 2 tablets in the evening daily  · Contact [facility] for follow-up and repeat testing for COVID-19 in 1 week          [address] [place] [address]

## 2020-09-15 NOTE — PROGRESS NOTES
Porter Rust is a 60 y.o. female evaluated virtual visit via [email or web address] video on 9/15/2020. Consent:  She and/or health care decision maker is aware that that she may receive a bill for this telephone service, depending on her insurance coverage, and has provided verbal consent to proceed: NA - Consent obtained within past 12 months      Documentation:  I communicated with the patient and/or health care decision maker about COVID-19 infection, GERD. Details of this discussion including any medical advice provided below      I affirm this is a Patient Initiated Episode with an Established Patient who has not had a related appointment within my department in the past 7 days or scheduled within the next 24 hours. Total Time: minutes: 21-30 minutes    Note: not billable if this call serves to triage the patient into an appointment for the relevant concern      [facility]                  9/15/2020    TELEHEALTH EVALUATION -- Audio/Visual (During PLNSI-45 public health emergency)    Patient and physician are located in their individual homes    HPI:    Porter Rust (:  1965) has requested an audio only/video evaluation for the following concern(s):    Patient is here for virtual visit via video for follow-up for COVID-19 infection, GERD. Patient states she is compliant with medications. Patient went to the ER on 2020 for COVID-19 symptoms, \"thought it was a summer cold\", patient states \"never experienced\" symptoms like she had, patient was admitted, symptoms improved to the point patient was recommended to be discharged to rehab facility but patient states \"opted to go home\". Patient is on oxygen at home and states \"it helps\". Informed patient due to COVID-19 infection she would have to be seen [facility] for rechecking.   Patient voiced increased productive cough, patient was offered prescription for decongestant, patient agrees to take, decongestant prescribed. Patient states GERD medication, Carafate, \"helps but will miss middle dose\". Patient confirms symptoms severity are more severe in the evening, informed patient prescription will be updated to 1 tablet in the morning, 2 tablets in the evening, patient voiced understanding. Patient requested medication refills for PTSD/insomnia, CHF, hypothyroidism. Labs were reviewed. Health maintenance reviewed. Medications reviewed, prescribed Mucinex 600 mg twice daily PRN for 10 days, Carafate 1 g in the morning, 2 g in the evening, refilled Synthroid 25 mcg, trazodone 150 mg, Isordil 10 mg. HPI    Review of Systems   Constitutional: Positive for appetite change, chills and fatigue. Negative for fever. HENT: Negative for congestion. Respiratory: Positive for shortness of breath. Negative for cough and wheezing. Cardiovascular: Negative for chest pain. Gastrointestinal: Positive for nausea. Negative for constipation, diarrhea and vomiting. Genitourinary: Negative for dysuria, frequency and urgency. Musculoskeletal: Positive for myalgias. Negative for back pain and neck pain. Neurological: Negative for headaches. Prior to Visit Medications    Medication Sig Taking?  Authorizing Provider   sucralfate (CARAFATE) 1 GM tablet Take 1 tablet in AM, 2 tablet in evening daily Yes Maverick Walker PA-C   levothyroxine (SYNTHROID) 25 MCG tablet Take 1 tablet by mouth daily Yes Maverick Walker PA-C   traZODone (DESYREL) 150 MG tablet Take 1 tablet by mouth nightly Yes Maverick Walker PA-C   isosorbide dinitrate (ISORDIL) 10 MG tablet Take 1 tablet by mouth 2 times daily Yes Maverick Walker PA-C   bumetanide (BUMEX) 1 MG tablet Take 1 tablet by mouth daily Yes Mimi Has, DO   metoprolol tartrate (LOPRESSOR) 50 MG tablet Take 1 tablet by mouth 2 times daily Yes Maverick Walker PA-C   atorvastatin (LIPITOR) 80 MG tablet Take 1 tablet by mouth daily Yes Maverick Walker PA-C DULoxetine (CYMBALTA) 60 MG extended release capsule Take 1 capsule by mouth daily Yes Forrest Hall PA-C   buPROPion (WELLBUTRIN XL) 150 MG extended release tablet Take 1 tablet by mouth every morning Yes Forrest Hall PA-C   spironolactone (ALDACTONE) 25 MG tablet Take 1 tablet by mouth daily Yes Forrest Hall PA-C   clopidogrel (PLAVIX) 75 MG tablet Take 1 tablet by mouth daily Yes Forrest Hall PA-C   pantoprazole (PROTONIX) 40 MG tablet Take 1 tablet by mouth daily Yes Forrest Hall PA-C   montelukast (SINGULAIR) 10 MG tablet Take 1 tablet by mouth nightly Yes Forrest Hall PA-C   allopurinol (ZYLOPRIM) 300 MG tablet Take 1 tablet by mouth daily Yes Forrest Hall PA-C   albuterol sulfate  (90 Base) MCG/ACT inhaler Inhale 2 puffs into the lungs every 4 hours as needed for Wheezing Yes Forrest Hall PA-C   ibuprofen (ADVIL;MOTRIN) 800 MG tablet Take 1 tablet by mouth every 8 hours as needed for Pain Yes Forrest Hall PA-C   oxybutynin (DITROPAN) 5 MG tablet TAKE 1 TABLET BY MOUTH TWICE DAILY Yes Forrest Hall PA-C   Respiratory Therapy Supplies (NEBULIZER COMPRESSOR) KIT Provide insurance covered nebulizer, use daily as needed Yes Forrest Hall PA-C   polyethylene glycol (GLYCOLAX) packet polyethylene glycol 3350 17 gram/dose oral powder Yes Historical Provider, MD   albuterol (PROVENTIL) (2.5 MG/3ML) 0.083% nebulizer solution Take 3 mLs by nebulization every 6 hours as needed for Wheezing Yes Forrest Hall PA-C   aspirin 81 MG tablet Take 81 mg by mouth daily Yes Historical Provider, MD   oxyCODONE-acetaminophen (PERCOCET) 7.5-325 MG per tablet Take 1 tablet by mouth every 6 hours as needed for Pain for up to 30 days.  Intended supply: 30 days  ASA Yee CNP   baclofen (LIORESAL) 10 MG tablet Take 1 tablet by mouth 3 times daily  ASA Yee CNP   SUPREP BOWEL PREP KIT 17.5-3.13-1.6 GM/177ML SOLN Take as directed - dispense one Kit  Patient not taking: Reported on 9/15/2020  Selena Mendez MD   Handicap Placard MISC by Does not apply route 2 years  THOMAS Marc PO Take by mouth  Historical Provider, MD       Social History     Tobacco Use    Smoking status: Never Smoker    Smokeless tobacco: Never Used   Substance Use Topics    Alcohol use: No    Drug use: No        Past Medical History:   Diagnosis Date    Arthritis     CHF (congestive heart failure) (HCC)     GERD (gastroesophageal reflux disease)     Gout 10/2019    History of heart attack     HLD (hyperlipidemia)     Hypertension     Insomnia     Osteoarthritis               RECORD REVIEW: Previous medical records were reviewed at today's visit. PHYSICAL EXAMINATION:    Vital Signs: (As obtained by patient/caregiver at home)  There were no vitals filed for this visit. No flowsheet data found. Physical Exam  Constitutional:       General: She is awake. Appearance: Normal appearance. She is well-developed and well-groomed. Interventions: Nasal cannula in place. HENT:      Head: Normocephalic and atraumatic. Right Ear: Hearing and external ear normal.      Left Ear: Hearing and external ear normal.      Nose: Nose normal.      Mouth/Throat:      Lips: Pink. Eyes:      General: Lids are normal.      Conjunctiva/sclera: Conjunctivae normal.   Neck:      Musculoskeletal: Neck supple. Pulmonary:      Effort: Pulmonary effort is normal.   Musculoskeletal:         General: No deformity or signs of injury. Neurological:      Mental Status: She is alert and oriented to person, place, and time. Psychiatric:         Attention and Perception: Attention and perception normal.         Mood and Affect: Mood normal.         Speech: Speech normal.         Behavior: Behavior is cooperative. Thought Content:  Thought content normal.         Cognition and Memory: Cognition normal.         Judgment: Judgment normal. Other pertinent observable physical exam findings:-     RECORD REVIEW: Previous medical records were reviewed at today's visit. The past family, medical and social histories were reviewed and unchanged with the exceptions of what is mentioned in this note. Due to this being a TeleHealth encounter, evaluation of the following organ systems is limited: Vitals/Constitutional/EENT/Resp/CV/GI//MS/Neuro/Skin/Heme-Lymph-Imm. ASSESSMENT/PLAN:  Sav Waldron was seen today for ed follow-up. Diagnoses and all orders for this visit:    COVID-19 virus infection    Gastroesophageal reflux disease, esophagitis presence not specified  -     sucralfate (CARAFATE) 1 GM tablet; Take 1 tablet in AM, 2 tablet in evening daily    Sputum production  -     guaiFENesin (MUCINEX) 600 MG extended release tablet; Take 1 tablet by mouth 2 times daily as needed for Congestion    Hypothyroidism, unspecified type  -     levothyroxine (SYNTHROID) 25 MCG tablet; Take 1 tablet by mouth daily    PTSD (post-traumatic stress disorder)  -     traZODone (DESYREL) 150 MG tablet; Take 1 tablet by mouth nightly    Chronic congestive heart failure, unspecified heart failure type (HCC)  -     isosorbide dinitrate (ISORDIL) 10 MG tablet; Take 1 tablet by mouth 2 times daily        Return if symptoms worsen or fail to improve. An  electronic signature was used to authenticate this note. --Timothy Mccauley PA-C on 9/28/2020 at 5:02 PM    This note is created with the assistance of a speech-recognition program. While intending to generate a document that actually reflects the content of the visit, the document can still have some mistakes which may not have been identified and corrected by editing. 9    Pursuant to the emergency declaration under the [facility] waiver authority and the [employer] Act, this Virtual  Visit was conducted, with

## 2020-09-17 ENCOUNTER — HOSPITAL ENCOUNTER (OUTPATIENT)
Dept: PAIN MANAGEMENT | Age: 55
Discharge: HOME | End: 2020-09-17
Payer: MEDICARE

## 2020-09-17 PROCEDURE — 99213 OFFICE O/P EST LOW 20 MIN: CPT

## 2020-09-17 PROCEDURE — 99213 OFFICE O/P EST LOW 20 MIN: CPT | Performed by: NURSE PRACTITIONER

## 2020-09-17 RX ORDER — BACLOFEN 10 MG/1
10 TABLET ORAL 3 TIMES DAILY
Qty: 90 TABLET | Refills: 0 | Status: SHIPPED | OUTPATIENT
Start: 2020-09-17 | End: 2020-12-11 | Stop reason: SDUPTHER

## 2020-09-17 RX ORDER — OXYCODONE AND ACETAMINOPHEN 7.5; 325 MG/1; MG/1
1 TABLET ORAL EVERY 6 HOURS PRN
Qty: 120 TABLET | Refills: 0 | Status: SHIPPED | OUTPATIENT
Start: 2020-09-17 | End: 2020-11-12 | Stop reason: SDUPTHER

## 2020-09-17 ASSESSMENT — ENCOUNTER SYMPTOMS
EYES NEGATIVE: 1
SHORTNESS OF BREATH: 1
NAUSEA: 1
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
## 2020-09-18 ENCOUNTER — TELEPHONE (OUTPATIENT)
Dept: PRIMARY CARE CLINIC | Age: 55
End: 2020-09-18

## 2020-09-18 NOTE — TELEPHONE ENCOUNTER
Patient is requesting an alternative called into pharmacy for nasal drip, medication prescribe on 09/15/2020 is not covered under patients insurance

## 2020-09-22 NOTE — TELEPHONE ENCOUNTER
Spoke with pt. Pt stated that she won't be taking the medication because she does not have the money. Pt stated when she called [facility] on [address] they said they would not check her for covid again.

## 2020-09-22 NOTE — PROGRESS NOTES
[] [facility] ([facility]) @ [facility]  [address]  [place], [address] [address]  Phone [phone]  Fax [phone]    Physical Therapy Discharge Note    Date: 2020      Patient: Sirena Becerra  : 1965  MRN: [id number]       Additional Pertinent Hx: Spinal Fusion ; L knee and shoulder all work injury related  Referring Practitioner: JESUS ALBERTO Navarro  Referral Date : 20  Diagnosis:  Chronic pain R knee M25.561, G89.29 ( Main complaint )  Follows Commands: Within Functional Limits  Onset Date: 10/01/16  PT Insurance Information: [facility] Medicare  Total # of Visits Approved: 12  Total # of Visits to Date: 9  No Show: 2  Canceled Appointment: 2  Date of initial visit:   2020              [] Patient recovered from conditions. Treatment goals were met. [] Patient received maximum benefit. No further therapy indicated at this time. [] Patient demonstrated improvement from condition with   Of Short term goals met. []Patient demonstrated improvement from condition with    Of  Long term goals met. [x] Patient to continue exercise/home instructions independently. [x] Therapy interrupted due to:   See comment  [x] Patient has 2 or more no shows/cancels, is discontinued per our policy. [] Patient has completed prescribed number of treatment sessions. [] Other:      Pain level at evaluation was       /10 and at discharge was       /10    It Is My Understanding That The:  [] Patient returned to work. [] Patient demonstrated improved level of function. [] Patient returned to previous functional level. [x] Patient's current functional status is unknown due to no-shows / cancels  [] Other:     Recommendations/Comments:     Patient last seen in [facility] on 2020, unable to do a recheck due to cancellation on  and 9/3/2020. Noted in records that patient was admitted due to Covid. Was advised to have another test prior to resuming therapy. Have not heard from patient, discharge from therapy at this time. Treatment Included:     [x] Therapeutic Exercise   73018  [] Iontophoresis: 4 mg/mL Dexamethasone Sodium Phosphate  mAmin  66090   [] Therapeutic Activity  31042 [] Vasopneumatic cold with compression  08112    [] Gait Training   42465 [] Ultrasound   38040   [] Neuromuscular Re-education  17129 [] Electrical Stimulation Unattended  12502   [] Manual Therapy  89612 [] Electrical Stimulation Attended  07736   [x] Instruction in HEP  [] Lumbar/Cervical Traction  15697   [x] Aquatic Therapy   29500 [] Cold/hotpack    [] Massage   55606      [] Dry Needling, 1 or 2 muscles  88542   [] Biofeedback, first 15 minutes   20249  [] Biofeedback, additional 15 minutes   26863 [] Dry Needling, 3 or more muscles  44070                If you have any questions or concerns regarding this patient's care, please contact us. Thank you for your referral.      Electronically signed by:  Guanaco Zapien PT Electronically signed by Guanaco Zapien PT on 9/22/2020 at 2:23 PM

## 2020-09-28 ASSESSMENT — ENCOUNTER SYMPTOMS
COUGH: 0
BACK PAIN: 0
WHEEZING: 0

## 2020-10-01 ENCOUNTER — TELEPHONE (OUTPATIENT)
Dept: PRIMARY CARE CLINIC | Age: 55
End: 2020-10-01

## 2020-10-02 ENCOUNTER — HOSPITAL ENCOUNTER (OUTPATIENT)
Age: 55
Setting detail: SPECIMEN
Discharge: HOME | End: 2020-10-02
Payer: MEDICARE

## 2020-10-02 RX ORDER — ONDANSETRON 4 MG/1
4 TABLET, FILM COATED ORAL 2 TIMES DAILY PRN
Qty: 60 TABLET | Refills: 0 | Status: SHIPPED | OUTPATIENT
Start: 2020-10-02 | End: 2020-10-08 | Stop reason: SDUPTHER

## 2020-10-05 LAB — SARS-COV-2, NAA: NOT DETECTED

## 2020-10-06 ENCOUNTER — TELEPHONE (OUTPATIENT)
Dept: FAMILY MEDICINE CLINIC | Age: 55
End: 2020-10-06

## 2020-10-06 NOTE — TELEPHONE ENCOUNTER
Spoke with pt, pt was retested by a nurse that came to her home. She said they will send results when they come in.

## 2020-10-08 ENCOUNTER — OFFICE VISIT (OUTPATIENT)
Dept: PRIMARY CARE CLINIC | Age: 55
End: 2020-10-08
Payer: MEDICARE

## 2020-10-08 VITALS
SYSTOLIC BLOOD PRESSURE: 165 MMHG | HEART RATE: 85 BPM | OXYGEN SATURATION: 96 % | TEMPERATURE: 97.7 F | DIASTOLIC BLOOD PRESSURE: 91 MMHG

## 2020-10-08 PROCEDURE — 90686 IIV4 VACC NO PRSV 0.5 ML IM: CPT | Performed by: PHYSICIAN ASSISTANT

## 2020-10-08 PROCEDURE — 1036F TOBACCO NON-USER: CPT | Performed by: PHYSICIAN ASSISTANT

## 2020-10-08 PROCEDURE — G8482 FLU IMMUNIZE ORDER/ADMIN: HCPCS | Performed by: PHYSICIAN ASSISTANT

## 2020-10-08 PROCEDURE — 99214 OFFICE O/P EST MOD 30 MIN: CPT | Performed by: PHYSICIAN ASSISTANT

## 2020-10-08 PROCEDURE — G0009 ADMIN PNEUMOCOCCAL VACCINE: HCPCS | Performed by: PHYSICIAN ASSISTANT

## 2020-10-08 PROCEDURE — G0008 ADMIN INFLUENZA VIRUS VAC: HCPCS | Performed by: PHYSICIAN ASSISTANT

## 2020-10-08 PROCEDURE — G8417 CALC BMI ABV UP PARAM F/U: HCPCS | Performed by: PHYSICIAN ASSISTANT

## 2020-10-08 PROCEDURE — 1111F DSCHRG MED/CURRENT MED MERGE: CPT | Performed by: PHYSICIAN ASSISTANT

## 2020-10-08 PROCEDURE — G8427 DOCREV CUR MEDS BY ELIG CLIN: HCPCS | Performed by: PHYSICIAN ASSISTANT

## 2020-10-08 PROCEDURE — 90732 PPSV23 VACC 2 YRS+ SUBQ/IM: CPT | Performed by: PHYSICIAN ASSISTANT

## 2020-10-08 PROCEDURE — 3017F COLORECTAL CA SCREEN DOC REV: CPT | Performed by: PHYSICIAN ASSISTANT

## 2020-10-08 RX ORDER — ONDANSETRON HYDROCHLORIDE 8 MG/1
8 TABLET, FILM COATED ORAL 2 TIMES DAILY PRN
Qty: 60 TABLET | Refills: 1 | Status: SHIPPED | OUTPATIENT
Start: 2020-10-08 | End: 2020-11-07

## 2020-10-08 RX ORDER — BUSPIRONE HYDROCHLORIDE 5 MG/1
5 TABLET ORAL DAILY PRN
Qty: 15 TABLET | Refills: 0 | Status: SHIPPED | OUTPATIENT
Start: 2020-10-08 | End: 2020-10-15

## 2020-10-08 RX ORDER — ALBUTEROL SULFATE 90 UG/1
2 AEROSOL, METERED RESPIRATORY (INHALATION) EVERY 4 HOURS PRN
Qty: 1 INHALER | Refills: 3 | Status: SHIPPED | OUTPATIENT
Start: 2020-10-08 | End: 2021-01-21 | Stop reason: SDUPTHER

## 2020-10-08 ASSESSMENT — ENCOUNTER SYMPTOMS
WHEEZING: 1
DIARRHEA: 0
SHORTNESS OF BREATH: 1
VOMITING: 0
NAUSEA: 1
CHEST TIGHTNESS: 1

## 2020-10-08 NOTE — PATIENT INSTRUCTIONS
· Take Buspar 5 mg as needed for anxiety, can take up to 3 times daily, contact PCP office to update on symptoms after finishing   · Increase Zofran to 8 mg twice daily as needed for nausea

## 2020-10-08 NOTE — PROGRESS NOTES
[facility]  [id number] [facility] [address]  Dept: [phone]  Dept Fax: [phone]    Office Progress/Follow Up Note    Date of patient's visit: 10/8/2020    Patient's Name:  Catrachita Elaine YOB: 1965            Patient Care Team:  Deni Joya PA-C as PCP - General (Physician Assistant)  Deni Joya PA-C as PCP - [facility]  ================================================================    REASON FOR VISIT/CHIEF COMPLAINT:  Anxiety    HISTORY OF PRESENTING ILLNESS:  History was obtained from: patient. Catrachita Elaine is a 60 y.o. is here for follow-up for high blood pressure, COVID-19 infection, anxiety. Patient states she is compliant with medications. Patient is voicing COVID-19 symptoms \"still having, not improving\". Patient does confirm \"breathing treatments more frequently\" due to the shortness of breath. Patient states \"body ache, fatigue, no smell or taste\" still present. Informed patient that COVID-19 symptoms can linger for quite some time due to what research is showing, unable to give approximate timeframe when she should start feeling better. Informed patient will reach out to infectious disease to see if any other supportive care can be provided to help improve symptoms. Patient is still voicing nausea, informed patient will increase Zofran 8 mg. Patient voiced increased anxiety due to \"medical issues\". Informed patient she will be prescribed medication to take as needed for anxiety, BuSpar, with the possibility of being started on another medication, Lexapro Elavil, to help improve anxiety with a referral to behavioral health for counseling and evaluation, patient voiced understanding. Patient voiced requesting prescription for scooter, documentation scanned into patient chart from .   Informed patient she will be referred for wheelchair evaluation which will need to be completed for insurance purposes, patient voiced understanding. Patient blood pressure is elevated in office. PCP is concerned with medication compliance, patient has 52% compliance with Spironolactone and metoprolol. Discussed uncontrolled high blood pressure and risk, importance of medication compliance in depth with patient, encourage patient to take medication as prescribed. Patient is morbid obese, did not provide weight in office. Discussed weight loss in depth with patient, encourage patient make changes in diet. Labs reviewed. Health maintenance reviewed, discussed influenza and pneumococcal vaccination in depth with patient, patient consented and influenza and pneumococcal vaccine administered in office. Medications reviewed, prescribed BuSpar 5 mg daily PRN for 15 days, Zofran 8 mg twice daily PRN, refill albuterol 108 mcg inhaler.         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, insomnia type ([address]    Opioid dependence, uncomplicated (HCC)    Sacroiliac joint dysfunction of both sides    Chronic pain disorder    Bulge of cervical disc without myelopathy    DDD (degenerative disc disease), lumbar    Failed back syndrome of cervical spine    Chest pain    Class 3 severe obesity due to excess calories with serious comorbidity and body mass index (BMI) of 45.0 to 49.9 in adult [facility])    Cervical spondylosis with radiculopathy    Status post cervical spinal fusion    Status post lumbar spinal fusion    Lumbar radiculopathy    Chronic pain of both knees    Myalgia    Encounter for long-term opiate analgesic use    Lower respiratory tract infection due to COVID-19 virus    Hypertension    Gout    GERD (gastroesophageal reflux disease)    Chronic combined systolic and diastolic heart failure (HCC)    Other proteinuria    Nausea and vomiting       Health Maintenance Due   Topic Date Due    DTaP/Tdap/Td vaccine (1 - Tdap) 06/29/1982    Breast cancer screen  06/29/2003    Shingles Vaccine (1 of 2) 06/29/2013    Colon cancer screen colonoscopy  06/29/2013       Allergies   Allergen Reactions    Entresto [Sacubitril-Valsartan] Other (See Comments)     Acute kidney injury    Food Swelling     peaches    Gabapentin Swelling    Tetracyclines & Related          Current Outpatient Medications   Medication Sig Dispense Refill    albuterol sulfate  (90 Base) MCG/ACT inhaler Inhale 2 puffs into the lungs every 4 hours as needed for Shortness of Breath 1 Inhaler 3    ondansetron (ZOFRAN) 8 MG tablet Take 1 tablet by mouth 2 times daily as needed for Nausea 60 tablet 1    sucralfate (CARAFATE) 1 GM tablet Take 1 tablet in AM, 2 tablet in evening daily 90 tablet 0    levothyroxine (SYNTHROID) 25 MCG tablet Take 1 tablet by mouth daily 90 tablet 0    traZODone (DESYREL) 150 MG tablet Take 1 tablet by mouth nightly 90 tablet 0    isosorbide dinitrate (ISORDIL) 10 MG tablet Take 1 tablet by mouth 2 times daily 180 tablet 0    bumetanide (BUMEX) 1 MG tablet Take 1 tablet by mouth daily 30 tablet 3    SUPREP BOWEL PREP KIT 17.5-3.13-1.6 GM/177ML SOLN Take as directed - dispense one Kit 1 kit 0    metoprolol tartrate (LOPRESSOR) 50 MG tablet Take 1 tablet by mouth 2 times daily 180 tablet 1    atorvastatin (LIPITOR) 80 MG tablet Take 1 tablet by mouth daily 90 tablet 1    DULoxetine (CYMBALTA) 60 MG extended release capsule Take 1 capsule by mouth daily 90 capsule 1    buPROPion (WELLBUTRIN XL) 150 MG extended release tablet Take 1 tablet by mouth every morning 90 tablet 1    spironolactone (ALDACTONE) 25 MG tablet Take 1 tablet by mouth daily 90 tablet 1    clopidogrel (PLAVIX) 75 MG tablet Take 1 tablet by mouth daily 90 tablet 1    pantoprazole (PROTONIX) 40 MG tablet Take 1 tablet by mouth daily 90 tablet 1    montelukast (SINGULAIR) 10 MG tablet Take 1 tablet by mouth nightly 90 tablet 1    allopurinol (ZYLOPRIM) 300 MG tablet Take 1 tablet by mouth daily 90 tablet 1    Handicap Placard MISC by Does not apply route 2 years 1 each 0    ELDERBERRY PO Take by mouth      oxybutynin (DITROPAN) 5 MG tablet TAKE 1 TABLET BY MOUTH TWICE DAILY 60 tablet 1    polyethylene glycol (GLYCOLAX) packet polyethylene glycol 3350 17 gram/dose oral powder      ibuprofen (ADVIL;MOTRIN) 800 MG tablet TAKE 1 TABLET BY MOUTH EVERY 8 HOURS AS NEEDED FOR PAIN 90 tablet 2    albuterol (PROVENTIL) (2.5 MG/3ML) 0.083% nebulizer solution Take 3 mLs by nebulization every 6 hours as needed for Wheezing 120 each 3    oxyCODONE-acetaminophen (PERCOCET)  MG per tablet Take 1 tablet by mouth every 6 hours as needed for Pain for up to 30 days. Intended supply: 30 days 120 tablet 0    Respiratory Therapy Supplies (NEBULIZER COMPRESSOR) KIT Provide insurance covered nebulizer, use daily as needed 1 kit 0     No current facility-administered medications for this visit. Social History     Tobacco Use    Smoking status: Never Smoker    Smokeless tobacco: Never Used   Substance Use Topics    Alcohol use: No    Drug use: No       Family History   Problem Relation Age of Onset    Other Mother     Diabetes Mother     Heart Disease Mother     Arthritis Mother     Kidney Disease Mother     Lupus Mother     Arthritis Sister     Diabetes Brother     Asthma Brother     Cancer Maternal Grandfather     Hypertension Sister     Breast Cancer Sister         28s    Breast Cancer Niece         30-35        REVIEW OFSYSTEMS:  Review of Systems   Constitutional: Negative for chills and fever. HENT: Negative for congestion. Respiratory: Positive for chest tightness, shortness of breath and wheezing. Negative for cough.     Cardiovascular: Positive for chest pain.   Gastrointestinal: Positive for nausea. Negative for constipation, diarrhea and vomiting. Genitourinary: Positive for frequency. Negative for dysuria and urgency. Musculoskeletal: Negative for back pain, myalgias and neck pain. Neurological: Negative for headaches. PHYSICAL EXAM:  Vitals:    10/08/20 1551 10/08/20 1644   BP: (!) 149/96 (!) 165/91   Site: Left Lower Arm    Position: Sitting    Cuff Size: Medium Adult    Pulse: 85    Temp: 97.7 °F (36.5 °C)    SpO2: 96%      BP Readings from Last 3 Encounters:   10/08/20 (!) 165/91   09/09/20 (!) 105/53   08/26/20 (!) 144/72        Physical Exam  Vitals signs reviewed. Constitutional:       Appearance: Normal appearance. She is well-developed and well-groomed. She is morbidly obese. HENT:      Head: Normocephalic and atraumatic. Right Ear: External ear normal.      Left Ear: External ear normal.      Nose: Nose normal.   Eyes:      General: Lids are normal.      Conjunctiva/sclera: Conjunctivae normal.      Pupils: Pupils are equal, round, and reactive to light. Cardiovascular:      Rate and Rhythm: Normal rate and regular rhythm. Heart sounds: Normal heart sounds. Pulmonary:      Effort: Pulmonary effort is normal.      Breath sounds: Decreased breath sounds present. Abdominal:      Palpations: Abdomen is soft. There is no mass. Tenderness: There is no abdominal tenderness. Musculoskeletal:         General: No tenderness. Skin:     General: Skin is warm and dry. Neurological:      Mental Status: She is alert and oriented to person, place, and time. Psychiatric:         Behavior: Behavior is cooperative.            DIAGNOSTIC FINDINGS:  CBC:  Lab Results   Component Value Date    WBC 4.3 09/09/2020    HGB 9.6 09/09/2020     09/09/2020       BMP:    Lab Results   Component Value Date     09/09/2020    K 4.3 09/09/2020     09/09/2020    CO2 32 09/09/2020    BUN 16 09/09/2020    CREATININE 0.92 09/09/2020    GLUCOSE 88 09/09/2020       HEMOGLOBIN A1C:   Lab Results   Component Value Date    LABA1C 6.1 07/24/2020       FASTING LIPID PANEL:  Lab Results   Component Value Date    CHOL 158 07/24/2020    HDL 33 (L) 07/24/2020    TRIG 149 07/24/2020       ASSESSMENT AND PLAN:  Mark Way was seen today for anxiety. Diagnoses and all orders for this visit:    Essential hypertension    Shortness of breath with exposure to COVID-19 virus  -     albuterol sulfate  (90 Base) MCG/ACT inhaler; Inhale 2 puffs into the lungs every 4 hours as needed for Shortness of Breath    Anxiety  -     busPIRone (BUSPAR) 5 MG tablet; Take 1 tablet by mouth daily as needed (Anxiety)    Nausea  -     ondansetron (ZOFRAN) 8 MG tablet; Take 1 tablet by mouth 2 times daily as needed for Nausea    Class 3 severe obesity due to excess calories with serious comorbidity and body mass index (BMI) of 50.0 to 59.9 in adult [facility])    Needs flu shot  -     INFLUENZA, QUADV, 3 YRS AND OLDER, IM PF, PREFILL SYR OR SDV, 0.5ML ([facility] [place])    Encounter for wheelchair assessment  -     [address]    Need for pneumococcal vaccine  -     PNEUMOVAX 23 subcutaneous/IM (Pneumococcal polysaccharide vaccine 23-valent >= 1yo)      FOLLOW UP AND INSTRUCTIONS:  Return in about 1 month (around 11/8/2020) for SOB, HTN. · Mark Way received counseling on the following healthy behaviors:nutrition and medication adherence    · Discussed use, benefit, and side effects of prescribed medications. Barriers to medication compliance addressed. All patient questions answered. Pt voiced understanding.      · Patient given educational materials - see patient instructions    Electronically signed by Vera Romberg, PA-C on 10/8/20 at 4:11 PM EDT    This note is created with the assistance of a speech-recognition program. While intending to generate a document that actually reflects the content of the visit, the document can still have some mistakes which may not have been identified and corrected by editing.

## 2020-10-08 NOTE — PROGRESS NOTES
Visit Information    Have you changed or started any medications since your last visit including any over-the-counter medicines, vitamins, or herbal medicines? no   Are you having any side effects from any of your medications? -  no  Have you stopped taking any of your medications? Is so, why? -  no    Have you seen any other physician or provider since your last visit? No  Have you had any other diagnostic tests since your last visit? No  Have you been seen in the emergency room and/or had an admission to a hospital since we last saw you? No  Have you had your routine dental cleaning in the past 6 months? no    Have you activated your [employer] account? If not, what are your barriers?  No     Patient Care Team:  Diony Caro, THOMAS as PCP - General (Physician Assistant)  Diony Caro, THOMAS as PCP - [facility]    Medical History Review  Past Medical, Family, and Social History reviewed and does not contribute to the patient presenting condition    Health Maintenance   Topic Date Due    Pneumococcal 0-64 years Vaccine (1 of 1 - PPSV23) 06/29/1969    DTaP/Tdap/Td vaccine (1 - Tdap) 06/29/1982    Breast cancer screen  06/29/2003    Shingles Vaccine (1 of 2) 06/29/2013    Colon cancer screen colonoscopy  06/29/2013    Flu vaccine (1) 09/01/2020    A1C test (Diabetic or Prediabetic)  07/24/2021    Lipid screen  07/24/2021    Annual Wellness Visit (AWV)  08/14/2021    Potassium monitoring  09/09/2021    Creatinine monitoring  09/09/2021    Hepatitis C screen  Completed    HIV screen  Completed    Hepatitis A vaccine  Aged Out    Hepatitis B vaccine  Aged Out    Hib vaccine  Aged Out    Meningococcal (ACWY) vaccine  Aged Out

## 2020-10-09 ENCOUNTER — TELEPHONE (OUTPATIENT)
Dept: PRIMARY CARE CLINIC | Age: 55
End: 2020-10-09

## 2020-10-12 ASSESSMENT — ENCOUNTER SYMPTOMS
CONSTIPATION: 1
COUGH: 0
EYE PAIN: 0
RESPIRATORY NEGATIVE: 1
PHOTOPHOBIA: 0
NAUSEA: 0
EYES NEGATIVE: 1
SHORTNESS OF BREATH: 0
BOWEL INCONTINENCE: 0
CHOKING: 0
ABDOMINAL PAIN: 0
ALLERGIC/IMMUNOLOGIC NEGATIVE: 1
BACK PAIN: 1
VOMITING: 0

## 2020-10-12 NOTE — PROGRESS NOTES
Aleshia Harley is a 60 y.o. female evaluated on 10/15/2020. Modality of virtual service provided -via Video+audio   Consent:  Patient and/or health care decision maker is aware that that patient may receive a bill for this telephone service, depending on one's insurance coverage, and has provided verbal consent to proceed: Yes    Patient identification was verified at the start of the visit: Yes    Chief complaint: Aleshia Harley is 60 y.o.,  female, with  No chief complaint on file. .    Patient is complaining of pain involving the cervical area as well as in the low back. She had undergone surgery in her neck x2 one in about 15 years ago and one in 20 October 2018. Patient also complaining of pain involving the low back for which she had undergone injections in the past without any improvement in the pain. Patient reports he was diagnosed with Covid infection last month and was in the hospital for about 7 days. She is still having problems with the body aches all over and shortness of breath. She reports she is on oxygen at times as needed. She has cough. She also has a history of for bronchitis. She reports she is having pain involving several joints in the body as well as allover body pain which has been worse since she had this Covid infection. She reports she is not able to do the physical therapy because of increased pain and would like to increase her medications. Patient appears to be coughing during the interview and somewhat anxious. Back Pain   This is a chronic problem. The current episode started more than 1 year ago. The problem occurs constantly. The problem is unchanged. The pain is present in the lumbar spine and sacro-iliac. The quality of the pain is described as aching, burning, cramping and stabbing. The pain is at a severity of 10/10 (4-10). The pain is severe. The pain is worse during the night.  The symptoms are aggravated by bending, standing, twisting and position (walking, lifting, ADLs). Stiffness is present in the morning. Associated symptoms include chest pain, headaches, leg pain, numbness, tingling and weakness. Pertinent negatives include no abdominal pain, bladder incontinence, bowel incontinence, dysuria or fever. Risk factors include lack of exercise and obesity. Neck Pain    This is a chronic problem. The current episode started more than 1 year ago. The problem occurs constantly. The problem has been unchanged. The pain is associated with nothing. The pain is present in the left side, midline and right side. The quality of the pain is described as aching (sharp,throbbing). The pain is at a severity of 10/10. The pain is severe. The symptoms are aggravated by position, twisting, bending and coughing. Stiffness is present in the morning. Associated symptoms include chest pain, headaches, leg pain, numbness, tingling and weakness. Pertinent negatives include no fever, photophobia or syncope. Alleviating factors:nothing   Lifestyle changes experienced with pain: Wakes from sleep, Prevents or limits ADLs, Increases w/activity. Mood changes,none  Patient currently unemployed. Physical therapy did not help the pain. Are you under psychological counseling at present: Yes  Goals for treatment include:  Decrease in pain  Enjoy daily and recreational activities, return to previous status. Patient relates current medications are helping the pain. Patient reports taking pain medications as prescribed, denies obtaining medications from different sources and denies use of illegal drugs. Patient denies side effects from medications like nausea, vomiting, constipation or drowsiness. Patient reports current activities of daily living ar possible due to medications and would like to continue them.        ACTIVITY/SOCIAL/EMOTIONAL:  Sleep Pattern: 6 hours per night. nightime awakenings  Home Exercises: - none  Activity:decreased  Emotional Issues: tobacco: Never Used   Substance and Sexual Activity    Alcohol use: No    Drug use: No    Sexual activity: None   Lifestyle    Physical activity     Days per week: None     Minutes per session: None    Stress: None   Relationships    Social connections     Talks on phone: None     Gets together: None     Attends Jew service: None     Active member of club or organization: None     Attends meetings of clubs or organizations: None     Relationship status: None    Intimate partner violence     Fear of current or ex partner: None     Emotionally abused: None     Physically abused: None     Forced sexual activity: None   Other Topics Concern    None   Social History Narrative    None       Allergies   Allergen Reactions    Entresto [Sacubitril-Valsartan] Other (See Comments)     Acute kidney injury    Food Swelling     peaches    Gabapentin Swelling    Tetracyclines & Related        Current Outpatient Medications on File Prior to Encounter   Medication Sig Dispense Refill    albuterol sulfate  (90 Base) MCG/ACT inhaler Inhale 2 puffs into the lungs every 4 hours as needed for Shortness of Breath 1 Inhaler 3    ondansetron (ZOFRAN) 8 MG tablet Take 1 tablet by mouth 2 times daily as needed for Nausea 60 tablet 1    busPIRone (BUSPAR) 5 MG tablet Take 1 tablet by mouth daily as needed (Anxiety) 15 tablet 0    oxyCODONE-acetaminophen (PERCOCET) 7.5-325 MG per tablet Take 1 tablet by mouth every 6 hours as needed for Pain for up to 30 days.  Intended supply: 30 days 120 tablet 0    baclofen (LIORESAL) 10 MG tablet Take 1 tablet by mouth 3 times daily 90 tablet 0    sucralfate (CARAFATE) 1 GM tablet Take 1 tablet in AM, 2 tablet in evening daily 90 tablet 0    levothyroxine (SYNTHROID) 25 MCG tablet Take 1 tablet by mouth daily 90 tablet 0    traZODone (DESYREL) 150 MG tablet Take 1 tablet by mouth nightly 90 tablet 0    isosorbide dinitrate (ISORDIL) 10 MG tablet Take 1 tablet by mouth 2 Negative for photophobia and pain. Respiratory: Negative. Negative for cough, choking and shortness of breath. Cardiovascular: Positive for chest pain and leg swelling. Negative for syncope. Gastrointestinal: Positive for constipation. Negative for abdominal pain, bowel incontinence, nausea and vomiting. Endocrine: Negative. Negative for cold intolerance and polyphagia. Genitourinary: Negative. Negative for bladder incontinence, dysuria and hematuria. Musculoskeletal: Positive for back pain and neck pain. Skin: Negative. Negative for rash. Allergic/Immunologic: Negative. Neurological: Positive for dizziness, tingling, weakness, numbness and headaches. Hematological: Bruises/bleeds easily. Plavix and aspirin therapy   Psychiatric/Behavioral: Positive for sleep disturbance. Negative for confusion. Physical Exam  Skin:         Neurological:      Mental Status: She is alert and oriented to person, place, and time. Psychiatric:         Mood and Affect: Mood normal.            DATA:  LAB.:  7/28/2019 12:59 AM - [facility] Incoming Lab Results From [facility]     Component  Value  Ref Range & Units  Status  Collected  Lab    Pain Management Drug Panel Interp, Urine  Consistent   Final  07/24/2019  2:05 PM  [facility]    (NOTE)   ________________________________________________________________   DRUGS EXPECTED:   PERCOCET (OXYCODONE) [3 WEEKS AGO]   ________________________________________________________________   CONSISTENT with medications provided:   PERCOCET (OXYCODONE) : based on the absence of oxycodone and   metabolites   ________________________________________________________________   Drugs Not Included in this Assay:   Acetaminophen        X-Ray reports:  EXAMINATION:    CTA OF THE CHEST 9/2/2020 8:15 am         TECHNIQUE:    CTA of the chest was performed after the administration of intravenous    contrast.  Multiplanar reformatted images are provided for review.   MIP    images are provided for review. Dose modulation, iterative reconstruction,    and/or weight based adjustment of the mA/kV was utilized to reduce the    radiation dose to as low as reasonably achievable.         COMPARISON:    CT chest performed 10/02/2019.         HISTORY:    ORDERING SYSTEM PROVIDED HISTORY: sob    TECHNOLOGIST PROVIDED HISTORY:    sob    Reason for Exam: sob    Acuity: Acute    Type of Exam: Initial         FINDINGS:    Pulmonary Arteries: Pulmonary arteries are adequately opacified for    evaluation.  No evidence of intraluminal filling defect to suggest pulmonary    embolism.  Main pulmonary artery is normal in caliber.         Mediastinum: No evidence of mediastinal lymphadenopathy.  The heart is    enlarged.  There is no pericardial effusion.  There is no acute abnormality    of the thoracic aorta.  There is redemonstration of prior thyroidectomy with    a heterogeneous soft tissue nodule inferior to the thyroid bed on the left    this measures approximately 3.4 x 2.9 cm and is not significantly changed    compared to prior examination.         Lungs/pleura: There are scattered pulmonary opacities throughout the lungs    bilaterally.  There is no effusion. Lucetta Record is no pneumothorax.  The    tracheobronchial tree is patent.         Upper Abdomen: Limited images of the upper abdomen are unremarkable.         Soft Tissues/Bones: No acute bone or soft tissue abnormality.              Impression    No definite acute or chronic pulmonary embolism.         Scattered pulmonary opacities throughout the lungs bilaterally consistent    with pneumonia.         Redemonstration of a heterogeneous soft tissue nodule inferior to the thyroid    bed on the left that is not significantly changed in size or characteristics    compared to prior examination.          History: Left hip pain         Findings: AP pelvis and frog-leg lateral x-ray of the left hip done in the    office today shows mild degenerative narrowing of the hip joint. Fayne Tricia    at the lower lumbar spine is appreciated.  No evidence of fracture,    subluxation, dislocation, radiopaque foreign body, radiopaque tumors    noted.         Impression: Mild degenerative changes left hip as described above. History:   Right knee pain         Findings:   Standing AP/Lateral/Tunnel/Merchant view xrays of the Right    done in the office today shows significant medial joint space narrowing,    tricompartmental osteophytosis, joint line sclerosis medially.    No    evidence of fracture, subluxation, dislocation, radioopaque foreign    body/tumor is noted.  Lateral subluxation of the tibia is appreciated.         Impression:   Right knee severe  degenerative changes as described above. Clinical  impression:  1. DDD (degenerative disc disease), lumbar    2. Lumbar radiculopathy    3. Status post lumbar spinal fusion    4. Sacroiliac joint dysfunction of both sides    5. Chronic pain of both knees    6. Bulge of cervical disc without myelopathy    7. Status post cervical spinal fusion    8. Cervical spondylosis with radiculopathy    9. Morbid obesity ([address]    10. Encounter for long-term opiate analgesic use    11. Encounter for medication management        Plan of care:  Patient is complaining of increased pain secondary to Covid infection with muscle pains all over the body. Hence we will increase the dose of her Percocet to 10/3/2025 1 every 6 hours as needed for pain. Patient was told this is for this month only and will try to reassess and try to come down on medications to 7.5/325 as before. Patient was demanding that she needs 10/3/2025 in future too. Patient was told that we cannot give her any guarantee for future but for this month I would increase the dose because of her increased muscle pains from Covid infection. We will continue current pain medications  Current medications are being tolerated without any Adverse side effects.   Orders Placed This Encounter   Medications    oxyCODONE-acetaminophen (PERCOCET)  MG per tablet     Sig: Take 1 tablet by mouth every 6 hours as needed for Pain for up to 30 days. Intended supply: 30 days     Dispense:  120 tablet     Refill:  0     Reduce doses taken as pain becomes manageable     Urine drug screens have been appropriate. No aberrant activity noted. Analgesia is achieved. Activities of daily living are possible because of medications. Safe use of medications explained to patient. PDMP Monitoring:    Last PDMP Ingrid Martinez as Reviewed [facility]):  Review User Review Instant Review Result   Gunjan Akers 10/12/2020  6:00 AM Reviewed PDMP [1]     Counselling/Preventive measures for pain  Control:    [x]  Spine strengthening exercises are discussed with patient in detail. [x] Ill effects of being on chronic pain medications such as sleep disturbances, hormonal changes, withdrawal symptoms,  chronic opioid dependence and tolerance were discussed with patient. I had asked the patient to minimize medication use and utilize pain medications only for uncontrolled rest pain or pain with exertional activities. I advised patient not to self escalate pain medications without consulting with us. At each of patient's future visits we will try to taper pain medications, while adjusting the adjunct medications, and re-evaluating for Physical Therapy to improve spinal and joint strength. We will continue to have discussions to decrease pain medications as tolerated. I also discussed with the patient regarding the dangers of combining narcotic pain medication with tranquilizers, alcohol or illegal drugs or taking the medication any other than prescribed. The dangers including the respiratory depression and death. Patient was told to tell  to all  physicians regarding the medications he is getting from pain clinic.  Patient is warned not to take any unprescribed medications over-the-counter medications that can depress continuity of care, their prescriptions will be escribed this month after a careful chart review and review of their OARRS report  Pursuant to the emergency declaration under the [employer] and [facility], 1135 waiver authority and the [employer] and [employer] Act, this Virtual Visit was conducted, with patient's consent, to reduce the patient's risk of exposure to COVID-19 and provide continuity of care for an established patient. Services were provided through a video synchronous discussion virtually to substitute for in-person appointment. \"  Documentation:  I communicated with the patient and/or health care decision maker about plan of care  Details of this discussion including any medical advice provided: Total Time: minutes: 21-30 minutes    I affirm    this is a Patient Initiated Episode with an Established Patient who has not had a related appointment within my department in the past 7 days or scheduled within the next 24 hours.     Electronically signed by Mariposa Powell MD on 10/15/2020 at 12:07 PM

## 2020-10-15 ENCOUNTER — HOSPITAL ENCOUNTER (OUTPATIENT)
Dept: PAIN MANAGEMENT | Age: 55
Discharge: HOME | End: 2020-10-15
Payer: MEDICARE

## 2020-10-15 PROCEDURE — 99214 OFFICE O/P EST MOD 30 MIN: CPT | Performed by: PAIN MEDICINE

## 2020-10-15 PROCEDURE — 99213 OFFICE O/P EST LOW 20 MIN: CPT

## 2020-10-15 RX ORDER — ALBUTEROL SULFATE 2.5 MG/3ML
2.5 SOLUTION RESPIRATORY (INHALATION) EVERY 6 HOURS PRN
Qty: 120 EACH | Refills: 3 | Status: SHIPPED | OUTPATIENT
Start: 2020-10-15

## 2020-10-15 RX ORDER — OXYCODONE AND ACETAMINOPHEN 10; 325 MG/1; MG/1
1 TABLET ORAL EVERY 6 HOURS PRN
Qty: 120 TABLET | Refills: 0 | Status: SHIPPED | OUTPATIENT
Start: 2020-10-15 | End: 2020-11-12 | Stop reason: ALTCHOICE

## 2020-10-18 ASSESSMENT — ENCOUNTER SYMPTOMS
COUGH: 0
BACK PAIN: 0
CONSTIPATION: 0

## 2020-11-02 ENCOUNTER — OFFICE VISIT (OUTPATIENT)
Dept: PRIMARY CARE CLINIC | Age: 55
End: 2020-11-02
Payer: MEDICARE

## 2020-11-02 VITALS
HEART RATE: 60 BPM | OXYGEN SATURATION: 96 % | DIASTOLIC BLOOD PRESSURE: 78 MMHG | SYSTOLIC BLOOD PRESSURE: 128 MMHG | TEMPERATURE: 98.1 F

## 2020-11-02 PROCEDURE — 3017F COLORECTAL CA SCREEN DOC REV: CPT | Performed by: PHYSICIAN ASSISTANT

## 2020-11-02 PROCEDURE — 99215 OFFICE O/P EST HI 40 MIN: CPT | Performed by: PHYSICIAN ASSISTANT

## 2020-11-02 PROCEDURE — G8926 SPIRO NO PERF OR DOC: HCPCS | Performed by: PHYSICIAN ASSISTANT

## 2020-11-02 PROCEDURE — G8417 CALC BMI ABV UP PARAM F/U: HCPCS | Performed by: PHYSICIAN ASSISTANT

## 2020-11-02 PROCEDURE — 3023F SPIROM DOC REV: CPT | Performed by: PHYSICIAN ASSISTANT

## 2020-11-02 PROCEDURE — G8427 DOCREV CUR MEDS BY ELIG CLIN: HCPCS | Performed by: PHYSICIAN ASSISTANT

## 2020-11-02 PROCEDURE — G8482 FLU IMMUNIZE ORDER/ADMIN: HCPCS | Performed by: PHYSICIAN ASSISTANT

## 2020-11-02 PROCEDURE — 1036F TOBACCO NON-USER: CPT | Performed by: PHYSICIAN ASSISTANT

## 2020-11-02 RX ORDER — DILTIAZEM HYDROCHLORIDE 60 MG/1
2 TABLET, FILM COATED ORAL 2 TIMES DAILY
Qty: 1 INHALER | Refills: 0 | COMMUNITY
Start: 2020-11-02 | End: 2020-12-09 | Stop reason: SDUPTHER

## 2020-11-02 ASSESSMENT — ENCOUNTER SYMPTOMS
WHEEZING: 1
BACK PAIN: 1
SHORTNESS OF BREATH: 1

## 2020-11-02 NOTE — PROGRESS NOTES
[facility]  [id number] [facility] [address]  Dept: [phone]  Dept Fax: [phone]    Office Progress/Follow Up Note    Date of patient's visit: 11/2/2020    Patient's Name:  Glendy Hugo YOB: 1965            Patient Care Team:  Jessy Andre PA-C as PCP - General (Physician Assistant)  Jessy Andre PA-C as PCP - [facility] Empaneled Provider  ================================================================    REASON FOR VISIT/CHIEF COMPLAINT:  3 Month Follow-Up (Lab Review, Thyroid Disease, Arthralgia)    HISTORY OF PRESENTING ILLNESS:  History was obtained from: patient. Glendy Hugo is a 60 y.o. is here h/o COVID-19 symptoms. Patient states she is compliant with medications. PCP is concerned with medication compliance, multiple scripts are under 60% compliance, when asked patient patient replies Moe Horta a surplus of a lot of medications\". Patient seen by pain management for chronic pain at multiple joints and upper and lower back, last seen in 10/2020. Patient did have a positive JV and was referred to rheumatology but \"did not take insurance\". Instructed patient to contact insurance for additional rheumatology and coverage and will place a new referral, patient voiced understanding. PCP will possibly consider Lyrica trial to see if will improve chronic pain. Pt states COVID-19 symptoms \"are still present\". Patient is still voicing increased shortness of breath and wheezing, patient has history of chronic bronchitis. Discussed chronic bronchitis, medications in depth with patient, informed patient she will be provided sample of Symbicort 80 to use until out, instructed patient to contact PCP office update on symptoms, patient voiced understanding, if no improvement with respiratory symptoms will refer to pulmonology for evaluation.     Patient has history of CHF, \"seen by cardiology when living in [facility]". With patient worsening respiratory symptoms, informed patient will refer patient to be established with cardiology for evaluation, inform patient echo will be order to assess heart failure status, patient voiced understanding. Patient blood pressures controlled in office. Patient weight is stable. Discussed weight loss in depth with patient, encourage patient make changes in diet. Labs reviewed, informed patient labs will be ordered and to have completed before follow-up appointment, patient voiced understanding. Health maintenance reviewed, discussed breast cancer screening in depth with patient, mammogram ordered. Medications reviewed.     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)    Cervical spondylosis with radiculopathy    Status post cervical spinal fusion    Status post lumbar spinal fusion    Lumbar radiculopathy    Chronic pain of both knees    Myalgia    Encounter for long-term opiate analgesic use    Lower respiratory tract infection due to COVID-19 virus    Hypertension    Gout    GERD (gastroesophageal reflux disease)    Chronic combined systolic and diastolic heart failure (HCC)    Other proteinuria    Nausea and vomiting       Health Maintenance Due   Topic Date Due    DTaP/Tdap/Td vaccine (1 - Tdap) 06/29/1982    Breast every morning 90 tablet 1    spironolactone (ALDACTONE) 25 MG tablet Take 1 tablet by mouth daily 90 tablet 1    clopidogrel (PLAVIX) 75 MG tablet Take 1 tablet by mouth daily 90 tablet 1    pantoprazole (PROTONIX) 40 MG tablet Take 1 tablet by mouth daily 90 tablet 1    montelukast (SINGULAIR) 10 MG tablet Take 1 tablet by mouth nightly 90 tablet 1    allopurinol (ZYLOPRIM) 300 MG tablet Take 1 tablet by mouth daily 90 tablet 1    Handicap Placard MISC by Does not apply route 2 years 1 each 0    oxybutynin (DITROPAN) 5 MG tablet TAKE 1 TABLET BY MOUTH TWICE DAILY 60 tablet 1    polyethylene glycol (GLYCOLAX) packet polyethylene glycol 3350 17 gram/dose oral powder      ELDERBERRY PO Take by mouth      Respiratory Therapy Supplies (NEBULIZER COMPRESSOR) KIT Provide insurance covered nebulizer, use daily as needed 1 kit 0     No current facility-administered medications for this visit. Social History     Tobacco Use    Smoking status: Never Smoker    Smokeless tobacco: Never Used   Substance Use Topics    Alcohol use: No    Drug use: No       Family History   Problem Relation Age of Onset    Other Mother     Diabetes Mother     Heart Disease Mother     Arthritis Mother     Kidney Disease Mother     Lupus Mother     Arthritis Sister     Diabetes Brother     Asthma Brother     Cancer Maternal Grandfather     Hypertension Sister     Breast Cancer Sister         28s    Breast Cancer Niece         30-35        REVIEW OFSYSTEMS:  Review of Systems   Constitutional: Negative for chills and fever. HENT: Negative for congestion. Respiratory: Positive for shortness of breath and wheezing. Negative for cough. Cardiovascular: Negative for chest pain. Gastrointestinal: Negative for constipation, diarrhea, nausea and vomiting. Genitourinary: Positive for frequency (on diuretic). Negative for dysuria and urgency. Musculoskeletal: Positive for arthralgias and back pain.  Negative for myalgias and neck pain. Neurological: Negative for headaches. PHYSICAL EXAM:  Vitals:    11/02/20 1218 11/02/20 1322   BP: (!) 144/84 128/78   Site: Right Lower Arm Right Lower Arm   Position: Sitting Sitting   Cuff Size: Large Adult Large Adult   Pulse: 83 60   Temp: 98.1 °F (36.7 °C)    TempSrc: Temporal    SpO2: 96%      BP Readings from Last 3 Encounters:   11/02/20 128/78   10/08/20 (!) 165/91   09/09/20 (!) 105/53        Physical Exam  Vitals signs reviewed. Constitutional:       Appearance: Normal appearance. She is well-developed and well-groomed. She is morbidly obese. HENT:      Head: Normocephalic and atraumatic. Right Ear: External ear normal.      Left Ear: External ear normal.      Nose: Nose normal.   Eyes:      General: Lids are normal.      Conjunctiva/sclera: Conjunctivae normal.      Pupils: Pupils are equal, round, and reactive to light. Cardiovascular:      Rate and Rhythm: Normal rate and regular rhythm. Heart sounds: Normal heart sounds. Pulmonary:      Effort: Pulmonary effort is normal.      Breath sounds: Normal breath sounds. Abdominal:      Palpations: Abdomen is soft. There is no mass. Tenderness: There is no abdominal tenderness. Musculoskeletal:         General: No tenderness. Right knee: She exhibits bony tenderness. Left knee: She exhibits bony tenderness. Lumbar back: She exhibits pain. Skin:     General: Skin is warm and dry. Neurological:      Mental Status: She is alert and oriented to person, place, and time. Psychiatric:         Behavior: Behavior is cooperative.            DIAGNOSTIC FINDINGS:  CBC:  Lab Results   Component Value Date    WBC 4.3 09/09/2020    HGB 9.6 09/09/2020     09/09/2020       BMP:    Lab Results   Component Value Date     09/09/2020    K 4.3 09/09/2020     09/09/2020    CO2 32 09/09/2020    BUN 16 09/09/2020    CREATININE 0.92 09/09/2020    GLUCOSE 88 09/09/2020       HEMOGLOBIN A1C:   Lab Results   Component Value Date    LABA1C 6.1 07/24/2020       FASTING LIPID PANEL:  Lab Results   Component Value Date    CHOL 158 07/24/2020    HDL 33 (L) 07/24/2020    TRIG 149 07/24/2020       ASSESSMENT AND PLAN:  Mark Way was seen today for 3 month follow-up. Diagnoses and all orders for this visit:    Chronic combined systolic and diastolic heart failure ([address]  -     ECHO Complete 2D W Doppler W Color; Future  -     AFL - Thao Dumont MD, Cardiology, [facility]    Chronic bronchitis, unspecified chronic bronchitis type ([address]  -     SYMBICORT 80-4.5 MCG/ACT AERO; Inhale 2 puffs into the lungs 2 times daily    Chronic pain syndrome    Class 3 severe obesity due to excess calories with serious comorbidity and body mass index (BMI) of 50.0 to 59.9 in adult [facility])    History of 2019 novel coronavirus disease (COVID-19)    Anemia, unspecified type  -     CBC; Future    Prediabetes  -     Hemoglobin A1C; Future    Encounter for screening mammogram for breast cancer  -     [facility] Digital Screen Bilateral [ICD9071]; Future      FOLLOW UP AND INSTRUCTIONS:  Return in about 6 weeks (around 12/14/2020) for Bronchitis, CHF, Imaging Review, Lab Review. · Mark Way received counseling on the following healthy behaviors:nutrition    · Discussed use, benefit, and side effects of prescribed medications. Barriers to medication compliance addressed. All patient questions answered. Pt voiced understanding. · Patient given educational materials - see patient instructions    Electronically signed by Vera Romberg, PA-C on 11/2/20 at 12:38 PM EST    This note is created with the assistance of a speech-recognition program. While intending to generate a document that actually reflects the content of the visit, the document can still have some mistakes which may not have been identified and corrected by editing.

## 2020-11-02 NOTE — PROGRESS NOTES
Visit Information    Have you changed or started any medications since your last visit including any over-the-counter medicines, vitamins, or herbal medicines? no   Are you having any side effects from any of your medications? -  no  Have you stopped taking any of your medications? Is so, why? -  no    Have you seen any other physician or provider since your last visit? No  Have you had any other diagnostic tests since your last visit? No  Have you been seen in the emergency room and/or had an admission to a hospital since we last saw you? No  Have you had your routine dental cleaning in the past 6 months? no    Have you activated your [employer] account? If not, what are your barriers?  No:      Patient Care Team:  Diony Caro PA-C as PCP - General (Physician Assistant)  Diony Caro PA-C as PCP - [facility]    Medical History Review  Past Medical, Family, and Social History reviewed and does not contribute to the patient presenting condition    Health Maintenance   Topic Date Due    DTaP/Tdap/Td vaccine (1 - Tdap) 06/29/1982    Breast cancer screen  06/29/2003    Shingles Vaccine (1 of 2) 06/29/2013    Colon cancer screen colonoscopy  06/29/2013    A1C test (Diabetic or Prediabetic)  07/24/2021    Lipid screen  07/24/2021    Annual Wellness Visit (AWV)  08/14/2021    Potassium monitoring  09/09/2021    Creatinine monitoring  09/09/2021    Flu vaccine  Completed    Pneumococcal 0-64 years Vaccine  Completed    Hepatitis C screen  Completed    HIV screen  Completed    Hepatitis A vaccine  Aged Out    Hepatitis B vaccine  Aged Out    Hib vaccine  Aged Out    Meningococcal (ACWY) vaccine  Aged Out

## 2020-11-02 NOTE — PATIENT INSTRUCTIONS
· Confirm medication at home, contact PCP office for any needed refills  · Contact insurance for rheumatology in coverage, contact PCP office for referral  · Contact PCP office to update on symptoms after finishing medication, Symbicort 80  · Call and schedule appointment with cardiology   · Get lab done in Dec. 2020  · Call and schedule mammogram and echo, [phone]

## 2020-11-09 ASSESSMENT — ENCOUNTER SYMPTOMS
NAUSEA: 0
COUGH: 0
VOMITING: 0
DIARRHEA: 0
CONSTIPATION: 0

## 2020-11-11 ENCOUNTER — TELEPHONE (OUTPATIENT)
Dept: PRIMARY CARE CLINIC | Age: 55
End: 2020-11-11

## 2020-11-11 NOTE — TELEPHONE ENCOUNTER
Pt therapist Natalia Alexander)  called to notify office that pt BP was 157/115 before taking bp medication and pt stated that she had pain all over, given pain scale stated that it is 10/10 , pt also stated that she does not want to go to ER for further evaluation, pls advise

## 2020-11-12 ENCOUNTER — HOSPITAL ENCOUNTER (OUTPATIENT)
Dept: PAIN MANAGEMENT | Age: 55
Discharge: HOME | End: 2020-11-12
Payer: MEDICARE

## 2020-11-12 PROCEDURE — 99213 OFFICE O/P EST LOW 20 MIN: CPT

## 2020-11-12 PROCEDURE — 99214 OFFICE O/P EST MOD 30 MIN: CPT | Performed by: NURSE PRACTITIONER

## 2020-11-12 RX ORDER — OXYCODONE AND ACETAMINOPHEN 7.5; 325 MG/1; MG/1
1 TABLET ORAL EVERY 6 HOURS PRN
Qty: 120 TABLET | Refills: 0 | Status: SHIPPED | OUTPATIENT
Start: 2020-11-14 | End: 2020-12-11 | Stop reason: SDUPTHER

## 2020-11-12 ASSESSMENT — ENCOUNTER SYMPTOMS
BACK PAIN: 1
CONSTIPATION: 0
COUGH: 0
SHORTNESS OF BREATH: 0
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
## 2020-11-18 ENCOUNTER — HOSPITAL ENCOUNTER (OUTPATIENT)
Dept: NON INVASIVE DIAGNOSTICS | Age: 55
Discharge: HOME | End: 2020-11-18
Payer: MEDICARE

## 2020-11-18 LAB
LV EF: 33 %
LVEF MODALITY: NORMAL

## 2020-11-18 PROCEDURE — 93306 TTE W/DOPPLER COMPLETE: CPT

## 2020-11-19 ENCOUNTER — HOSPITAL ENCOUNTER (OUTPATIENT)
Age: 55
Discharge: HOME | End: 2020-11-19
Payer: MEDICARE

## 2020-11-19 ENCOUNTER — HOSPITAL ENCOUNTER (OUTPATIENT)
Dept: MAMMOGRAPHY | Facility: CLINIC | Age: 55
Discharge: HOME | End: 2020-11-21
Payer: MEDICARE

## 2020-11-19 LAB
ESTIMATED AVERAGE GLUCOSE: 126 MG/DL
HBA1C MFR BLD: 6 % (ref 4–6)
HCT VFR BLD CALC: 34.8 % (ref 36.3–47.1)
HEMOGLOBIN: 10.2 G/DL (ref 11.9–15.1)
MCH RBC QN AUTO: 26.9 PG (ref 25.2–33.5)
MCHC RBC AUTO-ENTMCNC: 29.3 G/DL (ref 28.4–34.8)
MCV RBC AUTO: 91.8 FL (ref 82.6–102.9)
NRBC AUTOMATED: 0 PER 100 WBC
PDW BLD-RTO: 15.9 % (ref 11.8–14.4)
PLATELET # BLD: 285 K/UL (ref 138–453)
PMV BLD AUTO: 10.9 FL (ref 8.1–13.5)
RBC # BLD: 3.79 M/UL (ref 3.95–5.11)
WBC # BLD: 6.7 K/UL (ref 3.5–11.3)

## 2020-11-19 PROCEDURE — 36415 COLL VENOUS BLD VENIPUNCTURE: CPT

## 2020-11-19 PROCEDURE — 85027 COMPLETE CBC AUTOMATED: CPT

## 2020-11-19 PROCEDURE — 83036 HEMOGLOBIN GLYCOSYLATED A1C: CPT

## 2020-11-19 PROCEDURE — 80307 DRUG TEST PRSMV CHEM ANLYZR: CPT

## 2020-11-19 PROCEDURE — 77067 SCR MAMMO BI INCL CAD: CPT

## 2020-11-20 ENCOUNTER — TELEPHONE (OUTPATIENT)
Dept: PRIMARY CARE CLINIC | Age: 55
End: 2020-11-20

## 2020-11-20 NOTE — TELEPHONE ENCOUNTER
Patient had labs completed that showed improved anemia levels, patient A1c has decreased from 6.1 to 6. Patient had echo completed that showed EF was 30-35%, moderate diastolic dysfunction, otherwise unremarkable. Compared to echo completed in 10/2019, EF has decreased from 40%.   Patient was referred to cardiology at last office visit on 11/2/2020, will confirm if patient was evaluated as of yet or have appointment scheduled

## 2020-11-23 LAB
6-ACETYLMORPHINE, UR: NOT DETECTED
7-AMINOCLONAZEPAM, URINE: NOT DETECTED
ALPHA-OH-ALPRAZ, URINE: NOT DETECTED
ALPRAZOLAM, URINE: NOT DETECTED
AMPHETAMINES, URINE: NOT DETECTED
BARBITURATES, URINE: NOT DETECTED
BENZOYLECGONINE, UR: NOT DETECTED
BUPRENORPHINE URINE: NOT DETECTED
CARISOPRODOL, UR: NOT DETECTED
CLONAZEPAM, URINE: NOT DETECTED
CODEINE, URINE: NOT DETECTED
CREATININE URINE: 246.1 MG/DL (ref 20–400)
DIAZEPAM, URINE: NOT DETECTED
DRUGS EXPECTED, UR: NORMAL
EER HI RES INTERP UR: NORMAL
ETHYL GLUCURONIDE UR: NOT DETECTED
FENTANYL URINE: NOT DETECTED
HYDROCODONE, URINE: NOT DETECTED
HYDROMORPHONE, URINE: NOT DETECTED
LORAZEPAM, URINE: NOT DETECTED
MARIJUANA METAB, UR: NOT DETECTED
MDA, UR: NOT DETECTED
MDEA, EVE, UR: NOT DETECTED
MDMA URINE: NOT DETECTED
MEPERIDINE METAB, UR: NOT DETECTED
METHADONE, URINE: NOT DETECTED
METHAMPHETAMINE, URINE: NOT DETECTED
METHYLPHENIDATE: NOT DETECTED
MIDAZOLAM, URINE: NOT DETECTED
MORPHINE URINE: NOT DETECTED
NORBUPRENORPHINE, URINE: NOT DETECTED
NORDIAZEPAM, URINE: NOT DETECTED
NORFENTANYL, URINE: NOT DETECTED
NORHYDROCODONE, URINE: NOT DETECTED
NOROXYCODONE, URINE: PRESENT
NOROXYMORPHONE, URINE: NOT DETECTED
OXAZEPAM, URINE: NOT DETECTED
OXYCODONE URINE: PRESENT
OXYMORPHONE, URINE: NOT DETECTED
PAIN MANAGEMENT DRUG PANEL INTERP, URINE: NORMAL
PAIN MGT DRUG PANEL, HI RES, UR: NORMAL
PCP,URINE: NOT DETECTED
PHENTERMINE, UR: NOT DETECTED
PROPOXYPHENE, URINE: NOT DETECTED
TAPENTADOL, URINE: NOT DETECTED
TAPENTADOL-O-SULFATE, URINE: NOT DETECTED
TEMAZEPAM, URINE: NOT DETECTED
TRAMADOL, URINE: NOT DETECTED
ZOLPIDEM, URINE: NOT DETECTED

## 2020-11-30 ENCOUNTER — APPOINTMENT (OUTPATIENT)
Dept: GENERAL RADIOLOGY | Age: 55
End: 2020-11-30
Payer: MEDICARE

## 2020-11-30 ENCOUNTER — APPOINTMENT (OUTPATIENT)
Dept: CT IMAGING | Age: 55
End: 2020-11-30
Payer: MEDICARE

## 2020-11-30 ENCOUNTER — HOSPITAL ENCOUNTER (EMERGENCY)
Age: 55
Discharge: HOME | End: 2020-12-02
Attending: EMERGENCY MEDICINE | Admitting: INTERNAL MEDICINE
Payer: MEDICARE

## 2020-11-30 ENCOUNTER — TELEPHONE (OUTPATIENT)
Dept: PRIMARY CARE CLINIC | Age: 55
End: 2020-11-30

## 2020-11-30 LAB
ABSOLUTE EOS #: 0.37 K/UL (ref 0–0.44)
ABSOLUTE IMMATURE GRANULOCYTE: 0.07 K/UL (ref 0–0.3)
ABSOLUTE LYMPH #: 2.36 K/UL (ref 1.1–3.7)
ABSOLUTE MONO #: 0.32 K/UL (ref 0.1–1.2)
ALBUMIN SERPL-MCNC: 4.1 G/DL (ref 3.5–5.2)
ALBUMIN/GLOBULIN RATIO: 1.1 (ref 1–2.5)
ALP BLD-CCNC: 156 U/L (ref 35–104)
ALT SERPL-CCNC: 15 U/L (ref 5–33)
AMYLASE: 76 U/L (ref 28–100)
ANION GAP SERPL CALCULATED.3IONS-SCNC: 13 MMOL/L (ref 9–17)
AST SERPL-CCNC: 19 U/L
BASOPHILS # BLD: 0 % (ref 0–2)
BASOPHILS ABSOLUTE: 0.03 K/UL (ref 0–0.2)
BILIRUB SERPL-MCNC: 0.36 MG/DL (ref 0.3–1.2)
BNP INTERPRETATION: ABNORMAL
BUN BLDV-MCNC: 21 MG/DL (ref 6–20)
BUN/CREAT BLD: ABNORMAL (ref 9–20)
CALCIUM SERPL-MCNC: 9.4 MG/DL (ref 8.6–10.4)
CHLORIDE BLD-SCNC: 105 MMOL/L (ref 98–107)
CO2: 24 MMOL/L (ref 20–31)
CREAT SERPL-MCNC: 1.14 MG/DL (ref 0.5–0.9)
D-DIMER QUANTITATIVE: 0.88 MG/L FEU
DIFFERENTIAL TYPE: ABNORMAL
EOSINOPHILS RELATIVE PERCENT: 4 % (ref 1–4)
GFR AFRICAN AMERICAN: 60 ML/MIN
GFR NON-AFRICAN AMERICAN: 49 ML/MIN
GFR SERPL CREATININE-BSD FRML MDRD: ABNORMAL ML/MIN/{1.73_M2}
GFR SERPL CREATININE-BSD FRML MDRD: ABNORMAL ML/MIN/{1.73_M2}
GLUCOSE BLD-MCNC: 91 MG/DL (ref 70–99)
HCT VFR BLD CALC: 36.8 % (ref 36.3–47.1)
HEMOGLOBIN: 11.2 G/DL (ref 11.9–15.1)
IMMATURE GRANULOCYTES: 1 %
INR BLD: 0.9
LACTIC ACID, SEPSIS WHOLE BLOOD: 1.6 MMOL/L (ref 0.5–1.9)
LACTIC ACID, SEPSIS: NORMAL MMOL/L (ref 0.5–1.9)
LIPASE: 18 U/L (ref 13–60)
LYMPHOCYTES # BLD: 27 % (ref 24–43)
MCH RBC QN AUTO: 27.1 PG (ref 25.2–33.5)
MCHC RBC AUTO-ENTMCNC: 30.4 G/DL (ref 28.4–34.8)
MCV RBC AUTO: 88.9 FL (ref 82.6–102.9)
MONOCYTES # BLD: 4 % (ref 3–12)
NRBC AUTOMATED: 0 PER 100 WBC
PARTIAL THROMBOPLASTIN TIME: 23.6 SEC (ref 20.5–30.5)
PDW BLD-RTO: 16.5 % (ref 11.8–14.4)
PLATELET # BLD: 298 K/UL (ref 138–453)
PLATELET ESTIMATE: ABNORMAL
PMV BLD AUTO: 10.3 FL (ref 8.1–13.5)
POTASSIUM SERPL-SCNC: 4 MMOL/L (ref 3.7–5.3)
PRO-BNP: 676 PG/ML
PROTHROMBIN TIME: 9.4 SEC (ref 9–12)
RBC # BLD: 4.14 M/UL (ref 3.95–5.11)
RBC # BLD: ABNORMAL 10*6/UL
SEG NEUTROPHILS: 64 % (ref 36–65)
SEGMENTED NEUTROPHILS ABSOLUTE COUNT: 5.52 K/UL (ref 1.5–8.1)
SODIUM BLD-SCNC: 142 MMOL/L (ref 135–144)
TOTAL PROTEIN: 8 G/DL (ref 6.4–8.3)
TROPONIN INTERP: ABNORMAL
TROPONIN T: ABNORMAL NG/ML
TROPONIN, HIGH SENSITIVITY: 21 NG/L (ref 0–14)
WBC # BLD: 8.7 K/UL (ref 3.5–11.3)
WBC # BLD: ABNORMAL 10*3/UL

## 2020-11-30 PROCEDURE — 85730 THROMBOPLASTIN TIME PARTIAL: CPT

## 2020-11-30 PROCEDURE — 99283 EMERGENCY DEPT VISIT LOW MDM: CPT

## 2020-11-30 PROCEDURE — 96375 TX/PRO/DX INJ NEW DRUG ADDON: CPT

## 2020-11-30 PROCEDURE — 96372 THER/PROPH/DIAG INJ SC/IM: CPT

## 2020-11-30 PROCEDURE — 96374 THER/PROPH/DIAG INJ IV PUSH: CPT

## 2020-11-30 PROCEDURE — 82728 ASSAY OF FERRITIN: CPT

## 2020-11-30 PROCEDURE — 85025 COMPLETE CBC W/AUTO DIFF WBC: CPT

## 2020-11-30 PROCEDURE — 83690 ASSAY OF LIPASE: CPT

## 2020-11-30 PROCEDURE — 84484 ASSAY OF TROPONIN QUANT: CPT

## 2020-11-30 PROCEDURE — 71045 X-RAY EXAM CHEST 1 VIEW: CPT

## 2020-11-30 PROCEDURE — 87040 BLOOD CULTURE FOR BACTERIA: CPT

## 2020-11-30 PROCEDURE — 83880 ASSAY OF NATRIURETIC PEPTIDE: CPT

## 2020-11-30 PROCEDURE — 83605 ASSAY OF LACTIC ACID: CPT

## 2020-11-30 PROCEDURE — 83540 ASSAY OF IRON: CPT

## 2020-11-30 PROCEDURE — 99285 EMERGENCY DEPT VISIT HI MDM: CPT

## 2020-11-30 PROCEDURE — 83550 IRON BINDING TEST: CPT

## 2020-11-30 PROCEDURE — 85610 PROTHROMBIN TIME: CPT

## 2020-11-30 PROCEDURE — 82150 ASSAY OF AMYLASE: CPT

## 2020-11-30 PROCEDURE — 93005 ELECTROCARDIOGRAM TRACING: CPT | Performed by: STUDENT IN AN ORGANIZED HEALTH CARE EDUCATION/TRAINING PROGRAM

## 2020-11-30 PROCEDURE — 87205 SMEAR GRAM STAIN: CPT

## 2020-11-30 PROCEDURE — 85379 FIBRIN DEGRADATION QUANT: CPT

## 2020-11-30 PROCEDURE — 80053 COMPREHEN METABOLIC PANEL: CPT

## 2020-11-30 ASSESSMENT — ENCOUNTER SYMPTOMS: TACHYPNEA: 1

## 2020-11-30 NOTE — TELEPHONE ENCOUNTER
Patient called stated she is having a lot of sob, wheezing and sweating. She has been using her inhalers and nebulizer with no help. Patient would like to know what you suggest she do.  Please advise      Health Maintenance   Topic Date Due    DTaP/Tdap/Td vaccine (1 - Tdap) 06/29/1982    Breast cancer screen  06/29/2003    Shingles Vaccine (1 of 2) 06/29/2013    Colon cancer screen colonoscopy  06/29/2013    Lipid screen  07/24/2021    Annual Wellness Visit (AWV)  08/14/2021    Potassium monitoring  09/09/2021    Creatinine monitoring  09/09/2021    A1C test (Diabetic or Prediabetic)  11/19/2021    Flu vaccine  Completed    Hepatitis C screen  Completed    HIV screen  Completed    Hepatitis A vaccine  Aged Out    Hepatitis B vaccine  Aged Out    Hib vaccine  Aged Out    Meningococcal (ACWY) vaccine  Aged Out    Pneumococcal 0-64 years Vaccine  Aged Out             (applicable per patient's age: Cancer Screenings, Depression Screening, Fall Risk Screening, Immunizations)    Hemoglobin A1C (%)   Date Value   11/19/2020 6.0   07/24/2020 6.1 (H)     LDL Cholesterol (mg/dL)   Date Value   07/24/2020 95     AST (U/L)   Date Value   09/03/2020 20     ALT (U/L)   Date Value   09/03/2020 16     BUN (mg/dL)   Date Value   09/09/2020 16      (goal A1C is < 7)   (goal LDL is <100) need 30-50% reduction from baseline     BP Readings from Last 3 Encounters:   11/02/20 128/78   10/08/20 (!) 165/91   09/09/20 (!) 105/53    (goal /80)      All Future Testing planned in [facility]:  Lab Frequency Next Occurrence   CHERY DIGITAL DIAGNOSTIC W OR WO CAD BILATERAL Once 07/28/2021   T4, Free Once 11/25/2020       Next Visit Date:  Future Appointments   Date Time Provider Christiano Wren   12/11/2020 11:00 AM Deborah Leyden, APRN - [facility] [address]   12/14/2020  1:40 PM Deni Joya PA-C [address]            Patient Active Problem List:     Low back pain     Therapeutic opioid-induced constipation (OIC)     Spondylosis of lumbar region without myelopathy or radiculopathy     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)     Cervical spondylosis with radiculopathy     Status post cervical spinal fusion     Status post lumbar spinal fusion     Lumbar radiculopathy     Morbid obesity (HCC)     Chronic pain of both knees     Myalgia     Encounter for long-term opiate analgesic use     Lower respiratory tract infection due to COVID-19 virus     Hypertension     Gout     GERD (gastroesophageal reflux disease)     Chronic combined systolic and diastolic heart failure (HCC)     Other proteinuria     Nausea and vomiting

## 2020-12-01 ENCOUNTER — APPOINTMENT (OUTPATIENT)
Dept: CT IMAGING | Age: 55
End: 2020-12-01
Payer: MEDICARE

## 2020-12-01 PROBLEM — I20.89 STABLE ANGINA: Status: ACTIVE | Noted: 2020-12-01

## 2020-12-01 PROBLEM — I20.8 STABLE ANGINA (HCC): Status: ACTIVE | Noted: 2020-12-01

## 2020-12-01 PROBLEM — I50.43 ACUTE ON CHRONIC COMBINED SYSTOLIC AND DIASTOLIC CONGESTIVE HEART FAILURE (HCC): Status: ACTIVE | Noted: 2020-09-02

## 2020-12-01 LAB
-: ABNORMAL
ABSOLUTE EOS #: 0.34 K/UL (ref 0–0.44)
ABSOLUTE IMMATURE GRANULOCYTE: 0.05 K/UL (ref 0–0.3)
ABSOLUTE LYMPH #: 2.26 K/UL (ref 1.1–3.7)
ABSOLUTE MONO #: 0.47 K/UL (ref 0.1–1.2)
AMORPHOUS: ABNORMAL
ANION GAP SERPL CALCULATED.3IONS-SCNC: 13 MMOL/L (ref 9–17)
BACTERIA: ABNORMAL
BASOPHILS # BLD: 0 % (ref 0–2)
BASOPHILS ABSOLUTE: 0.04 K/UL (ref 0–0.2)
BILIRUBIN URINE: NEGATIVE
BUN BLDV-MCNC: 19 MG/DL (ref 6–20)
BUN/CREAT BLD: ABNORMAL (ref 9–20)
CALCIUM SERPL-MCNC: 9 MG/DL (ref 8.6–10.4)
CASTS UA: ABNORMAL /LPF (ref 0–8)
CHLORIDE BLD-SCNC: 103 MMOL/L (ref 98–107)
CO2: 21 MMOL/L (ref 20–31)
COLOR: YELLOW
CREAT SERPL-MCNC: 0.94 MG/DL (ref 0.5–0.9)
CRYSTALS, UA: ABNORMAL /HPF
DIFFERENTIAL TYPE: ABNORMAL
EKG ATRIAL RATE: 82 BPM
EKG ATRIAL RATE: 99 BPM
EKG P AXIS: 66 DEGREES
EKG P AXIS: 70 DEGREES
EKG P-R INTERVAL: 152 MS
EKG P-R INTERVAL: 162 MS
EKG Q-T INTERVAL: 380 MS
EKG Q-T INTERVAL: 426 MS
EKG QRS DURATION: 116 MS
EKG QRS DURATION: 118 MS
EKG QTC CALCULATION (BAZETT): 487 MS
EKG QTC CALCULATION (BAZETT): 497 MS
EKG R AXIS: -41 DEGREES
EKG R AXIS: 37 DEGREES
EKG T AXIS: 50 DEGREES
EKG T AXIS: 91 DEGREES
EKG VENTRICULAR RATE: 82 BPM
EKG VENTRICULAR RATE: 99 BPM
EOSINOPHILS RELATIVE PERCENT: 4 % (ref 1–4)
EPITHELIAL CELLS UA: ABNORMAL /HPF (ref 0–5)
FERRITIN: 57 UG/L (ref 13–150)
GFR AFRICAN AMERICAN: >60 ML/MIN
GFR NON-AFRICAN AMERICAN: >60 ML/MIN
GFR SERPL CREATININE-BSD FRML MDRD: ABNORMAL ML/MIN/{1.73_M2}
GFR SERPL CREATININE-BSD FRML MDRD: ABNORMAL ML/MIN/{1.73_M2}
GLUCOSE BLD-MCNC: 94 MG/DL (ref 70–99)
GLUCOSE URINE: NEGATIVE
HCT VFR BLD CALC: 36.7 % (ref 36.3–47.1)
HEMOGLOBIN: 10.9 G/DL (ref 11.9–15.1)
IMMATURE GRANULOCYTES: 1 %
IRON SATURATION: 17 % (ref 20–55)
IRON: 54 UG/DL (ref 37–145)
KETONES, URINE: ABNORMAL
LEUKOCYTE ESTERASE, URINE: NEGATIVE
LYMPHOCYTES # BLD: 25 % (ref 24–43)
MCH RBC QN AUTO: 26.6 PG (ref 25.2–33.5)
MCHC RBC AUTO-ENTMCNC: 29.7 G/DL (ref 28.4–34.8)
MCV RBC AUTO: 89.5 FL (ref 82.6–102.9)
MONOCYTES # BLD: 5 % (ref 3–12)
MUCUS: ABNORMAL
NITRITE, URINE: NEGATIVE
NRBC AUTOMATED: 0 PER 100 WBC
OTHER OBSERVATIONS UA: ABNORMAL
PDW BLD-RTO: 16.4 % (ref 11.8–14.4)
PH UA: 5.5 (ref 5–8)
PLATELET # BLD: 282 K/UL (ref 138–453)
PLATELET ESTIMATE: ABNORMAL
PMV BLD AUTO: 10.4 FL (ref 8.1–13.5)
POTASSIUM SERPL-SCNC: 4.5 MMOL/L (ref 3.7–5.3)
PROTEIN UA: ABNORMAL
RBC # BLD: 4.1 M/UL (ref 3.95–5.11)
RBC # BLD: ABNORMAL 10*6/UL
RBC UA: ABNORMAL /HPF (ref 0–4)
RENAL EPITHELIAL, UA: ABNORMAL /HPF
SEG NEUTROPHILS: 65 % (ref 36–65)
SEGMENTED NEUTROPHILS ABSOLUTE COUNT: 5.8 K/UL (ref 1.5–8.1)
SODIUM BLD-SCNC: 137 MMOL/L (ref 135–144)
SPECIFIC GRAVITY UA: 1.03 (ref 1–1.03)
TOTAL IRON BINDING CAPACITY: 320 UG/DL (ref 250–450)
TRICHOMONAS: ABNORMAL
TROPONIN INTERP: ABNORMAL
TROPONIN T: ABNORMAL NG/ML
TROPONIN, HIGH SENSITIVITY: 20 NG/L (ref 0–14)
TURBIDITY: CLEAR
UNSATURATED IRON BINDING CAPACITY: 266 UG/DL (ref 112–347)
URINE HGB: NEGATIVE
UROBILINOGEN, URINE: NORMAL
WBC # BLD: 9 K/UL (ref 3.5–11.3)
WBC # BLD: ABNORMAL 10*3/UL
WBC UA: ABNORMAL /HPF (ref 0–5)
YEAST: ABNORMAL

## 2020-12-01 PROCEDURE — 6370000000 HC RX 637 (ALT 250 FOR IP): Performed by: STUDENT IN AN ORGANIZED HEALTH CARE EDUCATION/TRAINING PROGRAM

## 2020-12-01 PROCEDURE — 81001 URINALYSIS AUTO W/SCOPE: CPT

## 2020-12-01 PROCEDURE — 6360000002 HC RX W HCPCS: Performed by: STUDENT IN AN ORGANIZED HEALTH CARE EDUCATION/TRAINING PROGRAM

## 2020-12-01 PROCEDURE — 82607 VITAMIN B-12: CPT

## 2020-12-01 PROCEDURE — 71260 CT THORAX DX C+: CPT

## 2020-12-01 PROCEDURE — 96372 THER/PROPH/DIAG INJ SC/IM: CPT

## 2020-12-01 PROCEDURE — 2500000003 HC RX 250 WO HCPCS: Performed by: STUDENT IN AN ORGANIZED HEALTH CARE EDUCATION/TRAINING PROGRAM

## 2020-12-01 PROCEDURE — 94640 AIRWAY INHALATION TREATMENT: CPT

## 2020-12-01 PROCEDURE — 96374 THER/PROPH/DIAG INJ IV PUSH: CPT

## 2020-12-01 PROCEDURE — 85025 COMPLETE CBC W/AUTO DIFF WBC: CPT

## 2020-12-01 PROCEDURE — 93005 ELECTROCARDIOGRAM TRACING: CPT | Performed by: STUDENT IN AN ORGANIZED HEALTH CARE EDUCATION/TRAINING PROGRAM

## 2020-12-01 PROCEDURE — 99223 1ST HOSP IP/OBS HIGH 75: CPT | Performed by: INTERNAL MEDICINE

## 2020-12-01 PROCEDURE — 96375 TX/PRO/DX INJ NEW DRUG ADDON: CPT

## 2020-12-01 PROCEDURE — 82746 ASSAY OF FOLIC ACID SERUM: CPT

## 2020-12-01 PROCEDURE — 87086 URINE CULTURE/COLONY COUNT: CPT

## 2020-12-01 PROCEDURE — 6360000004 HC RX CONTRAST MEDICATION: Performed by: STUDENT IN AN ORGANIZED HEALTH CARE EDUCATION/TRAINING PROGRAM

## 2020-12-01 PROCEDURE — 80048 BASIC METABOLIC PNL TOTAL CA: CPT

## 2020-12-01 RX ORDER — PROMETHAZINE HYDROCHLORIDE 12.5 MG/1
12.5 TABLET ORAL EVERY 6 HOURS PRN
Status: DISCONTINUED | OUTPATIENT
Start: 2020-12-01 | End: 2020-12-02 | Stop reason: HOSPADM

## 2020-12-01 RX ORDER — SODIUM CHLORIDE 0.9 % (FLUSH) 0.9 %
10 SYRINGE (ML) INJECTION EVERY 12 HOURS SCHEDULED
Status: DISCONTINUED | OUTPATIENT
Start: 2020-12-01 | End: 2020-12-02 | Stop reason: HOSPADM

## 2020-12-01 RX ORDER — SUCRALFATE 1 G/1
1 TABLET ORAL EVERY 12 HOURS SCHEDULED
Status: DISCONTINUED | OUTPATIENT
Start: 2020-12-01 | End: 2020-12-02 | Stop reason: HOSPADM

## 2020-12-01 RX ORDER — ACETAMINOPHEN 325 MG/1
650 TABLET ORAL EVERY 6 HOURS PRN
Status: DISCONTINUED | OUTPATIENT
Start: 2020-12-01 | End: 2020-12-02 | Stop reason: HOSPADM

## 2020-12-01 RX ORDER — BUPROPION HYDROCHLORIDE 150 MG/1
150 TABLET ORAL EVERY MORNING
Status: CANCELLED | OUTPATIENT
Start: 2020-12-01

## 2020-12-01 RX ORDER — MONTELUKAST SODIUM 10 MG/1
10 TABLET ORAL NIGHTLY
Status: DISCONTINUED | OUTPATIENT
Start: 2020-12-01 | End: 2020-12-02 | Stop reason: HOSPADM

## 2020-12-01 RX ORDER — SODIUM CHLORIDE 0.9 % (FLUSH) 0.9 %
10 SYRINGE (ML) INJECTION PRN
Status: DISCONTINUED | OUTPATIENT
Start: 2020-12-01 | End: 2020-12-02 | Stop reason: HOSPADM

## 2020-12-01 RX ORDER — FUROSEMIDE 10 MG/ML
20 INJECTION INTRAMUSCULAR; INTRAVENOUS 2 TIMES DAILY
Status: CANCELLED | OUTPATIENT
Start: 2020-12-01

## 2020-12-01 RX ORDER — OXYCODONE AND ACETAMINOPHEN 7.5; 325 MG/1; MG/1
1 TABLET ORAL EVERY 6 HOURS PRN
Status: CANCELLED | OUTPATIENT
Start: 2020-12-01

## 2020-12-01 RX ORDER — ONDANSETRON 2 MG/ML
4 INJECTION INTRAMUSCULAR; INTRAVENOUS EVERY 6 HOURS PRN
Status: DISCONTINUED | OUTPATIENT
Start: 2020-12-01 | End: 2020-12-02 | Stop reason: HOSPADM

## 2020-12-01 RX ORDER — LEVOTHYROXINE SODIUM 0.03 MG/1
25 TABLET ORAL DAILY
Status: DISCONTINUED | OUTPATIENT
Start: 2020-12-01 | End: 2020-12-02 | Stop reason: HOSPADM

## 2020-12-01 RX ORDER — BUMETANIDE 0.25 MG/ML
2 INJECTION, SOLUTION INTRAMUSCULAR; INTRAVENOUS ONCE
Status: COMPLETED | OUTPATIENT
Start: 2020-12-01 | End: 2020-12-01

## 2020-12-01 RX ORDER — OXYCODONE HYDROCHLORIDE AND ACETAMINOPHEN 5; 325 MG/1; MG/1
1 TABLET ORAL ONCE
Status: COMPLETED | OUTPATIENT
Start: 2020-12-01 | End: 2020-12-01

## 2020-12-01 RX ORDER — OXYCODONE HYDROCHLORIDE AND ACETAMINOPHEN 5; 325 MG/1; MG/1
1 TABLET ORAL EVERY 6 HOURS PRN
Status: DISCONTINUED | OUTPATIENT
Start: 2020-12-01 | End: 2020-12-02 | Stop reason: ALTCHOICE

## 2020-12-01 RX ORDER — CLOPIDOGREL BISULFATE 75 MG/1
75 TABLET ORAL DAILY
Status: DISCONTINUED | OUTPATIENT
Start: 2020-12-01 | End: 2020-12-02 | Stop reason: HOSPADM

## 2020-12-01 RX ORDER — ISOSORBIDE DINITRATE 10 MG/1
10 TABLET ORAL 2 TIMES DAILY
Status: DISCONTINUED | OUTPATIENT
Start: 2020-12-01 | End: 2020-12-02 | Stop reason: HOSPADM

## 2020-12-01 RX ORDER — ALBUTEROL SULFATE 2.5 MG/3ML
2.5 SOLUTION RESPIRATORY (INHALATION) EVERY 6 HOURS PRN
Status: DISCONTINUED | OUTPATIENT
Start: 2020-12-01 | End: 2020-12-02 | Stop reason: HOSPADM

## 2020-12-01 RX ORDER — DULOXETIN HYDROCHLORIDE 30 MG/1
60 CAPSULE, DELAYED RELEASE ORAL DAILY
Status: DISCONTINUED | OUTPATIENT
Start: 2020-12-01 | End: 2020-12-02 | Stop reason: HOSPADM

## 2020-12-01 RX ORDER — ATORVASTATIN CALCIUM 80 MG/1
80 TABLET, FILM COATED ORAL DAILY
Status: DISCONTINUED | OUTPATIENT
Start: 2020-12-01 | End: 2020-12-02 | Stop reason: HOSPADM

## 2020-12-01 RX ORDER — ALLOPURINOL 300 MG/1
300 TABLET ORAL DAILY
Status: DISCONTINUED | OUTPATIENT
Start: 2020-12-01 | End: 2020-12-02 | Stop reason: HOSPADM

## 2020-12-01 RX ORDER — IPRATROPIUM BROMIDE AND ALBUTEROL SULFATE 2.5; .5 MG/3ML; MG/3ML
1 SOLUTION RESPIRATORY (INHALATION) EVERY 4 HOURS PRN
Status: DISCONTINUED | OUTPATIENT
Start: 2020-12-01 | End: 2020-12-02 | Stop reason: HOSPADM

## 2020-12-01 RX ORDER — OXYBUTYNIN CHLORIDE 5 MG/1
1 TABLET ORAL 2 TIMES DAILY
Status: CANCELLED | OUTPATIENT
Start: 2020-12-01

## 2020-12-01 RX ORDER — POLYETHYLENE GLYCOL 3350 17 G/17G
17 POWDER, FOR SOLUTION ORAL DAILY PRN
Status: DISCONTINUED | OUTPATIENT
Start: 2020-12-01 | End: 2020-12-02 | Stop reason: HOSPADM

## 2020-12-01 RX ORDER — SPIRONOLACTONE 25 MG/1
25 TABLET ORAL DAILY
Status: CANCELLED | OUTPATIENT
Start: 2020-12-01

## 2020-12-01 RX ORDER — ACETAMINOPHEN 650 MG/1
650 SUPPOSITORY RECTAL EVERY 6 HOURS PRN
Status: DISCONTINUED | OUTPATIENT
Start: 2020-12-01 | End: 2020-12-02 | Stop reason: HOSPADM

## 2020-12-01 RX ORDER — ACETAMINOPHEN 325 MG/1
650 TABLET ORAL EVERY 4 HOURS PRN
Status: DISCONTINUED | OUTPATIENT
Start: 2020-12-01 | End: 2020-12-02 | Stop reason: HOSPADM

## 2020-12-01 RX ORDER — BUDESONIDE AND FORMOTEROL FUMARATE DIHYDRATE 80; 4.5 UG/1; UG/1
2 AEROSOL RESPIRATORY (INHALATION) 2 TIMES DAILY
Status: DISCONTINUED | OUTPATIENT
Start: 2020-12-01 | End: 2020-12-02 | Stop reason: HOSPADM

## 2020-12-01 RX ORDER — METOPROLOL TARTRATE 50 MG/1
50 TABLET, FILM COATED ORAL 2 TIMES DAILY
Status: DISCONTINUED | OUTPATIENT
Start: 2020-12-01 | End: 2020-12-02 | Stop reason: HOSPADM

## 2020-12-01 RX ADMIN — CLOPIDOGREL 75 MG: 75 TABLET, FILM COATED ORAL at 09:16

## 2020-12-01 RX ADMIN — TRAZODONE HYDROCHLORIDE 150 MG: 100 TABLET ORAL at 21:07

## 2020-12-01 RX ADMIN — ONDANSETRON 4 MG: 2 INJECTION INTRAMUSCULAR; INTRAVENOUS at 12:03

## 2020-12-01 RX ADMIN — OXYCODONE HYDROCHLORIDE AND ACETAMINOPHEN 1 TABLET: 5; 325 TABLET ORAL at 01:41

## 2020-12-01 RX ADMIN — OXYCODONE HYDROCHLORIDE AND ACETAMINOPHEN 1 TABLET: 5; 325 TABLET ORAL at 16:01

## 2020-12-01 RX ADMIN — METOPROLOL TARTRATE 50 MG: 50 TABLET, FILM COATED ORAL at 09:21

## 2020-12-01 RX ADMIN — ALLOPURINOL 300 MG: 300 TABLET ORAL at 09:16

## 2020-12-01 RX ADMIN — BUMETANIDE 2 MG: 0.25 INJECTION INTRAMUSCULAR; INTRAVENOUS at 09:16

## 2020-12-01 RX ADMIN — BUDESONIDE AND FORMOTEROL FUMARATE DIHYDRATE 2 PUFF: 80; 4.5 AEROSOL RESPIRATORY (INHALATION) at 08:20

## 2020-12-01 RX ADMIN — IOPAMIDOL 85 ML: 755 INJECTION, SOLUTION INTRAVENOUS at 09:10

## 2020-12-01 RX ADMIN — SUCRALFATE 1 G: 1 TABLET ORAL at 09:15

## 2020-12-01 RX ADMIN — OXYCODONE HYDROCHLORIDE AND ACETAMINOPHEN 1 TABLET: 5; 325 TABLET ORAL at 21:07

## 2020-12-01 RX ADMIN — SUCRALFATE 1 G: 1 TABLET ORAL at 21:07

## 2020-12-01 RX ADMIN — MONTELUKAST SODIUM 10 MG: 10 TABLET, FILM COATED ORAL at 21:08

## 2020-12-01 RX ADMIN — ACETAMINOPHEN 650 MG: 325 TABLET ORAL at 09:15

## 2020-12-01 RX ADMIN — ENOXAPARIN SODIUM 40 MG: 40 INJECTION SUBCUTANEOUS at 09:16

## 2020-12-01 RX ADMIN — LEVOTHYROXINE SODIUM 25 MCG: 25 TABLET ORAL at 09:15

## 2020-12-01 RX ADMIN — METOPROLOL TARTRATE 50 MG: 50 TABLET, FILM COATED ORAL at 21:07

## 2020-12-01 RX ADMIN — ISOSORBIDE DINITRATE 10 MG: 10 TABLET ORAL at 09:15

## 2020-12-01 RX ADMIN — DULOXETINE HYDROCHLORIDE 60 MG: 30 CAPSULE, DELAYED RELEASE ORAL at 09:15

## 2020-12-01 RX ADMIN — ISOSORBIDE DINITRATE 10 MG: 10 TABLET ORAL at 21:08

## 2020-12-01 RX ADMIN — OXYCODONE HYDROCHLORIDE AND ACETAMINOPHEN 1 TABLET: 5; 325 TABLET ORAL at 09:15

## 2020-12-01 ASSESSMENT — ENCOUNTER SYMPTOMS
WHEEZING: 0
CONSTIPATION: 0
CHEST TIGHTNESS: 0
COLOR CHANGE: 0
BACK PAIN: 0
COUGH: 0
NAUSEA: 0
ABDOMINAL PAIN: 0
SHORTNESS OF BREATH: 1
VOMITING: 0
DIARRHEA: 0

## 2020-12-01 ASSESSMENT — PAIN SCALES - GENERAL
PAINLEVEL_OUTOF10: 7
PAINLEVEL_OUTOF10: 10
PAINLEVEL_OUTOF10: 10

## 2020-12-01 NOTE — PROGRESS NOTES
[facility]  Documentation Note                Admission Dx: Stable angina ([facility] [address] [I20.8]  Stable angina ([facility] [address] [I20.8]    Past Medical History:   has a past medical history of Arthritis, CHF (congestive heart failure) ([address] GERD (gastroesophageal reflux disease), Gout, History of heart attack, HLD (hyperlipidemia), Hypertension, Insomnia, and Osteoarthritis. Previous Testing:     ECHO 11/18/2020: EF 30-35% with global hypokinesia, grade II DD, no regurg/stenosis. ECHO 10/2/19: EF 42%, grade I DD, mild MR, trivial TR.      Prior Echo from [place] reviewed with LVEF 25-30% and global hypokinesis (Per KW's note)    Previous office/hospital visit:   MILAGROS Cedeno NP 10/3/19:   1. Atypical CP  2. Chronic systolic CHF    Plan --   Echos reviewed. Patient feeling better. No further inpatient work-up at this time. Pt. Advised to f/u with us as she does not have a cardiologist in the area yet in 2-3 weeks.     [facility]

## 2020-12-01 NOTE — ED PROVIDER NOTES
[facility]     Emergency Department     Faculty Attestation    I performed a history and physical examination of the patient and discussed management with the resident. I reviewed the residents note and agree with the documented findings and plan of care. Any areas of disagreement are noted on the chart. I was personally present for the key portions of any procedures. I have documented in the chart those procedures where I was not present during the key portions. I have reviewed the emergency nurses triage note. I agree with the chief complaint, past medical history, past surgical history, allergies, medications, social and family history as documented unless otherwise noted below. For Physician Assistant/ Nurse Practitioner cases/documentation I have personally evaluated this patient and have completed at least one if not all key elements of the E/M (history, physical exam, and MDM). Additional findings are as noted. I have personally seen and evaluated the patient. I find the patient's history and physical exam are consistent with the NP/PA documentation. I agree with the care provided, treatment rendered, disposition and follow-up plan. 68-year-old female with a history of CHF, last EF 30 to 35% on 11/18/2020 presenting with chest pressure, shortness of breath, and diaphoresis on exertion. Symptoms started on Friday, and have been worsening. She has tried breathing treatments at home without improvement. She did have COVID-19 several months ago, feels that this is different. Exam:  General: Laying on the bed, awake, alert and in no acute distress. Mildly diaphoretic  CV: normal rate and regular rhythm  Lungs: clear to auscultation bilaterally- no wheezes, rales or rhonchi, normal air movement, no respiratory distress  Abdomen: soft, non-tender, non-distended  Extremities: No significant swelling    Plan:  Cardiac work-up including CBC, troponin, BNP.   Troponin at baseline, BNP approximately 600, which is elevated for the patient but not the highest it has ever been. Mild SUSU with a creatinine of 1.14 from a baseline of 0.9. D-dimer slightly elevated at 0.88. Will obtain CT PE to rule this out. Patient does not become hypoxic with ambulation, but heart rate spikes into the 130s. Will plan to admit for decompensated heart failure, anginal equivalent.   Patient signed out to overnight physician pending CT PE and admission        Rachel Live MD   Attending Emergency  Physician    (Please note that portions of this note were completed with a voice recognition program. Efforts were made to edit the dictations but occasionally words are mis-transcribed.)             Rachel Live MD  12/01/20 0134

## 2020-12-01 NOTE — FLOWSHEET NOTE
SPIRITUAL CARE DEPARTMENT - [address]  PROGRESS NOTE    Shift date: 11.30.2020  Shift day: Monday   Shift # 2    Room # 20/20   Name: Karl Arroyo            Age: 60 y.o. Gender: female          Evangelical: Unknown   Place of Cheondoism: Unknown    Referral: Routine Visit    Admit Date & Time: 11/30/2020  9:34 PM    PATIENT/EVENT DESCRIPTION:  Karl Arroyo is a 60 y.o. female having trouble breathing    SPIRITUAL ASSESSMENT/INTERVENTION:  Patient appears to be calm and coping but concerned about her health. States that she had Covid-19 a couple months back. Says that she is having trouble breathing and that she has not been well since she had Covid. Patient states that she has body aches after getting sick. Patient has family for support and they know that she at the hospital.        SPIRITUAL CARE FOLLOW-UP PLAN:  Chaplains will remain available to offer spiritual and emotional support as needed. Electronically signed by Jamie Negron on 11/30/2020 at 10:18 PM.  [facility]  [phone]       11/30/20 2145   Encounter Summary   Services provided to: Patient   Referral/Consult From: [facility] Family members   Continue Visiting   ([phone])   Complexity of Encounter Moderate   Length of Encounter 15 minutes   Spiritual Assessment Completed Yes   Routine   Type Initial   Assessment Calm; Approachable;Coping   Intervention Active listening;Explored feelings, thoughts, concerns;Explored coping resources; Discussed illness/injury and it's impact   Outcome Expressed gratitude;Expressed feelings/needs/concerns;Engaged in conversation     Electronically signed by Kaia Sims on 11/30/2020 at 10:18 PM

## 2020-12-01 NOTE — H&P
[facility]     Department of Internal Medicine - Staff Internal Medicine Teaching Service          ADMISSION NOTE/HISTORY AND PHYSICAL EXAMINATION   Date: 12/1/2020  Patient Name: Benson Samaniego  Date of admission: 11/30/2020  9:34 PM  YOB: 1965  PCP: Briana Richardson PA-C  History Obtained From:  patient    CHIEF COMPLAINT     Chief complaint: SOB    HISTORY OF PRESENTING ILLNESS     The patient is a pleasant 60 y.o. female with background history of coronary artery disease, heart failure and hypertension presents with a chief complaint of shortness of breath. According to the patient she started to have shortness of breath from Friday. Initially she was feeling short of breath and minimal exertion like going from bedroom to the restroom but slowly and gradually it got worse. On Saturday she was feeling short of breath even going to the living room and she started using 4 pillows instead of 3 at nighttime. Patient also have frequent nighttime awakening and ankle swelling. Patient denies any fever, chills and her cough. No chest pain and palpitations. Patient is compliant with her medications and there is no change in her diet recently. Last echocardiogram was done on 11/18/2020 which showed LVEF of 30 to 35% with global hypokinesia and evidence of grade 2 diastolic dysfunction.         Review of Systems:  General ROS: Completed and except as mentioned above were negative   HEENT ROS: Completed and except as mentioned above were negative   Allergy and Immunology ROS:  Completed and except as mentioned above were negative  Hematological and Lymphatic ROS:  Completed and except as mentioned above were negative  Respiratory ROS:  Completed and except as mentioned above were negative  Cardiovascular ROS:  Completed and except as mentioned above were negative  Gastrointestinal ROS: Completed and except as mentioned above were negative  Genito-Urinary ROS: Completed and except as mentioned above were negative  Musculoskeletal ROS:  Completed and except as mentioned above were negative  Neurological ROS:  Completed and except as mentioned above were negative  Skin & Dermatological ROS:  Completed and except as mentioned above were negative  Psychological ROS:  Completed and except as mentioned above were negative    PAST MEDICAL HISTORY     Past Medical History:   Diagnosis Date    Arthritis     CHF (congestive heart failure) (HCC)     GERD (gastroesophageal reflux disease)     Gout 10/2019    History of heart attack     HLD (hyperlipidemia)     Hypertension     Insomnia     Osteoarthritis        PAST SURGICAL HISTORY     Past Surgical History:   Procedure Laterality Date    BACK SURGERY      CHOLECYSTECTOMY, LAPAROSCOPIC      HYSTERECTOMY, TOTAL ABDOMINAL      KNEE ARTHROSCOPY Right     KNEE SURGERY Left 2009    NECK SURGERY      SHOULDER SURGERY Right 2009       ALLERGIES     Entresto [sacubitril-valsartan]; Food; Gabapentin; and Tetracyclines & related    MEDICATIONS PRIOR TO ADMISSION     Prior to Admission medications    Medication Sig Start Date End Date Taking? Authorizing Provider   oxyCODONE-acetaminophen (PERCOCET) 7.5-325 MG per tablet Take 1 tablet by mouth every 6 hours as needed for Pain for up to 30 days.  Intended supply: 30 days 11/14/20 12/14/20  ASA Kamara CNP   SYMBICORT 80-4.5 MCG/ACT AERO Inhale 2 puffs into the lungs 2 times daily 11/2/20 12/2/20  Evan Garcia PA-C   ibuprofen (ADVIL;MOTRIN) 800 MG tablet TAKE 1 TABLET BY MOUTH EVERY 8 HOURS AS NEEDED FOR PAIN 10/17/20   Evan Garcia PA-C   albuterol (PROVENTIL) (2.5 MG/3ML) 0.083% nebulizer solution Take 3 mLs by nebulization every 6 hours as needed for Wheezing 10/15/20   JAKUB Julian-VIVIAN   albuterol sulfate  (90 Base) MCG/ACT inhaler Inhale 2 puffs into the lungs every 4 hours as needed for Shortness of Breath 10/8/20   Ban Rhymes Jomar Vela PA-C   sucralfate (CARAFATE) 1 GM tablet Take 1 tablet in AM, 2 tablet in evening daily 9/15/20   Satinder Phillips PA-C   levothyroxine (SYNTHROID) 25 MCG tablet Take 1 tablet by mouth daily 9/15/20   Satinder PhillipsTHOMAS reynolds   traZODone (DESYREL) 150 MG tablet Take 1 tablet by mouth nightly 9/15/20   Satinder Phillips PA-C   isosorbide dinitrate (ISORDIL) 10 MG tablet Take 1 tablet by mouth 2 times daily 9/15/20   Satinder Phillips PA-C   bumetanide (BUMEX) 1 MG tablet Take 1 tablet by mouth daily 9/10/20   Kamilla Mendiola DO   SUPREP BOWEL PREP KIT 17.5-3.13-1.6 GM/177ML SOLN Take as directed - dispense one Kit 8/26/20   Maribel Aguillon MD   metoprolol tartrate (LOPRESSOR) 50 MG tablet Take 1 tablet by mouth 2 times daily 8/13/20   Satinder Phillips PA-C   atorvastatin (LIPITOR) 80 MG tablet Take 1 tablet by mouth daily 8/13/20   Satinder Phillips PA-C   DULoxetine (CYMBALTA) 60 MG extended release capsule Take 1 capsule by mouth daily 8/13/20   Sarkise PhillipsTHOMAS reynolds   buPROPion (WELLBUTRIN XL) 150 MG extended release tablet Take 1 tablet by mouth every morning 8/13/20   Satinder Phillips PA-C   spironolactone (ALDACTONE) 25 MG tablet Take 1 tablet by mouth daily 8/13/20   Satinder Phillips PA-C   clopidogrel (PLAVIX) 75 MG tablet Take 1 tablet by mouth daily 8/13/20   Satinder Phillips PA-C   pantoprazole (PROTONIX) 40 MG tablet Take 1 tablet by mouth daily 8/13/20   Satinder Phillips PA-C   montelukast (SINGULAIR) 10 MG tablet Take 1 tablet by mouth nightly 8/13/20   Satinder Phillips PA-C   allopurinol (ZYLOPRIM) 300 MG tablet Take 1 tablet by mouth daily 8/13/20   Satinder Phillips PA-C   Handicap Placard MISC by Does not apply route 2 years 8/13/20   Satinder Phillips PA-C   ELDERBERRY PO Take by mouth    Historical Provider, MD   oxybutynin (DITROPAN) 5 MG tablet TAKE 1 TABLET BY MOUTH TWICE DAILY 10/26/19   Satinder Phillips PA-C   Respiratory Therapy Supplies (NEBULIZER COMPRESSOR) KIT Provide insurance covered nebulizer, use daily as needed 19  Sergio Baez PA-C   polyethylene glycol [facility]) packet polyethylene glycol 3350 17 gram/dose oral powder    Historical Provider, MD       SOCIAL HISTORY     Tobacco: Never  Alcohol: Occasionally  Illicits: None  Occupation:    FAMILY HISTORY     Family History   Problem Relation Age of Onset    Other Mother     Diabetes Mother     Heart Disease Mother     Arthritis Mother     Kidney Disease Mother     Lupus Mother     Arthritis Sister     Diabetes Brother     Asthma Brother     Cancer Maternal Grandfather     Hypertension Sister     Breast Cancer Sister         35s    Breast Cancer Niece         30-35       PHYSICAL EXAM     Vitals: BP (!) 156/103   Pulse 84   Temp 98.4 °F (36.9 °C) (Oral)   Resp 23   SpO2 98%   Tmax: Temp (24hrs), Av °F (36.7 °C), Min:97.5 °F (36.4 °C), Max:98.4 °F (36.9 °C)    Last Body weight:   Wt Readings from Last 3 Encounters:   20 (!) 350 lb (158.8 kg)   20 (!) 353 lb (160.1 kg)   20 (!) 350 lb (158.8 kg)     Body Mass Index : There is no height or weight on file to calculate BMI. PHYSICAL EXAMINATION:  Constitutional: This is a well developed, well nourished, Greater than 36 - Morbid Obesity / Extreme Obesity / Grade III 60y.o. year old female who is alert, oriented, cooperative and in no apparent distress. Head:normocephalic and atraumatic. EENT:  PERRLA. No conjunctival injections. Septum was midline, mucosa was without erythema, exudates or cobblestoning. No thrush was noted. Neck: Supple without thyromegaly. No elevated JVP. Trachea was midline. Respiratory: Chest was symmetrical without dullness to percussion. Breath sounds bilaterally were clear to auscultation. There were no wheezes, rhonchi or rales. There is no intercostal retraction or use of accessory muscles. No egophony noted.    Cardiovascular: Regular without murmur, clicks, gallops or rubs.   Abdomen: Slightly rounded and soft without organomegaly. No rebound, rigidity or guarding was appreciated. Lymphatic: No lymphadenopathy. Musculoskeletal: Normal curvature of the spine. No gross muscle weakness. Extremities:  No ulcerations, tenderness, varicosities or erythema. Muscle size, tone and strength are normal.  No involuntary movements are noted. + lower extremity edema,   Skin:  Warm and dry. Good color, turgor and pigmentation. No lesions or scars.   No cyanosis or clubbing  Neurological/Psychiatric: The patient's general behavior, level of consciousness, thought content and emotional status is normal.          INVESTIGATIONS     Laboratory Testing:     Recent Results (from the past 24 hour(s))   CBC Auto Differential    Collection Time: 11/30/20 10:28 PM   Result Value Ref Range    WBC 8.7 3.5 - 11.3 k/uL    RBC 4.14 3.95 - 5.11 m/uL    Hemoglobin 11.2 (L) 11.9 - 15.1 g/dL    Hematocrit 36.8 36.3 - 47.1 %    MCV 88.9 82.6 - 102.9 fL    MCH 27.1 25.2 - 33.5 pg    MCHC 30.4 28.4 - 34.8 g/dL    RDW 16.5 (H) 11.8 - 14.4 %    Platelets 526 615 - 853 k/uL    MPV 10.3 8.1 - 13.5 fL    NRBC Automated 0.0 0.0 per 100 WBC    Differential Type NOT REPORTED     Seg Neutrophils 64 36 - 65 %    Lymphocytes 27 24 - 43 %    Monocytes 4 3 - 12 %    Eosinophils % 4 1 - 4 %    Basophils 0 0 - 2 %    Immature Granulocytes 1 (H) 0 %    Segs Absolute 5.52 1.50 - 8.10 k/uL    Absolute Lymph # 2.36 1.10 - 3.70 k/uL    Absolute Mono # 0.32 0.10 - 1.20 k/uL    Absolute Eos # 0.37 0.00 - 0.44 k/uL    Basophils Absolute 0.03 0.00 - 0.20 k/uL    Absolute Immature Granulocyte 0.07 0.00 - 0.30 k/uL    WBC Morphology NOT REPORTED     RBC Morphology ANISOCYTOSIS PRESENT     Platelet Estimate NOT REPORTED    Troponin    Collection Time: 11/30/20 10:28 PM   Result Value Ref Range    Troponin, High Sensitivity 21 (H) 0 - 14 ng/L    Troponin T NOT REPORTED <0.03 ng/mL    Troponin Interp NOT REPORTED    Brain Natriuretic Peptide    Collection Time: 11/30/20 10:28 PM   Result Value Ref Range    Pro- (H) <300 pg/mL    BNP Interpretation Pro-BNP Reference Range:    Comprehensive Metabolic Panel w/ Reflex to MG    Collection Time: 11/30/20 10:28 PM   Result Value Ref Range    Glucose 91 70 - 99 mg/dL    BUN 21 (H) 6 - 20 mg/dL    CREATININE 1.14 (H) 0.50 - 0.90 mg/dL    Bun/Cre Ratio NOT REPORTED 9 - 20    Calcium 9.4 8.6 - 10.4 mg/dL    Sodium 142 135 - 144 mmol/L    Potassium 4.0 3.7 - 5.3 mmol/L    Chloride 105 98 - 107 mmol/L    CO2 24 20 - 31 mmol/L    Anion Gap 13 9 - 17 mmol/L    Alkaline Phosphatase 156 (H) 35 - 104 U/L    ALT 15 5 - 33 U/L    AST 19 <32 U/L    Total Bilirubin 0.36 0.3 - 1.2 mg/dL    Total Protein 8.0 6.4 - 8.3 g/dL    Alb 4.1 3.5 - 5.2 g/dL    Albumin/Globulin Ratio 1.1 1.0 - 2.5    GFR Non- 49 (L) >60 mL/min    GFR African American 60 (L) >60 mL/min    GFR Comment          GFR Staging NOT REPORTED    Lactate, Sepsis    Collection Time: 11/30/20 10:28 PM   Result Value Ref Range    Lactic Acid, Sepsis NOT REPORTED 0.5 - 1.9 mmol/L    Lactic Acid, Sepsis, Whole Blood 1.6 0.5 - 1.9 mmol/L   APTT    Collection Time: 11/30/20 10:28 PM   Result Value Ref Range    PTT 23.6 20.5 - 30.5 sec   Protime-INR    Collection Time: 11/30/20 10:28 PM   Result Value Ref Range    Protime 9.4 9.0 - 12.0 sec    INR 0.9    Amylase    Collection Time: 11/30/20 10:28 PM   Result Value Ref Range    Amylase 76 28 - 100 U/L   Lipase    Collection Time: 11/30/20 10:28 PM   Result Value Ref Range    Lipase 18 13 - 60 U/L   D-DIMER, QUANTITATIVE    Collection Time: 11/30/20 10:28 PM   Result Value Ref Range    D-Dimer, Quant 0.88 mg/L FEU       Imaging:   Xr Chest Portable    Result Date: 11/30/2020  Mild cardiomegaly. Otherwise, unremarkable upright portable AP view of the chest.       ASSESSMENT & PLAN   1.   Acute on chronic systolic heart failure:  -Furosemide 20 mg twice daily  -Strict intake output record  -Daily weight  -Cardiology consulted by ED    2. Acute kidney injury:  -Creatinine is 1.14 with a baseline is 0.9 total  -Seems to be related to the heart failure  -If renal function gets worse with the diuretics then will decrease the dose  -Monitor daily renal function    3. Anemia:  -Baseline hemoglobin is 10.2  -Today it is 11 point  -Iron studies, folate and B12  -Monitor daily    4. Coronary artery disease:  -Patient had MI about 3 to 4 years ago  -Continue clopidogrel, Lipitor, Lopressor and isosorbide nitrate    5. Hypertension:  -Resume home medication      DVT ppx: Enoxaparin  GI ppx: Not needed    PT/OT/SW: Needs PT OT evaluation  Discharge Planning:  to help with discharge of the patient    Reba Naranjo MD  Internal Medicine Resident, PGY-1  [address], [place]  12/1/2020, 1:34 AM      Attending Physician Statement  I have discussed the case, including pertinent history and exam findings with the resident and the team.  I have seen and examined the patient and the key elements of the encounter have been performed by me. I agree with the assessment, plan and orders as documented by the resident. In Brief:  Jessie Tello is a 60 y.o. female, who is on day 0 of hospital stay, presented with Shortness of Breath,  found to have acute exacerbation of chronic systolic CHF due to non-adherent to medications. Agree with IV bumex. Counseled patient about adherence to medications.      Juana Raymundo MD   Attending Physician, Internal Medicine Residency Program  12/1/2020, 12:23 PM

## 2020-12-01 NOTE — ED TRIAGE NOTES
Pt arrived to room 20 via wheelchair from home with CO SOB. SOB started Friday and has gotten worse. Pt states she was walking to kitchen Saturday when she felt SOB get worse. Pt has hx of CHF, diagnosis of COVID in September. Pt has not taken home meds today. Pt presents w/ tachypnea 24 RR and with spo2 of 97% on ra. Pt aox4. Pt placed on monitor. EKG performed. Will continue to monitor.

## 2020-12-01 NOTE — ED PROVIDER NOTES
Faculty Sign-Out Attestation  Handoff taken on the following patient from prior Attending Physician: Helen Cadena    I was available and discussed any additional care issues that arose and coordinated the management plans with the resident(s) caring for the patient during my duty period. Any areas of disagreement with residents documentation of care or procedures are noted on the chart. I was personally present for the key portions of any/all procedures during my duty period. I have documented in the chart those procedures where I was not present during the key portions.     Sob, needing ct r/o pe, if no iv, lovenox > admit  (should have intermed or medicine admit)     Alton Altman,   Attending Physician     Alton Altman,   12/01/20 201 hospitals  Internal medicine admitted, ordering lovenox, poor iv status,   Vss, admitted per plan     Alton Altman DO  12/01/20 [id number]

## 2020-12-01 NOTE — CONSULTS
[facility]   Admission Note                 Patient's name:  [address] [place] [address] Drive Record Number: [id number]  Patient's account/billing number: [de-identified]  Patient's YOB: 1965  Age: 60 y.o. Date of Admission: 11/30/2020  9:34 PM  Date of History and Physical Examination: 12/1/2020  Primary Care Physician: Ariella Peña PA-C    Code Status: Full Code    CHIEF COMPLAINT:    Chief Complaint   Patient presents with    Shortness of Breath       HISTORY OF PRESENT ILLNESS:      History was obtained from chart review and the patient. Jeff Cordero is a 60 y.o.  female with PMH of   - HTN, HLD  - Obesity with BMI - 53.22  - GERD  - Combined systolic and diastolic congestive heart failure with EF 30 to 35% with global hypokinesia  - Covid pneumonia in September 2020 with persistent shortness of breath    Presented to ER with shortness of breath For Congestive Heart Failure  Patient presents for re-evaluation of congestive heart failure. Patient's current complaints are dyspnea, lower extremity edema, orthopnea and paroxysmal nocturnal dyspnea. She denies chest pain, chest pressure/discomfort, claudication, fatigue, irregular heart beat, near-syncope and syncope. She states she is usually compliant but missed the last 2 doses of her medications. .   She states she is compliant most of the time with her diet. .     She reports having worse symptoms of dyspnea since having COVID infection in 09/2020. However,   Symptoms started gradually worsening over 3 to 4 days as the patient missed a few of her medications. She has been having orthopnea, using >4 pillows now and PND. She does have mild chest heaviness but does not have any chest pain. Upon admission, pt was A&O, hypertensive, normal HR and afebrile. Admission troponin was  20-21, Pro BNP was 676. Labs showed no leukocytosis, no AGMA, no abnormal electrolites, did have SUSU now resolved.    TSH was 1.87    Xray showed no vascular congestion. Pt was given bumex 2 mg for 1 dose and resumed on home medications. Past Medical History:     has a past medical history of Arthritis, Bronchitis, CHF (congestive heart failure) ([address] COVID-19, GERD (gastroesophageal reflux disease), Gout, History of heart attack, HLD (hyperlipidemia), Hypertension, Insomnia, and Osteoarthritis. Past Surgical History:     has a past surgical history that includes back surgery; shoulder surgery (Right, 2009); knee surgery (Left, 2009); Cholecystectomy, laparoscopic; Knee arthroscopy (Right); Hysterectomy, total abdominal; and Neck surgery. Home Medications:    Prior to Admission medications    Medication Sig Start Date End Date Taking? Authorizing Provider   SYMBICORT 80-4.5 MCG/ACT AERO Inhale 2 puffs into the lungs 2 times daily 11/2/20 12/2/20 Yes Satinder Phillips PA-C   albuterol (PROVENTIL) (2.5 MG/3ML) 0.083% nebulizer solution Take 3 mLs by nebulization every 6 hours as needed for Wheezing 10/15/20  Yes Satinder Phillips PA-C   levothyroxine (SYNTHROID) 25 MCG tablet Take 1 tablet by mouth daily 9/15/20  Yes Satinder Phillips PA-C   metoprolol tartrate (LOPRESSOR) 50 MG tablet Take 1 tablet by mouth 2 times daily 8/13/20  Yes Satinder Phillips PA-C   atorvastatin (LIPITOR) 80 MG tablet Take 1 tablet by mouth daily 8/13/20  Yes Satinder Phillips PA-C   clopidogrel (PLAVIX) 75 MG tablet Take 1 tablet by mouth daily 8/13/20  Yes Satinder Phillips PA-C   montelukast (SINGULAIR) 10 MG tablet Take 1 tablet by mouth nightly 8/13/20  Yes Satinder Phillpis PA-C   oxyCODONE-acetaminophen (PERCOCET) 7.5-325 MG per tablet Take 1 tablet by mouth every 6 hours as needed for Pain for up to 30 days.  Intended supply: 30 days 11/14/20 12/14/20  ASA Knox - CNP   ibuprofen (ADVIL;MOTRIN) 800 MG tablet TAKE 1 TABLET BY MOUTH EVERY 8 HOURS AS NEEDED FOR PAIN 10/17/20   Satinder Phillips PA-C   albuterol sulfate  (90 Base) MCG/ACT inhaler Inhale 2 puffs into the lungs every 4 hours as needed for Shortness of Breath 10/8/20   Socorro Stephens PA-C   sucralfate (CARAFATE) 1 GM tablet Take 1 tablet in AM, 2 tablet in evening daily 9/15/20   Socorro Stephens PA-C   traZODone (DESYREL) 150 MG tablet Take 1 tablet by mouth nightly 9/15/20   Socorro Stephens PA-C   isosorbide dinitrate (ISORDIL) 10 MG tablet Take 1 tablet by mouth 2 times daily 9/15/20   Socorro Stephens PA-C   bumetanide (BUMEX) 1 MG tablet Take 1 tablet by mouth daily 9/10/20   Alisa Scott DO   SUPREP BOWEL PREP KIT 17.5-3.13-1.6 GM/177ML SOLN Take as directed - dispense one Kit 8/26/20   Yunier Gonzalez MD   DULoxetine (CYMBALTA) 60 MG extended release capsule Take 1 capsule by mouth daily 8/13/20   Socorro Stephens PA-C   buPROPion (WELLBUTRIN XL) 150 MG extended release tablet Take 1 tablet by mouth every morning 8/13/20   Socorro Stephens PA-C   spironolactone (ALDACTONE) 25 MG tablet Take 1 tablet by mouth daily 8/13/20   Socorro Stephens PA-C   pantoprazole (PROTONIX) 40 MG tablet Take 1 tablet by mouth daily 8/13/20   Socorro Stephens PA-C   allopurinol (ZYLOPRIM) 300 MG tablet Take 1 tablet by mouth daily 8/13/20   Socorro Stephens PA-C   Handicap Placard MISC by Does not apply route 2 years 8/13/20   Socorro Stephens PA-C   ELDERBERRY PO Take by mouth    Historical Provider, MD   oxybutynin (DITROPAN) 5 MG tablet TAKE 1 TABLET BY MOUTH TWICE DAILY 10/26/19   Socorro Stephens PA-C   Respiratory Therapy Supplies (NEBULIZER COMPRESSOR) KIT Provide insurance covered nebulizer, use daily as needed 9/16/19 11/12/20  Socorro Stephens PA-C   polyethylene glycol (GLYCOLAX) packet polyethylene glycol 3350 17 gram/dose oral powder    Historical Provider, MD       Allergies:   Entresto [sacubitril-valsartan]; Food; Gabapentin; and Tetracyclines & related     Social History:     reports that she has never smoked.  She has never used smokeless tobacco. She reports that she does not drink alcohol or use drugs. Family History:  family history includes Arthritis in her mother and sister; Asthma in her brother; Breast Cancer in her niece and sister; Cancer in her maternal grandfather; Diabetes in her brother and mother; Heart Disease in her mother; Hypertension in her sister; Kidney Disease in her mother; Lupus in her mother; Other in her mother. No h/o sudden cardiac death. No for premature CAD     REVIEW OF SYSTEMS:    General: Denies any fevers, chills, rigors. Reports change in energy, physical activity, weight gain. Pt sleeps using 4 pillows  HEENT: No visual disturbances, hearing disturbances, nasal drainage or neck swelling  Heart: Denies any chest pain, palpitations, has PND and orthopnea. Lungs: Denies SOB, COFFEY,  cough, wheezing or pleurisy  Abdomen: Denies abdominal pain, nausea, vomiting, constipation, diarrhea or rectal  bleeding   Extremities: Denies any leg swelling or calf tenderness  Musculo skeletal: Denies any arthralgias, joint swelling  Skin: Denies any rash/skin changes  Hem/Onc: Denies bleeding gums, swollen lymph nodes  Endo: Denies polydypsia, polyphagia  Psychiatric: Denies any mood changes, agitation or psychosis    PHYSICAL EXAM:    Physical Examination:    Temperature:  Temp: 98.4 °F (36.9 °C)  Respirations:  Resp: 12  Pulse:   Pulse: 87  BP:    BP: (!) 156/103    Constitutional and General Appearance: alert, cooperative, no distress and appears stated age  HEENT: PERRL, no cervical lymphadenopathy. No masses palpable. Normal oral mucosa  Respiratory:  · Normal excursion and expansion without use of accessory muscles  · Resp Auscultation: Good respiratory effort. No for increased work of breathing. On auscultation: Bibasilar crackles to auscultation bilaterally  Cardiovascular:  · The apical impulse is not displaced  · Heart auscultation: S1, S2 heard, no murmur, rubs or gallops.    · Jugular venous pulsation performed through both breasts in the MLO and CC projection. Computer aided detection was utilized in the interpretation of this exam. COMPARISON: 4 January 2017; 7 October 2015 HISTORY: Screening. Positive family history of breast cancer; patient's sister diagnosed with breast cancer at age 28.  2 nieces diagnosed with breast cancer in her 35s, and a grand niece diagnosed with breast cancer at age 32. 3 year history of oral contraceptive usage. Negative history of hormonal replacement therapy. No prior breast interventions. FINDINGS: The breasts are composed of scattered fibroglandular density. No skin thickening, nipple contour changes or malignant type microcalcifications are noted. A focal asymmetry is present in the outer central left breast middle depth with additional mammographic workup advised. Full workup may require ultrasonography. Focal asymmetry outer central left breast middle depth with additional mammographic workup advised. Full workup may require ultrasonography. BI-RADS 0 BIRADS: BIRADS - CATEGORY 0 Incomplete: Needs Additional Imaging Evaluation OVERALL ASSESSMENT - INCOMPLETE:NEED ADDITIONAL IMAGING EVALUATION. Last EKG: NSR, no acute ST or T wave changes. Last Echo:   ECHO 11/18/2020: EF 30-35% with global hypokinesia, grade II DD, no regurg/stenosis.      ECHO 10/2/19: EF 42%, grade I DD, mild MR, trivial TR.      Prior Echo from [place] reviewed with LVEF 25-30% and global hypokinesis    Last Stress test/ LHC: Obtain records from [place]. IMPRESSION:    Active Problems:    SUSU (acute kidney injury) ([facility]    Class 3 severe obesity due to excess calories with serious comorbidity and body mass index (BMI) of 45.0 to 49.9 in adult [facility])    Acute on chronic combined systolic and diastolic congestive heart failure (HCC)    Stable angina ([address]  Resolved Problems:    * No resolved hospital problems. *      1.  Acute on chronic systolic congestive heart failure  2. Hypertension  3. CAD - obtain records  4. SUSU    RECOMMENDATIONS:  1. Continue diuresis as per primary team - resume home medications bumex and aldactone. 2. Obtain records from [facility] for previous ischemic work up at [facility] and follow up in the [facility] office as per scheduled appointment on 12/16/20. Plan to decide on cardiac catheterization vs AICD after review of the same. 3. Maximize medical management - continue plavix, lopressor 50 mg BID, imdur 10 mg BID, lipitor 80 mg daily. Add entresto if tolerated by BP. 4. Keep electrolytes K>4 and Mg >2. Deirdre Chang MD      Department of Internal Medicine  [address] [place] [address]         12/1/2020, 12:55 PM              Attending Physician Statement  I have discussed the case of Mikal Sanz including pertinent history and exam findings with the resident. I have seen and examined the patient and the key elements of the encounter have been performed by me. I agree with the assessment, plan and orders as documented by the resident With changes made to the note.      Electronically signed by Mari Nathan MD on 12/1/2020 at 1:14 PM.    [facility]      [phone]

## 2020-12-01 NOTE — ED PROVIDER NOTES
[facility]  ED  Emergency Department Encounter  Emergency Medicine Resident     Pt Name: Yajaira Thomas  MRN: [id number]  Lyndseygfkenisha 1965  Date of evaluation: 11/30/20  PCP:  Es Mc[address]       Chief Complaint   Patient presents with    Shortness of Breath       HISTORY [place]  (Location/Symptom, Timing/Onset, Context/Setting, Quality, Duration, Modifying Factors,Severity.)      Yajaira Thomas is a 60 y.o. female who presents with shortness of breath and chest pressure. Patient states symptoms started Friday, she is feeling better later Friday and they got worse Saturday morning. Patient presents today for worsening shortness of breath and chest pressure with exertion. Patient states for years ago she thinks she had a heart attack, but did not have any stents or intervention. Patient also has a history of COPD and heart failure. History of chronic pain as well. Patient had COVID-19 in September. Patient has also had episodes of diaphoresis when ambulating. PAST MEDICAL / SURGICAL / SOCIAL / FAMILY HISTORY      has a past medical history of Arthritis, CHF (congestive heart failure) ([address] GERD (gastroesophageal reflux disease), Gout, History of heart attack, HLD (hyperlipidemia), Hypertension, Insomnia, and Osteoarthritis. has a past surgical history that includes back surgery; shoulder surgery (Right, 2009); knee surgery (Left, 2009); Cholecystectomy, laparoscopic; Knee arthroscopy (Right); Hysterectomy, total abdominal; and Neck surgery.      Social History     Socioeconomic History    Marital status: Single     Spouse name: Not on file    Number of children: Not on file    Years of education: Not on file    Highest education level: Not on file   Occupational History    Not on file   Social Needs    Financial resource strain: Not on file    Food insecurity     Worry: Not on file     Inability: Not on file   [employer] needs     Medical: Not on file     Non-medical: Not on file   Tobacco Use    Smoking status: Never Smoker    Smokeless tobacco: Never Used   Substance and Sexual Activity    Alcohol use: No    Drug use: No    Sexual activity: Not on file   Lifestyle    Physical activity     Days per week: Not on file     Minutes per session: Not on file    Stress: Not on file   Relationships    Social connections     Talks on phone: Not on file     Gets together: Not on file     Attends Yazdanism service: Not on file     Active member of club or organization: Not on file     Attends meetings of clubs or organizations: Not on file     Relationship status: Not on file    Intimate partner violence     Fear of current or ex partner: Not on file     Emotionally abused: Not on file     Physically abused: Not on file     Forced sexual activity: Not on file   Other Topics Concern    Not on file   Social History Narrative    Not on file       Family History   Problem Relation Age of Onset    Other Mother     Diabetes Mother     Heart Disease Mother     Arthritis Mother     Kidney Disease Mother     Lupus Mother     Arthritis Sister     Diabetes Brother     Asthma Brother     Cancer Maternal Grandfather     Hypertension Sister     Breast Cancer Sister         28s    Breast Cancer Niece         30-35        Allergies:  Entresto [sacubitril-valsartan]; Food; Gabapentin; and Tetracyclines & related    Home Medications:  Prior to Admission medications    Medication Sig Start Date End Date Taking?  Authorizing Provider   SYMBICORT 80-4.5 MCG/ACT AERO Inhale 2 puffs into the lungs 2 times daily 11/2/20 12/2/20 Yes Mimi Holliday PA-C   albuterol (PROVENTIL) (2.5 MG/3ML) 0.083% nebulizer solution Take 3 mLs by nebulization every 6 hours as needed for Wheezing 10/15/20  Yes Mimi Holliday PA-C   levothyroxine (SYNTHROID) 25 MCG tablet Take 1 tablet by mouth daily 9/15/20  Yes Mimi Holliday PA-C   metoprolol tartrate (LOPRESSOR) 50 MG tablet Take 1 tablet by mouth 2 times daily 8/13/20  Yes Flores Hamilton PA-C   atorvastatin (LIPITOR) 80 MG tablet Take 1 tablet by mouth daily 8/13/20  Yes Flores Hamilton PA-C   clopidogrel (PLAVIX) 75 MG tablet Take 1 tablet by mouth daily 8/13/20  Yes Flores Hamilton PA-C   montelukast (SINGULAIR) 10 MG tablet Take 1 tablet by mouth nightly 8/13/20  Yes Flores Hamilton PA-C   oxyCODONE-acetaminophen (PERCOCET) 7.5-325 MG per tablet Take 1 tablet by mouth every 6 hours as needed for Pain for up to 30 days.  Intended supply: 30 days 11/14/20 12/14/20  ASA Gabriel - CNP   ibuprofen (ADVIL;MOTRIN) 800 MG tablet TAKE 1 TABLET BY MOUTH EVERY 8 HOURS AS NEEDED FOR PAIN 10/17/20   Flores Hamilton PA-C   albuterol sulfate  (90 Base) MCG/ACT inhaler Inhale 2 puffs into the lungs every 4 hours as needed for Shortness of Breath 10/8/20   Flores Hamilton PA-C   sucralfate (CARAFATE) 1 GM tablet Take 1 tablet in AM, 2 tablet in evening daily 9/15/20   Flores Hamilton PA-C   traZODone (DESYREL) 150 MG tablet Take 1 tablet by mouth nightly 9/15/20   Flores Hamilton PA-C   isosorbide dinitrate (ISORDIL) 10 MG tablet Take 1 tablet by mouth 2 times daily 9/15/20   Flores Hamilton PA-C   bumetanide (BUMEX) 1 MG tablet Take 1 tablet by mouth daily 9/10/20   Jojo Tavera DO   SUPREP BOWEL PREP KIT 17.5-3.13-1.6 GM/177ML SOLN Take as directed - dispense one Kit 8/26/20   Laureen Ag MD   DULoxetine (CYMBALTA) 60 MG extended release capsule Take 1 capsule by mouth daily 8/13/20   Flores Hamilton PA-C   buPROPion (WELLBUTRIN XL) 150 MG extended release tablet Take 1 tablet by mouth every morning 8/13/20   Flores Hamilton PA-C   spironolactone (ALDACTONE) 25 MG tablet Take 1 tablet by mouth daily 8/13/20   Flores Hamilton PA-C   pantoprazole (PROTONIX) 40 MG tablet Take 1 tablet by mouth daily 8/13/20   Flores Hamilton PA-C   allopurinol (ZYLOPRIM) 300 MG tablet Take 1 tablet by mouth daily 8/13/20   Evan Garcia PA-C   Handicap Placard MISC by Does not apply route 2 years 8/13/20   Evan Garcia PA-C   ELDERBERRY PO Take by mouth    Historical Provider, MD   oxybutynin (DITROPAN) 5 MG tablet TAKE 1 TABLET BY MOUTH TWICE DAILY 10/26/19   Evan Garcia PA-C   Respiratory Therapy Supplies (NEBULIZER COMPRESSOR) KIT Provide insurance covered nebulizer, use daily as needed 9/16/19 11/12/20  Evan Garcia PA-C   polyethylene glycol (GLYCOLAX) packet polyethylene glycol 3350 17 gram/dose oral powder    Historical Provider, MD       REVIEW OFSYSTEMS    (2-9 systems for level 4, 10 or more for level 5)      Review of Systems   Constitutional: Positive for diaphoresis and fatigue. Negative for chills and fever. Respiratory: Positive for shortness of breath. Negative for cough, chest tightness and wheezing. Cardiovascular: Positive for chest pain (pressure). Negative for palpitations and leg swelling. Gastrointestinal: Negative for abdominal pain, constipation, diarrhea, nausea and vomiting. Endocrine: Negative for polydipsia, polyphagia and polyuria. Genitourinary: Negative for difficulty urinating, dysuria and urgency. Musculoskeletal: Negative for arthralgias, back pain, neck pain and neck stiffness. Skin: Negative for color change, pallor and rash. Neurological: Negative for dizziness, weakness, light-headedness and headaches. PHYSICAL EXAM   (up to 7 for level 4, 8 or more forlevel 5)      INITIAL VITALS:   ED Triage Vitals   BP Temp Temp Source Pulse Resp SpO2 Height Weight   11/30/20 2142 11/30/20 2133 11/30/20 2142 11/30/20 2142 11/30/20 2142 11/30/20 2142 -- --   (!) 155/124 97.5 °F (36.4 °C) Oral 100 24 99 %         Physical Exam  Vitals signs and nursing note reviewed. Constitutional:       General: She is not in acute distress. Appearance: She is well-developed. She is not diaphoretic.    HENT:      Head: Normocephalic and atraumatic. Eyes:      General: No scleral icterus. Conjunctiva/sclera: Conjunctivae normal.      Pupils: Pupils are equal, round, and reactive to light. Neck:      Musculoskeletal: Normal range of motion and neck supple. Vascular: No JVD. Trachea: No tracheal deviation. Cardiovascular:      Rate and Rhythm: Regular rhythm. Tachycardia present. Heart sounds: Normal heart sounds. No murmur. No friction rub. Pulmonary:      Effort: Pulmonary effort is normal. No respiratory distress. Breath sounds: Normal breath sounds. No decreased breath sounds, wheezing or rhonchi. Chest:      Chest wall: No tenderness. Abdominal:      General: Bowel sounds are normal. There is no distension. Palpations: Abdomen is soft. Tenderness: There is no abdominal tenderness. There is no guarding. Skin:     General: Skin is warm and dry. Capillary Refill: Capillary refill takes less than 2 seconds. Coloration: Skin is not pale. Findings: No erythema. Neurological:      Mental Status: She is alert and oriented to person, place, and time. Cranial Nerves: No cranial nerve deficit. Sensory: No sensory deficit.    Psychiatric:         Behavior: Behavior normal.         DIFFERENTIAL  DIAGNOSIS     PLAN (LABS / IMAGING / EKG):  Orders Placed This Encounter   Procedures    Culture, Urine    Culture, Blood 1    Culture, Blood 2    XR CHEST PORTABLE    CT CHEST PULMONARY EMBOLISM W CONTRAST    CBC Auto Differential    Troponin    Brain Natriuretic Peptide    Comprehensive Metabolic Panel w/ Reflex to MG    Urinalysis with microscopic    Lactate, Sepsis    APTT    Protime-INR    Amylase    Lipase    D-DIMER, QUANTITATIVE    VITAMIN B12 & FOLATE    Iron and TIBC    Ferritin    DIET CARDIAC;    Vital signs per unit routine    Notify physician    Notify physician    Up with assistance    Telemetry Monitoring    Inpatient consult to Internal Medicine    Inpatient consult to Cardiology    EKG 12 Lead    EKG 12 Lead    PATIENT STATUS (FROM ED OR OR/PROCEDURAL) Inpatient       MEDICATIONS ORDERED:  Orders Placed This Encounter   Medications    iopamidol (ISOVUE-370) 76 % injection 85 mL    sodium chloride flush 0.9 % injection 10 mL    sodium chloride flush 0.9 % injection 10 mL    acetaminophen (TYLENOL) tablet 650 mg    enoxaparin (LOVENOX) injection 40 mg    oxyCODONE-acetaminophen (PERCOCET) 5-325 MG per tablet 1 tablet       DDX: Stable angina, ACS, PE, pneumonia, COVID-19, URI, CHF exacerbation, COPD exacerbation, sepsis    Initial MDM/Plan: 60 y.o. female who presents with shortness of breath and chest pressure ongoing since Friday. Lungs clear to auscultation bilaterally, patient is tachypneic and mildly tachycardic shortly after ambulating, however these improved after rest.  Concern for unstable angina given exertional component. Will perform cardiac work-up, blood cultures, CMP, urinalysis to evaluate. Unlikely sepsis given exertional component of all symptoms. Patient has not had any fevers or chills.     DIAGNOSTIC RESULTS / EMERGENCYDEPARTMENT COURSE / MDM     LABS:  Labs Reviewed   CBC WITH AUTO DIFFERENTIAL - Abnormal; Notable for the following components:       Result Value    Hemoglobin 11.2 (*)     RDW 16.5 (*)     Immature Granulocytes 1 (*)     All other components within normal limits   TROPONIN - Abnormal; Notable for the following components:    Troponin, High Sensitivity 21 (*)     All other components within normal limits   TROPONIN - Abnormal; Notable for the following components:    Troponin, High Sensitivity 20 (*)     All other components within normal limits   BRAIN NATRIURETIC PEPTIDE - Abnormal; Notable for the following components:    Pro- (*)     All other components within normal limits   COMPREHENSIVE METABOLIC PANEL W/ REFLEX TO MG FOR LOW K - Abnormal; Notable for the following components: BUN 21 (*)     CREATININE 1.14 (*)     Alkaline Phosphatase 156 (*)     GFR Non- 49 (*)     GFR  60 (*)     All other components within normal limits   CULTURE, URINE   CULTURE, BLOOD 1   CULTURE, BLOOD 2   LACTATE, SEPSIS   APTT   PROTIME-INR   AMYLASE   LIPASE   D-DIMER, QUANTITATIVE   URINALYSIS WITH MICROSCOPIC   VITAMIN B12 & FOLATE   IRON AND TIBC   FERRITIN         RADIOLOGY:  Xr Chest Portable    Result Date: 11/30/2020  EXAMINATION: ONE XRAY VIEW OF THE CHEST 11/30/2020 9:54 pm COMPARISON: CT chest pulmonary embolism with contrast September 2, 2020. Chest portable September 2, 2020 HISTORY: ORDERING SYSTEM PROVIDED HISTORY: SOB TECHNOLOGIST PROVIDED HISTORY: SOB Reason for Exam: upr,body aches,hx covid . FINDINGS: The heart is mildly enlarged with otherwise unremarkable configuration. The mediastinal contours are within normal limits. The lungs are well aerated. The pleural surfaces are normal and no evidence of a pleural effusion is seen. Bones and soft tissues are unremarkable. Mild cardiomegaly. Otherwise, unremarkable upright portable AP view of the chest.         EKG  EKG Interpretation    Interpreted by emergency department physician    Rhythm: normal sinus   Rate: tachycardia  Axis: normal  Ectopy: none  Conduction: normal  ST Segments: no acute change  T Waves: no acute change  Q Waves: none    Clinical Impression: no acute changes, poor EKG study secondary to movement artifact. Geovanna Hunter    All EKG's are interpreted by the Emergency Department Physicianwho either signs or Co-signs this chart in the absence of a cardiologist.    EMERGENCY DEPARTMENT COURSE:  ED Course as of Dec 01 0247   Tue Dec 01, 2020   0000 Elevated D-dimer, awaiting CT PE. Also waiting for repeat troponin. Initial troponin 21, consistent with baseline.     [JG]   [id number] I went to re-eval the patient, she was able to march in place without desaturations but became tachycardic 120s-130s. Will admit. [JG]   [id number] Talked with cardiology, agree patient should be admitted. [JG]   0136 I discussed patient with internal medicine, accepted patient for admission. [JG]      ED Course User Index  [JG] Kylah Elam DO          PROCEDURES:  None    CONSULTS:  IP CONSULT TO INTERNAL MEDICINE  IP CONSULT TO CARDIOLOGY    CRITICAL CARE:  Please see attending note    FINAL IMPRESSION      1. Dyspnea on exertion          DISPOSITION / PLAN     DISPOSITION Admitted 12/01/2020 02:32:55 AM      PATIENT REFERRED TO:  No follow-up provider specified.     DISCHARGE MEDICATIONS:  New Prescriptions    No medications on file       Kylah Elam DO  Emergency Medicine Resident    (Please note that portions of this note were completed with a voice recognition program.Efforts were made to edit the dictations but occasionally words are mis-transcribed.)     Kylah Elam DO  Resident  12/01/20 [id number]

## 2020-12-02 VITALS
DIASTOLIC BLOOD PRESSURE: 68 MMHG | OXYGEN SATURATION: 97 % | TEMPERATURE: 98.4 F | SYSTOLIC BLOOD PRESSURE: 140 MMHG | RESPIRATION RATE: 16 BRPM | HEART RATE: 80 BPM

## 2020-12-02 PROBLEM — E66.813 CLASS 3 SEVERE OBESITY DUE TO EXCESS CALORIES WITH SERIOUS COMORBIDITY AND BODY MASS INDEX (BMI) OF 45.0 TO 49.9 IN ADULT: Chronic | Status: ACTIVE | Noted: 2019-10-04

## 2020-12-02 PROBLEM — I50.43 ACUTE ON CHRONIC COMBINED SYSTOLIC AND DIASTOLIC CONGESTIVE HEART FAILURE (HCC): Chronic | Status: ACTIVE | Noted: 2020-09-02

## 2020-12-02 PROBLEM — E66.01 CLASS 3 SEVERE OBESITY DUE TO EXCESS CALORIES WITH SERIOUS COMORBIDITY AND BODY MASS INDEX (BMI) OF 45.0 TO 49.9 IN ADULT (HCC): Chronic | Status: ACTIVE | Noted: 2019-10-04

## 2020-12-02 LAB
ABSOLUTE EOS #: 0.32 K/UL (ref 0–0.44)
ABSOLUTE IMMATURE GRANULOCYTE: 0.05 K/UL (ref 0–0.3)
ABSOLUTE LYMPH #: 2.25 K/UL (ref 1.1–3.7)
ABSOLUTE MONO #: 0.39 K/UL (ref 0.1–1.2)
ANION GAP SERPL CALCULATED.3IONS-SCNC: 12 MMOL/L (ref 9–17)
BASOPHILS # BLD: 1 % (ref 0–2)
BASOPHILS ABSOLUTE: 0.04 K/UL (ref 0–0.2)
BUN BLDV-MCNC: 23 MG/DL (ref 6–20)
BUN/CREAT BLD: ABNORMAL (ref 9–20)
CALCIUM SERPL-MCNC: 8.7 MG/DL (ref 8.6–10.4)
CHLORIDE BLD-SCNC: 101 MMOL/L (ref 98–107)
CO2: 26 MMOL/L (ref 20–31)
CREAT SERPL-MCNC: 1.22 MG/DL (ref 0.5–0.9)
CULTURE: NORMAL
DIFFERENTIAL TYPE: ABNORMAL
EOSINOPHILS RELATIVE PERCENT: 4 % (ref 1–4)
GFR AFRICAN AMERICAN: 55 ML/MIN
GFR NON-AFRICAN AMERICAN: 45 ML/MIN
GFR SERPL CREATININE-BSD FRML MDRD: ABNORMAL ML/MIN/{1.73_M2}
GFR SERPL CREATININE-BSD FRML MDRD: ABNORMAL ML/MIN/{1.73_M2}
GLUCOSE BLD-MCNC: 114 MG/DL (ref 70–99)
HCT VFR BLD CALC: 34.1 % (ref 36.3–47.1)
HEMOGLOBIN: 10.1 G/DL (ref 11.9–15.1)
IMMATURE GRANULOCYTES: 1 %
LYMPHOCYTES # BLD: 28 % (ref 24–43)
Lab: NORMAL
MCH RBC QN AUTO: 26.8 PG (ref 25.2–33.5)
MCHC RBC AUTO-ENTMCNC: 29.6 G/DL (ref 28.4–34.8)
MCV RBC AUTO: 90.5 FL (ref 82.6–102.9)
MONOCYTES # BLD: 5 % (ref 3–12)
NRBC AUTOMATED: 0 PER 100 WBC
PDW BLD-RTO: 16.4 % (ref 11.8–14.4)
PLATELET # BLD: 279 K/UL (ref 138–453)
PLATELET ESTIMATE: ABNORMAL
PMV BLD AUTO: 10.4 FL (ref 8.1–13.5)
POTASSIUM SERPL-SCNC: 4.4 MMOL/L (ref 3.7–5.3)
RBC # BLD: 3.77 M/UL (ref 3.95–5.11)
RBC # BLD: ABNORMAL 10*6/UL
SEG NEUTROPHILS: 61 % (ref 36–65)
SEGMENTED NEUTROPHILS ABSOLUTE COUNT: 5 K/UL (ref 1.5–8.1)
SODIUM BLD-SCNC: 139 MMOL/L (ref 135–144)
SPECIMEN DESCRIPTION: NORMAL
TROPONIN INTERP: ABNORMAL
TROPONIN T: ABNORMAL NG/ML
TROPONIN, HIGH SENSITIVITY: 17 NG/L (ref 0–14)
WBC # BLD: 8.1 K/UL (ref 3.5–11.3)
WBC # BLD: ABNORMAL 10*3/UL

## 2020-12-02 PROCEDURE — 6370000000 HC RX 637 (ALT 250 FOR IP): Performed by: STUDENT IN AN ORGANIZED HEALTH CARE EDUCATION/TRAINING PROGRAM

## 2020-12-02 PROCEDURE — 85025 COMPLETE CBC W/AUTO DIFF WBC: CPT

## 2020-12-02 PROCEDURE — 80048 BASIC METABOLIC PNL TOTAL CA: CPT

## 2020-12-02 PROCEDURE — 94640 AIRWAY INHALATION TREATMENT: CPT

## 2020-12-02 PROCEDURE — 97162 PT EVAL MOD COMPLEX 30 MIN: CPT

## 2020-12-02 PROCEDURE — 2700000000 HC OXYGEN THERAPY PER DAY

## 2020-12-02 PROCEDURE — 94664 DEMO&/EVAL PT USE INHALER: CPT

## 2020-12-02 PROCEDURE — 84484 ASSAY OF TROPONIN QUANT: CPT

## 2020-12-02 PROCEDURE — 99238 HOSP IP/OBS DSCHRG MGMT 30/<: CPT | Performed by: INTERNAL MEDICINE

## 2020-12-02 PROCEDURE — 97530 THERAPEUTIC ACTIVITIES: CPT

## 2020-12-02 RX ORDER — OXYCODONE HYDROCHLORIDE 5 MG/1
2.5 TABLET ORAL EVERY 8 HOURS PRN
Status: DISCONTINUED | OUTPATIENT
Start: 2020-12-02 | End: 2020-12-02 | Stop reason: HOSPADM

## 2020-12-02 RX ORDER — BUPROPION HYDROCHLORIDE 150 MG/1
150 TABLET ORAL EVERY MORNING
Status: DISCONTINUED | OUTPATIENT
Start: 2020-12-02 | End: 2020-12-02 | Stop reason: HOSPADM

## 2020-12-02 RX ORDER — 0.9 % SODIUM CHLORIDE 0.9 %
500 INTRAVENOUS SOLUTION INTRAVENOUS ONCE
Status: DISCONTINUED | OUTPATIENT
Start: 2020-12-02 | End: 2020-12-02 | Stop reason: HOSPADM

## 2020-12-02 RX ORDER — BUMETANIDE 1 MG/1
1 TABLET ORAL DAILY
Status: DISCONTINUED | OUTPATIENT
Start: 2020-12-02 | End: 2020-12-02 | Stop reason: HOSPADM

## 2020-12-02 RX ORDER — OXYCODONE AND ACETAMINOPHEN 7.5; 325 MG/1; MG/1
1 TABLET ORAL EVERY 8 HOURS PRN
Status: DISCONTINUED | OUTPATIENT
Start: 2020-12-02 | End: 2020-12-02 | Stop reason: SDUPTHER

## 2020-12-02 RX ORDER — HEPARIN SODIUM 5000 [USP'U]/ML
5000 INJECTION, SOLUTION INTRAVENOUS; SUBCUTANEOUS EVERY 8 HOURS SCHEDULED
Status: DISCONTINUED | OUTPATIENT
Start: 2020-12-02 | End: 2020-12-02 | Stop reason: HOSPADM

## 2020-12-02 RX ORDER — OXYCODONE HYDROCHLORIDE AND ACETAMINOPHEN 5; 325 MG/1; MG/1
1 TABLET ORAL EVERY 8 HOURS PRN
Status: DISCONTINUED | OUTPATIENT
Start: 2020-12-02 | End: 2020-12-02 | Stop reason: HOSPADM

## 2020-12-02 RX ORDER — SPIRONOLACTONE 25 MG/1
25 TABLET ORAL DAILY
Status: DISCONTINUED | OUTPATIENT
Start: 2020-12-02 | End: 2020-12-02 | Stop reason: HOSPADM

## 2020-12-02 RX ADMIN — BUDESONIDE AND FORMOTEROL FUMARATE DIHYDRATE 2 PUFF: 80; 4.5 AEROSOL RESPIRATORY (INHALATION) at 08:56

## 2020-12-02 RX ADMIN — METOPROLOL TARTRATE 50 MG: 50 TABLET, FILM COATED ORAL at 09:19

## 2020-12-02 RX ADMIN — OXYCODONE HYDROCHLORIDE AND ACETAMINOPHEN 1 TABLET: 5; 325 TABLET ORAL at 09:23

## 2020-12-02 RX ADMIN — ATORVASTATIN CALCIUM 80 MG: 80 TABLET, FILM COATED ORAL at 09:19

## 2020-12-02 RX ADMIN — LEVOTHYROXINE SODIUM 25 MCG: 25 TABLET ORAL at 09:18

## 2020-12-02 RX ADMIN — OXYCODONE HYDROCHLORIDE AND ACETAMINOPHEN 1 TABLET: 5; 325 TABLET ORAL at 16:12

## 2020-12-02 RX ADMIN — CLOPIDOGREL 75 MG: 75 TABLET, FILM COATED ORAL at 09:19

## 2020-12-02 RX ADMIN — DULOXETINE HYDROCHLORIDE 60 MG: 30 CAPSULE, DELAYED RELEASE ORAL at 09:19

## 2020-12-02 RX ADMIN — SUCRALFATE 1 G: 1 TABLET ORAL at 09:18

## 2020-12-02 RX ADMIN — OXYCODONE HYDROCHLORIDE AND ACETAMINOPHEN 1 TABLET: 5; 325 TABLET ORAL at 03:07

## 2020-12-02 RX ADMIN — ALLOPURINOL 300 MG: 300 TABLET ORAL at 09:19

## 2020-12-02 ASSESSMENT — PAIN DESCRIPTION - PAIN TYPE
TYPE: ACUTE PAIN
TYPE: CHRONIC PAIN

## 2020-12-02 ASSESSMENT — PAIN DESCRIPTION - LOCATION
LOCATION: BACK;KNEE
LOCATION: BACK;KNEE

## 2020-12-02 ASSESSMENT — PAIN DESCRIPTION - ORIENTATION
ORIENTATION: LEFT;RIGHT
ORIENTATION: RIGHT;LEFT

## 2020-12-02 ASSESSMENT — PAIN SCALES - GENERAL
PAINLEVEL_OUTOF10: 9

## 2020-12-02 NOTE — PROGRESS NOTES
Physical Therapy    Facility/Department: [facility] ED  Initial Assessment    NAME: Joselyn Loyd  : 1965  MRN: [id number]    Date of Service: 2020  The patient is a pleasant 60 y.o. female with background history of coronary artery disease, heart failure and hypertension presents with a chief complaint of shortness of breath. According to the patient she started to have shortness of breath from Friday. Initially she was feeling short of breath and minimal exertion like going from bedroom to the restroom but slowly and gradually it got worse. On Saturday she was feeling short of breath even going to the living room. Discharge Recommendations:    Further therapy recommended at discharge. PT Equipment Recommendations  Equipment Needed: No  Other: pt owns rollator    Assessment   Body structures, Functions, Activity limitations: Decreased functional mobility ; Decreased strength;Decreased endurance  Assessment: Patient pleasant and cooperative throughout PT evaluation. Pt grossly SBA for functional mobility. Pt very limited d/t decreased endurance. Pt would benefit from continued therapy to increase independence. Prognosis: Good  Decision Making: Medium Complexity  PT Education: Goals; General Safety;Gait Training;PT Role;Plan of Care;Transfer Training  Barriers to Learning: endurance  REQUIRES PT FOLLOW UP: Yes  Activity Tolerance  Activity Tolerance: Patient Tolerated treatment well;Patient limited by endurance       Patient Diagnosis(es): The primary encounter diagnosis was Dyspnea on exertion. A diagnosis of SUSU (acute kidney injury) ([address] was also pertinent to this visit. has a past medical history of Arthritis, Bronchitis, CHF (congestive heart failure) ([address] COVID-19, GERD (gastroesophageal reflux disease), Gout, History of heart attack, HLD (hyperlipidemia), Hypertension, Insomnia, and Osteoarthritis.    has a past surgical history that includes back surgery; shoulder surgery (Right, 2009); knee surgery (Left, 2009); Cholecystectomy, laparoscopic; Knee arthroscopy (Right); Hysterectomy, total abdominal; and Neck surgery.     Restrictions  Restrictions/Precautions  Restrictions/Precautions: General Precautions, Up as Tolerated  Required Braces or Orthoses?: No  Position Activity Restriction  Other position/activity restrictions: up with assist  Vision/Hearing  Vision: Impaired  Vision Exceptions: Wears glasses for reading  Hearing: Within functional limits     Subjective  General  Patient assessed for rehabilitation services?: Yes  Response To Previous Treatment: Not applicable  Family / Caregiver Present: No  Follows Commands: Within Functional Limits  Subjective  Subjective: RN and pt agreeable to PT evaluation; pt supine in bed upon arrival  Pain Screening  Patient Currently in Pain: Yes  Pain Assessment  Pain Assessment: 0-10  Pain Level: 9  Pain Type: Chronic pain  Pain Location: Back;Knee  Pain Orientation: Right;Left  Vital Signs  Patient Currently in Pain: Yes     Orientation  Orientation  Overall Orientation Status: Within Functional Limits  Social/Functional History  Social/Functional History  Lives With: Alone  Type of Home: Apartment(senior living apartment)  Home Layout: One level  Home Access: Level entry  Entrance Stairs - Rails: None  Bathroom Shower/Tub: Walk-in shower  Bathroom Toilet: Standard  Bathroom Equipment: Grab bars in shower  Home Equipment: 4 wheeled walker, Cane(pt reports using rollator at all times)  ADL Assistance: Needs assistance(uncle/sister come and help--used to have aide but aide has not come in 2 weeks)  Homemaking Assistance: Needs assistance(uncle/sister assist with meal prepping and grocery shopping)  Homemaking Responsibilities: Yes  Ambulation Assistance: Independent  Transfer Assistance: Independent  Active : No  Patient's  Info: Uncle drives  Mode of Transportation: Car  Objective     Observation/Palpation  Posture: Fair  AROM RLE (degrees)  RLE AROM: WFL  AROM LLE (degrees)  LLE AROM : WFL  AROM RUE (degrees)  RUE AROM : WFL  AROM LUE (degrees)  LUE AROM : WFL  Strength RLE  Strength RLE: WFL  Comment: grossly 4/5  Strength LLE  Strength LLE: WFL  Comment: grossly 4/5  Strength RUE  Strength RUE: WFL  Strength LUE  Strength LUE: WFL  Tone RLE  RLE Tone: Normotonic  Tone LLE  LLE Tone: Normotonic  Sensation  Overall Sensation Status: WFL(denies N/T)  Bed mobility  Supine to Sit: Stand by assistance(HOB elevated 45 degrees)  Comment: pt retired sitting EOB  Transfers  Sit to Stand: Stand by assistance  Stand to sit: Stand by assistance  Comment: increased time to complete  Ambulation  Ambulation?: Yes  Ambulation 1  Surface: level tile  Device: Rolling Walker  Assistance: Stand by assistance  Distance: 30'  Comments: upon returning to sitting EOB, pt's O2 was 98% on room air despite pt reporting SOB; several v/c's to maintain EDILBERTO within walker with fair return     Balance  Posture: Fair  Sitting - Static: Good  Sitting - Dynamic: Good  Standing - Static: Fair;+  Standing - Dynamic: Fair;+  Comments: standing balance assessed with RW      Plan   Plan  Times per week: 5-6x/week  Times per day: Daily  Current Treatment Recommendations: Strengthening, Balance Training, Functional Mobility Training, Transfer Training, Gait Training, Stair training  Safety Devices  Type of devices:  All fall risk precautions in place, Call light within reach, Gait belt, Left in bed, Nurse notified  Restraints  Initially in place: No  AM-PAC Score     AM-PAC Inpatient Mobility without Stair Climbing Raw Score : 17 (12/02/20 1522)  AM-PAC Inpatient without Stair Climbing T-Scale Score : 48.47 (12/02/20 1522)  Mobility Inpatient CMS 0-100% Score: 32.72 (12/02/20 1522)  Mobility Inpatient without Stair CMS G-Code Modifier : Melvinia Ambridge (12/02/20 1522)  Goals  Short term goals  Time Frame for Short term goals: 12 visits  Short term goal 1: pt will be independent with bed mobility  Short term goal 2: pt will perform transfers independently  Short term goal 3: pt will ambulate 76' with least restrictive device mod (I)  Patient Goals   Patient goals : to go home and get better     Therapy Time   Individual Concurrent Group Co-treatment   Time In 1426         Time Out 1451         Minutes 25         Timed Code Treatment Minutes: 12 Minutes    This treatment/evaluation completed by signing SPT. Signing PT agrees with treatment and documentation.   Roberto Hernandez, Student Physical Therapist

## 2020-12-02 NOTE — DISCHARGE INSTR - COC
Continuity of Care Form    Patient Name: Antony Mckenzie   :  1965  MRN:  [id number]    Admit date:  2020  Discharge date:  ***    Code Status Order: Full Code   Advance Directives:     Admitting Physician:  Juana Anderson MD  PCP: Rigoberto Asher PA-C    Discharging Nurse: [facility] Unit/Room#: 36/38  Discharging Unit Phone Number: ***    Emergency Contact:   Extended Emergency Contact Information  Primary Emergency Contact: Jey Vazquez  Address: [address] [place] [address], [address] [place] [address] Phone: [phone]  Mobile Phone: [phone]  Relation: Brother/Sister  Hearing or visual needs: None  Other needs: None  Preferred language: English   needed?  No  Secondary Emergency Contact: Lori Villeda, [address] [facility] Phone: [phone]  Mobile Phone: [phone]  Relation: Child    Past Surgical History:  Past Surgical History:   Procedure Laterality Date    BACK SURGERY      CHOLECYSTECTOMY, LAPAROSCOPIC      HYSTERECTOMY, TOTAL ABDOMINAL      KNEE ARTHROSCOPY Right     KNEE SURGERY Left 2009    NECK SURGERY      SHOULDER SURGERY Right        Immunization History:   Immunization History   Administered Date(s) Administered    Influenza, Quadv, IM, PF (6 mo and older Fluzone, Flulaval, Fluarix, and 3 yrs and older Afluria) 2017, 2019, 10/08/2020    Pneumococcal Polysaccharide ([id number]) 10/08/2020       Active Problems:  Patient Active Problem List   Diagnosis Code    Low back pain M54.5    SUSU (acute kidney injury) ([facility] N17.9    Therapeutic opioid-induced constipation (OIC) K59.03, T40.2X5A    Spondylosis of lumbar region without myelopathy or radiculopathy M47.816    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, insomnia type ([facility] S23.437    Opioid dependence, uncomplicated (HCC) K08.23    Sacroiliac joint dysfunction of both sides M53.3    Chronic pain disorder G89.4    Bulge of cervical disc without myelopathy M50.20    DDD (degenerative disc disease), lumbar M51.36    Failed back syndrome of cervical spine M96.1    Chest pain R07.9    Class 3 severe obesity due to excess calories with serious comorbidity and body mass index (BMI) of 45.0 to 49.9 in adult ([facility]) E66.01, Z68.42    Cervical spondylosis with radiculopathy M47.22    Status post cervical spinal fusion Z98.1    Status post lumbar spinal fusion Z98.1    Lumbar radiculopathy M54.16    Morbid obesity ([facility]) E66.01    Chronic pain of both knees M25.561, M25.562, G89.29    Myalgia M79.10    Encounter for long-term opiate analgesic use Z79.891    Lower respiratory tract infection due to COVID-19 virus U07.1, J22    Hypertension I10    Gout M10.9    GERD (gastroesophageal reflux disease) K21.9    Acute on chronic combined systolic and diastolic congestive heart failure ([facility]) I50.43    Other proteinuria R80.8    Nausea and vomiting R11.2    Stable angina ([facility]) I20.8       Isolation/Infection:   Isolation          No Isolation        Patient Infection Status     Infection Onset Added Last Indicated Last Indicated By Review Planned Expiration Resolved Resolved By    None active    Resolved    COVID-19 Rule Out 10/03/20 10/03/20 10/03/20 Covid-19 Ambulatory (Ordered)   10/05/20 Rule-Out Test Resulted    COVID-19 20 COVID-19   20     COVID-19 Rule Out 20 COVID-19 (Ordered)   20 Rule-Out Test Resulted          Nurse Assessment:  Last Vital Signs: BP (!) 140/68   Pulse 80   Temp 98.4 °F (36.9 °C) (Oral)   Resp 16   SpO2 97%     Last documented pain score (0-10 scale): Pain Level: 9  Last Weight:   Wt Readings from Last 1 Encounters:   20 (!) 350 lb (158.8 kg)     Mental Status:  {IP PT MENTAL STATUS:}    IV Access:  {[facility] IV ACCESS:385048801}    Nursing Mobility/ADLs:  Walking   {[facility] DME TQKI:588848420}  Transfer  {[facility] LMYE:329247540}  Bathing  {GIL[facility] DME YIZE:108098114}  Dressing  {CHP DME PSJV:780801770}  Toileting  {CHP DME SIN}  Feeding  {CHP DME PPDF:155961237}  Med Admin  {CHP DME WXFH:683805347}  Med Delivery   { [facility] Delivery:932869313}    Wound Care Documentation and Therapy:  Wound 17 Buttocks Left tiny, round  (Active)   Number of days: 1093        Elimination:  Continence:   · Bowel: {YES / ER:15217}  · Bladder: {YES / CP:12069}  Urinary Catheter: {Urinary Catheter:534927938}   Colostomy/Ileostomy/Ileal Conduit: {YES / XW:60067}       Date of Last BM: ***  No intake or output data in the 24 hours ending 20 1333  No intake/output data recorded.     Safety Concerns:     [facility] Safety Concerns:621501514}    Impairments/Disabilities:      [facility] Impairments/Disabilities:271836161}    Nutrition Therapy:  Current Nutrition Therapy:   [facility] Diet List:516105943}    Routes of Feeding: {P DME Other Feedings:399161857}  Liquids: {Slp liquid thickness:35647}  Daily Fluid Restriction: {CHP DME Yes amt example:790616427}  Last Modified Barium Swallow with Video (Video Swallowing Test): {Done Not Done KKYC:170971191}    Treatments at the Time of Hospital Discharge:   Respiratory Treatments: ***  Oxygen Therapy:  {Therapy; copd oxygen:74399}  Ventilator:    { CC Vent LNQC:928900518}    Rehab Therapies: {THERAPEUTIC INTERVENTION:1918153764}  Weight Bearing Status/Restrictions: [employer] Weight Bearin}  Other Medical Equipment (for information only, NOT a DME order):  {EQUIPMENT:310257699}  Other Treatments: ***    Patient's personal belongings (please select all that are sent with patient):  {P DME Belongings:228878120}    RN SIGNATURE:  {Esignature:287211504}    CASE MANAGEMENT/SOCIAL WORK SECTION    Inpatient Status Date: ***    Readmission Risk Assessment Score:  Readmission Risk              Risk of Unplanned Readmission:        20           Discharging to Facility/ Agency   [facility]  FAX            [address] [place], [address] [address]       Phone: [phone]       Fax: [phone]        ·     Dialysis Facility (if applicable)   · Name:  · Address:  · Dialysis Schedule:  · Phone:  · Fax:    / signature: {Esignature:426407194}    PHYSICIAN SECTION    Prognosis: Fair    Condition at Discharge: Stable    Rehab Potential (if transferring to Rehab): Fair    Recommended Labs or Other Treatments After Discharge: Continue taking all home medications. Low salt diet and fluids restriction under 2000 ml/day. Cardiology follow up on 12/16  PCP to see in 1 week. Get BMP lab test on Friday and evaluate by PCP. CHF clinic nurse to follow up in 2 weeks after discharge. Get evaluation for sleep study. Physician Certification: I certify the above information and transfer of Benson Samaniego  is necessary for the continuing treatment of the diagnosis listed and that she requires Home Care for greater 30 days.      Update Admission H&P: No change in H&P    PHYSICIAN SIGNATURE:  {Esignature:084795645}

## 2020-12-02 NOTE — PROGRESS NOTES
[facility]  Progress Note                   Date:   12/2/2020  Patient name: Luz Marina Mendoza  Date of admission:  11/30/2020  9:34 PM  MRN:   [id number]  YOB: 1965  PCP: Satinder Phillips PA-C    Reason for Admission: Stable angina ([facility] [I20.8]  Stable angina ([facility] [I20.8]    Subjective:       Clinical Changes /Abnormalities: Seen & examined alone in room after talking with RN. Denies any chest pain. States breathing is \"still not 100% but I feel much better and am ready to go home. \" Labs, vitals, tele reviewed. Review of Systems    Medications:   Scheduled Meds:   bumetanide  1 mg Oral Daily    buPROPion  150 mg Oral QAM    spironolactone  25 mg Oral Daily    heparin (porcine)  5,000 Units Subcutaneous 3 times per day    sodium chloride flush  10 mL Intravenous 2 times per day    allopurinol  300 mg Oral Daily    atorvastatin  80 mg Oral Daily    clopidogrel  75 mg Oral Daily    DULoxetine  60 mg Oral Daily    isosorbide dinitrate  10 mg Oral BID    levothyroxine  25 mcg Oral Daily    metoprolol tartrate  50 mg Oral BID    montelukast  10 mg Oral Nightly    sucralfate  1 g Oral 2 times per day    budesonide-formoterol  2 puff Inhalation BID    sodium chloride flush  10 mL Intravenous 2 times per day    traZODone  150 mg Oral Nightly     Continuous Infusions:  CBC:   Recent Labs     11/30/20 2228 12/01/20  1044 12/02/20  0547   WBC 8.7 9.0 8.1   HGB 11.2* 10.9* 10.1*    282 279     BMP:    Recent Labs     11/30/20 2228 12/01/20  1044 12/02/20  0547    137 139   K 4.0 4.5 4.4    103 101   CO2 24 21 26   BUN 21* 19 23*   CREATININE 1.14* 0.94* 1.22*   GLUCOSE 91 94 114*     Hepatic:  Recent Labs     11/30/20 2228   AST 19   ALT 15   BILITOT 0.36   ALKPHOS 156*     Troponin:   Recent Labs     11/30/20  0018 11/30/20 2228 12/02/20  0547   TROPHS 20* 21* 17*     BNP: No results for input(s): BNP in the last 72 hours.   Lipids: No results for input(s): CHOL, HDL in the last 72 hours. Invalid input(s): LDLCALCU  INR:   Recent Labs     11/30/20  2228   INR 0.9       Objective:   Vitals: BP (!) 140/68   Pulse 80   Temp 98.4 °F (36.9 °C) (Oral)   Resp 16   SpO2 97%   General appearance: alert and cooperative with exam  HEENT: Head: Normocephalic, no lesions, without obvious abnormality. Neck:no JVD, trachea midline, no adenopathy  Lungs: Clear to auscultation throughout, slightly dim RLL  Heart: Regular rate and rhythm, s1/s2 auscultated, no murmurs  Abdomen: soft, non-tender, bowel sounds active, morbid obesity  Extremities: no edema  Neurologic: not done    ECHO 11/18/2020: EF 30-35% with global hypokinesia, grade II DD, no regurg/stenosis.      ECHO 10/2/19: EF 42%, grade I DD, mild MR, trivial TR.      Prior Echo from [place] reviewed with LVEF 25-30% and global hypokinesis (Per KW's note)    Assessment / Acute Cardiac Problems:   1. Acute on chronic systolic CHF  2. HTn  3. CAD - details unknown  4. SUSU   5.  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, insomnia type ([address]     Opioid dependence, uncomplicated (HCC)     Sacroiliac joint dysfunction of both sides     Chronic pain disorder     Bulge of cervical disc without myelopathy     DDD (degenerative disc disease), lumbar     Failed back syndrome of cervical spine     Chest pain     Class 3 severe obesity due to excess calories with serious comorbidity and body mass index (BMI) of 45.0 to 49.9 in adult [facility])     Cervical spondylosis with radiculopathy     Status post cervical spinal fusion     Status post lumbar spinal fusion     Lumbar radiculopathy     Morbid obesity (HCC)     Chronic pain of both knees     Myalgia     Encounter for long-term opiate analgesic use Lower respiratory tract infection due to COVID-19 virus     Hypertension     Gout     GERD (gastroesophageal reflux disease)     Acute on chronic combined systolic and diastolic congestive heart failure (HCC)     Other proteinuria     Nausea and vomiting     Stable angina ([address]      Plan of Treatment:   1. Doing well. Wants to go home. Has f/u appointment with us on 12/16. RN working on getting records from [place]. At this time no plans for ischemic work-up. Continue PO Bumex & Aldactone. Long discussion regarding monitoring PO fluid and salt intake along with urine output. Questions/concerns addressed. Continue PO BB. No ACE/Entresto at this time given SUSU. Will plan to reassess as OP  2. OK for discharge from CV standpoint with presently scheduled OP f/u.      Electronically signed by ASA Esparza CNP on 12/2/2020 at 10:36 AM  [address]  [phone]

## 2020-12-03 NOTE — DISCHARGE SUMMARY
[facility]     Department of Internal Medicine - Staff Internal Medicine Teaching Service    INPATIENT DISCHARGE SUMMARY      Patient Identification:  Ralph Verduzco is a 60 y.o. female. :  1965  MRN: [id number]     Acct: [de-identified]   PCP: Joe Dunn PA-C  Admit Date:  2020  Discharge date and time: 2020  4:34 PM   Attending Provider: Hector Montelongo MD                                   [address] Problem Lists:  Principal Problem:    Acute on chronic combined systolic and diastolic congestive heart failure ([facility]  Active Problems:    S[facility] (acute kidney injury) ([facility]    Class 3 severe obesity due to excess calories with serious comorbidity and body mass index (BMI) of 45.0 to 49.9 in [facility])    Chronic pain of both knees    Stable angina ([facility]  Resolved Problems:    * No resolved hospital problems. *    HOSPITAL STAY     Brief Inpatient course:   Ralph Verduzco is a 60 y.o. female who was admitted for the management of Acute on chronic combined systolic and diastolic congestive heart failure ([facility] presented to the emergency department with persistent shortness of breath, dyspnea, lower extremity edema, orthopnea, paroxysmal nocturnal dyspnea without any associated chest pain or discomfort; patient however acknowledged missing her last 2 doses of medication. Cardiology was consulted and any intervention from them was deferred. Patient was diuresed with Bumex and Aldactone and discharged home when medically clear and stable. Patient was educated about daily medication compliance and given a referral to a the congestive heart failure clinic.       Procedures/ Significant Interventions:    As Above    Consults:     Consults:     Final Specialist Recommendations/Findings:   IP CONSULT TO INTERNAL MEDICINE  IP CONSULT TO CARDIOLOGY  IP CONSULT TO HEART FAILURE NURSE/COORDINATOR  IP CONSULT TO CASE MANAGEMENT  IP CONSULT TO HOME CARE NEEDS      Any Hospital Acquired Infections: none    Discharge Functional Status:  stable    DISCHARGE PLAN     Disposition: home    Patient Instructions:   Discharge Medication List as of 12/2/2020  4:06 PM      CONTINUE these medications which have NOT CHANGED    Details   oxyCODONE-acetaminophen (PERCOCET) 7.5-325 MG per tablet Take 1 tablet by mouth every 6 hours as needed for Pain for up to 30 days.  Intended supply: 30 days, Disp-120 tablet,R-0Normal      SYMBICORT 80-4.5 MCG/ACT AERO Inhale 2 puffs into the lungs 2 times daily, Disp-1 Inhaler,R-0,DAWLOT #[id number] Exp: 08/2021Sample      ibuprofen (ADVIL;MOTRIN) 800 MG tablet TAKE 1 TABLET BY MOUTH EVERY 8 HOURS AS NEEDED FOR PAIN, Disp-90 tablet,R-2Normal      albuterol (PROVENTIL) (2.5 MG/3ML) 0.083% nebulizer solution Take 3 mLs by nebulization every 6 hours as needed for Wheezing, Disp-120 each,R-3Normal      albuterol sulfate  (90 Base) MCG/ACT inhaler Inhale 2 puffs into the lungs every 4 hours as needed for Shortness of Breath, Disp-1 Inhaler,R-3Normal      sucralfate (CARAFATE) 1 GM tablet Take 1 tablet in AM, 2 tablet in evening daily, Disp-90 tablet,R-0Normal      levothyroxine (SYNTHROID) 25 MCG tablet Take 1 tablet by mouth daily, Disp-90 tablet,R-0Normal      traZODone (DESYREL) 150 MG tablet Take 1 tablet by mouth nightly, Disp-90 tablet,R-0Normal      isosorbide dinitrate (ISORDIL) 10 MG tablet Take 1 tablet by mouth 2 times daily, Disp-180 tablet,R-0Normal      bumetanide (BUMEX) 1 MG tablet Take 1 tablet by mouth daily, Disp-30 tablet,R-3Normal      SUPREP BOWEL PREP KIT 17.5-3.13-1.6 GM/177ML SOLN Take as directed - dispense one Kit, Disp-1 kit,R-0,DAWNormal      metoprolol tartrate (LOPRESSOR) 50 MG tablet Take 1 tablet by mouth 2 times daily, Disp-180 tablet,R-1Normal      atorvastatin (LIPITOR) 80 MG tablet Take 1 tablet by mouth daily, Disp-90 tablet,R-1Normal      DULoxetine (CYMBALTA) 60 MG extended release capsule Take 1 capsule by mouth daily, Disp-90 capsule,R-1Normal      buPROPion (WELLBUTRIN XL) 150 MG extended release tablet Take 1 tablet by mouth every morning, Disp-90 tablet,R-1Normal      spironolactone (ALDACTONE) 25 MG tablet Take 1 tablet by mouth daily, Disp-90 tablet,R-1Normal      clopidogrel (PLAVIX) 75 MG tablet Take 1 tablet by mouth daily, Disp-90 tablet,R-1Normal      pantoprazole (PROTONIX) 40 MG tablet Take 1 tablet by mouth daily, Disp-90 tablet,R-1Normal      montelukast (SINGULAIR) 10 MG tablet Take 1 tablet by mouth nightly, Disp-90 tablet,R-1Normal      allopurinol (ZYLOPRIM) 300 MG tablet Take 1 tablet by mouth daily, Disp-90 tablet,R-1Normal      Handicap Placard MISC Starting Thu 8/13/2020, Disp-1 each,R-0, Print2 years      ELDERBERRY PO Take by mouthHistorical Med      oxybutynin (DITROPAN) 5 MG tablet TAKE 1 TABLET BY MOUTH TWICE DAILY, Disp-60 tablet, R-1Normal      Respiratory Therapy Supplies (NEBULIZER COMPRESSOR) KIT Disp-1 kit,R-0, PrintProvide insurance covered nebulizer, use daily as needed      polyethylene glycol (GLYCOLAX) packet polyethylene glycol 3350 17 gram/dose oral powderHistorical Med           Activity: activity as tolerated    Diet: cardiac diet    Follow-up:    Angelo Eastman PA-C  [facility]  [phone]    Schedule an appointment as soon as possible for a visit in 1 week  Post hospital follow up on CHF and [address] [facility]  [address] [place] [address]  [phone]  Go on 12/16/2020  Post hospital, ischemic work up for CHF. Already has appointment as per Cardiology. [facility]  [address] [place] [address]  [phone]  Schedule an appointment as soon as possible for a visit in 2 weeks  CHF education and monitoring    Patient Instructions: Please take all medication as prescribed. Please follow up with Cardiology as soon as possible and with the [address] [place] [address] Ave [address] in 2 weeks.

## 2020-12-04 ENCOUNTER — TELEPHONE (OUTPATIENT)
Dept: PRIMARY CARE CLINIC | Age: 55
End: 2020-12-04

## 2020-12-04 NOTE — TELEPHONE ENCOUNTER
Patient had abnormal mammogram that showed focal asymmetry in left breast, radiology recommend repeat mammogram with ultrasound. Imaging will be ordered.

## 2020-12-06 LAB
CULTURE: NORMAL
CULTURE: NORMAL
Lab: NORMAL
Lab: NORMAL
SPECIMEN DESCRIPTION: NORMAL
SPECIMEN DESCRIPTION: NORMAL

## 2020-12-09 ENCOUNTER — OFFICE VISIT (OUTPATIENT)
Dept: PRIMARY CARE CLINIC | Age: 55
End: 2020-12-09
Payer: MEDICARE

## 2020-12-09 VITALS
WEIGHT: 293 LBS | BODY MASS INDEX: 52.61 KG/M2 | HEART RATE: 74 BPM | DIASTOLIC BLOOD PRESSURE: 84 MMHG | SYSTOLIC BLOOD PRESSURE: 135 MMHG | OXYGEN SATURATION: 98 % | TEMPERATURE: 97.9 F

## 2020-12-09 PROCEDURE — 99215 OFFICE O/P EST HI 40 MIN: CPT | Performed by: PHYSICIAN ASSISTANT

## 2020-12-09 PROCEDURE — 1111F DSCHRG MED/CURRENT MED MERGE: CPT | Performed by: PHYSICIAN ASSISTANT

## 2020-12-09 RX ORDER — SUCRALFATE 1 G/1
TABLET ORAL
Qty: 90 TABLET | Refills: 3 | Status: SHIPPED | OUTPATIENT
Start: 2020-12-09

## 2020-12-09 RX ORDER — BUDESONIDE AND FORMOTEROL FUMARATE DIHYDRATE 80; 4.5 UG/1; UG/1
2 AEROSOL RESPIRATORY (INHALATION) 2 TIMES DAILY
Qty: 2 INHALER | Refills: 3 | Status: SHIPPED | OUTPATIENT
Start: 2020-12-09 | End: 2021-10-14 | Stop reason: ALTCHOICE

## 2020-12-09 RX ORDER — TRAZODONE HYDROCHLORIDE 150 MG/1
150 TABLET ORAL NIGHTLY
Qty: 90 TABLET | Refills: 1 | Status: SHIPPED | OUTPATIENT
Start: 2020-12-09 | End: 2021-06-16 | Stop reason: SDUPTHER

## 2020-12-09 RX ORDER — LEVOTHYROXINE SODIUM 0.03 MG/1
25 TABLET ORAL DAILY
Qty: 90 TABLET | Refills: 1 | Status: SHIPPED | OUTPATIENT
Start: 2020-12-09 | End: 2022-05-18 | Stop reason: SDUPTHER

## 2020-12-09 RX ORDER — ISOSORBIDE DINITRATE 10 MG/1
10 TABLET ORAL 2 TIMES DAILY
Qty: 180 TABLET | Refills: 1 | Status: SHIPPED | OUTPATIENT
Start: 2020-12-09 | End: 2022-05-18 | Stop reason: SDUPTHER

## 2020-12-09 RX ORDER — OXYBUTYNIN CHLORIDE 5 MG/1
5 TABLET ORAL 2 TIMES DAILY
Qty: 180 TABLET | Refills: 1 | Status: SHIPPED | OUTPATIENT
Start: 2020-12-09 | End: 2022-05-18 | Stop reason: SDUPTHER

## 2020-12-09 RX ORDER — PREGABALIN 75 MG/1
CAPSULE ORAL
COMMUNITY
Start: 2020-12-08 | End: 2021-02-12 | Stop reason: SINTOL

## 2020-12-09 ASSESSMENT — ENCOUNTER SYMPTOMS
CHEST TIGHTNESS: 1
WHEEZING: 1

## 2020-12-09 NOTE — PROGRESS NOTES
Visit Information    Have you changed or started any medications since your last visit including any over-the-counter medicines, vitamins, or herbal medicines? no   Are you having any side effects from any of your medications? -  no  Have you stopped taking any of your medications? Is so, why? -  no    Have you seen any other physician or provider since your last visit? Yes - Records Obtained  Have you had any other diagnostic tests since your last visit? Yes - Records Obtained  Have you been seen in the emergency room and/or had an admission to a hospital since we last saw you? Yes - Records Obtained  Have you had your routine dental cleaning in the past 6 months? no    Have you activated your [employer] account? If not, what are your barriers?  No     Patient Care Team:  Jamey Hussein PA-C as PCP - General (Physician Assistant)  Jamey Hussein PA-C as PCP - [facility] Provider    Medical History Review  Past Medical, Family, and Social History reviewed and does not contribute to the patient presenting condition    Health Maintenance   Topic Date Due    DTaP/Tdap/Td vaccine (1 - Tdap) 06/29/1982    Shingles Vaccine (1 of 2) 06/29/2013    Colon cancer screen colonoscopy  06/29/2013    Lipid screen  07/24/2021    Annual Wellness Visit (AWV)  08/14/2021    A1C test (Diabetic or Prediabetic)  11/19/2021    Breast cancer screen  11/19/2021    Potassium monitoring  12/02/2021    Creatinine monitoring  12/02/2021    Flu vaccine  Completed    Hepatitis C screen  Completed    HIV screen  Completed    Hepatitis A vaccine  Aged Out    Hepatitis B vaccine  Aged Out    Hib vaccine  Aged Out    Meningococcal (ACWY) vaccine  Aged Out    Pneumococcal 0-64 years Vaccine  Aged Out

## 2020-12-09 NOTE — PATIENT INSTRUCTIONS
· Contact Pharmacy Counter to follow up for nebulizer, [phone]  · Call and schedule appointment with pulmonology  · Call and schedule for evaluation at [address] [facility] [place] [id number]  [phone]

## 2020-12-09 NOTE — PROGRESS NOTES
 Nausea and vomiting    Stable angina (HCC)       Allergies   Allergen Reactions    Entresto [Sacubitril-Valsartan] Other (See Comments)     Acute kidney injury    Food Swelling     peaches    Gabapentin Swelling    Tetracyclines & Related        Medications listed as ordered at the time of discharge from hospital   Chiquita CAMACHO   Home Medication Instructions FLOYD:    Printed on:12/20/20 5146   Medication Information                      albuterol (PROVENTIL) (2.5 MG/3ML) 0.083% nebulizer solution  Take 3 mLs by nebulization every 6 hours as needed for Wheezing             albuterol sulfate  (90 Base) MCG/ACT inhaler  Inhale 2 puffs into the lungs every 4 hours as needed for Shortness of Breath             allopurinol (ZYLOPRIM) 300 MG tablet  Take 1 tablet by mouth daily             atorvastatin (LIPITOR) 80 MG tablet  Take 1 tablet by mouth daily             baclofen (LIORESAL) 10 MG tablet  Take 1 tablet by mouth 3 times daily             budesonide-formoterol (SYMBICORT) 80-4.5 MCG/ACT AERO  Inhale 2 puffs into the lungs 2 times daily             bumetanide (BUMEX) 1 MG tablet  Take 1 tablet by mouth daily             buPROPion (WELLBUTRIN XL) 150 MG extended release tablet  Take 1 tablet by mouth every morning             clopidogrel (PLAVIX) 75 MG tablet  Take 1 tablet by mouth daily             DULoxetine (CYMBALTA) 60 MG extended release capsule  Take 1 capsule by mouth daily             ELDERBERRY PO  Take by mouth             Handicap Placard MISC  by Does not apply route 2 years             ibuprofen (ADVIL;MOTRIN) 800 MG tablet  TAKE 1 TABLET BY MOUTH EVERY 8 HOURS AS NEEDED FOR PAIN             isosorbide dinitrate (ISORDIL) 10 MG tablet  Take 1 tablet by mouth 2 times daily             levothyroxine (SYNTHROID) 25 MCG tablet  Take 1 tablet by mouth daily             metoprolol tartrate (LOPRESSOR) 50 MG tablet  Take 1 tablet by mouth 2 times daily montelukast (SINGULAIR) 10 MG tablet  Take 1 tablet by mouth nightly             oxybutynin (DITROPAN) 5 MG tablet  Take 1 tablet by mouth 2 times daily             oxyCODONE-acetaminophen (PERCOCET) 7.5-325 MG per tablet  Take 1 tablet by mouth every 6 hours as needed for Pain for up to 30 days.  Intended supply: 30 days             pantoprazole (PROTONIX) 40 MG tablet  Take 1 tablet by mouth daily             polyethylene glycol (GLYCOLAX) packet  polyethylene glycol 3350 17 gram/dose oral powder             pregabalin (LYRICA) 75 MG capsule               Respiratory Therapy Supplies (NEBULIZER COMPRESSOR) KIT  Provide insurance covered nebulizer, use daily as needed             spironolactone (ALDACTONE) 25 MG tablet  Take 1 tablet by mouth daily             sucralfate (CARAFATE) 1 GM tablet  Take 1 tablet in AM, 2 tablet in evening daily             SUPREP BOWEL PREP KIT 17.5-3.13-1.6 GM/177ML SOLN  Take as directed - dispense one Kit             traZODone (DESYREL) 150 MG tablet  Take 1 tablet by mouth nightly                   Medications marked \"taking\" at this time  Outpatient Medications Marked as Taking for the 12/9/20 encounter (Office Visit) with Kathia Almazan PA-C   Medication Sig Dispense Refill    pregabalin (LYRICA) 75 MG capsule       traZODone (DESYREL) 150 MG tablet Take 1 tablet by mouth nightly 90 tablet 1    isosorbide dinitrate (ISORDIL) 10 MG tablet Take 1 tablet by mouth 2 times daily 180 tablet 1    levothyroxine (SYNTHROID) 25 MCG tablet Take 1 tablet by mouth daily 90 tablet 1    sucralfate (CARAFATE) 1 GM tablet Take 1 tablet in AM, 2 tablet in evening daily 90 tablet 3    budesonide-formoterol (SYMBICORT) 80-4.5 MCG/ACT AERO Inhale 2 puffs into the lungs 2 times daily 2 Inhaler 3    oxybutynin (DITROPAN) 5 MG tablet Take 1 tablet by mouth 2 times daily 180 tablet 1  [DISCONTINUED] oxyCODONE-acetaminophen (PERCOCET) 7.5-325 MG per tablet Take 1 tablet by mouth every 6 hours as needed for Pain for up to 30 days.  Intended supply: 30 days 120 tablet 0    ibuprofen (ADVIL;MOTRIN) 800 MG tablet TAKE 1 TABLET BY MOUTH EVERY 8 HOURS AS NEEDED FOR PAIN 90 tablet 2    albuterol (PROVENTIL) (2.5 MG/3ML) 0.083% nebulizer solution Take 3 mLs by nebulization every 6 hours as needed for Wheezing 120 each 3    albuterol sulfate  (90 Base) MCG/ACT inhaler Inhale 2 puffs into the lungs every 4 hours as needed for Shortness of Breath 1 Inhaler 3    bumetanide (BUMEX) 1 MG tablet Take 1 tablet by mouth daily 30 tablet 3    metoprolol tartrate (LOPRESSOR) 50 MG tablet Take 1 tablet by mouth 2 times daily 180 tablet 1    atorvastatin (LIPITOR) 80 MG tablet Take 1 tablet by mouth daily 90 tablet 1    DULoxetine (CYMBALTA) 60 MG extended release capsule Take 1 capsule by mouth daily 90 capsule 1    buPROPion (WELLBUTRIN XL) 150 MG extended release tablet Take 1 tablet by mouth every morning 90 tablet 1    spironolactone (ALDACTONE) 25 MG tablet Take 1 tablet by mouth daily 90 tablet 1    clopidogrel (PLAVIX) 75 MG tablet Take 1 tablet by mouth daily 90 tablet 1    pantoprazole (PROTONIX) 40 MG tablet Take 1 tablet by mouth daily 90 tablet 1    montelukast (SINGULAIR) 10 MG tablet Take 1 tablet by mouth nightly 90 tablet 1    allopurinol (ZYLOPRIM) 300 MG tablet Take 1 tablet by mouth daily 90 tablet 1    Handicap Placard MISC by Does not apply route 2 years 1 each 0    ELDERBERRY PO Take by mouth      polyethylene glycol (GLYCOLAX) packet polyethylene glycol 3350 17 gram/dose oral powder          Medications patient taking as of now reconciled against medications ordered at time of hospital discharge: Yes    Chief Complaint   Patient presents with    Follow-Up from [id number] [facility] course: Kimberley Padilla is a 60 y.o. female who was admitted for the management of Acute on chronic combined systolic and diastolic congestive heart failure ([facility] [address] presented to the emergency department with persistent shortness of breath, dyspnea, lower extremity edema, orthopnea, paroxysmal nocturnal dyspnea without any associated chest pain or discomfort; patient however acknowledged missing her last 2 doses of medication. Cardiology was consulted and any intervention from them was deferred. Patient was diuresed with Bumex and Aldactone and discharged home when medically clear and stable. Patient was educated about daily medication compliance and given a referral to a the congestive heart failure clinic. HPI    Inpatient course: Discharge summary reviewed- see chart. Interval history/Current status: Patient is here for hospital follow-up for CHF. Patient states she is compliant with medications. Patient was admitted for CHF exacerbation, diuresed in hospital, patient states symptoms improved afterwards. Patient states will be follow-up with cardiology on 12/16/2020. Patient states she will be following pain management on 12/11/2020. Patient states \"was seen by rheumatology on 12/8/2020\". Patient voiced improvement with breathing with medication, Symbicort, informed patient will refill medication refer to pulmonology for evaluation, patient voiced understanding. Patient states \"never got nebulizer\" that was ordered earlier this year, form patient will reorder. Patient will be having repeat mammogram and ultrasound due to abnormal mammogram on 12/17/2020. Patient blood pressure controlled in office. Patient is morbid obese. Discussed weight loss in depth with patient, encourage patient make changes in diet. Patient requesting additional medication refill for GERD, hypothyroidism, PTSD, urge incontinence, patient denies any side effect of medications. Labs reviewed. Health maintenance reviewed. Medications reviewed and prescribed. Review of Systems   Constitutional: Negative for chills and fever. HENT: Negative for congestion. Respiratory: Positive for chest tightness and wheezing. Negative for cough and shortness of breath. Cardiovascular: Negative for chest pain. Gastrointestinal: Negative for constipation, diarrhea, nausea and vomiting. Genitourinary: Negative for dysuria, frequency and urgency. Musculoskeletal: Negative for back pain, myalgias and neck pain. Neurological: Negative for headaches. Vitals:    12/09/20 1318   BP: 135/84   Pulse: 74   Temp: 97.9 °F (36.6 °C)   SpO2: 98%   Weight: (!) 346 lb (156.9 kg)     Body mass index is 52.61 kg/m². Wt Readings from Last 3 Encounters:   12/17/20 (!) 343 lb (155.6 kg)   12/09/20 (!) 346 lb (156.9 kg)   09/02/20 (!) 350 lb (158.8 kg)     BP Readings from Last 3 Encounters:   12/17/20 (!) 157/97   12/09/20 135/84   12/01/20 (!) 140/68       Physical Exam  Vitals signs reviewed. Constitutional:       Appearance: Normal appearance. She is well-developed and well-groomed. She is morbidly obese. HENT:      Head: Normocephalic and atraumatic. Right Ear: External ear normal.      Left Ear: External ear normal.      Nose: Nose normal.   Eyes:      General: Lids are normal.      Conjunctiva/sclera: Conjunctivae normal.      Pupils: Pupils are equal, round, and reactive to light. Cardiovascular:      Rate and Rhythm: Normal rate and regular rhythm. Heart sounds: Normal heart sounds. Pulmonary:      Effort: Pulmonary effort is normal.      Breath sounds: Normal breath sounds. Abdominal:      Palpations: Abdomen is soft. There is no mass. Tenderness: There is no abdominal tenderness. Musculoskeletal:         General: No tenderness. Skin:     General: Skin is warm and dry. Neurological:      Mental Status: She is alert and oriented to person, place, and time.    Psychiatric: Behavior: Behavior is cooperative. Assessment/Plan:    Regina Lala was seen today for follow-up from hospital.    Diagnoses and all orders for this visit:    Chronic combined systolic and diastolic heart failure (HCC)  -     isosorbide dinitrate (ISORDIL) 10 MG tablet; Take 1 tablet by mouth 2 times daily  -     TX DISCHARGE MEDS RECONCILED W/ CURRENT OUTPATIENT MED LIST    Chronic bronchitis, unspecified chronic bronchitis type ([facility] [address]  -     budesonide-formoterol (SYMBICORT) 80-4.5 MCG/ACT AERO; Inhale 2 puffs into the lungs 2 times daily  -     [facility], [facility]  -     DME Order for Maurice as OP    Abnormal mammogram of left breast    Class 3 severe obesity due to excess calories with serious comorbidity and body mass index (BMI) of 50.0 to 59.9 in [facility])    Fibromyalgia    Medication refill  -     traZODone (DESYREL) 150 MG tablet; Take 1 tablet by mouth nightly  -     levothyroxine (SYNTHROID) 25 MCG tablet; Take 1 tablet by mouth daily  -     sucralfate (CARAFATE) 1 GM tablet; Take 1 tablet in AM, 2 tablet in evening daily  -     oxybutynin (DITROPAN) 5 MG tablet; Take 1 tablet by mouth 2 times daily    Gastroesophageal reflux disease, unspecified whether esophagitis present  -     sucralfate (CARAFATE) 1 GM tablet; Take 1 tablet in AM, 2 tablet in evening daily    Hypothyroidism, unspecified type  -     levothyroxine (SYNTHROID) 25 MCG tablet; Take 1 tablet by mouth daily    PTSD (post-traumatic stress disorder)  -     traZODone (DESYREL) 150 MG tablet; Take 1 tablet by mouth nightly    Urge incontinence  -     oxybutynin (DITROPAN) 5 MG tablet;  Take 1 tablet by mouth 2 times daily            Medical Decision Making: moderate complexity

## 2020-12-10 ENCOUNTER — TELEPHONE (OUTPATIENT)
Dept: PRIMARY CARE CLINIC | Age: 55
End: 2020-12-10

## 2020-12-10 NOTE — TELEPHONE ENCOUNTER
Called. Informed pt provider placed order. Pt voiced understanding. Denied needing scheduling number.

## 2020-12-10 NOTE — TELEPHONE ENCOUNTER
Pt was seen in ov on 12/09, needs to have a PFT completed before pt can be seen by pulmonology,pls advise

## 2020-12-11 ENCOUNTER — HOSPITAL ENCOUNTER (OUTPATIENT)
Dept: PAIN MANAGEMENT | Age: 55
Discharge: HOME | End: 2020-12-11
Payer: MEDICARE

## 2020-12-11 PROCEDURE — 99213 OFFICE O/P EST LOW 20 MIN: CPT

## 2020-12-11 PROCEDURE — 99213 OFFICE O/P EST LOW 20 MIN: CPT | Performed by: NURSE PRACTITIONER

## 2020-12-11 RX ORDER — OXYCODONE AND ACETAMINOPHEN 7.5; 325 MG/1; MG/1
1 TABLET ORAL EVERY 6 HOURS PRN
Qty: 120 TABLET | Refills: 0 | Status: SHIPPED | OUTPATIENT
Start: 2020-12-14 | End: 2021-01-12 | Stop reason: SDUPTHER

## 2020-12-11 RX ORDER — BACLOFEN 10 MG/1
10 TABLET ORAL 3 TIMES DAILY
Qty: 90 TABLET | Refills: 0 | Status: SHIPPED | OUTPATIENT
Start: 2020-12-11 | End: 2021-03-12 | Stop reason: SDUPTHER

## 2020-12-11 ASSESSMENT — ENCOUNTER SYMPTOMS
BACK PAIN: 1
CONSTIPATION: 0
SHORTNESS OF BREATH: 0
COUGH: 0
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
## 2020-12-17 ENCOUNTER — HOSPITAL ENCOUNTER (OUTPATIENT)
Dept: ULTRASOUND IMAGING | Age: 55
End: 2020-12-17
Payer: MEDICARE

## 2020-12-17 ENCOUNTER — HOSPITAL ENCOUNTER (OUTPATIENT)
Dept: MAMMOGRAPHY | Age: 55
Discharge: HOME | End: 2020-12-19
Payer: MEDICARE

## 2020-12-17 ENCOUNTER — HOSPITAL ENCOUNTER (EMERGENCY)
Age: 55
Discharge: HOME | End: 2020-12-17
Attending: EMERGENCY MEDICINE
Payer: MEDICARE

## 2020-12-17 ENCOUNTER — APPOINTMENT (OUTPATIENT)
Dept: GENERAL RADIOLOGY | Age: 55
End: 2020-12-17
Payer: MEDICARE

## 2020-12-17 VITALS
OXYGEN SATURATION: 99 % | WEIGHT: 293 LBS | HEIGHT: 63 IN | RESPIRATION RATE: 18 BRPM | DIASTOLIC BLOOD PRESSURE: 97 MMHG | SYSTOLIC BLOOD PRESSURE: 157 MMHG | BODY MASS INDEX: 51.91 KG/M2 | HEART RATE: 73 BPM

## 2020-12-17 LAB
ABSOLUTE EOS #: 0.21 K/UL (ref 0–0.44)
ABSOLUTE IMMATURE GRANULOCYTE: 0.04 K/UL (ref 0–0.3)
ABSOLUTE LYMPH #: 2.28 K/UL (ref 1.1–3.7)
ABSOLUTE MONO #: 0.4 K/UL (ref 0.1–1.2)
ANION GAP SERPL CALCULATED.3IONS-SCNC: 11 MMOL/L (ref 9–17)
BASOPHILS # BLD: 0 % (ref 0–2)
BASOPHILS ABSOLUTE: 0.03 K/UL (ref 0–0.2)
BNP INTERPRETATION: NORMAL
BUN BLDV-MCNC: 19 MG/DL (ref 6–20)
BUN/CREAT BLD: ABNORMAL (ref 9–20)
CALCIUM SERPL-MCNC: 8.7 MG/DL (ref 8.6–10.4)
CHLORIDE BLD-SCNC: 104 MMOL/L (ref 98–107)
CO2: 26 MMOL/L (ref 20–31)
CREAT SERPL-MCNC: 1.1 MG/DL (ref 0.5–0.9)
DIFFERENTIAL TYPE: ABNORMAL
EOSINOPHILS RELATIVE PERCENT: 3 % (ref 1–4)
GFR AFRICAN AMERICAN: >60 ML/MIN
GFR NON-AFRICAN AMERICAN: 51 ML/MIN
GFR SERPL CREATININE-BSD FRML MDRD: ABNORMAL ML/MIN/{1.73_M2}
GFR SERPL CREATININE-BSD FRML MDRD: ABNORMAL ML/MIN/{1.73_M2}
GLUCOSE BLD-MCNC: 89 MG/DL (ref 70–99)
HCT VFR BLD CALC: 35.1 % (ref 36.3–47.1)
HEMOGLOBIN: 10.3 G/DL (ref 11.9–15.1)
IMMATURE GRANULOCYTES: 1 %
LYMPHOCYTES # BLD: 30 % (ref 24–43)
MCH RBC QN AUTO: 26.7 PG (ref 25.2–33.5)
MCHC RBC AUTO-ENTMCNC: 29.3 G/DL (ref 28.4–34.8)
MCV RBC AUTO: 90.9 FL (ref 82.6–102.9)
MONOCYTES # BLD: 5 % (ref 3–12)
NRBC AUTOMATED: 0 PER 100 WBC
PDW BLD-RTO: 15.9 % (ref 11.8–14.4)
PLATELET # BLD: 294 K/UL (ref 138–453)
PLATELET ESTIMATE: ABNORMAL
PMV BLD AUTO: 10.2 FL (ref 8.1–13.5)
POTASSIUM SERPL-SCNC: 4.5 MMOL/L (ref 3.7–5.3)
PRO-BNP: 270 PG/ML
RBC # BLD: 3.86 M/UL (ref 3.95–5.11)
RBC # BLD: ABNORMAL 10*6/UL
SEG NEUTROPHILS: 61 % (ref 36–65)
SEGMENTED NEUTROPHILS ABSOLUTE COUNT: 4.67 K/UL (ref 1.5–8.1)
SODIUM BLD-SCNC: 141 MMOL/L (ref 135–144)
TROPONIN INTERP: ABNORMAL
TROPONIN T: ABNORMAL NG/ML
TROPONIN, HIGH SENSITIVITY: 17 NG/L (ref 0–14)
WBC # BLD: 7.6 K/UL (ref 3.5–11.3)
WBC # BLD: ABNORMAL 10*3/UL

## 2020-12-17 PROCEDURE — 93005 ELECTROCARDIOGRAM TRACING: CPT | Performed by: STUDENT IN AN ORGANIZED HEALTH CARE EDUCATION/TRAINING PROGRAM

## 2020-12-17 PROCEDURE — 71045 X-RAY EXAM CHEST 1 VIEW: CPT

## 2020-12-17 PROCEDURE — 83880 ASSAY OF NATRIURETIC PEPTIDE: CPT

## 2020-12-17 PROCEDURE — 84484 ASSAY OF TROPONIN QUANT: CPT

## 2020-12-17 PROCEDURE — G0279 TOMOSYNTHESIS, MAMMO: HCPCS

## 2020-12-17 PROCEDURE — 6370000000 HC RX 637 (ALT 250 FOR IP): Performed by: STUDENT IN AN ORGANIZED HEALTH CARE EDUCATION/TRAINING PROGRAM

## 2020-12-17 PROCEDURE — 99284 EMERGENCY DEPT VISIT MOD MDM: CPT

## 2020-12-17 PROCEDURE — 80048 BASIC METABOLIC PNL TOTAL CA: CPT

## 2020-12-17 PROCEDURE — 85025 COMPLETE CBC W/AUTO DIFF WBC: CPT

## 2020-12-17 RX ORDER — HYDROCODONE BITARTRATE AND ACETAMINOPHEN 5; 325 MG/1; MG/1
2 TABLET ORAL ONCE
Status: COMPLETED | OUTPATIENT
Start: 2020-12-17 | End: 2020-12-17

## 2020-12-17 RX ADMIN — HYDROCODONE BITARTRATE AND ACETAMINOPHEN 2 TABLET: 5; 325 TABLET ORAL at 15:58

## 2020-12-17 ASSESSMENT — PAIN SCALES - GENERAL
PAINLEVEL_OUTOF10: 10
PAINLEVEL_OUTOF10: 10

## 2020-12-17 ASSESSMENT — PAIN DESCRIPTION - LOCATION: LOCATION: GENERALIZED

## 2020-12-17 ASSESSMENT — PAIN DESCRIPTION - PAIN TYPE: TYPE: CHRONIC PAIN

## 2020-12-17 ASSESSMENT — PAIN DESCRIPTION - DESCRIPTORS: DESCRIPTORS: ACHING

## 2020-12-17 ASSESSMENT — PAIN DESCRIPTION - FREQUENCY: FREQUENCY: CONTINUOUS

## 2020-12-17 NOTE — ED NOTES
Pt sitting on cot, alert, oriented, no distress noted. Awaiting orders.       Greer Lee RN  12/17/20 [id number]

## 2020-12-17 NOTE — ED NOTES
Pt to ed with c/o generalized body aches x3 days, sob with exertion, headache x2 days. Pt states she treats for chronic pain has been taking oxycodone and 800  Mg motrin without any relief. Pt denies chest pain, abdominal pain, visual disturbances. Pt states she had covid in September and the body aches feel worse today then they did back then. Pt given gown to change into and registration at bedside.       Kam Hernández RN  12/17/20 [id number]

## 2020-12-17 NOTE — ED PROVIDER NOTES
[facility]  ED  Emergency Department Encounter  Emergency Medicine Resident     Pt Name: Mitchell Rudd  MRN: [id number]  [place] 1965  Date of evaluation: 12/17/20  PCP:  Meenu Roblero, [facility]       Chief Complaint   Patient presents with    Generalized Body Aches     increased generalized pain and fatigue x 2-3 days    Shortness of Breath     with exertion    Headache     headache since yesterday, worse today       HISTORY OFPRESENT ILLNESS  (Location/Symptom, Timing/Onset, Context/Setting, Quality, Duration, Modifying Factors,Severity.)      Mitchell Rudd is a 60 y. o.yo female who presents with generalized body aches as well as shortness of breath. Patient has known history of CHF but states that she has been more short of breath recently with exertion. Patient also is complaining of diffuse generalized body aches as well as headache. Patient states she gets headache, this is not the worst headache of her life, not splitting, no vision changes. She states she took a Percocet as well as Motrin at home with minimal relief of pain. Patient does follow with pain clinic for chronic pain syndrome she was also recently diagnosed with fibromyalgia and started on a new medication on 12/8/2020. Patient denies any fever, chills, nausea, vomiting or any other concerning signs or symptoms of a viral illness. Patient does state that she had Covid back in September and she feels like her body aches have been worse since then. She states she still eating and drinking okay making good urine. PAST MEDICAL / SURGICAL / SOCIAL / FAMILY HISTORY      has a past medical history of Arthritis, Bronchitis, CHF (congestive heart failure) ([address] COVID-19, GERD (gastroesophageal reflux disease), Gout, History of heart attack, HLD (hyperlipidemia), Hypertension, Insomnia, and Osteoarthritis. has a past surgical history that includes back surgery; shoulder surgery (Right, 2009); knee surgery (Left, 2009); Cholecystectomy, laparoscopic; Knee arthroscopy (Right); Hysterectomy, total abdominal; and Neck surgery.      Social History     Socioeconomic History    Marital status: Single     Spouse name: Not on file    Number of children: Not on file    Years of education: Not on file    Highest education level: Not on file   Occupational History    Not on file   Social Needs    Financial resource strain: Not on file    Food insecurity     Worry: Not on file     Inability: Not on file    Transportation needs     Medical: Not on file     Non-medical: Not on file   Tobacco Use    Smoking status: Never Smoker    Smokeless tobacco: Never Used   Substance and Sexual Activity    Alcohol use: No    Drug use: No    Sexual activity: Not on file   Lifestyle    Physical activity     Days per week: Not on file     Minutes per session: Not on file    Stress: Not on file   Relationships    Social connections     Talks on phone: Not on file     Gets together: Not on file     Attends [employer] service: Not on file     Active member of club or organization: Not on file     Attends meetings of clubs or organizations: Not on file     Relationship status: Not on file    Intimate partner violence     Fear of current or ex partner: Not on file     Emotionally abused: Not on file     Physically abused: Not on file     Forced sexual activity: Not on file   Other Topics Concern    Not on file   Social History Narrative    Not on file       Family History   Problem Relation Age of Onset    Other Mother     Diabetes Mother     Heart Disease Mother     Arthritis Mother     Kidney Disease Mother     Lupus Mother     Arthritis Sister     Diabetes Brother     Asthma Brother     Cancer Maternal Grandfather     Hypertension Sister     Breast Cancer Sister         28s    Breast Cancer Niece         30-35 Allergies:  Entresto [sacubitril-valsartan], Food, Gabapentin, and Tetracyclines & related    Home Medications:  Prior to Admission medications    Medication Sig Start Date End Date Taking? Authorizing Provider   oxyCODONE-acetaminophen (PERCOCET) 7.5-325 MG per tablet Take 1 tablet by mouth every 6 hours as needed for Pain for up to 30 days.  Intended supply: 30 days 12/14/20 1/13/21  ASA Singh CNP   baclofen (LIORESAL) 10 MG tablet Take 1 tablet by mouth 3 times daily 12/11/20 1/10/21  ASA Singh CNP   pregabalin (LYRICA) 75 MG capsule  12/8/20   Historical Provider, MD   traZODone (DESYREL) 150 MG tablet Take 1 tablet by mouth nightly 12/9/20   Monica Jesus, THOMAS   isosorbide dinitrate (ISORDIL) 10 MG tablet Take 1 tablet by mouth 2 times daily 12/9/20   Monica Jesus, THOMAS   levothyroxine (SYNTHROID) 25 MCG tablet Take 1 tablet by mouth daily 12/9/20   Monica Jesus, THOMAS   sucralfate (CARAFATE) 1 GM tablet Take 1 tablet in AM, 2 tablet in evening daily 12/9/20   Monica Jesus, THOMAS   budesonide-formoterol [facility]) 80-4.5 MCG/ACT AERO Inhale 2 puffs into the lungs 2 times daily 12/9/20   Monica Jeuss, PA-VIVIAN   oxybutynin (DITROPAN) 5 MG tablet Take 1 tablet by mouth 2 times daily 12/9/20   Monica Jesus, PA-VIVIAN   ibuprofen (ADVIL;MOTRIN) 800 MG tablet TAKE 1 TABLET BY MOUTH EVERY 8 HOURS AS NEEDED FOR PAIN 10/17/20   Monica Jesus, THOMAS   albuterol (PROVENTIL) (2.5 MG/3ML) 0.083% nebulizer solution Take 3 mLs by nebulization every 6 hours as needed for Wheezing 10/15/20   Monica Jesus, PA-VIVIAN   albuterol sulfate  (90 Base) MCG/ACT inhaler Inhale 2 puffs into the lungs every 4 hours as needed for Shortness of Breath 10/8/20   Monica Jesus PA-C   bumetanide (BUMEX) 1 MG tablet Take 1 tablet by mouth daily 9/10/20   Myrna Morales DO Cardiovascular: Negative for chest pain, palpitations and leg swelling. Gastrointestinal: Negative for abdominal pain, constipation, diarrhea, nausea and vomiting. Genitourinary: Negative for difficulty urinating, dysuria, flank pain and urgency. Musculoskeletal: Positive for myalgias. Negative for back pain, neck pain and neck stiffness. Skin: Negative for color change, pallor and rash. Neurological: Positive for headaches. Negative for dizziness, tremors, speech difficulty, weakness and light-headedness. PHYSICAL EXAM   (up to 7 for level 4, 8 or more forlevel 5)      INITIAL VITALS:   ED Triage Vitals [12/17/20 1437]   BP Temp Temp src Pulse Resp SpO2 Height Weight   (!) 157/97 -- -- 73 18 99 % 5' 3\" (1.6 m) (!) 343 lb (155.6 kg)       Physical Exam  Constitutional:       General: She is not in acute distress. Appearance: She is obese. She is not ill-appearing or diaphoretic. HENT:      Head: Normocephalic and atraumatic. Right Ear: External ear normal.      Left Ear: External ear normal.      Nose: Nose normal. No rhinorrhea. Eyes:      Extraocular Movements: Extraocular movements intact. Pupils: Pupils are equal, round, and reactive to light. Neck:      Musculoskeletal: Normal range of motion and neck supple. Cardiovascular:      Rate and Rhythm: Normal rate and regular rhythm. Pulses: Normal pulses. Heart sounds: No murmur. Pulmonary:      Effort: Pulmonary effort is normal. No respiratory distress. Breath sounds: Normal breath sounds. Abdominal:      General: Bowel sounds are normal. There is no distension. Palpations: Abdomen is soft. Tenderness: There is no abdominal tenderness. Musculoskeletal: Normal range of motion. General: Tenderness (Multiple pressure points of tenderness located throughout upper and lower extermities as well as the trunk ) present. No swelling. Skin:     General: Skin is warm and dry.    Neurological: RADIOLOGY:  Xr Chest Portable    Result Date: 12/17/2020  EXAMINATION: ONE XRAY VIEW OF THE CHEST 12/17/2020 12:58 pm COMPARISON: 11/30/2020 HISTORY: ORDERING SYSTEM PROVIDED HISTORY: CHF TECHNOLOGIST PROVIDED HISTORY: CHF Acuity: Unknown Type of Exam: Unknown FINDINGS: The cardiac size is enlarged. No acute infiltrates or pleural effusions are seen. Pulmonary vascularity appears mildly hazy and indistinct. There is mild ectasia of the thoracic aorta. There are degenerative changes in the spine . No acute bony abnormalities. The hilar structures are normal. Cervical fusion plate appears in place. Cardiomegaly with mild vascular congestion     Sherlyn Neil Digital Diagnostic Unilateral Left    Result Date: 12/17/2020  EXAMINATION: DIAGNOSTIC DIGITAL LEFT BREAST MAMMOGRAM WITH TOMOSYNTHESIS, 12/17/2020 1:28 pm TECHNIQUE: Diagnostic mammography of the left breast was performed with tomosynthesis. 2D standard and 3D tomosynthesis combination imaging performed through the left breast.  Computer aided detection was utilized in the interpretation of this exam. Views: CC and MLO views with tomosynthesis. COMPARISON: 11/19/2020, 01/04/2017 HISTORY: ORDERING SYSTEM PROVIDED HISTORY: Abnormal mammogram of left breast FINDINGS: Scattered fibroglandular tissue. The focal asymmetry is not redemonstrated on this exam, which likely represented summation artifact. No evidence for mass on tomosynthesis. No distortion or suspicious microcalcification. Focal asymmetry is not redemonstrated with additional imaging. Return to yearly screening schedule is recommended. BI-RADS 1 BIRADS: BIRADS - CATEGORY 1 Negative. Normal interval follow-up is recommended in 12 months. OVERALL ASSESSMENT - NEGATIVE A letter of notification will be sent to the patient regarding the results. The [employer] recommends annual mammograms for women 40 years and older.        EKG All EKG's are interpreted by the Emergency Department Physicianwho either signs or Co-signs this chart in the absence of a cardiologist.    EMERGENCY DEPARTMENT COURSE:  ED Course as of Dec 17 1742   Thu Dec 17, 2020   1634 IMPRESSION:  Cardiomegaly with mild vascular congestion    [CD]   1656 Patient's lab work appears improved from previous visits including most recent being 2 weeks ago. [CD]   4458 Discussion had with patient, she is comfortable with going home and calling her primary care doctor. Likely a flareup of her fibromyalgia secondary to a recent Covid infection. [CD]      ED Course User Index  [CD] Nargis Huerta DO          PROCEDURES:  None    CONSULTS:  None    CRITICAL CARE:  Please see attending note    FINAL IMPRESSION      1.  Myalgia          DISPOSITION / PLAN     DISPOSITION Decision To Discharge 12/17/2020 05:07:15 PM      PATIENT REFERRED TO:  [facility] ED  [address] [facility]  [phone]  Go to   If symptoms worsen    Romain Robles PA-C  [address] [place] [address]  [phone]    Call in 3 days  For follow up      [facility]:  Discharge Medication List as of 12/17/2020  5:11 PM          Nargis Huerta DO  Emergency Medicine Resident    (Please note that portions of this note were completed with a voice recognition program.Efforts were made to edit the dictations but occasionally words are mis-transcribed.)        Nargis Huerta DO  Resident  12/17/20 746 [facility],   Resident  01/06/21 [id number]

## 2020-12-17 NOTE — ED PROVIDER NOTES
[facility] ED  eMERGENCY dEPARTMENT eNCOUnter   Attending Attestation     Pt Name: Fei Watters  MRN: [id number]  [place] 1965  Date of evaluation: 12/17/20       Fei Watters is a 60 y.o. female who presents with Generalized Body Aches (increased generalized pain and fatigue x 2-3 days), Shortness of Breath (with exertion), and Headache (headache since yesterday, worse today)      History: With acute on chronic diffuse body ache. Patient says been going on for some time but says it is worse today and she is tired of dealing with it. Patient also notices that she has been having some dyspnea on exertion. Exam: Heart rate and rhythm are regular. Lungs are clear to auscultation bilaterally. Abdomen is soft, nontender. Patient is in no acute distress. Patient has hyperesthesia over her entire body. Any where she is touched she states she is having severe pain. Plan for pain control, CHF work-up given her history and her dyspnea on exertion. I performed a history and physical examination of the patient and discussed management with the resident. I reviewed the residents note and agree with the documented findings and plan of care. Any areas of disagreement are noted on the chart. I was personally present for the key portions of any procedures. I have documented in the chart those procedures where I was not present during the key portions. I have personally reviewed all images and agree with the resident's interpretation. I have reviewed the emergency nurses triage note. I agree with the chief complaint, past medical history, past surgical history, allergies, medications, social and family history as documented unless otherwise noted below. Documentation of the HPI, Physical Exam and Medical Decision Making performed by medical students or scribes is based on my personal performance of the HPI, PE and MDM. For Phys Assistant/ Nurse Practitioner cases/documentation I have had a face to face evaluation of this patient and have completed at least one if not all key elements of the E/M (history, physical exam, and MDM). Additional findings are as noted. For APC cases I have personally evaluated and examined the patient in conjunction with the APC and agree with the treatment plan and disposition of the patient as recorded by the APC.     Forrest Cazares MD  Attending Emergency  Physician       Desire Mack MD  12/17/20 0803

## 2020-12-18 LAB
EKG ATRIAL RATE: 73 BPM
EKG P AXIS: 76 DEGREES
EKG P-R INTERVAL: 136 MS
EKG Q-T INTERVAL: 436 MS
EKG QRS DURATION: 118 MS
EKG QTC CALCULATION (BAZETT): 480 MS
EKG R AXIS: -22 DEGREES
EKG T AXIS: 32 DEGREES
EKG VENTRICULAR RATE: 73 BPM

## 2020-12-20 ASSESSMENT — ENCOUNTER SYMPTOMS
BACK PAIN: 0
VOMITING: 0
DIARRHEA: 0
NAUSEA: 0
CONSTIPATION: 0
SHORTNESS OF BREATH: 0
COUGH: 0

## 2021-01-03 ENCOUNTER — HOSPITAL ENCOUNTER (OUTPATIENT)
Dept: LAB | Age: 56
Setting detail: SPECIMEN
Discharge: HOME | End: 2021-01-03
Payer: MEDICARE

## 2021-01-03 DIAGNOSIS — Z01.818 PREOP TESTING: Primary | ICD-10-CM

## 2021-01-03 PROCEDURE — U0003 INFECTIOUS AGENT DETECTION BY NUCLEIC ACID (DNA OR RNA); SEVERE ACUTE RESPIRATORY SYNDROME CORONAVIRUS 2 (SARS-COV-2) (CORONAVIRUS DISEASE [COVID-19]), AMPLIFIED PROBE TECHNIQUE, MAKING USE OF HIGH THROUGHPUT TECHNOLOGIES AS DESCRIBED BY CMS-2020-01-R: HCPCS

## 2021-01-04 LAB
SARS-COV-2, RAPID: NORMAL
SARS-COV-2: NORMAL
SARS-COV-2: NOT DETECTED
SOURCE: NORMAL

## 2021-01-05 ENCOUNTER — TELEPHONE (OUTPATIENT)
Dept: PRIMARY CARE CLINIC | Age: 56
End: 2021-01-05

## 2021-01-06 ASSESSMENT — ENCOUNTER SYMPTOMS
DIARRHEA: 0
NAUSEA: 0
PHOTOPHOBIA: 0
BACK PAIN: 0
TROUBLE SWALLOWING: 0
CONSTIPATION: 0
EYE REDNESS: 0
CHEST TIGHTNESS: 0
COUGH: 0
SORE THROAT: 0
ABDOMINAL PAIN: 0
SINUS PRESSURE: 0
VOMITING: 0
COLOR CHANGE: 0
SINUS PAIN: 0
SHORTNESS OF BREATH: 1

## 2021-01-07 ENCOUNTER — HOSPITAL ENCOUNTER (OUTPATIENT)
Dept: PULMONOLOGY | Age: 56
Discharge: HOME | End: 2021-01-07
Payer: MEDICARE

## 2021-01-07 DIAGNOSIS — J42 CHRONIC BRONCHITIS, UNSPECIFIED CHRONIC BRONCHITIS TYPE (HCC): ICD-10-CM

## 2021-01-07 PROCEDURE — 94729 DIFFUSING CAPACITY: CPT

## 2021-01-07 PROCEDURE — 94664 DEMO&/EVAL PT USE INHALER: CPT

## 2021-01-07 PROCEDURE — 94640 AIRWAY INHALATION TREATMENT: CPT

## 2021-01-07 PROCEDURE — 94060 EVALUATION OF WHEEZING: CPT

## 2021-01-12 ENCOUNTER — HOSPITAL ENCOUNTER (OUTPATIENT)
Dept: PAIN MANAGEMENT | Age: 56
Discharge: HOME | End: 2021-01-12
Payer: MEDICARE

## 2021-01-12 DIAGNOSIS — Z79.891 ENCOUNTER FOR LONG-TERM OPIATE ANALGESIC USE: ICD-10-CM

## 2021-01-12 DIAGNOSIS — M47.22 CERVICAL SPONDYLOSIS WITH RADICULOPATHY: ICD-10-CM

## 2021-01-12 DIAGNOSIS — M54.16 LUMBAR RADICULOPATHY: ICD-10-CM

## 2021-01-12 DIAGNOSIS — E66.01 CLASS 3 SEVERE OBESITY DUE TO EXCESS CALORIES WITH SERIOUS COMORBIDITY AND BODY MASS INDEX (BMI) OF 45.0 TO 49.9 IN ADULT (HCC): Primary | Chronic | ICD-10-CM

## 2021-01-12 DIAGNOSIS — M47.816 SPONDYLOSIS OF LUMBAR REGION WITHOUT MYELOPATHY OR RADICULOPATHY: ICD-10-CM

## 2021-01-12 DIAGNOSIS — Z98.1 STATUS POST LUMBAR SPINAL FUSION: ICD-10-CM

## 2021-01-12 DIAGNOSIS — Z98.1 STATUS POST CERVICAL SPINAL FUSION: ICD-10-CM

## 2021-01-12 DIAGNOSIS — E66.01 MORBID OBESITY (HCC): ICD-10-CM

## 2021-01-12 DIAGNOSIS — M96.1 FAILED BACK SYNDROME OF CERVICAL SPINE: ICD-10-CM

## 2021-01-12 PROCEDURE — 99213 OFFICE O/P EST LOW 20 MIN: CPT | Performed by: NURSE PRACTITIONER

## 2021-01-12 PROCEDURE — 99213 OFFICE O/P EST LOW 20 MIN: CPT

## 2021-01-12 RX ORDER — OXYCODONE AND ACETAMINOPHEN 7.5; 325 MG/1; MG/1
1 TABLET ORAL EVERY 6 HOURS PRN
Qty: 120 TABLET | Refills: 0 | Status: SHIPPED | OUTPATIENT
Start: 2021-01-13 | End: 2021-02-12 | Stop reason: SDUPTHER

## 2021-01-12 ASSESSMENT — ENCOUNTER SYMPTOMS
SHORTNESS OF BREATH: 0
BACK PAIN: 1
COUGH: 0
CONSTIPATION: 0
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
## 2021-01-13 NOTE — PROCEDURES
[address] [facility]                               PULMONARY FUNCTION    PATIENT NAME: Bridgette Rust                   :        1965  MED REC NO:   [id number]                             ROOM:  ACCOUNT NO:   [de-identified]                           ADMIT DATE: 2021  PROVIDER:     Michelle Fleming    DATE OF PROCEDURE:  2021    The patient's spirometry shows a restricted flow-volume loop. FEV1 is  79% predicted with 4% bronchodilator change to 82% predicted. FVC is  75% predicted with no bronchodilator change. FEV1/FVC ratio 84. The patient's diffusion capacity 74% predicted, corrected for alveolar  volume 112% predicted. I do not have lung volumes to review. FINAL IMPRESSION:  Spirometry is essentially normal, and corrected  diffusion capacity is also normal.  Clinical correlation is advised.         Dayanna Ferrera    D: 2021 19:01:46       T: 2021 2:52:26     /[email or web address]  Job#: [id number]     Doc#: [id number]    CC:

## 2021-01-21 DIAGNOSIS — R06.02 SHORTNESS OF BREATH WITH EXPOSURE TO COVID-19 VIRUS: ICD-10-CM

## 2021-01-21 DIAGNOSIS — Z20.822 SHORTNESS OF BREATH WITH EXPOSURE TO COVID-19 VIRUS: ICD-10-CM

## 2021-01-21 RX ORDER — ALBUTEROL SULFATE 90 UG/1
2 AEROSOL, METERED RESPIRATORY (INHALATION) EVERY 4 HOURS PRN
Qty: 1 INHALER | Refills: 3 | Status: SHIPPED | OUTPATIENT
Start: 2021-01-21 | End: 2021-11-08 | Stop reason: SDUPTHER

## 2021-01-21 RX ORDER — BUMETANIDE 1 MG/1
1 TABLET ORAL DAILY
Qty: 90 TABLET | Refills: 0 | Status: SHIPPED | OUTPATIENT
Start: 2021-01-21 | End: 2021-05-20 | Stop reason: SDUPTHER

## 2021-01-21 NOTE — TELEPHONE ENCOUNTER
Patient is requesting there be a 90 supply for the Bumetanide 1 mg as well.     Health Maintenance   Topic Date Due    DTaP/Tdap/Td vaccine (1 - Tdap) 06/29/1982    Shingles Vaccine (1 of 2) 06/29/2013    Colon cancer screen colonoscopy  06/29/2013    Lipid screen  07/24/2021    Annual Wellness Visit (AWV)  08/14/2021    A1C test (Diabetic or Prediabetic)  11/19/2021    Breast cancer screen  12/17/2021    Potassium monitoring  12/17/2021    Creatinine monitoring  12/17/2021    Flu vaccine  Completed    Pneumococcal 0-64 years Vaccine  Completed    Hepatitis C screen  Completed    HIV screen  Completed    Hepatitis A vaccine  Aged Out    Hepatitis B vaccine  Aged Out    Hib vaccine  Aged Out    Meningococcal (ACWY) vaccine  Aged Out             (applicable per patient's age: Cancer Screenings, Depression Screening, Fall Risk Screening, Immunizations)    Hemoglobin A1C (%)   Date Value   11/19/2020 6.0   07/24/2020 6.1 (H)     LDL Cholesterol (mg/dL)   Date Value   07/24/2020 95     AST (U/L)   Date Value   11/30/2020 19     ALT (U/L)   Date Value   11/30/2020 15     BUN (mg/dL)   Date Value   12/17/2020 19      (goal A1C is < 7)   (goal LDL is <100) need 30-50% reduction from baseline     BP Readings from Last 3 Encounters:   12/17/20 (!) 157/97   12/09/20 135/84   12/01/20 (!) 140/68    (goal /80)      All Future Testing planned in [facility]:  Lab Frequency Next Occurrence   CHERY DIGITAL DIAGNOSTIC W OR WO CAD BILATERAL Once 07/28/2021   T4, Free Once 52/02/1457   Basic Metabolic Panel Once 56/73/5941   US BREAST COMPLETE LEFT Once 06/02/2021   COVID-19 Once 01/02/2021       Next Visit Date:  Future Appointments   Date Time Provider Christiano Wren   2/12/2021 11:00 AM Margo Suárez, APRN - CNP [address]   2/15/2021  2:40 PM Viet Kinney PA-C [address]            Patient Active Problem List:     Low back pain     SUSU (acute kidney injury) ([facility] [address]

## 2021-02-02 ENCOUNTER — TELEPHONE (OUTPATIENT)
Dept: PRIMARY CARE CLINIC | Age: 56
End: 2021-02-02

## 2021-02-02 DIAGNOSIS — J42 CHRONIC BRONCHITIS, UNSPECIFIED CHRONIC BRONCHITIS TYPE (HCC): Primary | ICD-10-CM

## 2021-02-02 NOTE — TELEPHONE ENCOUNTER
Patient is requesting a prescription be called into pharmacy for Prednisone to help with breathing in addition to using nebulizer, stated that she is doing treatments an directed and feel that taking Prednisone would help providing extra relief, pls advise    Health Maintenance   Topic Date Due    DTaP/Tdap/Td vaccine (1 - Tdap) 06/29/1982    Shingles Vaccine (1 of 2) 06/29/2013    Colon cancer screen colonoscopy  06/29/2013    Lipid screen  07/24/2021    Annual Wellness Visit (AWV)  08/14/2021    A1C test (Diabetic or Prediabetic)  11/19/2021    Breast cancer screen  12/17/2021    Potassium monitoring  12/17/2021    Creatinine monitoring  12/17/2021    Flu vaccine  Completed    Pneumococcal 0-64 years Vaccine  Completed    Hepatitis C screen  Completed    HIV screen  Completed    Hepatitis A vaccine  Aged Out    Hepatitis B vaccine  Aged Out    Hib vaccine  Aged Out    Meningococcal (ACWY) vaccine  Aged Out             (applicable per patient's age: Cancer Screenings, Depression Screening, Fall Risk Screening, Immunizations)    Hemoglobin A1C (%)   Date Value   11/19/2020 6.0   07/24/2020 6.1 (H)     LDL Cholesterol (mg/dL)   Date Value   07/24/2020 95     AST (U/L)   Date Value   11/30/2020 19     ALT (U/L)   Date Value   11/30/2020 15     BUN (mg/dL)   Date Value   12/17/2020 19      (goal A1C is < 7)   (goal LDL is <100) need 30-50% reduction from baseline     BP Readings from Last 3 Encounters:   12/17/20 (!) 157/97   12/09/20 135/84   12/01/20 (!) 140/68    (goal /80)      All Future Testing planned in [facility]:  Lab Frequency Next Occurrence   CHERY DIGITAL DIAGNOSTIC W OR WO CAD BILATERAL Once 07/28/2021   T4, Free Once 99/80/9672   Basic Metabolic Panel Once 54/68/9018   US BREAST COMPLETE LEFT Once 06/02/2021   COVID-19 Once 01/02/2021       Next Visit Date:  Future Appointments   Date Time Provider Christiano Wren   2/12/2021 11:00 AM [facility] [place]   2/15/2021  2:40 PM Jen Moscoso PA-C [facility]            Patient Active Problem List:     Low back pain     SUSU (acute kidney injury) ([address]     Therapeutic opioid-induced constipation (OIC)     Spondylosis of lumbar region without myelopathy or radiculopathy     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, insomnia type ([address]     Opioid dependence, uncomplicated (HCC)     Sacroiliac joint dysfunction of both sides     Chronic pain disorder     Bulge of cervical disc without myelopathy     DDD (degenerative disc disease), lumbar     Failed back syndrome of cervical spine     Chest pain     Class 3 severe obesity due to excess calories with serious comorbidity and body mass index (BMI) of 45.0 to 49.9 in adult [facility])     Cervical spondylosis with radiculopathy     Status post cervical spinal fusion     Status post lumbar spinal fusion     Lumbar radiculopathy     Morbid obesity (HCC)     Chronic pain of both knees     Myalgia     Encounter for long-term opiate analgesic use     Lower respiratory tract infection due to COVID-19 virus     Hypertension     Gout     GERD (gastroesophageal reflux disease)     Acute on chronic combined systolic and diastolic congestive heart failure (HCC)     Other proteinuria     Nausea and vomiting     Stable angina ([address]

## 2021-02-03 RX ORDER — PREDNISONE 20 MG/1
20 TABLET ORAL 2 TIMES DAILY
Qty: 10 TABLET | Refills: 0 | Status: SHIPPED | OUTPATIENT
Start: 2021-02-03 | End: 2021-02-08

## 2021-02-03 NOTE — TELEPHONE ENCOUNTER
Spoke with pt. States she has not scheduled appt with pulmonologist as of yet. Will try schedule appt today.

## 2021-02-03 NOTE — TELEPHONE ENCOUNTER
Will treat for exacerbation with oral steroid, prednisone 20 mg twice daily for 5 days, will encourage patient to call and schedule appointment to be evaluated with pulmonology.

## 2021-02-03 NOTE — TELEPHONE ENCOUNTER
Pt informed. Gave verbal understanding. States she needs Rx for nebulizer machine that she never received sent to [facility] that is covered with her insurance. Please advise.  Thank you

## 2021-02-12 ENCOUNTER — HOSPITAL ENCOUNTER (OUTPATIENT)
Dept: PAIN MANAGEMENT | Age: 56
Discharge: HOME | End: 2021-02-12
Payer: MEDICARE

## 2021-02-12 DIAGNOSIS — E66.01 MORBID OBESITY (HCC): ICD-10-CM

## 2021-02-12 DIAGNOSIS — Z79.891 ENCOUNTER FOR LONG-TERM OPIATE ANALGESIC USE: ICD-10-CM

## 2021-02-12 DIAGNOSIS — M54.16 LUMBAR RADICULOPATHY: ICD-10-CM

## 2021-02-12 DIAGNOSIS — M47.22 CERVICAL SPONDYLOSIS WITH RADICULOPATHY: ICD-10-CM

## 2021-02-12 DIAGNOSIS — M47.816 SPONDYLOSIS OF LUMBAR REGION WITHOUT MYELOPATHY OR RADICULOPATHY: ICD-10-CM

## 2021-02-12 DIAGNOSIS — Z98.1 STATUS POST CERVICAL SPINAL FUSION: ICD-10-CM

## 2021-02-12 DIAGNOSIS — Z98.1 STATUS POST LUMBAR SPINAL FUSION: ICD-10-CM

## 2021-02-12 DIAGNOSIS — M96.1 FAILED BACK SYNDROME OF CERVICAL SPINE: ICD-10-CM

## 2021-02-12 PROCEDURE — 99213 OFFICE O/P EST LOW 20 MIN: CPT

## 2021-02-12 PROCEDURE — 99213 OFFICE O/P EST LOW 20 MIN: CPT | Performed by: NURSE PRACTITIONER

## 2021-02-12 RX ORDER — OXYCODONE AND ACETAMINOPHEN 7.5; 325 MG/1; MG/1
1 TABLET ORAL EVERY 6 HOURS PRN
Qty: 120 TABLET | Refills: 0 | Status: SHIPPED | OUTPATIENT
Start: 2021-02-12 | End: 2021-03-12 | Stop reason: SDUPTHER

## 2021-02-12 RX ORDER — HYDRALAZINE HYDROCHLORIDE 25 MG/1
25 TABLET, FILM COATED ORAL 2 TIMES DAILY
COMMUNITY
End: 2022-05-18 | Stop reason: SDUPTHER

## 2021-02-12 ASSESSMENT — ENCOUNTER SYMPTOMS
CONSTIPATION: 0
COUGH: 0
SHORTNESS OF BREATH: 0
BACK PAIN: 1
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
## 2021-02-23 ENCOUNTER — OFFICE VISIT (OUTPATIENT)
Dept: PRIMARY CARE CLINIC | Age: 56
End: 2021-02-23
Payer: MEDICARE

## 2021-02-23 ENCOUNTER — OFFICE VISIT (OUTPATIENT)
Dept: PSYCHOLOGY | Age: 56
End: 2021-02-23
Payer: MEDICARE

## 2021-02-23 VITALS — OXYGEN SATURATION: 94 % | TEMPERATURE: 98.1 F | HEART RATE: 62 BPM

## 2021-02-23 DIAGNOSIS — G89.4 CHRONIC PAIN SYNDROME: ICD-10-CM

## 2021-02-23 DIAGNOSIS — Z76.0 MEDICATION REFILL: ICD-10-CM

## 2021-02-23 DIAGNOSIS — E79.0 HYPERURICEMIA: ICD-10-CM

## 2021-02-23 DIAGNOSIS — F32.A DEPRESSION, UNSPECIFIED DEPRESSION TYPE: Primary | ICD-10-CM

## 2021-02-23 DIAGNOSIS — I10 ESSENTIAL HYPERTENSION: ICD-10-CM

## 2021-02-23 DIAGNOSIS — F33.9 EPISODE OF RECURRENT MAJOR DEPRESSIVE DISORDER, UNSPECIFIED DEPRESSION EPISODE SEVERITY (HCC): Primary | ICD-10-CM

## 2021-02-23 DIAGNOSIS — K21.9 GASTROESOPHAGEAL REFLUX DISEASE, UNSPECIFIED WHETHER ESOPHAGITIS PRESENT: ICD-10-CM

## 2021-02-23 DIAGNOSIS — J45.30 MILD PERSISTENT ASTHMA WITHOUT COMPLICATION: ICD-10-CM

## 2021-02-23 DIAGNOSIS — I50.42 CHRONIC COMBINED SYSTOLIC AND DIASTOLIC HEART FAILURE (HCC): ICD-10-CM

## 2021-02-23 DIAGNOSIS — E66.01 CLASS 3 SEVERE OBESITY DUE TO EXCESS CALORIES WITH SERIOUS COMORBIDITY IN ADULT, UNSPECIFIED BMI (HCC): ICD-10-CM

## 2021-02-23 DIAGNOSIS — M79.7 FIBROMYALGIA: ICD-10-CM

## 2021-02-23 DIAGNOSIS — R73.03 PREDIABETES: ICD-10-CM

## 2021-02-23 LAB — HBA1C MFR BLD: 5.8 %

## 2021-02-23 PROCEDURE — 1036F TOBACCO NON-USER: CPT | Performed by: BEHAVIOR ANALYST

## 2021-02-23 PROCEDURE — 99214 OFFICE O/P EST MOD 30 MIN: CPT | Performed by: PHYSICIAN ASSISTANT

## 2021-02-23 PROCEDURE — 83036 HEMOGLOBIN GLYCOSYLATED A1C: CPT | Performed by: PHYSICIAN ASSISTANT

## 2021-02-23 PROCEDURE — 3017F COLORECTAL CA SCREEN DOC REV: CPT | Performed by: PHYSICIAN ASSISTANT

## 2021-02-23 PROCEDURE — G8482 FLU IMMUNIZE ORDER/ADMIN: HCPCS | Performed by: PHYSICIAN ASSISTANT

## 2021-02-23 PROCEDURE — G8427 DOCREV CUR MEDS BY ELIG CLIN: HCPCS | Performed by: PHYSICIAN ASSISTANT

## 2021-02-23 PROCEDURE — 1036F TOBACCO NON-USER: CPT | Performed by: PHYSICIAN ASSISTANT

## 2021-02-23 PROCEDURE — 90832 PSYTX W PT 30 MINUTES: CPT | Performed by: BEHAVIOR ANALYST

## 2021-02-23 PROCEDURE — G8417 CALC BMI ABV UP PARAM F/U: HCPCS | Performed by: PHYSICIAN ASSISTANT

## 2021-02-23 RX ORDER — BUPROPION HYDROCHLORIDE 300 MG/1
300 TABLET ORAL EVERY MORNING
Qty: 7 TABLET | Refills: 0 | Status: SHIPPED | OUTPATIENT
Start: 2021-02-23 | End: 2021-02-26 | Stop reason: SDUPTHER

## 2021-02-23 RX ORDER — METOPROLOL TARTRATE 50 MG/1
50 TABLET, FILM COATED ORAL 2 TIMES DAILY
Qty: 180 TABLET | Refills: 1 | Status: SHIPPED | OUTPATIENT
Start: 2021-02-23 | End: 2022-05-18 | Stop reason: SDUPTHER

## 2021-02-23 RX ORDER — PREGABALIN 150 MG/1
CAPSULE ORAL
COMMUNITY
Start: 2021-01-21 | End: 2021-02-23 | Stop reason: SINTOL

## 2021-02-23 RX ORDER — ATORVASTATIN CALCIUM 80 MG/1
80 TABLET, FILM COATED ORAL DAILY
Qty: 90 TABLET | Refills: 1 | Status: SHIPPED | OUTPATIENT
Start: 2021-02-23 | End: 2021-09-15 | Stop reason: SDUPTHER

## 2021-02-23 RX ORDER — PANTOPRAZOLE SODIUM 40 MG/1
40 TABLET, DELAYED RELEASE ORAL DAILY
Qty: 90 TABLET | Refills: 1 | Status: SHIPPED | OUTPATIENT
Start: 2021-02-23 | End: 2022-05-18 | Stop reason: SDUPTHER

## 2021-02-23 RX ORDER — ALLOPURINOL 300 MG/1
300 TABLET ORAL DAILY
Qty: 90 TABLET | Refills: 1 | Status: SHIPPED | OUTPATIENT
Start: 2021-02-23 | End: 2022-05-18 | Stop reason: SDUPTHER

## 2021-02-23 RX ORDER — IBUPROFEN 800 MG/1
800 TABLET ORAL EVERY 8 HOURS PRN
Qty: 270 TABLET | Refills: 1 | Status: SHIPPED | OUTPATIENT
Start: 2021-02-23 | End: 2021-08-20 | Stop reason: SDUPTHER

## 2021-02-23 RX ORDER — SPIRONOLACTONE 25 MG/1
25 TABLET ORAL DAILY
Qty: 90 TABLET | Refills: 1 | Status: SHIPPED | OUTPATIENT
Start: 2021-02-23 | End: 2022-01-06 | Stop reason: SDUPTHER

## 2021-02-23 RX ORDER — DULOXETIN HYDROCHLORIDE 60 MG/1
60 CAPSULE, DELAYED RELEASE ORAL DAILY
Qty: 7 CAPSULE | Refills: 0 | Status: SHIPPED | OUTPATIENT
Start: 2021-02-23 | End: 2021-02-26 | Stop reason: SDUPTHER

## 2021-02-23 RX ORDER — MONTELUKAST SODIUM 10 MG/1
10 TABLET ORAL NIGHTLY
Qty: 90 TABLET | Refills: 1 | Status: SHIPPED | OUTPATIENT
Start: 2021-02-23 | End: 2022-05-18 | Stop reason: SDUPTHER

## 2021-02-23 RX ORDER — CLOPIDOGREL BISULFATE 75 MG/1
75 TABLET ORAL DAILY
Qty: 90 TABLET | Refills: 1 | Status: SHIPPED | OUTPATIENT
Start: 2021-02-23 | End: 2021-11-08 | Stop reason: SDUPTHER

## 2021-02-23 ASSESSMENT — COLUMBIA-SUICIDE SEVERITY RATING SCALE - C-SSRS
1. WITHIN THE PAST MONTH, HAVE YOU WISHED YOU WERE DEAD OR WISHED YOU COULD GO TO SLEEP AND NOT WAKE UP?: NO
6. HAVE YOU EVER DONE ANYTHING, STARTED TO DO ANYTHING, OR PREPARED TO DO ANYTHING TO END YOUR LIFE?: NO

## 2021-02-23 ASSESSMENT — PATIENT HEALTH QUESTIONNAIRE - PHQ9
4. FEELING TIRED OR HAVING LITTLE ENERGY: 2
7. TROUBLE CONCENTRATING ON THINGS, SUCH AS READING THE NEWSPAPER OR WATCHING TELEVISION: 2
1. LITTLE INTEREST OR PLEASURE IN DOING THINGS: 3
9. THOUGHTS THAT YOU WOULD BE BETTER OFF DEAD, OR OF HURTING YOURSELF: 0
8. MOVING OR SPEAKING SO SLOWLY THAT OTHER PEOPLE COULD HAVE NOTICED. OR THE OPPOSITE, BEING SO FIGETY OR RESTLESS THAT YOU HAVE BEEN MOVING AROUND A LOT MORE THAN USUAL: 0
2. FEELING DOWN, DEPRESSED OR HOPELESS: 3
SUM OF ALL RESPONSES TO PHQ9 QUESTIONS 1 & 2: 6

## 2021-02-23 NOTE — PROGRESS NOTES
[facility]  [id number] [facility] [address]  Dept: [phone]  Dept Fax: [phone]    Office Progress/Follow Up Note    Date of patient's visit: 2/23/2021    Patient's Name:  Dewey Galindo YOB: 1965            Patient Care Team:  Forest Ernandez PA-C as PCP - General (Physician Assistant)  Forest Ernandez PA-C as PCP [facility]  ASA Osuna - CNP as Consulting Physician (Pain Management)  Lexx Ornelas MD as Consulting Physician (Rheumatology)  ================================================================    REASON FOR VISIT/CHIEF COMPLAINT:  Follow-up (2M, CHF) and Pain (Chronic pain entire body)    HISTORY OF PRESENTING ILLNESS:  History was obtained from: patient. Dewey Galindo is a 60 y.o. is here for follow-up for prediabetes, depression, medication refill. Patient states she is compliant with medications. Patient states that she was seen by rheumatology for fibromyalgia, \"had side effect to Lyrica\", will be following up on 2/25/2021. Patient states she will be seen by pulmonology in 4/2021 for asthma. Patient states she was seen by cardiology for CHF, informed patient will obtain records. Patient voiced concern with depression, \"been on Cymbalta for years, not helping anymore\". Patient denies ever being on any other antidepressant. Patient is currently on Wellbutrin for depression anxiety. Informed patient will increase Wellbutrin to 300 mg, instructed patient to contact PCP office to update on symptoms, if no improvement will need to wean off Cymbalta and try another antidepressant, patient voiced understanding, possible Zoloft trial.    Patient A1c in office 5.8, decrease from 6.   Discussed A1c, hyperglycemia in depth with patient, encourage patient to monitor sugar and carb intake to better control blood sugars, informed patient we will continue to watch and not prescribe diabetic medication, patient voiced understanding. Patient did not provide blood pressure in office, \"body hurts\". Patient requesting medication refill. Patient not provide weight in office, \"body hurts\". Discussed weight loss in depth with patient, encourage patient make changes in diet. Patient requesting additional medication refills for GERD, hyperuricemia, chronic pain syndrome, patient denies any side effect or complications of medications. Labs reviewed. Health maintenance reviewed. Medications reviewed and prescribed.     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, insomnia type ([address]    Opioid dependence, uncomplicated (HCC)    Sacroiliac joint dysfunction of both sides    Chronic pain disorder    Bulge of cervical disc without myelopathy    DDD (degenerative disc disease), lumbar    Failed back syndrome of cervical spine    Chest pain    Class 3 severe obesity due to excess calories with serious comorbidity and body mass index (BMI) of 45.0 to 49.9 in adult [facility])    Cervical spondylosis with radiculopathy    Status post cervical spinal fusion    Status post lumbar spinal fusion    Lumbar radiculopathy    Morbid obesity (HCC)    Chronic pain of both knees    Myalgia    Encounter for long-term opiate analgesic use    Lower respiratory tract infection due to COVID-19 virus    Hypertension    Gout    GERD (gastroesophageal reflux disease)    Acute on chronic combined systolic and diastolic congestive heart failure (HCC)    Other proteinuria    Nausea and vomiting    Stable angina (HCC)    PTSD (post-traumatic stress disorder)    Hyperuricemia    Mild persistent asthma without complication       Health Maintenance Due Topic Date Due    DTaP/Tdap/Td vaccine (1 - Tdap) Never done    Shingles Vaccine (1 of 2) Never done    Colon cancer screen colonoscopy  Never done       Allergies   Allergen Reactions    Entresto [Sacubitril-Valsartan] Other (See Comments)     Acute kidney injury    Food Swelling     peaches    Gabapentin Swelling    Lyrica [Pregabalin] Swelling     Mouth sores    Tetracyclines & Related          Current Outpatient Medications   Medication Sig Dispense Refill    metoprolol tartrate (LOPRESSOR) 50 MG tablet Take 1 tablet by mouth 2 times daily 180 tablet 1    atorvastatin (LIPITOR) 80 MG tablet Take 1 tablet by mouth daily 90 tablet 1    spironolactone (ALDACTONE) 25 MG tablet Take 1 tablet by mouth daily 90 tablet 1    clopidogrel (PLAVIX) 75 MG tablet Take 1 tablet by mouth daily 90 tablet 1    pantoprazole (PROTONIX) 40 MG tablet Take 1 tablet by mouth daily 90 tablet 1    montelukast (SINGULAIR) 10 MG tablet Take 1 tablet by mouth nightly 90 tablet 1    allopurinol (ZYLOPRIM) 300 MG tablet Take 1 tablet by mouth daily 90 tablet 1    ibuprofen (ADVIL;MOTRIN) 800 MG tablet Take 1 tablet by mouth every 8 hours as needed for Pain 270 tablet 1    hydrALAZINE (APRESOLINE) 25 MG tablet Take 25 mg by mouth 2 times daily      oxyCODONE-acetaminophen (PERCOCET) 7.5-325 MG per tablet Take 1 tablet by mouth every 6 hours as needed for Pain for up to 30 days.  Intended supply: 30 days 120 tablet 0    albuterol sulfate  (90 Base) MCG/ACT inhaler Inhale 2 puffs into the lungs every 4 hours as needed for Shortness of Breath 1 Inhaler 3    bumetanide (BUMEX) 1 MG tablet Take 1 tablet by mouth daily 90 tablet 0    traZODone (DESYREL) 150 MG tablet Take 1 tablet by mouth nightly 90 tablet 1    isosorbide dinitrate (ISORDIL) 10 MG tablet Take 1 tablet by mouth 2 times daily 180 tablet 1    levothyroxine (SYNTHROID) 25 MCG tablet Take 1 tablet by mouth daily 90 tablet 1    sucralfate (CARAFATE) 1 GM tablet Take 1 tablet in AM, 2 tablet in evening daily 90 tablet 3    budesonide-formoterol (SYMBICORT) 80-4.5 MCG/ACT AERO Inhale 2 puffs into the lungs 2 times daily 2 Inhaler 3    oxybutynin (DITROPAN) 5 MG tablet Take 1 tablet by mouth 2 times daily 180 tablet 1    albuterol (PROVENTIL) (2.5 MG/3ML) 0.083% nebulizer solution Take 3 mLs by nebulization every 6 hours as needed for Wheezing 120 each 3    SUPREP BOWEL PREP KIT 17.5-3.13-1.6 GM/177ML SOLN Take as directed - dispense one Kit 1 kit 0    Handicap Placard MISC by Does not apply route 2 years 1 each 0    Respiratory Therapy Supplies (NEBULIZER COMPRESSOR) KIT Provide insurance covered nebulizer, use daily as needed 1 kit 0    polyethylene glycol (GLYCOLAX) packet polyethylene glycol 3350 17 gram/dose oral powder      DULoxetine (CYMBALTA) 60 MG extended release capsule Take 2 capsules by mouth daily 60 capsule 0    buPROPion (WELLBUTRIN XL) 300 MG extended release tablet Take 1 tablet by mouth every morning 30 tablet 0     No current facility-administered medications for this visit. Social History     Tobacco Use    Smoking status: Never Smoker    Smokeless tobacco: Never Used   Substance Use Topics    Alcohol use: No    Drug use: No       Family History   Problem Relation Age of Onset    Other Mother     Diabetes Mother     Heart Disease Mother     Arthritis Mother     Kidney Disease Mother     Lupus Mother     Arthritis Sister     Diabetes Brother     Asthma Brother     Cancer Maternal Grandfather     Hypertension Sister     Breast Cancer Sister         28s    Breast Cancer Niece         30-35        REVIEW OFSYSTEMS:  Review of Systems   Constitutional: Negative for chills and fever. HENT: Negative for congestion. Respiratory: Negative for cough, shortness of breath and wheezing. Cardiovascular: Negative for chest pain. Gastrointestinal: Negative for constipation, diarrhea, nausea and vomiting.    Genitourinary: Negative for dysuria, frequency and urgency. Musculoskeletal: Positive for back pain, gait problem and myalgias. Negative for neck pain. Neurological: Negative for headaches. PHYSICAL EXAM:  Vitals:    02/23/21 1150   Pulse: 62   Temp: 98.1 °F (36.7 °C)   TempSrc: Temporal   SpO2: 94%     BP Readings from Last 3 Encounters:   12/17/20 (!) 157/97   12/09/20 135/84   12/01/20 (!) 140/68        Physical Exam  Vitals signs reviewed. Constitutional:       Appearance: Normal appearance. She is well-developed and well-groomed. She is morbidly obese. HENT:      Head: Normocephalic and atraumatic. Right Ear: External ear normal.      Left Ear: External ear normal.      Nose: Nose normal.   Eyes:      General: Lids are normal.      Conjunctiva/sclera: Conjunctivae normal.      Pupils: Pupils are equal, round, and reactive to light. Cardiovascular:      Rate and Rhythm: Normal rate and regular rhythm. Heart sounds: Normal heart sounds. Pulmonary:      Effort: Pulmonary effort is normal.      Breath sounds: Normal breath sounds. Abdominal:      Palpations: Abdomen is soft. There is no mass. Tenderness: There is no abdominal tenderness. Musculoskeletal:         General: No tenderness. Skin:     General: Skin is warm and dry. Neurological:      Mental Status: She is alert and oriented to person, place, and time. Psychiatric:         Behavior: Behavior is cooperative.            DIAGNOSTIC FINDINGS:  CBC:  Lab Results   Component Value Date    WBC 7.6 12/17/2020    HGB 10.3 12/17/2020     12/17/2020       BMP:    Lab Results   Component Value Date     12/17/2020    K 4.5 12/17/2020     12/17/2020    CO2 26 12/17/2020    BUN 19 12/17/2020    CREATININE 1.10 12/17/2020    GLUCOSE 89 12/17/2020       HEMOGLOBIN A1C:   Lab Results   Component Value Date    LABA1C 5.8 02/23/2021       FASTING LIPID PANEL:  Lab Results   Component Value Date    CHOL 158 07/24/2020    HDL 33 (L) 07/24/2020    TRIG 149 07/24/2020       ASSESSMENT AND PLAN:  Leonid Chung was seen today for follow-up and pain. Diagnoses and all orders for this visit:    Episode of recurrent major depressive disorder, unspecified depression episode severity ([facility] [address]  -     Discontinue: DULoxetine (CYMBALTA) 60 MG extended release capsule; Take 1 capsule by mouth daily for 7 days    Prediabetes  -     POCT glycosylated hemoglobin (Hb A1C)    Mild persistent asthma without complication  -     montelukast (SINGULAIR) 10 MG tablet; Take 1 tablet by mouth nightly    Fibromyalgia    Chronic combined systolic and diastolic heart failure (HCC)  -     spironolactone (ALDACTONE) 25 MG tablet; Take 1 tablet by mouth daily  -     clopidogrel (PLAVIX) 75 MG tablet; Take 1 tablet by mouth daily    Class 3 severe obesity due to excess calories with serious comorbidity in adult, unspecified BMI (HCC)    Essential hypertension  -     metoprolol tartrate (LOPRESSOR) 50 MG tablet; Take 1 tablet by mouth 2 times daily    Gastroesophageal reflux disease, unspecified whether esophagitis present  -     pantoprazole (PROTONIX) 40 MG tablet; Take 1 tablet by mouth daily    Hyperuricemia  -     allopurinol (ZYLOPRIM) 300 MG tablet; Take 1 tablet by mouth daily    Chronic pain syndrome  -     ibuprofen (ADVIL;MOTRIN) 800 MG tablet; Take 1 tablet by mouth every 8 hours as needed for Pain    Medication refill  -     Discontinue: DULoxetine (CYMBALTA) 60 MG extended release capsule; Take 1 capsule by mouth daily for 7 days  -     Discontinue: buPROPion (WELLBUTRIN XL) 300 MG extended release tablet; Take 1 tablet by mouth every morning for 7 days  -     metoprolol tartrate (LOPRESSOR) 50 MG tablet; Take 1 tablet by mouth 2 times daily  -     atorvastatin (LIPITOR) 80 MG tablet; Take 1 tablet by mouth daily  -     spironolactone (ALDACTONE) 25 MG tablet; Take 1 tablet by mouth daily  -     clopidogrel (PLAVIX) 75 MG tablet;  Take 1 tablet by mouth daily  - pantoprazole (PROTONIX) 40 MG tablet; Take 1 tablet by mouth daily  -     montelukast (SINGULAIR) 10 MG tablet; Take 1 tablet by mouth nightly  -     allopurinol (ZYLOPRIM) 300 MG tablet; Take 1 tablet by mouth daily  -     ibuprofen (ADVIL;MOTRIN) 800 MG tablet; Take 1 tablet by mouth every 8 hours as needed for Pain      FOLLOW UP AND INSTRUCTIONS:  Return in about 6 weeks (around 4/6/2021) for Depression. · Mari Eli received counseling on the following healthy behaviors:nutrition    · Discussed use, benefit, and side effects of prescribed medications. Barriers to medication compliance addressed. All patient questions answered. Pt voiced understanding. · Patient given educational materials - see patient instructions    Electronically signed by Malgorzata Vazquez PA-C on 2/23/21 at 12:28 PM EST    This note is created with the assistance of a speech-recognition program. While intending to generate a document that actually reflects the content of the visit, the document can still have some mistakes which may not have been identified and corrected by editing.
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
## 2021-02-23 NOTE — PROGRESS NOTES
Visit Information    Have you changed or started any medications since your last visit including any over-the-counter medicines, vitamins, or herbal medicines? no   Are you having any side effects from any of your medications? -  Yes-Lyrica  Have you stopped taking any of your medications? Is so, why? -  yes - side effects    Have you seen any other physician or provider since your last visit? No  Have you had any other diagnostic tests since your last visit? No  Have you been seen in the emergency room and/or had an admission to a hospital since we last saw you? No  Have you had your routine dental cleaning in the past 6 months? no    Have you activated your [employer] account? If not, what are your barriers?  No: Pt declined     Patient Care Team:  Viet Kinney PA-C as PCP - General (Physician Assistant)  Viet Kinney PA-C as PCP - [facility]    Medical History Review  Past Medical, Family, and Social History reviewed and does not contribute to the patient presenting condition    Health Maintenance   Topic Date Due    DTaP/Tdap/Td vaccine (1 - Tdap) 06/29/1982    Shingles Vaccine (1 of 2) 06/29/2013    Colon cancer screen colonoscopy  06/29/2013    Lipid screen  07/24/2021    Annual Wellness Visit (AWV)  08/14/2021    A1C test (Diabetic or Prediabetic)  11/19/2021    Breast cancer screen  12/17/2021    Potassium monitoring  12/17/2021    Creatinine monitoring  12/17/2021    Flu vaccine  Completed    Pneumococcal 0-64 years Vaccine  Completed    Hepatitis C screen  Completed    HIV screen  Completed    Hepatitis A vaccine  Aged Out    Hepatitis B vaccine  Aged Out    Hib vaccine  Aged Out    Meningococcal (ACWY) vaccine  Aged Out

## 2021-02-24 ENCOUNTER — CLINICAL DOCUMENTATION (OUTPATIENT)
Dept: BEHAVIORAL/MENTAL HEALTH CLINIC | Age: 56
End: 2021-02-24

## 2021-02-24 NOTE — PROGRESS NOTES
Warm handoff requested by Malgorzata Vazquez PA-C and was completed on 2/23/2021. Patient scheduled for same day face to face visit.

## 2021-02-26 ENCOUNTER — TELEPHONE (OUTPATIENT)
Dept: PRIMARY CARE CLINIC | Age: 56
End: 2021-02-26

## 2021-02-26 DIAGNOSIS — Z76.0 MEDICATION REFILL: ICD-10-CM

## 2021-02-26 DIAGNOSIS — F33.9 EPISODE OF RECURRENT MAJOR DEPRESSIVE DISORDER, UNSPECIFIED DEPRESSION EPISODE SEVERITY (HCC): ICD-10-CM

## 2021-02-26 DIAGNOSIS — F43.10 PTSD (POST-TRAUMATIC STRESS DISORDER): ICD-10-CM

## 2021-02-26 RX ORDER — DULOXETIN HYDROCHLORIDE 60 MG/1
120 CAPSULE, DELAYED RELEASE ORAL DAILY
Qty: 60 CAPSULE | Refills: 0 | Status: SHIPPED | OUTPATIENT
Start: 2021-02-26 | End: 2021-04-15 | Stop reason: SDUPTHER

## 2021-02-26 RX ORDER — BUPROPION HYDROCHLORIDE 300 MG/1
300 TABLET ORAL EVERY MORNING
Qty: 30 TABLET | Refills: 0 | Status: SHIPPED | OUTPATIENT
Start: 2021-02-26 | End: 2021-04-15 | Stop reason: SDUPTHER

## 2021-02-26 NOTE — TELEPHONE ENCOUNTER
Patient called to inform provider that Rheumatologist is recommending that Cymbalta medication is increased to 2 x daily and would also like provider \"not yet\" decrease Wellbutrin. Patient also stated that her Rheumatologist has placed her on Prednisone for the Fibromyalgia diagnosis.

## 2021-02-26 NOTE — TELEPHONE ENCOUNTER
Patient states \"rheumatology recommend increasing Cymbalta to twice daily\" for depression concerns, PCP will oblige. Patient is requesting not to decrease Wellbutrin as of yet. Medication reviewed.

## 2021-03-02 ENCOUNTER — VIRTUAL VISIT (OUTPATIENT)
Dept: PSYCHOLOGY | Age: 56
End: 2021-03-02
Payer: MEDICARE

## 2021-03-02 DIAGNOSIS — F33.2 MAJOR DEPRESSIVE DISORDER, RECURRENT SEVERE WITHOUT PSYCHOTIC FEATURES (HCC): Primary | ICD-10-CM

## 2021-03-02 PROCEDURE — 90791 PSYCH DIAGNOSTIC EVALUATION: CPT | Performed by: BEHAVIOR ANALYST

## 2021-03-02 PROCEDURE — 1036F TOBACCO NON-USER: CPT | Performed by: BEHAVIOR ANALYST
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
## 2021-03-07 ENCOUNTER — TELEPHONE (OUTPATIENT)
Dept: PRIMARY CARE CLINIC | Age: 56
End: 2021-03-07

## 2021-03-07 PROBLEM — E79.0 HYPERURICEMIA: Status: ACTIVE | Noted: 2021-03-07

## 2021-03-07 PROBLEM — J45.30 MILD PERSISTENT ASTHMA WITHOUT COMPLICATION: Status: ACTIVE | Noted: 2021-03-07

## 2021-03-07 PROBLEM — N17.9 AKI (ACUTE KIDNEY INJURY) (HCC): Status: RESOLVED | Noted: 2017-12-03 | Resolved: 2021-03-07

## 2021-03-07 PROBLEM — F43.10 PTSD (POST-TRAUMATIC STRESS DISORDER): Status: ACTIVE | Noted: 2021-03-07

## 2021-03-07 ASSESSMENT — ENCOUNTER SYMPTOMS
NAUSEA: 0
BACK PAIN: 1
COUGH: 0
DIARRHEA: 0
WHEEZING: 0
CONSTIPATION: 0
SHORTNESS OF BREATH: 0
VOMITING: 0

## 2021-03-12 ENCOUNTER — HOSPITAL ENCOUNTER (OUTPATIENT)
Dept: PAIN MANAGEMENT | Age: 56
Discharge: HOME | End: 2021-03-12
Payer: MEDICARE

## 2021-03-12 DIAGNOSIS — M79.10 MYALGIA: ICD-10-CM

## 2021-03-12 DIAGNOSIS — M47.816 SPONDYLOSIS OF LUMBAR REGION WITHOUT MYELOPATHY OR RADICULOPATHY: ICD-10-CM

## 2021-03-12 DIAGNOSIS — Z98.1 STATUS POST CERVICAL SPINAL FUSION: ICD-10-CM

## 2021-03-12 DIAGNOSIS — Z98.1 STATUS POST LUMBAR SPINAL FUSION: ICD-10-CM

## 2021-03-12 DIAGNOSIS — M54.16 LUMBAR RADICULOPATHY: ICD-10-CM

## 2021-03-12 DIAGNOSIS — M47.22 CERVICAL SPONDYLOSIS WITH RADICULOPATHY: ICD-10-CM

## 2021-03-12 DIAGNOSIS — M96.1 FAILED BACK SYNDROME OF CERVICAL SPINE: ICD-10-CM

## 2021-03-12 DIAGNOSIS — E66.01 MORBID OBESITY (HCC): ICD-10-CM

## 2021-03-12 DIAGNOSIS — Z79.891 ENCOUNTER FOR LONG-TERM OPIATE ANALGESIC USE: ICD-10-CM

## 2021-03-12 PROCEDURE — 99213 OFFICE O/P EST LOW 20 MIN: CPT

## 2021-03-12 PROCEDURE — 99212 OFFICE O/P EST SF 10 MIN: CPT | Performed by: NURSE PRACTITIONER

## 2021-03-12 RX ORDER — BACLOFEN 10 MG/1
10 TABLET ORAL 3 TIMES DAILY
Qty: 90 TABLET | Refills: 0 | Status: SHIPPED | OUTPATIENT
Start: 2021-03-12 | End: 2021-05-11 | Stop reason: SDUPTHER

## 2021-03-12 RX ORDER — OXYCODONE AND ACETAMINOPHEN 7.5; 325 MG/1; MG/1
1 TABLET ORAL EVERY 6 HOURS PRN
Qty: 120 TABLET | Refills: 0 | Status: SHIPPED | OUTPATIENT
Start: 2021-03-14 | End: 2021-04-12 | Stop reason: SDUPTHER

## 2021-03-12 RX ORDER — PREDNISONE 20 MG/1
20 TABLET ORAL DAILY
COMMUNITY
End: 2021-10-14 | Stop reason: ALTCHOICE

## 2021-03-12 ASSESSMENT — ENCOUNTER SYMPTOMS
SHORTNESS OF BREATH: 0
CONSTIPATION: 0
BACK PAIN: 1
COUGH: 0
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
## 2021-03-30 ENCOUNTER — TELEPHONE (OUTPATIENT)
Dept: PRIMARY CARE CLINIC | Age: 56
End: 2021-03-30

## 2021-03-30 DIAGNOSIS — Z80.3 FAMILY HISTORY OF BREAST CANCER IN SISTER: Primary | ICD-10-CM

## 2021-03-31 NOTE — TELEPHONE ENCOUNTER
Patient states sibling carries the cancer gene, also sibling has also in past been diagnosis with breast cancer, if patient is able to have this test ordered and completed at [facility], ins will cover it.

## 2021-04-05 ENCOUNTER — OFFICE VISIT (OUTPATIENT)
Dept: PULMONOLOGY | Age: 56
End: 2021-04-05
Payer: MEDICARE

## 2021-04-05 VITALS
OXYGEN SATURATION: 97 % | RESPIRATION RATE: 18 BRPM | HEART RATE: 70 BPM | BODY MASS INDEX: 44.41 KG/M2 | SYSTOLIC BLOOD PRESSURE: 115 MMHG | WEIGHT: 293 LBS | DIASTOLIC BLOOD PRESSURE: 67 MMHG | HEIGHT: 68 IN | TEMPERATURE: 97.8 F

## 2021-04-05 DIAGNOSIS — J45.50 SEVERE PERSISTENT ASTHMATIC BRONCHITIS WITHOUT COMPLICATION: ICD-10-CM

## 2021-04-05 DIAGNOSIS — J42 CHRONIC BRONCHITIS, UNSPECIFIED CHRONIC BRONCHITIS TYPE (HCC): ICD-10-CM

## 2021-04-05 DIAGNOSIS — G93.31 POSTVIRAL FATIGUE SYNDROME: ICD-10-CM

## 2021-04-05 DIAGNOSIS — Z20.822 SHORTNESS OF BREATH WITH EXPOSURE TO COVID-19 VIRUS: ICD-10-CM

## 2021-04-05 DIAGNOSIS — R06.02 SHORTNESS OF BREATH WITH EXPOSURE TO COVID-19 VIRUS: ICD-10-CM

## 2021-04-05 DIAGNOSIS — I42.8 NON-ISCHEMIC CARDIOMYOPATHY (HCC): ICD-10-CM

## 2021-04-05 DIAGNOSIS — E66.01 CLASS 3 SEVERE OBESITY DUE TO EXCESS CALORIES WITH SERIOUS COMORBIDITY AND BODY MASS INDEX (BMI) OF 50.0 TO 59.9 IN ADULT (HCC): ICD-10-CM

## 2021-04-05 DIAGNOSIS — U07.1 COVID-19 VIRUS INFECTION: Primary | ICD-10-CM

## 2021-04-05 DIAGNOSIS — J45.30 MILD PERSISTENT ASTHMA WITHOUT COMPLICATION: ICD-10-CM

## 2021-04-05 DIAGNOSIS — R06.09 DYSPNEA ON EXERTION: ICD-10-CM

## 2021-04-05 DIAGNOSIS — Z76.0 MEDICATION REFILL: ICD-10-CM

## 2021-04-05 DIAGNOSIS — M47.26 OSTEOARTHRITIS OF SPINE WITH RADICULOPATHY, LUMBAR REGION: ICD-10-CM

## 2021-04-05 PROCEDURE — G8427 DOCREV CUR MEDS BY ELIG CLIN: HCPCS | Performed by: INTERNAL MEDICINE

## 2021-04-05 PROCEDURE — G8926 SPIRO NO PERF OR DOC: HCPCS | Performed by: INTERNAL MEDICINE

## 2021-04-05 PROCEDURE — 99204 OFFICE O/P NEW MOD 45 MIN: CPT | Performed by: INTERNAL MEDICINE

## 2021-04-05 PROCEDURE — 1036F TOBACCO NON-USER: CPT | Performed by: INTERNAL MEDICINE

## 2021-04-05 PROCEDURE — G8417 CALC BMI ABV UP PARAM F/U: HCPCS | Performed by: INTERNAL MEDICINE

## 2021-04-05 PROCEDURE — 3023F SPIROM DOC REV: CPT | Performed by: INTERNAL MEDICINE

## 2021-04-05 PROCEDURE — 3017F COLORECTAL CA SCREEN DOC REV: CPT | Performed by: INTERNAL MEDICINE

## 2021-04-05 RX ORDER — CLOPIDOGREL BISULFATE 75 MG/1
75 TABLET ORAL
COMMUNITY
Start: 2021-02-23 | End: 2021-10-14 | Stop reason: ALTCHOICE

## 2021-04-05 RX ORDER — FUROSEMIDE 40 MG/1
TABLET ORAL
COMMUNITY
End: 2021-10-14 | Stop reason: ALTCHOICE

## 2021-04-05 RX ORDER — BUPROPION HYDROCHLORIDE 300 MG/1
300 TABLET ORAL
COMMUNITY
Start: 2021-02-26 | End: 2021-04-15

## 2021-04-05 RX ORDER — ASPIRIN 81 MG/1
81 TABLET ORAL
COMMUNITY

## 2021-04-05 RX ORDER — DULOXETIN HYDROCHLORIDE 60 MG/1
CAPSULE, DELAYED RELEASE ORAL
COMMUNITY
Start: 2021-02-26 | End: 2021-04-15

## 2021-04-05 ASSESSMENT — ENCOUNTER SYMPTOMS
WHEEZING: 1
COUGH: 1
SHORTNESS OF BREATH: 1
DIARRHEA: 1

## 2021-04-05 ASSESSMENT — SLEEP AND FATIGUE QUESTIONNAIRES
HOW LIKELY ARE YOU TO NOD OFF OR FALL ASLEEP WHEN YOU ARE A PASSENGER IN A CAR FOR AN HOUR WITHOUT A BREAK: 0
ESS TOTAL SCORE: 6
HOW LIKELY ARE YOU TO NOD OFF OR FALL ASLEEP WHILE LYING DOWN TO REST IN THE AFTERNOON WHEN CIRCUMSTANCES PERMIT: 2
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
## 2021-04-08 ASSESSMENT — ENCOUNTER SYMPTOMS
NAUSEA: 0
EYE PAIN: 0
RESPIRATORY NEGATIVE: 1
BACK PAIN: 1
CONSTIPATION: 1
BOWEL INCONTINENCE: 0
ABDOMINAL PAIN: 0
PHOTOPHOBIA: 0
ALLERGIC/IMMUNOLOGIC NEGATIVE: 1
SHORTNESS OF BREATH: 0
EYES NEGATIVE: 1
COUGH: 0
VOMITING: 0
CHOKING: 0

## 2021-04-08 NOTE — PROGRESS NOTES
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 .    Patient is complaining of pain involving the cervical area as well as in the low back as well as allover body pain. Patient reports she is still recovering from the Covid infection apparently with some shortness of breath as well as muscle pain and fatigue. She reports she got the vaccine but continued to have pain symptoms like fatigue loss of's test and smell. .  She reports he is not able to exercise because of the pain. She is reports she is taking in 20 mg of her Cymbalta along with 10 mg of Wellbutrin as prescribed by her physician. She reports she also received pregabalin which did  help the pain but she apparently developed swelling all over the body and hence she had to stop it. Patient reports her pain is excruciating and she not able to function well because of the pain in the back as well as in the neck-patient is requesting for increasing the dose of her medication as her pain control is inadequate. Back Pain  This is a chronic problem. The current episode started more than 1 year ago. The problem occurs constantly. The problem is unchanged. The pain is present in the lumbar spine and sacro-iliac. The quality of the pain is described as aching, burning, cramping and stabbing. The pain is at a severity of 10/10 (6-10). The pain is severe. The pain is worse during the night.  The symptoms are aggravated by bending, standing, twisting and position (walking, lifting, ADLs). Stiffness is present in the morning. Associated symptoms include chest pain, headaches, leg pain, numbness, tingling and weakness. Pertinent negatives include no abdominal pain, bladder incontinence, bowel incontinence, dysuria or fever. Risk factors include lack of exercise and obesity. Neck Pain   This is a chronic problem. The current episode started more than 1 year ago. The problem occurs constantly. The problem has been unchanged. The pain is associated with nothing. The pain is present in the left side, midline and right side. The quality of the pain is described as aching (sharp,throbbing). The pain is at a severity of 10/10. The pain is severe. The symptoms are aggravated by position, twisting, bending and coughing. Stiffness is present in the morning. Associated symptoms include chest pain, headaches, leg pain, numbness, tingling and weakness. Pertinent negatives include no fever, photophobia or syncope. Alleviating factors:heat and rest   Lifestyle changes experienced with pain: Wakes from sleep, Prevents or limits ADLs, Increases w/activity. Increases w/prolonged sitting/standing/walking  Mood changes,anxious and depressed  Patient currently unemployed. Physical therapy did not help the pain. Are you under psychological counseling at present: Yes  Goals for treatment include:  Decrease in pain  Enjoy daily and recreational activities, return to previous status. Patient relates current medications are helping the pain. Patient reports taking pain medications as prescribed, denies obtaining medications from different sources and denies use of illegal drugs. Patient denies side effects from medications like nausea, vomiting, constipation or drowsiness. Patient reports current activities of daily living ar possible due to medications and would like to continue them. ACTIVITY/SOCIAL/EMOTIONAL:  Sleep Pattern: 4 hours per night.  difficulty falling asleep, nightime awakenings and difficulty falling back asleep if awakened  Home Exercises:  no regular exercise  Activity:not significantly changed  Emotional Issues: anxiety/ nervousness.    Currently seeing a Psychiatrist or Psychologist:  Yes     ADVERSE MEDICATION EFFECTS:   Nausea and vomiting:no   Constipation: yes-Undercontrol-: yes  Dizziness/drowsy/sleepy--no  Urinary Retention: no    ABERRANT BEHAVIORS SINCE LAST VISIT  Lost rx/pills:------------------------------------------ no  Taking  medication as prescribed: ----------- yes  Urine Drug Screen ---------------------------------  yes             Date------------------------------------------------- 11/23/2020              Results as expected ---------------------yes    Recent ER visits: -------------------------------------Yes  Pill count is appropriate: ---------------------------yes   Refills for prescriptions appropriate:---------- yes      Past Medical History:   Diagnosis Date    Arthritis     Bronchitis     On ICS-LABA, denies asthma, copd    CHF (congestive heart failure) ([facility] [address]     COVID-19     diagnosed in September 2020, hospitilized on oxgyen    Fibromyalgia     GERD (gastroesophageal reflux disease)     Gout 10/2019    History of heart attack     HLD (hyperlipidemia)     Hypertension     Insomnia     Osteoarthritis        Past Surgical History:   Procedure Laterality Date    BACK SURGERY      CHOLECYSTECTOMY, LAPAROSCOPIC      HYSTERECTOMY, TOTAL ABDOMINAL      KNEE ARTHROSCOPY Right     KNEE SURGERY Left 2009    NECK SURGERY      SHOULDER SURGERY Right 2009       Family History   Problem Relation Age of Onset    Other Mother     Diabetes Mother     Heart Disease Mother     Arthritis Mother     Kidney Disease Mother     Lupus Mother     Arthritis Sister     Diabetes Brother     Asthma Brother     Cancer Maternal Grandfather     Hypertension Sister     Breast Cancer Sister         28s    Breast Cancer Niece 30-35       Social History     Socioeconomic History    Marital status: Single     Spouse name: None    Number of children: None    Years of education: None    Highest education level: None   Occupational History    None   Social Needs    Financial resource strain: None    Food insecurity     Worry: None     Inability: None    Transportation needs     Medical: None     Non-medical: None   Tobacco Use    Smoking status: Never Smoker    Smokeless tobacco: Never Used   Substance and Sexual Activity    Alcohol use: No    Drug use: No    Sexual activity: None   Lifestyle    Physical activity     Days per week: None     Minutes per session: None    Stress: None   Relationships    Social connections     Talks on phone: None     Gets together: None     Attends [employer] service: None     Active member of club or organization: None     Attends meetings of clubs or organizations: None     Relationship status: None    Intimate partner violence     Fear of current or ex partner: None     Emotionally abused: None     Physically abused: None     Forced sexual activity: None   Other Topics Concern    None   Social History Narrative    None       Allergies   Allergen Reactions    Entresto [Sacubitril-Valsartan] Other (See Comments)     Acute kidney injury    Food Swelling     peaches    Gabapentin Swelling    Lyrica [Pregabalin] Swelling     Mouth sores    Tetracyclines & Related        Current Outpatient Medications on File Prior to Encounter   Medication Sig Dispense Refill    aspirin 81 MG EC tablet Take 81 mg by mouth      furosemide (LASIX) 40 MG tablet furosemide 40 mg tablet      buPROPion (WELLBUTRIN XL) 300 MG extended release tablet Take 300 mg by mouth      DULoxetine (CYMBALTA) 60 MG extended release capsule duloxetine 60 mg capsule,delayed release      clopidogrel (PLAVIX) 75 MG tablet Take 75 mg by mouth      predniSONE (DELTASONE) 20 MG tablet Take 20 mg by mouth daily      DULoxetine (CYMBALTA) 60 MG extended release capsule Take 2 capsules by mouth daily 60 capsule 0    buPROPion (WELLBUTRIN XL) 300 MG extended release tablet Take 1 tablet by mouth every morning 30 tablet 0    metoprolol tartrate (LOPRESSOR) 50 MG tablet Take 1 tablet by mouth 2 times daily 180 tablet 1    atorvastatin (LIPITOR) 80 MG tablet Take 1 tablet by mouth daily 90 tablet 1    spironolactone (ALDACTONE) 25 MG tablet Take 1 tablet by mouth daily 90 tablet 1    clopidogrel (PLAVIX) 75 MG tablet Take 1 tablet by mouth daily 90 tablet 1    pantoprazole (PROTONIX) 40 MG tablet Take 1 tablet by mouth daily 90 tablet 1    montelukast (SINGULAIR) 10 MG tablet Take 1 tablet by mouth nightly 90 tablet 1    allopurinol (ZYLOPRIM) 300 MG tablet Take 1 tablet by mouth daily 90 tablet 1    ibuprofen (ADVIL;MOTRIN) 800 MG tablet Take 1 tablet by mouth every 8 hours as needed for Pain 270 tablet 1    hydrALAZINE (APRESOLINE) 25 MG tablet Take 25 mg by mouth 2 times daily      albuterol sulfate  (90 Base) MCG/ACT inhaler Inhale 2 puffs into the lungs every 4 hours as needed for Shortness of Breath 1 Inhaler 3    bumetanide (BUMEX) 1 MG tablet Take 1 tablet by mouth daily 90 tablet 0    traZODone (DESYREL) 150 MG tablet Take 1 tablet by mouth nightly 90 tablet 1    isosorbide dinitrate (ISORDIL) 10 MG tablet Take 1 tablet by mouth 2 times daily 180 tablet 1    levothyroxine (SYNTHROID) 25 MCG tablet Take 1 tablet by mouth daily 90 tablet 1    sucralfate (CARAFATE) 1 GM tablet Take 1 tablet in AM, 2 tablet in evening daily 90 tablet 3    budesonide-formoterol (SYMBICORT) 80-4.5 MCG/ACT AERO Inhale 2 puffs into the lungs 2 times daily 2 Inhaler 3    oxybutynin (DITROPAN) 5 MG tablet Take 1 tablet by mouth 2 times daily 180 tablet 1    albuterol (PROVENTIL) (2.5 MG/3ML) 0.083% nebulizer solution Take 3 mLs by nebulization every 6 hours as needed for Wheezing 120 each 3    SUPREP BOWEL PREP KIT 17.5-3.13-1.6 GM/177ML SOLN Take as directed - dispense one Kit 1 kit 0    Handicap Placard MISC by Does not apply route 2 years 1 each 0    Respiratory Therapy Supplies (NEBULIZER COMPRESSOR) KIT Provide insurance covered nebulizer, use daily as needed 1 kit 0    polyethylene glycol (GLYCOLAX) packet polyethylene glycol 3350 17 gram/dose oral powder       No current facility-administered medications on file prior to encounter. Review of Systems   Constitutional: Positive for activity change and fatigue. Negative for appetite change, fever and unexpected weight change. HENT: Negative. Negative for congestion and hearing loss. Loss of taste and sense of smell   Eyes: Negative. Negative for photophobia, pain and visual disturbance. Respiratory: Negative. Negative for cough, choking and shortness of breath. Cardiovascular: Positive for chest pain and leg swelling. Negative for syncope. Gastrointestinal: Positive for constipation. Negative for abdominal pain, bowel incontinence, nausea and vomiting. Endocrine: Negative. Negative for cold intolerance and polyphagia. Genitourinary: Negative. Negative for bladder incontinence, dysuria, hematuria and vaginal pain. Musculoskeletal: Positive for back pain and neck pain. Skin: Negative. Negative for rash. Allergic/Immunologic: Negative. Neurological: Positive for dizziness, tingling, weakness, numbness and headaches. Hematological: Bruises/bleeds easily. Plavix and aspirin therapy   Psychiatric/Behavioral: Positive for sleep disturbance. Negative for confusion. Physical Exam  Constitutional:       Appearance: Normal appearance. She is obese. Skin:         Neurological:      Mental Status: She is alert and oriented to person, place, and time.    Psychiatric:         Mood and Affect: Mood normal.            DATA:  LAB.:  12/17/2020  4:50 PM - Jp, Won Incoming Lab Results From [employer]    Component Value Ref Range & Units Status Collected Lab   Glucose 89  70 - 99 mg/dL Final 12/17/2020  4:10  [address]   BUN 19  6 - 20 mg/dL Final 12/17/2020  4:10  [address]   0.50 - 0.90 mg/dL Final 12/17/2020  4:10  [address]   Bun/Cre Ratio NOT REPORTED  9 - 20 Final 12/17/2020  4:10  [address]   Calcium 8.7  8.6 - 10.4 mg/dL Final 12/17/2020  4:10  [address]   Sodium 141  135 - 144 mmol/L Final 12/17/2020  4:10  [address]   Potassium 4.5  3.7 - 5.3 mmol/L Final 12/17/2020  4:10  [address]   Chloride 104  98 - 107 mmol/L Final 12/17/2020  4:10  [address]   CO2 26  20 - 31 mmol/L Final 12/17/2020  4:10  [address]   Anion Gap 11  9 - 17 mmol/L Final 12/17/2020  4:10  [address]   GFR Non- 51Low   >60 mL/min Final 12/17/2020  4:10  [address]   GFR African American >60  >60 mL/min Final 12/17/2020  4:10  [address]   GFR Comment        Final 12/17/2020  4:10  [address]   Average GFR for 52-63 years old:        X-Ray reports:  History:   Right knee pain   Findings:  Standing AP/Lateral/Tunnel/Merchant view xrays of the Right done in the office today shows significant medial joint space narrowing, tricompartmental osteophytosis, joint line sclerosis medially. No evidence of fracture, subluxation, dislocation, radioopaque foreign body/tumor is noted. Lateral subluxation of the tibia is appreciated. Impression:  Right knee severe  degenerative changes as described above. History: Left hip pain   Findings: AP pelvis and frog-leg lateral x-ray of the left hip done in the office today shows mild degenerative narrowing of the hip joint. Hardware at the lower lumbar spine is appreciated.   No evidence of fracture, subluxation, dislocation, radiopaque foreign body, radiopaque tumors noted. Impression: Mild degenerative changes left hip as described above. EXAMINATION:   CTA OF THE CHEST 12/1/2020 8:51 am       TECHNIQUE:   CTA of the chest was performed after the administration of intravenous   contrast.  Multiplanar reformatted images are provided for review.  MIP   images are provided for review. Dose modulation, iterative reconstruction,   and/or weight based adjustment of the mA/kV was utilized to reduce the   radiation dose to as low as reasonably achievable.       COMPARISON:   09/02/2020, 10/02/2019       HISTORY:   ORDERING SYSTEM PROVIDED HISTORY: sob, tachycardia, elevated ddimer   TECHNOLOGIST PROVIDED HISTORY:   sob, tachycardia, elevated ddimer       FINDINGS:   Pulmonary Arteries: Pulmonary arteries are adequately opacified for   evaluation.  No evidence of intraluminal filling defect to suggest pulmonary   embolism.  Main pulmonary artery is dilated measuring 3.6 cm.       Mediastinum: Marked cardiomegaly.  No pericardial effusion.  Normal caliber   thoracic aorta.  No mediastinal, hilar, or axillary lymphadenopathy.  Stable   3.6 cm partially calcified nodule in the region of the left thyroid bed.       Lungs/pleura: No focal consolidation, pleural effusion, pneumothorax.  There   is pulmonary vascular congestion.       Upper Abdomen: Limited images of the upper abdomen are unremarkable.       Soft Tissues/Bones: No acute bone or soft tissue abnormality.           Impression   No evidence of pulmonary embolism.       Cardiomegaly with pulmonary vascular congestion.       Enlarged main pulmonary indicating pulmonary hypertension. Clinical  impression:  1. DDD (degenerative disc disease), lumbar    2. Lumbar radiculopathy    3. Status post lumbar spinal fusion-anterior and posterior approach    4. Spondylosis of lumbar region without myelopathy or radiculopathy    5. Cervical spondylosis with radiculopathy    6. Status post cervical spinal fusion    7. Bulge of cervical disc without myelopathy    8. Sacroiliac joint dysfunction of both sides    9. Failed back syndrome of cervical spine    10. Morbid obesity ([address]    11. Encounter for long-term opiate analgesic use    12. Encounter for medication management    13. Therapeutic opioid-induced constipation (OIC)        Plan of care: We will increase the Percocet to 10/325 to help with the increase in  muscle pain due to Covid infection. She is encouraged to exercise. We will also added Topamax to help with the pain in the depending on the response will increase the dose. She was advised to take 25 mg a day for a week or so and see if she is experiencing any side effects are the pain relief is adequate. She is told to increase the dose to 50 mg a day if there are no side effects and it is not helping her pain. Current medications are being tolerated without any Adverse side effects. Orders Placed This Encounter   Medications    DISCONTD: oxyCODONE-acetaminophen (PERCOCET) 7.5-325 MG per tablet     Sig: Take 1 tablet by mouth every 6 hours as needed for Pain for up to 30 days. Intended supply: 30 days     Dispense:  120 tablet     Refill:  0     Reduce doses taken as pain becomes manageable    oxyCODONE-acetaminophen (PERCOCET)  MG per tablet     Sig: Take 1 tablet by mouth every 6 hours as needed for Pain for up to 30 days. Intended supply: 30 days     Dispense:  120 tablet     Refill:  0     Reduce doses taken as pain becomes manageable    topiramate (TOPAMAX) 25 MG tablet     Sig: Take 1 tablet by mouth nightly     Dispense:  60 tablet     Refill:  3     Urine drug screens have been appropriate. No aberrant activity noted. Analgesia is achieved. Activities of daily living are possible because of medications. Safe use of medications explained to patient.     PDMP Monitoring:    Last PDMP Manuel Diver as Reviewed [facility]):  Review User Review Instant Review Result   Phoebe Cifuentes 4/12/2021 10:19 AM Reviewed PDMP [1]     Counselling/Preventive measures for pain  Control:    [x]  Spine strengthening exercises are discussed with patient in detail. [x] Ill effects of being on chronic pain medications such as sleep disturbances, hormonal changes, withdrawal symptoms,  chronic opioid dependence and tolerance were discussed with patient. I had asked the patient to minimize medication use and utilize pain medications only for uncontrolled rest pain or pain with exertional activities. I advised patient not to self escalate pain medications without consulting with us. At each of patient's future visits we will try to taper pain medications, while adjusting the adjunct medications, and re-evaluating for Physical Therapy to improve spinal and joint strength. We will continue to have discussions to decrease pain medications as tolerated. I also discussed with the patient regarding the dangers of combining narcotic pain medication with tranquilizers, alcohol or illegal drugs or taking the medication any other than prescribed. The dangers including the respiratory depression and death. Patient was told to tell  to all  physicians regarding the medications he is getting from pain clinic. Patient is warned not to take any unprescribed medications over-the-counter medications that can depress breathing . Patient is advised to talk to the pharmacist or physicians if planning to take any over-the-counter medications before  takeing them. Patient is strongly advised to avoid tranquilizers or  Relaxants for any medications that can depress breathing or recreational drugs. Patient is also advised to tell us if there is any changes in his medications from other physicians. We discussed the same at today's visit and have not been to implement it, as the patient's pain is not under control with current medications.      Decision Making Process : Patient's health history and referral records thoroughly reviewed before focused physical examination and discussion with patient. I have spent 25 mins. Over 50% of today's visit is spent on examining the patient and counseling and coordinating the care. Level of complexity of date to be reviewed is Moderate. The chart date reviewed include the following: Imaging Reports. Summary of Care. Time spent reviewing with patient the below reports:   Medication safety, Treatment options. Level of diagnosis and management options of this case is multiple: involving the following management options: Interventions as needed, medication management as appropriate, future visits, activity modification, heat/ice as needed, Urine drug screen as required. [x]The patient's questions were answered to the best of my abilities. This note was created using voice recognition software. There may be inaccuracies of transcription  that are inadvertently overlooked prior to the signature. There is any questions about the transcription please contact me. Return in  4 weeks  with physician / CNP  for further plan of treatment. Due to the COVID-19 pandemic and the appropriate interventions by Katia Hickman, our non-urgent pain management patients will not be seen in the office at this time for their protection and the protection of our staff. To offer continuity of care, their prescriptions will be escribed this month after a careful chart review and review of their OARRS report  Pursuant to the emergency declaration under the [employer] and [facility], 1135 waiver authority and the [employer] and [employer] Act, this Virtual Visit was conducted, with patient's consent, to reduce the patient's risk of exposure to COVID-19 and provide continuity of care for an established patient. Services were provided through a video synchronous discussion virtually to substitute for in-person appointment. \"  Documentation:  I communicated with the patient and/or health care decision maker about plan of care  Details of this discussion including any medical advice provided: Total Time: minutes: 21-30 minutes    I do not affirm (not billable) this is a Patient Initiated Episode with an Established Patient who has not had a related appointment within my department in the past 7 days or scheduled within the next 24 hours.     Electronically signed by Bretta Phoenix, MD on 4/12/2021 at 10:36 AM

## 2021-04-12 ENCOUNTER — HOSPITAL ENCOUNTER (OUTPATIENT)
Dept: PAIN MANAGEMENT | Age: 56
Discharge: HOME | End: 2021-04-12
Payer: MEDICARE

## 2021-04-12 DIAGNOSIS — M54.16 LUMBAR RADICULOPATHY: ICD-10-CM

## 2021-04-12 DIAGNOSIS — Z98.1 STATUS POST LUMBAR SPINAL FUSION: ICD-10-CM

## 2021-04-12 DIAGNOSIS — K59.03 THERAPEUTIC OPIOID-INDUCED CONSTIPATION (OIC): ICD-10-CM

## 2021-04-12 DIAGNOSIS — Z79.899 ENCOUNTER FOR MEDICATION MANAGEMENT: ICD-10-CM

## 2021-04-12 DIAGNOSIS — M50.30 BULGE OF CERVICAL DISC WITHOUT MYELOPATHY: ICD-10-CM

## 2021-04-12 DIAGNOSIS — E66.01 MORBID OBESITY (HCC): ICD-10-CM

## 2021-04-12 DIAGNOSIS — M47.22 CERVICAL SPONDYLOSIS WITH RADICULOPATHY: ICD-10-CM

## 2021-04-12 DIAGNOSIS — M51.36 DDD (DEGENERATIVE DISC DISEASE), LUMBAR: Primary | ICD-10-CM

## 2021-04-12 DIAGNOSIS — M47.816 SPONDYLOSIS OF LUMBAR REGION WITHOUT MYELOPATHY OR RADICULOPATHY: ICD-10-CM

## 2021-04-12 DIAGNOSIS — T40.2X5A THERAPEUTIC OPIOID-INDUCED CONSTIPATION (OIC): ICD-10-CM

## 2021-04-12 DIAGNOSIS — M53.3 SACROILIAC JOINT DYSFUNCTION OF BOTH SIDES: ICD-10-CM

## 2021-04-12 DIAGNOSIS — Z79.891 ENCOUNTER FOR LONG-TERM OPIATE ANALGESIC USE: ICD-10-CM

## 2021-04-12 DIAGNOSIS — M96.1 FAILED BACK SYNDROME OF CERVICAL SPINE: ICD-10-CM

## 2021-04-12 DIAGNOSIS — Z98.1 STATUS POST CERVICAL SPINAL FUSION: ICD-10-CM

## 2021-04-12 PROCEDURE — 99213 OFFICE O/P EST LOW 20 MIN: CPT

## 2021-04-12 PROCEDURE — 99214 OFFICE O/P EST MOD 30 MIN: CPT | Performed by: PAIN MEDICINE

## 2021-04-12 RX ORDER — OXYCODONE AND ACETAMINOPHEN 7.5; 325 MG/1; MG/1
1 TABLET ORAL EVERY 6 HOURS PRN
Qty: 120 TABLET | Refills: 0 | Status: SHIPPED | OUTPATIENT
Start: 2021-04-13 | End: 2021-04-12 | Stop reason: DRUGHIGH

## 2021-04-12 RX ORDER — TOPIRAMATE 25 MG/1
25 TABLET ORAL NIGHTLY
Qty: 60 TABLET | Refills: 3 | Status: SHIPPED | OUTPATIENT
Start: 2021-04-12 | End: 2021-05-11 | Stop reason: SINTOL

## 2021-04-12 RX ORDER — OXYCODONE AND ACETAMINOPHEN 10; 325 MG/1; MG/1
1 TABLET ORAL EVERY 6 HOURS PRN
Qty: 120 TABLET | Refills: 0 | Status: SHIPPED | OUTPATIENT
Start: 2021-04-13 | End: 2021-05-11 | Stop reason: SDUPTHER

## 2021-04-15 ENCOUNTER — VIRTUAL VISIT (OUTPATIENT)
Dept: PRIMARY CARE CLINIC | Age: 56
End: 2021-04-15
Payer: MEDICARE

## 2021-04-15 DIAGNOSIS — R19.8 ABNORMAL BOWEL MOVEMENT: ICD-10-CM

## 2021-04-15 DIAGNOSIS — F43.10 PTSD (POST-TRAUMATIC STRESS DISORDER): ICD-10-CM

## 2021-04-15 DIAGNOSIS — M79.7 FIBROMYALGIA: ICD-10-CM

## 2021-04-15 DIAGNOSIS — Z76.0 MEDICATION REFILL: ICD-10-CM

## 2021-04-15 DIAGNOSIS — R06.09 DYSPNEA ON EXERTION: ICD-10-CM

## 2021-04-15 DIAGNOSIS — Z12.11 COLON CANCER SCREENING: ICD-10-CM

## 2021-04-15 DIAGNOSIS — F33.0 MILD EPISODE OF RECURRENT MAJOR DEPRESSIVE DISORDER (HCC): Primary | ICD-10-CM

## 2021-04-15 PROCEDURE — G8427 DOCREV CUR MEDS BY ELIG CLIN: HCPCS | Performed by: PHYSICIAN ASSISTANT

## 2021-04-15 PROCEDURE — 3017F COLORECTAL CA SCREEN DOC REV: CPT | Performed by: PHYSICIAN ASSISTANT

## 2021-04-15 PROCEDURE — 99214 OFFICE O/P EST MOD 30 MIN: CPT | Performed by: PHYSICIAN ASSISTANT

## 2021-04-15 RX ORDER — DULOXETIN HYDROCHLORIDE 60 MG/1
120 CAPSULE, DELAYED RELEASE ORAL DAILY
Qty: 180 CAPSULE | Refills: 1 | Status: SHIPPED | OUTPATIENT
Start: 2021-04-15 | End: 2022-05-18 | Stop reason: SDUPTHER

## 2021-04-15 RX ORDER — BUPROPION HYDROCHLORIDE 300 MG/1
300 TABLET ORAL EVERY MORNING
Qty: 90 TABLET | Refills: 1 | Status: SHIPPED | OUTPATIENT
Start: 2021-04-15 | End: 2022-01-06 | Stop reason: SDUPTHER

## 2021-04-15 ASSESSMENT — ENCOUNTER SYMPTOMS
CONSTIPATION: 1
DIARRHEA: 1
SHORTNESS OF BREATH: 1

## 2021-04-15 NOTE — PROGRESS NOTES
Ellis Bailey is a 60 y.o. female evaluated virtual visit via [employer] video on 4/15/2021. Consent:  She and/or health care decision maker is aware that that she may receive a bill for this telephone service, depending on her insurance coverage, and has provided verbal consent to proceed: NA - Consent obtained within past 12 months      Documentation:  I communicated with the patient and/or health care decision maker about PTSD, depression. Details of this discussion including any medical advice provided below      I affirm this is a Patient Initiated Episode with an Established Patient who has not had a related appointment within my department in the past 7 days or scheduled within the next 24 hours. Total Time: minutes: 11-20 minutes    Note: not billable if this call serves to triage the patient into an appointment for the relevant concern      Enma Tuan                  4/15/2021    TELEHEALTH EVALUATION -- Audio/Visual [facility])    Patient and physician are located in their individual homes    HPI:    Ellis Bailey (:  1965) has requested an audio only/video evaluation for the following concern(s):    Patient is here for virtual visit via video for follow-up for depression, PTSD, medication refill. Patient states she is compliant with medications. Patient states symptoms of PTSD and depression are \"much better\" with recent medication adjustment, but Wellbutrin Cymbalta was increased. Patient is currently being seen by counseling in office for psychiatric concerns, will be following up on 2021. Patient was seen by pulmonology for dyspnea on exertion since COVID-19 infection. Patient is seen by pain management for fibromyalgia, \"recently had medication increased\" that has improved pain. Patient confirms \"exercising more\" to help with weight loss. Labs reviewed.     Health maintenance reviewed, discussed colon cancer screening in depth with patient, patient referred to GI for colonoscopy. Medications reviewed and prescribed. Patient was informed that PCP will be residing in April 2021, encourage patient to get established with another provider in office or contact PCP office to be referred to another primary care patient, patient voiced understanding. HPI    Review of Systems   Constitutional: Negative for chills and fever. HENT: Negative for congestion. Respiratory: Positive for shortness of breath. Negative for cough and wheezing. Cardiovascular: Negative for chest pain. Gastrointestinal: Positive for constipation and diarrhea. Negative for nausea and vomiting. Genitourinary: Negative for dysuria, frequency and urgency. Musculoskeletal: Positive for arthralgias. Negative for back pain, myalgias and neck pain. Neurological: Negative for headaches. Psychiatric/Behavioral: Negative for dysphoric mood (\"better\"). Prior to Visit Medications    Medication Sig Taking? Authorizing Provider   DULoxetine (CYMBALTA) 60 MG extended release capsule Take 2 capsules by mouth daily Yes Jade Chung PA-C   buPROPion (WELLBUTRIN XL) 300 MG extended release tablet Take 1 tablet by mouth every morning Yes Jade Chung PA-C   oxyCODONE-acetaminophen (PERCOCET)  MG per tablet Take 1 tablet by mouth every 6 hours as needed for Pain for up to 30 days.  Intended supply: 30 days Yes 30 Colton Higuera MD   topiramate (TOPAMAX) 25 MG tablet Take 1 tablet by mouth nightly Yes 30 Colton Higuera MD   aspirin 81 MG EC tablet Take 81 mg by mouth Yes Historical Provider, MD   furosemide (LASIX) 40 MG tablet furosemide 40 mg tablet Yes Historical Provider, MD   clopidogrel (PLAVIX) 75 MG tablet Take 75 mg by mouth Yes Historical Provider, MD   predniSONE (DELTASONE) 20 MG tablet Take 20 mg by mouth daily Yes Historical Provider, MD   metoprolol tartrate (LOPRESSOR) 50 MG tablet Take 1 tablet by mouth 2 times daily Yes Canidce De La Garza PA-C   atorvastatin (LIPITOR) 80 MG tablet Take 1 tablet by mouth daily Yes Candice De La Garza PA-C   spironolactone (ALDACTONE) 25 MG tablet Take 1 tablet by mouth daily Yes Candice De La Garza PA-C   clopidogrel (PLAVIX) 75 MG tablet Take 1 tablet by mouth daily Yes Candice De La Garza PA-C   pantoprazole (PROTONIX) 40 MG tablet Take 1 tablet by mouth daily Yes Candice De La Garza PA-C   montelukast (SINGULAIR) 10 MG tablet Take 1 tablet by mouth nightly Yes Candice De La Garza PA-C   allopurinol (ZYLOPRIM) 300 MG tablet Take 1 tablet by mouth daily Yes Candice De La Garza PA-C   ibuprofen (ADVIL;MOTRIN) 800 MG tablet Take 1 tablet by mouth every 8 hours as needed for Pain Yes Candice De La Garza PA-C   hydrALAZINE (APRESOLINE) 25 MG tablet Take 25 mg by mouth 2 times daily Yes Historical Provider, MD   albuterol sulfate  (90 Base) MCG/ACT inhaler Inhale 2 puffs into the lungs every 4 hours as needed for Shortness of Breath Yes Candice De La Garza PA-C   bumetanide (BUMEX) 1 MG tablet Take 1 tablet by mouth daily Yes Candice De La Garza PA-C   traZODone (DESYREL) 150 MG tablet Take 1 tablet by mouth nightly Yes Candice De La Garza PA-C   isosorbide dinitrate (ISORDIL) 10 MG tablet Take 1 tablet by mouth 2 times daily Yes Candice De La Garza PA-C   levothyroxine (SYNTHROID) 25 MCG tablet Take 1 tablet by mouth daily Yes Candice De La Garza PA-C   sucralfate (CARAFATE) 1 GM tablet Take 1 tablet in AM, 2 tablet in evening daily Yes Candice De La Garza PA-C   budesonide-formoterol (SYMBICORT) 80-4.5 MCG/ACT AERO Inhale 2 puffs into the lungs 2 times daily Yes Candice De La Garza PA-C   oxybutynin (DITROPAN) 5 MG tablet Take 1 tablet by mouth 2 times daily Yes Candice De La Garza PA-C   albuterol (PROVENTIL) (2.5 MG/3ML) 0.083% nebulizer solution Take 3 mLs by nebulization every 6 hours as needed for Wheezing Yes Candice De La Garza PA-C   SUPREP BOWEL PREP KIT 17.5-3.13-1.6 GM/177ML SOLN Take as directed - dispense one Kit Yes Lucas Serrato MD   Handhira Naylorard MISC by Does not apply route 2 years Yes Shlomo Dowling PA-C   Respiratory Therapy Supplies (NEBULIZER COMPRESSOR) KIT Provide insurance covered nebulizer, use daily as needed Yes Shlomo Dowling PA-C   polyethylene glycol (GLYCOLAX) packet polyethylene glycol 3350 17 gram/dose oral powder Yes Historical Provider, MD       Social History     Tobacco Use    Smoking status: Never Smoker    Smokeless tobacco: Never Used   Substance Use Topics    Alcohol use: No    Drug use: No        Past Medical History:   Diagnosis Date    Arthritis     Bronchitis     On ICS-LABA, denies asthma, copd    CHF (congestive heart failure) ([facility] [address]     COVID-19     diagnosed in September 2020, hospitilized on oxgyen    Fibromyalgia     GERD (gastroesophageal reflux disease)     Gout 10/2019    History of heart attack     HLD (hyperlipidemia)     Hypertension     Insomnia     Osteoarthritis             CBC:  Lab Results   Component Value Date    WBC 7.6 12/17/2020    HGB 10.3 12/17/2020     12/17/2020       BMP:    Lab Results   Component Value Date     12/17/2020    K 4.5 12/17/2020     12/17/2020    CO2 26 12/17/2020    BUN 19 12/17/2020    CREATININE 1.10 12/17/2020    GLUCOSE 89 12/17/2020       HEMOGLOBIN A1C:   Lab Results   Component Value Date    LABA1C 5.8 02/23/2021       FASTING LIPID PANEL:  Lab Results   Component Value Date    CHOL 158 07/24/2020    HDL 33 (L) 07/24/2020    TRIG 149 07/24/2020         RECORD REVIEW: Previous medical records were reviewed at today's visit. PHYSICAL EXAMINATION:    Vital Signs: (As obtained by patient/caregiver at home)    No flowsheet data found. Physical Exam  Constitutional:       General: She is awake. Appearance: Normal appearance. She is well-developed and well-groomed. HENT:      Head: Normocephalic and atraumatic.       Right Ear: Hearing and external ear normal. XL) 300 MG extended release tablet; Take 1 tablet by mouth every morning        Return in about 5 months (around 9/15/2021). An  electronic signature was used to authenticate this note. --Beverley Clements PA-C on 4/18/2021 at 8:26 PM    This note is created with the assistance of a speech-recognition program. While intending to generate a document that actually reflects the content of the visit, the document can still have some mistakes which may not have been identified and corrected by editing. 9    Pursuant to the emergency declaration under the [facility], Counts include 234 beds at the [facility] waiver authority and the [employer] Act, this Virtual  Visit was conducted, with patient's consent, to reduce the patient's risk of exposure to COVID-19 and provide continuity of care for an established patient. Services were provided through a video synchronous discussion virtually to substitute for in-person clinic visit.

## 2021-04-15 NOTE — PROGRESS NOTES
Visit Information    Have you changed or started any medications since your last visit including any over-the-counter medicines, vitamins, or herbal medicines? no   Are you having any side effects from any of your medications? -  no  Have you stopped taking any of your medications? Is so, why? -  no    Have you seen any other physician or provider since your last visit? No  Have you had any other diagnostic tests since your last visit? No  Have you been seen in the emergency room and/or had an admission to a hospital since we last saw you? No  Have you had your routine dental cleaning in the past 6 months? no    Have you activated your [employer] account? If not, what are your barriers?  Yes     Patient Care Team:  Yehuda Schaumann, PA-C as PCP - General (Physician Assistant)  Yehuda Schaumann, PA-C as [facility]CP [facility]  ASA Francisco Che - CNP as Consulting Physician (Pain Management)  Cassia Valadez MD as Consulting Physician (Rheumatology)  Casey Washington DO as Consulting Physician (Pulmonary Disease)    Medical History Review  Past Medical, Family, and Social History reviewed and does not contribute to the patient presenting condition    Health Maintenance   Topic Date Due    DTaP/Tdap/Td vaccine (1 - Tdap) Never done    Shingles Vaccine (1 of 2) Never done    Colon cancer screen colonoscopy  Never done    Lipid screen  07/24/2021    Annual Wellness Visit (AWV)  08/14/2021    Breast cancer screen  12/17/2021    Potassium monitoring  12/17/2021    Creatinine monitoring  12/17/2021    A1C test (Diabetic or Prediabetic)  02/23/2022    Flu vaccine  Completed    Pneumococcal 0-64 years Vaccine  Completed    COVID-19 Vaccine  Completed    Hepatitis C screen  Completed    HIV screen  Completed    Hepatitis A vaccine  Aged Out    Hepatitis B vaccine  Aged Out    Hib vaccine  Aged Out    Meningococcal (ACWY) vaccine  Aged Out Spoke with patient and family 4/17, patient has frequent fall, no more coumadin, will monitor, will discuss with cardiology again.

## 2021-04-18 ASSESSMENT — ENCOUNTER SYMPTOMS
NAUSEA: 0
COUGH: 0
VOMITING: 0
BACK PAIN: 0
WHEEZING: 0

## 2021-04-21 ENCOUNTER — TELEPHONE (OUTPATIENT)
Dept: GASTROENTEROLOGY | Age: 56
End: 2021-04-21

## 2021-04-21 NOTE — TELEPHONE ENCOUNTER
Called pt for referral. Pt takes Plavix and requires OV.  Pt scheduled for OV on 4/30/21 at [address] [place] [address] with Dr Zenda Kocher

## 2021-04-27 ENCOUNTER — OFFICE VISIT (OUTPATIENT)
Dept: PSYCHOLOGY | Age: 56
End: 2021-04-27
Payer: MEDICARE

## 2021-04-27 DIAGNOSIS — F33.2 MAJOR DEPRESSIVE DISORDER, RECURRENT SEVERE WITHOUT PSYCHOTIC FEATURES (HCC): Primary | ICD-10-CM

## 2021-04-27 PROCEDURE — 90834 PSYTX W PT 45 MINUTES: CPT | Performed by: BEHAVIOR ANALYST

## 2021-04-27 PROCEDURE — 1036F TOBACCO NON-USER: CPT | Performed by: BEHAVIOR ANALYST

## 2021-04-30 ENCOUNTER — TELEPHONE (OUTPATIENT)
Dept: GASTROENTEROLOGY | Age: 56
End: 2021-04-30

## 2021-05-11 ENCOUNTER — HOSPITAL ENCOUNTER (OUTPATIENT)
Dept: PAIN MANAGEMENT | Age: 56
Discharge: HOME | End: 2021-05-11
Payer: MEDICARE

## 2021-05-11 DIAGNOSIS — M47.22 CERVICAL SPONDYLOSIS WITH RADICULOPATHY: ICD-10-CM

## 2021-05-11 DIAGNOSIS — M96.1 FAILED BACK SYNDROME OF CERVICAL SPINE: ICD-10-CM

## 2021-05-11 DIAGNOSIS — M53.3 SACROILIAC JOINT DYSFUNCTION OF BOTH SIDES: ICD-10-CM

## 2021-05-11 DIAGNOSIS — M50.30 BULGE OF CERVICAL DISC WITHOUT MYELOPATHY: ICD-10-CM

## 2021-05-11 DIAGNOSIS — M51.36 DDD (DEGENERATIVE DISC DISEASE), LUMBAR: ICD-10-CM

## 2021-05-11 DIAGNOSIS — Z79.891 ENCOUNTER FOR LONG-TERM OPIATE ANALGESIC USE: ICD-10-CM

## 2021-05-11 DIAGNOSIS — Z98.1 STATUS POST LUMBAR SPINAL FUSION: ICD-10-CM

## 2021-05-11 DIAGNOSIS — M79.10 MYALGIA: ICD-10-CM

## 2021-05-11 DIAGNOSIS — M54.16 LUMBAR RADICULOPATHY: ICD-10-CM

## 2021-05-11 DIAGNOSIS — M47.816 SPONDYLOSIS OF LUMBAR REGION WITHOUT MYELOPATHY OR RADICULOPATHY: ICD-10-CM

## 2021-05-11 DIAGNOSIS — Z98.1 STATUS POST CERVICAL SPINAL FUSION: ICD-10-CM

## 2021-05-11 DIAGNOSIS — E66.01 MORBID OBESITY (HCC): ICD-10-CM

## 2021-05-11 PROCEDURE — 99213 OFFICE O/P EST LOW 20 MIN: CPT | Performed by: NURSE PRACTITIONER

## 2021-05-11 PROCEDURE — 99213 OFFICE O/P EST LOW 20 MIN: CPT

## 2021-05-11 RX ORDER — BACLOFEN 10 MG/1
10 TABLET ORAL 3 TIMES DAILY
Qty: 90 TABLET | Refills: 0 | Status: SHIPPED | OUTPATIENT
Start: 2021-05-11 | End: 2021-06-11 | Stop reason: SDUPTHER

## 2021-05-11 RX ORDER — POLYETHYLENE GLYCOL 3350 17 G/17G
POWDER, FOR SOLUTION ORAL
Qty: 527 G | Refills: 0 | Status: SHIPPED | OUTPATIENT
Start: 2021-05-11

## 2021-05-11 RX ORDER — OXYCODONE AND ACETAMINOPHEN 10; 325 MG/1; MG/1
1 TABLET ORAL EVERY 6 HOURS PRN
Qty: 120 TABLET | Refills: 0 | Status: SHIPPED | OUTPATIENT
Start: 2021-05-13 | End: 2021-06-11 | Stop reason: SDUPTHER

## 2021-05-11 ASSESSMENT — ENCOUNTER SYMPTOMS
SHORTNESS OF BREATH: 0
BACK PAIN: 1
CONSTIPATION: 0
COUGH: 0
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
## 2021-05-20 NOTE — TELEPHONE ENCOUNTER
Patient states she is taking 2mg of Bumex          Health Maintenance   Topic Date Due    DTaP/Tdap/Td vaccine (1 - Tdap) Never done    Shingles Vaccine (1 of 2) Never done    Colon cancer screen colonoscopy  Never done    Lipid screen  07/24/2021    Annual Wellness Visit (AWV)  08/14/2021    Breast cancer screen  12/17/2021    Potassium monitoring  12/17/2021    Creatinine monitoring  12/17/2021    A1C test (Diabetic or Prediabetic)  02/23/2022    Pneumococcal 0-64 years Vaccine (2 of 2) 06/29/2028    Flu vaccine  Completed    COVID-19 Vaccine  Completed    Hepatitis C screen  Completed    HIV screen  Completed    Hepatitis A vaccine  Aged Out    Hepatitis B vaccine  Aged Out    Hib vaccine  Aged Out    Meningococcal (ACWY) vaccine  Aged Out             (applicable per patient's age: Cancer Screenings, Depression Screening, Fall Risk Screening, Immunizations)    Hemoglobin A1C (%)   Date Value   02/23/2021 5.8   11/19/2020 6.0   07/24/2020 6.1 (H)     LDL Cholesterol (mg/dL)   Date Value   07/24/2020 95     AST (U/L)   Date Value   11/30/2020 19     ALT (U/L)   Date Value   11/30/2020 15     BUN (mg/dL)   Date Value   12/17/2020 19      (goal A1C is < 7)   (goal LDL is <100) need 30-50% reduction from baseline     BP Readings from Last 3 Encounters:   04/05/21 115/67   12/17/20 (!) 157/97   12/09/20 135/84    (goal /80)      All Future Testing planned in [facility]:  Lab Frequency Next Occurrence   CHERY DIGITAL DIAGNOSTIC W OR WO CAD BILATERAL Once 07/28/2021   T4, Free Once 61/01/0281   Basic Metabolic Panel Once 48/51/9181   US BREAST COMPLETE LEFT Once 06/02/2021   COVID-19 Once 01/02/2021       Next Visit Date:  Future Appointments   Date Time Provider Christiano Wren   5/24/2021  8:05 AM Mira Bethea, DO ORTHO SPECIA [facility]   6/11/2021  1:00 PM Stephnaie Lissa, APRN - CNP [address]   7/20/2021 11:15 AM Harrison Archer, DO Resp Spec [facility]   8/9/2021 10:30 AM Arvin Young, APRN - CNP [facility]            Patient Active Problem List:     Low back pain     Therapeutic opioid-induced constipation (OIC)     Spondylosis of lumbar region without myelopathy or radiculopathy     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, insomnia type (HCC)     Opioid dependence, uncomplicated (HCC)     Sacroiliac joint dysfunction of both sides     Chronic pain disorder     Bulge of cervical disc without myelopathy     DDD (degenerative disc disease), lumbar     Failed back syndrome of cervical spine     Chest pain     Class 3 severe obesity due to excess calories with serious comorbidity and body mass index (BMI) of 45.0 to 49.9 in adult [facility])     Cervical spondylosis with radiculopathy     Status post cervical spinal fusion     Status post lumbar spinal fusion     Lumbar radiculopathy     Morbid obesity (HCC)     Chronic pain of both knees     Myalgia     Encounter for long-term opiate analgesic use     Lower respiratory tract infection due to COVID-19 virus     Hypertension     Gout     GERD (gastroesophageal reflux disease)     Acute on chronic combined systolic and diastolic congestive heart failure (HCC)     Other proteinuria     Nausea and vomiting     Stable angina (HCC)     PTSD (post-traumatic stress disorder)     Hyperuricemia     Mild persistent asthma without complication

## 2021-05-21 DIAGNOSIS — G89.29 CHRONIC PAIN OF BOTH KNEES: Primary | ICD-10-CM

## 2021-05-21 DIAGNOSIS — M25.562 CHRONIC PAIN OF BOTH KNEES: Primary | ICD-10-CM

## 2021-05-21 DIAGNOSIS — M25.561 CHRONIC PAIN OF BOTH KNEES: Primary | ICD-10-CM

## 2021-05-21 RX ORDER — BUMETANIDE 1 MG/1
2 TABLET ORAL DAILY
Qty: 180 TABLET | Refills: 0 | Status: SHIPPED | OUTPATIENT
Start: 2021-05-21 | End: 2021-10-14 | Stop reason: SDUPTHER

## 2021-05-21 RX ORDER — BUMETANIDE 1 MG/1
2 TABLET ORAL DAILY
Qty: 90 TABLET | Refills: 0 | Status: SHIPPED | OUTPATIENT
Start: 2021-05-21 | End: 2021-05-21 | Stop reason: SDUPTHER

## 2021-05-24 ENCOUNTER — OFFICE VISIT (OUTPATIENT)
Dept: ORTHOPEDIC SURGERY | Age: 56
End: 2021-05-24
Payer: MEDICARE

## 2021-05-24 VITALS — WEIGHT: 293 LBS | HEIGHT: 68 IN | BODY MASS INDEX: 44.41 KG/M2

## 2021-05-24 DIAGNOSIS — M17.11 ARTHRITIS OF RIGHT KNEE: Primary | ICD-10-CM

## 2021-05-24 PROCEDURE — G8427 DOCREV CUR MEDS BY ELIG CLIN: HCPCS | Performed by: ORTHOPAEDIC SURGERY

## 2021-05-24 PROCEDURE — 1036F TOBACCO NON-USER: CPT | Performed by: ORTHOPAEDIC SURGERY

## 2021-05-24 PROCEDURE — G8417 CALC BMI ABV UP PARAM F/U: HCPCS | Performed by: ORTHOPAEDIC SURGERY

## 2021-05-24 PROCEDURE — 3017F COLORECTAL CA SCREEN DOC REV: CPT | Performed by: ORTHOPAEDIC SURGERY

## 2021-05-24 PROCEDURE — 99214 OFFICE O/P EST MOD 30 MIN: CPT | Performed by: ORTHOPAEDIC SURGERY

## 2021-05-24 ASSESSMENT — ENCOUNTER SYMPTOMS
NAUSEA: 0
COUGH: 0
CONSTIPATION: 0
DIARRHEA: 0

## 2021-05-24 NOTE — PROGRESS NOTES
lb (158.8 kg)   BMI 53.22 kg/m²  Body mass index is 53.22 kg/m². General: Ludwig Mendez is a 60 y.o. female who is alert and oriented and sitting comfortably in our office. Ortho Exam  MS:  Left knee 8-100. Right knee 30-80. Exquisite pain right knee with any kind of exam.  No significant outward deformity of the right knee is appreciated. Calves are supple bilaterally. Motor, sensory, vascular examination of the right lower extremity appears to be grossly intact without focal deficits. Neuro: alert and oriented to person and place. Eyes: Extra-ocular muscles intact  Mouth: Oral mucosa moist. No perioral lesions  Pulm: Respirations unlabored and regular. Symmetric chest excursion without outward deformity is noted. Skin: warm, well perfused  Psych:   Patient has good fund of knowledge and displays understanging of exam, diagnosis, and plan. Radiology:     XR KNEE RIGHT (MIN 4 VIEWS)    Result Date: 5/26/2021  History:   Right knee pain Findings:   Standing AP/Lateral/Tunnel/Merchant view xrays of the Right done in the office today shows severe  medial joint space narrowing, tricompartmental osteophytosis, joint line sclerosis medially. No evidence of fracture, subluxation, dislocation, radioopaque foreign body/tumor is noted. Lateral subluxation of the tibia is appreciated. Impression:   Right knee severe  degenerative changes as described above. Assessment:      1. Arthritis of right knee    2. BMI 50.0-59.9, adult [facility])       Plan:        Went over new radiographs with patient. Discussed with her that her next step is a right total knee replacement. Right now the only barrier is her BMI of 53. Discussed with her that she will need to get that BMI close to 40. We will refer her to  of Bariatrics. Discussed with her that if she wants to be seen for her left knee that we will needs the [facility] records with consultation. Patient may follow up as needed.         Follow up:Return if

## 2021-06-11 ENCOUNTER — HOSPITAL ENCOUNTER (OUTPATIENT)
Dept: PAIN MANAGEMENT | Age: 56
Discharge: HOME | End: 2021-06-11
Payer: MEDICARE

## 2021-06-11 ENCOUNTER — TELEPHONE (OUTPATIENT)
Dept: PRIMARY CARE CLINIC | Age: 56
End: 2021-06-11

## 2021-06-11 DIAGNOSIS — Z79.891 ENCOUNTER FOR LONG-TERM OPIATE ANALGESIC USE: ICD-10-CM

## 2021-06-11 DIAGNOSIS — M79.10 MYALGIA: ICD-10-CM

## 2021-06-11 DIAGNOSIS — M96.1 FAILED BACK SYNDROME OF CERVICAL SPINE: ICD-10-CM

## 2021-06-11 DIAGNOSIS — M53.3 SACROILIAC JOINT DYSFUNCTION OF BOTH SIDES: ICD-10-CM

## 2021-06-11 DIAGNOSIS — M50.30 BULGE OF CERVICAL DISC WITHOUT MYELOPATHY: ICD-10-CM

## 2021-06-11 DIAGNOSIS — M51.36 DDD (DEGENERATIVE DISC DISEASE), LUMBAR: ICD-10-CM

## 2021-06-11 DIAGNOSIS — Z98.1 STATUS POST CERVICAL SPINAL FUSION: ICD-10-CM

## 2021-06-11 DIAGNOSIS — M47.816 SPONDYLOSIS OF LUMBAR REGION WITHOUT MYELOPATHY OR RADICULOPATHY: ICD-10-CM

## 2021-06-11 DIAGNOSIS — Z98.1 STATUS POST LUMBAR SPINAL FUSION: ICD-10-CM

## 2021-06-11 DIAGNOSIS — M54.16 LUMBAR RADICULOPATHY: ICD-10-CM

## 2021-06-11 DIAGNOSIS — M47.22 CERVICAL SPONDYLOSIS WITH RADICULOPATHY: ICD-10-CM

## 2021-06-11 DIAGNOSIS — E66.01 MORBID OBESITY (HCC): ICD-10-CM

## 2021-06-11 PROCEDURE — 99213 OFFICE O/P EST LOW 20 MIN: CPT

## 2021-06-11 PROCEDURE — 99213 OFFICE O/P EST LOW 20 MIN: CPT | Performed by: NURSE PRACTITIONER

## 2021-06-11 RX ORDER — BACLOFEN 10 MG/1
10 TABLET ORAL 3 TIMES DAILY
Qty: 90 TABLET | Refills: 0 | Status: SHIPPED | OUTPATIENT
Start: 2021-06-11 | End: 2021-08-06 | Stop reason: SDUPTHER

## 2021-06-11 RX ORDER — OXYCODONE AND ACETAMINOPHEN 10; 325 MG/1; MG/1
1 TABLET ORAL EVERY 6 HOURS PRN
Qty: 120 TABLET | Refills: 0 | Status: SHIPPED | OUTPATIENT
Start: 2021-06-12 | End: 2021-07-09 | Stop reason: SDUPTHER

## 2021-06-11 ASSESSMENT — ENCOUNTER SYMPTOMS
CONSTIPATION: 0
COUGH: 0
SHORTNESS OF BREATH: 0
BACK PAIN: 1
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
## 2021-06-11 NOTE — TELEPHONE ENCOUNTER
Patient called office with c/o having issues with anxiety due to apartment building that she currently resides in and would like an sooner appt, patient has been scheduled with psychology on 6/29/21, but is needing sooner appt if available.

## 2021-06-16 DIAGNOSIS — F43.10 PTSD (POST-TRAUMATIC STRESS DISORDER): ICD-10-CM

## 2021-06-16 DIAGNOSIS — Z76.0 MEDICATION REFILL: ICD-10-CM

## 2021-06-16 RX ORDER — TRAZODONE HYDROCHLORIDE 150 MG/1
150 TABLET ORAL NIGHTLY
Qty: 90 TABLET | Refills: 1 | Status: SHIPPED | OUTPATIENT
Start: 2021-06-16 | End: 2022-01-06 | Stop reason: SDUPTHER

## 2021-06-16 NOTE — TELEPHONE ENCOUNTER
Health Maintenance   Topic Date Due    DTaP/Tdap/Td vaccine (1 - Tdap) Never done    Shingles Vaccine (1 of 2) Never done    Colon cancer screen colonoscopy  Never done    Lipid screen  07/24/2021    Annual Wellness Visit (AWV)  08/14/2021    Breast cancer screen  12/17/2021    Potassium monitoring  12/17/2021    Creatinine monitoring  12/17/2021    A1C test (Diabetic or Prediabetic)  02/23/2022    Pneumococcal 0-64 years Vaccine (2 of 2) 06/29/2028    Flu vaccine  Completed    COVID-19 Vaccine  Completed    Hepatitis C screen  Completed    HIV screen  Completed    Hepatitis A vaccine  Aged Out    Hepatitis B vaccine  Aged Out    Hib vaccine  Aged Out    Meningococcal (ACWY) vaccine  Aged Out             (applicable per patient's age: Cancer Screenings, Depression Screening, Fall Risk Screening, Immunizations)    Hemoglobin A1C (%)   Date Value   02/23/2021 5.8   11/19/2020 6.0   07/24/2020 6.1 (H)     LDL Cholesterol (mg/dL)   Date Value   07/24/2020 95     AST (U/L)   Date Value   11/30/2020 19     ALT (U/L)   Date Value   11/30/2020 15     BUN (mg/dL)   Date Value   12/17/2020 19      (goal A1C is < 7)   (goal LDL is <100) need 30-50% reduction from baseline     BP Readings from Last 3 Encounters:   04/05/21 115/67   12/17/20 (!) 157/97   12/09/20 135/84    (goal /80)      All Future Testing planned in [facility]:  Lab Frequency Next Occurrence   CHERY DIGITAL DIAGNOSTIC W OR WO CAD BILATERAL Once 07/28/2021   T4, Free Once 79/60/9891   Basic Metabolic Panel Once 19/21/2680   US BREAST COMPLETE LEFT Once 06/02/2021   COVID-19 Once 01/02/2021       Next Visit Date:  Future Appointments   Date Time Provider Christiano Wren   6/22/2021  2:00 PM Padmini Wall psych [facility]   7/9/2021  3:20 PM ASA Henley - CNP [address]   7/20/2021 11:15 AM Flako Goetz DO Resp Spec [facility]   8/9/2021 10:30 AM [facility] Patient Active Problem List:     Low back pain     Therapeutic opioid-induced constipation (OIC)     Spondylosis of lumbar region without myelopathy or radiculopathy     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, insomnia type (HCC)     Opioid dependence, uncomplicated (HCC)     Sacroiliac joint dysfunction of both sides     Chronic pain disorder     Bulge of cervical disc without myelopathy     DDD (degenerative disc disease), lumbar     Failed back syndrome of cervical spine     Chest pain     Class 3 severe obesity due to excess calories with serious comorbidity and body mass index (BMI) of 45.0 to 49.9 in adult [facility])     Cervical spondylosis with radiculopathy     Status post cervical spinal fusion     Status post lumbar spinal fusion     Lumbar radiculopathy     Morbid obesity (HCC)     Chronic pain of both knees     Myalgia     Encounter for long-term opiate analgesic use     Lower respiratory tract infection due to COVID-19 virus     Hypertension     Gout     GERD (gastroesophageal reflux disease)     Acute on chronic combined systolic and diastolic congestive heart failure (HCC)     Other proteinuria     Nausea and vomiting     Stable angina (HCC)     PTSD (post-traumatic stress disorder)     Hyperuricemia     Mild persistent asthma without complication

## 2021-06-17 DIAGNOSIS — J42 CHRONIC BRONCHITIS, UNSPECIFIED CHRONIC BRONCHITIS TYPE (HCC): ICD-10-CM

## 2021-06-17 NOTE — TELEPHONE ENCOUNTER
Health Maintenance   Topic Date Due    DTaP/Tdap/Td vaccine (1 - Tdap) Never done    Shingles Vaccine (1 of 2) Never done    Colon cancer screen colonoscopy  Never done    Lipid screen  07/24/2021    Annual Wellness Visit (AWV)  08/14/2021    Breast cancer screen  12/17/2021    Potassium monitoring  12/17/2021    Creatinine monitoring  12/17/2021    A1C test (Diabetic or Prediabetic)  02/23/2022    Pneumococcal 0-64 years Vaccine (2 of 2) 06/29/2028    Flu vaccine  Completed    COVID-19 Vaccine  Completed    Hepatitis C screen  Completed    HIV screen  Completed    Hepatitis A vaccine  Aged Out    Hepatitis B vaccine  Aged Out    Hib vaccine  Aged Out    Meningococcal (ACWY) vaccine  Aged Out             (applicable per patient's age: Cancer Screenings, Depression Screening, Fall Risk Screening, Immunizations)    Hemoglobin A1C (%)   Date Value   02/23/2021 5.8   11/19/2020 6.0   07/24/2020 6.1 (H)     LDL Cholesterol (mg/dL)   Date Value   07/24/2020 95     AST (U/L)   Date Value   11/30/2020 19     ALT (U/L)   Date Value   11/30/2020 15     BUN (mg/dL)   Date Value   12/17/2020 19      (goal A1C is < 7)   (goal LDL is <100) need 30-50% reduction from baseline     BP Readings from Last 3 Encounters:   04/05/21 115/67   12/17/20 (!) 157/97   12/09/20 135/84    (goal /80)      All Future Testing planned in [facility]:  Lab Frequency Next Occurrence   CHERY DIGITAL DIAGNOSTIC W OR WO CAD BILATERAL Once 07/28/2021   T4, Free Once 08/62/4981   Basic Metabolic Panel Once 58/85/4081   US BREAST COMPLETE LEFT Once 06/02/2021   COVID-19 Once 01/02/2021       Next Visit Date:  Future Appointments   Date Time Provider Christiano Wren   6/22/2021  2:00 PM Padmini Baldwin psych [facility]   7/9/2021  3:20 PM ASA Barajas CNP [address]   7/13/2021  1:15 PM ASA Dickerson CNP [facility]   7/20/2021 11:15 AM Delbert Brothers, DO Resp Spec [facility] Patient Active Problem List:     Low back pain     Therapeutic opioid-induced constipation (OIC)     Spondylosis of lumbar region without myelopathy or radiculopathy     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, insomnia type (HCC)     Opioid dependence, uncomplicated (HCC)     Sacroiliac joint dysfunction of both sides     Chronic pain disorder     Bulge of cervical disc without myelopathy     DDD (degenerative disc disease), lumbar     Failed back syndrome of cervical spine     Chest pain     Class 3 severe obesity due to excess calories with serious comorbidity and body mass index (BMI) of 45.0 to 49.9 in adult [facility])     Cervical spondylosis with radiculopathy     Status post cervical spinal fusion     Status post lumbar spinal fusion     Lumbar radiculopathy     Morbid obesity (HCC)     Chronic pain of both knees     Myalgia     Encounter for long-term opiate analgesic use     Lower respiratory tract infection due to COVID-19 virus     Hypertension     Gout     GERD (gastroesophageal reflux disease)     Acute on chronic combined systolic and diastolic congestive heart failure (HCC)     Other proteinuria     Nausea and vomiting     Stable angina (HCC)     PTSD (post-traumatic stress disorder)     Hyperuricemia     Mild persistent asthma without complication

## 2021-06-18 RX ORDER — DILTIAZEM HYDROCHLORIDE 60 MG/1
2 TABLET, FILM COATED ORAL 2 TIMES DAILY
Qty: 1 INHALER | Refills: 2 | Status: SHIPPED | OUTPATIENT
Start: 2021-06-18 | End: 2021-11-08 | Stop reason: SDUPTHER

## 2021-07-09 ENCOUNTER — HOSPITAL ENCOUNTER (OUTPATIENT)
Dept: PAIN MANAGEMENT | Age: 56
Discharge: HOME | End: 2021-07-09
Payer: MEDICARE

## 2021-07-09 DIAGNOSIS — M96.1 FAILED BACK SYNDROME OF CERVICAL SPINE: ICD-10-CM

## 2021-07-09 DIAGNOSIS — M50.30 BULGE OF CERVICAL DISC WITHOUT MYELOPATHY: ICD-10-CM

## 2021-07-09 DIAGNOSIS — M54.16 LUMBAR RADICULOPATHY: ICD-10-CM

## 2021-07-09 DIAGNOSIS — M51.36 DDD (DEGENERATIVE DISC DISEASE), LUMBAR: ICD-10-CM

## 2021-07-09 DIAGNOSIS — M47.22 CERVICAL SPONDYLOSIS WITH RADICULOPATHY: ICD-10-CM

## 2021-07-09 DIAGNOSIS — M47.816 SPONDYLOSIS OF LUMBAR REGION WITHOUT MYELOPATHY OR RADICULOPATHY: ICD-10-CM

## 2021-07-09 DIAGNOSIS — M53.3 SACROILIAC JOINT DYSFUNCTION OF BOTH SIDES: ICD-10-CM

## 2021-07-09 DIAGNOSIS — Z79.891 ENCOUNTER FOR LONG-TERM OPIATE ANALGESIC USE: ICD-10-CM

## 2021-07-09 DIAGNOSIS — M79.10 MYALGIA: ICD-10-CM

## 2021-07-09 DIAGNOSIS — E66.01 MORBID OBESITY (HCC): ICD-10-CM

## 2021-07-09 DIAGNOSIS — Z98.1 STATUS POST CERVICAL SPINAL FUSION: ICD-10-CM

## 2021-07-09 DIAGNOSIS — Z98.1 STATUS POST LUMBAR SPINAL FUSION: ICD-10-CM

## 2021-07-09 PROCEDURE — 99212 OFFICE O/P EST SF 10 MIN: CPT | Performed by: NURSE PRACTITIONER

## 2021-07-09 PROCEDURE — 99213 OFFICE O/P EST LOW 20 MIN: CPT

## 2021-07-09 RX ORDER — OXYCODONE AND ACETAMINOPHEN 10; 325 MG/1; MG/1
1 TABLET ORAL EVERY 6 HOURS PRN
Qty: 120 TABLET | Refills: 0 | Status: SHIPPED | OUTPATIENT
Start: 2021-07-12 | End: 2021-08-06 | Stop reason: SDUPTHER

## 2021-07-09 ASSESSMENT — ENCOUNTER SYMPTOMS
CONSTIPATION: 0
COUGH: 0
BACK PAIN: 1
SHORTNESS OF BREATH: 0
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
## 2021-07-29 NOTE — PROGRESS NOTES
2053 - patient complaining of shortness of breath and pain in chest and upper abdomen. EKG obtained (see results) and MD notified of patient condition and results. Orders received and carried out for troponin. (see results) Cardiology put on consult. Will monitor patient on tele. MD notified of results and patient condition. Will continue to monitor patient.    ADULT BEHAVIORAL HEALTH FOLLOW UP  Alma Villalobos Psy.D. Licensed Clinical Psychologist [facility] [phone])      Visit Date: 4/27/2021   Time of appointment:  1:10-1:54  Time spent with Patient: 44 minutes. This is patient's second appointment. Reason for Consult: Follow-up and Depression     Referring Provider/PCP:    No ref. provider found  Faylene Dubin, PA-C      Pt provided informed consent for the behavioral health program. Discussed with patient model of service to include the limits of confidentiality (i.e. abuse reporting, suicide intervention, etc.) and short-term intervention focused approach. Pt indicated understanding. Arabella Rizzo is a 60 y.o. female who presents for follow up of depression. She reported that symptoms of depression have been improved over the past few weeks since increasing her medication dosage. However she described a recent fall that has led to significant pain, which impacts her depression. She described feeling tired most of the time due to chronic pain and lacking social support. She reported that she has noticed her mood has been improved when she is busier, although that has been harder lately considering her recent fall. Discussed pacing of activities so that she can manage her pain while engaging in things that she enjoys. MENTAL STATUS EXAM  Mood was depressed with congruent affect. Suicidal ideation was denied. Homicidal ideation was denied. Hygiene was good . Dress was appropriate. Behavior was Within Normal Limits with Yes observation or self-report of difficulties ambulating. Attitude was Cooperative and [place]. Eye-contact was good. Speech: rate - WNL, rhythm - WNL, volume - WNL. Verbalizations were goal directed and coherent. Thought processes were intact and goal-oriented without evidence of delusions, hallucinations, obsessions, or elizabeth. Associations were characterized by tangential cognitive processes.   Pt was Ania Suero PSYD on 4/27/21 at 1:10 PM EDT

## 2021-08-06 ENCOUNTER — HOSPITAL ENCOUNTER (OUTPATIENT)
Dept: PAIN MANAGEMENT | Age: 56
Discharge: HOME | End: 2021-08-06
Payer: MEDICARE

## 2021-08-06 DIAGNOSIS — M79.10 MYALGIA: ICD-10-CM

## 2021-08-06 DIAGNOSIS — M47.22 CERVICAL SPONDYLOSIS WITH RADICULOPATHY: ICD-10-CM

## 2021-08-06 DIAGNOSIS — Z98.1 STATUS POST LUMBAR SPINAL FUSION: ICD-10-CM

## 2021-08-06 DIAGNOSIS — Z79.891 ENCOUNTER FOR LONG-TERM OPIATE ANALGESIC USE: ICD-10-CM

## 2021-08-06 DIAGNOSIS — M50.30 BULGE OF CERVICAL DISC WITHOUT MYELOPATHY: ICD-10-CM

## 2021-08-06 DIAGNOSIS — M96.1 FAILED BACK SYNDROME OF CERVICAL SPINE: ICD-10-CM

## 2021-08-06 DIAGNOSIS — M54.16 LUMBAR RADICULOPATHY: ICD-10-CM

## 2021-08-06 DIAGNOSIS — M51.36 DDD (DEGENERATIVE DISC DISEASE), LUMBAR: ICD-10-CM

## 2021-08-06 DIAGNOSIS — M53.3 SACROILIAC JOINT DYSFUNCTION OF BOTH SIDES: ICD-10-CM

## 2021-08-06 DIAGNOSIS — M47.816 SPONDYLOSIS OF LUMBAR REGION WITHOUT MYELOPATHY OR RADICULOPATHY: ICD-10-CM

## 2021-08-06 DIAGNOSIS — E66.01 MORBID OBESITY (HCC): ICD-10-CM

## 2021-08-06 DIAGNOSIS — Z98.1 STATUS POST CERVICAL SPINAL FUSION: ICD-10-CM

## 2021-08-06 PROCEDURE — 99213 OFFICE O/P EST LOW 20 MIN: CPT

## 2021-08-06 PROCEDURE — 99213 OFFICE O/P EST LOW 20 MIN: CPT | Performed by: NURSE PRACTITIONER

## 2021-08-06 RX ORDER — BACLOFEN 10 MG/1
10 TABLET ORAL 3 TIMES DAILY
Qty: 90 TABLET | Refills: 0 | Status: SHIPPED | OUTPATIENT
Start: 2021-08-06 | End: 2021-10-05 | Stop reason: SDUPTHER

## 2021-08-06 RX ORDER — OXYCODONE AND ACETAMINOPHEN 10; 325 MG/1; MG/1
1 TABLET ORAL EVERY 6 HOURS PRN
Qty: 120 TABLET | Refills: 0 | Status: SHIPPED | OUTPATIENT
Start: 2021-08-11 | End: 2021-09-07 | Stop reason: SDUPTHER

## 2021-08-06 ASSESSMENT — ENCOUNTER SYMPTOMS
CONSTIPATION: 0
BACK PAIN: 1
SHORTNESS OF BREATH: 0
COUGH: 0
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
## 2021-08-20 DIAGNOSIS — Z76.0 MEDICATION REFILL: ICD-10-CM

## 2021-08-20 DIAGNOSIS — G89.4 CHRONIC PAIN SYNDROME: ICD-10-CM

## 2021-08-20 RX ORDER — IBUPROFEN 800 MG/1
800 TABLET ORAL EVERY 8 HOURS PRN
Qty: 270 TABLET | Refills: 1 | Status: SHIPPED | OUTPATIENT
Start: 2021-08-20 | End: 2021-11-08 | Stop reason: SDUPTHER

## 2021-09-07 ENCOUNTER — HOSPITAL ENCOUNTER (OUTPATIENT)
Dept: PAIN MANAGEMENT | Age: 56
Discharge: HOME | End: 2021-09-07
Payer: MEDICARE

## 2021-09-07 DIAGNOSIS — M50.30 BULGE OF CERVICAL DISC WITHOUT MYELOPATHY: ICD-10-CM

## 2021-09-07 DIAGNOSIS — E66.01 MORBID OBESITY (HCC): ICD-10-CM

## 2021-09-07 DIAGNOSIS — Z98.1 STATUS POST LUMBAR SPINAL FUSION: ICD-10-CM

## 2021-09-07 DIAGNOSIS — M53.3 SACROILIAC JOINT DYSFUNCTION OF BOTH SIDES: ICD-10-CM

## 2021-09-07 DIAGNOSIS — M47.816 SPONDYLOSIS OF LUMBAR REGION WITHOUT MYELOPATHY OR RADICULOPATHY: ICD-10-CM

## 2021-09-07 DIAGNOSIS — M54.16 LUMBAR RADICULOPATHY: ICD-10-CM

## 2021-09-07 DIAGNOSIS — M51.36 DDD (DEGENERATIVE DISC DISEASE), LUMBAR: ICD-10-CM

## 2021-09-07 DIAGNOSIS — M47.22 CERVICAL SPONDYLOSIS WITH RADICULOPATHY: ICD-10-CM

## 2021-09-07 DIAGNOSIS — Z98.1 STATUS POST CERVICAL SPINAL FUSION: ICD-10-CM

## 2021-09-07 DIAGNOSIS — M96.1 FAILED BACK SYNDROME OF CERVICAL SPINE: ICD-10-CM

## 2021-09-07 DIAGNOSIS — Z79.891 ENCOUNTER FOR LONG-TERM OPIATE ANALGESIC USE: ICD-10-CM

## 2021-09-07 PROCEDURE — 99213 OFFICE O/P EST LOW 20 MIN: CPT

## 2021-09-07 PROCEDURE — 99213 OFFICE O/P EST LOW 20 MIN: CPT | Performed by: NURSE PRACTITIONER

## 2021-09-07 RX ORDER — OXYCODONE AND ACETAMINOPHEN 10; 325 MG/1; MG/1
1 TABLET ORAL EVERY 6 HOURS PRN
Qty: 120 TABLET | Refills: 0 | Status: SHIPPED | OUTPATIENT
Start: 2021-09-10 | End: 2021-10-05 | Stop reason: SDUPTHER

## 2021-09-07 ASSESSMENT — ENCOUNTER SYMPTOMS
SHORTNESS OF BREATH: 0
BACK PAIN: 1
CONSTIPATION: 0
COUGH: 0
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
## 2021-09-14 DIAGNOSIS — Z76.0 MEDICATION REFILL: ICD-10-CM

## 2021-09-15 RX ORDER — ATORVASTATIN CALCIUM 80 MG/1
80 TABLET, FILM COATED ORAL DAILY
Qty: 90 TABLET | Refills: 1 | Status: SHIPPED | OUTPATIENT
Start: 2021-09-15 | End: 2022-05-12 | Stop reason: SDUPTHER

## 2021-10-05 ENCOUNTER — HOSPITAL ENCOUNTER (OUTPATIENT)
Dept: PAIN MANAGEMENT | Age: 56
Discharge: HOME | End: 2021-10-05
Payer: MEDICARE

## 2021-10-05 DIAGNOSIS — Z98.1 STATUS POST LUMBAR SPINAL FUSION: ICD-10-CM

## 2021-10-05 DIAGNOSIS — E66.01 MORBID OBESITY (HCC): ICD-10-CM

## 2021-10-05 DIAGNOSIS — Z79.891 ENCOUNTER FOR LONG-TERM OPIATE ANALGESIC USE: ICD-10-CM

## 2021-10-05 DIAGNOSIS — M47.816 SPONDYLOSIS OF LUMBAR REGION WITHOUT MYELOPATHY OR RADICULOPATHY: ICD-10-CM

## 2021-10-05 DIAGNOSIS — M53.3 SACROILIAC JOINT DYSFUNCTION OF BOTH SIDES: ICD-10-CM

## 2021-10-05 DIAGNOSIS — M96.1 FAILED BACK SYNDROME OF CERVICAL SPINE: ICD-10-CM

## 2021-10-05 DIAGNOSIS — M51.36 DDD (DEGENERATIVE DISC DISEASE), LUMBAR: ICD-10-CM

## 2021-10-05 DIAGNOSIS — M79.10 MYALGIA: ICD-10-CM

## 2021-10-05 DIAGNOSIS — M50.30 BULGE OF CERVICAL DISC WITHOUT MYELOPATHY: ICD-10-CM

## 2021-10-05 DIAGNOSIS — M47.22 CERVICAL SPONDYLOSIS WITH RADICULOPATHY: ICD-10-CM

## 2021-10-05 DIAGNOSIS — M54.16 LUMBAR RADICULOPATHY: ICD-10-CM

## 2021-10-05 DIAGNOSIS — Z98.1 STATUS POST CERVICAL SPINAL FUSION: ICD-10-CM

## 2021-10-05 PROCEDURE — 99213 OFFICE O/P EST LOW 20 MIN: CPT | Performed by: NURSE PRACTITIONER

## 2021-10-05 PROCEDURE — 99213 OFFICE O/P EST LOW 20 MIN: CPT

## 2021-10-05 RX ORDER — OXYCODONE AND ACETAMINOPHEN 10; 325 MG/1; MG/1
1 TABLET ORAL EVERY 6 HOURS PRN
Qty: 120 TABLET | Refills: 0 | Status: SHIPPED | OUTPATIENT
Start: 2021-10-10 | End: 2021-11-08 | Stop reason: SDUPTHER

## 2021-10-05 RX ORDER — BACLOFEN 10 MG/1
10 TABLET ORAL 3 TIMES DAILY
Qty: 90 TABLET | Refills: 0 | Status: SHIPPED | OUTPATIENT
Start: 2021-10-05 | End: 2022-01-04 | Stop reason: SDUPTHER

## 2021-10-05 ASSESSMENT — ENCOUNTER SYMPTOMS
SHORTNESS OF BREATH: 0
BACK PAIN: 1
COUGH: 0
CONSTIPATION: 0

## 2021-10-05 NOTE — PROGRESS NOTES
rest for the symptoms. The treatment provided mild relief. Knee Pain   The incident occurred more than 1 week ago. There was no injury mechanism. The pain is present in the left knee and right knee. The quality of the pain is described as aching. The pain is moderate. The pain has been constant since onset. The symptoms are aggravated by movement and weight bearing. She has tried non-weight bearing and rest for the symptoms. The treatment provided mild relief. Patient denies any new neurological symptoms. No bowel or bladder incontinence, no weakness, and no falling. Pill count: appropriate due 10/10    Morphine equivalent: 60    Periodic Controlled Substance Monitoring: Possible medication side effects, risk of tolerance/dependence & alternative treatments discussed., No signs of potential drug abuse or diversion identified., Obtaining appropriate analgesic effect of treatment.  (Corey Castillo, APRN - CNP)      Past Medical History:   Diagnosis Date    Arthritis     Bronchitis     On ICS-LABA, denies asthma, copd    CHF (congestive heart failure) ([facility] [address]     COVID-19     diagnosed in September 2020, hospitilized on oxgyen    Fibromyalgia     GERD (gastroesophageal reflux disease)     Gout 10/2019    History of heart attack     HLD (hyperlipidemia)     Hypertension     Insomnia     Osteoarthritis        Past Surgical History:   Procedure Laterality Date    BACK SURGERY      CHOLECYSTECTOMY, LAPAROSCOPIC      HYSTERECTOMY, TOTAL ABDOMINAL      KNEE ARTHROSCOPY Right     KNEE SURGERY Left 2009    NECK SURGERY      SHOULDER SURGERY Right 2009       Allergies   Allergen Reactions    Entresto [Sacubitril-Valsartan] Other (See Comments)     Acute kidney injury    Food Swelling     peaches    Gabapentin Swelling    Lyrica [Pregabalin] Swelling     Mouth sores    Tetracyclines & Related          Current Outpatient Medications:     atorvastatin (LIPITOR) 80 MG tablet, Take 1 tablet by mouth daily, Disp: 90 tablet, Rfl: 1    oxyCODONE-acetaminophen (PERCOCET)  MG per tablet, Take 1 tablet by mouth every 6 hours as needed for Pain for up to 30 days.  Intended supply: 30 days, Disp: 120 tablet, Rfl: 0    ibuprofen (ADVIL;MOTRIN) 800 MG tablet, Take 1 tablet by mouth every 8 hours as needed for Pain, Disp: 270 tablet, Rfl: 1    SYMBICORT 80-4.5 MCG/ACT AERO, Inhale 2 puffs into the lungs 2 times daily, Disp: 1 Inhaler, Rfl: 2    traZODone (DESYREL) 150 MG tablet, Take 1 tablet by mouth nightly, Disp: 90 tablet, Rfl: 1    bumetanide (BUMEX) 1 MG tablet, Take 2 tablets by mouth daily, Disp: 180 tablet, Rfl: 0    polyethylene glycol (GLYCOLAX) 17 g packet, polyethylene glycol 3350 17 gram/dose oral powder, Disp: 527 g, Rfl: 0    DULoxetine (CYMBALTA) 60 MG extended release capsule, Take 2 capsules by mouth daily, Disp: 180 capsule, Rfl: 1    buPROPion (WELLBUTRIN XL) 300 MG extended release tablet, Take 1 tablet by mouth every morning, Disp: 90 tablet, Rfl: 1    aspirin 81 MG EC tablet, Take 81 mg by mouth, Disp: , Rfl:     furosemide (LASIX) 40 MG tablet, furosemide 40 mg tablet, Disp: , Rfl:     clopidogrel (PLAVIX) 75 MG tablet, Take 75 mg by mouth, Disp: , Rfl:     predniSONE (DELTASONE) 20 MG tablet, Take 20 mg by mouth daily, Disp: , Rfl:     metoprolol tartrate (LOPRESSOR) 50 MG tablet, Take 1 tablet by mouth 2 times daily, Disp: 180 tablet, Rfl: 1    spironolactone (ALDACTONE) 25 MG tablet, Take 1 tablet by mouth daily, Disp: 90 tablet, Rfl: 1    clopidogrel (PLAVIX) 75 MG tablet, Take 1 tablet by mouth daily, Disp: 90 tablet, Rfl: 1    pantoprazole (PROTONIX) 40 MG tablet, Take 1 tablet by mouth daily, Disp: 90 tablet, Rfl: 1    montelukast (SINGULAIR) 10 MG tablet, Take 1 tablet by mouth nightly, Disp: 90 tablet, Rfl: 1    allopurinol (ZYLOPRIM) 300 MG tablet, Take 1 tablet by mouth daily, Disp: 90 tablet, Rfl: 1    hydrALAZINE (APRESOLINE) 25 MG tablet, Take 25 mg by Sexual activity: Not on file   Other Topics Concern    Not on file   Social History Narrative    Not on file     Social Determinants of Health     Financial Resource Strain:     Difficulty of Paying Living Expenses:    Food Insecurity:     Worried About Running Out of Food in the Last Year:     [address] in the Last Year:    Transportation Needs:     Lack of Transportation (Medical):  Lack of Transportation (Non-Medical):    Physical Activity:     Days of Exercise per Week:     Minutes of Exercise per Session:    Stress:     Feeling of Stress :    Social Connections:     Frequency of Communication with Friends and Family:     Frequency of Social Gatherings with Friends and Family:     Attends Yarsani Services:     Active Member of Clubs or Organizations:     Attends Club or Organization Meetings:     Marital Status:    Intimate Partner Violence:     Fear of Current or Ex-Partner:     Emotionally Abused:     Physically Abused:     Sexually Abused:        Review of Systems:  Review of Systems   Constitutional: Negative for chills and fever. Cardiovascular: Negative for chest pain and palpitations. Respiratory: Negative for cough and shortness of breath. Musculoskeletal: Positive for back pain and joint pain. Gastrointestinal: Negative for constipation. Neurological: Negative for disturbances in coordination and loss of balance. Physical Exam:  There were no vitals taken for this visit. Physical Exam  HENT:      Head: Normocephalic. Pulmonary:      Effort: Pulmonary effort is normal.   Neurological:      Mental Status: She is alert.    Psychiatric:         Mood and Affect: Mood normal.         Behavior: Behavior normal.         Record/Diagnostics Review:    Last sanjuana 11/2020 and was appropriate     ABERRANT BEHAVIORS SINCE LAST VISIT  Lost rx/pills:------------------------------------------ no  Taking  medication as prescribed: ----------- yes  Urine Drug Screen ---------------------------------Emily Arceo             Date------------------------------------------------- 11/23/2020              Results as expected ---------------------yes     Recent ER visits: -------------------------------------No  Pill count is appropriate: ---------------------------yes   Refills for prescriptions appropriate:---------- yes    Assessment:  Problem List Items Addressed This Visit     Encounter for long-term opiate analgesic use (Chronic)    Spondylosis of lumbar region without myelopathy or radiculopathy    Relevant Medications    oxyCODONE-acetaminophen (PERCOCET)  MG per tablet (Start on 10/10/2021)    baclofen (LIORESAL) 10 MG tablet    Sacroiliac joint dysfunction of both sides    Relevant Medications    oxyCODONE-acetaminophen (PERCOCET)  MG per tablet (Start on 10/10/2021)    Bulge of cervical disc without myelopathy    Relevant Medications    oxyCODONE-acetaminophen (PERCOCET)  MG per tablet (Start on 10/10/2021)    DDD (degenerative disc disease), lumbar    Relevant Medications    oxyCODONE-acetaminophen (PERCOCET)  MG per tablet (Start on 10/10/2021)    baclofen (LIORESAL) 10 MG tablet    Failed back syndrome of cervical spine    Relevant Medications    oxyCODONE-acetaminophen (PERCOCET)  MG per tablet (Start on 10/10/2021)    Cervical spondylosis with radiculopathy    Relevant Medications    oxyCODONE-acetaminophen (PERCOCET)  MG per tablet (Start on 10/10/2021)    baclofen (LIORESAL) 10 MG tablet    Status post cervical spinal fusion    Relevant Medications    oxyCODONE-acetaminophen (PERCOCET)  MG per tablet (Start on 10/10/2021)    Status post lumbar spinal fusion    Relevant Medications    oxyCODONE-acetaminophen (PERCOCET)  MG per tablet (Start on 10/10/2021)    Lumbar radiculopathy    Relevant Medications    oxyCODONE-acetaminophen (PERCOCET)  MG per tablet (Start on 10/10/2021)    baclofen (LIORESAL) 10 MG tablet    Morbid obesity ([address]    Relevant Medications    oxyCODONE-acetaminophen (PERCOCET)  MG per tablet (Start on 10/10/2021)    Myalgia    Relevant Medications    baclofen (LIORESAL) 10 MG tablet             Treatment Plan:  Patient relates current medications are helping the pain. Patient reports taking pain medications as prescribed, denies obtaining medications from different sources and denies use of illegal drugs. Patient denies side effects from medications like nausea, vomiting, constipation or drowsiness. Patient reports current activities of daily living are possible due to medications and would like to continue them. As always, we encourage daily stretching and strengthening exercises, and recommend minimizing use of pain medications unless patient cannot get through daily activities due to pain. Contract requirements met. Continue opioid therapy. Script written for percocet  Follow up appointment made for 4 weeks    Martine Romero, was evaluated through a synchronous (real-time) audio-video encounter. The patient (or guardian if applicable) is aware that this is a billable service. Verbal consent to proceed has been obtained within the past 12 months. The visit was conducted pursuant to the emergency declaration under the [address] [place] [address], [address] [place] [address] authority and the [employer] General Act. Patient identification was verified, and a caregiver was present when appropriate. The patient was located in a state where the provider was credentialed to provide care. Total time spent for this encounter: Not billed by time    --ASA Decker CNP on 10/5/2021 at 11:22 AM    An electronic signature was used to authenticate this note.

## 2021-10-14 ENCOUNTER — OFFICE VISIT (OUTPATIENT)
Dept: PRIMARY CARE CLINIC | Age: 56
End: 2021-10-14
Payer: MEDICARE

## 2021-10-14 VITALS
SYSTOLIC BLOOD PRESSURE: 137 MMHG | BODY MASS INDEX: 52.91 KG/M2 | HEART RATE: 78 BPM | WEIGHT: 293 LBS | OXYGEN SATURATION: 96 % | DIASTOLIC BLOOD PRESSURE: 85 MMHG | TEMPERATURE: 97.2 F

## 2021-10-14 DIAGNOSIS — R73.03 PREDIABETES: ICD-10-CM

## 2021-10-14 DIAGNOSIS — I50.42 CHRONIC COMBINED SYSTOLIC AND DIASTOLIC CONGESTIVE HEART FAILURE (HCC): ICD-10-CM

## 2021-10-14 DIAGNOSIS — J45.30 MILD PERSISTENT ASTHMA WITHOUT COMPLICATION: ICD-10-CM

## 2021-10-14 DIAGNOSIS — M79.7 FIBROMYALGIA: Primary | ICD-10-CM

## 2021-10-14 DIAGNOSIS — G47.19 EXCESSIVE DAYTIME SLEEPINESS: ICD-10-CM

## 2021-10-14 DIAGNOSIS — F11.20 OPIOID DEPENDENCE, UNCOMPLICATED (HCC): ICD-10-CM

## 2021-10-14 DIAGNOSIS — I20.8 STABLE ANGINA (HCC): ICD-10-CM

## 2021-10-14 DIAGNOSIS — R11.0 NAUSEA: ICD-10-CM

## 2021-10-14 PROCEDURE — 3017F COLORECTAL CA SCREEN DOC REV: CPT | Performed by: NURSE PRACTITIONER

## 2021-10-14 PROCEDURE — 99214 OFFICE O/P EST MOD 30 MIN: CPT | Performed by: NURSE PRACTITIONER

## 2021-10-14 PROCEDURE — G8484 FLU IMMUNIZE NO ADMIN: HCPCS | Performed by: NURSE PRACTITIONER

## 2021-10-14 PROCEDURE — G8417 CALC BMI ABV UP PARAM F/U: HCPCS | Performed by: NURSE PRACTITIONER

## 2021-10-14 PROCEDURE — G8427 DOCREV CUR MEDS BY ELIG CLIN: HCPCS | Performed by: NURSE PRACTITIONER

## 2021-10-14 PROCEDURE — 1036F TOBACCO NON-USER: CPT | Performed by: NURSE PRACTITIONER

## 2021-10-14 RX ORDER — ONDANSETRON 4 MG/1
4 TABLET, FILM COATED ORAL 2 TIMES DAILY PRN
Qty: 60 TABLET | Refills: 0 | Status: SHIPPED | OUTPATIENT
Start: 2021-10-14 | End: 2021-11-13

## 2021-10-14 RX ORDER — BUMETANIDE 1 MG/1
2 TABLET ORAL DAILY
Qty: 180 TABLET | Refills: 0 | Status: SHIPPED | OUTPATIENT
Start: 2021-10-14 | End: 2022-05-18 | Stop reason: SDUPTHER

## 2021-10-14 RX ORDER — BUMETANIDE 1 MG/1
1 TABLET ORAL DAILY
Qty: 4 TABLET | Refills: 0 | Status: SHIPPED | OUTPATIENT
Start: 2021-10-14 | End: 2022-05-18 | Stop reason: ALTCHOICE

## 2021-10-14 SDOH — ECONOMIC STABILITY: FOOD INSECURITY: WITHIN THE PAST 12 MONTHS, YOU WORRIED THAT YOUR FOOD WOULD RUN OUT BEFORE YOU GOT MONEY TO BUY MORE.: NEVER TRUE

## 2021-10-14 SDOH — ECONOMIC STABILITY: FOOD INSECURITY: WITHIN THE PAST 12 MONTHS, THE FOOD YOU BOUGHT JUST DIDN'T LAST AND YOU DIDN'T HAVE MONEY TO GET MORE.: NEVER TRUE

## 2021-10-14 ASSESSMENT — ENCOUNTER SYMPTOMS
WHEEZING: 0
VOMITING: 0
BACK PAIN: 0
CONSTIPATION: 1
COUGH: 0
SHORTNESS OF BREATH: 1
NAUSEA: 0
BLOOD IN STOOL: 0

## 2021-10-14 ASSESSMENT — SOCIAL DETERMINANTS OF HEALTH (SDOH): HOW HARD IS IT FOR YOU TO PAY FOR THE VERY BASICS LIKE FOOD, HOUSING, MEDICAL CARE, AND HEATING?: NOT HARD AT ALL

## 2021-10-14 NOTE — PROGRESS NOTES
[facility]  [id number] [facility] [address]  Dept: [phone]  Dept Fax: [phone]    Patient Care Team:  ASA Lao CNP as PCP - General (Family Medicine)  ASA Lao CNP as PCP - [facility] Empaneled Provider  ASA Castle CNP as Consulting Physician (Pain Management)  Jaden Metz MD as Consulting Physician (Rheumatology)  Dada Haskins DO as Consulting Physician (Pulmonary Disease)  Rodríguez Alejandro MD as Consulting Physician (Gastroenterology)    10/14/2021     Jadon Rosario (:  )LI a 60 y.o. female, here for evaluation of the following medical concerns:   Chief Complaint   Patient presents with    Follow-up     DM    Other     discuss dark urine concern, cholesterol    Post-COVID Symptoms    Medication Refill       Jadon Rosario is a 23-year-old female here today for follow-up of chronic conditions. Patient with known fibromyalgia, hypertension, CHF, and asthma. Covid illness in 2020, still complaining of long-term side effect such as brain fog and shortness of breath. Does follow with pulmonology, next visit in December. Does see cardiology, however she is unsure when her last visit was. Compliant with daily medications, although admits she does sometimes forget medication or lacks motivation due to her depression. Recently was not taking her to montelukast, but did just restarted a few days ago. She does have a scale to weigh herself, but does not consistently check weights for fluid monitoring. Does feel she has gained water weight over the last few days as having increase shortness of breath. Denies wheezing, denies chest pain. Long haul covid symptoms, much trouble recalling appts and past testing. Jas Guzman denies any previous testing for sleep apnea. .    Review of Systems   Constitutional: Positive for fatigue. Negative for chills and fever. HENT: Negative for congestion. Respiratory: Positive for shortness of breath. Negative for cough and wheezing. Cardiovascular: Positive for leg swelling. Negative for chest pain. Gastrointestinal: Positive for constipation. Negative for blood in stool, nausea and vomiting. Genitourinary: Negative for dysuria, frequency and urgency. Musculoskeletal: Positive for arthralgias and myalgias. Negative for back pain and neck pain. Skin: Negative for rash and wound. Neurological: Negative for dizziness, syncope and headaches. Psychiatric/Behavioral: Positive for dysphoric mood. Negative for sleep disturbance and suicidal ideas. Prior to Visit Medications    Medication Sig Taking? Authorizing Provider   ondansetron (ZOFRAN) 4 MG tablet Take 1 tablet by mouth 2 times daily as needed for Nausea Yes ASA Kathleen CNP   bumetanide (BUMEX) 1 MG tablet Take 2 tablets by mouth daily Yes ASA Kathleen CNP   bumetanide (BUMEX) 1 MG tablet Take 1 tablet by mouth daily for 4 days Take in addition to AM dose x4 days, take in afternoon Yes ASA Kathleen CNP   oxyCODONE-acetaminophen (PERCOCET)  MG per tablet Take 1 tablet by mouth every 6 hours as needed for Pain for up to 30 days.  Intended supply: 30 days Yes ASA Evans CNP   baclofen (LIORESAL) 10 MG tablet Take 1 tablet by mouth 3 times daily Yes ASA Evans CNP   atorvastatin (LIPITOR) 80 MG tablet Take 1 tablet by mouth daily Yes ASA Kathleen CNP   ibuprofen (ADVIL;MOTRIN) 800 MG tablet Take 1 tablet by mouth every 8 hours as needed for Pain Yes ASA Kathleen CNP   SYMBICORT 80-4.5 MCG/ACT AERO Inhale 2 puffs into the lungs 2 times daily Yes ASA Kathleen CNP   traZODone (DESYREL) 150 MG tablet Take 1 tablet by mouth nightly Yes ASA Kathleen CNP   polyethylene glycol (GLYCOLAX) 17 g packet polyethylene glycol 3350 Provide insurance covered nebulizer, use daily as needed  Aisha Geronimo PA-C        Social History     Tobacco Use    Smoking status: Never Smoker    Smokeless tobacco: Never Used   Substance Use Topics    Alcohol use: No        Vitals:    10/14/21 1313   BP: 137/85   Site: Right Lower Arm   Position: Sitting   Pulse: 78   Temp: 97.2 °F (36.2 °C)   TempSrc: Temporal   SpO2: 96%   Weight: (!) 348 lb (157.9 kg)     Estimated body mass index is 52.91 kg/m² as calculated from the following:    Height as of 5/24/21: 5' 8\" (1.727 m). Weight as of this encounter: 348 lb (157.9 kg). DIAGNOSTIC FINDINGS:  CBC:  Lab Results   Component Value Date    WBC 7.6 12/17/2020    HGB 10.3 12/17/2020     12/17/2020       BMP:    Lab Results   Component Value Date     12/17/2020    K 4.5 12/17/2020     12/17/2020    CO2 26 12/17/2020    BUN 19 12/17/2020    CREATININE 1.10 12/17/2020    GLUCOSE 89 12/17/2020       HEMOGLOBIN A1C:   Lab Results   Component Value Date    LABA1C 5.8 02/23/2021       FASTING LIPID PANEL:  Lab Results   Component Value Date    CHOL 158 07/24/2020    HDL 33 (L) 07/24/2020    TRIG 149 07/24/2020       Physical Exam  Constitutional:       General: She is awake. Appearance: Normal appearance. She is well-developed and well-groomed. She is morbidly obese. HENT:      Head: Normocephalic and atraumatic. Right Ear: Hearing and external ear normal.      Left Ear: Hearing and external ear normal.      Nose: Nose normal.      Mouth/Throat:      Lips: Pink. Eyes:      General: Lids are normal. No scleral icterus. Conjunctiva/sclera: Conjunctivae normal.   Cardiovascular:      Rate and Rhythm: Normal rate. Heart sounds: No murmur heard. Pulmonary:      Effort: Pulmonary effort is normal.      Breath sounds: Normal breath sounds. No wheezing or rhonchi. Musculoskeletal:         General: No deformity or signs of injury. Cervical back: Neck supple. Right lower le+ Pitting Edema present. Left lower le+ Pitting Edema present. Neurological:      Mental Status: She is alert and oriented to person, place, and time. Psychiatric:         Attention and Perception: Attention and perception normal.         Mood and Affect: Mood normal.         Speech: Speech normal.         Behavior: Behavior is cooperative. Thought Content: Thought content normal.         Cognition and Memory: Cognition normal.         Judgment: Judgment normal.         ASSESSMENT     Diagnosis Orders   1. Fibromyalgia     2. Opioid dependence, uncomplicated ([facility])     3. Stable angina ([facility] [address]     4. Chronic combined systolic and diastolic congestive heart failure ([facility])  Brain Natriuretic Peptide    Comprehensive Metabolic Panel    Lipid, Fasting    bumetanide (BUMEX) 1 MG tablet    bumetanide (BUMEX) 1 MG tablet   5. Mild persistent asthma without complication     6. Nausea  ondansetron (ZOFRAN) 4 MG tablet   7. Excessive daytime sleepiness  Baseline Diagnostic Sleep Study   8. Prediabetes  Lipid, Fasting          PLAN:  Orders Placed This Encounter   Medications    ondansetron (ZOFRAN) 4 MG tablet     Sig: Take 1 tablet by mouth 2 times daily as needed for Nausea     Dispense:  60 tablet     Refill:  0    bumetanide (BUMEX) 1 MG tablet     Sig: Take 2 tablets by mouth daily     Dispense:  180 tablet     Refill:  0    bumetanide (BUMEX) 1 MG tablet     Sig: Take 1 tablet by mouth daily for 4 days Take in addition to AM dose x4 days, take in afternoon     Dispense:  4 tablet     Refill:  0         1. Constellation of signs and symptoms support the diagnosis of longhauler COVID-19 syndrome. 2. Concerned for sleep apnea, pulmonology notes state patient claimed prior sleep apnea testing. Today's visit, the patient denies any previous testing. She is agreeable to testing due to symptoms, order placed.   3. Heavily encourage patient to monitor daily weights for rapid weight gain.  Labs ordered today to check BNP as well as kidney function. 4. Add additional dose of Bumex x4 days for diuresis. 5. Flu vaccine declined today by patient  6. Will contact [facility] cardiology for last visit note, last note on file from January 2021. FOLLOW UP AND INSTRUCTIONS:  Return in about 3 months (around 1/14/2022) for Diabetes, HTN, CHF. · Madelon Means received counseling on the following healthy behaviors:nutrition, exercise and medication adherence    · Discussed use, benefit, and side effects of prescribed medications. Barriers to  medication compliance addressed. All patient questions answered. Pt  verbalized understanding of all instructions given. · Patient given educational materials - see patient instructions      · Patient advised to contact scheduling offices for any referrals or imaging orders  placed today if they have not been contacted in 48 hours. Return in about 3 months (around 1/14/2022) for Diabetes, HTN, CHF. An electronic signature was used to authenticate this note. --ASA Andino CNP on 10/14/2021 at 2:29 PM  Visit Information    Have you changed or started any medications since your last visit including any over-the-counter medicines, vitamins, or herbal medicines? no   Are you having any side effects from any of your medications? -  no  Have you stopped taking any of your medications? Is so, why? -  no    Have you seen any other physician or provider since your last visit? No  Have you had any other diagnostic tests since your last visit? No  Have you been seen in the emergency room and/or had an admission to a hospital since we last saw you? No  Have you had your routine dental cleaning in the past 6 months? no    Have you activated your [employer] account? If not, what are your barriers?  Yes     Patient Care Team:  ASA Andino CNP as PCP - General (Family Medicine)  ASA Andino CNP as PCP - [facility] Provider  Marisabel Calle Lindsay Magallon CNP as Consulting Physician (Pain Management)  Momo Benjamin MD as Consulting Physician (Rheumatology)  Jose A Howard DO as Consulting Physician (Pulmonary Disease)  Maria Luisa Giordano MD as Consulting Physician (Gastroenterology)    Medical History Review  Past Medical, Family, and Social History reviewed and does not contribute to the patient presenting condition    Health Maintenance   Topic Date Due    DTaP/Tdap/Td vaccine (1 - Tdap) Never done    Shingles Vaccine (1 of 2) Never done    Lipid screen  07/24/2021    Annual Wellness Visit (AWV)  08/14/2021    Flu vaccine (1) 09/01/2021    Breast cancer screen  12/17/2021    Potassium monitoring  12/17/2021    Creatinine monitoring  12/17/2021    A1C test (Diabetic or Prediabetic)  02/23/2022    Pneumococcal 0-64 years Vaccine (2 of 2 - PPSV23) 06/29/2028    Colon cancer screen colonoscopy  06/24/2031    COVID-19 Vaccine  Completed    Hepatitis C screen  Completed    HIV screen  Completed    Hepatitis A vaccine  Aged Out    Hepatitis B vaccine  Aged Out    Hib vaccine  Aged Out    Meningococcal (ACWY) vaccine  Aged Out

## 2021-10-15 ENCOUNTER — HOSPITAL ENCOUNTER (OUTPATIENT)
Age: 56
Discharge: HOME | End: 2021-10-15
Payer: MEDICARE

## 2021-10-15 DIAGNOSIS — R73.03 PREDIABETES: ICD-10-CM

## 2021-10-15 DIAGNOSIS — I50.42 CHRONIC COMBINED SYSTOLIC AND DIASTOLIC CONGESTIVE HEART FAILURE (HCC): ICD-10-CM

## 2021-10-15 LAB
ALBUMIN SERPL-MCNC: 4.1 G/DL (ref 3.5–5.2)
ALBUMIN/GLOBULIN RATIO: 1.2 (ref 1–2.5)
ALP BLD-CCNC: 157 U/L (ref 35–104)
ALT SERPL-CCNC: 12 U/L (ref 5–33)
ANION GAP SERPL CALCULATED.3IONS-SCNC: 10 MMOL/L (ref 9–17)
AST SERPL-CCNC: 18 U/L
BILIRUB SERPL-MCNC: 0.3 MG/DL (ref 0.3–1.2)
BNP INTERPRETATION: ABNORMAL
BUN BLDV-MCNC: 15 MG/DL (ref 6–20)
BUN/CREAT BLD: ABNORMAL (ref 9–20)
CALCIUM SERPL-MCNC: 9.1 MG/DL (ref 8.6–10.4)
CHLORIDE BLD-SCNC: 103 MMOL/L (ref 98–107)
CHOLESTEROL, FASTING: 174 MG/DL
CHOLESTEROL/HDL RATIO: 4.8
CO2: 25 MMOL/L (ref 20–31)
CREAT SERPL-MCNC: 1.02 MG/DL (ref 0.5–0.9)
GFR AFRICAN AMERICAN: >60 ML/MIN
GFR NON-AFRICAN AMERICAN: 56 ML/MIN
GFR SERPL CREATININE-BSD FRML MDRD: ABNORMAL ML/MIN/{1.73_M2}
GFR SERPL CREATININE-BSD FRML MDRD: ABNORMAL ML/MIN/{1.73_M2}
GLUCOSE BLD-MCNC: 88 MG/DL (ref 70–99)
HDLC SERPL-MCNC: 36 MG/DL
LDL CHOLESTEROL: 115 MG/DL (ref 0–130)
POTASSIUM SERPL-SCNC: 4.3 MMOL/L (ref 3.7–5.3)
PRO-BNP: 375 PG/ML
SODIUM BLD-SCNC: 138 MMOL/L (ref 135–144)
TOTAL PROTEIN: 7.6 G/DL (ref 6.4–8.3)
TRIGLYCERIDE, FASTING: 117 MG/DL
VLDLC SERPL CALC-MCNC: ABNORMAL MG/DL (ref 1–30)

## 2021-10-15 PROCEDURE — 36415 COLL VENOUS BLD VENIPUNCTURE: CPT

## 2021-10-15 PROCEDURE — 83880 ASSAY OF NATRIURETIC PEPTIDE: CPT

## 2021-10-15 PROCEDURE — 80061 LIPID PANEL: CPT

## 2021-10-15 PROCEDURE — 80053 COMPREHEN METABOLIC PANEL: CPT

## 2021-11-03 ASSESSMENT — ENCOUNTER SYMPTOMS
ALLERGIC/IMMUNOLOGIC NEGATIVE: 1
BOWEL INCONTINENCE: 0
CHOKING: 0
CONSTIPATION: 1
VOMITING: 0
ABDOMINAL PAIN: 0
BACK PAIN: 1
COUGH: 0
NAUSEA: 0
PHOTOPHOBIA: 0
EYE PAIN: 0
EYES NEGATIVE: 1

## 2021-11-03 NOTE — PROGRESS NOTES
headaches. Risk factors include lack of exercise and obesity. Neck Pain   This is a chronic problem. The current episode started more than 1 year ago. The problem occurs every several days. The problem has been unchanged. The pain is associated with nothing. The pain is present in the left side, midline and right side. The quality of the pain is described as aching (sharp,throbbing). The pain is at a severity of 9/10. The pain is severe. The symptoms are aggravated by position, twisting, bending and coughing. Stiffness is present in the morning. Associated symptoms include chest pain, leg pain, numbness, tingling and weakness. Pertinent negatives include no fever, headaches, photophobia or syncope. Alleviating factors:heat and rest   Lifestyle changes experienced with pain: Keeps awake at night, Wakes from sleep, Prevents or limits ADLs, Increases w/activity. Increases w/prolonged sitting/standing/walking  Mood changes,anxious, depressed and irritable  Patient currently disability. Physical therapy did not help the pain. Are you under psychological counseling at present: Yes  Goals for treatment include:  Decrease in pain  Enjoy daily and recreational activities, return to previous status. Patient relates current medications are helping the pain. Patient reports taking pain medications as prescribed, denies obtaining medications from different sources and denies use of illegal drugs. Patient denies side effects from medications like nausea, vomiting, constipation or drowsiness. Patient reports current activities of daily living ar possible due to medications and would like to continue them.      ACTIVITY/SOCIAL/EMOTIONAL:  Sleep Pattern: 5 hours per night. nightime awakenings and difficulty falling back asleep if awakened  Home Exercises: daily Stretching  Activity:unchanged  Emotional Issues: Depression and PTSD  Currently seeing a Psychiatrist or Psychologist:  Yes     ADVERSE MEDICATION EFFECTS: Nausea and vomiting: no   Constipation: yes-Undercontrol-: yes--uses MiraLAX  Dizziness/drowsy/sleepy--no  Urinary Retention: no    ABERRANT BEHAVIORS SINCE LAST VISIT  Lost rx/pills:------------------------------------------ no  Taking  medication as prescribed: ----------- yes  Urine Drug Screen ---------------------------------  yes             Date------------------------------------------------- 11/23/2020              Results as expected ---------------------yes    Recent ER visits: -------------------------------------No  Pill count is appropriate: ---------------------------yes   Refills for prescriptions appropriate:---------- yes      Past Medical History:   Diagnosis Date    Arthritis     Bronchitis     On ICS-LABA, denies asthma, copd    CHF (congestive heart failure) ([facility] [address]     COVID-19     diagnosed in September 2020, hospitilized on oxgyen    Fibromyalgia     GERD (gastroesophageal reflux disease)     Gout 10/2019    History of heart attack     HLD (hyperlipidemia)     Hypertension     Insomnia     Osteoarthritis        Past Surgical History:   Procedure Laterality Date    BACK SURGERY      CHOLECYSTECTOMY, LAPAROSCOPIC      HYSTERECTOMY, TOTAL ABDOMINAL      KNEE ARTHROSCOPY Right     KNEE SURGERY Left 2009    NECK SURGERY      SHOULDER SURGERY Right 2009       Family History   Problem Relation Age of Onset    Other Mother     Diabetes Mother     Heart Disease Mother     Arthritis Mother     Kidney Disease Mother     Lupus Mother     Arthritis Sister     Diabetes Brother     Asthma Brother     Cancer Maternal Grandfather     Hypertension Sister     Breast Cancer Sister         28s    Breast Cancer Niece         30-35       Social History     Socioeconomic History    Marital status: Single     Spouse name: None    Number of children: None    Years of education: None    Highest education level: None   Occupational History    None   Tobacco Use    Smoking status: Never Smoker    Smokeless tobacco: Never Used   Vaping Use    Vaping Use: Never used   Substance and Sexual Activity    Alcohol use: No    Drug use: No    Sexual activity: None   Other Topics Concern    None   Social History Narrative    None     Social Determinants of Health     Financial Resource Strain: Low Risk     Difficulty of Paying Living Expenses: Not hard at all   Food Insecurity: No Food Insecurity    Worried About Running Out of Food in the Last Year: Never true    Brianna of Food in the Last Year: Never true   Transportation Needs:     Lack of Transportation (Medical): Not on file    Lack of Transportation (Non-Medical):  Not on file   Physical Activity:     Days of Exercise per Week: Not on file    Minutes of Exercise per Session: Not on file   Stress:     Feeling of Stress : Not on file   Social Connections:     Frequency of Communication with Friends and Family: Not on file    Frequency of Social Gatherings with Friends and Family: Not on file    Attends Restorationism Services: Not on file    Active Member of [address] [place] [address] or Organizations: Not on file    Attends Club or Organization Meetings: Not on file    Marital Status: Not on file   Intimate Partner Violence:     Fear of Current or Ex-Partner: Not on file    Emotionally Abused: Not on file    Physically Abused: Not on file    Sexually Abused: Not on file   Housing Stability:     Unable to Pay for Housing in the Last Year: Not on file    Number of [place] in the Last Year: Not on file    Unstable Housing in the Last Year: Not on file       Allergies   Allergen Reactions    Entresto [Sacubitril-Valsartan] Other (See Comments)     Acute kidney injury    Food Swelling     peaches    Gabapentin Swelling    Lyrica [Pregabalin] Swelling     Mouth sores    Tetracyclines & Related        Current Outpatient Medications on File Prior to Encounter   Medication Sig Dispense Refill    ondansetron (ZOFRAN) 4 MG tablet Take 1 tablet by mouth 2 times daily as needed for Nausea 60 tablet 0    bumetanide (BUMEX) 1 MG tablet Take 2 tablets by mouth daily 180 tablet 0    bumetanide (BUMEX) 1 MG tablet Take 1 tablet by mouth daily for 4 days Take in addition to AM dose x4 days, take in afternoon 4 tablet 0    atorvastatin (LIPITOR) 80 MG tablet Take 1 tablet by mouth daily 90 tablet 1    ibuprofen (ADVIL;MOTRIN) 800 MG tablet Take 1 tablet by mouth every 8 hours as needed for Pain 270 tablet 1    SYMBICORT 80-4.5 MCG/ACT AERO Inhale 2 puffs into the lungs 2 times daily 1 Inhaler 2    traZODone (DESYREL) 150 MG tablet Take 1 tablet by mouth nightly 90 tablet 1    polyethylene glycol (GLYCOLAX) 17 g packet polyethylene glycol 3350 17 gram/dose oral powder 527 g 0    DULoxetine (CYMBALTA) 60 MG extended release capsule Take 2 capsules by mouth daily 180 capsule 1    buPROPion (WELLBUTRIN XL) 300 MG extended release tablet Take 1 tablet by mouth every morning 90 tablet 1    aspirin 81 MG EC tablet Take 81 mg by mouth      metoprolol tartrate (LOPRESSOR) 50 MG tablet Take 1 tablet by mouth 2 times daily 180 tablet 1    spironolactone (ALDACTONE) 25 MG tablet Take 1 tablet by mouth daily 90 tablet 1    clopidogrel (PLAVIX) 75 MG tablet Take 1 tablet by mouth daily 90 tablet 1    pantoprazole (PROTONIX) 40 MG tablet Take 1 tablet by mouth daily 90 tablet 1    montelukast (SINGULAIR) 10 MG tablet Take 1 tablet by mouth nightly 90 tablet 1    allopurinol (ZYLOPRIM) 300 MG tablet Take 1 tablet by mouth daily 90 tablet 1    hydrALAZINE (APRESOLINE) 25 MG tablet Take 25 mg by mouth 2 times daily      albuterol sulfate  (90 Base) MCG/ACT inhaler Inhale 2 puffs into the lungs every 4 hours as needed for Shortness of Breath 1 Inhaler 3    isosorbide dinitrate (ISORDIL) 10 MG tablet Take 1 tablet by mouth 2 times daily 180 tablet 1    levothyroxine (SYNTHROID) 25 MCG tablet Take 1 tablet by mouth daily 90 tablet 1    sucralfate (CARAFATE) 1 fatigue. Physical Exam  Constitutional:       Appearance: She is obese. Skin:         Neurological:      Mental Status: She is alert and oriented to person, place, and time. Psychiatric:         Mood and Affect: Mood normal.        Ortho Exam     DATA:  LAB.:   11/23/2020  4:55 PM - Jp, [place] Incoming Lab Results From [facility]    Component Value Ref Range & Units Status Collected Lab   Pain Management Drug Panel Interp, Urine Consistent   Final 11/19/2020  2:02 PM [facility]   (NOTE)   ________________________________________________________________   DRUGS EXPECTED:   PERCOCET (OXYCODONE) [TODAY]   ________________________________________________________________   CONSISTENT with medications provided:   PERCOCET (OXYCODONE) : based on oxycodone, noroxycodone        X-Ray reports:  History:   Right knee pain   Findings:  Standing AP/Lateral/Tunnel/Merchant view xrays of the Right done in the office today shows significant medial joint space narrowing, tricompartmental osteophytosis, joint line sclerosis medially. No evidence of fracture, subluxation, dislocation, radioopaque foreign body/tumor is noted. Lateral subluxation of the tibia is appreciated. Impression:  Right knee severe  degenerative changes as described above. History: Left hip pain   Findings: AP pelvis and frog-leg lateral x-ray of the left hip done in the office today shows mild degenerative narrowing of the hip joint. Hardware at the lower lumbar spine is appreciated. No evidence of fracture, subluxation, dislocation, radiopaque foreign body, radiopaque tumors noted. Impression: Mild degenerative changes left hip as described above. Clinical  impression:  1. DDD (degenerative disc disease), lumbar    2. Lumbar radiculopathy    3. Status post lumbar spinal fusion-anterior and posterior approach    4. Spondylosis of lumbar region without myelopathy or radiculopathy    5. Cervical spondylosis with radiculopathy    6.  Status post cervical spinal fusion    7. Sacroiliac joint dysfunction of both sides    8. Morbid obesity ([address]    9. Failed back syndrome of cervical spine    10. Encounter for medication management    11. Therapeutic opioid-induced constipation (OIC)    12. Class 3 severe obesity due to excess calories with serious comorbidity and body mass index (BMI) of 45.0 to 49.9 in adult ([address]    13. Bulge of cervical disc without myelopathy        Plan of care: We will continue current pain medications  Current medications are being tolerated without any Adverse side effects. Orders Placed This Encounter   Medications    oxyCODONE-acetaminophen (PERCOCET)  MG per tablet     Sig: Take 1 tablet by mouth every 6 hours as needed for Pain for up to 30 days. Intended supply: 30 days     Dispense:  120 tablet     Refill:  0     Reduce doses taken as pain becomes manageable     Urine drug screens have been appropriate. No aberrant activity noted. Analgesia is achieved. Activities of daily living are possible because of medications. Safe use of medications explained to patient. PDMP Monitoring:    Last PDMP [facility] as Reviewed [facility]):  Review User Review Instant Review Result   Fredo Drewn 11/3/2021  4:33 AM Reviewed PDMP [1]     Counselling/Preventive measures for pain  Control:    [x]  Spine strengthening exercises are discussed with patient in detail. Patient is also counseled on the importance of exercise core strengthening as well as diet and weight loss. [x] Ill effects of being on chronic pain medications such as sleep disturbances, hormonal changes, withdrawal symptoms,  chronic opioid dependence and tolerance were discussed with patient. I had asked the patient to minimize medication use and utilize pain medications only for uncontrolled rest pain or pain with exertional activities. I advised patient not to self escalate pain medications without consulting with us.   At each of patient's future visits we will try to taper pain medications, while adjusting the adjunct medications, and re-evaluating for Physical Therapy to improve spinal and joint strength. We will continue to have discussions to decrease pain medications as tolerated. I also discussed with the patient regarding the dangers of combining narcotic pain medication with tranquilizers, alcohol or illegal drugs or taking the medication any other than prescribed. The dangers including the respiratory depression and death. Patient was told to tell  to all  physicians regarding the medications he is getting from pain clinic. Patient is warned not to take any unprescribed medications over-the-counter medications that can depress breathing . Patient is advised to talk to the pharmacist or physicians if planning to take any over-the-counter medications before  takeing them. Patient is strongly advised to avoid tranquilizers or  Relaxants for any medications that can depress breathing or recreational drugs. Patient is also advised to tell us if there is any changes in his medications from other physicians. We discussed the same at today's visit and have not been to implement it, as the patient's pain is not under control with current medications. Decision Making Process : Patient's health history and referral records thoroughly reviewed before focused physical examination and discussion with patient. Over 50% of today's visit is spent on examining the patient and counseling and coordinating the care. Level of complexity of date to be reviewed is Moderate. The chart date reviewed include the following: Imaging Reports. Summary of Care. Time spent reviewing with patient the below reports:   Medication safety, Treatment options.     Level of diagnosis and management options of this case is multiple: involving the following management options: Interventions as needed, medication management as appropriate, future visits, activity modification, heat/ice as needed, Urine drug screen as required. [x]The patient's questions were answered to the best of my abilities. This note was created using voice recognition software. There may be inaccuracies of transcription  that are inadvertently overlooked prior to the signature. There is any questions about the transcription please contact me. Return in  4 weeks  with physician / CNP  for further plan of treatment. Due to the COVID-19 pandemic and the appropriate interventions by Katia Hickman, our non-urgent pain management patients will not be seen in the office at this time for their protection and the protection of our staff. To offer continuity of care, their prescriptions will be escribed this month after a careful chart review and review of their OARRS report  Pursuant to the emergency declaration under the [employer] and [facility], 1135 waiver authority and the [employer] and [employer] Act, this Virtual Visit was conducted, with patient's consent, to reduce the patient's risk of exposure to COVID-19 and provide continuity of care for an established patient. Services were provided through a video synchronous discussion virtually to substitute for in-person appointment. \"  Documentation:  I communicated with the patient and/or health care decision maker about plan of care  Details of this discussion including any medical advice provided: Total Time: minutes: 21-30 minutes    I affirm this is a Patient Initiated Episode with an Established Patient who has not had a related appointment within my department in the past 7 days or scheduled within the next 24 hours.     Electronically signed by Serafin Zelaya MD on 11/8/2021 at 10:39 AM

## 2021-11-08 ENCOUNTER — HOSPITAL ENCOUNTER (OUTPATIENT)
Dept: PAIN MANAGEMENT | Age: 56
Discharge: HOME | End: 2021-11-08
Payer: MEDICARE

## 2021-11-08 DIAGNOSIS — Z20.822 SHORTNESS OF BREATH WITH EXPOSURE TO COVID-19 VIRUS: ICD-10-CM

## 2021-11-08 DIAGNOSIS — E66.01 CLASS 3 SEVERE OBESITY DUE TO EXCESS CALORIES WITH SERIOUS COMORBIDITY AND BODY MASS INDEX (BMI) OF 45.0 TO 49.9 IN ADULT (HCC): ICD-10-CM

## 2021-11-08 DIAGNOSIS — Z98.1 STATUS POST CERVICAL SPINAL FUSION: ICD-10-CM

## 2021-11-08 DIAGNOSIS — M51.36 DDD (DEGENERATIVE DISC DISEASE), LUMBAR: Primary | ICD-10-CM

## 2021-11-08 DIAGNOSIS — K59.03 THERAPEUTIC OPIOID-INDUCED CONSTIPATION (OIC): ICD-10-CM

## 2021-11-08 DIAGNOSIS — M47.816 SPONDYLOSIS OF LUMBAR REGION WITHOUT MYELOPATHY OR RADICULOPATHY: ICD-10-CM

## 2021-11-08 DIAGNOSIS — R06.02 SHORTNESS OF BREATH WITH EXPOSURE TO COVID-19 VIRUS: ICD-10-CM

## 2021-11-08 DIAGNOSIS — M47.22 CERVICAL SPONDYLOSIS WITH RADICULOPATHY: ICD-10-CM

## 2021-11-08 DIAGNOSIS — G89.4 CHRONIC PAIN SYNDROME: ICD-10-CM

## 2021-11-08 DIAGNOSIS — Z79.899 ENCOUNTER FOR MEDICATION MANAGEMENT: ICD-10-CM

## 2021-11-08 DIAGNOSIS — J42 CHRONIC BRONCHITIS, UNSPECIFIED CHRONIC BRONCHITIS TYPE (HCC): ICD-10-CM

## 2021-11-08 DIAGNOSIS — T40.2X5A THERAPEUTIC OPIOID-INDUCED CONSTIPATION (OIC): ICD-10-CM

## 2021-11-08 DIAGNOSIS — M53.3 SACROILIAC JOINT DYSFUNCTION OF BOTH SIDES: ICD-10-CM

## 2021-11-08 DIAGNOSIS — Z98.1 STATUS POST LUMBAR SPINAL FUSION: ICD-10-CM

## 2021-11-08 DIAGNOSIS — Z76.0 MEDICATION REFILL: ICD-10-CM

## 2021-11-08 DIAGNOSIS — M96.1 FAILED BACK SYNDROME OF CERVICAL SPINE: ICD-10-CM

## 2021-11-08 DIAGNOSIS — M54.16 LUMBAR RADICULOPATHY: ICD-10-CM

## 2021-11-08 DIAGNOSIS — M50.30 BULGE OF CERVICAL DISC WITHOUT MYELOPATHY: ICD-10-CM

## 2021-11-08 DIAGNOSIS — E66.01 MORBID OBESITY (HCC): ICD-10-CM

## 2021-11-08 DIAGNOSIS — I50.42 CHRONIC COMBINED SYSTOLIC AND DIASTOLIC HEART FAILURE (HCC): ICD-10-CM

## 2021-11-08 PROCEDURE — 99214 OFFICE O/P EST MOD 30 MIN: CPT | Performed by: PAIN MEDICINE

## 2021-11-08 PROCEDURE — 99213 OFFICE O/P EST LOW 20 MIN: CPT

## 2021-11-08 RX ORDER — OXYCODONE AND ACETAMINOPHEN 10; 325 MG/1; MG/1
1 TABLET ORAL EVERY 6 HOURS PRN
Qty: 120 TABLET | Refills: 0 | Status: SHIPPED | OUTPATIENT
Start: 2021-11-09 | End: 2021-12-06 | Stop reason: SDUPTHER

## 2021-11-08 RX ORDER — ALBUTEROL SULFATE 90 UG/1
2 AEROSOL, METERED RESPIRATORY (INHALATION) EVERY 4 HOURS PRN
Qty: 1 EACH | Refills: 0 | Status: SHIPPED | OUTPATIENT
Start: 2021-11-08 | End: 2022-05-12 | Stop reason: SDUPTHER

## 2021-11-08 RX ORDER — CLOPIDOGREL BISULFATE 75 MG/1
75 TABLET ORAL DAILY
Qty: 90 TABLET | Refills: 1 | Status: SHIPPED | OUTPATIENT
Start: 2021-11-08 | End: 2022-05-12 | Stop reason: SDUPTHER

## 2021-11-08 RX ORDER — DILTIAZEM HYDROCHLORIDE 60 MG/1
2 TABLET, FILM COATED ORAL 2 TIMES DAILY
Qty: 1 EACH | Refills: 3 | Status: SHIPPED | OUTPATIENT
Start: 2021-11-08 | End: 2022-05-12 | Stop reason: SDUPTHER

## 2021-11-08 RX ORDER — IBUPROFEN 800 MG/1
800 TABLET ORAL EVERY 8 HOURS PRN
Qty: 270 TABLET | Refills: 1 | Status: SHIPPED | OUTPATIENT
Start: 2021-11-08 | End: 2022-05-18 | Stop reason: SDUPTHER

## 2021-11-08 ASSESSMENT — ENCOUNTER SYMPTOMS
SORE THROAT: 0
SHORTNESS OF BREATH: 1

## 2021-12-06 ENCOUNTER — HOSPITAL ENCOUNTER (OUTPATIENT)
Dept: PAIN MANAGEMENT | Age: 56
Discharge: HOME | End: 2021-12-06
Payer: MEDICARE

## 2021-12-06 DIAGNOSIS — Z98.1 STATUS POST CERVICAL SPINAL FUSION: ICD-10-CM

## 2021-12-06 DIAGNOSIS — M96.1 FAILED BACK SYNDROME OF CERVICAL SPINE: ICD-10-CM

## 2021-12-06 DIAGNOSIS — E66.01 MORBID OBESITY (HCC): ICD-10-CM

## 2021-12-06 DIAGNOSIS — M47.22 CERVICAL SPONDYLOSIS WITH RADICULOPATHY: ICD-10-CM

## 2021-12-06 DIAGNOSIS — M54.16 LUMBAR RADICULOPATHY: ICD-10-CM

## 2021-12-06 DIAGNOSIS — M51.36 DDD (DEGENERATIVE DISC DISEASE), LUMBAR: ICD-10-CM

## 2021-12-06 DIAGNOSIS — Z79.891 ENCOUNTER FOR LONG-TERM OPIATE ANALGESIC USE: ICD-10-CM

## 2021-12-06 DIAGNOSIS — M50.30 BULGE OF CERVICAL DISC WITHOUT MYELOPATHY: ICD-10-CM

## 2021-12-06 DIAGNOSIS — Z98.1 STATUS POST LUMBAR SPINAL FUSION: ICD-10-CM

## 2021-12-06 DIAGNOSIS — M53.3 SACROILIAC JOINT DYSFUNCTION OF BOTH SIDES: ICD-10-CM

## 2021-12-06 DIAGNOSIS — M47.816 SPONDYLOSIS OF LUMBAR REGION WITHOUT MYELOPATHY OR RADICULOPATHY: Primary | ICD-10-CM

## 2021-12-06 PROCEDURE — 99213 OFFICE O/P EST LOW 20 MIN: CPT | Performed by: NURSE PRACTITIONER

## 2021-12-06 PROCEDURE — 99213 OFFICE O/P EST LOW 20 MIN: CPT

## 2021-12-06 RX ORDER — OXYCODONE AND ACETAMINOPHEN 10; 325 MG/1; MG/1
1 TABLET ORAL EVERY 6 HOURS PRN
Qty: 120 TABLET | Refills: 0 | Status: SHIPPED | OUTPATIENT
Start: 2021-12-09 | End: 2022-01-04 | Stop reason: SDUPTHER

## 2021-12-06 ASSESSMENT — ENCOUNTER SYMPTOMS
SHORTNESS OF BREATH: 0
BACK PAIN: 1
COUGH: 0
CONSTIPATION: 0
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
## 2022-01-04 ENCOUNTER — HOSPITAL ENCOUNTER (OUTPATIENT)
Dept: PAIN MANAGEMENT | Age: 57
Discharge: HOME | End: 2022-01-04
Payer: MEDICARE

## 2022-01-04 DIAGNOSIS — Z98.1 STATUS POST LUMBAR SPINAL FUSION: ICD-10-CM

## 2022-01-04 DIAGNOSIS — M47.816 SPONDYLOSIS OF LUMBAR REGION WITHOUT MYELOPATHY OR RADICULOPATHY: ICD-10-CM

## 2022-01-04 DIAGNOSIS — M53.3 SACROILIAC JOINT DYSFUNCTION OF BOTH SIDES: ICD-10-CM

## 2022-01-04 DIAGNOSIS — M96.1 FAILED BACK SYNDROME OF CERVICAL SPINE: ICD-10-CM

## 2022-01-04 DIAGNOSIS — Z79.891 ENCOUNTER FOR LONG-TERM OPIATE ANALGESIC USE: ICD-10-CM

## 2022-01-04 DIAGNOSIS — M51.36 DDD (DEGENERATIVE DISC DISEASE), LUMBAR: ICD-10-CM

## 2022-01-04 DIAGNOSIS — E66.01 MORBID OBESITY (HCC): ICD-10-CM

## 2022-01-04 DIAGNOSIS — M79.10 MYALGIA: ICD-10-CM

## 2022-01-04 DIAGNOSIS — M47.22 CERVICAL SPONDYLOSIS WITH RADICULOPATHY: ICD-10-CM

## 2022-01-04 DIAGNOSIS — Z98.1 STATUS POST CERVICAL SPINAL FUSION: ICD-10-CM

## 2022-01-04 DIAGNOSIS — M54.16 LUMBAR RADICULOPATHY: ICD-10-CM

## 2022-01-04 DIAGNOSIS — M50.30 BULGE OF CERVICAL DISC WITHOUT MYELOPATHY: ICD-10-CM

## 2022-01-04 PROCEDURE — 99213 OFFICE O/P EST LOW 20 MIN: CPT

## 2022-01-04 PROCEDURE — 99213 OFFICE O/P EST LOW 20 MIN: CPT | Performed by: NURSE PRACTITIONER

## 2022-01-04 RX ORDER — BACLOFEN 10 MG/1
10 TABLET ORAL 3 TIMES DAILY
Qty: 90 TABLET | Refills: 0 | Status: SHIPPED | OUTPATIENT
Start: 2022-01-04 | End: 2022-02-03

## 2022-01-04 RX ORDER — OXYCODONE AND ACETAMINOPHEN 10; 325 MG/1; MG/1
1 TABLET ORAL EVERY 6 HOURS PRN
Qty: 120 TABLET | Refills: 0 | Status: SHIPPED | OUTPATIENT
Start: 2022-01-08 | End: 2022-02-07

## 2022-01-04 ASSESSMENT — ENCOUNTER SYMPTOMS
BACK PAIN: 1
COUGH: 0
CONSTIPATION: 0
SHORTNESS OF BREATH: 0
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
## 2022-01-05 DIAGNOSIS — Z76.0 MEDICATION REFILL: ICD-10-CM

## 2022-01-05 DIAGNOSIS — F43.10 PTSD (POST-TRAUMATIC STRESS DISORDER): ICD-10-CM

## 2022-01-05 DIAGNOSIS — I50.42 CHRONIC COMBINED SYSTOLIC AND DIASTOLIC HEART FAILURE (HCC): ICD-10-CM

## 2022-01-05 NOTE — TELEPHONE ENCOUNTER
Health Maintenance   Topic Date Due    DTaP/Tdap/Td vaccine (1 - Tdap) Never done    Shingles Vaccine (1 of 2) Never done   [employer] Visit (AWV)  08/14/2021    COVID-19 Vaccine (3 - Booster for Pfizer series) 08/26/2021    Flu vaccine (1) 09/01/2021    Breast cancer screen  12/17/2021    A1C test (Diabetic or Prediabetic)  02/23/2022    Depression Monitoring  02/23/2022    Lipid screen  10/15/2022    Potassium monitoring  10/15/2022    Creatinine monitoring  10/15/2022    Pneumococcal 0-64 years Vaccine (2 of 2 - PPSV23) 06/29/2028    Colon cancer screen colonoscopy  06/24/2031    Hepatitis C screen  Completed    HIV screen  Completed    Hepatitis A vaccine  Aged Out    Hepatitis B vaccine  Aged Out    Hib vaccine  Aged Out    Meningococcal (ACWY) vaccine  Aged Out             (applicable per patient's age: Cancer Screenings, Depression Screening, Fall Risk Screening, Immunizations)    Hemoglobin A1C (%)   Date Value   02/23/2021 5.8   11/19/2020 6.0   07/24/2020 6.1 (H)     LDL Cholesterol (mg/dL)   Date Value   10/15/2021 115     AST (U/L)   Date Value   10/15/2021 18     ALT (U/L)   Date Value   10/15/2021 12     BUN (mg/dL)   Date Value   10/15/2021 15      (goal A1C is < 7)   (goal LDL is <100) need 30-50% reduction from baseline     BP Readings from Last 3 Encounters:   10/14/21 137/85   04/05/21 115/67   12/17/20 (!) 157/97    (goal /80)      All Future Testing planned in [facility]:  Lab Frequency Next Occurrence   Baseline Diagnostic Sleep Study Once 10/14/2021   DRUG SCREEN, PAIN Once 12/06/2021       Next Visit Date:  Future Appointments   Date Time Provider Christiano Wren   1/17/2022  1:15 PM ASA Briceño Si, CNP [facility]   2/7/2022  1:00 PM ASA Morgan CNP [place]            Patient Active Problem List:     Low back pain     Therapeutic opioid-induced constipation (OIC)     Spondylosis of lumbar region without myelopathy or radiculopathy     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, insomnia type (HCC)     Opioid dependence, uncomplicated (HCC)     Sacroiliac joint dysfunction of both sides     Chronic pain disorder     Bulge of cervical disc without myelopathy     DDD (degenerative disc disease), lumbar     Failed back syndrome of cervical spine     Chest pain     Class 3 severe obesity due to excess calories with serious comorbidity and body mass index (BMI) of 45.0 to 49.9 in adult [facility])     Cervical spondylosis with radiculopathy     Status post cervical spinal fusion     Status post lumbar spinal fusion     Lumbar radiculopathy     Morbid obesity (HCC)     Chronic pain of both knees     Myalgia     Encounter for long-term opiate analgesic use     Lower respiratory tract infection due to COVID-19 virus     Hypertension     Gout     GERD (gastroesophageal reflux disease)     Acute on chronic combined systolic and diastolic congestive heart failure (HCC)     Other proteinuria     Nausea and vomiting     Stable angina (HCC)     PTSD (post-traumatic stress disorder)     Hyperuricemia     Mild persistent asthma without complication     CHF (congestive heart failure) ([facility] [address]

## 2022-01-06 RX ORDER — BUPROPION HYDROCHLORIDE 300 MG/1
300 TABLET ORAL EVERY MORNING
Qty: 90 TABLET | Refills: 1 | Status: SHIPPED | OUTPATIENT
Start: 2022-01-06 | End: 2022-05-18 | Stop reason: SDUPTHER

## 2022-01-06 RX ORDER — SPIRONOLACTONE 25 MG/1
25 TABLET ORAL DAILY
Qty: 90 TABLET | Refills: 1 | Status: SHIPPED | OUTPATIENT
Start: 2022-01-06 | End: 2022-05-18 | Stop reason: SDUPTHER

## 2022-01-06 RX ORDER — TRAZODONE HYDROCHLORIDE 150 MG/1
150 TABLET ORAL NIGHTLY
Qty: 90 TABLET | Refills: 1 | Status: SHIPPED | OUTPATIENT
Start: 2022-01-06 | End: 2022-05-12 | Stop reason: SDUPTHER

## 2022-05-09 ENCOUNTER — HOSPITAL ENCOUNTER (OUTPATIENT)
Dept: PAIN MANAGEMENT | Age: 57
Discharge: HOME | End: 2022-05-09
Payer: MEDICARE

## 2022-05-09 VITALS
DIASTOLIC BLOOD PRESSURE: 98 MMHG | TEMPERATURE: 97.9 F | OXYGEN SATURATION: 99 % | HEART RATE: 92 BPM | SYSTOLIC BLOOD PRESSURE: 150 MMHG

## 2022-05-09 DIAGNOSIS — M25.561 CHRONIC PAIN OF BOTH KNEES: Primary | Chronic | ICD-10-CM

## 2022-05-09 DIAGNOSIS — M25.562 CHRONIC PAIN OF BOTH KNEES: Primary | Chronic | ICD-10-CM

## 2022-05-09 DIAGNOSIS — Z98.1 STATUS POST LUMBAR SPINAL FUSION: ICD-10-CM

## 2022-05-09 DIAGNOSIS — M96.1 FAILED BACK SYNDROME OF CERVICAL SPINE: ICD-10-CM

## 2022-05-09 DIAGNOSIS — Z79.891 ENCOUNTER FOR LONG-TERM OPIATE ANALGESIC USE: ICD-10-CM

## 2022-05-09 DIAGNOSIS — M51.36 DDD (DEGENERATIVE DISC DISEASE), LUMBAR: ICD-10-CM

## 2022-05-09 DIAGNOSIS — M54.16 LUMBAR RADICULOPATHY: ICD-10-CM

## 2022-05-09 DIAGNOSIS — E66.01 MORBID OBESITY (HCC): ICD-10-CM

## 2022-05-09 DIAGNOSIS — M47.22 CERVICAL SPONDYLOSIS WITH RADICULOPATHY: ICD-10-CM

## 2022-05-09 DIAGNOSIS — G89.29 CHRONIC PAIN OF BOTH KNEES: Primary | Chronic | ICD-10-CM

## 2022-05-09 PROCEDURE — 99213 OFFICE O/P EST LOW 20 MIN: CPT | Performed by: NURSE PRACTITIONER

## 2022-05-09 PROCEDURE — 99213 OFFICE O/P EST LOW 20 MIN: CPT

## 2022-05-09 ASSESSMENT — ENCOUNTER SYMPTOMS
CONSTIPATION: 0
SHORTNESS OF BREATH: 1
BOWEL INCONTINENCE: 1
COUGH: 0
BACK PAIN: 1

## 2022-05-09 ASSESSMENT — PAIN SCALES - GENERAL: PAINLEVEL_OUTOF10: 10
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
## 2022-05-12 ENCOUNTER — TELEPHONE (OUTPATIENT)
Dept: PRIMARY CARE CLINIC | Age: 57
End: 2022-05-12

## 2022-05-12 ENCOUNTER — TELEPHONE (OUTPATIENT)
Dept: PAIN MANAGEMENT | Age: 57
End: 2022-05-12

## 2022-05-12 DIAGNOSIS — I50.42 CHRONIC COMBINED SYSTOLIC AND DIASTOLIC HEART FAILURE (HCC): ICD-10-CM

## 2022-05-12 DIAGNOSIS — J42 CHRONIC BRONCHITIS, UNSPECIFIED CHRONIC BRONCHITIS TYPE (HCC): ICD-10-CM

## 2022-05-12 DIAGNOSIS — G89.29 CHRONIC MIDLINE LOW BACK PAIN WITH BILATERAL SCIATICA: Primary | ICD-10-CM

## 2022-05-12 DIAGNOSIS — M54.42 CHRONIC MIDLINE LOW BACK PAIN WITH BILATERAL SCIATICA: Primary | ICD-10-CM

## 2022-05-12 DIAGNOSIS — R06.02 SHORTNESS OF BREATH WITH EXPOSURE TO COVID-19 VIRUS: ICD-10-CM

## 2022-05-12 DIAGNOSIS — F43.10 PTSD (POST-TRAUMATIC STRESS DISORDER): ICD-10-CM

## 2022-05-12 DIAGNOSIS — M54.41 CHRONIC MIDLINE LOW BACK PAIN WITH BILATERAL SCIATICA: Primary | ICD-10-CM

## 2022-05-12 DIAGNOSIS — Z20.822 SHORTNESS OF BREATH WITH EXPOSURE TO COVID-19 VIRUS: ICD-10-CM

## 2022-05-12 DIAGNOSIS — Z76.0 MEDICATION REFILL: ICD-10-CM

## 2022-05-12 RX ORDER — DILTIAZEM HYDROCHLORIDE 60 MG/1
2 TABLET, FILM COATED ORAL 2 TIMES DAILY
Qty: 1 EACH | Refills: 3 | Status: SHIPPED | OUTPATIENT
Start: 2022-05-12 | End: 2022-08-26 | Stop reason: DRUGHIGH

## 2022-05-12 RX ORDER — ALBUTEROL SULFATE 90 UG/1
2 AEROSOL, METERED RESPIRATORY (INHALATION) EVERY 4 HOURS PRN
Qty: 1 EACH | Refills: 0 | Status: SHIPPED | OUTPATIENT
Start: 2022-05-12 | End: 2022-08-26 | Stop reason: SDUPTHER

## 2022-05-12 RX ORDER — TRAZODONE HYDROCHLORIDE 150 MG/1
150 TABLET ORAL NIGHTLY
Qty: 90 TABLET | Refills: 1 | Status: SHIPPED | OUTPATIENT
Start: 2022-05-12

## 2022-05-12 RX ORDER — CLOPIDOGREL BISULFATE 75 MG/1
75 TABLET ORAL DAILY
Qty: 90 TABLET | Refills: 1 | Status: SHIPPED | OUTPATIENT
Start: 2022-05-12 | End: 2022-07-08 | Stop reason: HOSPADM

## 2022-05-12 RX ORDER — ATORVASTATIN CALCIUM 80 MG/1
80 TABLET, FILM COATED ORAL DAILY
Qty: 90 TABLET | Refills: 1 | Status: SHIPPED | OUTPATIENT
Start: 2022-05-12

## 2022-05-12 RX ORDER — DIAZEPAM 5 MG/1
5 TABLET ORAL ONCE
Qty: 1 TABLET | Refills: 0 | Status: SHIPPED | OUTPATIENT
Start: 2022-05-12 | End: 2022-05-12

## 2022-05-12 NOTE — TELEPHONE ENCOUNTER
I returned patient's call and advised Barbara sent Valium x1 to her pharmacy, to be taken 30 minutes prior to MRI. I did tell the patient she needs to have a  on day of imaging. Patient verbalizes understanding.

## 2022-05-12 NOTE — TELEPHONE ENCOUNTER
Pt has was late to today's appt and now has appt scheduled for next week and would like refills of the following medications:  Lipitor  Trazadone  Her \"blood pressure medication\"  And \"both inhalers\"  Pt is completely out of above medications.

## 2022-05-18 ENCOUNTER — OFFICE VISIT (OUTPATIENT)
Dept: PRIMARY CARE CLINIC | Age: 57
End: 2022-05-18
Payer: MEDICARE

## 2022-05-18 ENCOUNTER — TELEPHONE (OUTPATIENT)
Dept: ORTHOPEDIC SURGERY | Age: 57
End: 2022-05-18

## 2022-05-18 VITALS
SYSTOLIC BLOOD PRESSURE: 126 MMHG | BODY MASS INDEX: 53.98 KG/M2 | HEART RATE: 83 BPM | TEMPERATURE: 97.3 F | WEIGHT: 293 LBS | DIASTOLIC BLOOD PRESSURE: 73 MMHG | OXYGEN SATURATION: 97 %

## 2022-05-18 DIAGNOSIS — E66.01 MORBID OBESITY WITH BMI OF 50.0-59.9, ADULT (HCC): ICD-10-CM

## 2022-05-18 DIAGNOSIS — I20.8 STABLE ANGINA (HCC): ICD-10-CM

## 2022-05-18 DIAGNOSIS — G47.19 EXCESSIVE DAYTIME SLEEPINESS: Primary | ICD-10-CM

## 2022-05-18 DIAGNOSIS — E03.9 HYPOTHYROIDISM, UNSPECIFIED TYPE: ICD-10-CM

## 2022-05-18 DIAGNOSIS — F43.10 PTSD (POST-TRAUMATIC STRESS DISORDER): ICD-10-CM

## 2022-05-18 DIAGNOSIS — G89.4 CHRONIC PAIN SYNDROME: ICD-10-CM

## 2022-05-18 DIAGNOSIS — F33.0 MILD EPISODE OF RECURRENT MAJOR DEPRESSIVE DISORDER (HCC): ICD-10-CM

## 2022-05-18 DIAGNOSIS — I10 ESSENTIAL HYPERTENSION: ICD-10-CM

## 2022-05-18 DIAGNOSIS — F33.2 MAJOR DEPRESSIVE DISORDER, RECURRENT SEVERE WITHOUT PSYCHOTIC FEATURES (HCC): ICD-10-CM

## 2022-05-18 DIAGNOSIS — R73.03 PREDIABETES: ICD-10-CM

## 2022-05-18 DIAGNOSIS — Z76.0 MEDICATION REFILL: ICD-10-CM

## 2022-05-18 DIAGNOSIS — I50.42 CHRONIC COMBINED SYSTOLIC AND DIASTOLIC HEART FAILURE (HCC): ICD-10-CM

## 2022-05-18 DIAGNOSIS — Z12.31 ENCOUNTER FOR SCREENING MAMMOGRAM FOR MALIGNANT NEOPLASM OF BREAST: ICD-10-CM

## 2022-05-18 DIAGNOSIS — J42 CHRONIC BRONCHITIS, UNSPECIFIED CHRONIC BRONCHITIS TYPE (HCC): ICD-10-CM

## 2022-05-18 DIAGNOSIS — N39.41 URGE INCONTINENCE: ICD-10-CM

## 2022-05-18 DIAGNOSIS — K21.9 GASTROESOPHAGEAL REFLUX DISEASE, UNSPECIFIED WHETHER ESOPHAGITIS PRESENT: ICD-10-CM

## 2022-05-18 DIAGNOSIS — J45.30 MILD PERSISTENT ASTHMA WITHOUT COMPLICATION: ICD-10-CM

## 2022-05-18 DIAGNOSIS — F11.20 OPIOID DEPENDENCE, UNCOMPLICATED (HCC): ICD-10-CM

## 2022-05-18 DIAGNOSIS — I50.42 CHRONIC COMBINED SYSTOLIC AND DIASTOLIC CONGESTIVE HEART FAILURE (HCC): ICD-10-CM

## 2022-05-18 DIAGNOSIS — G47.33 OSA (OBSTRUCTIVE SLEEP APNEA): ICD-10-CM

## 2022-05-18 DIAGNOSIS — E79.0 HYPERURICEMIA: ICD-10-CM

## 2022-05-18 PROCEDURE — 3017F COLORECTAL CA SCREEN DOC REV: CPT | Performed by: NURSE PRACTITIONER

## 2022-05-18 PROCEDURE — G8417 CALC BMI ABV UP PARAM F/U: HCPCS | Performed by: NURSE PRACTITIONER

## 2022-05-18 PROCEDURE — 1036F TOBACCO NON-USER: CPT | Performed by: NURSE PRACTITIONER

## 2022-05-18 PROCEDURE — 3023F SPIROM DOC REV: CPT | Performed by: NURSE PRACTITIONER

## 2022-05-18 PROCEDURE — 99214 OFFICE O/P EST MOD 30 MIN: CPT | Performed by: NURSE PRACTITIONER

## 2022-05-18 PROCEDURE — G8427 DOCREV CUR MEDS BY ELIG CLIN: HCPCS | Performed by: NURSE PRACTITIONER

## 2022-05-18 RX ORDER — OXYBUTYNIN CHLORIDE 5 MG/1
5 TABLET ORAL 2 TIMES DAILY
Qty: 180 TABLET | Refills: 1 | Status: SHIPPED | OUTPATIENT
Start: 2022-05-18

## 2022-05-18 RX ORDER — METOPROLOL TARTRATE 50 MG/1
50 TABLET, FILM COATED ORAL 2 TIMES DAILY
Qty: 180 TABLET | Refills: 1 | Status: SHIPPED | OUTPATIENT
Start: 2022-05-18

## 2022-05-18 RX ORDER — LEVOTHYROXINE SODIUM 0.03 MG/1
25 TABLET ORAL DAILY
Qty: 90 TABLET | Refills: 1 | Status: SHIPPED | OUTPATIENT
Start: 2022-05-18 | End: 2022-05-25

## 2022-05-18 RX ORDER — ISOSORBIDE DINITRATE 10 MG/1
10 TABLET ORAL 2 TIMES DAILY
Qty: 180 TABLET | Refills: 1 | Status: SHIPPED | OUTPATIENT
Start: 2022-05-18

## 2022-05-18 RX ORDER — HYDRALAZINE HYDROCHLORIDE 25 MG/1
25 TABLET, FILM COATED ORAL 2 TIMES DAILY
Qty: 60 TABLET | Refills: 0 | Status: SHIPPED | OUTPATIENT
Start: 2022-05-18

## 2022-05-18 RX ORDER — DULOXETIN HYDROCHLORIDE 60 MG/1
120 CAPSULE, DELAYED RELEASE ORAL DAILY
Qty: 180 CAPSULE | Refills: 1 | Status: SHIPPED | OUTPATIENT
Start: 2022-05-18

## 2022-05-18 RX ORDER — MONTELUKAST SODIUM 10 MG/1
10 TABLET ORAL NIGHTLY
Qty: 90 TABLET | Refills: 1 | Status: SHIPPED | OUTPATIENT
Start: 2022-05-18 | End: 2022-10-03 | Stop reason: SDUPTHER

## 2022-05-18 RX ORDER — ALLOPURINOL 300 MG/1
300 TABLET ORAL DAILY
Qty: 90 TABLET | Refills: 1 | Status: SHIPPED | OUTPATIENT
Start: 2022-05-18

## 2022-05-18 RX ORDER — BUMETANIDE 1 MG/1
2 TABLET ORAL DAILY
Qty: 180 TABLET | Refills: 0 | Status: SHIPPED | OUTPATIENT
Start: 2022-05-18

## 2022-05-18 RX ORDER — SPIRONOLACTONE 25 MG/1
25 TABLET ORAL DAILY
Qty: 90 TABLET | Refills: 1 | Status: SHIPPED | OUTPATIENT
Start: 2022-05-18

## 2022-05-18 RX ORDER — IBUPROFEN 800 MG/1
800 TABLET ORAL EVERY 8 HOURS PRN
Qty: 270 TABLET | Refills: 1 | Status: SHIPPED | OUTPATIENT
Start: 2022-05-18 | End: 2022-06-01 | Stop reason: ALTCHOICE

## 2022-05-18 RX ORDER — PANTOPRAZOLE SODIUM 40 MG/1
40 TABLET, DELAYED RELEASE ORAL DAILY
Qty: 90 TABLET | Refills: 1 | Status: SHIPPED | OUTPATIENT
Start: 2022-05-18

## 2022-05-18 RX ORDER — BUPROPION HYDROCHLORIDE 300 MG/1
300 TABLET ORAL EVERY MORNING
Qty: 90 TABLET | Refills: 1 | Status: SHIPPED | OUTPATIENT
Start: 2022-05-18

## 2022-05-18 RX ORDER — DIAZEPAM 5 MG/1
TABLET ORAL
Status: ON HOLD | COMMUNITY
Start: 2022-05-12 | End: 2022-06-16 | Stop reason: HOSPADM

## 2022-05-18 ASSESSMENT — PATIENT HEALTH QUESTIONNAIRE - PHQ9
5. POOR APPETITE OR OVEREATING: 0
4. FEELING TIRED OR HAVING LITTLE ENERGY: 0
SUM OF ALL RESPONSES TO PHQ9 QUESTIONS 1 & 2: 0
8. MOVING OR SPEAKING SO SLOWLY THAT OTHER PEOPLE COULD HAVE NOTICED. OR THE OPPOSITE, BEING SO FIGETY OR RESTLESS THAT YOU HAVE BEEN MOVING AROUND A LOT MORE THAN USUAL: 0
10. IF YOU CHECKED OFF ANY PROBLEMS, HOW DIFFICULT HAVE THESE PROBLEMS MADE IT FOR YOU TO DO YOUR WORK, TAKE CARE OF THINGS AT HOME, OR GET ALONG WITH OTHER PEOPLE: 0
2. FEELING DOWN, DEPRESSED OR HOPELESS: 0
6. FEELING BAD ABOUT YOURSELF - OR THAT YOU ARE A FAILURE OR HAVE LET YOURSELF OR YOUR FAMILY DOWN: 0
SUM OF ALL RESPONSES TO PHQ QUESTIONS 1-9: 0
SUM OF ALL RESPONSES TO PHQ QUESTIONS 1-9: 0
1. LITTLE INTEREST OR PLEASURE IN DOING THINGS: 0
SUM OF ALL RESPONSES TO PHQ QUESTIONS 1-9: 0
3. TROUBLE FALLING OR STAYING ASLEEP: 0
SUM OF ALL RESPONSES TO PHQ QUESTIONS 1-9: 0
9. THOUGHTS THAT YOU WOULD BE BETTER OFF DEAD, OR OF HURTING YOURSELF: 0
7. TROUBLE CONCENTRATING ON THINGS, SUCH AS READING THE NEWSPAPER OR WATCHING TELEVISION: 0

## 2022-05-18 ASSESSMENT — ENCOUNTER SYMPTOMS
BLOOD IN STOOL: 0
NAUSEA: 0
SHORTNESS OF BREATH: 0
CONSTIPATION: 1
BACK PAIN: 0
WHEEZING: 0
COUGH: 0
VOMITING: 0

## 2022-05-18 NOTE — TELEPHONE ENCOUNTER
Patient came in the office with her [facility] info for her right knee. She would like to be called for an appointment so Kailee Rabago can see her for the knee replacement. [id number]    Arminda Comes CASE# [id number]    [id number]    DOI- 10/26/2018 in [address] per office note on 04/19/2019. She gave 2 phone numbers as well.       [phone]  [address] [place] [address], [address] [place] [address]

## 2022-05-18 NOTE — PROGRESS NOTES
[facility]  [id number] [facility] [address]  Dept: [phone]  Dept Fax: [phone]    Patient Care Team:  ASA Dickerson CNP as PCP - General (Family Medicine)  ASA Dickerson CNP as PCP - [facility] Empaneled Provider  ASA Lucas CNP as Consulting Physician (Pain Management)  Nasreen Valdez MD as Consulting Physician (Rheumatology)  Matt Mccarthy DO as Consulting Physician (Pulmonary Disease)  Bret Kwan MD as Consulting Physician (Gastroenterology)    2022     Mini Moran (:  )CO a 60 y.o. female, here for evaluation of the following medical concerns:   Chief Complaint   Patient presents with    Chronic Pain     Follow-up    Mass     RLE       Mini Moran is a 40-year-old female here today for follow-up of chronic conditions. Patient with known fibromyalgia, hypertension, CHF, and asthma. Covid illness in 2020, still complaining of long-term side effect such as brain fog and shortness of breath. Has been staying in [facility] for the last 6 months, seeing pain management in the last month. Does see cardiology, however she is unsure when her last visit was. Compliant with daily medications, although admits she does sometimes forget medication or lacks motivation due to her depression. She does have a scale to weigh herself, but does not consistently check weights for fluid monitoring. Denies wheezing, denies chest pain. Long haul covid symptoms, much trouble recalling appts and past testing. Rod Taliadulce denies any previous testing for sleep apnea. .    Review of Systems   Constitutional: Positive for fatigue. Negative for chills and fever. HENT: Negative for congestion. Respiratory: Negative for cough, shortness of breath and wheezing. Cardiovascular: Positive for leg swelling. Negative for chest pain. Gastrointestinal: Positive for constipation. Negative for blood in stool, nausea and vomiting. Genitourinary: Negative for dysuria, frequency and urgency. Musculoskeletal: Positive for arthralgias and myalgias. Negative for back pain and neck pain. Skin: Negative for rash and wound. Neurological: Negative for dizziness, syncope and headaches. Psychiatric/Behavioral: Positive for dysphoric mood. Negative for sleep disturbance and suicidal ideas. Prior to Visit Medications    Medication Sig Taking?  Authorizing Provider   pantoprazole (PROTONIX) 40 MG tablet Take 1 tablet by mouth daily Yes ASA Vásquez CNP   metoprolol tartrate (LOPRESSOR) 50 MG tablet Take 1 tablet by mouth 2 times daily Yes ASA Vásquez CNP   bumetanide (BUMEX) 1 MG tablet Take 2 tablets by mouth daily Yes ASA Vásquez CNP   DULoxetine (CYMBALTA) 60 MG extended release capsule Take 2 capsules by mouth daily Yes ASA Vásquez CNP   montelukast (SINGULAIR) 10 MG tablet Take 1 tablet by mouth nightly Yes ASA Vásquez CNP   allopurinol (ZYLOPRIM) 300 MG tablet Take 1 tablet by mouth daily Yes ASA Vásquez CNP   hydrALAZINE (APRESOLINE) 25 MG tablet Take 1 tablet by mouth 2 times daily Yes ASA Vásquez CNP   isosorbide dinitrate (ISORDIL) 10 MG tablet Take 1 tablet by mouth 2 times daily Yes ASA Vásquez CNP   levothyroxine (SYNTHROID) 25 MCG tablet Take 1 tablet by mouth daily Yes ASA Vásquez CNP   buPROPion (WELLBUTRIN XL) 300 MG extended release tablet Take 1 tablet by mouth every morning Yes ASA Vásquez CNP   spironolactone (ALDACTONE) 25 MG tablet Take 1 tablet by mouth daily Yes ASA Vásquez CNP   ibuprofen (ADVIL;MOTRIN) 800 MG tablet Take 1 tablet by mouth every 8 hours as needed for Pain Yes ASA Vásquez CNP   oxybutynin (DITROPAN) 5 MG tablet Take 1 tablet by mouth 2 times daily Yes Liudmila Patel ASA Lakhani CNP   traZODone (DESYREL) 150 MG tablet Take 1 tablet by mouth nightly Yes Cleola Canavan, APRN - CNP   clopidogrel (PLAVIX) 75 MG tablet Take 1 tablet by mouth daily Yes Cleola Canavan, APRN - CNP   SYMBICORT 80-4.5 MCG/ACT AERO Inhale 2 puffs into the lungs 2 times daily Yes Cleola Canavan, APRN - CNP   albuterol sulfate  (90 Base) MCG/ACT inhaler Inhale 2 puffs into the lungs every 4 hours as needed for Shortness of Breath Yes Cleola Canavan, APRN - CNP   atorvastatin (LIPITOR) 80 MG tablet Take 1 tablet by mouth daily Yes Cleola Canavan, APRN - CNP   polyethylene glycol (GLYCOLAX) 17 g packet polyethylene glycol 3350 17 gram/dose oral powder Yes Génesis AlvarengaASA hamm CNP   aspirin 81 MG EC tablet Take 81 mg by mouth Yes Historical Provider, MD   sucralfate (CARAFATE) 1 GM tablet Take 1 tablet in AM, 2 tablet in evening daily Yes Stacy Obrien PA-C   albuterol (PROVENTIL) (2.5 MG/3ML) 0.083% nebulizer solution Take 3 mLs by nebulization every 6 hours as needed for Wheezing Yes Stacy Obrien PA-C   Handicap Placard MISC by Does not apply route 2 years Yes Stacy Obrien PA-C   diazePAM (VALIUM) 5 MG tablet TAKE 1 TABLET BY MOUTH ONCE FOR 1 DOSE, TAKE 30 MINUTES PRIOR TO MRI. WILL NEED SOMEONE TO DRIVE. Historical Provider, MD   Respiratory Therapy Supplies (NEBULIZER COMPRESSOR) KIT Provide insurance covered nebulizer, use daily as needed  Stacy Obrien PA-C        Social History     Tobacco Use    Smoking status: Never Smoker    Smokeless tobacco: Never Used   Substance Use Topics    Alcohol use: No        Vitals:    05/18/22 1429 05/18/22 1510   BP: (!) 145/89 126/73   Site: Left Lower Arm    Position: Sitting    Pulse: 88 83   Temp: 97.3 °F (36.3 °C)    TempSrc: Infrared    SpO2: 97%    Weight: (!) 355 lb (161 kg)      Estimated body mass index is 53.98 kg/m² as calculated from the following:    Height as of 5/24/21: 5' 8\" (1.727 m).     Weight as of this encounter: 355 lb (161 kg). DIAGNOSTIC FINDINGS:  CBC:  Lab Results   Component Value Date    WBC 7.6 2020    HGB 10.3 2020     2020       BMP:    Lab Results   Component Value Date     10/15/2021    K 4.3 10/15/2021     10/15/2021    CO2 25 10/15/2021    BUN 15 10/15/2021    CREATININE 1.02 10/15/2021    GLUCOSE 88 10/15/2021       HEMOGLOBIN A1C:   Lab Results   Component Value Date    LABA1C 5.8 2021       FASTING LIPID PANEL:  Lab Results   Component Value Date    CHOL 158 2020    HDL 36 (L) 10/15/2021    TRIG 149 2020       Physical Exam  Constitutional:       General: She is awake. Appearance: Normal appearance. She is well-developed and well-groomed. She is morbidly obese. HENT:      Head: Normocephalic and atraumatic. Right Ear: Hearing and external ear normal.      Left Ear: Hearing and external ear normal.      Nose: Nose normal.      Mouth/Throat:      Lips: Pink. Eyes:      General: Lids are normal. No scleral icterus. Conjunctiva/sclera: Conjunctivae normal.   Cardiovascular:      Rate and Rhythm: Normal rate. Heart sounds: No murmur heard. Pulmonary:      Effort: Pulmonary effort is normal.      Breath sounds: Normal breath sounds. No wheezing or rhonchi. Musculoskeletal:         General: No deformity or signs of injury. Cervical back: Neck supple. Right lower le+ Pitting Edema present. Left lower le+ Pitting Edema present. Neurological:      Mental Status: She is alert and oriented to person, place, and time. Psychiatric:         Attention and Perception: Attention and perception normal.         Mood and Affect: Mood normal.         Speech: Speech normal.         Behavior: Behavior is cooperative. Thought Content: Thought content normal.         Cognition and Memory: Cognition normal.         Judgment: Judgment normal.         ASSESSMENT     Diagnosis Orders   1.  Excessive daytime sleepiness  Sleep Study with PAP Titration   2. SMILEY (obstructive sleep apnea)  Sleep Study with PAP Titration   3. Chronic bronchitis, unspecified chronic bronchitis type ([facility]     4. Major depressive disorder, recurrent severe without psychotic features ([facility]     5. Chronic combined systolic and diastolic congestive heart failure (HCC)  bumetanide (BUMEX) 1 MG tablet    [address]   6. Opioid dependence, uncomplicated ([facility]     7. Stable angina (HCC)     8. Gastroesophageal reflux disease, unspecified whether esophagitis present  pantoprazole (PROTONIX) 40 MG tablet   9. Medication refill  pantoprazole (PROTONIX) 40 MG tablet    metoprolol tartrate (LOPRESSOR) 50 MG tablet    DULoxetine (CYMBALTA) 60 MG extended release capsule    montelukast (SINGULAIR) 10 MG tablet    allopurinol (ZYLOPRIM) 300 MG tablet    levothyroxine (SYNTHROID) 25 MCG tablet    buPROPion (WELLBUTRIN XL) 300 MG extended release tablet    spironolactone (ALDACTONE) 25 MG tablet    ibuprofen (ADVIL;MOTRIN) 800 MG tablet    oxybutynin (DITROPAN) 5 MG tablet   10. Essential hypertension  metoprolol tartrate (LOPRESSOR) 50 MG tablet    Basic Metabolic Panel   11. Mild episode of recurrent major depressive disorder (HCC)  DULoxetine (CYMBALTA) 60 MG extended release capsule   12. Mild persistent asthma without complication  montelukast (SINGULAIR) 10 MG tablet   13. Hyperuricemia  allopurinol (ZYLOPRIM) 300 MG tablet   14. Chronic combined systolic and diastolic heart failure (HCC)  isosorbide dinitrate (ISORDIL) 10 MG tablet    spironolactone (ALDACTONE) 25 MG tablet   15. Hypothyroidism, unspecified type  levothyroxine (SYNTHROID) 25 MCG tablet    TSH With Reflex Ft4   16. PTSD (post-traumatic stress disorder)  buPROPion (WELLBUTRIN XL) 300 MG extended release tablet   17. Chronic pain syndrome  ibuprofen (ADVIL;MOTRIN) 800 MG tablet   18. Urge incontinence  oxybutynin (DITROPAN) 5 MG tablet   19.  Encounter for screening mammogram for malignant neoplasm of breast  CHERY DIGITAL SCREEN SELF REFERRAL W OR WO CAD BILATERAL   20. Prediabetes  Hemoglobin A1C    [facility]- [facility] - [facility]   21.  Morbid obesity with BMI of 50.0-59.9, adult [facility])  [address]          PLAN:  Orders Placed This Encounter   Medications    pantoprazole (PROTONIX) 40 MG tablet     Sig: Take 1 tablet by mouth daily     Dispense:  90 tablet     Refill:  1    metoprolol tartrate (LOPRESSOR) 50 MG tablet     Sig: Take 1 tablet by mouth 2 times daily     Dispense:  180 tablet     Refill:  1    bumetanide (BUMEX) 1 MG tablet     Sig: Take 2 tablets by mouth daily     Dispense:  180 tablet     Refill:  0    DULoxetine (CYMBALTA) 60 MG extended release capsule     Sig: Take 2 capsules by mouth daily     Dispense:  180 capsule     Refill:  1    montelukast (SINGULAIR) 10 MG tablet     Sig: Take 1 tablet by mouth nightly     Dispense:  90 tablet     Refill:  1    allopurinol (ZYLOPRIM) 300 MG tablet     Sig: Take 1 tablet by mouth daily     Dispense:  90 tablet     Refill:  1    hydrALAZINE (APRESOLINE) 25 MG tablet     Sig: Take 1 tablet by mouth 2 times daily     Dispense:  60 tablet     Refill:  0    isosorbide dinitrate (ISORDIL) 10 MG tablet     Sig: Take 1 tablet by mouth 2 times daily     Dispense:  180 tablet     Refill:  1    levothyroxine (SYNTHROID) 25 MCG tablet     Sig: Take 1 tablet by mouth daily     Dispense:  90 tablet     Refill:  1    buPROPion (WELLBUTRIN XL) 300 MG extended release tablet     Sig: Take 1 tablet by mouth every morning     Dispense:  90 tablet     Refill:  1    spironolactone (ALDACTONE) 25 MG tablet     Sig: Take 1 tablet by mouth daily     Dispense:  90 tablet     Refill:  1    ibuprofen (ADVIL;MOTRIN) 800 MG tablet     Sig: Take 1 tablet by mouth every 8 hours as needed for Pain     Dispense:  270 tablet     Refill:  1    oxybutynin (DITROPAN) 5 MG tablet     Sig: Take 1 tablet by mouth 2 times daily     Dispense:  180 tablet     Refill:  1         1. Multiple refills provided today  2. Health maintenance reviewed  3. PSG testing not previously completed, reordered today split sleep study due to previous diagnosis of SMILEY  4. Reviewed patient's weight loss goals, would like to meet with nutritionist to aid in dietary choices    FOLLOW UP AND INSTRUCTIONS:  Return in about 3 months (around 8/18/2022) for CHF, HTN. · Maisha Victoria received counseling on the following healthy behaviors:nutrition and medication adherence    · Discussed use, benefit, and side effects of prescribed medications. Barriers to  medication compliance addressed. All patient questions answered. Pt  verbalized understanding of all instructions given. · Patient given educational materials - see patient instructions      · Patient advised to contact scheduling offices for any referrals or imaging orders  placed today if they have not been contacted in 48 hours. Return in about 3 months (around 8/18/2022) for CHF, HTN. An electronic signature was used to authenticate this note. --ASA Falcon - CNP on 5/18/2022 at 5:05 PM  Visit Information    Have you changed or started any medications since your last visit including any over-the-counter medicines, vitamins, or herbal medicines? no   Are you having any side effects from any of your medications? -  no  Have you stopped taking any of your medications? Is so, why? -  no    Have you seen any other physician or provider since your last visit? Yes - Records Obtained  Have you had any other diagnostic tests since your last visit? Yes - Records Obtained  Have you been seen in the emergency room and/or had an admission to a hospital since we last saw you? No  Have you had your routine dental cleaning in the past 6 months? no    Have you activated your [employer] account? If not, what are your barriers?  Yes     Patient Care Team:  ASA Roman CNP as PCP - General (Family Medicine)  ASA Roman CNP as PCP - [facility] Empaneled Provider  ASA Barreto CNP as Consulting Physician (Pain Management)  Kristie Tijerina MD as Consulting Physician (Rheumatology)  Javon Ballard DO as Consulting Physician (Pulmonary Disease)  Scott Wagner MD as Consulting Physician (Gastroenterology)    Medical History Review  Past Medical, Family, and Social History reviewed and does not contribute to the patient presenting condition    Health Maintenance   Topic Date Due    DTaP/Tdap/Td vaccine (1 - Tdap) Never done    Shingles vaccine (1 of 2) Never done    COVID-19 Vaccine (3 - Booster for [employer] series) 07/26/2021    Annual Wellness Visit (AWV)  08/14/2021    Pneumococcal 0-64 years Vaccine (2 - PCV) 10/08/2021    Breast cancer screen  12/17/2021    A1C test (Diabetic or Prediabetic)  02/23/2022    Depression Monitoring  02/23/2022    Flu vaccine (Season Ended) 09/01/2022    Lipids  10/15/2022    Colorectal Cancer Screen  06/24/2031    Hepatitis C screen  Completed    HIV screen  Completed    Hepatitis A vaccine  Aged Out    Hepatitis B vaccine  Aged Out    Hib vaccine  Aged Out    Meningococcal (ACWY) vaccine  Aged Out

## 2022-05-19 NOTE — TELEPHONE ENCOUNTER
Called patient. No answer. Unable to leave [facility]. We have to have physical documentation that we can see patient for this claim. Also, if she has approval for surgery, we need documentation in writing. If she gets approval for appointment we can request surgery if Dr. Saurav Hodges feels it is appropriate.

## 2022-05-24 ENCOUNTER — HOSPITAL ENCOUNTER (OUTPATIENT)
Dept: MRI IMAGING | Facility: CLINIC | Age: 57
Discharge: HOME | End: 2022-05-26
Payer: MEDICARE

## 2022-05-24 ENCOUNTER — HOSPITAL ENCOUNTER (OUTPATIENT)
Age: 57
Setting detail: SPECIMEN
Discharge: HOME | End: 2022-05-24

## 2022-05-24 DIAGNOSIS — M47.22 CERVICAL SPONDYLOSIS WITH RADICULOPATHY: ICD-10-CM

## 2022-05-24 DIAGNOSIS — M54.16 LUMBAR RADICULOPATHY: ICD-10-CM

## 2022-05-24 DIAGNOSIS — E03.9 HYPOTHYROIDISM, UNSPECIFIED TYPE: ICD-10-CM

## 2022-05-24 DIAGNOSIS — R73.03 PREDIABETES: ICD-10-CM

## 2022-05-24 DIAGNOSIS — M51.36 DDD (DEGENERATIVE DISC DISEASE), LUMBAR: ICD-10-CM

## 2022-05-24 DIAGNOSIS — I10 ESSENTIAL HYPERTENSION: ICD-10-CM

## 2022-05-24 DIAGNOSIS — Z98.1 STATUS POST LUMBAR SPINAL FUSION: ICD-10-CM

## 2022-05-24 DIAGNOSIS — M96.1 FAILED BACK SYNDROME OF CERVICAL SPINE: ICD-10-CM

## 2022-05-24 LAB
ANION GAP SERPL CALCULATED.3IONS-SCNC: 10 MMOL/L (ref 9–17)
BUN BLDV-MCNC: 25 MG/DL (ref 6–20)
CALCIUM SERPL-MCNC: 9.1 MG/DL (ref 8.6–10.4)
CHLORIDE BLD-SCNC: 104 MMOL/L (ref 98–107)
CO2: 24 MMOL/L (ref 20–31)
CREAT SERPL-MCNC: 1.33 MG/DL (ref 0.5–0.9)
GFR AFRICAN AMERICAN: 50 ML/MIN
GFR NON-AFRICAN AMERICAN: 41 ML/MIN
GFR SERPL CREATININE-BSD FRML MDRD: ABNORMAL ML/MIN/{1.73_M2}
GLUCOSE BLD-MCNC: 75 MG/DL (ref 70–99)
POTASSIUM SERPL-SCNC: 5.1 MMOL/L (ref 3.7–5.3)
SODIUM BLD-SCNC: 138 MMOL/L (ref 135–144)
TSH SERPL DL<=0.05 MIU/L-ACNC: 5.69 UIU/ML (ref 0.3–5)

## 2022-05-24 PROCEDURE — 72148 MRI LUMBAR SPINE W/O DYE: CPT

## 2022-05-25 ENCOUNTER — TELEPHONE (OUTPATIENT)
Dept: PRIMARY CARE CLINIC | Age: 57
End: 2022-05-25

## 2022-05-25 DIAGNOSIS — Z76.0 MEDICATION REFILL: ICD-10-CM

## 2022-05-25 DIAGNOSIS — E03.9 HYPOTHYROIDISM, UNSPECIFIED TYPE: ICD-10-CM

## 2022-05-25 LAB
ESTIMATED AVERAGE GLUCOSE: 123 MG/DL
HBA1C MFR BLD: 5.9 % (ref 4–6)
THYROXINE, FREE: 0.74 NG/DL (ref 0.93–1.7)

## 2022-05-25 RX ORDER — LEVOTHYROXINE SODIUM 0.05 MG/1
50 TABLET ORAL DAILY
Qty: 30 TABLET | Refills: 0 | Status: SHIPPED | OUTPATIENT
Start: 2022-05-25 | End: 2022-08-26

## 2022-05-25 NOTE — TELEPHONE ENCOUNTER
Please notify Roz Schwab that her lab results are back. Thyroid function level is not within normal range, would like to increase her levothyroxine dosing to 50 mcg a day for improved control of hypothyroidism. Prescription sent to her local pharmacy.   She can repeat her levels in roughly 6 weeks to reevaluate for improved function

## 2022-05-26 ENCOUNTER — HOSPITAL ENCOUNTER (OUTPATIENT)
Dept: PAIN MANAGEMENT | Age: 57
Discharge: HOME | End: 2022-05-26
Payer: MEDICARE

## 2022-05-26 ENCOUNTER — TELEPHONE (OUTPATIENT)
Dept: PRIMARY CARE CLINIC | Age: 57
End: 2022-05-26

## 2022-05-26 VITALS
BODY MASS INDEX: 44.41 KG/M2 | WEIGHT: 293 LBS | HEIGHT: 68 IN | SYSTOLIC BLOOD PRESSURE: 128 MMHG | DIASTOLIC BLOOD PRESSURE: 84 MMHG | HEART RATE: 89 BPM | OXYGEN SATURATION: 98 %

## 2022-05-26 DIAGNOSIS — E66.01 MORBID OBESITY WITH BMI OF 50.0-59.9, ADULT (HCC): ICD-10-CM

## 2022-05-26 DIAGNOSIS — Z79.891 ENCOUNTER FOR LONG-TERM OPIATE ANALGESIC USE: ICD-10-CM

## 2022-05-26 DIAGNOSIS — G89.29 CHRONIC PAIN OF MULTIPLE SITES: Primary | ICD-10-CM

## 2022-05-26 DIAGNOSIS — Z98.1 HISTORY OF LUMBAR FUSION: ICD-10-CM

## 2022-05-26 DIAGNOSIS — G89.4 CHRONIC PAIN SYNDROME: Primary | ICD-10-CM

## 2022-05-26 DIAGNOSIS — M51.36 DDD (DEGENERATIVE DISC DISEASE), LUMBAR: ICD-10-CM

## 2022-05-26 DIAGNOSIS — R52 CHRONIC PAIN OF MULTIPLE SITES: Primary | ICD-10-CM

## 2022-05-26 PROCEDURE — 99213 OFFICE O/P EST LOW 20 MIN: CPT

## 2022-05-26 PROCEDURE — 99215 OFFICE O/P EST HI 40 MIN: CPT | Performed by: ANESTHESIOLOGY

## 2022-05-26 RX ORDER — TIZANIDINE 4 MG/1
4 TABLET ORAL 3 TIMES DAILY PRN
Qty: 90 TABLET | Refills: 0 | Status: SHIPPED | OUTPATIENT
Start: 2022-05-26 | End: 2022-08-29

## 2022-05-26 ASSESSMENT — ENCOUNTER SYMPTOMS
SHORTNESS OF BREATH: 1
NAUSEA: 0
VOMITING: 0
CONSTIPATION: 0
WHEEZING: 1
COUGH: 0
BACK PAIN: 1
DIARRHEA: 0

## 2022-05-26 NOTE — PROGRESS NOTES
The patient is a 60 y. o. Non- / non  female. Chief Complaint   Patient presents with    Back Pain    Knee Pain    Medication Refill        Back Pain  Associated symptoms include headaches, numbness and weakness. Pertinent negatives include no fever. Knee Pain   Associated symptoms include numbness.      This is a 64-year-old woman with a past medical history significant for morbid obesity BMI 54  Complain of chronic generalized all over the body pain  History of previous cervical and lumbar spine surgery several years ago  She was seen in this clinic in past and was managed with chronic opioid therapy  Last prescription was written in January 2022 when she was prescribed Percocet 10 mg 4 times a day  Later she moved to [place]  Returned back to last month and was evaluated by the nurse practitioner who ordered diagnostic work-up with MRI cervical and lumbar spine for ongoing neck and back pain issue  She completed MRI lumbar spine and is here for the follow-up  States that she was not able to complete neck MRI  For chronic neck knee pain she was seeing Dr. Terrie Scott orthopedics and was recommended for weight management  Patient do not want to follow-up with weight management    She is unable to identify any specific pain location  She said that her pain is generalized all over the body and relates it to her fibromyalgia  Rates her pain intensity is 10 out of 10    Patient states that she has done therapy and injections in the past and did not find them helpful and do not wish to repeat them    She is insisting today for repeat statement of her chronic opioid therapy    Medication Refill:     Pain score Today:  10  Adverse effects (Constipation / Nausea / Sedation / sexual Dysfunction / others) : no  Mood: poor  Sleep pattern and quality: poor  Activity level: poor    Pill count Today: none   Last dose taken  01/2022  OARRS report reviewed today: yes  ER/Hospitalizations/PCP visit related to pain since last visit:yes [place]   Any legal problems e.g. DUI etc.:No  Satisfied with current management: Yes    Opioid Contract:could not fins   Last Urine Dug screen dated:12/06/2021    Lab Results   Component Value Date    LABA1C 5.9 05/24/2022     Lab Results   Component Value Date     05/24/2022       Past Medical History, Past Surgical History, Social History, Allergies and Medications reviewed and updated in EPIC as indicated    Family History reviewed and is noncontributory.         Past Medical History:   Diagnosis Date    Arthritis     Bronchitis     On ICS-LABA, denies asthma, copd    CHF (congestive heart failure) ([facility] [address]     COVID-19     diagnosed in September 2020, hospitilized on oxgyen    Fibromyalgia     GERD (gastroesophageal reflux disease)     Gout 10/2019    History of heart attack     HLD (hyperlipidemia)     Hypertension     Insomnia     Osteoarthritis         Past Surgical History:   Procedure Laterality Date    BACK SURGERY      CHOLECYSTECTOMY, LAPAROSCOPIC      HYSTERECTOMY, TOTAL ABDOMINAL      KNEE ARTHROSCOPY Right     KNEE SURGERY Left 2009    NECK SURGERY      SHOULDER SURGERY Right 2009       Social History     Socioeconomic History    Marital status: Single     Spouse name: Not on file    Number of children: Not on file    Years of education: Not on file    Highest education level: Not on file   Occupational History    Not on file   Tobacco Use    Smoking status: Never Smoker    Smokeless tobacco: Never Used   Vaping Use    Vaping Use: Never used   Substance and Sexual Activity    Alcohol use: No    Drug use: No    Sexual activity: Not on file   Other Topics Concern    Not on file   Social History Narrative    Not on file     Social Determinants of Health     Financial Resource Strain: Low Risk     Difficulty of Paying Living Expenses: Not hard at all   Food Insecurity: No Food Insecurity    Worried About Running Out of Food in the Last Year: Never true    Ran Out of Food in the Last Year: Never true   Transportation Needs:     Lack of Transportation (Medical): Not on file    Lack of Transportation (Non-Medical):  Not on file   Physical Activity:     Days of Exercise per Week: Not on file    Minutes of Exercise per Session: Not on file   Stress:     Feeling of Stress : Not on file   Social Connections:     Frequency of Communication with Friends and Family: Not on file    Frequency of Social Gatherings with Friends and Family: Not on file    Attends Scientology Services: Not on file    Active Member of Clubs or Organizations: Not on file    Attends Club or Organization Meetings: Not on file    Marital Status: Not on file   Intimate Partner Violence:     Fear of Current or Ex-Partner: Not on file    Emotionally Abused: Not on file    Physically Abused: Not on file    Sexually Abused: Not on file   Housing Stability:     Unable to Pay for Housing in the Last Year: Not on file    Number of Places Lived in the Last Year: Not on file    Unstable Housing in the Last Year: Not on file       Family History   Problem Relation Age of Onset    Other Mother     Diabetes Mother     Heart Disease Mother     Arthritis Mother     Kidney Disease Mother     Lupus Mother     Arthritis Sister     Diabetes Brother     Asthma Brother     Cancer Maternal Grandfather     Hypertension Sister     Breast Cancer Sister         35s    Breast Cancer Niece         30-35       Allergies   Allergen Reactions    Entresto [Sacubitril-Valsartan] Other (See Comments)     Acute kidney injury    Food Swelling     peaches    Gabapentin Swelling    Lyrica [Pregabalin] Swelling     Mouth sores    Tetracyclines & Related        Vitals:    05/26/22 1032   BP: 128/84   Pulse: 89   SpO2: 98%       Current Outpatient Medications   Medication Sig Dispense Refill    levothyroxine (SYNTHROID) 50 MCG tablet Take 1 tablet by mouth Daily 30 tablet 0    diazePAM (VALIUM) 5 MG tablet TAKE 1 TABLET BY MOUTH ONCE FOR 1 DOSE, TAKE 30 MINUTES PRIOR TO MRI. WILL NEED SOMEONE TO DRIVE.       pantoprazole (PROTONIX) 40 MG tablet Take 1 tablet by mouth daily 90 tablet 1    metoprolol tartrate (LOPRESSOR) 50 MG tablet Take 1 tablet by mouth 2 times daily 180 tablet 1    bumetanide (BUMEX) 1 MG tablet Take 2 tablets by mouth daily 180 tablet 0    DULoxetine (CYMBALTA) 60 MG extended release capsule Take 2 capsules by mouth daily 180 capsule 1    montelukast (SINGULAIR) 10 MG tablet Take 1 tablet by mouth nightly 90 tablet 1    allopurinol (ZYLOPRIM) 300 MG tablet Take 1 tablet by mouth daily 90 tablet 1    hydrALAZINE (APRESOLINE) 25 MG tablet Take 1 tablet by mouth 2 times daily 60 tablet 0    isosorbide dinitrate (ISORDIL) 10 MG tablet Take 1 tablet by mouth 2 times daily 180 tablet 1    buPROPion (WELLBUTRIN XL) 300 MG extended release tablet Take 1 tablet by mouth every morning 90 tablet 1    spironolactone (ALDACTONE) 25 MG tablet Take 1 tablet by mouth daily 90 tablet 1    ibuprofen (ADVIL;MOTRIN) 800 MG tablet Take 1 tablet by mouth every 8 hours as needed for Pain 270 tablet 1    oxybutynin (DITROPAN) 5 MG tablet Take 1 tablet by mouth 2 times daily 180 tablet 1    traZODone (DESYREL) 150 MG tablet Take 1 tablet by mouth nightly 90 tablet 1    clopidogrel (PLAVIX) 75 MG tablet Take 1 tablet by mouth daily 90 tablet 1    SYMBICORT 80-4.5 MCG/ACT AERO Inhale 2 puffs into the lungs 2 times daily 1 each 3    albuterol sulfate  (90 Base) MCG/ACT inhaler Inhale 2 puffs into the lungs every 4 hours as needed for Shortness of Breath 1 each 0    atorvastatin (LIPITOR) 80 MG tablet Take 1 tablet by mouth daily 90 tablet 1    polyethylene glycol (GLYCOLAX) 17 g packet polyethylene glycol 3350 17 gram/dose oral powder 527 g 0    aspirin 81 MG EC tablet Take 81 mg by mouth      sucralfate (CARAFATE) 1 GM tablet Take 1 tablet in AM, 2 tablet in evening daily 90 tablet 3    albuterol (PROVENTIL) (2.5 MG/3ML) 0.083% nebulizer solution Take 3 mLs by nebulization every 6 hours as needed for Wheezing 120 each 3    Handicap Placard MISC by Does not apply route 2 years 1 each 0    Respiratory Therapy Supplies (NEBULIZER COMPRESSOR) KIT Provide insurance covered nebulizer, use daily as needed 1 kit 0     No current facility-administered medications for this encounter. Review of Systems   Constitutional: Positive for fatigue. Negative for chills and fever. Sense covid    Respiratory: Positive for shortness of breath and wheezing. Negative for cough. Do to other dx and having covid in the lamar    Gastrointestinal: Negative for constipation, diarrhea, nausea and vomiting. Musculoskeletal: Positive for back pain, gait problem, neck pain and neck stiffness. Neurological: Positive for weakness, numbness and headaches. Negative for dizziness and tremors. Objective:  General Appearance:  Uncomfortable and in pain. Vital signs: (most recent): Blood pressure 128/84, pulse 89, height 5' 8\" (1.727 m), weight (!) 355 lb (161 kg), SpO2 98 %, not currently breastfeeding. Vital signs are normal.  No fever. Output: Producing urine and producing stool. Lungs:  Normal effort. She is not in respiratory distress. Heart: Normal rate. Neurological: Patient is alert and oriented to person, place and time.       Assessment & Plan   This is a 75-year-old woman with a past medical history significant for morbid obesity BMI 54  Complain of chronic generalized all over the body pain  History of previous cervical and lumbar spine surgery several years ago  She was seen in this clinic in past and was managed with chronic opioid therapy  Last prescription was written in January 2022 when she was prescribed Percocet 10 mg 4 times a day  Later she moved to [place]  Returned back to last month and was evaluated by the nurse practitioner who ordered diagnostic work-up with MRI cervical and lumbar spine for ongoing neck and back pain issue  She completed MRI lumbar spine and is here for the follow-up  States that she was not able to complete neck MRI  For chronic neck knee pain she was seeing Dr. Sarahy Owens orthopedics and was recommended for weight management  Patient do not want to follow-up with weight management    She is unable to identify any specific pain location  She said that her pain is generalized all over the body and relates it to her fibromyalgia  Rates her pain intensity is 10 out of 10    Patient states that she has done therapy and injections in the past and did not find them helpful and do not wish to repeat them    She is insisting today for repeat statement of her chronic opioid therapy      1. Chronic pain of multiple sites    2. Encounter for long-term opiate analgesic use    3. History of lumbar fusion    4.  Morbid obesity with BMI of 50.0-59.9, adult ([facility] [address]          -MRI lumbar spine was reviewed  Finding discussed with patient  Explained to patient that there is stable fusion at L5 level  Mild arthritic changes above those levels  No significant pathology    She is unable to identify any specific location for her pain  Explained to patient that there is nothing surgical that I could recommend for her back issues  Unable to offer any significant interventional procedure because she is unable to identify any particular location  She is also not interested in any injections    With regard to her request for opioids, explained to patient that she is at high risk for respiratory depression from morbid obesity  Opioids will not be the appropriate choice in my opinion also for lack of exhaustion of nonopioid modalities  MRI lumbar spine did not show any significant pathology there has not been any therapy for several years    She is not interested in any intervention    Patient became very upset after my refusal to prescribe opioids  Patient passionately I reviewed for opioid  She was very unhappy with her visit and then called me\" one of the worst doctor she has ever seen\"      No orders of the defined types were placed in this encounter. No orders of the defined types were placed in this encounter.            Electronically signed by Nathen Woodson MD on 5/26/2022 at 10:59 AM

## 2022-05-26 NOTE — TELEPHONE ENCOUNTER
Pt called and was informed of new muscle relaxer medication being sent to pt's pharmacy. Pt would also like a pain medication until she can get into her new pain management doctor.

## 2022-05-26 NOTE — TELEPHONE ENCOUNTER
Patient would like a referral and last office visit notes sent to [address] [facility] at fax number [phone].

## 2022-05-26 NOTE — TELEPHONE ENCOUNTER
I sent the muscle relaxer, but I do not provide chronic narcotic pain treatment.   That is at the discretion of the pain management provider

## 2022-05-26 NOTE — PROGRESS NOTES
The patient is a 60 y. o. Non- / non  female. Chief Complaint   Patient presents with    Back Pain    Knee Pain    Medication Refill        HPI      Medication Refill:     Pain score Today:  10  Adverse effects (Constipation / Nausea / Sedation / sexual Dysfunction / others) : no  Mood: poor  Sleep pattern and quality: poor  Activity level: poor    Pill count Today: none   Last dose taken  01/2022  OARRS report reviewed today: yes  ER/Hospitalizations/PCP visit related to pain since last visit:yes [place]   Any legal problems e.g. DUI etc.:No  Satisfied with current management: Yes    Opioid Contract:could not fins   Last Urine Dug screen dated:12/06/2021    Lab Results   Component Value Date    LABA1C 5.9 05/24/2022     Lab Results   Component Value Date     05/24/2022       Past Medical History, Past Surgical History, Social History, Allergies and Medications reviewed and updated in EPIC as indicated    Family History reviewed and is noncontributory.         Past Medical History:   Diagnosis Date    Arthritis     Bronchitis     On ICS-LABA, denies asthma, copd    CHF (congestive heart failure) ([facility] [address]     COVID-19     diagnosed in September 2020, hospitilized on oxgyen    Fibromyalgia     GERD (gastroesophageal reflux disease)     Gout 10/2019    History of heart attack     HLD (hyperlipidemia)     Hypertension     Insomnia     Osteoarthritis         Past Surgical History:   Procedure Laterality Date    BACK SURGERY      CHOLECYSTECTOMY, LAPAROSCOPIC      HYSTERECTOMY, TOTAL ABDOMINAL      KNEE ARTHROSCOPY Right     KNEE SURGERY Left 2009    NECK SURGERY      SHOULDER SURGERY Right 2009       Social History     Socioeconomic History    Marital status: Single     Spouse name: Not on file    Number of children: Not on file    Years of education: Not on file    Highest education level: Not on file   Occupational History    Not on file   Tobacco Use    Smoking status: Never Smoker    Smokeless tobacco: Never Used   Vaping Use    Vaping Use: Never used   Substance and Sexual Activity    Alcohol use: No    Drug use: No    Sexual activity: Not on file   Other Topics Concern    Not on file   Social History Narrative    Not on file     Social Determinants of Health     Financial Resource Strain: Low Risk     Difficulty of Paying Living Expenses: Not hard at all   Food Insecurity: No Food Insecurity    Worried About [address] in the Last Year: Never true    [address] in the Last Year: Never true   Transportation Needs:     Lack of Transportation (Medical): Not on file    Lack of Transportation (Non-Medical):  Not on file   Physical Activity:     Days of Exercise per Week: Not on file    Minutes of Exercise per Session: Not on file   Stress:     Feeling of Stress : Not on file   Social Connections:     Frequency of Communication with Friends and Family: Not on file    Frequency of Social Gatherings with Friends and Family: Not on file    Attends Sikhism Services: Not on file    Active Member of Clubs or Organizations: Not on file    Attends Club or Organization Meetings: Not on file    Marital Status: Not on file   Intimate Partner Violence:     Fear of Current or Ex-Partner: Not on file    Emotionally Abused: Not on file    Physically Abused: Not on file    Sexually Abused: Not on file   Housing Stability:     Unable to Pay for Housing in the Last Year: Not on file    Number of [place] in the Last Year: Not on file    Unstable Housing in the Last Year: Not on file       Family History   Problem Relation Age of Onset    Other Mother     Diabetes Mother     Heart Disease Mother     Arthritis Mother     Kidney Disease Mother     Lupus Mother     Arthritis Sister     Diabetes Brother     Asthma Brother     Cancer Maternal Grandfather     Hypertension Sister     Breast Cancer Sister         28s    Breast Cancer Niece 30-35       Allergies   Allergen Reactions    Entresto [Sacubitril-Valsartan] Other (See Comments)     Acute kidney injury    Food Swelling     peaches    Gabapentin Swelling    Lyrica [Pregabalin] Swelling     Mouth sores    Tetracyclines & Related        There were no vitals filed for this visit. Current Outpatient Medications   Medication Sig Dispense Refill    levothyroxine (SYNTHROID) 50 MCG tablet Take 1 tablet by mouth Daily 30 tablet 0    diazePAM (VALIUM) 5 MG tablet TAKE 1 TABLET BY MOUTH ONCE FOR 1 DOSE, TAKE 30 MINUTES PRIOR TO MRI. WILL NEED SOMEONE TO DRIVE.       pantoprazole (PROTONIX) 40 MG tablet Take 1 tablet by mouth daily 90 tablet 1    metoprolol tartrate (LOPRESSOR) 50 MG tablet Take 1 tablet by mouth 2 times daily 180 tablet 1    bumetanide (BUMEX) 1 MG tablet Take 2 tablets by mouth daily 180 tablet 0    DULoxetine (CYMBALTA) 60 MG extended release capsule Take 2 capsules by mouth daily 180 capsule 1    montelukast (SINGULAIR) 10 MG tablet Take 1 tablet by mouth nightly 90 tablet 1    allopurinol (ZYLOPRIM) 300 MG tablet Take 1 tablet by mouth daily 90 tablet 1    hydrALAZINE (APRESOLINE) 25 MG tablet Take 1 tablet by mouth 2 times daily 60 tablet 0    isosorbide dinitrate (ISORDIL) 10 MG tablet Take 1 tablet by mouth 2 times daily 180 tablet 1    buPROPion (WELLBUTRIN XL) 300 MG extended release tablet Take 1 tablet by mouth every morning 90 tablet 1    spironolactone (ALDACTONE) 25 MG tablet Take 1 tablet by mouth daily 90 tablet 1    ibuprofen (ADVIL;MOTRIN) 800 MG tablet Take 1 tablet by mouth every 8 hours as needed for Pain 270 tablet 1    oxybutynin (DITROPAN) 5 MG tablet Take 1 tablet by mouth 2 times daily 180 tablet 1    traZODone (DESYREL) 150 MG tablet Take 1 tablet by mouth nightly 90 tablet 1    clopidogrel (PLAVIX) 75 MG tablet Take 1 tablet by mouth daily 90 tablet 1    SYMBICORT 80-4.5 MCG/ACT AERO Inhale 2 puffs into the lungs 2 times daily 1 each 3    albuterol sulfate  (90 Base) MCG/ACT inhaler Inhale 2 puffs into the lungs every 4 hours as needed for Shortness of Breath 1 each 0    atorvastatin (LIPITOR) 80 MG tablet Take 1 tablet by mouth daily 90 tablet 1    polyethylene glycol (GLYCOLAX) 17 g packet polyethylene glycol 3350 17 gram/dose oral powder 527 g 0    aspirin 81 MG EC tablet Take 81 mg by mouth      sucralfate (CARAFATE) 1 GM tablet Take 1 tablet in AM, 2 tablet in evening daily 90 tablet 3    albuterol (PROVENTIL) (2.5 MG/3ML) 0.083% nebulizer solution Take 3 mLs by nebulization every 6 hours as needed for Wheezing 120 each 3    Handicap Placard MISC by Does not apply route 2 years 1 each 0    Respiratory Therapy Supplies (NEBULIZER COMPRESSOR) KIT Provide insurance covered nebulizer, use daily as needed 1 kit 0     No current facility-administered medications for this encounter. Review of Systems   Constitutional: Positive for fatigue. Negative for chills and fever. Sense covid    Respiratory: Positive for shortness of breath and wheezing. Negative for cough. Do to other dx and having covid in the lamar    Gastrointestinal: Negative for constipation, diarrhea, nausea and vomiting. Musculoskeletal: Positive for back pain, gait problem, neck pain and neck stiffness. Neurological: Positive for weakness, numbness and headaches. Negative for dizziness and tremors. Objective:  Vital signs: (most recent): not currently breastfeeding. No fever. Assessment & Plan  1. Encounter for long-term opiate analgesic use        No orders of the defined types were placed in this encounter. No orders of the defined types were placed in this encounter.            Electronically signed by Anthony Chandler MA on 5/26/2022 at 10:23 AM

## 2022-05-26 NOTE — PROGRESS NOTES
The patient is a 60 y. o. Non- / non  female. Chief Complaint   Patient presents with    Back Pain    Knee Pain    Medication Refill        Back Pain  Associated symptoms include headaches, numbness and weakness. Pertinent negatives include no fever. Knee Pain   Associated symptoms include numbness. Medication Refill:     Pain score Today:  10  Adverse effects (Constipation / Nausea / Sedation / sexual Dysfunction / others) : no  Mood: poor  Sleep pattern and quality: poor  Activity level: poor    Pill count Today: none   Last dose taken  01/2022  OARRS report reviewed today: yes  ER/Hospitalizations/PCP visit related to pain since last visit:yes [place]   Any legal problems e.g. DUI etc.:No  Satisfied with current management: Yes    Opioid Contract:could not fins   Last Urine Dug screen dated:12/06/2021    Lab Results   Component Value Date    LABA1C 5.9 05/24/2022     Lab Results   Component Value Date     05/24/2022       Past Medical History, Past Surgical History, Social History, Allergies and Medications reviewed and updated in EPIC as indicated    Family History reviewed and is noncontributory.         Past Medical History:   Diagnosis Date    Arthritis     Bronchitis     On ICS-LABA, denies asthma, copd    CHF (congestive heart failure) ([facility] [address]     COVID-19     diagnosed in September 2020, hospitilized on oxgyen    Fibromyalgia     GERD (gastroesophageal reflux disease)     Gout 10/2019    History of heart attack     HLD (hyperlipidemia)     Hypertension     Insomnia     Osteoarthritis         Past Surgical History:   Procedure Laterality Date    BACK SURGERY      CHOLECYSTECTOMY, LAPAROSCOPIC      HYSTERECTOMY, TOTAL ABDOMINAL      KNEE ARTHROSCOPY Right     KNEE SURGERY Left 2009    NECK SURGERY      SHOULDER SURGERY Right 2009       Social History     Socioeconomic History    Marital status: Single     Spouse name: Not on file    Number of children: Not on file    Years of education: Not on file    Highest education level: Not on file   Occupational History    Not on file   Tobacco Use    Smoking status: Never Smoker    Smokeless tobacco: Never Used   Vaping Use    Vaping Use: Never used   Substance and Sexual Activity    Alcohol use: No    Drug use: No    Sexual activity: Not on file   Other Topics Concern    Not on file   Social History Narrative    Not on file     Social Determinants of Health     Financial Resource Strain: Low Risk     Difficulty of Paying Living Expenses: Not hard at all   Food Insecurity: No Food Insecurity    Worried About [address] in the Last Year: Never true    [address] [facility] [address] in the Last Year: Never true   Transportation Needs:     Lack of Transportation (Medical): Not on file    Lack of Transportation (Non-Medical):  Not on file   Physical Activity:     Days of Exercise per Week: Not on file    Minutes of Exercise per Session: Not on file   Stress:     Feeling of Stress : Not on file   Social Connections:     Frequency of Communication with Friends and Family: Not on file    Frequency of Social Gatherings with Friends and Family: Not on file    Attends Latter day Services: Not on file    Active Member of Clubs or Organizations: Not on file    Attends Club or Organization Meetings: Not on file    Marital Status: Not on file   Intimate Partner Violence:     Fear of Current or Ex-Partner: Not on file    Emotionally Abused: Not on file    Physically Abused: Not on file    Sexually Abused: Not on file   Housing Stability:     Unable to Pay for Housing in the Last Year: Not on file    Number of Places Lived in the Last Year: Not on file    Unstable Housing in the Last Year: Not on file       Family History   Problem Relation Age of Onset    Other Mother     Diabetes Mother     Heart Disease Mother     Arthritis Mother     Kidney Disease Mother     Lupus Mother     Arthritis Sister  Diabetes Brother     Asthma Brother     Cancer Maternal Grandfather     Hypertension Sister     Breast Cancer Sister         35s    Breast Cancer Niece         30-35       Allergies   Allergen Reactions    Entresto [Sacubitril-Valsartan] Other (See Comments)     Acute kidney injury    Food Swelling     peaches    Gabapentin Swelling    Lyrica [Pregabalin] Swelling     Mouth sores    Tetracyclines & Related        Vitals:    05/26/22 1032   BP: 128/84   Pulse: 89   SpO2: 98%       Current Outpatient Medications   Medication Sig Dispense Refill    levothyroxine (SYNTHROID) 50 MCG tablet Take 1 tablet by mouth Daily 30 tablet 0    diazePAM (VALIUM) 5 MG tablet TAKE 1 TABLET BY MOUTH ONCE FOR 1 DOSE, TAKE 30 MINUTES PRIOR TO MRI. WILL NEED SOMEONE TO DRIVE.       pantoprazole (PROTONIX) 40 MG tablet Take 1 tablet by mouth daily 90 tablet 1    metoprolol tartrate (LOPRESSOR) 50 MG tablet Take 1 tablet by mouth 2 times daily 180 tablet 1    bumetanide (BUMEX) 1 MG tablet Take 2 tablets by mouth daily 180 tablet 0    DULoxetine (CYMBALTA) 60 MG extended release capsule Take 2 capsules by mouth daily 180 capsule 1    montelukast (SINGULAIR) 10 MG tablet Take 1 tablet by mouth nightly 90 tablet 1    allopurinol (ZYLOPRIM) 300 MG tablet Take 1 tablet by mouth daily 90 tablet 1    hydrALAZINE (APRESOLINE) 25 MG tablet Take 1 tablet by mouth 2 times daily 60 tablet 0    isosorbide dinitrate (ISORDIL) 10 MG tablet Take 1 tablet by mouth 2 times daily 180 tablet 1    buPROPion (WELLBUTRIN XL) 300 MG extended release tablet Take 1 tablet by mouth every morning 90 tablet 1    spironolactone (ALDACTONE) 25 MG tablet Take 1 tablet by mouth daily 90 tablet 1    ibuprofen (ADVIL;MOTRIN) 800 MG tablet Take 1 tablet by mouth every 8 hours as needed for Pain 270 tablet 1    oxybutynin (DITROPAN) 5 MG tablet Take 1 tablet by mouth 2 times daily 180 tablet 1    traZODone (DESYREL) 150 MG tablet Take 1 tablet by mouth nightly 90 tablet 1    clopidogrel (PLAVIX) 75 MG tablet Take 1 tablet by mouth daily 90 tablet 1    SYMBICORT 80-4.5 MCG/ACT AERO Inhale 2 puffs into the lungs 2 times daily 1 each 3    albuterol sulfate  (90 Base) MCG/ACT inhaler Inhale 2 puffs into the lungs every 4 hours as needed for Shortness of Breath 1 each 0    atorvastatin (LIPITOR) 80 MG tablet Take 1 tablet by mouth daily 90 tablet 1    polyethylene glycol (GLYCOLAX) 17 g packet polyethylene glycol 3350 17 gram/dose oral powder 527 g 0    aspirin 81 MG EC tablet Take 81 mg by mouth      sucralfate (CARAFATE) 1 GM tablet Take 1 tablet in AM, 2 tablet in evening daily 90 tablet 3    albuterol (PROVENTIL) (2.5 MG/3ML) 0.083% nebulizer solution Take 3 mLs by nebulization every 6 hours as needed for Wheezing 120 each 3    Handicap Placard MISC by Does not apply route 2 years 1 each 0    Respiratory Therapy Supplies (NEBULIZER COMPRESSOR) KIT Provide insurance covered nebulizer, use daily as needed 1 kit 0     No current facility-administered medications for this encounter. Review of Systems   Constitutional: Positive for fatigue. Negative for chills and fever. Sense covid    Respiratory: Positive for shortness of breath and wheezing. Negative for cough. Do to other dx and having covid in the lamar    Gastrointestinal: Negative for constipation, diarrhea, nausea and vomiting. Musculoskeletal: Positive for back pain, gait problem, neck pain and neck stiffness. Neurological: Positive for weakness, numbness and headaches. Negative for dizziness and tremors. Objective:  General Appearance:  Uncomfortable and in pain (morbid obesity). Vital signs: (most recent): Blood pressure 128/84, pulse 89, height 5' 8\" (1.727 m), weight (!) 355 lb (161 kg), SpO2 98 %, not currently breastfeeding. Vital signs are normal.  No fever. Output: Producing urine and producing stool.     HEENT: Normal HEENT exam.    Lungs: Normal effort and normal respiratory rate. She is not in respiratory distress. Heart: Normal rate. Extremities: Normal range of motion. There is no deformity. Neurological: Patient is alert and oriented to person, place and time. Patient has normal coordination. Pupils:  Pupils are equal, round, and reactive to light. Pupils are equal.   Skin:  Warm and dry. No rash or cyanosis. Assessment & Plan       EXAMINATION: MRI OF THE LUMBAR SPINE WITHOUT CONTRAST, 5/24/2022 1:34 pm    Mild lumbar spine degenerative changes resulting in mild stenosis of the central canal and bilateral foramina at T12-L1 and of bilateral foramina at L5-S1. Status post intradiscal fusion across the L5-S1 level. 1. Chronic pain of multiple sites    2. Encounter for long-term opiate analgesic use    3. History of lumbar fusion    4. Morbid obesity with BMI of 50.0-59.9, adult ([facility] [address]        No orders of the defined types were placed in this encounter. No orders of the defined types were placed in this encounter.            Electronically signed by Shabana Franco MD on 5/26/2022 at 10:52 AM

## 2022-05-26 NOTE — TELEPHONE ENCOUNTER
Pt would like a muscle relaxer prescribed until she can get in with a different pain management doctor. Pt states Diazapam works well for her. Pt went to her pain mgmt appt today and had a bad experience with the rudeness of the doctor, who would not prescribe anything for her. She has a list of other pain mgmt doctors to call.

## 2022-05-26 NOTE — PROGRESS NOTES
I witnessed the encounter with Dr. Zach Andrade and Mrs. Jordan Bernstein. Dr. Zach Andrade offered physical therapy and injections to determine the focus of pain. Mrs. Jordan Bernstein was not amenable to these options. Mrs. Jordan Bernstein proceeded to get upset with the options that Dr. Zach Andrade presented. Mrs. Jordan Bernstein requested that Dr. Zach Andrade refill her oxycodone prescription. Mrs. Jordan Bernstein stated that it was the only thing that helped relieve her diffuse body pain and fibromyalgia. Dr. Zach Andrade responded by educating Mrs. Jordan Bernstein on medications approved for the management of fibromyalgia. Mrs. Jordna Bernstein became frustrated. Dr. Zach Andrade recommended that Mrs. Jordan Bernstein seek another opinion. Mrs. Jordan Bernstein proceeded to speak negatively about Dr. Zach Andrade as a physician. Dr. Zach Andrade again encouraged Mrs. Jordan Bernstein to seek another opinion. Mrs. Jordan Bernstein then left.      Perla Guidry  [facility]

## 2022-06-01 RX ORDER — MELOXICAM 15 MG/1
15 TABLET ORAL DAILY
Qty: 30 TABLET | Refills: 3 | Status: ON HOLD | OUTPATIENT
Start: 2022-06-01 | End: 2022-06-16 | Stop reason: HOSPADM

## 2022-06-01 NOTE — TELEPHONE ENCOUNTER
Writer spoke with pt, states she is aware that she cant take both, she would like to stop taking ibuprofen for the mobic. Please advise.

## 2022-06-01 NOTE — TELEPHONE ENCOUNTER
Writer informed patient of pcp message regarding Rx . Gave verbal understanding. No questions asked at time of call.

## 2022-06-13 ENCOUNTER — TELEPHONE (OUTPATIENT)
Dept: PRIMARY CARE CLINIC | Age: 57
End: 2022-06-13

## 2022-06-13 ENCOUNTER — APPOINTMENT (OUTPATIENT)
Dept: GENERAL RADIOLOGY | Age: 57
DRG: 177 | End: 2022-06-13
Payer: MEDICARE

## 2022-06-13 ENCOUNTER — HOSPITAL ENCOUNTER (INPATIENT)
Age: 57
LOS: 3 days | Discharge: HOME | DRG: 177 | End: 2022-06-16
Attending: EMERGENCY MEDICINE | Admitting: INTERNAL MEDICINE
Payer: MEDICARE

## 2022-06-13 DIAGNOSIS — R07.9 CHEST PAIN, UNSPECIFIED TYPE: ICD-10-CM

## 2022-06-13 DIAGNOSIS — U07.1 COVID-19: Primary | ICD-10-CM

## 2022-06-13 DIAGNOSIS — U07.1 COVID-19 VIRUS INFECTION: Primary | ICD-10-CM

## 2022-06-13 DIAGNOSIS — Z79.891 CHRONIC USE OF OPIATE DRUG FOR THERAPEUTIC PURPOSE: ICD-10-CM

## 2022-06-13 DIAGNOSIS — G89.4 CHRONIC PAIN SYNDROME: ICD-10-CM

## 2022-06-13 DIAGNOSIS — I50.42 CHRONIC COMBINED SYSTOLIC AND DIASTOLIC CONGESTIVE HEART FAILURE (HCC): ICD-10-CM

## 2022-06-13 LAB
-: ABNORMAL
ABSOLUTE EOS #: 0.15 K/UL (ref 0–0.44)
ABSOLUTE IMMATURE GRANULOCYTE: 0.03 K/UL (ref 0–0.3)
ABSOLUTE LYMPH #: 0.74 K/UL (ref 1.1–3.7)
ABSOLUTE MONO #: 0.54 K/UL (ref 0.1–1.2)
ANION GAP SERPL CALCULATED.3IONS-SCNC: 10 MMOL/L (ref 9–17)
BASOPHILS # BLD: 0 % (ref 0–2)
BASOPHILS ABSOLUTE: <0.03 K/UL (ref 0–0.2)
BILIRUBIN URINE: NEGATIVE
BUN BLDV-MCNC: 17 MG/DL (ref 6–20)
CALCIUM SERPL-MCNC: 8.9 MG/DL (ref 8.6–10.4)
CHLORIDE BLD-SCNC: 101 MMOL/L (ref 98–107)
CO2: 25 MMOL/L (ref 20–31)
COLOR: YELLOW
CREAT SERPL-MCNC: 1.2 MG/DL (ref 0.5–0.9)
EOSINOPHILS RELATIVE PERCENT: 2 % (ref 1–4)
EPITHELIAL CELLS UA: ABNORMAL /HPF (ref 0–5)
GFR AFRICAN AMERICAN: 56 ML/MIN
GFR NON-AFRICAN AMERICAN: 46 ML/MIN
GFR SERPL CREATININE-BSD FRML MDRD: ABNORMAL ML/MIN/{1.73_M2}
GLUCOSE BLD-MCNC: 69 MG/DL (ref 70–99)
GLUCOSE URINE: NEGATIVE
HCT VFR BLD CALC: 34.6 % (ref 36.3–47.1)
HEMOGLOBIN: 10.6 G/DL (ref 11.9–15.1)
IMMATURE GRANULOCYTES: 1 %
KETONES, URINE: NEGATIVE
LEUKOCYTE ESTERASE, URINE: ABNORMAL
LYMPHOCYTES # BLD: 11 % (ref 24–43)
MCH RBC QN AUTO: 28.7 PG (ref 25.2–33.5)
MCHC RBC AUTO-ENTMCNC: 30.6 G/DL (ref 28.4–34.8)
MCV RBC AUTO: 93.8 FL (ref 82.6–102.9)
MONOCYTES # BLD: 8 % (ref 3–12)
NITRITE, URINE: NEGATIVE
NRBC AUTOMATED: 0 PER 100 WBC
PDW BLD-RTO: 15.6 % (ref 11.8–14.4)
PH UA: 7 (ref 5–8)
PLATELET # BLD: 248 K/UL (ref 138–453)
PMV BLD AUTO: 10.6 FL (ref 8.1–13.5)
POTASSIUM SERPL-SCNC: 4.9 MMOL/L (ref 3.7–5.3)
PRO-BNP: 305 PG/ML
PROTEIN UA: NEGATIVE
RBC # BLD: 3.69 M/UL (ref 3.95–5.11)
RBC # BLD: ABNORMAL 10*6/UL
RBC UA: ABNORMAL /HPF (ref 0–4)
SARS-COV-2, RAPID: DETECTED
SEG NEUTROPHILS: 78 % (ref 36–65)
SEGMENTED NEUTROPHILS ABSOLUTE COUNT: 5.1 K/UL (ref 1.5–8.1)
SODIUM BLD-SCNC: 136 MMOL/L (ref 135–144)
SPECIFIC GRAVITY UA: 1.02 (ref 1–1.03)
SPECIMEN DESCRIPTION: ABNORMAL
TROPONIN, HIGH SENSITIVITY: 11 NG/L (ref 0–14)
TROPONIN, HIGH SENSITIVITY: 11 NG/L (ref 0–14)
TURBIDITY: CLEAR
URINE HGB: NEGATIVE
UROBILINOGEN, URINE: NORMAL
WBC # BLD: 6.6 K/UL (ref 3.5–11.3)
WBC UA: ABNORMAL /HPF (ref 0–5)

## 2022-06-13 PROCEDURE — 96372 THER/PROPH/DIAG INJ SC/IM: CPT

## 2022-06-13 PROCEDURE — 6360000002 HC RX W HCPCS: Performed by: STUDENT IN AN ORGANIZED HEALTH CARE EDUCATION/TRAINING PROGRAM

## 2022-06-13 PROCEDURE — 6370000000 HC RX 637 (ALT 250 FOR IP): Performed by: STUDENT IN AN ORGANIZED HEALTH CARE EDUCATION/TRAINING PROGRAM

## 2022-06-13 PROCEDURE — 99285 EMERGENCY DEPT VISIT HI MDM: CPT

## 2022-06-13 PROCEDURE — 99223 1ST HOSP IP/OBS HIGH 75: CPT | Performed by: INTERNAL MEDICINE

## 2022-06-13 PROCEDURE — 71045 X-RAY EXAM CHEST 1 VIEW: CPT

## 2022-06-13 PROCEDURE — 85025 COMPLETE CBC W/AUTO DIFF WBC: CPT

## 2022-06-13 PROCEDURE — 80048 BASIC METABOLIC PNL TOTAL CA: CPT

## 2022-06-13 PROCEDURE — 83880 ASSAY OF NATRIURETIC PEPTIDE: CPT

## 2022-06-13 PROCEDURE — 81001 URINALYSIS AUTO W/SCOPE: CPT

## 2022-06-13 PROCEDURE — 93005 ELECTROCARDIOGRAM TRACING: CPT | Performed by: STUDENT IN AN ORGANIZED HEALTH CARE EDUCATION/TRAINING PROGRAM

## 2022-06-13 PROCEDURE — 1200000000 HC SEMI PRIVATE

## 2022-06-13 PROCEDURE — 96374 THER/PROPH/DIAG INJ IV PUSH: CPT

## 2022-06-13 PROCEDURE — 84484 ASSAY OF TROPONIN QUANT: CPT

## 2022-06-13 PROCEDURE — 87635 SARS-COV-2 COVID-19 AMP PRB: CPT

## 2022-06-13 RX ORDER — ASPIRIN 81 MG/1
324 TABLET, CHEWABLE ORAL ONCE
Status: COMPLETED | OUTPATIENT
Start: 2022-06-13 | End: 2022-06-13

## 2022-06-13 RX ORDER — ORPHENADRINE CITRATE 30 MG/ML
60 INJECTION INTRAMUSCULAR; INTRAVENOUS ONCE
Status: COMPLETED | OUTPATIENT
Start: 2022-06-13 | End: 2022-06-13

## 2022-06-13 RX ORDER — FENTANYL CITRATE 50 UG/ML
50 INJECTION, SOLUTION INTRAMUSCULAR; INTRAVENOUS ONCE
Status: COMPLETED | OUTPATIENT
Start: 2022-06-13 | End: 2022-06-13

## 2022-06-13 RX ORDER — ONDANSETRON 4 MG/1
4 TABLET, ORALLY DISINTEGRATING ORAL ONCE
Status: COMPLETED | OUTPATIENT
Start: 2022-06-13 | End: 2022-06-13

## 2022-06-13 RX ORDER — ACETAMINOPHEN 500 MG
1000 TABLET ORAL ONCE
Status: COMPLETED | OUTPATIENT
Start: 2022-06-13 | End: 2022-06-13

## 2022-06-13 RX ADMIN — ONDANSETRON 4 MG: 4 TABLET, ORALLY DISINTEGRATING ORAL at 17:21

## 2022-06-13 RX ADMIN — ORPHENADRINE CITRATE 60 MG: 30 INJECTION INTRAMUSCULAR; INTRAVENOUS at 17:21

## 2022-06-13 RX ADMIN — FENTANYL CITRATE 50 MCG: 50 INJECTION, SOLUTION INTRAMUSCULAR; INTRAVENOUS at 19:40

## 2022-06-13 RX ADMIN — ACETAMINOPHEN 1000 MG: 500 TABLET ORAL at 17:20

## 2022-06-13 RX ADMIN — ASPIRIN 324 MG: 81 TABLET, CHEWABLE ORAL at 17:57

## 2022-06-13 ASSESSMENT — ENCOUNTER SYMPTOMS
VOMITING: 0
SHORTNESS OF BREATH: 1
TACHYPNEA: 1
COLOR CHANGE: 0
ABDOMINAL PAIN: 0
NAUSEA: 0
COUGH: 1

## 2022-06-13 ASSESSMENT — PAIN SCALES - GENERAL
PAINLEVEL_OUTOF10: 10
PAINLEVEL_OUTOF10: 6
PAINLEVEL_OUTOF10: 4
PAINLEVEL_OUTOF10: 10

## 2022-06-13 ASSESSMENT — PAIN DESCRIPTION - PAIN TYPE: TYPE: CHRONIC PAIN

## 2022-06-13 ASSESSMENT — PAIN DESCRIPTION - LOCATION: LOCATION: OTHER (COMMENT)

## 2022-06-13 ASSESSMENT — HEART SCORE: ECG: 2

## 2022-06-13 ASSESSMENT — PAIN - FUNCTIONAL ASSESSMENT: PAIN_FUNCTIONAL_ASSESSMENT: 0-10

## 2022-06-13 NOTE — ED NOTES
Patient presented to ED today with shortness of breath and generalized body aches. Patient tested positive for Covid-19 this morning. Patient has a history of chronic pain. Patient states she hasn't been able to keep anything down or take medication. Patient's call light is within reach.      Patsy Patton RN  06/13/22 [id number]

## 2022-06-13 NOTE — TELEPHONE ENCOUNTER
Pt called stating that she tested positive for covid today and wanted to see if she can get meds for it. She did an at home test being that her sister tested positive, she feels terrible, having an headache, dizziness, lightheaded, cough, and sob.  Wants meds paxlovid

## 2022-06-13 NOTE — ED PROVIDER NOTES
[facility] ED  Emergency Department Encounter  Emergency Medicine Resident     Pt Name: Kristin Hawkins  MRN: [id number]  [place] 1965  Date of evaluation: 6/13/22  PCP:  ASA Vidal [id number]       Chief Complaint   Patient presents with    Positive For Covid-19     home test       HISTORY OFPRESENT ILLNESS  (Location/Symptom, Timing/Onset, Context/Setting, Quality, Duration, Modifying Factors,Severity.)      Kristin Hawkins is a 60 y.o. female past medical history of CHF, hyperlipidemia, hypertension, CAD complains of shortness of breath, chest tightness, myalgias and nausea. Patient states symptoms have been ongoing for 3 days. She took an at-home COVID test 3 days ago and was negative however test turned +2 days ago. She called her PCP who did prescribe her paxlovid. This prescription was sent to her pharmacy however patient states she was unable to  the medication given her symptoms. Patient states symptoms have worsened and now is endorsing generalized myalgias as well as nausea and decreased p.o. intake. Breath at home have a history of bronchitis and has been using inhalers with no relief in her symptoms. Patient does have a history of fibromyalgia and chronic pain and was following with [facility] pain clinic however recently switched pain clinic doctors, per last note at pain clinic patient became upset when pain clinic doctor declined to prescribe opioids, per note doctor was concerned with opiate prescription given morbid obesity and concerns for respiratory depression. Patient reports he does have appointment next month with [facility] to reestablish pain management.     PAST MEDICAL / SURGICAL / SOCIAL / FAMILY HISTORY      has a past medical history of Arthritis, Bronchitis, CHF (congestive heart failure) ([address] COVID-19, Fibromyalgia, GERD (gastroesophageal reflux disease), Gout, History of heart attack, HLD (hyperlipidemia), Hypertension, Insomnia, and Osteoarthritis. has a past surgical history that includes back surgery; shoulder surgery (Right, 2009); knee surgery (Left, 2009); Cholecystectomy, laparoscopic; Knee arthroscopy (Right); Hysterectomy, total abdominal; and Neck surgery. Social History     Socioeconomic History    Marital status: Single     Spouse name: Not on file    Number of children: Not on file    Years of education: Not on file    Highest education level: Not on file   Occupational History    Not on file   Tobacco Use    Smoking status: Never Smoker    Smokeless tobacco: Never Used   Vaping Use    Vaping Use: Never used   Substance and Sexual Activity    Alcohol use: No    Drug use: No    Sexual activity: Not on file   Other Topics Concern    Not on file   Social History Narrative    Not on file     Social Determinants of Health     Financial Resource Strain: Low Risk     Difficulty of Paying Living Expenses: Not hard at all   Food Insecurity: No Food Insecurity    Worried About [address] [employer] in the Last Year: Never true    [facility] in the Last Year: Never true   Transportation Needs:     Lack of Transportation (Medical): Not on file    Lack of Transportation (Non-Medical):  Not on file   Physical Activity:     Days of Exercise per Week: Not on file    Minutes of Exercise per Session: Not on file   Stress:     Feeling of Stress : Not on file   Social Connections:     Frequency of Communication with Friends and Family: Not on file    Frequency of Social Gatherings with Friends and Family: Not on file    Attends Anabaptism Services: Not on file    Active Member of Clubs or Organizations: Not on file    Attends Club or Organization Meetings: Not on file    Marital Status: Not on file   Intimate Partner Violence:     Fear of Current or Ex-Partner: Not on file    Emotionally Abused: Not on file    Physically Abused: Not on file    Sexually Abused: Not on file   Housing Stability:     Unable to Pay for Housing in the Last Year: Not on file    Number of Places Lived in the Last Year: Not on file    Unstable Housing in the Last Year: Not on file       Family History   Problem Relation Age of Onset    Other Mother     Diabetes Mother     Heart Disease Mother     Arthritis Mother     Kidney Disease Mother     Lupus Mother     Arthritis Sister     Diabetes Brother     Asthma Brother     Cancer Maternal Grandfather     Hypertension Sister     Breast Cancer Sister         28s    Breast Cancer Niece         30-35       Allergies:  Entresto [sacubitril-valsartan], Food, Gabapentin, Lyrica [pregabalin], and Tetracyclines & related    Home Medications:  Prior to Admission medications    Medication Sig Start Date End Date Taking? Authorizing Provider   nirmatrelvir/ritonavir (PAXLOVID) 20 x 150 MG & 10 x 100MG Take 3 tablets (two 150 mg nirmatrelvir and one 100 mg ritonavir tablets) by mouth every 12 hours for 5 days. 6/13/22 6/18/22  ASA Irving CNP   meloxicam [facility]) 15 MG tablet Take 1 tablet by mouth daily 6/1/22   ASA Irving CNP   tiZANidine (ZANAFLEX) 4 MG tablet Take 1 tablet by mouth 3 times daily as needed (pain) 5/26/22   ASA Irving CNP   levothyroxine (SYNTHROID) 50 MCG tablet Take 1 tablet by mouth Daily 5/25/22 6/24/22  ASA Irving CNP   diazePAM (VALIUM) 5 MG tablet TAKE 1 TABLET BY MOUTH ONCE FOR 1 DOSE, TAKE 30 MINUTES PRIOR TO MRI. WILL NEED SOMEONE TO DRIVE.  5/12/22   Historical Provider, MD   pantoprazole (PROTONIX) 40 MG tablet Take 1 tablet by mouth daily 5/18/22   ASA Irving CNP   metoprolol tartrate (LOPRESSOR) 50 MG tablet Take 1 tablet by mouth 2 times daily 5/18/22   ASA Irving CNP   bumetanide Keesha Lather) 1 MG tablet Take 2 tablets by mouth daily 5/18/22   ASA Irving CNP   DULoxetine (CYMBALTA) 60 MG extended release capsule Take 2 capsules by mouth daily 5/18/22 [facility], APRN - CNP   montelukast (SINGULAIR) 10 MG tablet Take 1 tablet by mouth nightly 5/18/22   [facility], APRN - CNP   allopurinol (ZYLOPRIM) 300 MG tablet Take 1 tablet by mouth daily 5/18/22   [facility], APRN - CNP   hydrALAZINE (APRESOLINE) 25 MG tablet Take 1 tablet by mouth 2 times daily 5/18/22   [facility], APRN - CNP   isosorbide dinitrate (ISORDIL) 10 MG tablet Take 1 tablet by mouth 2 times daily 5/18/22   [facility], APRN - CNP   buPROPion (WELLBUTRIN XL) 300 MG extended release tablet Take 1 tablet by mouth every morning 5/18/22   [facility], APRN - CNP   spironolactone (ALDACTONE) 25 MG tablet Take 1 tablet by mouth daily 5/18/22   [facility], APRN - CNP   oxybutynin (DITROPAN) 5 MG tablet Take 1 tablet by mouth 2 times daily 5/18/22   [facility], APRN - CNP   traZODone (DESYREL) 150 MG tablet Take 1 tablet by mouth nightly 5/12/22   [facility], APRN - CNP   clopidogrel (PLAVIX) 75 MG tablet Take 1 tablet by mouth daily 5/12/22   [facility], APRN - CNP   SYMBICORT 80-4.5 MCG/ACT AERO Inhale 2 puffs into the lungs 2 times daily 5/12/22 6/11/22  [facility], APRN - CNP   albuterol sulfate  (90 Base) MCG/ACT inhaler Inhale 2 puffs into the lungs every 4 hours as needed for Shortness of Breath 5/12/22   [facility], APRN - CNP   atorvastatin (LIPITOR) 80 MG tablet Take 1 tablet by mouth daily 5/12/22   [facility], APRN - CNP   polyethylene glycol (GLYCOLAX) 17 g packet polyethylene glycol 3350 17 gram/dose oral powder 5/11/21   Rafe Aase Vriezelaar, APRN - CNP   aspirin 81 MG EC tablet Take 81 mg by mouth    Historical Provider, MD   sucralfate (CARAFATE) 1 GM tablet Take 1 tablet in AM, 2 tablet in evening daily 12/9/20   Stephen Mattson PA-C   albuterol (PROVENTIL) (2.5 MG/3ML) 0.083% nebulizer solution Take 3 mLs by nebulization every 6 hours as needed for Wheezing 10/15/20   THOMAS Triana MISC by Does not apply route 2 years 8/13/20   Carrie Dumont PA-C   Respiratory Therapy Supplies (NEBULIZER COMPRESSOR) KIT Provide insurance covered nebulizer, use daily as needed 9/16/19 12/6/21  Carrie Dumont PA-C       REVIEW OF SYSTEMS    (2-9 systems for level 4, 10 or more for level 5)      Review of Systems   Constitutional: Positive for fatigue. Negative for fever. HENT: Negative for congestion. Eyes: Negative for visual disturbance. Respiratory: Positive for cough and shortness of breath. Cardiovascular: Positive for chest pain and leg swelling. Gastrointestinal: Negative for abdominal pain, nausea and vomiting. Genitourinary: Positive for urgency. Negative for dysuria. Musculoskeletal: Positive for myalgias. Skin: Negative for color change and rash. Neurological: Negative for headaches. PHYSICAL EXAM   (up to 7 for level 4, 8 or more for level 5)     INITIAL VITALS:    height is 5' 8\" (1.727 m) and weight is 345 lb (156.5 kg) (abnormal). Her oral temperature is 98.8 °F (37.1 °C). Her blood pressure is 140/65 (abnormal) and her pulse is 99. Her respiration is 22 and oxygen saturation is 98%. Physical Exam  Constitutional:       Comments: Alert and oriented person, place, time, patient does appear ill, but nontoxic, she does appear uncomfortable   HENT:      Head: Normocephalic and atraumatic. Eyes:      Extraocular Movements: Extraocular movements intact. Pupils: Pupils are equal, round, and reactive to light. Cardiovascular:      Rate and Rhythm: Normal rate and regular rhythm. Heart sounds: No murmur heard. No gallop. Pulmonary:      Effort: Pulmonary effort is normal. No respiratory distress. Breath sounds: Normal breath sounds. No wheezing. Comments: Even and nonlabored respirations, speaking in full sentences  Abdominal:      General: Abdomen is flat. Palpations: Abdomen is soft. Tenderness: There is no abdominal tenderness. There is no guarding or rebound. Musculoskeletal:      Comments: Patient has diffuse myalgias and allodynia, pain to palpation anywhere she is touched in both the bilateral upper as well as bilateral lower extremities   Skin:     General: Skin is warm and dry. Capillary Refill: Capillary refill takes less than 2 seconds. Findings: No rash. Psychiatric:         Mood and Affect: Mood normal.         Behavior: Behavior normal.         DIFFERENTIAL  DIAGNOSIS     PLAN (LABS / IMAGING / EKG):  Orders Placed This Encounter   Procedures    SPECIALTY BED REQUEST    COVID-19, Rapid    XR CHEST PORTABLE    CBC with Auto Differential    Basic Metabolic Panel    Troponin    Brain Natriuretic Peptide    Urinalysis with Microscopic    Troponin    Inpatient consult to Hospitalist    Inpatient consult to Cardiology    EKG 12 Lead    ADMIT TO INPATIENT       MEDICATIONS ORDERED:  Orders Placed This Encounter   Medications    acetaminophen (TYLENOL) tablet 1,000 mg    orphenadrine (NORFLEX) injection 60 mg    ondansetron (ZOFRAN-ODT) disintegrating tablet 4 mg    aspirin chewable tablet 324 mg    fentaNYL (SUBLIMAZE) injection 50 mcg       DDX: COVID-19, CHF, acute on chronic bronchitis, superimposed pneumonia, ACS    Initial MDM/Plan: 60 y.o. female who presents with myalgias, shortness of breath, cough, chest pain. Recent diagnosis of COVID-19. Primary care physician did prescribe the patient Paxlovid however given symptoms presented to the emergency department for evaluation has not taken any doses of her medication. Seen and examined, he is alert oriented answers questions appropriately. Patient does appear ill and uncomfortable is complaining of diffuse myalgias but no respiratory distress, speaking full sentences. Ox saturation 100% on room air. Nontachycardic, speaking in full sentences, even nonlabored respirations.   Diffuse pain to palpation bilateral upper and lower extremities, patient is a chronic pain patient and has been off her medications for several weeks so most of a component of allodynia. Patient is febrile at 100.8 will give Tylenol. Will perform cardiac work-up, chest x-ray, treat symptoms. DIAGNOSTIC RESULTS / EMERGENCY DEPARTMENT COURSE / MDM     LABS:  Labs Reviewed   COVID-19, RAPID - Abnormal; Notable for the following components:       Result Value    SARS-CoV-2, Rapid DETECTED (*)     All other components within normal limits   CBC WITH AUTO DIFFERENTIAL - Abnormal; Notable for the following components:    RBC 3.69 (*)     Hemoglobin 10.6 (*)     Hematocrit 34.6 (*)     RDW 15.6 (*)     Seg Neutrophils 78 (*)     Lymphocytes 11 (*)     Immature Granulocytes 1 (*)     Absolute Lymph # 0.74 (*)     All other components within normal limits   BASIC METABOLIC PANEL - Abnormal; Notable for the following components:    Glucose 69 (*)     CREATININE 1.20 (*)     GFR Non- 46 (*)     GFR  56 (*)     All other components within normal limits   BRAIN NATRIURETIC PEPTIDE - Abnormal; Notable for the following components:    Pro- (*)     All other components within normal limits   URINALYSIS WITH MICROSCOPIC - Abnormal; Notable for the following components:    Leukocyte Esterase, Urine SMALL (*)     All other components within normal limits   TROPONIN   TROPONIN         RADIOLOGY:  XR CHEST PORTABLE    Result Date: 6/13/2022  EXAMINATION: ONE XRAY VIEW OF THE CHEST 6/13/2022 5:43 pm COMPARISON: None. HISTORY: ORDERING SYSTEM PROVIDED HISTORY: sob/cp TECHNOLOGIST PROVIDED HISTORY: sob/cp FINDINGS: Mild cardiomegaly with pulmonary vascular congestion. No acute infiltrates. No pneumothorax or pleural effusion. Mild cardiomegaly with pulmonary vascular congestion.        EKG  EKG Interpretation    Interpreted by me    Rhythm: normal sinus   Rate: normal  Axis: normal  Ectopy: none  Conduction: , seen on prior EKG  ST Segments: no acute change  T Waves: Inversions 1, aVL  Q Waves: Septal Q waves along with poor R progression in the precordial leads    Clinical Impression: Normal sinus rhythm, normal axis, QRS slightly prolonged 124 which has been present on prior EKGs, 2 inversions 1 and aVL has been seen on prior EKG, Q waves septally as well as poor R wave progression consistent with history of old myocardial infarction    All EKG's are interpreted by the Emergency Department Physician who either signs or Co-signs this chart in the absence of a cardiologist.    EMERGENCY DEPARTMENT COURSE:     Labs are unremarkable, troponin x2 negative. Chest x-ray demonstrates cardiomegaly with pulmonary vascular congestion but no pulmonary edema. Given EKG changes and elevated heart score of 7 we will plan for admission. Patient was given aspirin given EKG changes and chest pain. Cardiology consulted who recommended getting troponin tomorrow morning no indication for heparin at this time, most likely plan for echo tomorrow given that cannot form stress test because patient is infected with COVID. Medicine paged for admission, they accepted admission of the patient. PROCEDURES:  None    CONSULTS:  IP CONSULT TO HOSPITALIST  IP CONSULT TO CARDIOLOGY    CRITICAL CARE:  Please see attending note    FINAL IMPRESSION      1. COVID-19    2. Chest pain, unspecified type          DISPOSITION / PLAN     DISPOSITION Admitted 06/13/2022 09:00:27 PM        PATIENTREFERRED TO:  No follow-up provider specified.     DISCHARGE MEDICATIONS:  New Prescriptions    No medications on file       Ernesto Farr DO  EmergencyMedicine Resident    (Please note that portions of this note were completed with a voice recognition program.  Efforts were made to edit the dictations but occasionally words are mis-transcribed.)        Ernesto Farr DO  Resident  06/13/22 [id number]

## 2022-06-13 NOTE — TELEPHONE ENCOUNTER
Pt states that the symptoms started yesterday and took the test this morning pt informed of  Medication hold and is ok with holding medication.

## 2022-06-13 NOTE — ED NOTES
Pt unable to urinate at this time, will ask patient again during next rounding.       Cameron Ch RN  06/13/22 [id number]

## 2022-06-13 NOTE — ED PROVIDER NOTES
has no prior stents. No history of thromboembolic disease. She describes the chest discomfort as pressure. It is nonradiating. It is not exertional.   Awake, alert, coop, responsive. Speech fluent, normal comprehension. Lungs clear bilaterally. No rales, rhonchi, wheezes, stridor, retractions. Cardiac-S1S2, RRR, no murmur, rub, or gallop. Abdomen soft, nondistended, nontender. No organomegaly, mass, bruit. Normal bowel sounds. Examination of lower extremities reveals trace pitting edema in the ankles bilaterally. EKG Interpretation    Interpreted by me    Rhythm: normal sinus   Rate: normal  Axis: normal  Ectopy: none  Conduction: normal  ST Segments: Sagging ST segment noted in 1, aVL and V6. T Waves: T wave inversions noted in leads I and aVL. Q Waves: none  Left atrial enlargement. LVH. Clinical Impression: Findings as noted above. Changes in the T waves in the ST segments appear to be new when compared with prior tracing dated 17 December 2020. Impression: COVID. Chest pain. Plan: We will initiate a cardiac work-up and obtain chest x-ray. We also provide IV fluids and symptomatic medications and reassess. Given the patient's history of hypertension as well as coronary disease in the changes on her current cardiogram compared to previous tracings I would anticipate admission at least overnight. We will await the results of lab work today to make disposition.        Raj Hagan MD  06/13/22 [id number]

## 2022-06-13 NOTE — ED NOTES
The following labs labeled with pt sticker and tubed to lab by inna morfin:     [] Blue     [] Ellis Brewster   [] on ice  [] Green/yellow  [] Green/black [] on ice  [] Yellow  [] Red  [] Pink      [] COVID-19 swab    [] Rapid  [] PCR  [] Flu swab  [] Peds Viral Panel     [x] Urine Sample  [] Pelvic Cultures  [] Blood Cultures            Candice Millan RN  06/13/22 [id number]

## 2022-06-13 NOTE — ED NOTES
The following labs labeled with pt sticker and tubed to lab:     [x] Blue     [x] Lavender   [] on ice  [x] Green/yellow  [x] Green/black [] on ice  [] Yellow  [] Red  [] Pink      [] COVID-19 swab    [] Rapid  [] PCR  [] Flu swab  [] Peds Viral Panel     [] Urine Sample  [] Pelvic Cultures  [] Blood Cultures            Yasmine Castillo RN  06/13/22 [id number]

## 2022-06-13 NOTE — TELEPHONE ENCOUNTER
Please verify symptom onset date to ensure we are in the treatment window. In regards to the oral treatment, Paxlovid, she does have significant interactions with her current medication list.  Rakesh Das would have to hold her trazodone, Plavix, and atorvastatin during the 5-day treatment.

## 2022-06-14 PROBLEM — N18.30 CKD (CHRONIC KIDNEY DISEASE) STAGE 3, GFR 30-59 ML/MIN (HCC): Status: ACTIVE | Noted: 2022-06-14

## 2022-06-14 PROBLEM — R51.9 HEADACHE: Status: ACTIVE | Noted: 2022-06-14

## 2022-06-14 PROBLEM — E55.9 VITAMIN D DEFICIENCY: Status: ACTIVE | Noted: 2022-06-14

## 2022-06-14 PROBLEM — R29.818 SUSPECTED SLEEP APNEA: Status: ACTIVE | Noted: 2022-06-14

## 2022-06-14 LAB
ABSOLUTE EOS #: 0.15 K/UL (ref 0–0.44)
ABSOLUTE IMMATURE GRANULOCYTE: 0.04 K/UL (ref 0–0.3)
ABSOLUTE LYMPH #: 1.03 K/UL (ref 1.1–3.7)
ABSOLUTE MONO #: 0.6 K/UL (ref 0.1–1.2)
ALBUMIN SERPL-MCNC: 3.4 G/DL (ref 3.5–5.2)
ALBUMIN/GLOBULIN RATIO: 1.1 (ref 1–2.5)
ALP BLD-CCNC: 136 U/L (ref 35–104)
ALT SERPL-CCNC: 10 U/L (ref 5–33)
ANION GAP SERPL CALCULATED.3IONS-SCNC: 13 MMOL/L (ref 9–17)
AST SERPL-CCNC: 11 U/L
BASOPHILS # BLD: 0 % (ref 0–2)
BASOPHILS ABSOLUTE: <0.03 K/UL (ref 0–0.2)
BILIRUB SERPL-MCNC: 0.18 MG/DL (ref 0.3–1.2)
BUN BLDV-MCNC: 16 MG/DL (ref 6–20)
C-REACTIVE PROTEIN: 48.6 MG/L (ref 0–5)
CALCIUM SERPL-MCNC: 8.5 MG/DL (ref 8.6–10.4)
CHLORIDE BLD-SCNC: 103 MMOL/L (ref 98–107)
CO2: 24 MMOL/L (ref 20–31)
CREAT SERPL-MCNC: 1.17 MG/DL (ref 0.5–0.9)
EKG ATRIAL RATE: 93 BPM
EKG P AXIS: 70 DEGREES
EKG P-R INTERVAL: 140 MS
EKG Q-T INTERVAL: 364 MS
EKG QRS DURATION: 124 MS
EKG QTC CALCULATION (BAZETT): 452 MS
EKG T AXIS: 114 DEGREES
EKG VENTRICULAR RATE: 93 BPM
EOSINOPHILS RELATIVE PERCENT: 3 % (ref 1–4)
FERRITIN: 69 NG/ML (ref 13–150)
FIBRINOGEN: 443 MG/DL (ref 140–420)
GFR AFRICAN AMERICAN: 58 ML/MIN
GFR NON-AFRICAN AMERICAN: 48 ML/MIN
GFR SERPL CREATININE-BSD FRML MDRD: ABNORMAL ML/MIN/{1.73_M2}
GLUCOSE BLD-MCNC: 144 MG/DL (ref 70–99)
HCT VFR BLD CALC: 29.8 % (ref 36.3–47.1)
HEMOGLOBIN: 9.1 G/DL (ref 11.9–15.1)
IMMATURE GRANULOCYTES: 1 %
LYMPHOCYTES # BLD: 19 % (ref 24–43)
MCH RBC QN AUTO: 28.1 PG (ref 25.2–33.5)
MCHC RBC AUTO-ENTMCNC: 30.5 G/DL (ref 28.4–34.8)
MCV RBC AUTO: 92 FL (ref 82.6–102.9)
MONOCYTES # BLD: 11 % (ref 3–12)
NRBC AUTOMATED: 0 PER 100 WBC
PDW BLD-RTO: 15.7 % (ref 11.8–14.4)
PLATELET # BLD: 224 K/UL (ref 138–453)
PMV BLD AUTO: 10.4 FL (ref 8.1–13.5)
POTASSIUM SERPL-SCNC: 4.3 MMOL/L (ref 3.7–5.3)
PROCALCITONIN: 0.07 NG/ML
RBC # BLD: 3.24 M/UL (ref 3.95–5.11)
RBC # BLD: ABNORMAL 10*6/UL
SEG NEUTROPHILS: 66 % (ref 36–65)
SEGMENTED NEUTROPHILS ABSOLUTE COUNT: 3.71 K/UL (ref 1.5–8.1)
SODIUM BLD-SCNC: 140 MMOL/L (ref 135–144)
TOTAL PROTEIN: 6.4 G/DL (ref 6.4–8.3)
TROPONIN, HIGH SENSITIVITY: 11 NG/L (ref 0–14)
VITAMIN D 25-HYDROXY: 11.9 NG/ML
WBC # BLD: 5.6 K/UL (ref 3.5–11.3)

## 2022-06-14 PROCEDURE — 80053 COMPREHEN METABOLIC PANEL: CPT

## 2022-06-14 PROCEDURE — 6370000000 HC RX 637 (ALT 250 FOR IP): Performed by: CLINICAL NURSE SPECIALIST

## 2022-06-14 PROCEDURE — 94640 AIRWAY INHALATION TREATMENT: CPT

## 2022-06-14 PROCEDURE — 82306 VITAMIN D 25 HYDROXY: CPT

## 2022-06-14 PROCEDURE — 86140 C-REACTIVE PROTEIN: CPT

## 2022-06-14 PROCEDURE — 99222 1ST HOSP IP/OBS MODERATE 55: CPT | Performed by: INTERNAL MEDICINE

## 2022-06-14 PROCEDURE — XW033E5 INTRODUCTION OF REMDESIVIR ANTI-INFECTIVE INTO PERIPHERAL VEIN, PERCUTANEOUS APPROACH, NEW TECHNOLOGY GROUP 5: ICD-10-PCS | Performed by: INTERNAL MEDICINE

## 2022-06-14 PROCEDURE — 86738 MYCOPLASMA ANTIBODY: CPT

## 2022-06-14 PROCEDURE — 2580000003 HC RX 258: Performed by: INTERNAL MEDICINE

## 2022-06-14 PROCEDURE — 1200000000 HC SEMI PRIVATE

## 2022-06-14 PROCEDURE — 6370000000 HC RX 637 (ALT 250 FOR IP): Performed by: STUDENT IN AN ORGANIZED HEALTH CARE EDUCATION/TRAINING PROGRAM

## 2022-06-14 PROCEDURE — 85025 COMPLETE CBC W/AUTO DIFF WBC: CPT

## 2022-06-14 PROCEDURE — 93010 ELECTROCARDIOGRAM REPORT: CPT | Performed by: INTERNAL MEDICINE

## 2022-06-14 PROCEDURE — 2500000003 HC RX 250 WO HCPCS: Performed by: STUDENT IN AN ORGANIZED HEALTH CARE EDUCATION/TRAINING PROGRAM

## 2022-06-14 PROCEDURE — 82728 ASSAY OF FERRITIN: CPT

## 2022-06-14 PROCEDURE — 2580000003 HC RX 258: Performed by: CLINICAL NURSE SPECIALIST

## 2022-06-14 PROCEDURE — 6360000002 HC RX W HCPCS: Performed by: CLINICAL NURSE SPECIALIST

## 2022-06-14 PROCEDURE — 36415 COLL VENOUS BLD VENIPUNCTURE: CPT

## 2022-06-14 PROCEDURE — 84145 PROCALCITONIN (PCT): CPT

## 2022-06-14 PROCEDURE — 85384 FIBRINOGEN ACTIVITY: CPT

## 2022-06-14 PROCEDURE — 84484 ASSAY OF TROPONIN QUANT: CPT

## 2022-06-14 PROCEDURE — 99232 SBSQ HOSP IP/OBS MODERATE 35: CPT | Performed by: STUDENT IN AN ORGANIZED HEALTH CARE EDUCATION/TRAINING PROGRAM

## 2022-06-14 PROCEDURE — 2500000003 HC RX 250 WO HCPCS: Performed by: INTERNAL MEDICINE

## 2022-06-14 RX ORDER — OXYBUTYNIN CHLORIDE 5 MG/1
5 TABLET ORAL 2 TIMES DAILY
Status: DISCONTINUED | OUTPATIENT
Start: 2022-06-14 | End: 2022-06-17 | Stop reason: HOSPADM

## 2022-06-14 RX ORDER — BUMETANIDE 1 MG/1
2 TABLET ORAL DAILY
Status: DISCONTINUED | OUTPATIENT
Start: 2022-06-14 | End: 2022-06-14

## 2022-06-14 RX ORDER — ONDANSETRON 4 MG/1
4 TABLET, ORALLY DISINTEGRATING ORAL EVERY 8 HOURS PRN
Status: DISCONTINUED | OUTPATIENT
Start: 2022-06-14 | End: 2022-06-17 | Stop reason: HOSPADM

## 2022-06-14 RX ORDER — LEVOTHYROXINE SODIUM 0.05 MG/1
50 TABLET ORAL DAILY
Status: DISCONTINUED | OUTPATIENT
Start: 2022-06-14 | End: 2022-06-17 | Stop reason: HOSPADM

## 2022-06-14 RX ORDER — SUCRALFATE 1 G/1
2 TABLET ORAL NIGHTLY
Status: DISCONTINUED | OUTPATIENT
Start: 2022-06-14 | End: 2022-06-17 | Stop reason: HOSPADM

## 2022-06-14 RX ORDER — MONTELUKAST SODIUM 10 MG/1
10 TABLET ORAL NIGHTLY
Status: DISCONTINUED | OUTPATIENT
Start: 2022-06-14 | End: 2022-06-17 | Stop reason: HOSPADM

## 2022-06-14 RX ORDER — 0.9 % SODIUM CHLORIDE 0.9 %
30 INTRAVENOUS SOLUTION INTRAVENOUS PRN
Status: DISCONTINUED | OUTPATIENT
Start: 2022-06-14 | End: 2022-06-17 | Stop reason: HOSPADM

## 2022-06-14 RX ORDER — CLOPIDOGREL BISULFATE 75 MG/1
75 TABLET ORAL DAILY
Status: DISCONTINUED | OUTPATIENT
Start: 2022-06-14 | End: 2022-06-17 | Stop reason: HOSPADM

## 2022-06-14 RX ORDER — DULOXETIN HYDROCHLORIDE 30 MG/1
120 CAPSULE, DELAYED RELEASE ORAL DAILY
Status: DISCONTINUED | OUTPATIENT
Start: 2022-06-14 | End: 2022-06-17 | Stop reason: HOSPADM

## 2022-06-14 RX ORDER — BUPROPION HYDROCHLORIDE 150 MG/1
300 TABLET ORAL EVERY MORNING
Status: DISCONTINUED | OUTPATIENT
Start: 2022-06-14 | End: 2022-06-17 | Stop reason: HOSPADM

## 2022-06-14 RX ORDER — SODIUM CHLORIDE 0.9 % (FLUSH) 0.9 %
5-40 SYRINGE (ML) INJECTION PRN
Status: DISCONTINUED | OUTPATIENT
Start: 2022-06-14 | End: 2022-06-17 | Stop reason: HOSPADM

## 2022-06-14 RX ORDER — ALBUTEROL SULFATE 90 UG/1
2 AEROSOL, METERED RESPIRATORY (INHALATION) EVERY 4 HOURS PRN
Status: DISCONTINUED | OUTPATIENT
Start: 2022-06-14 | End: 2022-06-17 | Stop reason: HOSPADM

## 2022-06-14 RX ORDER — MELOXICAM 7.5 MG/1
15 TABLET ORAL DAILY
Status: DISCONTINUED | OUTPATIENT
Start: 2022-06-14 | End: 2022-06-17 | Stop reason: HOSPADM

## 2022-06-14 RX ORDER — HYDROCODONE BITARTRATE AND ACETAMINOPHEN 5; 325 MG/1; MG/1
1 TABLET ORAL EVERY 6 HOURS PRN
Status: DISCONTINUED | OUTPATIENT
Start: 2022-06-14 | End: 2022-06-17 | Stop reason: HOSPADM

## 2022-06-14 RX ORDER — ASPIRIN 81 MG/1
81 TABLET ORAL DAILY
Status: DISCONTINUED | OUTPATIENT
Start: 2022-06-14 | End: 2022-06-17 | Stop reason: HOSPADM

## 2022-06-14 RX ORDER — SODIUM CHLORIDE 9 MG/ML
25 INJECTION, SOLUTION INTRAVENOUS PRN
Status: DISCONTINUED | OUTPATIENT
Start: 2022-06-14 | End: 2022-06-17 | Stop reason: HOSPADM

## 2022-06-14 RX ORDER — ERGOCALCIFEROL 1.25 MG/1
50000 CAPSULE ORAL WEEKLY
Status: DISCONTINUED | OUTPATIENT
Start: 2022-06-14 | End: 2022-06-17 | Stop reason: HOSPADM

## 2022-06-14 RX ORDER — ONDANSETRON 2 MG/ML
4 INJECTION INTRAMUSCULAR; INTRAVENOUS EVERY 6 HOURS PRN
Status: DISCONTINUED | OUTPATIENT
Start: 2022-06-14 | End: 2022-06-17 | Stop reason: HOSPADM

## 2022-06-14 RX ORDER — ALLOPURINOL 300 MG/1
300 TABLET ORAL DAILY
Status: DISCONTINUED | OUTPATIENT
Start: 2022-06-14 | End: 2022-06-17 | Stop reason: HOSPADM

## 2022-06-14 RX ORDER — POLYETHYLENE GLYCOL 3350 17 G/17G
17 POWDER, FOR SOLUTION ORAL DAILY PRN
Status: DISCONTINUED | OUTPATIENT
Start: 2022-06-14 | End: 2022-06-17 | Stop reason: HOSPADM

## 2022-06-14 RX ORDER — BUDESONIDE AND FORMOTEROL FUMARATE DIHYDRATE 80; 4.5 UG/1; UG/1
2 AEROSOL RESPIRATORY (INHALATION) 2 TIMES DAILY
Status: DISCONTINUED | OUTPATIENT
Start: 2022-06-14 | End: 2022-06-17 | Stop reason: HOSPADM

## 2022-06-14 RX ORDER — SPIRONOLACTONE 25 MG/1
25 TABLET ORAL DAILY
Status: DISCONTINUED | OUTPATIENT
Start: 2022-06-14 | End: 2022-06-17 | Stop reason: HOSPADM

## 2022-06-14 RX ORDER — POLYETHYLENE GLYCOL 3350 17 G/17G
17 POWDER, FOR SOLUTION ORAL DAILY PRN
Status: DISCONTINUED | OUTPATIENT
Start: 2022-06-14 | End: 2022-06-14

## 2022-06-14 RX ORDER — GUAIFENESIN DEXTROMETHORPHAN HYDROBROMIDE ORAL SOLUTION 10; 100 MG/5ML; MG/5ML
5 SOLUTION ORAL EVERY 4 HOURS PRN
Status: DISCONTINUED | OUTPATIENT
Start: 2022-06-14 | End: 2022-06-17 | Stop reason: HOSPADM

## 2022-06-14 RX ORDER — ATORVASTATIN CALCIUM 80 MG/1
80 TABLET, FILM COATED ORAL DAILY
Status: DISCONTINUED | OUTPATIENT
Start: 2022-06-14 | End: 2022-06-17 | Stop reason: HOSPADM

## 2022-06-14 RX ORDER — METOPROLOL TARTRATE 50 MG/1
50 TABLET, FILM COATED ORAL 2 TIMES DAILY
Status: DISCONTINUED | OUTPATIENT
Start: 2022-06-14 | End: 2022-06-17 | Stop reason: HOSPADM

## 2022-06-14 RX ORDER — ISOSORBIDE DINITRATE 10 MG/1
10 TABLET ORAL 2 TIMES DAILY
Status: DISCONTINUED | OUTPATIENT
Start: 2022-06-14 | End: 2022-06-17 | Stop reason: HOSPADM

## 2022-06-14 RX ORDER — ACETAMINOPHEN 325 MG/1
650 TABLET ORAL EVERY 6 HOURS PRN
Status: DISCONTINUED | OUTPATIENT
Start: 2022-06-14 | End: 2022-06-17 | Stop reason: HOSPADM

## 2022-06-14 RX ORDER — TIZANIDINE 4 MG/1
4 TABLET ORAL 3 TIMES DAILY PRN
Status: DISCONTINUED | OUTPATIENT
Start: 2022-06-14 | End: 2022-06-17 | Stop reason: HOSPADM

## 2022-06-14 RX ORDER — HYDRALAZINE HYDROCHLORIDE 25 MG/1
25 TABLET, FILM COATED ORAL 2 TIMES DAILY
Status: DISCONTINUED | OUTPATIENT
Start: 2022-06-14 | End: 2022-06-17 | Stop reason: HOSPADM

## 2022-06-14 RX ORDER — PANTOPRAZOLE SODIUM 40 MG/1
40 TABLET, DELAYED RELEASE ORAL DAILY
Status: DISCONTINUED | OUTPATIENT
Start: 2022-06-14 | End: 2022-06-17 | Stop reason: HOSPADM

## 2022-06-14 RX ORDER — SUCRALFATE 1 G/1
1 TABLET ORAL EVERY MORNING
Status: DISCONTINUED | OUTPATIENT
Start: 2022-06-14 | End: 2022-06-17 | Stop reason: HOSPADM

## 2022-06-14 RX ORDER — ENOXAPARIN SODIUM 100 MG/ML
40 INJECTION SUBCUTANEOUS 2 TIMES DAILY
Status: DISCONTINUED | OUTPATIENT
Start: 2022-06-14 | End: 2022-06-17 | Stop reason: HOSPADM

## 2022-06-14 RX ORDER — SODIUM CHLORIDE 0.9 % (FLUSH) 0.9 %
5-40 SYRINGE (ML) INJECTION EVERY 12 HOURS SCHEDULED
Status: DISCONTINUED | OUTPATIENT
Start: 2022-06-14 | End: 2022-06-17 | Stop reason: HOSPADM

## 2022-06-14 RX ORDER — BUMETANIDE 0.25 MG/ML
2 INJECTION, SOLUTION INTRAMUSCULAR; INTRAVENOUS DAILY
Status: DISCONTINUED | OUTPATIENT
Start: 2022-06-14 | End: 2022-06-15

## 2022-06-14 RX ORDER — ACETAMINOPHEN 650 MG/1
650 SUPPOSITORY RECTAL EVERY 6 HOURS PRN
Status: DISCONTINUED | OUTPATIENT
Start: 2022-06-14 | End: 2022-06-17 | Stop reason: HOSPADM

## 2022-06-14 RX ADMIN — ATORVASTATIN CALCIUM 80 MG: 80 TABLET, FILM COATED ORAL at 10:50

## 2022-06-14 RX ADMIN — REMDESIVIR 200 MG: 100 INJECTION, POWDER, LYOPHILIZED, FOR SOLUTION INTRAVENOUS at 16:11

## 2022-06-14 RX ADMIN — SODIUM CHLORIDE, PRESERVATIVE FREE 10 ML: 5 INJECTION INTRAVENOUS at 09:37

## 2022-06-14 RX ADMIN — ALLOPURINOL 300 MG: 300 TABLET ORAL at 10:47

## 2022-06-14 RX ADMIN — HYDROCODONE BITARTRATE AND ACETAMINOPHEN 1 TABLET: 5; 325 TABLET ORAL at 01:11

## 2022-06-14 RX ADMIN — ISOSORBIDE DINITRATE 10 MG: 10 TABLET ORAL at 01:12

## 2022-06-14 RX ADMIN — MONTELUKAST SODIUM 10 MG: 10 TABLET, FILM COATED ORAL at 21:39

## 2022-06-14 RX ADMIN — SODIUM CHLORIDE, PRESERVATIVE FREE 10 ML: 5 INJECTION INTRAVENOUS at 01:13

## 2022-06-14 RX ADMIN — TRAZODONE HYDROCHLORIDE 150 MG: 50 TABLET ORAL at 21:38

## 2022-06-14 RX ADMIN — METOPROLOL TARTRATE 50 MG: 50 TABLET, FILM COATED ORAL at 10:54

## 2022-06-14 RX ADMIN — BUDESONIDE AND FORMOTEROL FUMARATE DIHYDRATE 2 PUFF: 80; 4.5 AEROSOL RESPIRATORY (INHALATION) at 20:47

## 2022-06-14 RX ADMIN — ERGOCALCIFEROL 50000 UNITS: 1.25 CAPSULE ORAL at 14:51

## 2022-06-14 RX ADMIN — PANTOPRAZOLE SODIUM 40 MG: 40 TABLET, DELAYED RELEASE ORAL at 09:28

## 2022-06-14 RX ADMIN — LEVOTHYROXINE SODIUM 50 MCG: 50 TABLET ORAL at 09:27

## 2022-06-14 RX ADMIN — METOPROLOL TARTRATE 50 MG: 50 TABLET, FILM COATED ORAL at 01:13

## 2022-06-14 RX ADMIN — SUCRALFATE 2 G: 1 TABLET ORAL at 21:38

## 2022-06-14 RX ADMIN — HYDROCODONE BITARTRATE AND ACETAMINOPHEN 1 TABLET: 5; 325 TABLET ORAL at 17:44

## 2022-06-14 RX ADMIN — TIZANIDINE 4 MG: 4 TABLET ORAL at 01:12

## 2022-06-14 RX ADMIN — ENOXAPARIN SODIUM 40 MG: 100 INJECTION SUBCUTANEOUS at 21:38

## 2022-06-14 RX ADMIN — Medication 81 MG: at 10:48

## 2022-06-14 RX ADMIN — SUCRALFATE 1 G: 1 TABLET ORAL at 10:56

## 2022-06-14 RX ADMIN — TRAZODONE HYDROCHLORIDE 150 MG: 50 TABLET ORAL at 01:12

## 2022-06-14 RX ADMIN — DULOXETINE HYDROCHLORIDE 120 MG: 30 CAPSULE, DELAYED RELEASE ORAL at 10:53

## 2022-06-14 RX ADMIN — HYDRALAZINE HYDROCHLORIDE 25 MG: 25 TABLET ORAL at 10:54

## 2022-06-14 RX ADMIN — SUCRALFATE 2 G: 1 TABLET ORAL at 01:13

## 2022-06-14 RX ADMIN — OXYBUTYNIN CHLORIDE 5 MG: 5 TABLET ORAL at 01:14

## 2022-06-14 RX ADMIN — ISOSORBIDE DINITRATE 10 MG: 10 TABLET ORAL at 21:38

## 2022-06-14 RX ADMIN — ISOSORBIDE DINITRATE 10 MG: 10 TABLET ORAL at 09:28

## 2022-06-14 RX ADMIN — METOPROLOL TARTRATE 50 MG: 50 TABLET, FILM COATED ORAL at 21:39

## 2022-06-14 RX ADMIN — SPIRONOLACTONE 25 MG: 25 TABLET ORAL at 10:56

## 2022-06-14 RX ADMIN — HYDROCODONE BITARTRATE AND ACETAMINOPHEN 1 TABLET: 5; 325 TABLET ORAL at 11:04

## 2022-06-14 RX ADMIN — TIZANIDINE 4 MG: 4 TABLET ORAL at 21:40

## 2022-06-14 RX ADMIN — BUMETANIDE 2 MG: 0.25 INJECTION, SOLUTION INTRAMUSCULAR; INTRAVENOUS at 10:50

## 2022-06-14 RX ADMIN — ENOXAPARIN SODIUM 40 MG: 100 INJECTION SUBCUTANEOUS at 10:53

## 2022-06-14 RX ADMIN — HYDRALAZINE HYDROCHLORIDE 25 MG: 25 TABLET ORAL at 19:13

## 2022-06-14 RX ADMIN — OXYBUTYNIN CHLORIDE 5 MG: 5 TABLET ORAL at 21:40

## 2022-06-14 RX ADMIN — MONTELUKAST SODIUM 10 MG: 10 TABLET, FILM COATED ORAL at 01:12

## 2022-06-14 RX ADMIN — BUPROPION HYDROCHLORIDE 300 MG: 150 TABLET, EXTENDED RELEASE ORAL at 10:52

## 2022-06-14 RX ADMIN — CLOPIDOGREL 75 MG: 75 TABLET, FILM COATED ORAL at 10:52

## 2022-06-14 RX ADMIN — OXYBUTYNIN CHLORIDE 5 MG: 5 TABLET ORAL at 10:55

## 2022-06-14 ASSESSMENT — PAIN DESCRIPTION - ORIENTATION
ORIENTATION: OTHER (COMMENT)
ORIENTATION: LOWER
ORIENTATION: OTHER (COMMENT)
ORIENTATION: LOWER

## 2022-06-14 ASSESSMENT — ENCOUNTER SYMPTOMS
EYE DISCHARGE: 0
CHEST TIGHTNESS: 1
TROUBLE SWALLOWING: 1
ABDOMINAL DISTENTION: 0
COLOR CHANGE: 0
SORE THROAT: 1

## 2022-06-14 ASSESSMENT — PAIN SCALES - GENERAL
PAINLEVEL_OUTOF10: 8
PAINLEVEL_OUTOF10: 7
PAINLEVEL_OUTOF10: 8
PAINLEVEL_OUTOF10: 9

## 2022-06-14 ASSESSMENT — PAIN DESCRIPTION - DESCRIPTORS
DESCRIPTORS: ACHING

## 2022-06-14 ASSESSMENT — PAIN DESCRIPTION - LOCATION
LOCATION: BACK;HEAD;NECK
LOCATION: HEAD;BACK;NECK
LOCATION: BACK
LOCATION: BACK;CHEST

## 2022-06-14 ASSESSMENT — LIFESTYLE VARIABLES: HOW OFTEN DO YOU HAVE A DRINK CONTAINING ALCOHOL: NEVER

## 2022-06-14 NOTE — CARE COORDINATION
Transitional Planning  Attempted several times today to call in pt's room to do admission interview no answer.

## 2022-06-14 NOTE — PROGRESS NOTES
[facility]  Office: [address], DO, John Swan, DO, Jordan Reece, DO, Emiliana Lock, DO, Mack Chong MD, Briseida Finney MD, Radha Estes MD, Nelly Russo MD, Crow Alonzo MD, Wilfred Jacques MD, Alma Sheridan MD, Marianna Enriquez DO, Akosua Naranjo MD,  Darrick Enciso DO, Gissel Mcgee MD, Jared Wolff MD, Adrian Cotton MD, Gilberto Carrion DO, Per Lozano MD, Lennie Souza MD, Ava Ratliff DO, Ronan Rothman MD, Lv Sullivan CNP, [facility], [facility], Rafi Glaser, CNP, Claude Leep, CNP, Willy Merino, CNP, Cong Newman, CNP, Sonny Luque PA-C, Anaya Gaitan [facility], Jonas Ni CNP, Luis A Regan, CNP, Terry Nayak, CNP, José Luis Reveles, CNS, Dayanara Tejada, [facility], Raleigh Wallace, CNP, Connor Park, CNP, Vivek Feliz, [address] [place] [address]    Progress Note    6/14/2022    12:54 PM    Name:   Chapito Malave  MRN:     [id number]     Acct:      [de-identified]   Room:   [address] [place] [address] Day:  1  Admit Date:  6/13/2022  4:30 PM    PCP:   ASA Whyte CNP  Code Status:  Full Code    Subjective:     C/C:   Chief Complaint   Patient presents with    Positive For Covid-19     home test     Interval History Status: not changed. Patient examined at bedside  Patient is awake and alert, had some desaturation episodes overnight while sleeping  Was started on oxygen however saturating around 98% now. Patient has cough and feels congested  No fever overnight. Blood pressure 110/71, pulse of 69, temperature 98. 1. CRP of 48.6  Troponins 11  Creatinine of 1.17  White count of 5.6  Hemoglobin 9.1.   Brief History:     64-year-old female with known medical history of hypertension, hyperlipidemia, chronic systolic and diastolic heart failure with EF of 30 to 35%, morbid obesity presented to the hospital with shortness of breath, generalized myalgias, fatigue, tested positive at home with COVID. Patient also has a history of chronic pain syndrome but states that these myalgias and pain were much worse. He has had COVID in 2020 and was on oxygen for few weeks. Patient also complained of some chest pressure on arrival in the ER and cardiology was consulted. Review of Systems:     Constitutional: Positive for chills, fevers, sweats  Respiratory: Positive for cough, dyspnea on exertion, shortness of breath, no wheezing  Cardiovascular: Positive for chest pressure, negative for chest pain, trace lower extremity edema, no palpitations  Gastrointestinal:  negative for abdominal pain, constipation, diarrhea, nausea, vomiting, loose formed bowel movements present  Neurological: Positive for dizziness and generalized weakness    Medications: Allergies:     Allergies   Allergen Reactions    Entresto [Sacubitril-Valsartan] Other (See Comments)     Acute kidney injury    Food Swelling     peaches    Gabapentin Swelling    Lyrica [Pregabalin] Swelling     Mouth sores    Tetracyclines & Related        Current Meds:   Scheduled Meds:    allopurinol  300 mg Oral Daily    aspirin  81 mg Oral Daily    atorvastatin  80 mg Oral Daily    buPROPion  300 mg Oral QAM    clopidogrel  75 mg Oral Daily    DULoxetine  120 mg Oral Daily    hydrALAZINE  25 mg Oral BID    isosorbide dinitrate  10 mg Oral BID    levothyroxine  50 mcg Oral Daily    [Held by provider] meloxicam  15 mg Oral Daily    metoprolol tartrate  50 mg Oral BID    montelukast  10 mg Oral Nightly    oxybutynin  5 mg Oral BID    pantoprazole  40 mg Oral Daily    spironolactone  25 mg Oral Daily    sucralfate  1 g Oral QAM    sucralfate  2 g Oral Nightly    budesonide-formoterol  2 puff Inhalation BID    traZODone  150 mg Oral Nightly    sodium chloride flush  5-40 mL IntraVENous 2 times per day    enoxaparin  40 mg SubCUTAneous BID    bumetanide  2 mg IntraVENous Daily     Continuous Infusions:    sodium chloride       PRN Meds: albuterol sulfate HFA, polyethylene glycol, tiZANidine, sodium chloride flush, sodium chloride, ondansetron **OR** ondansetron, acetaminophen **OR** acetaminophen, dextromethorphan-guaiFENesin, HYDROcodone 5 mg - acetaminophen    Data:     Past Medical History:   has a past medical history of Arthritis, Bronchitis, CHF (congestive heart failure) ([address] COVID-19, Fibromyalgia, GERD (gastroesophageal reflux disease), Gout, History of heart attack, HLD (hyperlipidemia), Hypertension, Insomnia, and Osteoarthritis. Social History:   reports that she has never smoked. She has never used smokeless tobacco. She reports that she does not drink alcohol and does not use drugs. Family History:   Family History   Problem Relation Age of Onset   Damian Maieros Other Mother     Diabetes Mother     Heart Disease Mother     Arthritis Mother     Kidney Disease Mother     Lupus Mother     Arthritis Sister     Diabetes Brother     Asthma Brother     Cancer Maternal Grandfather     Hypertension Sister     Breast Cancer Sister         35s    Breast Cancer Niece         30-35       Vitals:  /71   Pulse 69   Temp 98.1 °F (36.7 °C) (Oral)   Resp 20   Ht 5' 8\" (1.727 m)   Wt (!) 345 lb (156.5 kg)   SpO2 93%   BMI 52.46 kg/m²   Temp (24hrs), Av °F (37.2 °C), Min:98.1 °F (36.7 °C), Max:100.8 °F (38.2 °C)    No results for input(s): POCGLU in the last 72 hours. I/O (24Hr):     Intake/Output Summary (Last 24 hours) at 2022 1254  Last data filed at 2022 0100  Gross per 24 hour   Intake 240 ml   Output --   Net 240 ml       Labs:  Hematology:  Recent Labs     22  1745 22  0455   WBC 6.6 5.6   RBC 3.69* 3.24*   HGB 10.6* 9.1*   HCT 34.6* 29.8*   MCV 93.8 92.0   MCH 28.7 28.1   MCHC 30.6 30.5   RDW 15.6* 15.7*    224   MPV 10.6 10.4   CRP  --  48.6*     Chemistry:  Recent Labs     22  1745 22  1905 22  0455 22  0907     --  140  --    K 4.9  --  4.3  --    CL 101  --  103  --    CO2 25  --  24  --    GLUCOSE 69*  --  144*  --    BUN 17  --  16  --    CREATININE 1.20*  --  1.17*  --    ANIONGAP 10  --  13  --    LABGLOM 46*  --  48*  --    GFRAA 56*  --  58*  --    CALCIUM 8.9  --  8.5*  --    PROBNP 305*  --   --   --    TROPHS 11 11  --  11     Recent Labs     06/14/22  0455   PROT 6.4   LABALBU 3.4*   AST 11   ALT 10   ALKPHOS 136*   BILITOT 0.18*     ABG:No results found for: POCPH, PHART, PH, POCPCO2, FLB5RJJ, PCO2, POCPO2, PO2ART, PO2, POCHCO3, OSN1CMA, HCO3, NBEA, PBEA, BEART, BE, THGBART, THB, AVE6UNC, PYTM4IZB, W2PVIYUQ, O2SAT, FIO2  Lab Results   Component Value Date/Time    SPECIAL NOT REPORTED 12/01/2020 05:48 AM     Lab Results   Component Value Date/Time    CULTURE NO SIGNIFICANT GROWTH 12/01/2020 05:48 AM       Radiology:  XR CHEST PORTABLE    Result Date: 6/13/2022  Mild cardiomegaly with pulmonary vascular congestion.        Physical Examination:        General appearance:  alert, cooperative and mild distress  Mental Status:  oriented to person, place and time and normal affect  Lungs: Decreased breath sounds bilaterally, normal effort  Heart:  regular rate and rhythm, no murmur  Abdomen:  soft, nontender, nondistended, normal bowel sounds, no masses, hepatomegaly, splenomegaly  Extremities: Trace pedal edema, no redness, tenderness in the calves  Skin:  no gross lesions, rashes, induration    Assessment:        Hospital Problems           Last Modified POA    * (Principal) Chest pain 6/13/2022 Yes    Headache 6/14/2022 Yes    Suspected sleep apnea 6/14/2022 Yes    Vitamin D deficiency 6/14/2022 Yes    CKD (chronic kidney disease) stage 3, GFR 30-59 ml/min ([facility]) 6/14/2022 Yes    Low back pain 6/14/2022 Yes    Opioid dependence, uncomplicated ([facility] [birth year]/1923 Yes    Class 3 severe obesity due to excess calories with serious comorbidity and body mass index (BMI) of 45.0 to 49.9 in adult [facility]) (Chronic) 6/14/2022 Yes    Myalgia 6/14/2022 Yes    COVID 6/14/2022 Yes    GERD (gastroesophageal reflux disease) 6/14/2022 Yes    Acute on chronic combined systolic and diastolic congestive heart failure ([address] 6/14/2022 Yes    Chronic pain 6/14/2022 Yes          Plan:        COVID-19 infection- patient crp elevated to 48, chest x-ray shows congestion, patient has upper respiratory symptoms, infectious disease consulted. Patient is on room air during the day, does have some desaturation episodes overnight but likely secondary to sleep apnea. Will infectious disease decide about remdesivir versus monoclonal antibodies. Continue to monitor inflammatory markers. Acute on chronic systolic and diastolic heart failure- previous ejection fraction of 30 to 35%, change Bumex to IV, monitor intake and output, fluid restriction to 1500 mL. Obtain echo to check for ejection fraction. Continue aspirin, Lipitor, hydralazine, Isordil, Lopressor and Aldactone. Patient is allergic to ACE inhibitor, had history of SUSU    Hypertension- continue antihypertensives    Hypothyroid- on synthyroid, recent TSH shows elevated, needs to be complaint to synthyroid and needs to take it empty stomach    CKD STAGE 3- at baseline 1.1-1.2    GERD-continue Carafate and Protonix    Obesity-encouraged to lose weight    Suspected sleep apnea-patient states he has prescription to get a sleep study done outpatient. Monitor electrolytes and replace as tolerated  Replace vitamin d    Chronic pain- saw pain management, had an argument with DR Jr Randolph as he refused opioids. Will encourage to avoid opioids as much as possible.       Kaylah Mckeon MD  6/14/2022  12:54 PM

## 2022-06-14 NOTE — PLAN OF CARE
Problem: Discharge Planning  Goal: Discharge to home or other facility with appropriate resources  6/14/2022 1701 by Juliet Monroy RN  Outcome: Progressing  6/14/2022 1701 by Juliet Monroy RN  Outcome: Progressing  6/14/2022 1700 by Juliet Monroy RN  Outcome: Progressing     Problem: Pain  Goal: Verbalizes/displays adequate comfort level or baseline comfort level  6/14/2022 1701 by Juliet Monroy RN  Outcome: Progressing  6/14/2022 1701 by Juliet Monroy RN  Outcome: Progressing  6/14/2022 1700 by Juliet Monroy RN  Outcome: Progressing     Problem: Safety - Adult  Goal: Free from fall injury  6/14/2022 1701 by Juliet Monroy RN  Outcome: Progressing  6/14/2022 1701 by Juliet Monroy RN  Outcome: Progressing  6/14/2022 1700 by Juliet Monroy RN  Outcome: Progressing     Problem: ABCDS Injury Assessment  Goal: Absence of physical injury  6/14/2022 1701 by Juliet Monroy RN  Outcome: Progressing  6/14/2022 1701 by Juliet Monroy RN  Outcome: Progressing  6/14/2022 1700 by Juliet Monroy RN  Outcome: Progressing     Problem: Chronic Conditions and Co-morbidities  Goal: Patient's chronic conditions and co-morbidity symptoms are monitored and maintained or improved  6/14/2022 1701 by Juliet Monroy RN  Outcome: Progressing  6/14/2022 1701 by Juliet Monroy RN  Outcome: Progressing  6/14/2022 1700 by Juliet Monroy RN  Outcome: Progressing     Problem: Respiratory - Adult  Goal: Achieves optimal ventilation and oxygenation  Outcome: Progressing     Problem: Genitourinary - Adult  Goal: Absence of urinary retention  Outcome: Progressing     Problem: Infection - Adult  Goal: Absence of infection at discharge  Outcome: Progressing  Goal: Absence of infection during hospitalization  Outcome: Progressing

## 2022-06-14 NOTE — CONSULTS
[facility] -   Infectious diseases evaluation  admission date 6/13/2022    reason for consultation:   Covid    Impression :   Current:  · Covid resp infection - +on 6/12 at home test   · Sick since 6/12  · Lymphopenia   · Elevated CRP 48  · CHF - proBNP 305 - EF 35%  · COPd exacerbation -inhalors at home- no HO2  · CAD s/p prior MI  · Fibromyalgia -aches all over -Chronic pain syndrome  · Hypovitaminosis D  · SMILEY suspected    Other:  ·   Discussion / summary of stay / plan of care   · covid resp illness since 6/12  · Achy all over worse than usual fibromyalgia  · CRP elevation   · CXr congested - as in past  · Copd exacerbation, better after inhalors  Recommendations   · Covid positive 6/12  · remdesivir 5 days till 6/18, since she is in the 1st week of illness  · Watch CRP closely to decide for more aggressive tx  · Get other infl markers to better assess level of her covid illness  · CT chest look for covid pneumonia   · Aggressive Vitamin D replacement  · Steroids use as needed for COPd, defer to PCP - at this point does not need decadron for covid  · Will follow w you    Infection Control Recommendations   · Universal Precautions  · Droplet + Isolation till 6/21      Antimicrobial Stewardship Recommendations   · Off Ab -  History of Present Illness:   Initial history:  Munira Warner is a 60y.o.-year-old female presented to ED for positive at home Covid test.    PMH bronchitis, prior Covid 2020 CHF, HTN, CAD s/p prior MI, chronic pain syndrome, morbid obesity. Has had Covid in 2020 was DC on home O2 for a few weeks. Has covid vaccine not booster. Since 2020 Covid has had exertional dyspnea. But no Home o2 - on inhalors  copd and CHF w EF 35      She had a + Covid test 6/12 at home. Her PCP prescribed Paxlovid but was unable to  from her pharmacy due to sys of SOB, chest tightness, myalgias. Does endorse uses of nebulizer, albuterol and Symbicort for Bronchitis without relief of Covid sys. Sys have been worsening and endorses generalized myalgias, nausea and dec PO intake, headache, cough with occasional phlegm light yellow in color, chest tightness with pain. A near syncopal episode due to dizziness and feeling lightheaded, denies fall or injuries. Has loose stools. abd cramping pain. Her WBC was 6.6  CRP 48.6  Pro yung 0.07  Trop was 11      Is on 2L NC     Interval changes  2022   Patient Vitals for the past 8 hrs:   BP Temp Temp src Pulse Resp SpO2   22 0020 (!) 130/90 98.2 °F (36.8 °C) Oral 82 22 97 %       Summary of relevant labs:  Labs:  WBC -6.6 -5.6  CRP 48.6  Cr 1.17  Pro yung 0.07    Trop 11      Micro:   Covid +      Imagin/13 CXR   Impression Mild cardiomegaly with pulmonary vascular congestion. Echo -pending       I have personally reviewed the past medical history, past surgical history, medications, social history, and family history, and I haveupdated the database accordingly. Allergies:   Entresto [sacubitril-valsartan], Food, Gabapentin, Lyrica [pregabalin], and Tetracyclines & related     Review of Systems:     Review of Systems   Constitutional: Positive for appetite change and fatigue. HENT: Positive for sore throat and trouble swallowing. Eyes: Negative for discharge. Respiratory: Positive for chest tightness. Cardiovascular: Positive for chest pain. Gastrointestinal: Negative for abdominal distention. Endocrine: Negative for polydipsia. Genitourinary: Negative for dysuria. Musculoskeletal: Positive for arthralgias and myalgias. Skin: Negative for color change. Allergic/Immunologic: Negative for immunocompromised state. Neurological: Positive for dizziness, light-headedness and headaches. Hematological: Negative for adenopathy. Psychiatric/Behavioral: Negative for agitation and behavioral problems.        Physical Examination :       Physical Exam  Vitals and nursing note reviewed. Constitutional:       Appearance: She is obese. HENT:      Head: Normocephalic and atraumatic. Right Ear: External ear normal.      Left Ear: External ear normal.      Nose: Nose normal.      Mouth/Throat:      Mouth: Mucous membranes are dry. Eyes:      General: No scleral icterus. Right eye: No discharge. Cardiovascular:      Rate and Rhythm: Normal rate and regular rhythm. Pulses: Normal pulses. Heart sounds: Normal heart sounds. Pulmonary:      Breath sounds: No wheezing or rhonchi. Comments: Diminished breath sounds   Abdominal:      General: There is no distension. Palpations: Abdomen is soft. Tenderness: There is abdominal tenderness. Genitourinary:     Comments: No askew  Musculoskeletal:         General: Tenderness present. No deformity. Cervical back: Normal range of motion. Skin:     General: Skin is warm and dry. Neurological:      General: No focal deficit present. Mental Status: She is alert and oriented to person, place, and time. Sensory: No sensory deficit. Psychiatric:         Mood and Affect: Mood normal.         Thought Content:  Thought content normal.         Past Medical History:     Past Medical History:   Diagnosis Date    Arthritis     Bronchitis     On ICS-LABA, denies asthma, copd    [facility]F ([facility]estive heart failure) [address] [address]     COVID-19     diagnosed in September 2020, hospitilized on oxgyen    Fibromyalgia     GERD (gastroesophageal reflux disease)     Gout 10/2019    History of heart attack     HLD (hyperlipidemia)     Hypertension     Insomnia     Osteoarthritis        Past Surgical  History:     Past Surgical History:   Procedure Laterality Date    BACK SURGERY      CHOLECYSTECTOMY, LAPAROSCOPIC      HYSTERECTOMY, TOTAL ABDOMINAL (CERVIX REMOVED)      KNEE ARTHROSCOPY Right     KNEE SURGERY Left 2009    NECK SURGERY      SHOULDER SURGERY Right 2009       Medications:      allopurinol  300 mg Oral Daily    aspirin  81 mg Oral Daily    atorvastatin  80 mg Oral Daily    buPROPion  300 mg Oral QAM    clopidogrel  75 mg Oral Daily    DULoxetine  120 mg Oral Daily    hydrALAZINE  25 mg Oral BID    isosorbide dinitrate  10 mg Oral BID    levothyroxine  50 mcg Oral Daily    [Held by provider] meloxicam  15 mg Oral Daily    metoprolol tartrate  50 mg Oral BID    montelukast  10 mg Oral Nightly    oxybutynin  5 mg Oral BID    pantoprazole  40 mg Oral Daily    spironolactone  25 mg Oral Daily    sucralfate  1 g Oral QAM    sucralfate  2 g Oral Nightly    budesonide-formoterol  2 puff Inhalation BID    traZODone  150 mg Oral Nightly    sodium chloride flush  5-40 mL IntraVENous 2 times per day    enoxaparin  40 mg SubCUTAneous BID    bumetanide  2 mg IntraVENous Daily       Social History:     Social History     Socioeconomic History    Marital status: Single     Spouse name: Not on file    Number of children: Not on file    Years of education: Not on file    Highest education level: Not on file   Occupational History    Not on file   Tobacco Use    Smoking status: Never Smoker    Smokeless tobacco: Never Used   Vaping Use    Vaping Use: Never used   Substance and Sexual Activity    Alcohol use: No    Drug use: No    Sexual activity: Not on file   Other Topics Concern    Not on file   Social History Narrative    Not on file     Social Determinants of Health     Financial Resource Strain: Low Risk     Difficulty of Paying Living Expenses: Not hard at all   Food Insecurity: No Food Insecurity    Worried About Running Out of Food in the Last Year: Never true    Brianna of Food in the Last Year: Never true   Transportation Needs:     Lack of Transportation (Medical): Not on file    Lack of Transportation (Non-Medical):  Not on file   Physical Activity:     Days of Exercise per Week: Not on file    Minutes of Exercise per Session: Not on file   Stress:     Feeling of Stress : Not on file   Social Connections:     Frequency of Communication with Friends and Family: Not on file    Frequency of Social Gatherings with Friends and Family: Not on file    Attends Confucianism Services: Not on file    Active Member of Clubs or Organizations: Not on file    Attends Club or Organization Meetings: Not on file    Marital Status: Not on file   Intimate Partner Violence:     Fear of Current or Ex-Partner: Not on file    Emotionally Abused: Not on file    Physically Abused: Not on file    Sexually Abused: Not on file   Housing Stability:     Unable to Pay for Housing in the Last Year: Not on file    Number of [place] in the Last Year: Not on file    Unstable Housing in the Last Year: Not on file       Family History:     Family History   Problem Relation Age of Onset    Other Mother     Diabetes Mother     Heart Disease Mother     Arthritis Mother     Kidney Disease Mother     Lupus Mother     Arthritis Sister     Diabetes Brother     Asthma Brother     Cancer Maternal Grandfather     Hypertension Sister     Breast Cancer Sister         35s    Breast Cancer Niece         32-31      Medical Decision Making:   I have independently reviewed/ordered the following labs:    CBC with Differential:   Recent Labs     06/13/22  1745 06/14/22  0455   WBC 6.6 5.6   HGB 10.6* 9.1*   HCT 34.6* 29.8*    224   LYMPHOPCT 11* 19*   MONOPCT 8 11     BMP:  Recent Labs     06/13/22  1745 06/14/22  0455    140   K 4.9 4.3    103   CO2 25 24   BUN 17 16   CREATININE 1.20* 1.17*     Hepatic Function Panel:   Recent Labs     06/14/22  0455   PROT 6.4   LABALBU 3.4*   BILITOT 0.18*   ALKPHOS 136*   ALT 10   AST 11     No results for input(s): RPR in the last 72 hours. No results for input(s): HIV in the last 72 hours. No results for input(s): BC in the last 72 hours.   Lab Results   Component Value Date    CREATININE 1.17 06/14/2022    GLUCOSE 144 06/14/2022 Detailed results: Thank you for allowing us to participate in the care of this patient. Please call with questions. This note is created with the assistance of a speech recognition program.  While intending to generate adocument that actually reflects the content of the visit, the document can still have some errors including those of syntax and sound a like substitutions which may escape proof reading. It such instances, actual meaningcan be extrapolated by contextual diversion. Pedro Meneses  Office: [phone]  Perfect serve / office [phone]          I have discussed the care of the patient, including pertinent history and exam findings,  with the resident. I have seen and examined the patient and the key elements of all parts of the encounter have been performed by me. I agree with the assessment, plan and orders as documented by the resident.     Jasmine Holliday, Infectious Diseases

## 2022-06-14 NOTE — ED NOTES
The following labs labeled with pt sticker and tubed to lab:     [] Blue     [] Lavender   [] on ice  [] Green/yellow  [] Green/black [] on ice  [] Yellow  [] Red  [] Pink      [x] COVID-19 swab    [x] Rapid  [] PCR  [] Flu swab  [] Peds Viral Panel     [] Urine Sample  [] Pelvic Cultures  [] Blood Cultures            Nabila León RN  06/13/22 2215

## 2022-06-14 NOTE — CONSULTS
[facility]   Consult Note         Today's Date: 6/14/2022  Patient Name: Munira Warner  Date of admission: 6/13/2022  4:30 PM  Patient's age: 60 y.o., 1965  Admission Dx: Chest pain [R07.9]  Chest pain, unspecified type [R07.9]  COVID-19 [U07.1]    Reason for Consult:  Cardiac evaluation    Requesting Physician: No admitting provider for patient encounter. REASON FOR CONSULT:  chest pain    History Obtained From:  Patient, chart, staff, records    HISTORY OF PRESENT ILLNESS:      The patient is a 60 y.o. female who is admitted to the hospital for Chest Pain  Munira Warner complains of chest pain. Onset was 3 days ago. Symptoms have been stable since that time. The patient's pain is constant. The patient describes the pain as pressure and radiates to the left shoulder. Patient rates pain as a 10/10 in intensity. Associated  flulike symptoms on with abrupt worsening myalgias, fatigue, headache, cough. Aggravating factors are: none. Alleviating factors are: none. Patient's cardiac risk factors are: dyslipidemia, hypertension, obesity (BMI >= 30 kg/m2) and sedentary lifestyle. Patient's risk factors for DVT/PE: none. Previous cardiac testing: echocardiogram, electrocardiogram (ECG) and exercise thallium stress test.   PMH chronic pain syndrome, morbid obesity, chronic systolic/ diastolic heart failure, cad s/p prior mi, htn   At the ED, she was Febrile 100.8, bp 153/60, on room air, BMI 52, Cr 1.17  , trop 11. Started on aspirin, plavix, hydralazine, spironolactone, metoprolol, isosorbide. Past Medical History:   has a past medical history of Arthritis, Bronchitis, CHF (congestive heart failure) ([address] COVID-19, Fibromyalgia, GERD (gastroesophageal reflux disease), Gout, History of heart attack, HLD (hyperlipidemia), Hypertension, Insomnia, and Osteoarthritis.     Past Surgical History:   has a past surgical history that includes back surgery; shoulder surgery (Right, 2009); knee surgery (Left, 2009); Cholecystectomy, laparoscopic; Knee arthroscopy (Right); Hysterectomy, total abdominal; and Neck surgery. Home Medications:    Prior to Admission medications    Medication Sig Start Date End Date Taking? Authorizing Provider   nirmatrelvir/ritonavir (PAXLOVID) 20 x 150 MG & 10 x 100MG Take 3 tablets (two 150 mg nirmatrelvir and one 100 mg ritonavir tablets) by mouth every 12 hours for 5 days. 6/13/22 6/18/22  ASA Evans CNP   meloxicam [facility]) 15 MG tablet Take 1 tablet by mouth daily 6/1/22   ASA Evans CNP   tiZANidine (ZANAFLEX) 4 MG tablet Take 1 tablet by mouth 3 times daily as needed (pain) 5/26/22   ASA Evans CNP   levothyroxine (SYNTHROID) 50 MCG tablet Take 1 tablet by mouth Daily 5/25/22 6/24/22  ASA Evans CNP   diazePAM (VALIUM) 5 MG tablet TAKE 1 TABLET BY MOUTH ONCE FOR 1 DOSE, TAKE 30 MINUTES PRIOR TO MRI. WILL NEED SOMEONE TO DRIVE.  5/12/22   Historical Provider, MD   pantoprazole (PROTONIX) 40 MG tablet Take 1 tablet by mouth daily 5/18/22   ASA Evans CNP   metoprolol tartrate (LOPRESSOR) 50 MG tablet Take 1 tablet by mouth 2 times daily 5/18/22   ASA Evans CNP   bumetanide (BUMEX) 1 MG tablet Take 2 tablets by mouth daily 5/18/22   ASA Evans CNP   DULoxetine (CYMBALTA) 60 MG extended release capsule Take 2 capsules by mouth daily 5/18/22   ASA Evans CNP   montelukast (SINGULAIR) 10 MG tablet Take 1 tablet by mouth nightly 5/18/22   ASA Evans CNP   allopurinol (ZYLOPRIM) 300 MG tablet Take 1 tablet by mouth daily 5/18/22   ASA Evans CNP   hydrALAZINE (APRESOLINE) 25 MG tablet Take 1 tablet by mouth 2 times daily 5/18/22   ASA Evans CNP   isosorbide dinitrate (ISORDIL) 10 MG tablet Take 1 tablet by mouth 2 times daily 5/18/22   ASA Evans CNP   buPROPion (WELLBUTRIN XL) 300 MG extended release tablet Take 1 tablet by mouth every morning 5/18/22   May ASA Sharma CNP   spironolactone (ALDACTONE) 25 MG tablet Take 1 tablet by mouth daily 5/18/22   May ASA Sharma CNP   oxybutynin (DITROPAN) 5 MG tablet Take 1 tablet by mouth 2 times daily 5/18/22   May ASA Sharma CNP   traZODone (DESYREL) 150 MG tablet Take 1 tablet by mouth nightly 5/12/22   May ASA Shrama CNP   clopidogrel (PLAVIX) 75 MG tablet Take 1 tablet by mouth daily 5/12/22   May ASA Sharma CNP   SYMBICORT 80-4.5 MCG/ACT AERO Inhale 2 puffs into the lungs 2 times daily 5/12/22 6/11/22  May ASA Sharma CNP   albuterol sulfate  (90 Base) MCG/ACT inhaler Inhale 2 puffs into the lungs every 4 hours as needed for Shortness of Breath 5/12/22   May ASA Sharma CNP   atorvastatin (LIPITOR) 80 MG tablet Take 1 tablet by mouth daily 5/12/22   May ASA Sharma CNP   polyethylene glycol (GLYCOLAX) 17 g packet polyethylene glycol 3350 17 gram/dose oral powder 5/11/21   [facility] ASA Stearns CNP   aspirin 81 MG EC tablet Take 81 mg by mouth    Historical Provider, MD   sucralfate (CARAFATE) 1 GM tablet Take 1 tablet in AM, 2 tablet in evening daily 12/9/20   Dominguez Randall PA-C   albuterol (PROVENTIL) (2.5 MG/3ML) 0.083% nebulizer solution Take 3 mLs by nebulization every 6 hours as needed for Wheezing 10/15/20   Dominguez Randall PA-C   Handicap Placard MISC by Does not apply route 2 years 8/13/20   Dominguez Randall PA-C   Respiratory Therapy Supplies (NEBULIZER COMPRESSOR) KIT Provide insurance covered nebulizer, use daily as needed 9/16/19 12/6/21  Dominguez Randall PA-C     allopurinol, 300 mg, Oral, Daily    aspirin, 81 mg, Oral, Daily    atorvastatin, 80 mg, Oral, Daily    bumetanide, 2 mg, Oral, Daily    buPROPion, 300 mg, Oral, QAM    clopidogrel, 75 mg, Oral, Daily    DULoxetine, 120 mg, Oral, Daily    hydrALAZINE, 25 mg, Oral, BID    isosorbide dinitrate, 10 mg, Oral, BID    levothyroxine, 50 mcg, Oral, Daily    [Held by provider] meloxicam, 15 mg, Oral, Daily    metoprolol tartrate, 50 mg, Oral, BID    montelukast, 10 mg, Oral, Nightly    oxybutynin, 5 mg, Oral, BID    pantoprazole, 40 mg, Oral, Daily    spironolactone, 25 mg, Oral, Daily    sucralfate, 1 g, Oral, QAM    sucralfate, 2 g, Oral, Nightly    budesonide-formoterol, 2 puff, Inhalation, BID    traZODone, 150 mg, Oral, Nightly    sodium chloride flush, 5-40 mL, IntraVENous, 2 times per day    enoxaparin, 40 mg, SubCUTAneous, BID      Allergies:  Entresto [sacubitril-valsartan], Food, Gabapentin, Lyrica [pregabalin], and Tetracyclines & related    Social History:   reports that she has never smoked. She has never used smokeless tobacco. She reports that she does not drink alcohol and does not use drugs. Family History: family history includes Arthritis in her mother and sister; Asthma in her brother; Breast Cancer in her niece and sister; Cancer in her maternal grandfather; Diabetes in her brother and mother; Heart Disease in her mother; Hypertension in her sister; Kidney Disease in her mother; Lupus in her mother; Other in her mother. No h/o sudden cardiac death. REVIEW OF SYSTEMS:    · Constitutional: there has been no unanticipated weight loss. · Eyes: No visual changes or diplopia. No scleral icterus. · ENT: No Headaches, hearing loss or vertigo. No mouth sores or sore throat. · Cardiovascular: per HPI  · Respiratory: per HPI  · Gastrointestinal: Loose stool, but no diarrhea. No abdominal pain,blood in stools. · Genitourinary: No dysuria, trouble voiding, or hematuria. · Musculoskeletal:  Myalgias and arthralgia. No gait disturbance, No weakness  · Integumentary: No rash or pruritis. · Neurological: No headache, diplopia, change in muscle strength, numbness or tingling. No change in gait, balance, coordination, mood, affect, memory, mentation, behavior.   · Psychiatric: No anxiety, or depression. · Endocrine: No temperature intolerance. No excessive thirst, fluid intake, or urination. No tremor. · Hematologic/Lymphatic: No abnormal bruising or bleeding, blood clots or swollen lymph nodes. · Allergic/Immunologic:  nasal congestion      PHYSICAL EXAM:      BP (!) 130/90   Pulse 82   Temp 98.2 °F (36.8 °C) (Oral)   Resp 22   Ht 5' 8\" (1.727 m)   Wt (!) 345 lb (156.5 kg)   SpO2 97%   BMI 52.46 kg/m²    Constitutional and General Appearance: alert, cooperative, no distress and appears stated age  HEENT: PERRL, no cervical lymphadenopathy. No masses palpable. Normal oral mucosa  Respiratory:  · Normal excursion and expansion without use of accessory muscles  · Resp Auscultation: Good respiratory effort. No for increased work of breathing. On auscultation: clear to auscultation bilaterally  Cardiovascular:  · The apical impulse is not displaced  · Heart tones are crisp and normal. regular S1 and S2.  · Jugular venous pulsation Normal  · The carotid upstroke is normal in amplitude and contour without delay or bruit  · Peripheral pulses are symmetrical and full   Abdomen:   · No masses or tenderness  · Bowel sounds present  Extremities:  ·  No Cyanosis or Clubbing  ·  Lower extremity edema: No  ·  Skin: Warm and dry  Neurological:  · Alert and oriented. · Moves all extremities well  · No abnormalities of mood, affect, memory, mentation, or behavior are noted        EKG:    Date: 06/14/22  Reading:   Regular rate and rhytym Hr 93  Axis normal, Wide QRS at 124ms  QTc 452. No ST elevations, depressions or T-wave inversion noted      LAST ECHO:  ECHO 11/18/2020: EF 30-35% with global hypokinesia, grade II DD, no regurg/stenosis. ECHO 10/2/19: EF 42%, grade I DD, mild MR, trivial TR. Prior Echo from [place] reviewed with LVEF 25-30% and global hypokinesis (Per KW's note)    No previous stress test or angiography.      Labs:     CBC:   Recent Labs     06/13/22  1745 06/14/22  0455   WBC 6.6 5. 6   HGB 10.6* 9.1*   HCT 34.6* 29.8*    224     BMP:   Recent Labs     06/13/22  1745 06/14/22  0455    140   K 4.9 4.3   CO2 25 24   BUN 17 16   CREATININE 1.20* 1.17*   LABGLOM 46* 48*   GLUCOSE 69* 144*     BNP: No results for input(s): BNP in the last 72 hours. PT/INR: No results for input(s): PROTIME, INR in the last 72 hours. APTT:No results for input(s): APTT in the last 72 hours. CARDIAC ENZYMES:No results for input(s): CKTOTAL, CKMB, CKMBINDEX, TROPONINI in the last 72 hours.   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, insomnia type ([facility]    Opioid dependence, uncomplicated (HCC)    Sacroiliac joint dysfunction of both sides    Chronic pain disorder    Bulge of cervical disc without myelopathy    DDD (degenerative disc disease), lumbar    Failed back syndrome of cervical spine    Chest pain    Class 3 severe obesity due to excess calories with serious comorbidity and body mass index (BMI) of 45.0 to 49.9 in adult [facility])    Cervical spondylosis with radiculopathy    Status post cervical spinal fusion    History of lumbar fusion    Lumbar radiculopathy    Morbid obesity with BMI of 50.0-59.9, adult (HCC)    Chronic pain of both knees    Myalgia    Chronic use of opiate drug for therapeutic purpose    COVID    Hypertension    Gout    GERD (gastroesophageal reflux disease)    Acute on chronic combined systolic and diastolic congestive heart failure (HCC)    Other proteinuria    Nausea and vomiting    Chronic pain    PTSD (post-traumatic stress disorder)    Hyperuricemia    Mild persistent asthma without complication    CHF (congestive heart failure) (HCC)    Chronic bronchitis, unspecified chronic bronchitis type ([address]    Major depressive disorder, recurrent severe without psychotic features ([facility] [address]    Headache    Suspected sleep apnea           IMPRESSION & Recommendations:    1. Atypical chest pain, no evidence of ACS   2. COVID-19 infection   3. Essential hypertension  4. Acute on chronic systolic heart failure  5. Cardiomyopathy, unclear etiology, diagnosed several years ago at [place], last LVEF 30-35%. 2D echocardiogram 11/08/2020, moderately reduced LV systolic function with EF 30-35%, global hypokinesia, grade 2 diastolic dysfunction, no significant valve abnormality  6. Morbid obesity  7. Fibromyalgia  8. Hyperlipidemia  9. CKD    Plan:     1. OK to resume home medications  2. Agree with Echo  3. Continue Bumex, spironolactone, hydralazine, isordil, lopressor. 4. Continue atorvastatin for HLD  5. Monitor electrolytes. Keep K+ between 4-5      Discussed with patient, family, and Nurse. Electronically signed by Gregorio Ontiveros MD on 6/14/2022 at 6:39 AM    Gregorio Ontiveros MD  Internal Medicine Resident, PGY- [address], [place]  6/14/2022, 12:39 PM      Attending Physician Statement  I have discussed the care of Luz Marina Mendoza, including pertinent history and exam findings,  with the cardiology fellow/resident. I have seen the patient and the key elements of all parts of the encounter have been performed by me. I have completed at least one if not all key elements of the E/M (history, physical exam, and MDM). I agree with the assessment, plan and orders as documented by the resident with additional recommendations as below:     Acute on chronic HFrEF secondary to COVID 19 PNA. Agree with iv diuresis x today and then switch to po tomorrow. Resume BB, Hydralazine and isordil. Patient has known CMP for several years, likely Nonischemic, diagnosed at [facility]. Will repeat TTE and plan ischemia work up as OP.         Shantanu Dc MD, [facility]

## 2022-06-14 NOTE — PROGRESS NOTES
Pharmacy Note     Enoxaparin Dose Adjustment    Chapito Malave is a 60 y.o. female. Pharmacist assessment of enoxaparin dose for VTE prophylaxis. Recent Labs     06/13/22  1745   BUN 17       Recent Labs     06/13/22  1745   CREATININE 1.20*       Estimated Creatinine Clearance: 81 mL/min (A) (based on SCr of 1.2 mg/dL (H)). Estimated CrCl using Ideal Body Weight: 51.53 mL/min (based on IBW 63.88 kg)    Height:   Ht Readings from Last 1 Encounters:   06/13/22 5' 8\" (1.727 m)     Weight:  Wt Readings from Last 1 Encounters:   06/13/22 (!) 345 lb (156.5 kg)       The following enoxaparin dose has been adjusted based upon renal function and/or patient weight per P&T Guidelines:             Lovenox 30 mg twice daily changed to 40 mg twice daily.     Neisha Brand [place]  6/14/2022 1:33 AM

## 2022-06-14 NOTE — H&P
myalgias with fatigue, headache, cough, chest tightness with chest pain. Patient describes severe chest tightness \"real tight midsternum\". Denies radiation. Worsening with coughing episodes with occasional phlegm productive cough, denies hemoptysis. + Dizziness with lightheadedness with near syncope but denies collapse falls or injuries. Does struggle with ambulation at home with chronic pain syndrome. Describes worsening myalgias and arthralgias of the past few days. Poor p.o. intake.  + Loose stools with nausea. Worsening acute on chronic abdominal pain with cramping pain. Past Medical History:     Past Medical History:   Diagnosis Date    Arthritis     Bronchitis     On ICS-LABA, denies asthma, copd    CHF (congestive heart failure) ([facility] [address]     COVID-19     diagnosed in September 2020, hospitilized on oxgyen    Fibromyalgia     GERD (gastroesophageal reflux disease)     Gout 10/2019    History of heart attack     HLD (hyperlipidemia)     Hypertension     Insomnia     Osteoarthritis         Past Surgical History:     Past Surgical History:   Procedure Laterality Date    BACK SURGERY      CHOLECYSTECTOMY, LAPAROSCOPIC      HYSTERECTOMY, TOTAL ABDOMINAL (CERVIX REMOVED)      KNEE ARTHROSCOPY Right     KNEE SURGERY Left 2009    NECK SURGERY      SHOULDER SURGERY Right 2009        Medications Prior to Admission:     Prior to Admission medications    Medication Sig Start Date End Date Taking? Authorizing Provider   nirmatrelvir/ritonavir (PAXLOVID) 20 x 150 MG & 10 x 100MG Take 3 tablets (two 150 mg nirmatrelvir and one 100 mg ritonavir tablets) by mouth every 12 hours for 5 days.  6/13/22 6/18/22  Marien Homans, APRN - CNP   meloxicam [facility]) 15 MG tablet Take 1 tablet by mouth daily 6/1/22   Marien Homans, APRN - CNP   tiZANidine (ZANAFLEX) 4 MG tablet Take 1 tablet by mouth 3 times daily as needed (pain) 5/26/22   Marien Homans, APRN - CNP   levothyroxine (SYNTHROID) 50 MCG tablet Take 1 tablet by mouth Daily 5/25/22 6/24/22  Marien Homans, APRN - CNP   diazePAM (VALIUM) 5 MG tablet TAKE 1 TABLET BY MOUTH ONCE FOR 1 DOSE, TAKE 30 MINUTES PRIOR TO MRI. WILL NEED SOMEONE TO DRIVE.  5/12/22   Historical Provider, MD   pantoprazole (PROTONIX) 40 MG tablet Take 1 tablet by mouth daily 5/18/22   Marien Homans, APRN - CNP   metoprolol tartrate (LOPRESSOR) 50 MG tablet Take 1 tablet by mouth 2 times daily 5/18/22   Marien Homans, APRN - CNP   bumetanide (BUMEX) 1 MG tablet Take 2 tablets by mouth daily 5/18/22   Marien Homans, APRN - CNP   DULoxetine (CYMBALTA) 60 MG extended release capsule Take 2 capsules by mouth daily 5/18/22   Marien Homans, APRN - CNP   montelukast (SINGULAIR) 10 MG tablet Take 1 tablet by mouth nightly 5/18/22   Marien Homans, APRN - CNP   allopurinol (ZYLOPRIM) 300 MG tablet Take 1 tablet by mouth daily 5/18/22   Marien Homans, APRN - CNP   hydrALAZINE (APRESOLINE) 25 MG tablet Take 1 tablet by mouth 2 times daily 5/18/22   Marien Homans, APRN - CNP   isosorbide dinitrate (ISORDIL) 10 MG tablet Take 1 tablet by mouth 2 times daily 5/18/22   Marien Homans, APRN - CNP   buPROPion (WELLBUTRIN XL) 300 MG extended release tablet Take 1 tablet by mouth every morning 5/18/22   Marien Homans, APRN - CNP   spironolactone (ALDACTONE) 25 MG tablet Take 1 tablet by mouth daily 5/18/22   Marien Homans, APRN - CNP   oxybutynin (DITROPAN) 5 MG tablet Take 1 tablet by mouth 2 times daily 5/18/22   Marien Homans, APRN - CNP   traZODone (DESYREL) 150 MG tablet Take 1 tablet by mouth nightly 5/12/22   Marien Homans, APRN - CNP   clopidogrel (PLAVIX) 75 MG tablet Take 1 tablet by mouth daily 5/12/22   Marien Homans, APRN - CNP   SYMBICORT 80-4.5 MCG/ACT AERO Inhale 2 puffs into the lungs 2 times daily 5/12/22 6/11/22  Marien Homans, APRN - CNP   albuterol sulfate  (90 Base) MCG/ACT inhaler Inhale 2 puffs into the lungs every 4 hours as needed for Shortness of Breath 5/12/22   ASA Littlejohn CNP   atorvastatin (LIPITOR) 80 MG tablet Take 1 tablet by mouth daily 5/12/22   ASA Littlejohn CNP   polyethylene glycol (GLYCOLAX) 17 g packet polyethylene glycol 3350 17 gram/dose oral powder 5/11/21   ASA Sheehan CNP   aspirin 81 MG EC tablet Take 81 mg by mouth    Historical Provider, MD   sucralfate (CARAFATE) 1 GM tablet Take 1 tablet in AM, 2 tablet in evening daily 12/9/20   Jeffery Berg PA-C   albuterol (PROVENTIL) (2.5 MG/3ML) 0.083% nebulizer solution Take 3 mLs by nebulization every 6 hours as needed for Wheezing 10/15/20   Jeffery Berg PA-C   Handicap Placard MISC by Does not apply route 2 years 8/13/20   Jeffery Berg PA-C   Respiratory Therapy Supplies ([facility]) KIT Provide insurance covered nebulizer, use daily as needed 9/16/19 12/6/21  Jeffery Berg PA-C        Allergies:     Delories Murders [sacubitril-valsartan], Food, Gabapentin, Lyrica [pregabalin], and Tetracyclines & related    Social History:     Tobacco:    reports that she has never smoked. She has never used smokeless tobacco.  Alcohol:      reports no history of alcohol use. Drug Use:  reports no history of drug use. Patient does use a scooter for ambulation, does use a cane on occasion, denies tobacco, denies binge drinking or excessive alcohol, denies illegal drugs, denies travel. Family History:     Family History   Problem Relation Age of Onset    Other Mother     Diabetes Mother     Heart Disease Mother     Arthritis Mother     Kidney Disease Mother     Lupus Mother     Arthritis Sister     Diabetes Brother     Asthma Brother     Cancer Maternal Grandfather     Hypertension Sister     Breast Cancer Sister         28s    Breast Cancer Niece         32-31   Mom does have lupus    Review of Systems:     Positive and Negative as described in HPI.     Review of Systems   Patient has been formally evaluated for lupus and other connective tissue/autoimmune disorders and testing has been negative. patient does have chronic pain syndrome previously followed at [facility] pain center but was dissatisfied when her primary pain management doctor retired and was replaced by a new provider who she did not like. [facility] She does take opiates at home for her chronic pain syndrome but noted worsening diffuse myalgias with headaches over the past few days. Was previously treated for COVID-19 in  requiring oxygen at discharge with acute hypoxic respiratory failure. Was weaned off oxygen within a few weeks after discharge. Did receive COVID-vaccine. Denies booster. Denies easy bruising or bleeding issues. Was previously advised and suspected for sleep apnea but has not followed up for formal testing. Denies history of stroke DVT PE diabetes. Denies easy bruising or bleeding issues. Denies melena or bright red blood per rectum. Denies palpitations or irregular heartbeat. Denies urinary symptoms, no dysuria hematuria . review of systems otherwise negative, 12 system review and otherwise unremarkable      Physical Exam:   BP (!) 130/90   Pulse 82   Temp 98.2 °F (36.8 °C) (Oral)   Resp 22   Ht 5' 8\" (1.727 m)   Wt (!) 345 lb (156.5 kg)   SpO2 97%   BMI 52.46 kg/m²   Temp (24hrs), Av.3 °F (37.4 °C), Min:98.2 °F (36.8 °C), Max:100.8 °F (38.2 °C)    No results for input(s): POCGLU in the last 72 hours.   No intake or output data in the 24 hours ending 22 0151    Physical Exam   General awake alert pleasant sitting up in bed watching TV appropriate, follows commands  Eye exam pupils equally round reactive sclera nonicteric normal horizontal gaze  ENT oropharynx with moist mucous membranes face symmetric neck supple  Cardiac regular rate and rhythm normal S1-S2 no murmurs rubs rubs or gallops, no JVD but limited with thick neck  Lungs poor respiratory effort, diminished bilaterally with mild resting tachypnea, nonlabored no accessory muscle use, no wheezing or rhonchi  GI soft obese nontender nondistended bowel sounds present  Vascular intact dorsalis pedis and posterior tibialis with difficulty to discern adipose tissue from edema however no obvious significant edema in feet or ankles  Derm adequate foot hygiene, no rashes lesions or skin infections  Musculoskeletal Limited with diffuse myalgias with range of motion, no synovitis or joint effusions  Neuro Limited with limited range of motion from myalgias, no focal deficits, normal speech and cognition strength symmetric in upper and lower limbs with poor effort, sensation grossly intact    Investigations:      Laboratory Testing:  Recent Results (from the past 24 hour(s))   EKG 12 Lead    Collection Time: 06/13/22  4:51 PM   Result Value Ref Range    Ventricular Rate 93 BPM    Atrial Rate 93 BPM    P-R Interval 140 ms    QRS Duration 124 ms    Q-T Interval 364 ms    QTc Calculation (Bazett) 452 ms    P Axis 70 degrees    T Axis 114 degrees   CBC with Auto Differential    Collection Time: 06/13/22  5:45 PM   Result Value Ref Range    WBC 6.6 3.5 - 11.3 k/uL    RBC 3.69 (L) 3.95 - 5.11 m/uL    Hemoglobin 10.6 (L) 11.9 - 15.1 g/dL    Hematocrit 34.6 (L) 36.3 - 47.1 %    MCV 93.8 82.6 - 102.9 fL    MCH 28.7 25.2 - 33.5 pg    MCHC 30.6 28.4 - 34.8 g/dL    RDW 15.6 (H) 11.8 - 14.4 %    Platelets 670 516 - 765 k/uL    MPV 10.6 8.1 - 13.5 fL    NRBC Automated 0.0 0.0 per 100 WBC    Seg Neutrophils 78 (H) 36 - 65 %    Lymphocytes 11 (L) 24 - 43 %    Monocytes 8 3 - 12 %    Eosinophils % 2 1 - 4 %    Basophils 0 0 - 2 %    Immature Granulocytes 1 (H) 0 %    Segs Absolute 5.10 1.50 - 8.10 k/uL    Absolute Lymph # 0.74 (L) 1.10 - 3.70 k/uL    Absolute Mono # 0.54 0.10 - 1.20 k/uL    Absolute Eos # 0.15 0.00 - 0.44 k/uL    Basophils Absolute <0.03 0.00 - 0.20 k/uL    Absolute Immature Granulocyte 0.03 0.00 - 0.30 k/uL RBC Morphology ANISOCYTOSIS PRESENT    Basic Metabolic Panel    Collection Time: 06/13/22  5:45 PM   Result Value Ref Range    Glucose 69 (L) 70 - 99 mg/dL    BUN 17 6 - 20 mg/dL    CREATININE 1.20 (H) 0.50 - 0.90 mg/dL    Calcium 8.9 8.6 - 10.4 mg/dL    Sodium 136 135 - 144 mmol/L    Potassium 4.9 3.7 - 5.3 mmol/L    Chloride 101 98 - 107 mmol/L    CO2 25 20 - 31 mmol/L    Anion Gap 10 9 - 17 mmol/L    GFR Non-African American 46 (L) >60 mL/min    GFR  56 (L) >60 mL/min    GFR Comment         Troponin    Collection Time: 06/13/22  5:45 PM   Result Value Ref Range    Troponin, High Sensitivity 11 0 - 14 ng/L   Brain Natriuretic Peptide    Collection Time: 06/13/22  5:45 PM   Result Value Ref Range    Pro- (H) <300 pg/mL   Urinalysis with Microscopic    Collection Time: 06/13/22  5:53 PM   Result Value Ref Range    Color, UA Yellow Yellow    Turbidity UA Clear Clear    Glucose, Ur NEGATIVE NEGATIVE    Bilirubin Urine NEGATIVE NEGATIVE    Ketones, Urine NEGATIVE NEGATIVE    Specific Gravity, UA 1.018 1.005 - 1.030    Urine Hgb NEGATIVE NEGATIVE    pH, UA 7.0 5.0 - 8.0    Protein, UA NEGATIVE NEGATIVE    Urobilinogen, Urine Normal Normal    Nitrite, Urine NEGATIVE NEGATIVE    Leukocyte Esterase, Urine SMALL (A) NEGATIVE    -          WBC, UA 0 TO 2 0 - 5 /HPF    RBC, UA 0 TO 2 0 - 4 /HPF    Epithelial Cells UA 2 TO 5 0 - 5 /HPF   Troponin    Collection Time: 06/13/22  7:05 PM   Result Value Ref Range    Troponin, High Sensitivity 11 0 - 14 ng/L   COVID-19, Rapid    Collection Time: 06/13/22  9:03 PM    Specimen: Nasopharyngeal Swab   Result Value Ref Range    Specimen Description . NASOPHARYNGEAL SWAB     SARS-CoV-2, Rapid DETECTED (A) Not Detected       Imaging/Diagnostics:  XR CHEST PORTABLE    Result Date: 6/13/2022  Mild cardiomegaly with pulmonary vascular congestion.        Assessment :      Hospital Problems           Last Modified POA    * (Principal) Chest pain 6/13/2022 Yes    COVID 6/14/2022 Yes    Headache 6/14/2022 Yes    Suspected sleep apnea 6/14/2022 Yes    Low back pain 6/14/2022 Yes    Opioid dependence, uncomplicated ([facility] [address] 0/06/3533 Yes    Class 3 severe obesity due to excess calories with serious comorbidity and body mass index (BMI) of 45.0 to 49.9 in adult [facility]) (Chronic) 6/14/2022 Yes    Myalgia 6/14/2022 Yes    GERD (gastroesophageal reflux disease) 6/14/2022 Yes    Acute on chronic combined systolic and diastolic congestive heart failure ([facility] [address] 6/14/2022 Yes    Chronic pain 6/14/2022 Yes          Plan:     Admit to the hospital service  Atypical chest pain/ pleurisy with flu like symptoms related to covid 19 viral infection. Continue to monitor closely as patient high risk for developing worsening respiratory failure associated with covid with history of chf, morbid obesity, htn. Consult ID to evaluate further treatment. Previously recommended for paxlovid but didn't fill prescription. Pending cardio to evaluate with history of mi, prior cad/ischemic cmp. Continue cardiac regimen. Check echo. Acute on chronic pain syndrome likely myalgias/ headache associated with covid infection. Continue norco per home regimen. Avoid iv opiates if possible. D/w patient avoiding opiates for headache, causing rebound headaches discussed. Continue other supportive rx. F/u with pain management as previously recommended  Acute weakness. Consult therapy to evaluate dc needs  Suspect carmen- consider outpatient sleep study d/w patient. Previously recommended, patient aware. 6. Iron deficiency anemia.  hgb stable. Denies obvious bleeding issues. Continue gi prophylaxis. Consider further outpatient wkup if appropriate. Anticipate dc in 2-3 days contingent on clinical progress. D/w nsg. Questions/concerns d/w patient in detail. Plan of care discussed.       Consultations:   IP CONSULT TO HOSPITALIST  IP CONSULT TO CARDIOLOGY  IP CONSULT TO PHARMACY    Patient is admitted as inpatient status because of co-morbidities listed above, severity of signs and symptoms as outlined, requirement for current medical therapies and most importantly because of direct risk to patient if care not provided in a hospital setting. Expected length of stay > 48 hours.     Ana Winters MD  6/14/2022  1:51 AM    Copy sent to Dr. Greg Acuna, APRN - CNP

## 2022-06-14 NOTE — ED NOTES
TRANSFER - OUT REPORT:    Verbal report given to Dylon Davison rn on Jessie Tello  being transferred to [phone] for routine progression of patient care       Report consisted of patient's Situation, Background, Assessment and   Recommendations(SBAR). Information from the following report(s) Nurse Handoff Report was reviewed with the receiving nurse.    -Pt bariatric, would benefit from bariatric bed   -Pt admitted for cardiology in AM   -AxO x4, RR even and unlabored, on RA      Lines:   Peripheral IV 06/13/22 Proximal;Right Forearm (Active)        Opportunity for questions and clarification was provided.       Patient transported with:  Tech via stretcher, on [address], RN  06/13/22 [id number]

## 2022-06-15 ENCOUNTER — APPOINTMENT (OUTPATIENT)
Dept: CT IMAGING | Age: 57
DRG: 177 | End: 2022-06-15
Payer: MEDICARE

## 2022-06-15 LAB
ABSOLUTE EOS #: 0.25 K/UL (ref 0–0.44)
ABSOLUTE IMMATURE GRANULOCYTE: 0.03 K/UL (ref 0–0.3)
ABSOLUTE LYMPH #: 1.58 K/UL (ref 1.1–3.7)
ABSOLUTE MONO #: 0.62 K/UL (ref 0.1–1.2)
ANION GAP SERPL CALCULATED.3IONS-SCNC: 12 MMOL/L (ref 9–17)
BASOPHILS # BLD: 0 % (ref 0–2)
BASOPHILS ABSOLUTE: <0.03 K/UL (ref 0–0.2)
BUN BLDV-MCNC: 18 MG/DL (ref 6–20)
C-REACTIVE PROTEIN: 43.2 MG/L (ref 0–5)
CALCIUM SERPL-MCNC: 8.7 MG/DL (ref 8.6–10.4)
CHLORIDE BLD-SCNC: 99 MMOL/L (ref 98–107)
CO2: 27 MMOL/L (ref 20–31)
CREAT SERPL-MCNC: 1.23 MG/DL (ref 0.5–0.9)
EOSINOPHILS RELATIVE PERCENT: 5 % (ref 1–4)
GFR AFRICAN AMERICAN: 54 ML/MIN
GFR NON-AFRICAN AMERICAN: 45 ML/MIN
GFR SERPL CREATININE-BSD FRML MDRD: ABNORMAL ML/MIN/{1.73_M2}
GLUCOSE BLD-MCNC: 105 MG/DL (ref 70–99)
HCT VFR BLD CALC: 32.2 % (ref 36.3–47.1)
HEMOGLOBIN: 10 G/DL (ref 11.9–15.1)
IMMATURE GRANULOCYTES: 1 %
LYMPHOCYTES # BLD: 29 % (ref 24–43)
MCH RBC QN AUTO: 28.2 PG (ref 25.2–33.5)
MCHC RBC AUTO-ENTMCNC: 31.1 G/DL (ref 28.4–34.8)
MCV RBC AUTO: 91 FL (ref 82.6–102.9)
MONOCYTES # BLD: 11 % (ref 3–12)
MYCOPLASMA PNEUMONIAE IGM: 0.22
NRBC AUTOMATED: 0 PER 100 WBC
PDW BLD-RTO: 15.6 % (ref 11.8–14.4)
PLATELET # BLD: 242 K/UL (ref 138–453)
PMV BLD AUTO: 10.2 FL (ref 8.1–13.5)
POTASSIUM SERPL-SCNC: 4.2 MMOL/L (ref 3.7–5.3)
RBC # BLD: 3.54 M/UL (ref 3.95–5.11)
RBC # BLD: ABNORMAL 10*6/UL
SEG NEUTROPHILS: 54 % (ref 36–65)
SEGMENTED NEUTROPHILS ABSOLUTE COUNT: 3.02 K/UL (ref 1.5–8.1)
SODIUM BLD-SCNC: 138 MMOL/L (ref 135–144)
WBC # BLD: 5.5 K/UL (ref 3.5–11.3)

## 2022-06-15 PROCEDURE — 99232 SBSQ HOSP IP/OBS MODERATE 35: CPT | Performed by: STUDENT IN AN ORGANIZED HEALTH CARE EDUCATION/TRAINING PROGRAM

## 2022-06-15 PROCEDURE — 6360000002 HC RX W HCPCS: Performed by: CLINICAL NURSE SPECIALIST

## 2022-06-15 PROCEDURE — 1200000000 HC SEMI PRIVATE

## 2022-06-15 PROCEDURE — 86140 C-REACTIVE PROTEIN: CPT

## 2022-06-15 PROCEDURE — 2580000003 HC RX 258: Performed by: INTERNAL MEDICINE

## 2022-06-15 PROCEDURE — 99232 SBSQ HOSP IP/OBS MODERATE 35: CPT | Performed by: INTERNAL MEDICINE

## 2022-06-15 PROCEDURE — 6370000000 HC RX 637 (ALT 250 FOR IP): Performed by: STUDENT IN AN ORGANIZED HEALTH CARE EDUCATION/TRAINING PROGRAM

## 2022-06-15 PROCEDURE — 2580000003 HC RX 258: Performed by: CLINICAL NURSE SPECIALIST

## 2022-06-15 PROCEDURE — 2500000003 HC RX 250 WO HCPCS: Performed by: INTERNAL MEDICINE

## 2022-06-15 PROCEDURE — 85025 COMPLETE CBC W/AUTO DIFF WBC: CPT

## 2022-06-15 PROCEDURE — 6370000000 HC RX 637 (ALT 250 FOR IP): Performed by: CLINICAL NURSE SPECIALIST

## 2022-06-15 PROCEDURE — 94640 AIRWAY INHALATION TREATMENT: CPT

## 2022-06-15 PROCEDURE — 93970 EXTREMITY STUDY: CPT

## 2022-06-15 PROCEDURE — 36415 COLL VENOUS BLD VENIPUNCTURE: CPT

## 2022-06-15 PROCEDURE — 71250 CT THORAX DX C-: CPT

## 2022-06-15 PROCEDURE — 80048 BASIC METABOLIC PNL TOTAL CA: CPT

## 2022-06-15 RX ORDER — BUMETANIDE 1 MG/1
2 TABLET ORAL DAILY
Status: DISCONTINUED | OUTPATIENT
Start: 2022-06-15 | End: 2022-06-17 | Stop reason: HOSPADM

## 2022-06-15 RX ADMIN — BUDESONIDE AND FORMOTEROL FUMARATE DIHYDRATE 2 PUFF: 80; 4.5 AEROSOL RESPIRATORY (INHALATION) at 20:15

## 2022-06-15 RX ADMIN — METOPROLOL TARTRATE 50 MG: 50 TABLET, FILM COATED ORAL at 20:12

## 2022-06-15 RX ADMIN — PANTOPRAZOLE SODIUM 40 MG: 40 TABLET, DELAYED RELEASE ORAL at 08:42

## 2022-06-15 RX ADMIN — SUCRALFATE 1 G: 1 TABLET ORAL at 08:42

## 2022-06-15 RX ADMIN — GUAIFENESIN DEXTROMETHORPHAN HYDROBROMIDE ORAL SOLUTION 5 ML: 200; 20 SOLUTION ORAL at 10:22

## 2022-06-15 RX ADMIN — BUPROPION HYDROCHLORIDE 300 MG: 150 TABLET, EXTENDED RELEASE ORAL at 10:11

## 2022-06-15 RX ADMIN — MONTELUKAST SODIUM 10 MG: 10 TABLET, FILM COATED ORAL at 20:13

## 2022-06-15 RX ADMIN — CLOPIDOGREL 75 MG: 75 TABLET, FILM COATED ORAL at 10:11

## 2022-06-15 RX ADMIN — ISOSORBIDE DINITRATE 10 MG: 10 TABLET ORAL at 09:00

## 2022-06-15 RX ADMIN — ALLOPURINOL 300 MG: 300 TABLET ORAL at 10:10

## 2022-06-15 RX ADMIN — HYDRALAZINE HYDROCHLORIDE 25 MG: 25 TABLET ORAL at 16:55

## 2022-06-15 RX ADMIN — METOPROLOL TARTRATE 50 MG: 50 TABLET, FILM COATED ORAL at 08:43

## 2022-06-15 RX ADMIN — SODIUM CHLORIDE, PRESERVATIVE FREE 10 ML: 5 INJECTION INTRAVENOUS at 10:15

## 2022-06-15 RX ADMIN — ISOSORBIDE DINITRATE 10 MG: 10 TABLET ORAL at 20:13

## 2022-06-15 RX ADMIN — LEVOTHYROXINE SODIUM 50 MCG: 50 TABLET ORAL at 08:43

## 2022-06-15 RX ADMIN — OXYBUTYNIN CHLORIDE 5 MG: 5 TABLET ORAL at 20:12

## 2022-06-15 RX ADMIN — SODIUM CHLORIDE, PRESERVATIVE FREE 10 ML: 5 INJECTION INTRAVENOUS at 20:14

## 2022-06-15 RX ADMIN — Medication 81 MG: at 10:10

## 2022-06-15 RX ADMIN — SUCRALFATE 2 G: 1 TABLET ORAL at 20:13

## 2022-06-15 RX ADMIN — DULOXETINE HYDROCHLORIDE 120 MG: 30 CAPSULE, DELAYED RELEASE ORAL at 10:12

## 2022-06-15 RX ADMIN — HYDROCODONE BITARTRATE AND ACETAMINOPHEN 1 TABLET: 5; 325 TABLET ORAL at 16:53

## 2022-06-15 RX ADMIN — REMDESIVIR 100 MG: 100 INJECTION, POWDER, LYOPHILIZED, FOR SOLUTION INTRAVENOUS at 16:57

## 2022-06-15 RX ADMIN — OXYBUTYNIN CHLORIDE 5 MG: 5 TABLET ORAL at 10:14

## 2022-06-15 RX ADMIN — HYDROCODONE BITARTRATE AND ACETAMINOPHEN 1 TABLET: 5; 325 TABLET ORAL at 01:00

## 2022-06-15 RX ADMIN — BUDESONIDE AND FORMOTEROL FUMARATE DIHYDRATE 2 PUFF: 80; 4.5 AEROSOL RESPIRATORY (INHALATION) at 09:29

## 2022-06-15 RX ADMIN — HYDROCODONE BITARTRATE AND ACETAMINOPHEN 1 TABLET: 5; 325 TABLET ORAL at 08:47

## 2022-06-15 RX ADMIN — TRAZODONE HYDROCHLORIDE 150 MG: 50 TABLET ORAL at 20:13

## 2022-06-15 RX ADMIN — SODIUM CHLORIDE, PRESERVATIVE FREE 10 ML: 5 INJECTION INTRAVENOUS at 02:11

## 2022-06-15 RX ADMIN — HYDRALAZINE HYDROCHLORIDE 25 MG: 25 TABLET ORAL at 10:13

## 2022-06-15 RX ADMIN — HYDROCODONE BITARTRATE AND ACETAMINOPHEN 1 TABLET: 5; 325 TABLET ORAL at 22:37

## 2022-06-15 RX ADMIN — TIZANIDINE 4 MG: 4 TABLET ORAL at 22:37

## 2022-06-15 RX ADMIN — ATORVASTATIN CALCIUM 80 MG: 80 TABLET, FILM COATED ORAL at 10:11

## 2022-06-15 RX ADMIN — BUMETANIDE 2 MG: 1 TABLET ORAL at 11:44

## 2022-06-15 RX ADMIN — ENOXAPARIN SODIUM 40 MG: 100 INJECTION SUBCUTANEOUS at 20:13

## 2022-06-15 RX ADMIN — ENOXAPARIN SODIUM 40 MG: 100 INJECTION SUBCUTANEOUS at 10:13

## 2022-06-15 RX ADMIN — SPIRONOLACTONE 25 MG: 25 TABLET ORAL at 10:15

## 2022-06-15 ASSESSMENT — ENCOUNTER SYMPTOMS
TROUBLE SWALLOWING: 1
CHEST TIGHTNESS: 1
EYE DISCHARGE: 0
ABDOMINAL DISTENTION: 0
SORE THROAT: 1
COLOR CHANGE: 0

## 2022-06-15 ASSESSMENT — PAIN DESCRIPTION - DESCRIPTORS: DESCRIPTORS: ACHING

## 2022-06-15 ASSESSMENT — PAIN DESCRIPTION - LOCATION
LOCATION: BACK;KNEE
LOCATION: BACK;LEG

## 2022-06-15 ASSESSMENT — PAIN DESCRIPTION - ORIENTATION
ORIENTATION: OTHER (COMMENT)
ORIENTATION: LOWER;OTHER (COMMENT)

## 2022-06-15 ASSESSMENT — PAIN DESCRIPTION - PAIN TYPE: TYPE: CHRONIC PAIN

## 2022-06-15 ASSESSMENT — PAIN - FUNCTIONAL ASSESSMENT
PAIN_FUNCTIONAL_ASSESSMENT: PREVENTS OR INTERFERES SOME ACTIVE ACTIVITIES AND ADLS
PAIN_FUNCTIONAL_ASSESSMENT: PREVENTS OR INTERFERES SOME ACTIVE ACTIVITIES AND ADLS

## 2022-06-15 ASSESSMENT — PAIN SCALES - GENERAL
PAINLEVEL_OUTOF10: 7
PAINLEVEL_OUTOF10: 8
PAINLEVEL_OUTOF10: 8
PAINLEVEL_OUTOF10: 6

## 2022-06-15 NOTE — PROGRESS NOTES
[facility]  Office: [phone]  Rosalva Melendez, DO, Misti Ny, DO, Candida Hamilton, DO, Deep Lock, DO, Rosalinda Poe MD, Nikki Mantilla MD, Damion Lopes MD, Rome Arias MD, Abiola Miller MD, Jared Hansen MD, Haley Jones MD, Kai Johnson DO, Essie Lamb MD,  Sally Mcguire, DO, Chance Salter MD, Kasey Kidd MD, Hope Opitz, MD, Disha Correa DO, eNena Ontiveros MD, Onur Weir MD, Alex Nunes DO, Heidy Brown MD, Khari Vann, [facility], [facility], CNP, Bossman Anthony, CNP, Rich García, CNP, Timi Dangelo, CNP, Azucena Kaiser, CNP, Brianna Awad PA-C, Rachele Escobar DNP, Jinny Meza, CNP, Dominique Reina, CNP, Cristina Pozo, CNP, Claudia Rasmussen, CNS, Ben Greenberg, YANIV, Dominique Brannon, CNP, Kisha Candelario, CNP, [facility], [address] [id number] [facility]    Progress Note    6/15/2022    1:29 PM    Name:   Kristin Hawkins  MRN:     [id number]     Acct:      [de-identified]   Room:   [address] [place] [address] Day:  2  Admit Date:  6/13/2022  4:30 PM    PCP:   ASA Vidal CNP  Code Status:  Full Code    Subjective:     C/C:   Chief Complaint   Patient presents with    Positive For Covid-19     home test     Interval History Status: not changed. Patient examined at bedside  Is much better today  Fatigue has improved  Continues to have pain, now complaining of pain in calf rt> lt  Patient has cough, bringing up yellow phlegm  No fever or chills  Blood pressure 112/57, saturating well on room air. Creatinine 1.23  CRP 43.2  Hemoglobin 10  White count 5.5  Brief History:     49-year-old female with known medical history of hypertension, hyperlipidemia, chronic systolic and diastolic heart failure with EF of 30 to 35%, morbid obesity presented to the hospital with shortness of breath, generalized myalgias, fatigue, tested positive at home with COVID.   Patient also has a history of chronic pain syndrome but states that these myalgias and pain were much worse. He has had COVID in 2020 and was on oxygen for few weeks. Patient also complained of some chest pressure on arrival in the ER and cardiology was consulted. Patient was started on remdesivir for COVID-pneumonia. Was weaned down to room air. Patient has history of systolic heart failure, initially responded well to IV diuresis. Echo was pending for chest pressure. Review of Systems:     Constitutional: Positive for chills, fevers, sweats  Respiratory: Positive for cough, dyspnea on exertion, shortness of breath, no wheezing  Cardiovascular: Positive for chest pressure, negative for chest pain, trace lower extremity edema, no palpitations  Gastrointestinal:  negative for abdominal pain, constipation, diarrhea, nausea, vomiting, loose formed bowel movements present  Neurological: Improving generalized weakness  Positive for leg pain  Medications: Allergies:     Allergies   Allergen Reactions    Entresto [Sacubitril-Valsartan] Other (See Comments)     Acute kidney injury    Food Swelling     peaches    Gabapentin Swelling    Lyrica [Pregabalin] Swelling     Mouth sores    Tetracyclines & Related        Current Meds:   Scheduled Meds:    bumetanide  2 mg Oral Daily    allopurinol  300 mg Oral Daily    aspirin  81 mg Oral Daily    atorvastatin  80 mg Oral Daily    buPROPion  300 mg Oral QAM    clopidogrel  75 mg Oral Daily    DULoxetine  120 mg Oral Daily    hydrALAZINE  25 mg Oral BID    isosorbide dinitrate  10 mg Oral BID    levothyroxine  50 mcg Oral Daily    [Held by provider] meloxicam  15 mg Oral Daily    metoprolol tartrate  50 mg Oral BID    montelukast  10 mg Oral Nightly    oxybutynin  5 mg Oral BID    pantoprazole  40 mg Oral Daily    spironolactone  25 mg Oral Daily    sucralfate  1 g Oral QAM    sucralfate  2 g Oral Nightly    budesonide-formoterol  2 puff Inhalation BID    traZODone  150 mg Oral Nightly  sodium chloride flush  5-40 mL IntraVENous 2 times per day    enoxaparin  40 mg SubCUTAneous BID    vitamin D  50,000 Units Oral Weekly    remdesivir IVPB  100 mg IntraVENous Q24H     Continuous Infusions:    sodium chloride       PRN Meds: albuterol sulfate HFA, polyethylene glycol, tiZANidine, sodium chloride flush, sodium chloride, ondansetron **OR** ondansetron, acetaminophen **OR** acetaminophen, dextromethorphan-guaiFENesin, HYDROcodone 5 mg - acetaminophen, sodium chloride    Data:     Past Medical History:   has a past medical history of Arthritis, Bronchitis, CHF (congestive heart failure) ([facility] [address] [facility] COVID-19, Fibromyalgia, GERD (gastroesophageal reflux disease), Gout, History of heart attack, HLD (hyperlipidemia), Hypertension, Insomnia, and Osteoarthritis. Social History:   reports that she has never smoked. She has never used smokeless tobacco. She reports that she does not drink alcohol and does not use drugs. Family History:   Family History   Problem Relation Age of Onset   Kofi Self Other Mother     Diabetes Mother     Heart Disease Mother     Arthritis Mother     Kidney Disease Mother     Lupus Mother     Arthritis Sister     Diabetes Brother     Asthma Brother     Cancer Maternal Grandfather     Hypertension Sister     Breast Cancer Sister         35s    Breast Cancer Niece         30-35       Vitals:  BP (!) 112/57   Pulse 62   Temp 97.9 °F (36.6 °C) (Oral)   Resp 25   Ht 5' 8\" (1.727 m)   Wt (!) 329 lb (149.2 kg)   SpO2 96%   BMI 50.02 kg/m²   Temp (24hrs), Av.1 °F (36.7 °C), Min:97.9 °F (36.6 °C), Max:98.2 °F (36.8 °C)    No results for input(s): POCGLU in the last 72 hours. I/O (24Hr):     Intake/Output Summary (Last 24 hours) at 6/15/2022 1329  Last data filed at 6/15/2022 1013  Gross per 24 hour   Intake 400 ml   Output 2900 ml   Net -2500 ml       Labs:  Hematology:  Recent Labs     22  1745 22  0455 06/15/22  0514   WBC 6.6 5.6 5.5   RBC 3.69* 3.24* 3.54*   HGB 10.6* 9.1* 10.0*   HCT 34.6* 29.8* 32.2*   MCV 93.8 92.0 91.0   MCH 28.7 28.1 28.2   MCHC 30.6 30.5 31.1   RDW 15.6* 15.7* 15.6*    224 242   MPV 10.6 10.4 10.2   CRP  --  48.6* 43.2*     Chemistry:  Recent Labs     06/13/22  1745 06/13/22  1905 06/14/22  0455 06/14/22  0907 06/15/22  0514     --  140  --  138   K 4.9  --  4.3  --  4.2     --  103  --  99   CO2 25  --  24  --  27   GLUCOSE 69*  --  144*  --  105*   BUN 17  --  16  --  18   CREATININE 1.20*  --  1.17*  --  1.23*   ANIONGAP 10  --  13  --  12   LABGLOM 46*  --  48*  --  45*   GFRAA 56*  --  58*  --  54*   CALCIUM 8.9  --  8.5*  --  8.7   PROBNP 305*  --   --   --   --    TROPHS 11 11  --  11  --      Recent Labs     06/14/22  0455   PROT 6.4   LABALBU 3.4*   AST 11   ALT 10   ALKPHOS 136*   BILITOT 0.18*     ABG:No results found for: POCPH, PHART, PH, POCPCO2, AIC7AYH, PCO2, POCPO2, PO2ART, PO2, POCHCO3, DWQ8ZGT, HCO3, NBEA, PBEA, BEART, BE, THGBART, THB, TQD0CAY, OUTO5TIK, F5PYSZSM, O2SAT, FIO2  Lab Results   Component Value Date/Time    SPECIAL NOT REPORTED 12/01/2020 05:48 AM     Lab Results   Component Value Date/Time    CULTURE NO SIGNIFICANT GROWTH 12/01/2020 05:48 AM       Radiology:  XR CHEST PORTABLE    Result Date: 6/13/2022  Mild cardiomegaly with pulmonary vascular congestion.        Physical Examination:        General appearance:  alert, cooperative and no distress  Mental Status:  oriented to person, place and time and normal affect  Lungs: Decreased breath sounds bilaterally, normal effort  Heart:  regular rate and rhythm, no murmur  Abdomen:  soft, nontender, nondistended, normal bowel sounds, no masses, hepatomegaly, splenomegaly  Extremities: Trace pedal edema, no redness, tenderness in the calves  Skin:  no gross lesions, rashes, induration    Assessment:        Hospital Problems           Last Modified POA    * (Principal) Chest pain 6/13/2022 Yes    Headache 6/14/2022 Yes    Suspected sleep apnea 6/14/2022 Yes    Vitamin D deficiency 6/14/2022 Yes    CKD (chronic kidney disease) stage 3, GFR 30-59 ml/min ([facility]) 6/14/2022 Yes    COPD exacerbation ([facility] 6/14/2022 Yes    CRP elevated 6/14/2022 Yes    Low back pain 6/14/2022 Yes    Opioid dependence, uncomplicated ([facility] 2/81/3983 Yes    Class 3 severe obesity due to excess calories with serious comorbidity and body mass index (BMI) of 45.0 to 49.9 in adult [facility]) (Chronic) 6/14/2022 Yes    Myalgia 6/14/2022 Yes    COVID-19 6/14/2022 Yes    GERD (gastroesophageal reflux disease) 6/14/2022 Yes    Acute on chronic combined systolic and diastolic congestive heart failure ([facility] 6/14/2022 Yes    Chronic pain 6/14/2022 Yes          Plan:        COVID-19 infection- continue remdesivir. CT chest does not show evidence of pneumonia, patient is on room air during the day, does have some desaturation episodes overnight but likely secondary to sleep apnea. Continue to monitor inflammatory markers. Acute on chronic systolic and diastolic heart failure- previous ejection fraction of 30 to 35%, change Bumex from IV to oral, monitor intake and output, fluid restriction to 1500 mL. Pending echo to check for ejection fraction. Continue aspirin, Lipitor, hydralazine, Isordil, Lopressor and Aldactone. Patient is allergic to ACE inhibitor, had history of SUSU    Hypertension- continue antihypertensives    Hypothyroid- on synthyroid, recent TSH shows elevated, needs to be complaint to synthyroid and needs to take it empty stomach. CT chest shows patient has enlargement of left lobe of thyroid, stable from prior exams, will need ultrasound outpatient    Obtain Doppler lower extremities to rule out DVT    CKD STAGE 3- at baseline 1.1-1.2    GERD-continue Carafate and Protonix    Obesity-encouraged to lose weight    Suspected sleep apnea-patient states she has prescription to get a sleep study done outpatient.     Monitor electrolytes and replace as tolerated  Replace vitamin d    Chronic pain- saw pain management, had an argument with DR Marlena Jc as he refused opioids. Will encourage to avoid opioids as much as possible.       Chantal Ham MD  6/15/2022  1:29 PM

## 2022-06-15 NOTE — CARE COORDINATION
Case Management Initial Discharge Plan  Yajaira Thomas,             Met with:patient to discuss discharge plans. Information verified: address, contacts, phone number, , insurance Yes  Insurance Provider: [facility] medicare and Medicaid  : No    Emergency Contact/Next of Kin name & number: Sister and Daughter  Who are involved in patient's support system? family    PCP: ASA Bashir CNP  Date of last visit: 2 weeks ago       Discharge Planning    Living Arrangements:  Family Members     Home has 2 stories  4 stairs to climb to get into front door, flight of stairs to climb to reach second floor  Location of bedroom/bathroom in home lower    Patient able to perform ADL's:Independent    Current Services (outpatient & in home) DMe  DME equipment: Scooter, nebulizer  DME provider:     Is patient receiving oral anticoagulation therapy? Yes    If indicated: Plavix  Physician managing anticoagulation treatment: na  Where does patient obtain lab work for ATC treatment? na    Does patient have any issues/concerns obtaining medications? No  If yes, what are patient's concerns? Is there a preferred Pharmacy after hours or on weekends? Yes    If yes, which pharmacy?  [facility]    Potential Assistance Needed:  Durable Medical Equipment,Transportation    Patient agreeable to home care: No  [facility] of choice provided:  had home care after covid in     Prior SNF/Rehab Placement and Facility: In [address] [place] [address] to SNF/Rehab: No  [facility] of choice provided: n/a     Evaluation: no    Expected Discharge date:       Patient expects to be discharged to:   home    If home: is the family and/or caregiver wiling & able to provide support at home? yes  Who will be providing this support? family*    Follow Up Appointment: Best Day/ Time: Thursday AM    Transportation provider: floyd  Transportation arrangements needed for discharge: No    Readmission Risk              Risk of Unplanned

## 2022-06-15 NOTE — PROGRESS NOTES
[facility] -   Infectious diseases evaluation  admission date 6/13/2022    reason for consultation:   Covid    Impression :   Current:  · Covid resp infection - +on 6/12 at home test   · Sick since 6/12  · Lymphopenia   · Elevated CRP 48  · CHF - proBNP 305 - EF 35%  · COPd exacerbation -inhalors at home- no HO2  · CAD s/p prior MI  · Fibromyalgia -aches all over -Chronic pain syndrome  · Hypovitaminosis D  · SMILEY suspected    Other:  ·   Discussion / summary of stay / plan of care   · covid resp illness since 6/12  · Achy all over worse than usual fibromyalgia  · CRP elevation   · CXr congested - as in past  · Copd exacerbation, better after inhalors  Recommendations   · Covid positive 6/12  · remdesivir 5 days till 6/18, since she is in the 1st week of illness  · Feels better and  CRP better  · Aggressive Vitamin D replacement  · Steroids use as needed for COPd, defer to PCP - at this point does not need decadron for covid  · If CRP better in am , consider DC after one more dose of remdesevir which can be given in the morning to facilitate the DC  · Will follow w you    Infection Control Recommendations   · Universal Precautions  · Droplet + Isolation till 6/21      Antimicrobial Stewardship Recommendations   · Off Ab -  History of Present Illness:   Initial history:  Miryam Ramachandran is a 60y.o.-year-old female presented to ED for positive at home Covid test.    PMH bronchitis, prior Covid 2020 CHF, HTN, CAD s/p prior MI, chronic pain syndrome, morbid obesity. Has had Covid in 2020 was DC on home O2 for a few weeks. Has covid vaccine not booster. Since 2020 Covid has had exertional dyspnea. But no Home o2 - on inhalors  copd and CHF w EF 35      She had a + Covid test 6/12 at home. Her PCP prescribed Paxlovid but was unable to  from her pharmacy due to sys of SOB, chest tightness, myalgias. Does endorse uses of nebulizer, albuterol and Symbicort for Bronchitis without relief of Covid sys. Sys have been worsening and endorses generalized myalgias, nausea and dec PO intake, headache, cough with occasional phlegm light yellow in color, chest tightness with pain. A near syncopal episode due to dizziness and feeling lightheaded, denies fall or injuries. Has loose stools. abd cramping pain. Her WBC was 6.6  CRP 48.6  Pro yung 0.07  Trop was 11      Is on 2L NC     Interval changes  6/15/2022   Patient Vitals for the past 8 hrs:   BP Temp Temp src Pulse Resp SpO2   06/15/22 0335 116/62 98.2 °F (36.8 °C) Oral 67 23 97 %   06/15/22 0110 (!) 99/41 97.9 °F (36.6 °C) Oral 66 26 91 %   06/15/22 0100 -- -- -- -- 20 --     6/15  Afebrile  VSS  On RA   Still endorses sys but is starting to feel better  CT chest no pneumonia   CRP 43  Fibrinogen 443  Ferritin 69    Summary of relevant labs:  Labs:  WBC -6.6 -5.6 -5.5  CRP 48.6  -43  Cr 1.17  -1.23  Pro yung 0.07    Trop 11  Ferritin 69  Fibrinogen 443      Micro:   Covid +      Imagin/15 chest CT  . No acute process in the chest. 2. Cardiomegaly. 3. Heterogeneous enlargement of the left lobe of the thyroid, which is stable from prior exams and most likely benign. Follow-up evaluation with thyroid ultrasound could be considered as clinically indicated.  CXR   Impression Mild cardiomegaly with pulmonary vascular congestion. Echo -pending       I have personally reviewed the past medical history, past surgical history, medications, social history, and family history, and I haveupdated the database accordingly. Allergies:   Entresto [sacubitril-valsartan], Food, Gabapentin, Lyrica [pregabalin], and Tetracyclines & related     Review of Systems:     Review of Systems   Constitutional: Positive for appetite change and fatigue. HENT: Positive for sore throat and trouble swallowing. Eyes: Negative for discharge. Respiratory: Positive for chest tightness.     Cardiovascular: Positive for chest pain. Gastrointestinal: Negative for abdominal distention. Endocrine: Negative for polydipsia. Genitourinary: Negative for dysuria. Musculoskeletal: Positive for arthralgias and myalgias. Skin: Negative for color change. Allergic/Immunologic: Negative for immunocompromised state. Neurological: Positive for dizziness, light-headedness and headaches. Hematological: Negative for adenopathy. Psychiatric/Behavioral: Negative for agitation and behavioral problems. Physical Examination :       Physical Exam  Vitals and nursing note reviewed. Constitutional:       Appearance: She is obese. HENT:      Head: Normocephalic and atraumatic. Right Ear: External ear normal.      Left Ear: External ear normal.      Nose: Nose normal.      Mouth/Throat:      Mouth: Mucous membranes are dry. Eyes:      General: No scleral icterus. Right eye: No discharge. Cardiovascular:      Rate and Rhythm: Normal rate and regular rhythm. Pulses: Normal pulses. Heart sounds: Normal heart sounds. Pulmonary:      Breath sounds: No wheezing or rhonchi. Comments: Diminished breath sounds   Abdominal:      General: There is no distension. Palpations: Abdomen is soft. Tenderness: There is abdominal tenderness. Genitourinary:     Comments: No askew  Musculoskeletal:         General: Tenderness present. No deformity. Cervical back: Normal range of motion. Skin:     General: Skin is warm and dry. Coloration: Skin is not jaundiced or pale. Neurological:      General: No focal deficit present. Mental Status: She is alert and oriented to person, place, and time. Sensory: No sensory deficit. Psychiatric:         Mood and Affect: Mood normal.         Thought Content:  Thought content normal.         Past Medical History:     Past Medical History:   Diagnosis Date    Arthritis     Bronchitis     On ICS-LABA, denies asthma, copd    [facility]     COVID-19     diagnosed in September 2020, hospitilized on oxgyen    Fibromyalgia     GERD (gastroesophageal reflux disease)     Gout 10/2019    History of heart attack     HLD (hyperlipidemia)     Hypertension     Insomnia     Osteoarthritis        Past Surgical  History:     Past Surgical History:   Procedure Laterality Date    BACK SURGERY      CHOLECYSTECTOMY, LAPAROSCOPIC      HYSTERECTOMY, TOTAL ABDOMINAL (CERVIX REMOVED)      KNEE ARTHROSCOPY Right     KNEE SURGERY Left 2009    NECK SURGERY      SHOULDER SURGERY Right 2009       Medications:      bumetanide  2 mg Oral Daily    allopurinol  300 mg Oral Daily    aspirin  81 mg Oral Daily    atorvastatin  80 mg Oral Daily    buPROPion  300 mg Oral QAM    clopidogrel  75 mg Oral Daily    DULoxetine  120 mg Oral Daily    hydrALAZINE  25 mg Oral BID    isosorbide dinitrate  10 mg Oral BID    levothyroxine  50 mcg Oral Daily    [Held by provider] meloxicam  15 mg Oral Daily    metoprolol tartrate  50 mg Oral BID    montelukast  10 mg Oral Nightly    oxybutynin  5 mg Oral BID    pantoprazole  40 mg Oral Daily    spironolactone  25 mg Oral Daily    sucralfate  1 g Oral QAM    sucralfate  2 g Oral Nightly    budesonide-formoterol  2 puff Inhalation BID    traZODone  150 mg Oral Nightly    sodium chloride flush  5-40 mL IntraVENous 2 times per day    enoxaparin  40 mg SubCUTAneous BID    vitamin D  50,000 Units Oral Weekly    remdesivir IVPB  100 mg IntraVENous Q24H       Social History:     Social History     Socioeconomic History    Marital status: Single     Spouse name: Not on file    Number of children: Not on file    Years of education: Not on file    Highest education level: Not on file   Occupational History    Not on file   Tobacco Use    Smoking status: Never Smoker    Smokeless tobacco: Never Used   Vaping Use    Vaping Use: Never used   Substance and Sexual Activity    Alcohol use: No    Drug use: No    Sexual activity: Not on file   Other Topics Concern    Not on file   Social History Narrative    Not on file     Social Determinants of Health     Financial Resource Strain: Low Risk     Difficulty of Paying Living Expenses: Not hard at all   Food Insecurity: No Food Insecurity    Worried About Running Out of Food in the Last Year: Never true    [address] in the Last Year: Never true   Transportation Needs:     Lack of Transportation (Medical): Not on file    Lack of Transportation (Non-Medical):  Not on file   Physical Activity:     Days of Exercise per Week: Not on file    Minutes of Exercise per Session: Not on file   Stress:     Feeling of Stress : Not on file   Social Connections:     Frequency of Communication with Friends and Family: Not on file    Frequency of Social Gatherings with Friends and Family: Not on file    Attends Yazidism Services: Not on file    Active Member of Clubs or Organizations: Not on file    Attends Club or Organization Meetings: Not on file    Marital Status: Not on file   Intimate Partner Violence:     Fear of Current or Ex-Partner: Not on file    Emotionally Abused: Not on file    Physically Abused: Not on file    Sexually Abused: Not on file   Housing Stability:     Unable to Pay for Housing in the Last Year: Not on file    Number of [place] in the Last Year: Not on file    Unstable Housing in the Last Year: Not on file       Family History:     Family History   Problem Relation Age of Onset    Other Mother     Diabetes Mother     Heart Disease Mother     Arthritis Mother     Kidney Disease Mother     Lupus Mother     Arthritis Sister     Diabetes Brother     Asthma Brother     Cancer Maternal Grandfather     Hypertension Sister     Breast Cancer Sister         35s    Breast Cancer Niece         30-35      Medical Decision Making:   I have independently reviewed/ordered the following labs:    CBC with Differential:   Recent Labs     06/14/22 0455 06/15/22  0514   WBC 5.6 5.5   HGB 9.1* 10.0*   HCT 29.8* 32.2*    242   LYMPHOPCT 19* 29   MONOPCT 11 11     BMP:  Recent Labs     06/14/22  0455 06/15/22  0514    138   K 4.3 4.2    99   CO2 24 27   BUN 16 18   CREATININE 1.17* 1.23*     Hepatic Function Panel:   Recent Labs     06/14/22 0455   PROT 6.4   LABALBU 3.4*   BILITOT 0.18*   ALKPHOS 136*   ALT 10   AST 11     No results for input(s): RPR in the last 72 hours. No results for input(s): HIV in the last 72 hours. No results for input(s): BC in the last 72 hours. Lab Results   Component Value Date    CREATININE 1.23 06/15/2022    GLUCOSE 105 06/15/2022       Detailed results: Thank you for allowing us to participate in the care of this patient. Please call with questions. This note is created with the assistance of a speech recognition program.  While intending to generate adocument that actually reflects the content of the visit, the document can still have some errors including those of syntax and sound a like substitutions which may escape proof reading. It such instances, actual meaningcan be extrapolated by contextual diversion. Tiffani Montelongo  Office: [phone]  Perfect serve / office [phone]          I have discussed the care of the patient, including pertinent history and exam findings,  with the resident. I have seen and examined the patient and the key elements of all parts of the encounter have been performed by me. I agree with the assessment, plan and orders as documented by the resident.     Jasmine Holliday, Infectious Diseases

## 2022-06-15 NOTE — PROGRESS NOTES
Hepatic:  Recent Labs     06/14/22  0455   AST 11   ALT 10   BILITOT 0.18*   ALKPHOS 136*     Troponin:   Recent Labs     06/13/22  1745 06/13/22  1905 06/14/22  0907   TROPHS 11 11 11     BNP: No results for input(s): BNP in the last 72 hours. Lipids: No results for input(s): CHOL, HDL in the last 72 hours. Invalid input(s): LDLCALCU  INR: No results for input(s): INR in the last 72 hours. EKG:    Date: 06/14/22  Reading:   Regular rate and rhytym Hr 93  Axis normal, Wide QRS at 124ms  QTc 452. No ST elevations, depressions or T-wave inversion noted        LAST ECHO:  ECHO 11/18/2020: EF 30-35% with global hypokinesia, grade II DD, no regurg/stenosis.      ECHO 10/2/19: EF 42%, grade I DD, mild MR, trivial TR.      Prior Echo from [place] reviewed with LVEF 25-30% and global hypokinesis (Per KW's note)     No previous stress test or angiography.          Objective:   Vitals: BP (!) 145/70   Pulse 62   Temp 98.1 °F (36.7 °C) (Oral)   Resp 25   Ht 5' 8\" (1.727 m)   Wt (!) 329 lb (149.2 kg)   SpO2 96%   BMI 50.02 kg/m²   General appearance: alert and cooperative with exam  HEENT: Head: Normocephalic, no lesions, without obvious abnormality. Neck:no JVD, trachea midline, no adenopathy  Lungs: Clear to auscultation  Heart: Regular rate and rhythm, s1/s2 auscultated, no murmurs  Abdomen: soft, non-tender, bowel sounds active  Extremities: pedal edema more to right than left   Neurologic: not done        Assessment / Acute Cardiac Problems:     1. Atypical chest pain, no evidence of ACS   2. COVID-19 infection   3. Essential hypertension  4. Acute on chronic systolic heart failure  5. Cardiomyopathy, unclear etiology, diagnosed several years ago at [place], last LVEF 30-35%. 2D echocardiogram 11/08/2020, moderately reduced LV systolic function with EF 30-35%, global hypokinesia, grade 2 diastolic dysfunction, no significant valve abnormality  6. Morbid obesity  7. Fibromyalgia  8. Hyperlipidemia  9. 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, insomnia type (HCC)     Opioid dependence, uncomplicated (HCC)     Sacroiliac joint dysfunction of both sides     Chronic pain disorder     Bulge of cervical disc without myelopathy     DDD (degenerative disc disease), lumbar     Failed back syndrome of cervical spine     Chest pain     Class 3 severe obesity due to excess calories with serious comorbidity and body mass index (BMI) of 45.0 to 49.9 in adult [facility])     Cervical spondylosis with radiculopathy     Status post cervical spinal fusion     History of lumbar fusion     Lumbar radiculopathy     Morbid obesity with BMI of 50.0-59.9, adult (HCC)     Chronic pain of both knees     Myalgia     Chronic use of opiate drug for therapeutic purpose     COVID-19     Hypertension     Gout     GERD (gastroesophageal reflux disease)     Acute on chronic combined systolic and diastolic congestive heart failure (HCC)     Other proteinuria     Nausea and vomiting     Chronic pain     PTSD (post-traumatic stress disorder)     Hyperuricemia     Mild persistent asthma without complication     CHF (congestive heart failure) (HCC)     Chronic bronchitis, unspecified chronic bronchitis type (HCC)     Major depressive disorder, recurrent severe without psychotic features (HCC)     Headache     Suspected sleep apnea     Vitamin D deficiency     CKD (chronic kidney disease) stage 3, GFR 30-59 ml/min (HCC)     COPD exacerbation (HCC)     CRP elevated      Plan of Treatment:   1. Cardiac stable -not in fluid overload clinically continue statin Bumex Aldactone hydralazine and   beta-blocker  2. Awaiting echo  3.  Keep K>4, MG>2    Electronically signed by ASA Abel NP on 6/15/2022 at 11:53 [address] [place] [address].  [phone]

## 2022-06-16 VITALS
SYSTOLIC BLOOD PRESSURE: 106 MMHG | DIASTOLIC BLOOD PRESSURE: 68 MMHG | WEIGHT: 293 LBS | HEART RATE: 59 BPM | BODY MASS INDEX: 44.41 KG/M2 | HEIGHT: 68 IN | OXYGEN SATURATION: 100 % | RESPIRATION RATE: 23 BRPM | TEMPERATURE: 97.8 F

## 2022-06-16 LAB — C-REACTIVE PROTEIN: 29.8 MG/L (ref 0–5)

## 2022-06-16 PROCEDURE — 94761 N-INVAS EAR/PLS OXIMETRY MLT: CPT

## 2022-06-16 PROCEDURE — 6360000002 HC RX W HCPCS: Performed by: CLINICAL NURSE SPECIALIST

## 2022-06-16 PROCEDURE — 2580000003 HC RX 258: Performed by: CLINICAL NURSE SPECIALIST

## 2022-06-16 PROCEDURE — 6370000000 HC RX 637 (ALT 250 FOR IP): Performed by: STUDENT IN AN ORGANIZED HEALTH CARE EDUCATION/TRAINING PROGRAM

## 2022-06-16 PROCEDURE — 36415 COLL VENOUS BLD VENIPUNCTURE: CPT

## 2022-06-16 PROCEDURE — 2500000003 HC RX 250 WO HCPCS: Performed by: INTERNAL MEDICINE

## 2022-06-16 PROCEDURE — 94640 AIRWAY INHALATION TREATMENT: CPT

## 2022-06-16 PROCEDURE — 2580000003 HC RX 258: Performed by: INTERNAL MEDICINE

## 2022-06-16 PROCEDURE — 99232 SBSQ HOSP IP/OBS MODERATE 35: CPT | Performed by: INTERNAL MEDICINE

## 2022-06-16 PROCEDURE — 6370000000 HC RX 637 (ALT 250 FOR IP): Performed by: CLINICAL NURSE SPECIALIST

## 2022-06-16 PROCEDURE — 1200000000 HC SEMI PRIVATE

## 2022-06-16 PROCEDURE — 86140 C-REACTIVE PROTEIN: CPT

## 2022-06-16 PROCEDURE — 99239 HOSP IP/OBS DSCHRG MGMT >30: CPT | Performed by: STUDENT IN AN ORGANIZED HEALTH CARE EDUCATION/TRAINING PROGRAM

## 2022-06-16 RX ORDER — ERGOCALCIFEROL 1.25 MG/1
50000 CAPSULE ORAL WEEKLY
Qty: 5 CAPSULE | Refills: 1 | Status: SHIPPED | OUTPATIENT
Start: 2022-06-21 | End: 2022-07-21 | Stop reason: SDUPTHER

## 2022-06-16 RX ORDER — GUAIFENESIN DEXTROMETHORPHAN HYDROBROMIDE ORAL SOLUTION 10; 100 MG/5ML; MG/5ML
5 SOLUTION ORAL EVERY 4 HOURS PRN
Qty: 473 ML | Refills: 1 | Status: SHIPPED | OUTPATIENT
Start: 2022-06-16

## 2022-06-16 RX ORDER — HYDROCODONE BITARTRATE AND ACETAMINOPHEN 5; 325 MG/1; MG/1
1 TABLET ORAL EVERY 6 HOURS PRN
Qty: 7 TABLET | Refills: 0 | Status: SHIPPED | OUTPATIENT
Start: 2022-06-16 | End: 2022-06-21

## 2022-06-16 RX ADMIN — SODIUM CHLORIDE 25 ML: 0.9 INJECTION, SOLUTION INTRAVENOUS at 13:45

## 2022-06-16 RX ADMIN — ENOXAPARIN SODIUM 40 MG: 100 INJECTION SUBCUTANEOUS at 09:25

## 2022-06-16 RX ADMIN — ISOSORBIDE DINITRATE 10 MG: 10 TABLET ORAL at 09:25

## 2022-06-16 RX ADMIN — REMDESIVIR 100 MG: 100 INJECTION, POWDER, LYOPHILIZED, FOR SOLUTION INTRAVENOUS at 13:51

## 2022-06-16 RX ADMIN — ATORVASTATIN CALCIUM 80 MG: 80 TABLET, FILM COATED ORAL at 09:47

## 2022-06-16 RX ADMIN — HYDROCODONE BITARTRATE AND ACETAMINOPHEN 1 TABLET: 5; 325 TABLET ORAL at 13:38

## 2022-06-16 RX ADMIN — METOPROLOL TARTRATE 50 MG: 50 TABLET, FILM COATED ORAL at 09:27

## 2022-06-16 RX ADMIN — TIZANIDINE 4 MG: 4 TABLET ORAL at 13:39

## 2022-06-16 RX ADMIN — SODIUM CHLORIDE, PRESERVATIVE FREE 10 ML: 5 INJECTION INTRAVENOUS at 09:28

## 2022-06-16 RX ADMIN — BUDESONIDE AND FORMOTEROL FUMARATE DIHYDRATE 2 PUFF: 80; 4.5 AEROSOL RESPIRATORY (INHALATION) at 09:30

## 2022-06-16 RX ADMIN — BUPROPION HYDROCHLORIDE 300 MG: 150 TABLET, EXTENDED RELEASE ORAL at 09:25

## 2022-06-16 RX ADMIN — HYDRALAZINE HYDROCHLORIDE 25 MG: 25 TABLET ORAL at 16:25

## 2022-06-16 RX ADMIN — OXYBUTYNIN CHLORIDE 5 MG: 5 TABLET ORAL at 09:26

## 2022-06-16 RX ADMIN — ALLOPURINOL 300 MG: 300 TABLET ORAL at 09:25

## 2022-06-16 RX ADMIN — SUCRALFATE 1 G: 1 TABLET ORAL at 09:27

## 2022-06-16 RX ADMIN — HYDRALAZINE HYDROCHLORIDE 25 MG: 25 TABLET ORAL at 09:26

## 2022-06-16 RX ADMIN — SPIRONOLACTONE 25 MG: 25 TABLET ORAL at 09:25

## 2022-06-16 RX ADMIN — HYDROCODONE BITARTRATE AND ACETAMINOPHEN 1 TABLET: 5; 325 TABLET ORAL at 06:41

## 2022-06-16 RX ADMIN — LEVOTHYROXINE SODIUM 50 MCG: 50 TABLET ORAL at 09:26

## 2022-06-16 RX ADMIN — DULOXETINE HYDROCHLORIDE 120 MG: 30 CAPSULE, DELAYED RELEASE ORAL at 09:46

## 2022-06-16 RX ADMIN — PANTOPRAZOLE SODIUM 40 MG: 40 TABLET, DELAYED RELEASE ORAL at 09:27

## 2022-06-16 RX ADMIN — BUMETANIDE 2 MG: 1 TABLET ORAL at 09:25

## 2022-06-16 RX ADMIN — ACETAMINOPHEN 650 MG: 325 TABLET ORAL at 09:46

## 2022-06-16 RX ADMIN — TIZANIDINE 4 MG: 4 TABLET ORAL at 06:42

## 2022-06-16 RX ADMIN — CLOPIDOGREL 75 MG: 75 TABLET, FILM COATED ORAL at 09:25

## 2022-06-16 RX ADMIN — Medication 81 MG: at 09:27

## 2022-06-16 ASSESSMENT — PAIN SCALES - GENERAL
PAINLEVEL_OUTOF10: 9
PAINLEVEL_OUTOF10: 9
PAINLEVEL_OUTOF10: 7
PAINLEVEL_OUTOF10: 9
PAINLEVEL_OUTOF10: 7

## 2022-06-16 ASSESSMENT — ENCOUNTER SYMPTOMS
CHEST TIGHTNESS: 1
EYE PAIN: 0
TROUBLE SWALLOWING: 1
ABDOMINAL DISTENTION: 0
SORE THROAT: 1
EYE REDNESS: 0
COLOR CHANGE: 0
EYE DISCHARGE: 0

## 2022-06-16 ASSESSMENT — PAIN DESCRIPTION - ORIENTATION: ORIENTATION: OTHER (COMMENT)

## 2022-06-16 ASSESSMENT — PAIN DESCRIPTION - LOCATION
LOCATION: BACK;LEG
LOCATION: LEG;BACK
LOCATION: BACK;NECK;LEG
LOCATION: BACK;LEG;NECK

## 2022-06-16 ASSESSMENT — PAIN DESCRIPTION - DESCRIPTORS: DESCRIPTORS: ACHING

## 2022-06-16 NOTE — PROGRESS NOTES
[facility] -   Infectious diseases evaluation  admission date 6/13/2022    reason for consultation:   Covid    Impression :   Current:  · Covid resp infection - +on 6/12 at home test   · Sick since 6/12  · Lymphopenia   · Elevated CRP 48  · CHF - proBNP 305 - EF 35%  · COPd exacerbation -inhalors at home- no HO2  · CAD s/p prior MI  · Fibromyalgia -aches all over -Chronic pain syndrome  · Hypovitaminosis D  · SMILEY suspected    Other:  ·   Discussion / summary of stay / plan of care   · covid resp illness since 6/12  · Achy all over worse than usual fibromyalgia  · CRP elevation   · CXr congested - as in past  · Copd exacerbation, better after inhalors  Recommendations   · Covid positive 6/12  · remdesivir stop after 6/16 - she is better - last dose given early to facilitate early discharge  · Dodie Azul for DC anytime  · ID signing off    Infection Control Recommendations   · Universal Precautions  · Droplet + Isolation till 6/21      Antimicrobial Stewardship Recommendations   · Off Ab -  History of Present Illness:   Initial history:  Naomi Hoover is a 60y.o.-year-old female presented to ED for positive at home Covid test.    PMH bronchitis, prior Covid 2020 CHF, HTN, CAD s/p prior MI, chronic pain syndrome, morbid obesity. Has had Covid in 2020 was DC on home O2 for a few weeks. Has covid vaccine not booster. Since 2020 Covid has had exertional dyspnea. But no Home o2 - on inhalors  copd and CHF w EF 35      She had a + Covid test 6/12 at home. Her PCP prescribed Paxlovid but was unable to  from her pharmacy due to sys of SOB, chest tightness, myalgias. Does endorse uses of nebulizer, albuterol and Symbicort for Bronchitis without relief of Covid sys. Sys have been worsening and endorses generalized myalgias, nausea and dec PO intake, headache, cough with occasional phlegm light yellow in color, chest tightness with pain.      A near syncopal episode due to dizziness and feeling lightheaded, denies fall or injuries. Has loose stools. abd cramping pain. Her WBC was 6.6  CRP 48.6  Pro yung 0.07  Trop was 11      Is on 2L NC     Interval changes  2022   Patient Vitals for the past 8 hrs:   BP Temp Temp src Pulse Resp SpO2   22 0335 111/65 97.7 °F (36.5 °C) Oral 60 24 94 %     6/15  Afebrile  VSS  On RA   Still endorses sys but is starting to feel better  CT chest no pneumonia   CRP 43  Fibrinogen 443  Ferritin 69      Afebrile  VSS  On RA  1 more dose of remdesivir this morning then DC - pt cant leave before 4 PM due to transportation issues    Summary of relevant labs:  Labs:  WBC -6.6 -5.6 -5.5  CRP 48.6  -43  -29.8  Cr 1.17  -1.23  Pro yung 0.07    Trop 11  Ferritin 69  Fibrinogen 443      Micro:   Covid +      Imagin/15 chest CT  . No acute process in the chest. 2. Cardiomegaly. 3. Heterogeneous enlargement of the left lobe of the thyroid, which is stable from prior exams and most likely benign. Follow-up evaluation with thyroid ultrasound could be considered as clinically indicated.  CXR   Impression Mild cardiomegaly with pulmonary vascular congestion. Echo -pending       I have personally reviewed the past medical history, past surgical history, medications, social history, and family history, and I haveupdated the database accordingly. Allergies:   Entresto [sacubitril-valsartan], Food, Gabapentin, Lyrica [pregabalin], and Tetracyclines & related     Review of Systems:     Review of Systems   Constitutional: Positive for appetite change and fatigue. HENT: Positive for sore throat and trouble swallowing. Eyes: Negative for pain, discharge and redness. Respiratory: Positive for chest tightness. Cardiovascular: Positive for chest pain. Gastrointestinal: Negative for abdominal distention. Endocrine: Negative for polydipsia. Genitourinary: Negative for dysuria.    Musculoskeletal: Positive for arthralgias and myalgias. Skin: Negative for color change. Allergic/Immunologic: Negative for immunocompromised state. Neurological: Positive for dizziness, light-headedness and headaches. Hematological: Negative for adenopathy. Psychiatric/Behavioral: Negative for agitation and behavioral problems. Physical Examination :       Physical Exam  Vitals and nursing note reviewed. Constitutional:       Appearance: She is obese. HENT:      Head: Normocephalic and atraumatic. Right Ear: External ear normal.      Left Ear: External ear normal.      Nose: Nose normal.      Mouth/Throat:      Mouth: Mucous membranes are moist.   Eyes:      General: No scleral icterus. Left eye: No discharge. Cardiovascular:      Rate and Rhythm: Normal rate and regular rhythm. Pulses: Normal pulses. Heart sounds: Normal heart sounds. Pulmonary:      Breath sounds: No wheezing or rhonchi. Comments: Diminished breath sounds   Abdominal:      General: There is no distension. Palpations: Abdomen is soft. Tenderness: There is abdominal tenderness. Genitourinary:     Comments: No askew  Musculoskeletal:         General: No tenderness, deformity or signs of injury. Cervical back: Normal range of motion. Left lower leg: No edema. Skin:     General: Skin is warm and dry. Coloration: Skin is not jaundiced or pale. Neurological:      General: No focal deficit present. Mental Status: She is alert and oriented to person, place, and time. Sensory: No sensory deficit. Psychiatric:         Mood and Affect: Mood normal.         Thought Content:  Thought content normal.         Past Medical History:     Past Medical History:   Diagnosis Date    Arthritis     Bronchitis     On ICS-LABA, denies asthma, copd    [facility]     COVID-19     diagnosed in September 2020, hospitilized on oxgyen    Fibromyalgia     GERD (gastroesophageal reflux disease)     Gout 10/2019    History of heart attack     HLD (hyperlipidemia)     Hypertension     Insomnia     Osteoarthritis        Past Surgical  History:     Past Surgical History:   Procedure Laterality Date    BACK SURGERY      CHOLECYSTECTOMY, LAPAROSCOPIC      HYSTERECTOMY, TOTAL ABDOMINAL (CERVIX REMOVED)      KNEE ARTHROSCOPY Right     KNEE SURGERY Left 2009    NECK SURGERY      SHOULDER SURGERY Right 2009       Medications:      bumetanide  2 mg Oral Daily    allopurinol  300 mg Oral Daily    aspirin  81 mg Oral Daily    atorvastatin  80 mg Oral Daily    buPROPion  300 mg Oral QAM    clopidogrel  75 mg Oral Daily    DULoxetine  120 mg Oral Daily    hydrALAZINE  25 mg Oral BID    isosorbide dinitrate  10 mg Oral BID    levothyroxine  50 mcg Oral Daily    [Held by provider] meloxicam  15 mg Oral Daily    metoprolol tartrate  50 mg Oral BID    montelukast  10 mg Oral Nightly    oxybutynin  5 mg Oral BID    pantoprazole  40 mg Oral Daily    spironolactone  25 mg Oral Daily    sucralfate  1 g Oral QAM    sucralfate  2 g Oral Nightly    budesonide-formoterol  2 puff Inhalation BID    traZODone  150 mg Oral Nightly    sodium chloride flush  5-40 mL IntraVENous 2 times per day    enoxaparin  40 mg SubCUTAneous BID    vitamin D  50,000 Units Oral Weekly    remdesivir IVPB  100 mg IntraVENous Q24H       Social History:     Social History     Socioeconomic History    Marital status: Single     Spouse name: Not on file    Number of children: Not on file    Years of education: Not on file    Highest education level: Not on file   Occupational History    Not on file   Tobacco Use    Smoking status: Never Smoker    Smokeless tobacco: Never Used   Vaping Use    Vaping Use: Never used   Substance and Sexual Activity    Alcohol use: No    Drug use: No    Sexual activity: Not on file   Other Topics Concern    Not on file   Social History Narrative    Not on file     Social Determinants of Health     Financial Resource Strain: Low Risk     Difficulty of Paying Living Expenses: Not hard at all   Food Insecurity: No Food Insecurity    Worried About Running Out of Food in the Last Year: Never true    Brianna of Food in the Last Year: Never true   Transportation Needs:     Lack of Transportation (Medical): Not on file    Lack of Transportation (Non-Medical):  Not on file   Physical Activity:     Days of Exercise per Week: Not on file    Minutes of Exercise per Session: Not on file   Stress:     Feeling of Stress : Not on file   Social Connections:     Frequency of Communication with Friends and Family: Not on file    Frequency of Social Gatherings with Friends and Family: Not on file    Attends Catholic Services: Not on file    Active Member of Clubs or Organizations: Not on file    Attends Club or Organization Meetings: Not on file    Marital Status: Not on file   Intimate Partner Violence:     Fear of Current or Ex-Partner: Not on file    Emotionally Abused: Not on file    Physically Abused: Not on file    Sexually Abused: Not on file   Housing Stability:     Unable to Pay for Housing in the Last Year: Not on file    Number of [place] in the Last Year: Not on file    Unstable Housing in the Last Year: Not on file       Family History:     Family History   Problem Relation Age of Onset    Other Mother     Diabetes Mother     Heart Disease Mother     Arthritis Mother     Kidney Disease Mother     Lupus Mother     Arthritis Sister     Diabetes Brother     Asthma Brother     Cancer Maternal Grandfather     Hypertension Sister     Breast Cancer Sister         35s    Breast Cancer Niece         32-31      Medical Decision Making:   I have independently reviewed/ordered the following labs:    CBC with Differential:   Recent Labs     06/14/22  0455 06/15/22  0514   WBC 5.6 5.5   HGB 9.1* 10.0*   HCT 29.8* 32.2*    242   LYMPHOPCT 19* 29   MONOPCT 11 11     BMP:  Recent Labs     06/14/22  0455 06/15/22  0514    138   K 4.3 4.2    99   CO2 24 27   BUN 16 18   CREATININE 1.17* 1.23*     Hepatic Function Panel:   Recent Labs     06/14/22  0455   PROT 6.4   LABALBU 3.4*   BILITOT 0.18*   ALKPHOS 136*   ALT 10   AST 11     No results for input(s): RPR in the last 72 hours. No results for input(s): HIV in the last 72 hours. No results for input(s): BC in the last 72 hours. Lab Results   Component Value Date    CREATININE 1.23 06/15/2022    GLUCOSE 105 06/15/2022       Detailed results: Thank you for allowing us to participate in the care of this patient. Please call with questions. This note is created with the assistance of a speech recognition program.  While intending to generate adocument that actually reflects the content of the visit, the document can still have some errors including those of syntax and sound a like substitutions which may escape proof reading. It such instances, actual meaningcan be extrapolated by contextual diversion. [facility]  Office: [phone]  Perfect serve / office [phone]          I have discussed the care of the patient, including pertinent history and exam findings,  with the resident. I have seen and examined the patient and the key elements of all parts of the encounter have been performed by me. I agree with the assessment, plan and orders as documented by the resident.     Jasmine Holliday, Infectious Diseases

## 2022-06-16 NOTE — CARE COORDINATION
Transitional Planning:  Call from Rosemary at [facility]. She does not have a raised toilet to accommodate her wt. They will be able to deliver the rollator tomorrow. Pt informed that raised toilet seat is not covered by insurance.

## 2022-06-16 NOTE — PLAN OF CARE
Problem: Discharge Planning  Goal: Discharge to home or other facility with appropriate resources  6/16/2022 0538 by Hailey Cox RN  Outcome: Progressing  Flowsheets (Taken 6/15/2022 2000)  Discharge to home or other facility with appropriate resources:   Identify barriers to discharge with patient and caregiver   Arrange for needed discharge resources and transportation as appropriate   Identify discharge learning needs (meds, wound care, etc)   Arrange for interpreters to assist at discharge as needed   Refer to discharge planning if patient needs post-hospital services based on physician order or complex needs related to functional status, cognitive ability or social support system  6/15/2022 1551 by Whitney Hastings RN  Outcome: Progressing     Problem: Pain  Goal: Verbalizes/displays adequate comfort level or baseline comfort level  6/16/2022 0538 by Hailey Cox RN  Outcome: Progressing  6/15/2022 1551 by Whitney Hastings RN  Outcome: Progressing     Problem: Safety - Adult  Goal: Free from fall injury  6/16/2022 0538 by Hailey Cox RN  Outcome: Progressing  6/15/2022 1551 by Whitney Hastings RN  Outcome: Progressing     Problem: ABCDS Injury Assessment  Goal: Absence of physical injury  6/16/2022 0538 by Hailey Cox RN  Outcome: Progressing  6/15/2022 1551 by Whitney Hastings RN  Outcome: Progressing     Problem: Chronic Conditions and Co-morbidities  Goal: Patient's chronic conditions and co-morbidity symptoms are monitored and maintained or improved  6/16/2022 0538 by Hailey Cox RN  Outcome: Progressing  Flowsheets (Taken 6/15/2022 2000)  Care Plan - Patient's Chronic Conditions and Co-Morbidity Symptoms are Monitored and Maintained or Improved:   Monitor and assess patient's chronic conditions and comorbid symptoms for stability, deterioration, or improvement   Collaborate with multidisciplinary team to address chronic and comorbid conditions and prevent exacerbation or order   Administer medications as ordered   Instruct and encourage patient and family to use good hand hygiene technique   Identify and instruct in appropriate isolation precautions for identified infection/condition  6/15/2022 1551 by Rowdy Smith RN  Outcome: Progressing  Goal: Absence of infection during hospitalization  6/16/2022 0538 by Melissa Be RN  Outcome: Progressing  Flowsheets (Taken 6/15/2022 2000)  Absence of infection during hospitalization:   Assess and monitor for signs and symptoms of infection   Monitor lab/diagnostic results   Monitor all insertion sites i.e., indwelling lines, tubes and drains   Monitor endotracheal (as able) and nasal secretions for changes in amount and color   [place] appropriate cooling/warming therapies per order   Administer medications as ordered   Instruct and encourage patient and family to use good hand hygiene technique   Identify and instruct in appropriate isolation precautions for identified infection/condition  6/15/2022 1551 by Rowdy Smith RN  Outcome: Progressing     Problem: Skin/Tissue Integrity  Goal: Absence of new skin breakdown  Description: 1. Monitor for areas of redness and/or skin breakdown  2. Assess vascular access sites hourly  3. Every 4-6 hours minimum:  Change oxygen saturation probe site  4. Every 4-6 hours:  If on nasal continuous positive airway pressure, respiratory therapy assess nares and determine need for appliance change or resting period.   6/16/2022 0538 by Melissa Be RN  Outcome: Progressing  6/15/2022 1551 by Rowdy Smith RN  Outcome: Progressing

## 2022-06-16 NOTE — PLAN OF CARE
Problem: Discharge Planning  Goal: Discharge to home or other facility with appropriate resources  6/16/2022 1559 by Lena Holliday RN  Outcome: Progressing  6/16/2022 0538 by Haley Kelley RN  Outcome: Progressing  Flowsheets (Taken 6/15/2022 2000)  Discharge to home or other facility with appropriate resources:   Identify barriers to discharge with patient and caregiver   Arrange for needed discharge resources and transportation as appropriate   Identify discharge learning needs (meds, wound care, etc)   Arrange for interpreters to assist at discharge as needed   Refer to discharge planning if patient needs post-hospital services based on physician order or complex needs related to functional status, cognitive ability or social support system     Problem: Pain  Goal: Verbalizes/displays adequate comfort level or baseline comfort level  6/16/2022 1559 by Lena Holliday RN  Outcome: Progressing  6/16/2022 0538 by Haley Kelley RN  Outcome: Progressing     Problem: Safety - Adult  Goal: Free from fall injury  6/16/2022 1559 by Lena Holliday RN  Outcome: Jaclyn Samples  6/16/2022 0538 by Haley Kelley RN  Outcome: Progressing     Problem: ABCDS Injury Assessment  Goal: Absence of physical injury  6/16/2022 1559 by Lena Holliday RN  Outcome: Jaclyn Samples  6/16/2022 0538 by Haley Kelley RN  Outcome: Progressing     Problem: Chronic Conditions and Co-morbidities  Goal: Patient's chronic conditions and co-morbidity symptoms are monitored and maintained or improved  6/16/2022 1559 by Lena Holliday RN  Outcome: Progressing  6/16/2022 0538 by Haley Kelley RN  Outcome: Progressing  Flowsheets (Taken 6/15/2022 2000)  Care Plan - Patient's Chronic Conditions and Co-Morbidity Symptoms are Monitored and Maintained or Improved:   Monitor and assess patient's chronic conditions and comorbid symptoms for stability, deterioration, or improvement   Collaborate with multidisciplinary team to address chronic and urinary retention  6/16/2022 1559 by Kristi Wood RN  Outcome: Progressing  6/16/2022 0538 by Pam Bowman RN  Outcome: Progressing  Flowsheets (Taken 6/15/2022 2000)  Absence of urinary retention:   Assess patients ability to void and empty bladder   Monitor intake/output and perform bladder scan as needed     Problem: Infection - Adult  Goal: Absence of infection at discharge  6/16/2022 1559 by Kristi Wood RN  Outcome: Progressing  Flowsheets (Taken 6/16/2022 0800 by Natalie Alicea RN)  Absence of infection at discharge:   Assess and monitor for signs and symptoms of infection   Monitor all insertion sites i.e., indwelling lines, tubes and drains   Instruct and encourage patient and family to use good hand hygiene technique   Identify and instruct in appropriate isolation precautions for identified infection/condition  6/16/2022 0538 by Pam Bowman RN  Outcome: Progressing  Flowsheets (Taken 6/15/2022 2000)  Absence of infection at discharge:   Assess and monitor for signs and symptoms of infection   Monitor lab/diagnostic results   Monitor all insertion sites i.e., indwelling lines, tubes and drains   Monitor endotracheal (as able) and nasal secretions for changes in amount and color   [place] appropriate cooling/warming therapies per order   Administer medications as ordered   Instruct and encourage patient and family to use good hand hygiene technique   Identify and instruct in appropriate isolation precautions for identified infection/condition     Problem: Infection - Adult  Goal: Absence of infection during hospitalization  6/16/2022 1559 by Kristi Wood RN  Outcome: Progressing  Flowsheets (Taken 6/16/2022 0800 by Natalie Alicea RN)  Absence of infection during hospitalization:   Assess and monitor for signs and symptoms of infection   Monitor lab/diagnostic results   Monitor all insertion sites i.e., indwelling lines, tubes and drains   Administer medications as ordered   Instruct and encourage patient and family to use good hand hygiene technique   Identify and instruct in appropriate isolation precautions for identified infection/condition  6/16/2022 0538 by Gurwinder Daly, RN  Outcome: Progressing  Flowsheets (Taken 6/15/2022 2000)  Absence of infection during hospitalization:   Assess and monitor for signs and symptoms of infection   Monitor lab/diagnostic results   Monitor all insertion sites i.e., indwelling lines, tubes and drains   Monitor endotracheal (as able) and nasal secretions for changes in amount and color   [place] appropriate cooling/warming therapies per order   Administer medications as ordered   Instruct and encourage patient and family to use good hand hygiene technique   Identify and instruct in appropriate isolation precautions for identified infection/condition     Problem: Skin/Tissue Integrity  Goal: Absence of new skin breakdown  Description: 1. Monitor for areas of redness and/or skin breakdown  2. Assess vascular access sites hourly  3. Every 4-6 hours minimum:  Change oxygen saturation probe site  4. Every 4-6 hours:  If on nasal continuous positive airway pressure, respiratory therapy assess nares and determine need for appliance change or resting period. 6/16/2022 1559 by Julio Mancilla RN  Outcome: Progressing  6/16/2022 0538 by Gurwinder Daly RN  Outcome: Progressing     Will continue to monitor patient.

## 2022-06-16 NOTE — DISCHARGE SUMMARY
[facility]  Office: [phone]  Romeo García DO, Ara Peck DO, Wilmar Evangelista DO, Danitza Lock, DO, Isidro Woodard MD, Merissa Lopez MD, Isiah Pineda MD, Thompson Bueno MD, Baltazar Ahumada MD, Melissa Salazar MD, Zac Hollingsworth MD, Monique Mcelroy, DO, Chad Skinner MD,  Tere Crystal, DO, Daniel Rosario MD, Gunnar Salvador MD, Thomas Capps MD, Ricci Lundborg, DO, Paulette Faustin MD, Mariola Dubois MD, Margoth Fitzgerald DO, Arnulfo Schmitt MD, Carlos Quiros [facility], [facility], CNP, Rah Denny, CNP, Roselyn Ennis, CNP, Vipin Khan, CNP, Sara Bear, CNP, Yamila Stock PA-C, Phyllis Hook, [facility], Brad Matthews, [facility], Brayan Briscoe, CNP, Amanda Chandler, CNP, Migdalia Odell, CNS, Yolande Gaviria, [facility], Yris Mosley, CNP, Lucía Sterling, CNP, Melinda Justin, [facility]    Discharge Summary     Patient ID: Jeff Cordero  :  1965   MRN: [id number]     ACCOUNT:  [de-identified]   Patient's PCP: ASA Jim CNP  Admit Date: 2022   Discharge Date: 2022     Length of Stay: 3  Code Status:  Full Code  Admitting Physician: No admitting provider for patient encounter.   Discharge Physician: Gunnar Salvador MD     Active Discharge Diagnoses:     Hospital Problem Lists:  Principal Problem:    Chest pain  Active Problems:    Headache    Suspected sleep apnea    Vitamin D deficiency    CKD (chronic kidney disease) stage 3, GFR 30-59 ml/min (HCC)    COPD exacerbation (HCC)    CRP elevated    High serum fibrinogen    Low back pain    Opioid dependence, uncomplicated (HCC)    Class 3 severe obesity due to excess calories with serious comorbidity and body mass index (BMI) of 45.0 to 49.9 in adult [facility])    Myalgia    COVID-19    GERD (gastroesophageal reflux disease)    Acute on chronic combined systolic and diastolic congestive heart failure (HCC)    Chronic pain  Resolved Problems:    * No resolved hospital problems. *      Admission Condition:  poor     Discharged Condition: good    Hospital Stay:     Hospital Course:  Mitchell Rudd is a 60 y.o. female who was admitted for the management of   Chest pain , presented to ER with Positive For Covid-19 (home test)    49-year-old female with known medical history of hypertension, hyperlipidemia, chronic systolic and diastolic heart failure with EF of 30 to 35%, morbid obesity presented to the hospital with shortness of breath, generalized myalgias, fatigue, tested positive at home with COVID. Patient also has a history of chronic pain syndrome but states that these myalgias and pain were much worse. He has had COVID in 2020 and was on oxygen for few weeks. Patient also complained of some chest pressure on arrival in the ER and cardiology was consulted.     Patient was started on remdesivir for COVID-pneumonia. Was weaned down to room air. Patient has history of systolic heart failure, initially responded well to IV diuresis. Patient felt much better after remdisvir- 3 days and crp trended down. Cardiology will follow up outpatient. Since patient symptoms are better, will plan for echo outpatient.     Plan was comfortable for  discharge. Significant therapeutic interventions:    COVID-19 infection- continue remdesivir. CT chest does not show evidence of pneumonia, patient is on room air during the day, does have some desaturation episodes overnight but likely secondary to sleep apnea. Continue to monitor inflammatory markers. Received remdisvir 3 doses, CRP trending down, plan for discharge.     Acute on chronic systolic and diastolic heart failure- previous ejection fraction of 30 to 35%, continue bumex oral, monitor intake and output, fluid restriction to 1500 mL. Continue aspirin, Lipitor, hydralazine, Isordil, Lopressor and Aldactone. Patient is allergic to ACE inhibitor, had history of SUSU.  No chest pain, will reschedule echo for outpatient     Hypertension- continue antihypertensives     Hypothyroid- on synthyroid, recent TSH shows elevated, needs to be complaint to synthyroid and needs to take it empty stomach. CT chest shows patient has enlargement of left lobe of thyroid, stable from prior exams, will need ultrasound outpatient     Negative Doppler lower extremities for DVT     CKD STAGE 3- at baseline 1.1-1.2     GERD-continue Carafate and Protonix     Obesity-encouraged to lose weight     Suspected sleep apnea-patient states she has prescription to get a sleep study done outpatient    Significant Diagnostic Studies:   Labs / Micro:  CBC:   Lab Results   Component Value Date    WBC 5.5 06/15/2022    RBC 3.54 06/15/2022    HGB 10.0 06/15/2022    HCT 32.2 06/15/2022    MCV 91.0 06/15/2022    MCH 28.2 06/15/2022    MCHC 31.1 06/15/2022    RDW 15.6 06/15/2022     06/15/2022     BMP:    Lab Results   Component Value Date    GLUCOSE 105 06/15/2022     06/15/2022    K 4.2 06/15/2022    CL 99 06/15/2022    CO2 27 06/15/2022    ANIONGAP 12 06/15/2022    BUN 18 06/15/2022    CREATININE 1.23 06/15/2022    BUNCRER NOT REPORTED 10/15/2021    CALCIUM 8.7 06/15/2022    LABGLOM 45 06/15/2022    GFRAA 54 06/15/2022    GFR      06/15/2022     HFP:    Lab Results   Component Value Date    PROT 6.4 06/14/2022     CMP:    Lab Results   Component Value Date    GLUCOSE 105 06/15/2022     06/15/2022    K 4.2 06/15/2022    CL 99 06/15/2022    CO2 27 06/15/2022    BUN 18 06/15/2022    CREATININE 1.23 06/15/2022    ANIONGAP 12 06/15/2022    ALKPHOS 136 06/14/2022    ALT 10 06/14/2022    AST 11 06/14/2022    BILITOT 0.18 06/14/2022    LABALBU 3.4 06/14/2022    ALBUMIN 1.1 06/14/2022    LABGLOM 45 06/15/2022    GFRAA 54 06/15/2022    GFR      06/15/2022    PROT 6.4 06/14/2022    CALCIUM 8.7 06/15/2022        Radiology:  CT CHEST WO CONTRAST    Result Date: 6/15/2022  1. No acute process in the chest. 2. Cardiomegaly.  3. Heterogeneous enlargement of the left lobe of the thyroid, which is stable from prior exams and most likely benign. Follow-up evaluation with thyroid ultrasound could be considered as clinically indicated. XR CHEST PORTABLE    Result Date: 6/13/2022  Mild cardiomegaly with pulmonary vascular congestion. Consultations:    Consults:     Final Specialist Recommendations/Findings:   IP CONSULT TO HOSPITALIST  IP CONSULT TO CARDIOLOGY  IP CONSULT TO INFECTIOUS DISEASES  IP CONSULT TO IV TEAM      The patient was seen and examined on day of discharge and this discharge summary is in conjunction with any daily progress note from day of discharge. Discharge plan:     Disposition: Home    Physician Follow Up:     ASA Ibrahim - [facility]  [address] [facility] [address]  [phone]    Schedule an appointment as soon as possible for a visit in 1 week      Saintclair Kung, MD  [address] [place], [place] [id number] [phone]    Schedule an appointment as soon as possible for a visit in 3 days         Requiring Further Evaluation/Follow Up POST HOSPITALIZATION/Incidental Findings: echo outpatient  Cardio follow up  CT chest shows patient has enlargement of left lobe of thyroid, stable from prior exams, will need ultrasound outpatient    Diet: cardiac diet    Activity: As tolerated    Instructions to Patient: take meds as ordered    Discharge Medications:      Medication List      START taking these medications    dextromethorphan-guaiFENesin  MG/5ML syrup  Commonly known as: ROBITUSSIN-DM  Take 5 mLs by mouth every 4 hours as needed for Cough     HYDROcodone-acetaminophen 5-325 MG per tablet  Commonly known as: NORCO  Take 1 tablet by mouth every 6 hours as needed for Pain for up to 5 days.      Vitamin D (Ergocalciferol) 72224 units Caps  Take 50,000 Units by mouth once a week  Start taking on: June 21, 2022        CONTINUE taking these medications    * albuterol (2.5 MG/3ML) 0.083% nebulizer solution  Commonly known as: PROVENTIL  Take 3 mLs by nebulization every 6 hours as needed for Wheezing     * albuterol sulfate  (90 Base) MCG/ACT inhaler  Commonly known as: PROVENTIL;VENTOLIN;PROAIR  Inhale 2 puffs into the lungs every 4 hours as needed for Shortness of Breath     allopurinol 300 MG tablet  Commonly known as: ZYLOPRIM  Take 1 tablet by mouth daily     aspirin 81 MG EC tablet     atorvastatin 80 MG tablet  Commonly known as: LIPITOR  Take 1 tablet by mouth daily     bumetanide 1 MG tablet  Commonly known as: BUMEX  Take 2 tablets by mouth daily     buPROPion 300 MG extended release tablet  Commonly known as: WELLBUTRIN XL  Take 1 tablet by mouth every morning     clopidogrel 75 MG tablet  Commonly known as: PLAVIX  Take 1 tablet by mouth daily     DULoxetine 60 MG extended release capsule  Commonly known as: CYMBALTA  Take 2 capsules by mouth daily     Handicap Placard Misc  by Does not apply route 2 years     hydrALAZINE 25 MG tablet  Commonly known as: APRESOLINE  Take 1 tablet by mouth 2 times daily     isosorbide dinitrate 10 MG tablet  Commonly known as: ISORDIL  Take 1 tablet by mouth 2 times daily     levothyroxine 50 MCG tablet  Commonly known as: SYNTHROID  Take 1 tablet by mouth Daily     metoprolol tartrate 50 MG tablet  Commonly known as: LOPRESSOR  Take 1 tablet by mouth 2 times daily     montelukast 10 MG tablet  Commonly known as: SINGULAIR  Take 1 tablet by mouth nightly     Nebulizer Compressor Kit  Provide insurance covered nebulizer, use daily as needed     oxybutynin 5 MG tablet  Commonly known as: DITROPAN  Take 1 tablet by mouth 2 times daily     pantoprazole 40 MG tablet  Commonly known as: PROTONIX  Take 1 tablet by mouth daily     polyethylene glycol 17 g packet  Commonly known as: GLYCOLAX  polyethylene glycol 3350 17 gram/dose oral powder     spironolactone 25 MG tablet  Commonly known as: ALDACTONE  Take 1 tablet by mouth daily     sucralfate 1 GM tablet  Commonly known as: Carafate  Take 1 tablet in AM, 2 tablet in evening daily     Symbicort 80-4.5 MCG/ACT Aero  Generic drug: budesonide-formoterol  Inhale 2 puffs into the lungs 2 times daily     tiZANidine 4 MG tablet  Commonly known as: ZANAFLEX  Take 1 tablet by mouth 3 times daily as needed (pain)     traZODone 150 MG tablet  Commonly known as: DESYREL  Take 1 tablet by mouth nightly         * This list has 2 medication(s) that are the same as other medications prescribed for you. Read the directions carefully, and ask your doctor or other care provider to review them with you. STOP taking these medications    diazePAM 5 MG tablet  Commonly known as: VALIUM     meloxicam 15 MG tablet  Commonly known as: Mobic     nirmatrelvir/ritonavir 20 x 150 MG & 10 x 100MG  Commonly known as: PAXLOVID           Where to Get Your Medications      These medications were sent to [facility] [address] [facility], [address] [place] [address]  [address] [facility], [place] [address]    Phone: [phone]   · dextromethorphan-guaiFENesin  MG/5ML syrup  · HYDROcodone-acetaminophen 5-325 MG per tablet  · Vitamin D (Ergocalciferol) 64418 units Caps         Discharge Procedure Orders   Echocardiogram complete   Standing Status: Future Standing Exp. Date: 08/15/22   Order Comments: Follow up results with cardiology     Order Specific Question Answer Comments   Reason for exam: check ef        Time Spent on discharge is  35 mins in patient examination, evaluation, counseling as well as medication reconciliation, prescriptions for required medications, discharge plan and follow up. Electronically signed by   Javy Garber MD  6/16/2022  1:23 PM      Thank you Dr. Elmer Jarrett, APRN - CNP for the opportunity to be involved in this patient's care.

## 2022-06-16 NOTE — PROGRESS NOTES
Billie Chapa was evaluated today and a DME order was entered for a wheeled walker with seat because she requires this to successfully complete daily living tasks of personal cares and ambulating. A wheeled walker with seat is necessary due to the patient's unsteady gait, upper body weakness, inability to  and ambulation device, ambulating only short distances by pushing a walker, and the need to sit for a short time before resuming ambulation. These tasks cannot be completed with a lesser ambulation device such as a cane, crutch, or standard walker. The need for this equipment was discussed with the patient and she understands and is in agreement. Billie Chapa requires a raised toilet seat due to being confined to a single room, and is physically incapable of utilizing regular toilet facilities. Current body weight is Weight: (!) 329 lb (149.2 kg).

## 2022-06-16 NOTE — PROGRESS NOTES
[facility]  Office: [address], DO, Ara Peck, DO, Wilmar Patee, DO, Danitza Guidry Blood, DO, Isidro Woodard MD, Merissa Lopez MD, Isiah Pineda MD, Thompson Bueno MD, Baltazar Ahumada MD, Melissa Salazar MD, Zac Hollingsworth MD, Monique Mcelroy, DO, Chad Skinner MD,  Tere Rota, DO, Daniel Rosario MD, Gunnar Salvador MD, Thomas Capps MD, Ricci Lundborg, DO, Paulette Faustin MD, Mariola Dubois MD, Margoth Fitzgerald, DO, Arnulfo Schmitt MD, Carlos Quiros, [facility], [facility] Radha, CNP, Rah Denny, CNP, Roselyn Ennis, CNP, Vipin Khan, CNP, Sara Bear, CNP, LAURA MeloC, Phyllis Hook, DNP, Brad Matthews, [facility], Brayan Briscoe, CNP, Amanda Chandler, CNP, Migdalia Odell, CNS, Yolande Gaviria, DNP, Yris Mosley, CNP, Lucía Sterling, CNP, Melinda Justin, [address] [place] [address]    Progress Note    6/16/2022    1:21 PM    Name:   Jeff Cordero  MRN:     [id number]     Acct:      [de-identified]   Room:   [address] [place], [place] [id number]   Day:  3  Admit Date:  6/13/2022  4:30 PM    PCP:   ASA Jim CNP  Code Status:  Full Code    Subjective:     C/C:   Chief Complaint   Patient presents with    Positive For Covid-19     home test     Interval History Status: not changed. Patient examined at bedside  Is much better today  Fatigue has improved  Pain has improved  Comfortable with discharge  Slept well overnight  Brief History:     57-year-old female with known medical history of hypertension, hyperlipidemia, chronic systolic and diastolic heart failure with EF of 30 to 35%, morbid obesity presented to the hospital with shortness of breath, generalized myalgias, fatigue, tested positive at home with COVID. Patient also has a history of chronic pain syndrome but states that these myalgias and pain were much worse. He has had COVID in 2020 and was on oxygen for few weeks.   Patient also complained of some chest pressure on arrival in the ER and cardiology was consulted. Patient was started on remdesivir for COVID-pneumonia. Was weaned down to room air. Patient has history of systolic heart failure, initially responded well to IV diuresis. Patient felt much better after remdisvir- 3 days and crp trended down. Cardiology will follow up outpatient. Since patient symptoms are better, will plan for echo outpatient. Plan was comfortable for  discharge. Review of Systems:     Constitutional: Positive for chills, fevers, sweats  Respiratory: Positive for cough, improving dyspnea on exertion, shortness of breath, no wheezing  Cardiovascular: no chest pressure, negative for chest pain, trace lower extremity edema, no palpitations  Gastrointestinal:  negative for abdominal pain, constipation, diarrhea, nausea, vomiting, loose formed bowel movements present  Neurological: Improving generalized weakness  Positive for leg pain  Medications: Allergies:     Allergies   Allergen Reactions    Entresto [Sacubitril-Valsartan] Other (See Comments)     Acute kidney injury    Food Swelling     peaches    Gabapentin Swelling    Lyrica [Pregabalin] Swelling     Mouth sores    Tetracyclines & Related        Current Meds:   Scheduled Meds:    bumetanide  2 mg Oral Daily    allopurinol  300 mg Oral Daily    aspirin  81 mg Oral Daily    atorvastatin  80 mg Oral Daily    buPROPion  300 mg Oral QAM    clopidogrel  75 mg Oral Daily    DULoxetine  120 mg Oral Daily    hydrALAZINE  25 mg Oral BID    isosorbide dinitrate  10 mg Oral BID    levothyroxine  50 mcg Oral Daily    [Held by provider] meloxicam  15 mg Oral Daily    metoprolol tartrate  50 mg Oral BID    montelukast  10 mg Oral Nightly    oxybutynin  5 mg Oral BID    pantoprazole  40 mg Oral Daily    spironolactone  25 mg Oral Daily    sucralfate  1 g Oral QAM    sucralfate  2 g Oral Nightly    budesonide-formoterol  2 puff Inhalation BID    traZODone  150 mg Oral Nightly    sodium chloride flush  5-40 mL IntraVENous 2 times per day    enoxaparin  40 mg SubCUTAneous BID    vitamin D  50,000 Units Oral Weekly    remdesivir IVPB  100 mg IntraVENous Q24H     Continuous Infusions:    sodium chloride       PRN Meds: albuterol sulfate HFA, polyethylene glycol, tiZANidine, sodium chloride flush, sodium chloride, ondansetron **OR** ondansetron, acetaminophen **OR** acetaminophen, dextromethorphan-guaiFENesin, HYDROcodone 5 mg - acetaminophen, sodium chloride    Data:     Past Medical History:   has a past medical history of Arthritis, Bronchitis, CHF (congestive heart failure) ([address] COVID-19, Fibromyalgia, GERD (gastroesophageal reflux disease), Gout, History of heart attack, HLD (hyperlipidemia), Hypertension, Insomnia, and Osteoarthritis. Social History:   reports that she has never smoked. She has never used smokeless tobacco. She reports that she does not drink alcohol and does not use drugs. Family History:   Family History   Problem Relation Age of Onset   [facility] Other Mother     Diabetes Mother     Heart Disease Mother     Arthritis Mother     Kidney Disease Mother     Lupus Mother     Arthritis Sister     Diabetes Brother     Asthma Brother     Cancer Maternal Grandfather     Hypertension Sister     Breast Cancer Sister         28s    Breast Cancer Niece         30-35       Vitals:  BP 97/71   Pulse 67   Temp 97.8 °F (36.6 °C) (Oral)   Resp 23   Ht 5' 8\" (1.727 m)   Wt (!) 329 lb (149.2 kg)   SpO2 99%   BMI 50.02 kg/m²   Temp (24hrs), Av.9 °F (36.6 °C), Min:97.7 °F (36.5 °C), Max:98.2 °F (36.8 °C)    No results for input(s): POCGLU in the last 72 hours. I/O (24Hr):     Intake/Output Summary (Last 24 hours) at 2022 1321  Last data filed at 2022 0500  Gross per 24 hour   Intake 1700 ml   Output 2400 ml   Net -700 ml       Labs:  Hematology:  Recent Labs     22  1745 22  0455 06/15/22  0514 22  0406   WBC 6.6 5.6 5.5 --    RBC 3.69* 3.24* 3.54*  --    HGB 10.6* 9.1* 10.0*  --    HCT 34.6* 29.8* 32.2*  --    MCV 93.8 92.0 91.0  --    MCH 28.7 28.1 28.2  --    MCHC 30.6 30.5 31.1  --    RDW 15.6* 15.7* 15.6*  --     224 242  --    MPV 10.6 10.4 10.2  --    CRP  --  48.6* 43.2* 29.8*     Chemistry:  Recent Labs     06/13/22  1745 06/13/22  1905 06/14/22  0455 06/14/22  0907 06/15/22  0514     --  140  --  138   K 4.9  --  4.3  --  4.2     --  103  --  99   CO2 25  --  24  --  27   GLUCOSE 69*  --  144*  --  105*   BUN 17  --  16  --  18   CREATININE 1.20*  --  1.17*  --  1.23*   ANIONGAP 10  --  13  --  12   LABGLOM 46*  --  48*  --  45*   GFRAA 56*  --  58*  --  54*   CALCIUM 8.9  --  8.5*  --  8.7   PROBNP 305*  --   --   --   --    TROPHS 11 11  --  11  --      Recent Labs     06/14/22  0455   PROT 6.4   LABALBU 3.4*   AST 11   ALT 10   ALKPHOS 136*   BILITOT 0.18*     ABG:No results found for: POCPH, PHART, PH, POCPCO2, PWP8AZW, PCO2, POCPO2, PO2ART, PO2, POCHCO3, YVJ5TEC, HCO3, NBEA, PBEA, BEART, BE, THGBART, THB, GUL9UCP, FTGE5NYP, A5GWBIXW, O2SAT, FIO2  Lab Results   Component Value Date/Time    SPECIAL NOT REPORTED 12/01/2020 05:48 AM     Lab Results   Component Value Date/Time    CULTURE NO SIGNIFICANT GROWTH 12/01/2020 05:48 AM       Radiology:  XR CHEST PORTABLE    Result Date: 6/13/2022  Mild cardiomegaly with pulmonary vascular congestion.        Physical Examination:        General appearance:  alert, cooperative and no distress  Mental Status:  oriented to person, place and time and normal affect  Lungs: Decreased breath sounds bilaterally, normal effort  Heart:  regular rate and rhythm, no murmur  Abdomen:  soft, nontender, nondistended, normal bowel sounds, no masses, hepatomegaly, splenomegaly  Extremities: Trace pedal edema, no redness, tenderness in the calves  Skin:  no gross lesions, rashes, induration    Assessment:        Hospital Problems           Last Modified POA    * (Principal) Chest pain 6/13/2022 Yes    Headache 6/14/2022 Yes    Suspected sleep apnea 6/14/2022 Yes    Vitamin D deficiency 6/14/2022 Yes    CKD (chronic kidney disease) stage 3, GFR 30-59 ml/min ([address] 6/14/2022 Yes    COPD exacerbation ([address] 6/14/2022 Yes    CRP elevated 6/14/2022 Yes    High serum fibrinogen 6/15/2022 Yes    Low back pain 6/14/2022 Yes    Opioid dependence, uncomplicated ([address] 5/96/2279 Yes    Class 3 severe obesity due to excess calories with serious comorbidity and body mass index (BMI) of 45.0 to 49.9 in adult [facility]) (Chronic) 6/14/2022 Yes    Myalgia 6/14/2022 Yes    COVID-19 6/14/2022 Yes    GERD (gastroesophageal reflux disease) 6/14/2022 Yes    Acute on chronic combined systolic and diastolic congestive heart failure ([facility] [address] 6/14/2022 Yes    Chronic pain 6/14/2022 Yes          Plan:        COVID-19 infection- continue remdesivir. CT chest does not show evidence of pneumonia, patient is on room air during the day, does have some desaturation episodes overnight but likely secondary to sleep apnea. Continue to monitor inflammatory markers. Received remdisvir 3 doses, CRP trending down, plan for discharge. Acute on chronic systolic and diastolic heart failure- previous ejection fraction of 30 to 35%, continue bumex oral, monitor intake and output, fluid restriction to 1500 mL. Continue aspirin, Lipitor, hydralazine, Isordil, Lopressor and Aldactone. Patient is allergic to ACE inhibitor, had history of SUSU. No chest pain, will reschedule echo for outpatient    Hypertension- continue antihypertensives    Hypothyroid- on synthyroid, recent TSH shows elevated, needs to be complaint to synthyroid and needs to take it empty stomach.   CT chest shows patient has enlargement of left lobe of thyroid, stable from prior exams, will need ultrasound outpatient    Negative Doppler lower extremities for DVT    CKD STAGE 3- at baseline 1.1-1.2    GERD-continue Carafate and Protonix    Obesity-encouraged to lose weight    Suspected sleep apnea-patient states she has prescription to get a sleep study done outpatient. Monitor electrolytes and replace as tolerated  Replace vitamin d    Chronic pain- saw pain management, had an argument with DR Zach Andrade as he refused opioids. Will encourage to avoid opioids as much as possible.     Rosanne Beck MD  6/16/2022  1:21 PM

## 2022-06-16 NOTE — PROGRESS NOTES
Writer is discharging patient after 1600 today when the patient's niece is available to  the patient. Writer is administering the IV remdesivir at noon per Dr. Pieter Rushing. Writer spoke to Saratha Osgood (pharmacist) and he will be preparing the medication to be given at noon.  Will continue to monitor patient

## 2022-06-16 NOTE — PROGRESS NOTES
[facility]  Progress Note                   Date:   6/16/2022  Patient name: Kristin Hawkins  Date of admission:  6/13/2022  4:30 PM  MRN:   [id number]  YOB: 1965  PCP: ASA Vidal CNP    Reason for Admission: Chest pain [R07.9]  Chest pain, unspecified type [R07.9]  COVID-19 [U07.1]    Subjective:       Clinical Changes /Abnormalities: Patient seen and examined , stated feeling better and slept well  . Denies chest pain or shortness of breath. Tele/vitals/labs reviewed . Discussed case with RN.  ECHO not done     -2 liter since admission   Review of Systems    Medications:   Scheduled Meds:   bumetanide  2 mg Oral Daily    allopurinol  300 mg Oral Daily    aspirin  81 mg Oral Daily    atorvastatin  80 mg Oral Daily    buPROPion  300 mg Oral QAM    clopidogrel  75 mg Oral Daily    DULoxetine  120 mg Oral Daily    hydrALAZINE  25 mg Oral BID    isosorbide dinitrate  10 mg Oral BID    levothyroxine  50 mcg Oral Daily    [Held by provider] meloxicam  15 mg Oral Daily    metoprolol tartrate  50 mg Oral BID    montelukast  10 mg Oral Nightly    oxybutynin  5 mg Oral BID    pantoprazole  40 mg Oral Daily    spironolactone  25 mg Oral Daily    sucralfate  1 g Oral QAM    sucralfate  2 g Oral Nightly    budesonide-formoterol  2 puff Inhalation BID    traZODone  150 mg Oral Nightly    sodium chloride flush  5-40 mL IntraVENous 2 times per day    enoxaparin  40 mg SubCUTAneous BID    vitamin D  50,000 Units Oral Weekly    remdesivir IVPB  100 mg IntraVENous Q24H     Continuous Infusions:   sodium chloride       CBC:   Recent Labs     06/13/22 1745 06/14/22 0455 06/15/22  0514   WBC 6.6 5.6 5.5   HGB 10.6* 9.1* 10.0*    224 242     BMP:    Recent Labs     06/13/22 1745 06/14/22 0455 06/15/22  0514    140 138   K 4.9 4.3 4.2    103 99   CO2 25 24 27   BUN 17 16 18   CREATININE 1.20* 1.17* 1.23*   GLUCOSE 69* 144* 105* Hepatic:  Recent Labs     06/14/22  0455   AST 11   ALT 10   BILITOT 0.18*   ALKPHOS 136*     Troponin:   Recent Labs     06/13/22  1745 06/13/22  1905 06/14/22  0907   TROPHS 11 11 11     BNP: No results for input(s): BNP in the last 72 hours. Lipids: No results for input(s): CHOL, HDL in the last 72 hours. Invalid input(s): LDLCALCU  INR: No results for input(s): INR in the last 72 hours. EKG:    Date: 06/14/22  Reading:   Regular rate and rhytym Hr 93  Axis normal, Wide QRS at 124ms  QTc 452. No ST elevations, depressions or T-wave inversion noted        LAST ECHO:  ECHO 11/18/2020: EF 30-35% with global hypokinesia, grade II DD, no regurg/stenosis.      ECHO 10/2/19: EF 42%, grade I DD, mild MR, trivial TR.      Prior Echo from [place] reviewed with LVEF 25-30% and global hypokinesis (Per KW's note)     No previous stress test or angiography.          Objective:   Vitals: BP 97/71   Pulse 67   Temp 97.8 °F (36.6 °C) (Oral)   Resp 23   Ht 5' 8\" (1.727 m)   Wt (!) 329 lb (149.2 kg)   SpO2 99%   BMI 50.02 kg/m²   General appearance: alert and cooperative with exam  HEENT: Head: Normocephalic, no lesions, without obvious abnormality. Neck:no JVD, trachea midline, no adenopathy  Lungs: Clear to auscultation  Heart: Regular rate and rhythm, s1/s2 auscultated, no murmurs  Abdomen: soft, non-tender, bowel sounds active  Extremities: pedal edema more to right than left   Neurologic: not done        Assessment / Acute Cardiac Problems:     1. Atypical chest pain, no evidence of ACS   2. COVID-19 infection   3. Essential hypertension  4. Acute on chronic systolic heart failure  5. Cardiomyopathy, unclear etiology, diagnosed several years ago at [place], last LVEF 30-35%. 2D echocardiogram 11/08/2020, moderately reduced LV systolic function with EF 30-35%, global hypokinesia, grade 2 diastolic dysfunction, no significant valve abnormality  6. Morbid obesity  7. Fibromyalgia  8. Hyperlipidemia  9. 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, insomnia type (HCC)     Opioid dependence, uncomplicated (HCC)     Sacroiliac joint dysfunction of both sides     Chronic pain disorder     Bulge of cervical disc without myelopathy     DDD (degenerative disc disease), lumbar     Failed back syndrome of cervical spine     Chest pain     Class 3 severe obesity due to excess calories with serious comorbidity and body mass index (BMI) of 45.0 to 49.9 in adult [facility])     Cervical spondylosis with radiculopathy     Status post cervical spinal fusion     History of lumbar fusion     Lumbar radiculopathy     Morbid obesity with BMI of 50.0-59.9, adult (HCC)     Chronic pain of both knees     Myalgia     Chronic use of opiate drug for therapeutic purpose     COVID-19     Hypertension     Gout     GERD (gastroesophageal reflux disease)     Acute on chronic combined systolic and diastolic congestive heart failure (HCC)     Other proteinuria     Nausea and vomiting     Chronic pain     PTSD (post-traumatic stress disorder)     Hyperuricemia     Mild persistent asthma without complication     CHF (congestive heart failure) (HCC)     Chronic bronchitis, unspecified chronic bronchitis type (HCC)     Major depressive disorder, recurrent severe without psychotic features (HCC)     Headache     Suspected sleep apnea     Vitamin D deficiency     CKD (chronic kidney disease) stage 3, GFR 30-59 ml/min (HCC)     COPD exacerbation (HCC)     CRP elevated      Plan of Treatment:   1. Cardiac stable -not in fluid overload clinically continue statin Bumex Aldactone hydralazine and   beta-blocker  2. Awaiting echo  3.  Keep K>4, MG>2    Electronically signed by ASA Clifford - NP on 6/16/2022 at [facility]. [phone]

## 2022-06-16 NOTE — CARE COORDINATION
Ps Dr Socorro Lopez patient is requesting a rollator, raised toilet seat     Discharge [facility] Case Management [facility]  Written by: Yasmany Darby RN    Patient Name: Serg Humphrey  Attending Provider: Mery Reyes MD  Admit Date: 2022  4:30 PM  MRN: [id number]  Account: [de-identified]                     : 1965  Discharge Date: 22      Disposition: home, called patient faxed order for raised toilet seat and rollator to [facility], updated avs with their contact information, instructed patient to call to arrange delivery when discharged.  Called spoke to [facility], RN

## 2022-06-17 ENCOUNTER — CARE COORDINATION (OUTPATIENT)
Dept: CASE MANAGEMENT | Age: 57
End: 2022-06-17

## 2022-06-17 DIAGNOSIS — U07.1 COVID-19: Primary | ICD-10-CM

## 2022-06-17 PROCEDURE — 1111F DSCHRG MED/CURRENT MED MERGE: CPT | Performed by: NURSE PRACTITIONER

## 2022-06-17 NOTE — CARE COORDINATION
Nick 45 Transitions Initial Follow Up Call    Call within 2 business days of discharge: Yes    Patient: Jones Mcclelland Patient : 1965   MRN: [id number]  Reason for Admission: COVID 19  Discharge Date: 22 RARS: Readmission Risk Score: 14.6 ( )      Last Discharge [facility]       Complaint Diagnosis Description Type Department Provider    22 Positive For Covid-19 COVID-19 . .. ED to Hosp-Admission (Discharged) (ADMITTED) Jazmin Eldridge MD; Kate Carey. .. Spoke with: Patient    Facility: [facility]    Non-face-to-face services provided:  Scheduled appointment with PCP-  Scheduled appointment with Sarah Nava with cardiology  Obtained and reviewed discharge summary and/or continuity of care documents     Spoke with patient who said she is feeling a little better today. She is fatigued, states her fibromyalgia is acting up and she has chronic pain. She goes to the pain clinic for her fibromyalgia and will be seeing a new pain management center through [address] in a couple of weeks. She has a productive cough of clear - yellow sputum, she gets short of breath with exertion and is fatigued. She denies any further chest pains. Discharge instructions reviewed, 1111F order done. She will call today to [facility] so they can deliver her rollator. She has all medications, 1111F  done. She is to remain in isolation until . COVID zone tool sheet reviewed. Transitions of Care Initial Call    Was this an external facility discharge? No Discharge Facility: [facility]    Challenges to be reviewed by the provider   Additional needs identified to be addressed with provider: No  none             Method of communication with provider : none    Advance Care Planning:   Does patient have an Advance Directive: not on file; education provided. Care Transition Nurse contacted the patient by telephone to perform post hospital discharge assessment.  Verified name and  with patient as identifiers. Provided introduction to self, and explanation of the CTN role. CTN reviewed discharge instructions, medical action plan and red flags with patient who verbalized understanding. Patient given an opportunity to ask questions and does not have any further questions or concerns at this time. Were discharge instructions available to patient? Yes. Reviewed appropriate site of care based on symptoms and resources available to patient including: PCP  Specialist. The patient agrees to contact the PCP office for questions related to their healthcare. Medication reconciliation was performed with patient, who verbalizes understanding of administration of home medications. Advised obtaining a 90-day supply of all daily and as-needed medications. Was patient discharged with a pulse oximeter? no    CTN provided contact information. Plan for follow-up call in 3-5 days based on severity of symptoms and risk factors. Plan for next call: check on cough, any new symptoms, if needing assistance to get 2D echo scheuled.           Care Transitions 24 Hour Call    Schedule Follow Up Appointment with PCP: Completed  Do you have a copy of your discharge instructions?: Yes  Do you have all of your prescriptions and are they filled?: Yes  Have you been contacted by a [facility] Pharmacist?: No  Have you scheduled your follow up appointment?: Yes  How are you going to get to your appointment?: Car - family or friend to transport  [address]: [address] (Comment: Kev Ramirez)  Do you feel like you have everything you need to keep you well at home?: Yes  Care Transitions Interventions         Follow Up  Future Appointments   Date Time Provider Christiano Wren   2022  4:00 PM ASA Tomlinson - [facility]   2022 12:30 PM ASA Tomlinson - [address] Noble Merchant RN

## 2022-06-22 ENCOUNTER — TELEPHONE (OUTPATIENT)
Dept: ORTHOPEDIC SURGERY | Age: 57
End: 2022-06-22

## 2022-06-22 NOTE — TELEPHONE ENCOUNTER
Pt called in stating she dropped some paperwork off to the [facility] office to have staff check to see if her [address] case was still open so pt can scheduled an appt with dr Teresa Green

## 2022-06-23 ENCOUNTER — CARE COORDINATION (OUTPATIENT)
Dept: CASE MANAGEMENT | Age: 57
End: 2022-06-23

## 2022-06-23 ENCOUNTER — TELEMEDICINE (OUTPATIENT)
Dept: PRIMARY CARE CLINIC | Age: 57
End: 2022-06-23
Payer: MEDICARE

## 2022-06-23 DIAGNOSIS — R93.89 ABNORMAL CT OF THE CHEST: ICD-10-CM

## 2022-06-23 DIAGNOSIS — U07.1 COVID-19 VIRUS INFECTION: Primary | ICD-10-CM

## 2022-06-23 DIAGNOSIS — Z09 HOSPITAL DISCHARGE FOLLOW-UP: ICD-10-CM

## 2022-06-23 DIAGNOSIS — J42 CHRONIC BRONCHITIS, UNSPECIFIED CHRONIC BRONCHITIS TYPE (HCC): ICD-10-CM

## 2022-06-23 PROCEDURE — 3017F COLORECTAL CA SCREEN DOC REV: CPT | Performed by: NURSE PRACTITIONER

## 2022-06-23 PROCEDURE — 1111F DSCHRG MED/CURRENT MED MERGE: CPT | Performed by: NURSE PRACTITIONER

## 2022-06-23 PROCEDURE — G8427 DOCREV CUR MEDS BY ELIG CLIN: HCPCS | Performed by: NURSE PRACTITIONER

## 2022-06-23 PROCEDURE — 99214 OFFICE O/P EST MOD 30 MIN: CPT | Performed by: NURSE PRACTITIONER

## 2022-06-23 RX ORDER — AMOXICILLIN AND CLAVULANATE POTASSIUM 500; 125 MG/1; MG/1
1 TABLET, FILM COATED ORAL 2 TIMES DAILY
Qty: 20 TABLET | Refills: 0 | Status: SHIPPED | OUTPATIENT
Start: 2022-06-23 | End: 2022-07-03

## 2022-06-23 NOTE — CARE COORDINATION
Nick 45 Transitions Follow Up Call    2022    Patient: Tracy Lopez  Patient : 1965   MRN: [id number]  Reason for Admission: COVID  Discharge Date: 22 RARS: Readmission Risk Score: 14.6 ( )         Spoke with: Patient    Spoke with patient who said she is still having a cough that is productive of yellow, has fatigue and states her fibromyalgia is worse since having COVID. She had a VV with her PCP today, she has been referred to pulmonary, was started on Augmentin and was ordered an US of her neck. She is out of isolation period, states she has not developed any new symptoms, just still the lingering cough and fatigue. She denies any other needs or concerns at this time. She will be following up with pain management for her fibromyalgia. Care Transitions Follow Up Call    Needs to be reviewed by the provider   Additional needs identified to be addressed with provider: No  none             Method of communication with provider : none      Care Transition Nurse contacted the patient by telephone to follow up after admission on . Verified name and  with patient as identifiers. Addressed changes since last contact: none  Discussed follow-up appointments. CTN reviewed discharge instructions, medical action plan and red flags with patient and discussed any barriers to care and/or understanding of plan of care after discharge. Discussed appropriate site of care based on symptoms and resources available to patient including: Specialist. The patient agrees to contact the PCP office for questions related to their healthcare. Patients top risk factors for readmission: medical condition-COVID  Interventions to address risk factors: Obtained and reviewed discharge summary and/or continuity of care documents      Non-[facility] follow up appointment(s): n/a    CTN provided contact information for future needs.  Plan for follow-up call in 7-10 days based on severity of symptoms and risk factors. Plan for next call: check if she still has cough/phlegm after new ATB taken, check for any additional needs. Care Transitions Subsequent and Final Call    Schedule Follow Up Appointment with PCP: Completed  Subsequent and Final Calls  Do you have any ongoing symptoms?: Yes  Onset of Patient-reported symptoms: In the past 7 days  Patient-reported symptoms: Cough, Pain  Have your medications changed?: Yes  Patient Reports: started new ATB  Do you have any questions related to your medications?: No  Do you currently have any active services?: Yes  Are you currently active with any services?: Home Health  Do you have any needs or concerns that I can assist you with?: No  Identified Barriers: Lack of Education  Care Transitions Interventions  Other Interventions:            Follow Up  Future Appointments   Date Time Provider Christiano Wren   7/14/2022  2:30 PM ASA Falcon CNP V WALK IN [facility]   8/18/2022 12:30 PM ASA Falcon CNP WALK IN Dori Garcia RN

## 2022-06-23 NOTE — PROGRESS NOTES
Post-Discharge Transitional Care  Follow Up      Danielle Nails   YOB: 1965    Date of Office Visit:  6/23/2022  Date of Hospital Admission: 6/13/22  Date of Hospital Discharge: 6/16/22  Risk of hospital readmission (high >=14%. Medium >=10%) :Readmission Risk Score: 14.6 ( )      Care management risk score Rising risk (score 2-5) and Complex Care (Scores >=6): 8     Non face to face  following discharge, date last encounter closed (first attempt may have been earlier): 6/17/2022 10:11 AM    Call initiated 2 business days of discharge: Yes    ASSESSMENT/PLAN:   Below is the assessment and plan developed based on review of pertinent history, physical exam, labs, studies, and medications. COVID-19 virus infection  Hospital discharge follow-up  -     NV DISCHARGE MEDS RECONCILED W/ CURRENT OUTPATIENT MED LIST  Abnormal CT of the chest  -     US HEAD NECK SOFT TISSUE THYROID; Future  Chronic bronchitis, unspecified chronic bronchitis type (HCC)  -     Wesley Phelps MD, Pulmonology, [facility]  -     amoxicillin-clavulanate (AUGMENTIN) 500-125 MG per tablet; Take 1 tablet by mouth 2 times daily for 10 days, Disp-20 tablet, R-0Normal    Possible secondary COPD exacerbation, no wheezing present. Start Augmentin as patient has allergy to tetracyclines and should avoid azithromycin due to QT widening. Patient has reported history of thyroidectomy, however CT scan report demonstrates enlargement of left thyroid lobe. Agreed evaluate further with ultrasound. Medical Decision Making: low complexity  Return in about 2 weeks (around 7/7/2022) for AMV VV, pt wished tyo cancel 6/29 appt. On this date 6/23/2022 I have spent 25 minutes reviewing previous notes, test results and face to face with the patient discussing the diagnosis and importance of compliance with the treatment plan as well as documenting on the day of the visit.        Subjective:   HPI:  Follow up of HILARIO AVENDAÑO problems/diagnosis(es):     Has seen cardiology OP, plans for cardiac cath soon. Patient admits that he her concern for sleep apnea she was admitted, has not yet scheduled outpatient testing    Meme Perez is concerned about some worsening symptoms, for the past 2 days she has had increased sputum that is yellow or green in color. No fevers or chills. No wheezing or shortness of breath. Inpatient course: Discharge summary reviewed- see chart. Hospital Course:  Opal Delgado is a 60 y.o. female who was admitted for the management of   Chest pain , presented to ER with Positive For Covid-19 (home test)     47-year-old female with known medical history of hypertension, hyperlipidemia, chronic systolic and diastolic heart failure with EF of 30 to 35%, morbid obesity presented to the hospital with shortness of breath, generalized myalgias, fatigue, tested positive at home with COVID.  Patient also has a history of chronic pain syndrome but states that these myalgias and pain were much worse.  He has had COVID in 2020 and was on oxygen for few weeks.  Patient also complained of some chest pressure on arrival in the ER and cardiology was consulted.     Patient was started on remdesivir for COVID-pneumonia.  Was weaned down to room air. Patient has history of systolic heart failure, initially responded well to IV diuresis.  Patient felt much better after remdisvir- 3 days and crp trended down. Cardiology will follow up outpatient. Since patient symptoms are better, will plan for echo outpatient.       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)    Cervical spondylosis with radiculopathy    Status post cervical spinal fusion    History of lumbar fusion    Lumbar radiculopathy    Morbid obesity with BMI of 50.0-59.9, adult (HCC)    Chronic pain of both knees    Myalgia    Chronic use of opiate drug for therapeutic purpose    COVID-19    Hypertension    Gout    GERD (gastroesophageal reflux disease)    Acute on chronic combined systolic and diastolic congestive heart failure (HCC)    Other proteinuria    Nausea and vomiting    Chronic pain    PTSD (post-traumatic stress disorder)    Hyperuricemia    Mild persistent asthma without complication    CH[facility] (congestive heart failure) ([facility])    Chronic bronchitis, unspecified chronic bronchitis type ([facility])    Major depressive disorder, recurrent severe without psychotic features ([facility]    Headache    Suspected sleep apnea    Vitamin D deficiency    CKD (chronic kidney disease) stage 3, GFR 30-59 ml/min ([facility])    COPD exacerbation ([facility])    CRP elevated    High serum fibrinogen       Medications listed as ordered at the time of discharge from hospital     Medication List          Accurate as of June 23, 2022  3:34 PM. If you have any questions, ask your nurse or doctor. START taking these medications    amoxicillin-clavulanate 500-125 MG per tablet  Commonly known as:  Augmentin  Take 1 tablet by mouth 2 times daily for 10 days  Started by: ASA Lagos - CNP        CONTINUE taking these medications    * albuterol (2.5 MG/3ML) 0.083% nebulizer solution  Commonly known as: PROVENTIL  Take 3 mLs by nebulization every 6 hours as needed for Wheezing     * albuterol sulfate  (90 Base) MCG/ACT inhaler  Commonly known as: PROVENTIL;VENTOLIN;PROAIR  Inhale 2 puffs into the lungs every 4 hours as needed for Shortness of Breath     allopurinol 300 MG tablet  Commonly known as: ZYLOPRIM  Take 1 tablet by mouth daily     aspirin 81 MG EC tablet     atorvastatin 80 MG tablet  Commonly known as: LIPITOR  Take 1 tablet by mouth daily     bumetanide 1 MG tablet  Commonly known as: BUMEX  Take 2 tablets by mouth daily     buPROPion 300 MG extended release tablet  Commonly known as: WELLBUTRIN XL  Take 1 tablet by mouth every morning     clopidogrel 75 MG tablet  Commonly known as: PLAVIX  Take 1 tablet by mouth daily     dextromethorphan-guaiFENesin  MG/5ML syrup  Commonly known as: ROBITUSSIN-DM  Take 5 mLs by mouth every 4 hours as needed for Cough     DULoxetine 60 MG extended release capsule  Commonly known as: CYMBALTA  Take 2 capsules by mouth daily     Handicap Placard Misc  by Does not apply route 2 years     hydrALAZINE 25 MG tablet  Commonly known as: APRESOLINE  Take 1 tablet by mouth 2 times daily     isosorbide dinitrate 10 MG tablet  Commonly known as: ISORDIL  Take 1 tablet by mouth 2 times daily     levothyroxine 50 MCG tablet  Commonly known as: SYNTHROID  Take 1 tablet by mouth Daily     metoprolol tartrate 50 MG tablet  Commonly known as: LOPRESSOR  Take 1 tablet by mouth 2 times daily     montelukast 10 MG tablet  Commonly known as: SINGULAIR  Take 1 tablet by mouth nightly     Nebulizer Compressor Kit  Provide insurance covered nebulizer, use daily as needed     oxybutynin 5 MG tablet  Commonly known as: DITROPAN  Take 1 tablet by mouth 2 times daily     pantoprazole 40 MG tablet  Commonly known as: PROTONIX  Take 1 tablet by mouth daily     polyethylene glycol 17 g packet  Commonly known as: GLYCOLAX  polyethylene glycol 3350 17 gram/dose oral powder     spironolactone 25 MG tablet  Commonly known as: ALDACTONE  Take 1 tablet by mouth daily     sucralfate 1 GM tablet  Commonly known as: Carafate  Take 1 tablet in AM, 2 tablet in evening daily     Symbicort 80-4.5 MCG/ACT Aero  Generic drug: budesonide-formoterol  Inhale 2 puffs into the lungs 2 times daily     tiZANidine 4 MG tablet  Commonly known as: ZANAFLEX  Take 1 tablet by mouth 3 times daily as needed (pain)     traZODone 150 MG tablet  Commonly known as: DESYREL  Take 1 tablet by mouth nightly     Vitamin D (Ergocalciferol) 06039 units Caps  Take 50,000 Units by mouth once a week         * This list has 2 medication(s) that are the same as other medications prescribed for you. Read the directions carefully, and ask your doctor or other care provider to review them with you.                Where to Get Your Medications      These medications were sent to [facility] [address] [phone]  [address] [place] [address]    Phone: [phone]   · amoxicillin-clavulanate 500-125 MG per tablet           Medications marked \"taking\" at this time  Outpatient Medications Marked as Taking for the 6/23/22 encounter (Telemedicine) with ASA Vásquez CNP   Medication Sig Dispense Refill    amoxicillin-clavulanate (AUGMENTIN) 500-125 MG per tablet Take 1 tablet by mouth 2 times daily for 10 days 20 tablet 0    dextromethorphan-guaiFENesin (ROBITUSSIN-DM)  MG/5ML syrup Take 5 mLs by mouth every 4 hours as needed for Cough 473 mL 1    Ergocalciferol (VITAMIN D) 49730 units CAPS Take 50,000 Units by mouth once a week 5 capsule 1    tiZANidine (ZANAFLEX) 4 MG tablet Take 1 tablet by mouth 3 times daily as needed (pain) 90 tablet 0    levothyroxine (SYNTHROID) 50 MCG tablet Take 1 tablet by mouth Daily 30 tablet 0    pantoprazole (PROTONIX) 40 MG tablet Take 1 tablet by mouth daily 90 tablet 1    metoprolol tartrate (LOPRESSOR) 50 MG tablet Take 1 tablet by mouth 2 times daily 180 tablet 1    bumetanide (BUMEX) 1 MG tablet Take 2 tablets by mouth daily 180 tablet 0    DULoxetine (CYMBALTA) 60 MG extended release capsule Take 2 capsules by mouth daily 180 capsule 1    montelukast (SINGULAIR) 10 MG tablet Take 1 tablet by mouth nightly 90 tablet 1    allopurinol (ZYLOPRIM) 300 MG tablet Take 1 tablet by mouth daily 90 tablet 1    hydrALAZINE (APRESOLINE) 25 MG tablet Take 1 tablet by mouth 2 times daily 60 tablet 0    isosorbide dinitrate (ISORDIL) 10 MG tablet Take 1 tablet by mouth 2 times daily 180 tablet 1    buPROPion (WELLBUTRIN XL) 300 MG extended release tablet Take 1 tablet by mouth every morning 90 tablet 1    spironolactone (ALDACTONE) 25 MG tablet Take 1 tablet by mouth daily 90 tablet 1    oxybutynin (DITROPAN) 5 MG tablet Take 1 tablet by mouth 2 times daily 180 tablet 1    traZODone (DESYREL) 150 MG tablet Take 1 tablet by mouth nightly 90 tablet 1    clopidogrel (PLAVIX) 75 MG tablet Take 1 tablet by mouth daily 90 tablet 1    SYMBICORT 80-4.5 MCG/ACT AERO Inhale 2 puffs into the lungs 2 times daily 1 each 3    albuterol sulfate  (90 Base) MCG/ACT inhaler Inhale 2 puffs into the lungs every 4 hours as needed for Shortness of Breath 1 each 0    atorvastatin (LIPITOR) 80 MG tablet Take 1 tablet by mouth daily 90 tablet 1    polyethylene glycol (GLYCOLAX) 17 g packet polyethylene glycol 3350 17 gram/dose oral powder 527 g 0    aspirin 81 MG EC tablet Take 81 mg by mouth      sucralfate (CARAFATE) 1 GM tablet Take 1 tablet in AM, 2 tablet in evening daily 90 tablet 3    albuterol (PROVENTIL) (2.5 MG/3ML) 0.083% nebulizer solution Take 3 mLs by nebulization every 6 hours as needed for Wheezing 120 each 3    Handicap Placard MISC by Does not apply route 2 years 1 each 0    Respiratory Therapy Supplies (NEBULIZER COMPRESSOR) KIT Provide insurance covered nebulizer, use daily as needed 1 kit 0        Medications patient taking as of now reconciled against medications ordered at time of hospital discharge:  Yes    A comprehensive review of systems was negative except for what was noted in the HPI. Objective:    Patient-Reported Vitals  No data recorded      Physical Exam  Constitutional:       Appearance: Normal appearance. She is obese. She is not toxic-appearing. HENT:      Head: Normocephalic. Right Ear: External ear normal.      Left Ear: External ear normal.      Nose: Nose normal.      Mouth/Throat:      Mouth: Mucous membranes are moist.   Eyes:      General: No scleral icterus. Right eye: No discharge. Left eye: No discharge. Conjunctiva/sclera: Conjunctivae normal.   Pulmonary:      Effort: Pulmonary effort is normal.      Comments: Speaking in full sentences without evidence of dyspnea  Musculoskeletal:      Cervical back: Normal range of motion. Skin:     Coloration: Skin is not jaundiced. Neurological:      Mental Status: She is alert and oriented to person, place, and time. Psychiatric:         Mood and Affect: Mood normal.         Behavior: Behavior normal.         Thought Content: Thought content normal.         Naresh Wang, was evaluated through a synchronous (real-time) audio-video encounter. The patient (or guardian if applicable) is aware that this is a billable service, which includes applicable co-pays. This Virtual Visit was conducted with patient's (and/or legal guardian's) consent. The visit was conducted pursuant to the emergency declaration under the [facility] [address] authority and the [employer] General Act. Patient identification was verified, and a caregiver was present when appropriate. The patient was located at Home: [address] [place] [address]. Provider was located at [place] [address] (Appt Dept): [address] [facility],  [address] [place] [address]. An electronic signature was used to authenticate this note.   --Devika Mcguire, APRCARLOS ENRIQUE - CNP

## 2022-06-24 NOTE — TELEPHONE ENCOUNTER
Called patient left [facility]. Patient needs to reach out to [facility] to determine if she has an open case.

## 2022-06-27 NOTE — TELEPHONE ENCOUNTER
Pt called in stating water pill is not working,still retaining water. Couldn't tell me the name of medication due to she wasn't at home during time of call. Stated she is taking 2 tablets by mouth daily. Please advise.

## 2022-06-28 NOTE — TELEPHONE ENCOUNTER
Writer informed patient of pcp message regarding daily weight. Gave verbal understanding. No questions asked at time of call.

## 2022-06-30 ENCOUNTER — CARE COORDINATION (OUTPATIENT)
Dept: CASE MANAGEMENT | Age: 57
End: 2022-06-30

## 2022-06-30 NOTE — CARE COORDINATION
Nick 45 Transitions Follow Up Call    2022    Patient: Karl Arroyo  Patient : 1965   MRN: [id number]  Reason for Admission: COVID  Discharge Date: 22 RARS: Readmission Risk Score: 14.6 ( )         Spoke with: [facility] Transitions Subsequent and Final Call    Subsequent and Final Calls  Do you have any ongoing symptoms?: Yes  Onset of Patient-reported symptoms: Other  Patient-reported symptoms: Weakness, Cough, Pain  Have your medications changed?: No  Do you have any questions related to your medications?: No  Do you currently have any active services?: Yes  Are you currently active with any services?: Home Health  Do you have any needs or concerns that I can assist you with?: No  Care Transitions Interventions  Other Interventions:       Telemedine PCP call completed 22    States cough and weakness continue but are resolving. Pain is related to chronic Fibromyalgia. Will see her Pulmonologist in October. Care Transitions Follow Up Call    Needs to be reviewed by the provider   Additional needs identified to be addressed with provider: No  none             Method of communication with provider : none      Care Transition Nurse contacted the patient by telephone to follow up after admission on 22. Verified name and  with patient as identifiers. Addressed changes since last contact: getting better  Discussed follow-up appointments. If no appointment was previously scheduled, appointment scheduling offered: completed. Is follow up appointment scheduled within 7 days of discharge? No.    Advance Care Planning:   Does patient have an Advance Directive: reviewed and current. CTN reviewed discharge instructions, medical action plan and red flags with patient and discussed any barriers to care and/or understanding of plan of care after discharge.  Discussed appropriate site of care based on symptoms and resources available to patient including: PCP  Specialist  When to call [address] The patient agrees to contact the PCP office for questions related to their healthcare. Patients top risk factors for readmission: medical condition-COVID, CKD, COPD  Interventions to address risk factors: Will request [facility] to follow for continued care management need. Care Transitions Episode Completed. provided contact information for future needs. [facility] Referral  based on severity of symptoms and risk factors.     Follow Up  Future Appointments   Date Time Provider Department Center   7/14/2022  2:30 PM ASA Hairston CNP ST V WALK IN [facility]   8/18/2022 12:30 PM ASA Hairston C[facility]   10/3/2022  1:30 PM Betsy Howard DO Resp Spec Diya Pineda RN

## 2022-07-01 ENCOUNTER — CARE COORDINATION (OUTPATIENT)
Dept: CARE COORDINATION | Age: 57
End: 2022-07-01

## 2022-07-01 NOTE — TELEPHONE ENCOUNTER
Pushpa Hamilton,     Will you please see if this patient has further CC needs.      Thank you,  Robert Rogers

## 2022-07-01 NOTE — CARE COORDINATION
Called, message left on voicemail with my contact information and reason for call.  Will attempt to contact 7/5/22 if no return call today

## 2022-07-08 ENCOUNTER — HOSPITAL ENCOUNTER (OUTPATIENT)
Dept: CARDIAC CATH/INVASIVE PROCEDURES | Age: 57
Discharge: HOME | End: 2022-07-08
Payer: MEDICARE

## 2022-07-08 VITALS
HEART RATE: 65 BPM | SYSTOLIC BLOOD PRESSURE: 129 MMHG | WEIGHT: 293 LBS | HEIGHT: 68 IN | TEMPERATURE: 98.2 F | OXYGEN SATURATION: 97 % | DIASTOLIC BLOOD PRESSURE: 69 MMHG | BODY MASS INDEX: 44.41 KG/M2 | RESPIRATION RATE: 19 BRPM

## 2022-07-08 LAB
GFR NON-AFRICAN AMERICAN: 36 ML/MIN
GFR SERPL CREATININE-BSD FRML MDRD: 43 ML/MIN
GFR SERPL CREATININE-BSD FRML MDRD: ABNORMAL ML/MIN/{1.73_M2}
GLUCOSE BLD-MCNC: 107 MG/DL (ref 74–100)
POC BUN: 21 MG/DL (ref 8–26)
POC CHLORIDE: 106 MMOL/L (ref 98–107)
POC CREATININE: 1.49 MG/DL (ref 0.51–1.19)
POC HEMATOCRIT: 37 % (ref 36–46)
POC HEMOGLOBIN: 12.6 G/DL (ref 12–16)
POC POTASSIUM: 4.3 MMOL/L (ref 3.5–4.5)
POC SODIUM: 146 MMOL/L (ref 138–146)

## 2022-07-08 PROCEDURE — 99152 MOD SED SAME PHYS/QHP 5/>YRS: CPT

## 2022-07-08 PROCEDURE — 6360000002 HC RX W HCPCS

## 2022-07-08 PROCEDURE — 84520 ASSAY OF UREA NITROGEN: CPT

## 2022-07-08 PROCEDURE — 2500000003 HC RX 250 WO HCPCS

## 2022-07-08 PROCEDURE — 7100000011 HC PHASE II RECOVERY - ADDTL 15 MIN

## 2022-07-08 PROCEDURE — 84132 ASSAY OF SERUM POTASSIUM: CPT

## 2022-07-08 PROCEDURE — 2709999900 HC NON-CHARGEABLE SUPPLY

## 2022-07-08 PROCEDURE — 84295 ASSAY OF SERUM SODIUM: CPT

## 2022-07-08 PROCEDURE — 82435 ASSAY OF BLOOD CHLORIDE: CPT

## 2022-07-08 PROCEDURE — 7100000010 HC PHASE II RECOVERY - FIRST 15 MIN

## 2022-07-08 PROCEDURE — C1894 INTRO/SHEATH, NON-LASER: HCPCS

## 2022-07-08 PROCEDURE — 82947 ASSAY GLUCOSE BLOOD QUANT: CPT

## 2022-07-08 PROCEDURE — 6360000004 HC RX CONTRAST MEDICATION

## 2022-07-08 PROCEDURE — 85014 HEMATOCRIT: CPT

## 2022-07-08 PROCEDURE — 82565 ASSAY OF CREATININE: CPT

## 2022-07-08 PROCEDURE — 99153 MOD SED SAME PHYS/QHP EA: CPT

## 2022-07-08 PROCEDURE — 93454 CORONARY ARTERY ANGIO S&I: CPT

## 2022-07-08 PROCEDURE — C1887 CATHETER, GUIDING: HCPCS

## 2022-07-08 RX ORDER — SODIUM CHLORIDE 9 MG/ML
INJECTION, SOLUTION INTRAVENOUS CONTINUOUS
Status: DISCONTINUED | OUTPATIENT
Start: 2022-07-08 | End: 2022-07-09 | Stop reason: HOSPADM

## 2022-07-08 RX ORDER — 0.9 % SODIUM CHLORIDE 0.9 %
250 INTRAVENOUS SOLUTION INTRAVENOUS ONCE
Status: DISCONTINUED | OUTPATIENT
Start: 2022-07-08 | End: 2022-07-08

## 2022-07-08 RX ORDER — SODIUM CHLORIDE 0.9 % (FLUSH) 0.9 %
5-40 SYRINGE (ML) INJECTION PRN
Status: DISCONTINUED | OUTPATIENT
Start: 2022-07-08 | End: 2022-07-09 | Stop reason: HOSPADM

## 2022-07-08 RX ORDER — SODIUM CHLORIDE 0.9 % (FLUSH) 0.9 %
5-40 SYRINGE (ML) INJECTION EVERY 12 HOURS SCHEDULED
Status: DISCONTINUED | OUTPATIENT
Start: 2022-07-08 | End: 2022-07-09 | Stop reason: HOSPADM

## 2022-07-08 RX ORDER — ACETAMINOPHEN 325 MG/1
650 TABLET ORAL EVERY 4 HOURS PRN
Status: DISCONTINUED | OUTPATIENT
Start: 2022-07-08 | End: 2022-07-09 | Stop reason: HOSPADM

## 2022-07-08 RX ORDER — SODIUM CHLORIDE 9 MG/ML
INJECTION, SOLUTION INTRAVENOUS CONTINUOUS
Status: DISCONTINUED | OUTPATIENT
Start: 2022-07-08 | End: 2022-07-08

## 2022-07-08 RX ORDER — SODIUM CHLORIDE 9 MG/ML
INJECTION, SOLUTION INTRAVENOUS PRN
Status: DISCONTINUED | OUTPATIENT
Start: 2022-07-08 | End: 2022-07-09 | Stop reason: HOSPADM

## 2022-07-08 RX ADMIN — SODIUM CHLORIDE: 9 INJECTION, SOLUTION INTRAVENOUS at 11:58

## 2022-07-08 NOTE — OP NOTE
[facility]    CARDIAC CATHETERIZATION    Date:   7/8/2022  Patient name:  Martine Romero  Date of admission:  7/8/2022 10:46 AM  MRN:   [id number]  YOB: 1965    Operators:  Primary:   Dr Derrick dill  (Attending Physician)    Assistant/CV fellow:  Araseli Gonzalez MD     Procedure performed:       [x] Left Heart Catheterization. [] Graft Angiography.  [] Left Ventriculography. [] Right Heart Catheterization. [x] Coronary Angiography. [] Aortic Valve Studies. [] PCI:      [] Other:       Pre Procedure Conscious Sedation Data:  ASA Class:    [] I [x] II [] III [] IV    Mallampati Class:  [] I [x] II [] III [] IV      Indication:  [] STEMI      [] + Stress test  [] ACS      [] + EKG Changes  [] Non Q MI       [] Significant Risk Factors  [] Recurrent Angina             [] Diabetes Mellitus    [] New LBBB      [] Uncontrolled HTN. [x] CHF / Low EF changes     [] Abnormal CTA / Ca Score      Procedure:  Access:  [] Femoral  [x] Radial  artery       [x] Right  [] Left    Procedure: After informed consent was obtained with explanation of the risks and benefits, patient was brought to the cath lab. The access area was prepped and draped in sterile fashion. 1% lidocaine was used for local block. The artery was cannulated with 6  Fr sheath with brisk arterial blood return. The side port was frequently flushed and aspirated with normal saline. Findings:   Cardiac Arteries and Lesion Findings       LMCA: Normal 0% stenosis. LAD: Normal 0% stenosis. LCx: Normal 0% stenosis. OM is large vessel and normal     RCA: Normal 0% stenosis.      Coronary Tree        Dominance: Right         EBL: ~ 10 cc      Procedure Summary    1.  Normal coronary arteries observed.    2. Normal LVEDP        Recommendations    Routine Post Diagnostic Cath orders.    Optimize medical therapy for CMP    ICD evaluation as outpatient    Ok to d.c plavix from cardiac standpoint       Michela Munoz MD. PGY- 4  Fellow, cardiovascular disease   [facility], [place], [place]  7/8/2022, 1:41 PM      Attending Physician        Markus Geiger MD, Neil Watt

## 2022-07-08 NOTE — PROGRESS NOTES
Patient admitted, consent signed and questions answered. Patient ready for procedure. Call light to reach with side rails up 2 of 2. Bilateral groin areas clipped with writer and Chayito Munguia present. Sister Renata Day at bedside with patient. History and physical complete.

## 2022-07-08 NOTE — PROGRESS NOTES
Patient received post cath to [facility] room 6. Assessment obtained. Post cath pathway initiated. Right radial site with TR band inflated with 12 mL of air intact. No hematoma noted. Patient without complaints.

## 2022-07-09 NOTE — PROGRESS NOTES
Air removed fromVasc band in  2 mL increments until all air removed. No bleeding or hematoma noted. Pressure dressing applied, radial pulse palpable. Ambulated to bathroom and in halls. Gait steady. .     All discharge instructions reviewed, questions answered, paper signed and given copy. Patient discharged per wheelchair with sister and belongings.

## 2022-07-12 ENCOUNTER — CARE COORDINATION (OUTPATIENT)
Dept: CARE COORDINATION | Age: 57
End: 2022-07-12

## 2022-07-12 NOTE — CARE COORDINATION
Spoke with patient, states that she is doing well accept for a little soreness where they did her cardiac cath. She states that she does have difficulty getting to the grocery store and may run out of food at the end of the month on occasion. Provided [address] to call and find pantry's that deliver or are close to her. She thanked me for the information. She declines any other needs at this time. Will remove from needs to enroll list. Provided my contat information should any needs arise in the future.

## 2022-07-14 ENCOUNTER — TELEMEDICINE (OUTPATIENT)
Dept: PRIMARY CARE CLINIC | Age: 57
End: 2022-07-14
Payer: MEDICARE

## 2022-07-14 DIAGNOSIS — M51.36 DDD (DEGENERATIVE DISC DISEASE), LUMBAR: ICD-10-CM

## 2022-07-14 DIAGNOSIS — Z00.00 MEDICARE ANNUAL WELLNESS VISIT, SUBSEQUENT: Primary | ICD-10-CM

## 2022-07-14 DIAGNOSIS — I50.42 CHRONIC COMBINED SYSTOLIC AND DIASTOLIC CONGESTIVE HEART FAILURE (HCC): ICD-10-CM

## 2022-07-14 DIAGNOSIS — Z71.89 ACP (ADVANCE CARE PLANNING): ICD-10-CM

## 2022-07-14 PROCEDURE — 3017F COLORECTAL CA SCREEN DOC REV: CPT | Performed by: NURSE PRACTITIONER

## 2022-07-14 PROCEDURE — G0439 PPPS, SUBSEQ VISIT: HCPCS | Performed by: NURSE PRACTITIONER

## 2022-07-14 ASSESSMENT — PATIENT HEALTH QUESTIONNAIRE - PHQ9
SUM OF ALL RESPONSES TO PHQ QUESTIONS 1-9: 4
7. TROUBLE CONCENTRATING ON THINGS, SUCH AS READING THE NEWSPAPER OR WATCHING TELEVISION: 0
SUM OF ALL RESPONSES TO PHQ9 QUESTIONS 1 & 2: 2
10. IF YOU CHECKED OFF ANY PROBLEMS, HOW DIFFICULT HAVE THESE PROBLEMS MADE IT FOR YOU TO DO YOUR WORK, TAKE CARE OF THINGS AT HOME, OR GET ALONG WITH OTHER PEOPLE: 0
6. FEELING BAD ABOUT YOURSELF - OR THAT YOU ARE A FAILURE OR HAVE LET YOURSELF OR YOUR FAMILY DOWN: 0
9. THOUGHTS THAT YOU WOULD BE BETTER OFF DEAD, OR OF HURTING YOURSELF: 0
2. FEELING DOWN, DEPRESSED OR HOPELESS: 1
SUM OF ALL RESPONSES TO PHQ QUESTIONS 1-9: 4
SUM OF ALL RESPONSES TO PHQ QUESTIONS 1-9: 4
4. FEELING TIRED OR HAVING LITTLE ENERGY: 1
1. LITTLE INTEREST OR PLEASURE IN DOING THINGS: 1
SUM OF ALL RESPONSES TO PHQ QUESTIONS 1-9: 4
3. TROUBLE FALLING OR STAYING ASLEEP: 1
5. POOR APPETITE OR OVEREATING: 0
8. MOVING OR SPEAKING SO SLOWLY THAT OTHER PEOPLE COULD HAVE NOTICED. OR THE OPPOSITE, BEING SO FIGETY OR RESTLESS THAT YOU HAVE BEEN MOVING AROUND A LOT MORE THAN USUAL: 0

## 2022-07-14 ASSESSMENT — COLUMBIA-SUICIDE SEVERITY RATING SCALE - C-SSRS
6. HAVE YOU EVER DONE ANYTHING, STARTED TO DO ANYTHING, OR PREPARED TO DO ANYTHING TO END YOUR LIFE?: NO
2. HAVE YOU ACTUALLY HAD ANY THOUGHTS OF KILLING YOURSELF?: NO
1. WITHIN THE PAST MONTH, HAVE YOU WISHED YOU WERE DEAD OR WISHED YOU COULD GO TO SLEEP AND NOT WAKE UP?: NO

## 2022-07-14 ASSESSMENT — LIFESTYLE VARIABLES
HOW OFTEN DO YOU HAVE A DRINK CONTAINING ALCOHOL: NEVER
HOW MANY STANDARD DRINKS CONTAINING ALCOHOL DO YOU HAVE ON A TYPICAL DAY: 1 OR 2

## 2022-07-14 NOTE — PATIENT INSTRUCTIONS
Learning About Healthy Weight  What is a healthy weight? A healthy weight is the weight at which you feel good about yourself and have energy for work and play. It's also one that lowers your risk for healthproblems. What can you do to stay at a healthy weight? It can be hard to stay at a healthy weight, especially when fast food, vending-machine snacks, and processed foods are so easy to find. And with your busy lifestyle, activity may be low on your list of things to do. But stayingat a healthy weight may be easier than you think. Here are some dos and don'ts for staying at a healthy weight. Do eat healthy foods  The kinds of foods you eat have a big impact on both your weight and your health. Reaching and staying at a healthy weight is not about going on a diet. It's about making healthier food choices every day and changing your diet forgood. Healthy eating means eating a variety of foods so that you get all the nutrients you need. Your body needs protein, carbohydrate, and fats for energy. They keep your heart beating, your brain active, and your muscles working. On most days, try to eat from each food group. This means eating a variety of:   Whole grains, such as whole wheat breads and pastas.  Fruits and vegetables.  Dairy products, such as low-fat milk, yogurt, and cheese.  Lean proteins, such as all types of fish, chicken without the skin, and beans. Don't have too much or too little of one thing. All foods, if eaten inmoderation, can be part of healthy eating. Even sweets can be okay. If your favorite foods are high in fat, salt, sugar, or calories, limit howoften you eat them. Eat smaller servings, or look for healthy substitutes. Do watch what you eat  Many people eat more than their bodies need. Part of staying at a healthy weight means learning how much food you really need from day to day and not eating more than that.  Even with healthy foods, eating too much can make yougain weight. Having a well-balanced diet means that you eat enough, but not too much, and that your food gives you the nutrients you need to stay healthy. So listen toyour body. Eat when you're hungry. Stop when you feel satisfied. It's a good idea to have healthy snacks ready for when you get hungry. Keep healthy snacks with you at work, in your car, and at home. If you have a healthy snack easily available, you'll be less likely to pick a candy bar orbag of chips from a vending machine instead. Some healthy snacks you might want to keep on hand are fruit, low-fat yogurt, string cheese, low-fat microwave popcorn, raisins and other dried fruit, nuts,whole wheat crackers, pretzels, carrots, celery sticks, and broccoli. Do some physical activity  A big part of reaching and staying at a healthy weight is being active. When you're active, you burn calories. This makes it easier to reach and stay at a healthy weight. When you're active on a regular basis, your body burns more calories, even when you're at rest. Being active helps you lose fat andbuild lean muscle. Try to be active for at least 1 hour every day. This may sound like a lot, but it's okay to be active in smaller blocks of time that add up to 1 hour a day. Any activity that makes your heart beat faster and keeps it there for a while counts. A brisk walk, run, or swim will get your heart beating faster. So will climbing stairs, shooting baskets, or cycling. Even some household chores likevacuuming and mowing the lawn will get your heart rate up. Pick activities that you enjoy--ones that make your heart beat faster, your muscles stronger, and your muscles and joints more flexible. If you find more than one thing you like doing, do them all. You don't have to do the same thingevery day. Don't diet  Diets don't work. Diets are temporary. Because you give up so much when you diet, you may be hungry and think about food all the time.  And after you stop dieting, you also may overeat to make up for what you missed. Most people who diet end up gainingback the pounds they lost--and more. Remember that healthy bodies come in lots of shapes and sizes. Everyone can gethealthier by eating better and being more active. Where can you learn more? Go to [email or web address]://[email or web address] org and sign in to your Dejamor account. Enter 227 0495 in the [employer] box to learn more about \"Learning About Healthy Weight. \"     If you do not have an account, please click on the \"Sign Up Now\" link. Current as of: December 27, 2021               Content Version: 13.3  © 9125-7267 Healthwise, [employer]. Care instructions adapted under license by [facility] ([facility]). If you have questions about a medical condition or this instruction, always ask your healthcare professional. [place] [address] any warranty or liability for your use of this information. Personalized Preventive Plan for [place] - 7/14/2022  Medicare offers a range of preventive health benefits. Some of the tests and screenings are paid in full while other may be subject to a deductible, co-insurance, and/or copay. Some of these benefits include a comprehensive review of your medical history including lifestyle, illnesses that may run in your family, and various assessments and screenings as appropriate. After reviewing your medical record and screening and assessments performed today your provider may have ordered immunizations, labs, imaging, and/or referrals for you. A list of these orders (if applicable) as well as your Preventive Care list are included within your After Visit Summary for your review. Other Preventive Recommendations:    · A preventive eye exam performed by an eye specialist is recommended every 1-2 years to screen for glaucoma; cataracts, macular degeneration, and other eye disorders. · A preventive dental visit is recommended every 6 months.   · Try to get at least 150 minutes of exercise per week or 10,000 steps per day on a pedometer . · Order or download the FREE \"Exercise & Physical Activity: Your Everyday Guide\" from [employer]. Call 1-580.549.8799 or search [employer] online. · You need 3675-6228 mg of calcium and 4754-3343 IU of vitamin D per day. It is possible to meet your calcium requirement with diet alone, but a vitamin D supplement is usually necessary to meet this goal.  · When exposed to the sun, use a sunscreen that protects against both UVA and UVB radiation with an SPF of 30 or greater. Reapply every 2 to 3 hours or after sweating, drying off with a towel, or swimming. · Always wear a seat belt when traveling in a car. Always wear a helmet when riding a bicycle or motorcycle. High-Fiber Diet     What Is Fiber? Dietary fiber is a form of carbohydrate found in plants that cannot be digested by humans. All plants contain fiber, including fruits, vegetables, grains, and legumes. Fiber is often classified into two categories: soluble and insoluble. Soluble fiber draws water into the bowel and can help slow digestion. Examples of foods that are high in soluble fiber include oatmeal, oat bran, barley, legumes (eg, beans and peas), apples, and strawberries. Insoluble fiber speeds digestion and can add bulk to the stool. Examples of foods that are high in insoluble fiber include whole-wheat products, wheat bran, cauliflower, green beans, and potatoes. Why Follow a High-Fiber Diet? A high-fiber diet is often recommended to prevent and treat constipation , hemorrhoids , diverticulitis , and irritable bowel syndrome . Eating a high-fiber diet can also help improve your cholesterol levels, lower your risk of coronary heart disease , reduce your risk of type 2 diabetes , and lower your weight. For people with type 1 or 2 diabetes, a high-fiber diet can also help stabilize blood sugar levels.    How Much Fiber Should I Eat? A high-fiber diet should contain  20-35 grams  of fiber a day. This is actually the amount recommended for the general adult population; however, most Americans eat only 15 grams of fiber per day. Digestion of Fiber   Eating a higher fiber diet than usual can take some getting used to by your body's digestive system. To avoid the side effects of sudden increases in dietary fiber (eg, gas, cramping, bloating, and diarrhea), increase fiber gradually and be sure to drink plenty of fluids every day. Tips for Increasing Fiber Intake   Whenever possible, choose whole grains over refined grains (eg, brown rice instead of white rice, whole-wheat bread instead of white bread). Include a variety of grains in your diet, such as wheat, rye, barley, oats, quinoa, and bulgur. Eat more vegetarian-based meals. Here are some ideas: black bean burgers, eggplant lasagna, and veggie tofu stir-salas. Choose high-fiber snacks, such as fruits, popcorn, whole-grain crackers, and nuts. Make whole-grain cereal or whole-grain toast part of your daily breakfast regime. When eating out, whether ordering a sandwich or dinner, ask for extra vegetables. When baking, replace part of the white flour with whole-wheat flour. Whole-wheat flour is particularly easy to incorporate into a recipe. High-Fiber Diet Eating Guide   Food Category   Foods Recommended   Notes   Grains   Whole-grain breads, muffins, bagels, or saqib bread Rye bread Whole-wheat crackers or crisp breads Whole-grain or bran cereals Oatmeal, oat bran, or grits Wheat germ Whole-wheat pasta and brown rice   Read the ingredients list on food labels. Look for products that list \"whole\" as the first ingredient (eg, whole-wheat, whole oats). Choose cereals with at least 2 grams of fiber per serving.    Vegetables   All vegetables, especially asparagus, bean sprouts, broccoli, [place] sprouts, cabbage, carrots, cauliflower, celery, corn, greens, green beans, green pepper, onions, peas, potatoes (with skin), snow peas, spinach, squash, sweet potatoes, tomatoes, zucchini   For maximum fiber intake, eat the peels of fruits and vegetablesjust be sure to wash them well first.   Fruits   All fruits, especially apples, berries, grapefruits, mangoes, nectarines, oranges, peaches, pears, dried fruits (figs, dates, prunes, raisins)   Choose raw fruits and vegetables over juice, cooked, or cannedraw fruit has more fiber. Dried fruit is also a good source of fiber. Milk   With the exception of yogurt containing inulin (a type of fiber), dairy foods provide little fiber. Add more fiber by topping your yogurt or cottage cheese with fresh fruit, whole grain or bran cereals, nuts, or seeds. Meats and Beans   All beans and peas, especially Garbanzo beans, kidney beans, lentils, lima beans, split peas, and rodriguez beans All nuts and seeds, especially almonds, peanuts, [place] nuts, cashews, peanut butter, walnuts, sesame and sunflower seeds All meat, poultry, fish, and eggs   Increase fiber in meat dishes by adding rodriguez beans, kidney beans, black-eyed peas, bran, or oatmeal. If you are following a low-fat diet, use nuts and seeds only in moderation. Fats and Oils   All in moderation   Fats and oils do not provide fiber   Snacks, Sweets, and Condiments   Fruit Nuts Popcorn, whole-wheat pretzels, or trail mix made with dried fruits, nuts, and seeds Cakes, breads, and cookies made with oatmeal or whole-wheat flour   Most snack foods do not provide much fiber. Choose snacks with at least 2 grams of fiber per serving. Last Reviewed: March 2011 Gertrudis Humphreys MS, MPH, RD   Updated: 3/29/2011   ·        Advance Care Planning: Care Instructions  Your Care Instructions     It can be hard to live with an illness that cannot be cured. But if your health is getting worse, you may want to make decisions about end-of-life care.  Planning for the end of your life does not mean that be any confusion about what you want. You can change your instructions at any time. Which papers should you prepare? Advance directives are legal papers that tell doctors how you want to be cared for at the end of your life. You do not need a  to write these papers. Ask your doctor or your state health department for information on how to write your advance directives. They may have the forms for each of these types of papers. Make sure your doctor has a copy of these on file, and give a copy to afamily member or close friend. Consider a do-not-resuscitate order (DNR). This order asks that no extra treatments be done if your heart stops or you stop breathing. Extra treatments may include cardiopulmonary resuscitation (CPR), electrical shock to restart your heart, or a machine to breathe for you. If you decide to have a DNR order, ask your doctor to explain and write it. Place the order in your home where everyone can easily see it. Consider a living will. A living will explains your wishes about life support and other treatments at the end of your life if you become unable to speak for yourself. Living cook tell doctors to use or not use treatments that would keep you alive. You must have one or two witnesses or a notary present when you sign this form. A living will may be called something else in your state. Consider a medical power of . This form allows you to name a person to make decisions about your care if you are not able to. Most people ask a close friend or family member. Talk to this person about the kinds of treatments you want and those that you do not want. Make sure this person understands your wishes. A medical power of  may be called something else in your state. These legal papers are simple to change. Tell your doctor what you want to change, and ask him or her to make a note in your medical file. Give yourfamily updated copies of the papers.   Where can you learn more?  Go to [email or web address] and sign in to your Instamojo account. Enter P184 in the [facility] box to learn more about \"Advance Care Planning: Care Instructions. \"     If you do not have an account, please click on the \"Sign Up Now\" link. Current as of: October 18, 2021               Content Version: 13.3  © 2006-2022 [employer]. Care instructions adapted under license by [facility] ([facility]). If you have questions about a medical condition or this instruction, always ask your healthcare professional. [place] [address] any warranty or liability for your use of this information.     ·

## 2022-07-14 NOTE — PROGRESS NOTES
Medicare Annual Wellness Visit    Jeffery Osborne is here for Medicare AWV    Assessment & Plan   Medicare annual wellness visit, subsequent  ACP (advance care planning)      Recommendations for Preventive Services Due: see orders and patient instructions/AVS.  Recommended screening schedule for the next 5-10 years is provided to the patient in written form: see Patient Instructions/AVS.     Return for Medicare Annual Wellness Visit in 1 year. Subjective   The following acute and/or chronic problems were also addressed today:  Patient does suffer from chronic bilateral knee pain, told she must lose weight and be at 300 pounds to qualify for knee surgery. Reviewed home safety, patient is in need for bariatric shower chair. Discussed nutritional consult, declines as she believes this will be part of her treatment plan when meeting with pain management at [address] [place] [address] and screening values have been reviewed and are found in Flowsheets. The following problems were reviewed today and where indicated follow up appointments were made and/or referrals ordered.     Positive Risk Factor Screenings with Interventions:    Fall Risk:  Do you feel unsteady or are you worried about falling? : (!) yes  2 or more falls in past year?: no  Fall with injury in past year?: no     Fall Risk Interventions:    · Patient declines any further evaluation/treatment for this issue            General Health and ACP:  General  In general, how would you say your health is?: Fair  In the past 7 days, have you experienced any of the following: New or Increased Pain, New or Increased Fatigue, Loneliness, Social Isolation, Stress or Anger?: (!) Yes  Select all that apply: (!) New or Increased Pain,New or Increased Fatigue  Do you get the social and emotional support that you need?: Yes  Do you have a Living Will?: (!) No    Advance Directives     Power of  Living Will ACP-Advance Directive ACP-Power of     Not on File Not on File Not on File Not on File      General Health Risk Interventions:  · Pain issues: pain management referral ordered    Health Habits/Nutrition:     Physical Activity: Inactive    Days of Exercise per Week: 0 days    Minutes of Exercise per Session: 0 min     Have you lost any weight without trying in the past 3 months?: No     Have you seen the dentist within the past year?: (!) No    Health Habits/Nutrition Interventions:  · Dental exam overdue:  patient encouraged to make appointment with his/her dentist    Hearing/Vision:  Do you or your family notice any trouble with your hearing that hasn't been managed with hearing aids?: (!) Yes  Do you have difficulty driving, watching TV, or doing any of your daily activities because of your eyesight?: No  Have you had an eye exam within the past year?: Yes  No exam data present    Hearing/Vision Interventions:  · Hearing concerns:  had ears checked last year at [facility], told she had some hearing loss    Safety:  Do you have working smoke detectors?: Yes  Do you have any tripping hazards - loose or unsecured carpets or rugs?: No  Do you have any tripping hazards - clutter in doorways, halls, or stairs?: No  Do you have either shower bars, grab bars, non-slip mats or non-slip surfaces in your shower or bathtub?: (!) No  Do all of your stairways have a railing or banister?: (!) No  Do you always fasten your seatbelt when you are in a car?: Yes    Safety Interventions:  · Referred to [place] on Aging, declines audiology referral    ADLs:  In the past 7 days, did you need help from others to perform any of the following everyday activities: Eating, dressing, grooming, bathing, toileting, or walking/balance?: (!) Yes  In the past 7 days, did you need help from others to take care of any of the following: Laundry, housekeeping, banking/finances, shopping, telephone use, food preparation, transportation, or taking medications?: (!) Yes    ADL Interventions:  · Referred to [place] on Aging          Objective      Patient-Reported Vitals  No data recorded           Allergies   Allergen Reactions    Entresto [Sacubitril-Valsartan] Other (See Comments)     Acute kidney injury    Food Swelling     peaches    Gabapentin Swelling    Lyrica [Pregabalin] Swelling     Mouth sores    Tetracyclines & Related      Prior to Visit Medications    Medication Sig Taking?  Authorizing Provider   dextromethorphan-guaiFENesin (ROBITUSSIN-DM)  MG/5ML syrup Take 5 mLs by mouth every 4 hours as needed for Cough Yes Norma Cyr MD   Ergocalciferol (VITAMIN D) 81352 units CAPS Take 50,000 Units by mouth once a week Yes Norma Cyr MD   tiZANidine (ZANAFLEX) 4 MG tablet Take 1 tablet by mouth 3 times daily as needed (pain) Yes ASA Miguel CNP   levothyroxine (SYNTHROID) 50 MCG tablet Take 1 tablet by mouth Daily Yes ASA Miguel CNP   pantoprazole (PROTONIX) 40 MG tablet Take 1 tablet by mouth daily Yes ASA Miguel CNP   metoprolol tartrate (LOPRESSOR) 50 MG tablet Take 1 tablet by mouth 2 times daily Yes ASA Miguel CNP   bumetanide (BUMEX) 1 MG tablet Take 2 tablets by mouth daily Yes ASA Miguel CNP   DULoxetine (CYMBALTA) 60 MG extended release capsule Take 2 capsules by mouth daily Yes ASA Miguel CNP   montelukast (SINGULAIR) 10 MG tablet Take 1 tablet by mouth nightly Yes ASA Miguel CNP   allopurinol (ZYLOPRIM) 300 MG tablet Take 1 tablet by mouth daily Yes ASA Miguel CNP   hydrALAZINE (APRESOLINE) 25 MG tablet Take 1 tablet by mouth 2 times daily Yes ASA Miguel CNP   isosorbide dinitrate (ISORDIL) 10 MG tablet Take 1 tablet by mouth 2 times daily Yes ASA Miguel CNP   buPROPion (WELLBUTRIN XL) 300 MG extended release tablet Take 1 tablet by mouth every morning Yes ASA Miguel CNP   spironolactone (ALDACTONE) 25 MG tablet Take 1 tablet by mouth daily Yes ASA Kauffman CNP   oxybutynin (DITROPAN) 5 MG tablet Take 1 tablet by mouth 2 times daily Yes ASA Kauffman CNP   traZODone (DESYREL) 150 MG tablet Take 1 tablet by mouth nightly Yes ASA Kauffman CNP   SYMBICORT 80-4.5 MCG/ACT AERO Inhale 2 puffs into the lungs 2 times daily Yes ASA Kauffman CNP   albuterol sulfate  (90 Base) MCG/ACT inhaler Inhale 2 puffs into the lungs every 4 hours as needed for Shortness of Breath Yes ASA Kauffman CNP   atorvastatin (LIPITOR) 80 MG tablet Take 1 tablet by mouth daily Yes ASA Kauffman CNP   polyethylene glycol (GLYCOLAX) 17 g packet polyethylene glycol 3350 17 gram/dose oral powder Yes Wendell Goldberg Vriezelaar, APRN - CNP   aspirin 81 MG EC tablet Take 81 mg by mouth Yes Historical Provider, MD   sucralfate (CARAFATE) 1 GM tablet Take 1 tablet in AM, 2 tablet in evening daily Yes Rahul Merino PA-C   albuterol (PROVENTIL) (2.5 MG/3ML) 0.083% nebulizer solution Take 3 mLs by nebulization every 6 hours as needed for Wheezing Yes Rahul Merino PA-C   Handicap Placard [address] [facility] by Does not apply route 2 years Yes Rahul Merino PA-C   Respiratory Therapy Supplies ([place] [address]) KIT Provide insurance covered nebulizer, use daily as needed Yes Rahul Merino PA-C       CareTeam (Including outside providers/suppliers regularly involved in providing care):   Patient Care Team:  ASA Kauffman CNP as PCP - General (Family Medicine)  ASA Kauffman CNP as PCP - [address] [place] [address] Provider  ASA Munoz CNP as Consulting Physician (Pain Management)  Nik Horta MD as Consulting Physician (Rheumatology)  Timothy Montesinos DO as Consulting Physician (Pulmonary Disease)  Isaiah Uribe MD as Consulting Physician (Gastroenterology)     Reviewed and updated this visit:  Allergies  Meds            Josue Singleton, was evaluated through a synchronous (real-time) audio-video encounter. The patient (or guardian if applicable) is aware that this is a billable service, which includes applicable co-pays. This Virtual Visit was conducted with patient's (and/or legal guardian's) consent. The visit was conducted pursuant to the emergency declaration under the [address] [place] [address] authority and the [employer] and [employer] General Act. Patient identification was verified, and a caregiver was present when appropriate. The patient was located at home   Provider was located at [place] [address] (Appt Dept): [address] [facility],  [address] [place] [address].

## 2022-07-14 NOTE — PROGRESS NOTES
Visit Information    Have you changed or started any medications since your last visit including any over-the-counter medicines, vitamins, or herbal medicines? no   Are you having any side effects from any of your medications? -  no  Have you stopped taking any of your medications? Is so, why? -  no    Have you seen any other physician or provider since your last visit? No  Have you had any other diagnostic tests since your last visit? No  Have you been seen in the emergency room and/or had an admission to a hospital since we last saw you? No  Have you had your routine dental cleaning in the past 6 months? no    Have you activated your [employer] account? If not, what are your barriers?  Yes     Patient Care Team:  ASA Swain CNP as PCP - General (Family Medicine)  ASA Swain CNP as PCP - [facility]  ASA Kamara CNP as Consulting Physician (Pain Management)  Becca Jordan MD as Consulting Physician (Rheumatology)  Ky Mack DO as Consulting Physician (Pulmonary Disease)  Jesenia Diaz MD as Consulting Physician (Gastroenterology)    Medical History Review  Past Medical, Family, and Social History reviewed and does not contribute to the patient presenting condition    Health Maintenance   Topic Date Due    DTaP/Tdap/Td vaccine (1 - Tdap) Never done    Shingles vaccine (1 of 2) Never done    COVID-19 Vaccine (3 - Booster for [employer] series) 07/26/2021    Annual Wellness Visit (AWV)  08/14/2021    Pneumococcal 0-64 years Vaccine (2 - PCV) 10/08/2021    Breast cancer screen  12/17/2021    Flu vaccine (1) 09/01/2022    Lipids  10/15/2022    Depression Monitoring  05/18/2023    A1C test (Diabetic or Prediabetic)  05/24/2023    Colorectal Cancer Screen  06/24/2031    Hepatitis C screen  Completed    HIV screen  Completed    Hepatitis A vaccine  Aged Out    Hepatitis B vaccine  Aged Out    Hib vaccine  Aged Out    Meningococcal (ACWY)

## 2022-07-21 RX ORDER — ERGOCALCIFEROL 1.25 MG/1
50000 CAPSULE ORAL WEEKLY
Qty: 5 CAPSULE | Refills: 1 | Status: SHIPPED | OUTPATIENT
Start: 2022-07-21 | End: 2022-10-07 | Stop reason: SDUPTHER

## 2022-07-21 NOTE — TELEPHONE ENCOUNTER
Health Maintenance   Topic Date Due    DTaP/Tdap/Td vaccine (1 - Tdap) Never done    Shingles vaccine (1 of 2) Never done    COVID-19 Vaccine (3 - Booster for [employer] series) 07/26/2021    Pneumococcal 0-64 years Vaccine (2 - PCV) 10/08/2021    Breast cancer screen  12/17/2021    Flu vaccine (1) 09/01/2022    Lipids  10/15/2022    A1C test (Diabetic or Prediabetic)  05/24/2023    Depression Monitoring  07/14/2023    Annual Wellness Visit (AWV)  07/15/2023    Colorectal Cancer Screen  06/24/2031    Hepatitis C screen  Completed    HIV screen  Completed    Hepatitis A vaccine  Aged Out    Hepatitis B vaccine  Aged Out    Hib vaccine  Aged Out    Meningococcal (ACWY) vaccine  Aged Out             (applicable per patient's age: Cancer Screenings, Depression Screening, Fall Risk Screening, Immunizations)    Hemoglobin A1C (%)   Date Value   05/24/2022 5.9   02/23/2021 5.8   11/19/2020 6.0     LDL Cholesterol (mg/dL)   Date Value   10/15/2021 115     AST (U/L)   Date Value   06/14/2022 11     ALT (U/L)   Date Value   06/14/2022 10     BUN (mg/dL)   Date Value   06/15/2022 18      (goal A1C is < 7)   (goal LDL is <100) need 30-50% reduction from baseline     BP Readings from Last 3 Encounters:   07/08/22 129/69   06/16/22 106/68   05/26/22 128/84    (goal /80)      All Future Testing planned in [facility]:  Lab Frequency Next Occurrence   Baseline Diagnostic Sleep Study Once 10/14/2021   DRUG SCREEN, PAIN Once 12/06/2021   Sleep Study with PAP Titration Once 05/18/2022   CHERY DIGITAL SCREEN SELF REFERRAL W OR WO CAD BILATERAL Once 05/18/2022   TSH With Reflex Ft4 Once 06/25/2022   Echocardiogram complete Once 06/16/2022   US HEAD NECK SOFT TISSUE THYROID Once 06/23/2022       Next Visit Date:  Future Appointments   Date Time Provider Christiano Wren   8/18/2022 12:30 PM Monserrat Muro, APRN - CNP [facility]   10/3/2022  1:30 PM Yan Rudd DO Resp Spec [place]            Patient Active Problem List: Low back pain     Therapeutic opioid-induced constipation (OIC)     Spondylosis of lumbar region without myelopathy or radiculopathy     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, insomnia type (HCC)     Opioid dependence, uncomplicated (HCC)     Sacroiliac joint dysfunction of both sides     Chronic pain disorder     Bulge of cervical disc without myelopathy     DDD (degenerative disc disease), lumbar     Failed back syndrome of cervical spine     Chest pain     Class 3 severe obesity due to excess calories with serious comorbidity and body mass index (BMI) of 45.0 to 49.9 in adult [facility])     Cervical spondylosis with radiculopathy     Status post cervical spinal fusion     History of lumbar fusion     Lumbar radiculopathy     Morbid obesity with BMI of 50.0-59.9, adult (HCC)     Chronic pain of both knees     Myalgia     Chronic use of opiate drug for therapeutic purpose     COVID-19     Hypertension     Gout     GERD (gastroesophageal reflux disease)     Acute on chronic combined systolic and diastolic congestive heart failure (HCC)     Other proteinuria     Nausea and vomiting     Chronic pain     PTSD (post-traumatic stress disorder)     Hyperuricemia     Mild persistent asthma without complication     CHF (congestive heart failure) (HCC)     Chronic bronchitis, unspecified chronic bronchitis type (HCC)     Major depressive disorder, recurrent severe without psychotic features (HCC)     Headache     Suspected sleep apnea     Vitamin D deficiency     CKD (chronic kidney disease) stage 3, GFR 30-59 ml/min (HCC)     COPD exacerbation (HCC)     CRP elevated     High serum fibrinogen

## 2022-07-28 ENCOUNTER — TELEPHONE (OUTPATIENT)
Dept: PRIMARY CARE CLINIC | Age: 57
End: 2022-07-28

## 2022-07-28 NOTE — TELEPHONE ENCOUNTER
Writer spoke with pt states she will call office back with more information once she speak with Home Health agency. Stated she hasn't found an apartment yet.

## 2022-07-28 NOTE — TELEPHONE ENCOUNTER
Patient is requesting letter for two bedroom apartment for home health aid. Patient stated she did not mention in recent office visit that she recently fell. Patient stated that insurance company would like to set her up with aid for ADLs, however patient needs two bedroom apartment because she is moving out of her sisters home. Patient would like to  note when completed.

## 2022-08-19 NOTE — FLOWSHEET NOTE
[address]   Outpatient Physical Therapy  Cancel/No Show Note    Date: 20    Patient Name: Sirena Becerra        MRN: [id number]  Account: [de-identified] : 1965      General Information:  Referring Practitioner: JESUS ALBERTO Diaz  Referral Date : 20  Diagnosis:  Chronic pain R knee M25.561, G89.29 ( Main complaint )  Onset Date: 10/01/16  PT Insurance Information: [facility] Medicare  Total # of Visits Approved: 12  Total # of Visits to Date: 9  No Show: 1  Canceled Appointment: 2        Comments: Pt cancelled appointemnt today due to unable to make it.     Kimberley Hawkins, PTA DATE:    8/19/2022     ASSISTANT:   1.   GRACE Catherine    SURGICAL TASKS PERFORMED BY THE ASSISTANT: Under the surgeon's direction, the assistant has facilitated the operation by performing duties that included but were not limited to: exposure of the operative field, opening and closing skin, dissecting tissue, removing tissue, implanting devices, obtaining hemostasis, and/or otherwise altering tissues.      PREOPERATIVE DIAGNOSES:    1. Open chest.  2. Status post OHT and open chest.  3. Coagulopathy.     POSTOPERATIVE DIAGNOSES:    1. Open chest.  2. Status post OHT and open chest.  3. Coagulopathy.     PROCEDURE:    1. Mediastinal exploration.  2. Chest closure.  3. Cordis and [place] Alis catheter insertion with fluoroscopy.     ANESTHESIA:  General.     PROCEDURE IN DETAIL:  The patient was returned as planned to the operating room and placed on the table in supine position.  A formal time-out was performed.  He was already under general anesthesia.  The wound VAC was removed and he was prepped and draped in sterile fashion.  The mediastinum was explored.  There was evidence of coagulopathy with oozing from the chest wall and raw surfaces.  The anastomotic sites were intact.  The mediastinum was irrigated with saline and suction.  The mediasinal chest tube and Mauro drain were replaced.  After achieving hemostasis, the sternum was reapproximated with interrupted sternal wires.  Interrupted Vicryl sutures were placed around each of the wires due to bleeding and coagulopathy.  The fascia was closed in multiple layers.  The skin was reapproximated with staples.  A cordis catheter was placed in the right internal jugular vein via Seldinger technique.  A [place] Alis catheter was placed through the cordis and advanced into the pulmonary trunk under fluoroscopic guidance.  We waited in the operating room to assure no excess drainage from the chest tubes.  The patient tolerated the procedure well and there were  no complications.  He was transferred to the cardiovascular intensive care unit in critical condition.  He remained hemodynamically stable     ESTIMATED BLOOD LOSS:  25 mL.     SPECIMENS:  There were no specimens.

## 2022-08-23 ENCOUNTER — TELEPHONE (OUTPATIENT)
Dept: ORTHOPEDIC SURGERY | Age: 57
End: 2022-08-23

## 2022-08-23 NOTE — TELEPHONE ENCOUNTER
Gave patient number for medical records to get all notes/imaging from right knee to [facility]. Patient will be faxing information about left knee which is a [facility] case from the [facility].

## 2022-08-23 NOTE — TELEPHONE ENCOUNTER
Patient is asking if we can send appt notes and any imaging pertaining to RIGHT knee be faxed over to [facility] pain management office   Attention Amina Mcguire  Fax. [phone]      She is also asking if we have received approval from [facility] to see her for her LEFT Knee?     Please call her for follow up   Thank you

## 2022-08-26 ENCOUNTER — TELEMEDICINE (OUTPATIENT)
Dept: PRIMARY CARE CLINIC | Age: 57
End: 2022-08-26
Payer: MEDICARE

## 2022-08-26 DIAGNOSIS — M79.7 FIBROMYALGIA: ICD-10-CM

## 2022-08-26 DIAGNOSIS — F33.2 MAJOR DEPRESSIVE DISORDER, RECURRENT SEVERE WITHOUT PSYCHOTIC FEATURES (HCC): ICD-10-CM

## 2022-08-26 DIAGNOSIS — I50.42 CHRONIC COMBINED SYSTOLIC AND DIASTOLIC CONGESTIVE HEART FAILURE (HCC): ICD-10-CM

## 2022-08-26 DIAGNOSIS — J42 CHRONIC BRONCHITIS, UNSPECIFIED CHRONIC BRONCHITIS TYPE (HCC): Primary | ICD-10-CM

## 2022-08-26 DIAGNOSIS — E03.9 HYPOTHYROIDISM, UNSPECIFIED TYPE: ICD-10-CM

## 2022-08-26 PROCEDURE — 99214 OFFICE O/P EST MOD 30 MIN: CPT | Performed by: NURSE PRACTITIONER

## 2022-08-26 PROCEDURE — 3017F COLORECTAL CA SCREEN DOC REV: CPT | Performed by: NURSE PRACTITIONER

## 2022-08-26 PROCEDURE — G8427 DOCREV CUR MEDS BY ELIG CLIN: HCPCS | Performed by: NURSE PRACTITIONER

## 2022-08-26 RX ORDER — BUPROPION HYDROCHLORIDE 150 MG/1
150 TABLET ORAL EVERY MORNING
Qty: 90 TABLET | Refills: 1 | Status: SHIPPED | OUTPATIENT
Start: 2022-08-26

## 2022-08-26 RX ORDER — ALBUTEROL SULFATE 90 UG/1
2 AEROSOL, METERED RESPIRATORY (INHALATION) EVERY 4 HOURS PRN
Qty: 3 EACH | Refills: 0 | Status: SHIPPED | OUTPATIENT
Start: 2022-08-26 | End: 2022-10-03 | Stop reason: SDUPTHER

## 2022-08-26 RX ORDER — BUDESONIDE AND FORMOTEROL FUMARATE DIHYDRATE 160; 4.5 UG/1; UG/1
2 AEROSOL RESPIRATORY (INHALATION) 2 TIMES DAILY
Qty: 10.2 G | Refills: 3 | Status: SHIPPED | OUTPATIENT
Start: 2022-08-26 | End: 2022-10-03 | Stop reason: SDUPTHER

## 2022-08-26 ASSESSMENT — ENCOUNTER SYMPTOMS
SHORTNESS OF BREATH: 1
WHEEZING: 0
ORTHOPNEA: 1
CHEST TIGHTNESS: 1

## 2022-08-26 NOTE — PROGRESS NOTES
Clair Floyd is a 59 y.o. female evaluated virtual visit via "[employer] on 2022. Consent:  She and/or health care decision maker is aware that that she may receive a bill for this telephone service, depending on her insurance coverage, and has provided verbal consent to proceed: NA - Consent obtained within past 12 months      Documentation:  I communicated with the patient and/or health care decision maker about CHF, COPD, chronic pain. Details of this discussion including any medical advice provided below      I affirm this is a Patient Initiated Episode with an Established Patient who has not had a related appointment within my department in the past 7 days or scheduled within the next 24 hours. Total Time: minutes: 21-30 minutes    Note: not billable if this call serves to triage the patient into an appointment for the relevant concern      ASA Starr CNP                  2022    TELEHEALTH EVALUATION -- Audio/Visual (During [address] emergency)    Patient and physician are located in their individual homes    HPI:    Clair Floyd (:  1965) has requested an audio only/video evaluation for the following concern(s):        Clair Floyd is a 15-year-old female here today for follow-up of chronic conditions. Patient with known fibromyalgia, hypertension, CHF, and asthma. Covid illness in 2020, still complaining of long-term side effect such as brain fog and shortness of breath. Has been staying in [place] for the last 6 months, seeing pain management in the last month. Does see cardiology, however she is unsure when her last visit was. Compliant with daily medications, although admits she does sometimes forget medication or lacks motivation due to her depression. She does have a scale to weigh herself, but does not consistently check weights for fluid monitoring. Denies wheezing, denies chest pain.       Long haul covid symptoms, much trouble recalling appts and past testing. Jeanna Holden denies any previous testing for sleep apnea. Refuses to schedule testing at this time  Endorses SOB, no wheezing or productive cough. Some clear phlegm at night. + chest tightness and easily SOB with minimal exertion. Also experiencing dizziness with exertion. Jeanna Holden voices worsening depression symptoms, she is frustrated due to lack of pain control for her fibromyalgia. Was previously receiving narcotics but has not found a pain management group that is comfortable with that plan of care since moving back to the area. She is not listed with [facility] pain management and they are waiting on outside records. Allergies to gabapentin and Lyrica. She says she is more depressed, easily angered. Hypertension  This is a chronic problem. The problem is controlled. Associated symptoms include orthopnea, peripheral edema and shortness of breath. Pertinent negatives include no chest pain. Risk factors for coronary artery disease include obesity. Congestive Heart Failure  Presents for follow-up visit. Associated symptoms include fatigue and shortness of breath. Pertinent negatives include no chest pain. Review of Systems   Constitutional:  Positive for fatigue. Respiratory:  Positive for chest tightness and shortness of breath. Negative for wheezing. Cardiovascular:  Positive for orthopnea and leg swelling (Alleviated with elevation and medication). Negative for chest pain. Musculoskeletal:  Positive for myalgias. Neurological:  Positive for dizziness. Negative for tremors and syncope. Psychiatric/Behavioral:  Positive for agitation and dysphoric mood. Negative for self-injury and sleep disturbance. Prior to Visit Medications    Medication Sig Taking?  Authorizing Provider   budesonide-formoterol (SYMBICORT) 160-4.5 MCG/ACT AERO Inhale 2 puffs into the lungs 2 times daily Yes ASA Malik - CNP   albuterol sulfate HFA tablet by mouth 2 times daily Yes ASA Napier CNP   traZODone (DESYREL) 150 MG tablet Take 1 tablet by mouth nightly Yes ASA Napier CNP   atorvastatin (LIPITOR) 80 MG tablet Take 1 tablet by mouth daily Yes ASA Napier CNP   polyethylene glycol (GLYCOLAX) 17 g packet polyethylene glycol 3350 17 gram/dose oral powder Yes ASA Clayton CNP   aspirin 81 MG EC tablet Take 81 mg by mouth Yes Historical Provider, MD   sucralfate (CARAFATE) 1 GM tablet Take 1 tablet in AM, 2 tablet in evening daily Yes Ole Gupta PA-C   albuterol (PROVENTIL) (2.5 MG/3ML) 0.083% nebulizer solution Take 3 mLs by nebulization every 6 hours as needed for Wheezing Yes Ole Gupta PA-C   Handicap Placard [address] [facility] by Does not apply route 2 years Yes Ole Gupta PA-C   Respiratory Therapy Supplies ([address]) KIT Provide insurance covered nebulizer, use daily as needed Yes Ole Gupta PA-C       Social History     Tobacco Use    Smoking status: Never    Smokeless tobacco: Never   Vaping Use    Vaping Use: Never used   Substance Use Topics    Alcohol use: No    Drug use: No        Past Medical History:   Diagnosis Date    Arthritis     Bronchitis     On ICS-LABA, denies asthma, copd    CHF (congest[facility] failure) [facility]     COVID-19     diagnosed in September 2020, hospitilized on oxgyen    Fibromyalgia     GERD (gastroesophageal reflux disease)     Gout 10/2019    History of heart attack     HLD (hyperlipidemia)     Hypertension     Insomnia     Osteoarthritis         Allergies   Allergen Reactions    Entresto [Sacubitril-Valsartan] Other (See Comments)     Acute kidney injury    Food Swelling     peaches    Gabapentin Swelling    Lyrica [Pregabalin] Swelling     Mouth sores    Tetracyclines & Related    ,   Past Medical History:   Diagnosis Date    Arthritis     Bronchitis     On ICS-LABA, denies asthma, copd    CHF (congestive heart failure) ([facility] COVID-19     diagnosed in September 2020, hospitilized on oxgyen    Fibromyalgia     GERD (gastroesophageal reflux disease)     Gout 10/2019    History of heart attack     HLD (hyperlipidemia)     Hypertension     Insomnia     Osteoarthritis    ,   Past Surgical History:   Procedure Laterality Date    BACK SURGERY      CARDIAC CATHETERIZATION  07/08/2022    CHOLECYSTECTOMY, LAPAROSCOPIC      HYSTERECTOMY, TOTAL ABDOMINAL (CERVIX REMOVED)      KNEE ARTHROSCOPY Right     KNEE SURGERY Left 2009    NECK SURGERY      SHOULDER SURGERY Right 2009    THYROIDECTOMY         CBC:  Lab Results   Component Value Date/Time    WBC 5.5 06/15/2022 05:14 AM    HGB 10.0 06/15/2022 05:14 AM     06/15/2022 05:14 AM       BMP:    Lab Results   Component Value Date/Time     06/15/2022 05:14 AM    K 4.2 06/15/2022 05:14 AM    CL 99 06/15/2022 05:14 AM    CO2 27 06/15/2022 05:14 AM    BUN 18 06/15/2022 05:14 AM    CREATININE 1.49 07/08/2022 11:53 AM    CREATININE 1.23 06/15/2022 05:14 AM    GLUCOSE 105 06/15/2022 05:14 AM       HEMOGLOBIN A1C:   Lab Results   Component Value Date/Time    LABA1C 5.9 05/24/2022 03:04 PM       FASTING LIPID PANEL:  Lab Results   Component Value Date    CHOL 158 07/24/2020    HDL 36 (L) 10/15/2021    TRIG 149 07/24/2020     Lab Results   Component Value Date    TSH 5.69 (H) 05/24/2022         RECORD REVIEW: Previous medical records were reviewed at today's visit. PHYSICAL EXAMINATION:    Vital Signs: (As obtained by patient/caregiver at home)    No flowsheet data found. Physical Exam  Constitutional:       Appearance: Normal appearance. She is obese. She is not toxic-appearing. HENT:      Head: Normocephalic. Right Ear: External ear normal.      Left Ear: External ear normal.      Nose: Nose normal.      Mouth/Throat:      Mouth: Mucous membranes are moist.   Eyes:      General: No scleral icterus. Right eye: No discharge. Left eye: No discharge. Conjunctiva/sclera: Conjunctivae normal.   Pulmonary:      Effort: Pulmonary effort is normal.      Comments: Speaking in full sentences without evidence of dyspnea  Musculoskeletal:      Cervical back: Normal range of motion. Skin:     Coloration: Skin is not jaundiced. Neurological:      Mental Status: She is alert and oriented to person, place, and time. Psychiatric:         Mood and Affect: Mood normal.         Behavior: Behavior normal.         Thought Content: Thought content normal.             RECORD REVIEW: Previous medical records were reviewed at today's visit. The past family, medical and social histories were reviewed and unchanged with the exceptions of what is mentioned in this note. Due to this being a TeleHealth encounter, evaluation of the following organ systems is limited: Vitals/Constitutional/EENT/Resp/CV/GI//MS/Neuro/Skin/Heme-Lymph-Imm. ASSESSMENT/PLAN:  Cleve Pike was seen today for hypertension and congestive heart failure. Diagnoses and all orders for this visit:    Chronic bronchitis, unspecified chronic bronchitis type ([address]  -     budesonide-formoterol (SYMBICORT) 160-4.5 MCG/ACT AERO; Inhale 2 puffs into the lungs 2 times daily    Chronic combined systolic and diastolic congestive heart failure (HCC)    Hypothyroidism, unspecified type    Fibromyalgia    Major depressive disorder, recurrent severe without psychotic features ([address]  -     buPROPion (WELLBUTRIN XL) 150 MG extended release tablet; Take 1 tablet by mouth every morning Take with 300mg for total of 450 mg    Other orders  -     albuterol sulfate HFA (PROVENTIL;VENTOLIN;PROAIR) 108 (90 Base) MCG/ACT inhaler; Inhale 2 puffs into the lungs every 4 hours as needed for Shortness of Breath    Patient states she is albuterol inhaler daily, going through 1 month. Increase Symbicort dosing at this time, see pulmonology in early October.   January 2021 PFTs with normal spirometry, however patient endorses worsening symptoms after 2 episodes of Covid. Agreed to increase Wellbutrin today to 450 mg. Declines counseling services. Attempted to discuss symptoms of dizziness, shortness of breath and fatigue in regards to concern for sleep apnea. Javan Samson was not willing to discuss sleep apnea testing and became agitated with the discussion. Return in about 3 months (around 11/26/2022). An  electronic signature was used to authenticate this note. --ASA Agustin - CNP on 8/26/2022 at 1:45 PM    This note is created with the assistance of a speech-recognition program. While intending to generate a document that actually reflects the content of the visit, the document can still have some mistakes which may not have been identified and corrected by editing. [address] Jeri Mahad, was evaluated through a synchronous (real-time) audio-video encounter. The patient (or guardian if applicable) is aware that this is a billable service, which includes applicable co-pays. This Virtual Visit was conducted with patient's (and/or legal guardian's) consent. The visit was conducted pursuant to the emergency declaration under the [address] [place] [address] waiver authority and the [employer] General Act. Patient identification was verified, and a caregiver was present when appropriate. The patient was located in a state where the provider was licensed to provide care. Services were provided through a video synchronous discussion virtually to substitute for in-person clinic visit.

## 2022-08-26 NOTE — PROGRESS NOTES
Visit Information    Have you changed or started any medications since your last visit including any over-the-counter medicines, vitamins, or herbal medicines? no   Are you having any side effects from any of your medications? -  no  Have you stopped taking any of your medications? Is so, why? -  no    Have you seen any other physician or provider since your last visit? No  Have you had any other diagnostic tests since your last visit? No  Have you been seen in the emergency room and/or had an admission to a hospital since we last saw you? No  Have you had your routine dental cleaning in the past 6 months? no    Have you activated your [employer] account? If not, what are your barriers?  Yes     Patient Care Team:  ASA Reese CNP as PCP - General (Family Medicine)  ASA Reese CNP as PCP - [facility] Empaneled Provider  ASA Welch CNP as Consulting Physician (Pain Management)  Raul Siddiqi MD as Consulting Physician (Rheumatology)  Magdalena Faria DO as Consulting Physician (Pulmonary Disease)  Janet Merchant MD as Consulting Physician (Gastroenterology)    Medical History Review  Past Medical, Family, and Social History reviewed and does not contribute to the patient presenting condition    Health Maintenance   Topic Date Due    DTaP/Tdap/Td vaccine (1 - Tdap) Never done    Shingles vaccine (1 of 2) Never done    COVID-19 Vaccine (3 - Booster for [employer] series) 07/26/2021    Pneumococcal 0-64 years Vaccine (2 - PCV) 10/08/2021    Breast cancer screen  12/17/2021    Flu vaccine (1) 09/01/2022    Lipids  10/15/2022    A1C test (Diabetic or Prediabetic)  05/24/2023    Depression Monitoring  07/14/2023    Annual Wellness Visit (AWV)  07/15/2023    Colorectal Cancer Screen  06/24/2031    Hepatitis C screen  Completed    HIV screen  Completed    Hepatitis A vaccine  Aged Out    Hepatitis B vaccine  Aged Out    Hib vaccine  Aged Out    Meningococcal (ACWY) vaccine  Aged Out

## 2022-08-29 DIAGNOSIS — G89.4 CHRONIC PAIN SYNDROME: ICD-10-CM

## 2022-08-29 RX ORDER — TIZANIDINE 4 MG/1
TABLET ORAL
Qty: 90 TABLET | Refills: 0 | Status: SHIPPED | OUTPATIENT
Start: 2022-08-29 | End: 2022-10-31

## 2022-08-29 NOTE — TELEPHONE ENCOUNTER
Health Maintenance   Topic Date Due    DTaP/Tdap/Td vaccine (1 - Tdap) Never done    Shingles vaccine (1 of 2) Never done    COVID-19 Vaccine (3 - Booster for Pfizer series) 07/26/2021    Pneumococcal 0-64 years Vaccine (2 - PCV) 10/08/2021    Breast cancer screen  12/17/2021    Flu vaccine (1) 09/01/2022    Lipids  10/15/2022    A1C test (Diabetic or Prediabetic)  05/24/2023    Depression Monitoring  07/14/2023    Annual Wellness Visit (AWV)  07/15/2023    Colorectal Cancer Screen  06/24/2031    Hepatitis C screen  Completed    HIV screen  Completed    Hepatitis A vaccine  Aged Out    Hepatitis B vaccine  Aged Out    Hib vaccine  Aged Out    Meningococcal (ACWY) vaccine  Aged Out             (applicable per patient's age: Cancer Screenings, Depression Screening, Fall Risk Screening, Immunizations)    Hemoglobin A1C (%)   Date Value   05/24/2022 5.9   02/23/2021 5.8   11/19/2020 6.0     LDL Cholesterol (mg/dL)   Date Value   10/15/2021 115     AST (U/L)   Date Value   06/14/2022 11     ALT (U/L)   Date Value   06/14/2022 10     BUN (mg/dL)   Date Value   06/15/2022 18      (goal A1C is < 7)   (goal LDL is <100) need 30-50% reduction from baseline     BP Readings from Last 3 Encounters:   07/08/22 129/69   06/16/22 106/68   05/26/22 128/84    (goal /80)      All Future Testing planned in [facility]:  Lab Frequency Next Occurrence   Baseline Diagnostic Sleep Study Once 10/14/2021   DRUG SCREEN, PAIN Once 12/06/2021   Sleep Study with PAP Titration Once 05/18/2022   CHERY DIGITAL SCREEN SELF REFERRAL W OR WO CAD BILATERAL Once 05/18/2022   TSH With Reflex Ft4 Once 06/25/2022   US HEAD NECK SOFT TISSUE THYROID Once 06/23/2022       Next Visit Date:  Future Appointments   Date Time Provider Christiano Wren   8/31/2022  1:30 PM STV US GE XD CLEAR [facility] US [facility] Radiolog   10/3/2022  1:30 PM Jamir Pool DO Resp Spec [facility]   11/28/2022  1:30 PM ASA Smith - CNP ST V WALK IN [facility]            Patient Active Problem List:     Low back pain     Therapeutic opioid-induced constipation (OIC)     Spondylosis of lumbar region without myelopathy or radiculopathy     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)     Cervical spondylosis with radiculopathy     Status post cervical spinal fusion     History of lumbar fusion     Lumbar radiculopathy     Morbid obesity with BMI of 50.0-59.9, adult (HCC)     Chronic pain of both knees     Myalgia     Chronic use of opiate drug for therapeutic purpose     COVID-19     Hypertension     Gout     GERD (gastroesophageal reflux disease)     Acute on chronic combined systolic and diastolic congestive heart failure (HCC)     Other proteinuria     Nausea and vomiting     Chronic pain     PTSD (post-traumatic stress disorder)     Hyperuricemia     Mild persistent asthma without complication     CHF (congestive heart failure) (HCC)     Chronic bronchitis, unspecified chronic bronchitis type (HCC)     Major depressive disorder, recurrent severe without psychotic features (HCC)     Headache     Suspected sleep apnea     Vitamin D deficiency     CKD (chronic kidney disease) stage 3, GFR 30-59 ml/min (HCC)     COPD exacerbation (HCC)     CRP elevated     High serum fibrinogen

## 2022-08-31 ENCOUNTER — HOSPITAL ENCOUNTER (OUTPATIENT)
Dept: ULTRASOUND IMAGING | Age: 57
Discharge: HOME | End: 2022-09-02
Payer: MEDICARE

## 2022-08-31 DIAGNOSIS — R93.89 ABNORMAL CT OF THE CHEST: ICD-10-CM

## 2022-08-31 PROCEDURE — 76536 US EXAM OF HEAD AND NECK: CPT

## 2022-09-02 ENCOUNTER — TELEPHONE (OUTPATIENT)
Dept: PRIMARY CARE CLINIC | Age: 57
End: 2022-09-02

## 2022-09-02 DIAGNOSIS — G89.4 CHRONIC PAIN SYNDROME: Primary | ICD-10-CM

## 2022-09-07 ENCOUNTER — HOSPITAL ENCOUNTER (EMERGENCY)
Age: 57
Discharge: HOME | End: 2022-09-07
Attending: EMERGENCY MEDICINE
Payer: MEDICARE

## 2022-09-07 ENCOUNTER — APPOINTMENT (OUTPATIENT)
Dept: GENERAL RADIOLOGY | Age: 57
End: 2022-09-07
Payer: MEDICARE

## 2022-09-07 VITALS
SYSTOLIC BLOOD PRESSURE: 139 MMHG | TEMPERATURE: 97 F | WEIGHT: 293 LBS | HEIGHT: 68 IN | DIASTOLIC BLOOD PRESSURE: 71 MMHG | BODY MASS INDEX: 44.41 KG/M2 | RESPIRATION RATE: 19 BRPM | OXYGEN SATURATION: 99 % | HEART RATE: 64 BPM

## 2022-09-07 DIAGNOSIS — I50.43 ACUTE ON CHRONIC COMBINED SYSTOLIC AND DIASTOLIC CONGESTIVE HEART FAILURE (HCC): Primary | ICD-10-CM

## 2022-09-07 LAB
ABSOLUTE EOS #: 0.29 K/UL (ref 0–0.44)
ABSOLUTE IMMATURE GRANULOCYTE: 0.07 K/UL (ref 0–0.3)
ABSOLUTE LYMPH #: 2.52 K/UL (ref 1.1–3.7)
ABSOLUTE MONO #: 0.44 K/UL (ref 0.1–1.2)
ANION GAP SERPL CALCULATED.3IONS-SCNC: 10 MMOL/L (ref 9–17)
BASOPHILS # BLD: 0 % (ref 0–2)
BASOPHILS ABSOLUTE: 0.03 K/UL (ref 0–0.2)
BUN BLDV-MCNC: 22 MG/DL (ref 6–20)
CALCIUM SERPL-MCNC: 8.4 MG/DL (ref 8.6–10.4)
CHLORIDE BLD-SCNC: 108 MMOL/L (ref 98–107)
CO2: 23 MMOL/L (ref 20–31)
CREAT SERPL-MCNC: 1.31 MG/DL (ref 0.5–0.9)
EOSINOPHILS RELATIVE PERCENT: 3 % (ref 1–4)
GFR AFRICAN AMERICAN: 50 ML/MIN
GFR NON-AFRICAN AMERICAN: 42 ML/MIN
GFR SERPL CREATININE-BSD FRML MDRD: ABNORMAL ML/MIN/{1.73_M2}
GLUCOSE BLD-MCNC: 78 MG/DL (ref 70–99)
HCT VFR BLD CALC: 32.2 % (ref 36.3–47.1)
HEMOGLOBIN: 10.1 G/DL (ref 11.9–15.1)
IMMATURE GRANULOCYTES: 1 %
LYMPHOCYTES # BLD: 27 % (ref 24–43)
MAGNESIUM: 2 MG/DL (ref 1.6–2.6)
MCH RBC QN AUTO: 28.7 PG (ref 25.2–33.5)
MCHC RBC AUTO-ENTMCNC: 31.4 G/DL (ref 28.4–34.8)
MCV RBC AUTO: 91.5 FL (ref 82.6–102.9)
MONOCYTES # BLD: 5 % (ref 3–12)
NRBC AUTOMATED: 0 PER 100 WBC
PDW BLD-RTO: 16.4 % (ref 11.8–14.4)
PLATELET # BLD: 273 K/UL (ref 138–453)
PMV BLD AUTO: 10 FL (ref 8.1–13.5)
POTASSIUM SERPL-SCNC: 4.2 MMOL/L (ref 3.7–5.3)
PRO-BNP: 294 PG/ML
RBC # BLD: 3.52 M/UL (ref 3.95–5.11)
RBC # BLD: ABNORMAL 10*6/UL
SEG NEUTROPHILS: 64 % (ref 36–65)
SEGMENTED NEUTROPHILS ABSOLUTE COUNT: 6.13 K/UL (ref 1.5–8.1)
SODIUM BLD-SCNC: 141 MMOL/L (ref 135–144)
TROPONIN, HIGH SENSITIVITY: 13 NG/L (ref 0–14)
TROPONIN, HIGH SENSITIVITY: 13 NG/L (ref 0–14)
WBC # BLD: 9.5 K/UL (ref 3.5–11.3)

## 2022-09-07 PROCEDURE — 2500000003 HC RX 250 WO HCPCS: Performed by: HEALTH CARE PROVIDER

## 2022-09-07 PROCEDURE — 83880 ASSAY OF NATRIURETIC PEPTIDE: CPT

## 2022-09-07 PROCEDURE — 85025 COMPLETE CBC W/AUTO DIFF WBC: CPT

## 2022-09-07 PROCEDURE — 80048 BASIC METABOLIC PNL TOTAL CA: CPT

## 2022-09-07 PROCEDURE — 96374 THER/PROPH/DIAG INJ IV PUSH: CPT

## 2022-09-07 PROCEDURE — 71045 X-RAY EXAM CHEST 1 VIEW: CPT

## 2022-09-07 PROCEDURE — 93005 ELECTROCARDIOGRAM TRACING: CPT | Performed by: HEALTH CARE PROVIDER

## 2022-09-07 PROCEDURE — 99285 EMERGENCY DEPT VISIT HI MDM: CPT

## 2022-09-07 PROCEDURE — 83735 ASSAY OF MAGNESIUM: CPT

## 2022-09-07 PROCEDURE — 84484 ASSAY OF TROPONIN QUANT: CPT

## 2022-09-07 RX ORDER — BUMETANIDE 0.25 MG/ML
1 INJECTION, SOLUTION INTRAMUSCULAR; INTRAVENOUS ONCE
Status: COMPLETED | OUTPATIENT
Start: 2022-09-07 | End: 2022-09-07

## 2022-09-07 RX ADMIN — BUMETANIDE 1 MG: 0.25 INJECTION, SOLUTION INTRAMUSCULAR; INTRAVENOUS at 21:34

## 2022-09-07 ASSESSMENT — PAIN DESCRIPTION - FREQUENCY: FREQUENCY: CONTINUOUS

## 2022-09-07 ASSESSMENT — ENCOUNTER SYMPTOMS
DIARRHEA: 0
VOMITING: 0
SHORTNESS OF BREATH: 0
ABDOMINAL PAIN: 0
CONSTIPATION: 0
COUGH: 0
CHEST TIGHTNESS: 1
NAUSEA: 0
SORE THROAT: 0

## 2022-09-07 ASSESSMENT — PAIN SCALES - GENERAL: PAINLEVEL_OUTOF10: 8

## 2022-09-07 ASSESSMENT — PAIN - FUNCTIONAL ASSESSMENT: PAIN_FUNCTIONAL_ASSESSMENT: 0-10

## 2022-09-07 NOTE — ED NOTES
The following labs were labeled with appropriate pt sticker and tubed to lab:     [] Blue     [x] Lavender   [] on ice  [x] Green/yellow  [] Green/black [] on ice  [] Aliene Foil  [] on ice  [] Yellow  [] Red  [] Pink  [] ABG  [] VBG    [] COVID-19 swab    [] Rapid  [] PCR  [] Flu swab  [] Peds Viral Panel     [] Urine Sample  [] Pelvic Cultures  [] Blood Cultures  [] X 2  [] STREP Cultures         Summer DEVEN Castelan  09/07/22 7169

## 2022-09-07 NOTE — ED PROVIDER NOTES
[facility] ED  Emergency Department Encounter  Emergency Medicine Resident     Pt Name: Samia Marie  MRN: [id number]  [place] 1965  Date of evaluation: 9/7/22  PCP:  ASA Lott CNP    CHIEF COMPLAINT       Chief Complaint   Patient presents with    Shortness of Breath    Chest Pain       HISTORY OFPRESENT ILLNESS  (Location/Symptom, Timing/Onset, Context/Setting, Quality, Duration, Modifying Aj Johnson.)      Samia Marie is a 59 y.o. female with history of CHF, fibromyalgia, obesity who presents with concerns for chest pressure. Patient states she woke up this morning with some left-sided chest pressure that has been getting slightly worse throughout the day, she has been having increased difficulties with ambulation and that is making her very short of breath. Patient had a diagnostic cardiac cath on 7/8/2022, she had normal coronary arteries, normal LVEDP. Patient's last formal echo was on 11/02/2020, she had had a dilated left ventricle with moderately reduced systolic function, LVEF was 30 to 35% with global hypokinesia, and she had evidence of moderate grade 2 diastolic dysfunction at this time. Patient states that she did miss a dose of her Bumex on Monday. Over the last 2 days she has noticed some decreased urine output from her baseline, as well as some increased bilateral lower extremity edema. Denies any other fevers, chills, cough, abdominal pain, nausea vomiting, upper or lower extremity numbness, weakness, paresthesias. Patient does have history of fibromyalgia with some chronic pain at baseline, no changes at this time. No recent known sick contacts. No headaches or dizziness.     PAST MEDICAL / SURGICAL / SOCIAL / FAMILY HISTORY      has a past medical history of Arthritis, Bronchitis, CHF (congestive heart failure) ([facility] [address] COVID-19, Fibromyalgia, GERD (gastroesophageal reflux disease), Gout, History of heart attack, HLD (hyperlipidemia), Hypertension, Insomnia, and Osteoarthritis. has a past surgical history that includes back surgery; shoulder surgery (Right, 2009); knee surgery (Left, 2009); Cholecystectomy, laparoscopic; Knee arthroscopy (Right); Hysterectomy, total abdominal; Neck surgery; Thyroidectomy; and Cardiac catheterization (07/08/2022).     Social History     Socioeconomic History    Marital status: Single     Spouse name: Not on file    Number of children: Not on file    Years of education: Not on file    Highest education level: Not on file   Occupational History    Not on file   Tobacco Use    Smoking status: Never    Smokeless tobacco: Never   Vaping Use    Vaping Use: Never used   Substance and Sexual Activity    Alcohol use: No    Drug use: No    Sexual activity: Not on file   Other Topics Concern    Not on file   Social History Narrative    Not on file     Social Determinants of Health     Financial Resource Strain: Low Risk     Difficulty of Paying Living Expenses: Not hard at all   Food Insecurity: No Food Insecurity    Worried About Running Out of Food in the Last Year: Never true    Ran Out of Food in the Last Year: Never true   Transportation Needs: Not on file   Physical Activity: Inactive    Days of Exercise per Week: 0 days    Minutes of Exercise per Session: 0 min   Stress: Not on file   Social Connections: Not on file   Intimate Partner Violence: Not on file   Housing Stability: Not on file       Family History   Problem Relation Age of Onset    Other Mother     Diabetes Mother     Heart Disease Mother     Arthritis Mother     Kidney Disease Mother     Lupus Mother     Arthritis Sister     Diabetes Brother     Asthma Brother     Cancer Maternal Grandfather     Hypertension Sister     Breast Cancer Sister         28s    Breast Cancer Niece         30-35       Allergies:  Entresto [sacubitril-valsartan], Food, Gabapentin, Lyrica [pregabalin], and Tetracyclines & related    Home Medications:  Prior to times daily 5/18/22   [facility]   isosorbide dinitrate (ISORDIL) 10 MG tablet Take 1 tablet by mouth 2 times daily 5/18/22   [facility] - CNP   buPROPion (WELLBUTRIN XL) 300 MG extended release tablet Take 1 tablet by mouth every morning 5/18/22   [facility], APR[facility] - CNP   spironolactone (ALDACTONE) 25 MG tablet Take 1 tablet by mouth daily 5/18/22   [facility], APRN - CNP   oxybutynin (DITROPAN) 5 MG tablet Take 1 tablet by mouth 2 times daily 5/18/22   [facility], APRN - CNP   traZODone (DESYREL) 150 MG tablet Take 1 tablet by mouth nightly 5/12/22   [facility] CNP   atorvastatin (LIPITOR) 80 MG tablet Take 1 tablet by mouth daily 5/12/22   [facility]   polyethylene glycol (GLYCOLAX) 17 g packet polyethylene glycol 3350 17 gram/dose oral powder 5/11/21   ASA Wood - CNP   aspirin 81 MG EC tablet Take 81 mg by mouth    Historical Provider, MD   sucralfate (CARAFATE) 1 GM tablet Take 1 tablet in AM, 2 tablet in evening daily 12/9/20   Luiza Raymond PA-C   albuterol (PROVENTIL) (2.5 MG/3ML) 0.083% nebulizer solution Take 3 mLs by nebulization every 6 hours as needed for Wheezing 10/15/20   Luiza Raymond PA-C   Respiratory Therapy Supplies (NEBULIZER COMPRESSOR) KIT Provide insurance covered nebulizer, use daily as needed 9/16/19 8/26/22  Luiza Raymond PA-C       REVIEW OF SYSTEMS    (2-9 systems for level 4, 10 or more for level 5)      Review of Systems   Constitutional:  Negative for chills and fever. HENT:  Negative for ear pain, hearing loss and sore throat. Eyes:  Negative for visual disturbance. Respiratory:  Positive for chest tightness. Negative for cough and shortness of breath. Cardiovascular:  Negative for chest pain. Gastrointestinal:  Negative for abdominal pain, constipation, diarrhea, nausea and vomiting. Genitourinary:  Negative for difficulty urinating and dysuria. Musculoskeletal:  Negative for arthralgias and myalgias. Neurological:  Negative for numbness. Psychiatric/Behavioral:  Negative for agitation and confusion. PHYSICAL EXAM   (up to 7 for level 4, 8 or more for level 5)     INITIAL VITALS:    height is 5' 8\" (1.727 m) and weight is 377 lb (171 kg) (abnormal). Her oral temperature is 97 °F (36.1 °C). Her blood pressure is 139/71 and her pulse is 64. Her respiration is 19 and oxygen saturation is 99%. Physical Exam  Vitals and nursing note reviewed. Constitutional:       General: She is not in acute distress. Appearance: She is well-developed. She is obese. She is not ill-appearing, toxic-appearing or diaphoretic. HENT:      Head: Normocephalic and atraumatic. Right Ear: External ear normal.      Left Ear: External ear normal.      Nose: Nose normal.   Eyes:      Conjunctiva/sclera: Conjunctivae normal.   Neck:      Trachea: No tracheal deviation. Cardiovascular:      Rate and Rhythm: Normal rate and regular rhythm. Heart sounds: Normal heart sounds. No murmur heard. No friction rub. No gallop. Pulmonary:      Effort: Pulmonary effort is normal. No tachypnea or respiratory distress. Breath sounds: Normal breath sounds. No wheezing, rhonchi or rales. Comments: Lung sounds limited by body habitus, no audible wheezing noted, no tachypnea noted, patient speaking in full sentences  Abdominal:      General: Bowel sounds are normal.      Palpations: Abdomen is soft. Tenderness: There is no abdominal tenderness. Musculoskeletal:         General: Normal range of motion. Cervical back: Neck supple. Right lower leg: Tenderness present. Edema present. Left lower leg: Tenderness present. Edema present. Skin:     General: Skin is warm and dry. Capillary Refill: Capillary refill takes less than 2 seconds. Neurological:      Mental Status: She is alert and oriented to person, place, and time.       Motor: No abnormal muscle tone. DIFFERENTIAL  DIAGNOSIS     PLAN (LABS / IMAGING / EKG):  Orders Placed This Encounter   Procedures    XR CHEST PORTABLE    CBC with Auto Differential    Basic Metabolic Panel    Magnesium    Brain Natriuretic Peptide    Troponin    EKG 12 Lead    Insert peripheral IV    Saline lock IV       MEDICATIONS ORDERED:  Orders Placed This Encounter   Medications    bumetanide (BUMEX) injection 1 mg       DDX: CHF exacerbation, ACS,    Initial MDM/Plan: 59 y.o. female who presents with concerns for chest pressure, increased shortness of breath. At time of initial examination patient was in no active distress, her vital signs are stable although she is noted to have a blood pressure of 171/74, she is afebrile, respirations in the 20s, saturating at % on the monitor during exam.  Patient is talking full sentences, making jokes. Initial impression is mild CHF exacerbation, will plan on chest x-ray, CBC, BMP, EKG, troponins. Reassess. DIAGNOSTIC RESULTS / EMERGENCY DEPARTMENT COURSE / MDM     LABS:  Labs Reviewed   CBC WITH AUTO DIFFERENTIAL - Abnormal; Notable for the following components:       Result Value    RBC 3.52 (*)     Hemoglobin 10.1 (*)     Hematocrit 32.2 (*)     RDW 16.4 (*)     Immature Granulocytes 1 (*)     All other components within normal limits   BASIC METABOLIC PANEL - Abnormal; Notable for the following components:    BUN 22 (*)     Creatinine 1.31 (*)     Calcium 8.4 (*)     Chloride 108 (*)     GFR Non- 42 (*)     GFR  50 (*)     All other components within normal limits   MAGNESIUM   BRAIN NATRIURETIC PEPTIDE   TROPONIN   TROPONIN         RADIOLOGY:  XR CHEST PORTABLE    Result Date: 9/7/2022  EXAMINATION: ONE XRAY VIEW OF THE CHEST 9/7/2022 6:20 pm COMPARISON: 06/13/2022 HISTORY: ORDERING SYSTEM PROVIDED HISTORY: shortness of breath, hx CHF TECHNOLOGIST PROVIDED HISTORY: shortness of breath, hx CHF FINDINGS: Cardiomegaly. Pulmonary vascular congestion with bilateral perihilar ground-glass opacity and pulmonary vasculature indistinctness. No definite pleural effusion. No pneumothorax. Mediastinal contours normal.  No acute osseous abnormality. Anterior cervical spinal fixation hardware redemonstrated. Stable cardiomegaly with pulmonary vascular congestion and hydrostatic edema in keeping with congestive heart failure. EKG  EKG Interpretation    Interpreted by me    Rhythm: normal sinus   Rate: normal  Axis: normal  Ectopy: none  Conduction: normal  ST Segments: no acute change  T Waves: no acute change  Q Waves: none    Clinical Impression: no acute changes and normal EKG    All EKG's are interpreted by the Emergency Department Physician who either signs or Co-signs this chart in the absence of a cardiologist.    EMERGENCY DEPARTMENT COURSE:  ED Course as of 09/08/22 0435   Wed Sep 07, 2022   1830 Hemoglobin Quant(!): 10.1 [JS]   1903 Pro-BNP: 294 [JS]   1903 Troponin, High Sensitivity: 13 [JS]   2031 IMPRESSION:  Stable cardiomegaly with pulmonary vascular congestion and hydrostatic edema  in keeping with congestive heart failure. [JS]   8226 Basic Metabolic Panel(!):    Glucose, Random 78   BUN,BUNPL 22(!)   Creatinine 1.31(!)   CALCIUM, SERUM, 622118 8.4(!)   Sodium 141   Potassium 4.2   Chloride 108(!)   CO2 23   Anion Gap 10   GFR Non- 42(!)   GFR  50(!)   GFR Comment      [JS]   2237 Troponin, High Sensitivity: 13  Findings noted to be stable. Patient's blood pressure has improved without any intervention. She was given 1 mg of Bumex around 2134. Patient is very well-appearing, will plan for discharge home at this time. Patient informed she can double up on her Bumex dosing for the next 5 days and then follow-up with her PCP or cardiologist, which ever when she can see first.  Strict return precautions given. Follow up plans and ER return precautions discussed.  Patient verbalized understanding and had no further questions at time of discharge. [JS]      ED Course User Index  [JS] Madi Leblanc DO       PROCEDURES:  None    CONSULTS:  None    CRITICAL CARE:  Please see attending note    FINAL IMPRESSION      1. Acute on chronic combined systolic and diastolic congestive heart failure [facility])          DISPOSITION / PLAN     DISPOSITION Decision To Discharge 09/07/2022 10:37:34 PM    PATIENTREFERRED TO:  Kelly Smith, APRN - CNP  [address]  [address]  [phone]    Schedule an appointment as soon as possible for a visit in 5 days  For reevaluation and follow-up.     [facility]  [address] [place] [address]  [phone]  Schedule an appointment as soon as possible for a visit in 5 days      DISCHARGE MEDICATIONS:  Discharge Medication List as of 9/7/2022 10:37 PM          Nelia Marroquin DO  EmergencyMedicine Resident    (Please note that portions of this note were completed with a voice recognition program.  Efforts were made to edit the dictations but occasionally words are mis-transcribed.)       Madi Leblanc DO  Resident  09/08/22 9310

## 2022-09-08 LAB
EKG ATRIAL RATE: 73 BPM
EKG P AXIS: 73 DEGREES
EKG P-R INTERVAL: 150 MS
EKG Q-T INTERVAL: 434 MS
EKG QRS DURATION: 120 MS
EKG QTC CALCULATION (BAZETT): 478 MS
EKG R AXIS: -24 DEGREES
EKG T AXIS: 61 DEGREES
EKG VENTRICULAR RATE: 73 BPM

## 2022-09-08 PROCEDURE — 93010 ELECTROCARDIOGRAM REPORT: CPT | Performed by: INTERNAL MEDICINE

## 2022-09-08 NOTE — ED NOTES
Spoke with lab who states they didn't receive the repeat trop and that it may be because the tube systems were malfunctioning.  Writer re enrique Palacios and sent      Jasson Lancaster RN  09/07/22 3005

## 2022-09-08 NOTE — ED PROVIDER NOTES
Cardiac work-up, BNP, diuretics, reassess    No notes of EC Admission Criteria type on file.     EKG Interpretation  EKG Interpretation    Interpreted by emergency department physician    Rhythm: normal sinus   Rate: normal  Axis: normal  Ectopy: none  Conduction: LVH with QRS widening  ST Segments: Repolarization  T Waves: Repolarization consistent with LVH  Q Waves: none    EKG  Impression: Abnormal EKG, unchanged    Gema Molina MD    Interpreted by me      Desiree Lamb MD, Maureen Kemp  Attending Emergency Physician        Gema Molina MD  09/08/22 6617

## 2022-09-08 NOTE — DISCHARGE INSTRUCTIONS
Thank you for visiting [facility] Emergency Department. You were seen today for concerns of leg swelling and increased shortness of breath. Your work-up showed some signs consistent with congestive heart failure. We gave you a dose of your Bumex via IV IV while you were here. You are stable for discharge at this time. Please take your Bumex twice daily for the next 5 days. You need to call Ardia Gottron, APRN - CNP or your cardiologist to make an appointment as directed for follow up. We recommend following up in the next 5 to 7 days for reevaluation and repeat lab work if needed. You can follow-up with whichever one of these can see you first.    Should you have any questions regarding your care or further treatment, please call [facility] Emergency Department at [phone]. PLEASE RETURN TO THE ED IMMEDIATELY for worsening symptoms, or if you develop any concerning symptoms such as: high fever not relieved by tylenol and/or motrin, chills, shortness of breath, chest pain, persistent nausea and/or vomiting, numbness, weakness or tingling in the arms or legs or change in color of the extremities, changes in mental status, persistent headache, blurry vision, inability to urinate, unable to follow up with your physician, or other any other  Care or concern.

## 2022-09-09 ENCOUNTER — TELEPHONE (OUTPATIENT)
Dept: PRIMARY CARE CLINIC | Age: 57
End: 2022-09-09

## 2022-09-09 NOTE — TELEPHONE ENCOUNTER
Patient called the office to notify Melyssa Geronimo that she was seen in the ER yesterday due to shortness of breath and trouble breathing. She wanted to ask Melyssa Geronimo should she return to the ER. Patient was advised Melyssa Geronimo is out of the office today and due to her symptoms, patient was advised to return to the ER.

## 2022-10-03 ENCOUNTER — OFFICE VISIT (OUTPATIENT)
Dept: PULMONOLOGY | Age: 57
End: 2022-10-03
Payer: MEDICARE

## 2022-10-03 VITALS
SYSTOLIC BLOOD PRESSURE: 120 MMHG | HEART RATE: 68 BPM | WEIGHT: 293 LBS | HEIGHT: 68 IN | RESPIRATION RATE: 16 BRPM | DIASTOLIC BLOOD PRESSURE: 66 MMHG | BODY MASS INDEX: 44.41 KG/M2 | OXYGEN SATURATION: 97 %

## 2022-10-03 DIAGNOSIS — U07.1 COVID-19 VIRUS INFECTION: ICD-10-CM

## 2022-10-03 DIAGNOSIS — E66.01 CLASS 3 SEVERE OBESITY DUE TO EXCESS CALORIES WITH SERIOUS COMORBIDITY AND BODY MASS INDEX (BMI) OF 50.0 TO 59.9 IN ADULT (HCC): ICD-10-CM

## 2022-10-03 DIAGNOSIS — J45.50 SEVERE PERSISTENT ASTHMATIC BRONCHITIS WITHOUT COMPLICATION: ICD-10-CM

## 2022-10-03 DIAGNOSIS — J45.30 MILD PERSISTENT ASTHMA WITHOUT COMPLICATION: ICD-10-CM

## 2022-10-03 DIAGNOSIS — R06.09 POST-COVID-19 SYNDROME MANIFESTING AS CHRONIC DYSPNEA: ICD-10-CM

## 2022-10-03 DIAGNOSIS — I42.8 NON-ISCHEMIC CARDIOMYOPATHY (HCC): ICD-10-CM

## 2022-10-03 DIAGNOSIS — J42 CHRONIC BRONCHITIS, UNSPECIFIED CHRONIC BRONCHITIS TYPE (HCC): ICD-10-CM

## 2022-10-03 DIAGNOSIS — U09.9 POST-COVID-19 SYNDROME MANIFESTING AS CHRONIC FATIGUE: Primary | ICD-10-CM

## 2022-10-03 DIAGNOSIS — U09.9 POST-COVID-19 SYNDROME MANIFESTING AS CHRONIC DYSPNEA: ICD-10-CM

## 2022-10-03 DIAGNOSIS — G93.32 POST-COVID-19 SYNDROME MANIFESTING AS CHRONIC FATIGUE: Primary | ICD-10-CM

## 2022-10-03 DIAGNOSIS — Z76.0 MEDICATION REFILL: ICD-10-CM

## 2022-10-03 PROCEDURE — 1036F TOBACCO NON-USER: CPT | Performed by: INTERNAL MEDICINE

## 2022-10-03 PROCEDURE — 99214 OFFICE O/P EST MOD 30 MIN: CPT | Performed by: INTERNAL MEDICINE

## 2022-10-03 PROCEDURE — G8427 DOCREV CUR MEDS BY ELIG CLIN: HCPCS | Performed by: INTERNAL MEDICINE

## 2022-10-03 PROCEDURE — 3017F COLORECTAL CA SCREEN DOC REV: CPT | Performed by: INTERNAL MEDICINE

## 2022-10-03 PROCEDURE — 3023F SPIROM DOC REV: CPT | Performed by: INTERNAL MEDICINE

## 2022-10-03 PROCEDURE — G8417 CALC BMI ABV UP PARAM F/U: HCPCS | Performed by: INTERNAL MEDICINE

## 2022-10-03 PROCEDURE — G8484 FLU IMMUNIZE NO ADMIN: HCPCS | Performed by: INTERNAL MEDICINE

## 2022-10-03 RX ORDER — ALBUTEROL SULFATE 90 UG/1
2 AEROSOL, METERED RESPIRATORY (INHALATION) EVERY 4 HOURS PRN
Qty: 3 EACH | Refills: 3 | Status: SHIPPED | OUTPATIENT
Start: 2022-10-03 | End: 2023-01-01

## 2022-10-03 RX ORDER — MONTELUKAST SODIUM 10 MG/1
10 TABLET ORAL NIGHTLY
Qty: 90 TABLET | Refills: 3 | Status: SHIPPED | OUTPATIENT
Start: 2022-10-03

## 2022-10-03 RX ORDER — BUDESONIDE AND FORMOTEROL FUMARATE DIHYDRATE 160; 4.5 UG/1; UG/1
2 AEROSOL RESPIRATORY (INHALATION) 2 TIMES DAILY
Qty: 3 EACH | Refills: 3 | Status: SHIPPED | OUTPATIENT
Start: 2022-10-03

## 2022-10-03 ASSESSMENT — ENCOUNTER SYMPTOMS
SHORTNESS OF BREATH: 1
CHEST TIGHTNESS: 1
GASTROINTESTINAL NEGATIVE: 1
EYES NEGATIVE: 1

## 2022-10-03 NOTE — PROGRESS NOTES
Subjective:      Patient ID: Terry Linder is a 59 y.o. female being seen in my clinic for   Chief Complaint   Patient presents with    Breathing Problem       HPI  Follow-up visit for post-COVID dyspnea and asthma. Since her last visit 18 months ago she states that she mostly recovered from post-COVID syndrome however she again developed COVID infection in May. Since then her symptoms have returned. Severe, disabling fatigue and shortness of breath. Very discouraged. Also her chronic pain syndrome/fibromyalgia has acted up as well. Patient on Wellbutrin, Cymbalta, and trazodone. Sleep quality is poor. Previous sleep study reportedly was negative for sleep apnea. Asthma somewhat worse as well. Reported near daily use of albuterol. Her primary care provider increased the dose of Symbicort to 160-4.5. Albuterol use is decreased. She also received a few doses of prednisone. Patient received her initial series of COVID vaccinations but no boosters. Strongly encouraged her to receive the bivalent COVID booster which is available currently. Explained to her that the ancestral virus has changed dramatically (multiple omicron subvariance) and the immunity induced by previous infection/vaccination is not durable. Patient seems receptive. Scheduled for flu shot end of month. Follows with [place] [facility] for systolic heart failure. Reportedly under good control. Monitors fluid intake. Current asthma medications include Symbicort 160-4.5, montelukast 10 mg nightly, and albuterol HFA as needed (2-3 times daily.)    Review of Systems   Constitutional: Negative. HENT: Negative. Eyes: Negative. Respiratory:  Positive for chest tightness and shortness of breath. Cardiovascular: Negative. Gastrointestinal: Negative. Musculoskeletal:  Positive for arthralgias, gait problem and myalgias. Psychiatric/Behavioral:  Positive for decreased concentration and sleep disturbance. All other systems reviewed and are negative.     Objective:     Vitals:    10/03/22 1340   BP: 120/66   Site: Right Lower Arm   Position: Sitting   Cuff Size: Large Adult   Pulse: 68   Resp: 16   SpO2: 97%  Comment: room air at rest   Weight: (!) 370 lb (167.8 kg)   Height: 5' 8\" (1.727 m)     Current Outpatient Medications   Medication Sig Dispense Refill    budesonide-formoterol (SYMBICORT) 160-4.5 MCG/ACT AERO Inhale 2 puffs into the lungs 2 times daily 3 each 3    albuterol sulfate HFA (PROVENTIL;VENTOLIN;PROAIR) 108 (90 Base) MCG/ACT inhaler Inhale 2 puffs into the lungs every 4 hours as needed for Shortness of Breath 3 each 3    montelukast (SINGULAIR) 10 MG tablet Take 1 tablet by mouth nightly 90 tablet 3    Handicap Placard MISC by Does not apply route 5 year 1 each 0    tiZANidine (ZANAFLEX) 4 MG tablet TAKE 1 TABLET BY MOUTH THREE TIMES DAILY AS NEEDED FOR PAIN 90 tablet 0    buPROPion (WELLBUTRIN XL) 150 MG extended release tablet Take 1 tablet by mouth every morning Take with 300mg for total of 450 mg 90 tablet 1    Vitamin D, Ergocalciferol, 03402 units CAPS Take 50,000 Units by mouth once a week 5 capsule 1    dextromethorphan-guaiFENesin (ROBITUSSIN-DM)  MG/5ML syrup Take 5 mLs by mouth every 4 hours as needed for Cough 473 mL 1    pantoprazole (PROTONIX) 40 MG tablet Take 1 tablet by mouth daily 90 tablet 1    metoprolol tartrate (LOPRESSOR) 50 MG tablet Take 1 tablet by mouth 2 times daily 180 tablet 1    bumetanide (BUMEX) 1 MG tablet Take 2 tablets by mouth daily 180 tablet 0    DULoxetine (CYMBALTA) 60 MG extended release capsule Take 2 capsules by mouth daily 180 capsule 1    allopurinol (ZYLOPRIM) 300 MG tablet Take 1 tablet by mouth daily 90 tablet 1    hydrALAZINE (APRESOLINE) 25 MG tablet Take 1 tablet by mouth 2 times daily 60 tablet 0    isosorbide dinitrate (ISORDIL) 10 MG tablet Take 1 tablet by mouth 2 times daily 180 tablet 1    buPROPion (WELLBUTRIN XL) 300 MG extended release tablet Take 1 tablet by mouth every morning 90 tablet 1    spironolactone (ALDACTONE) 25 MG tablet Take 1 tablet by mouth daily 90 tablet 1    oxybutynin (DITROPAN) 5 MG tablet Take 1 tablet by mouth 2 times daily 180 tablet 1    traZODone (DESYREL) 150 MG tablet Take 1 tablet by mouth nightly 90 tablet 1    atorvastatin (LIPITOR) 80 MG tablet Take 1 tablet by mouth daily 90 tablet 1    polyethylene glycol (GLYCOLAX) 17 g packet polyethylene glycol 3350 17 gram/dose oral powder 527 g 0    aspirin 81 MG EC tablet Take 81 mg by mouth      sucralfate (CARAFATE) 1 GM tablet Take 1 tablet in AM, 2 tablet in evening daily 90 tablet 3    albuterol (PROVENTIL) (2.5 MG/3ML) 0.083% nebulizer solution Take 3 mLs by nebulization every 6 hours as needed for Wheezing 120 each 3    levothyroxine (SYNTHROID) 50 MCG tablet Take 1 tablet by mouth Daily 30 tablet 0    Respiratory Therapy Supplies (NEBULIZER COMPRESSOR) KIT Provide insurance covered nebulizer, use daily as needed 1 kit 0     No current facility-administered medications for this visit. Physical Exam  Vitals and nursing note reviewed. Constitutional:       Appearance: She is well-developed. She is obese. Comments: Patient in wheeled walker. Fleta Hammersmith:      Head: Normocephalic. Right Ear: Tympanic membrane, ear canal and external ear normal.      Left Ear: Tympanic membrane, ear canal and external ear normal.      Nose: Nose normal. No congestion. Mouth/Throat:      Mouth: Mucous membranes are moist.      Pharynx: Oropharynx is clear. No oropharyngeal exudate. Comments: Large tongue , low hanging soft palate and large uvula. Overall pharyngeal orifice moderately decreased    Eyes:      General: No scleral icterus. Conjunctiva/sclera: Conjunctivae normal.   Neck:      Thyroid: No thyromegaly. Vascular: No JVD. Trachea: No tracheal deviation. Cardiovascular:      Rate and Rhythm: Normal rate and regular rhythm.       Heart sounds: Normal heart sounds. No murmur heard. No gallop. Pulmonary:      Effort: Pulmonary effort is normal. No respiratory distress. Breath sounds: No wheezing or rales. Chest:      Chest wall: No tenderness. Abdominal:      Palpations: Abdomen is soft. Tenderness: There is no abdominal tenderness. Musculoskeletal:      Cervical back: Neck supple. Right lower leg: No edema. Left lower leg: No edema. Lymphadenopathy:      Cervical: No cervical adenopathy. Skin:     General: Skin is warm and dry. Neurological:      Mental Status: She is alert and oriented to person, place, and time.      Wt Readings from Last 3 Encounters:   10/03/22 (!) 370 lb (167.8 kg)   09/07/22 (!) 377 lb (171 kg)   07/08/22 (!) 326 lb (147.9 kg)     Results for orders placed or performed during the hospital encounter of 09/07/22   CBC with Auto Differential   Result Value Ref Range    WBC 9.5 3.5 - 11.3 k/uL    RBC 3.52 (L) 3.95 - 5.11 m/uL    Hemoglobin 10.1 (L) 11.9 - 15.1 g/dL    Hematocrit 32.2 (L) 36.3 - 47.1 %    MCV 91.5 82.6 - 102.9 fL    MCH 28.7 25.2 - 33.5 pg    MCHC 31.4 28.4 - 34.8 g/dL    RDW 16.4 (H) 11.8 - 14.4 %    Platelets 373 798 - 090 k/uL    MPV 10.0 8.1 - 13.5 fL    NRBC Automated 0.0 0.0 per 100 WBC    Seg Neutrophils 64 36 - 65 %    Lymphocytes 27 24 - 43 %    Monocytes 5 3 - 12 %    Eosinophils % 3 1 - 4 %    Basophils 0 0 - 2 %    Immature Granulocytes 1 (H) 0 %    Segs Absolute 6.13 1.50 - 8.10 k/uL    Absolute Lymph # 2.52 1.10 - 3.70 k/uL    Absolute Mono # 0.44 0.10 - 1.20 k/uL    Absolute Eos # 0.29 0.00 - 0.44 k/uL    Basophils Absolute 0.03 0.00 - 0.20 k/uL    Absolute Immature Granulocyte 0.07 0.00 - 0.30 k/uL    RBC Morphology ANISOCYTOSIS PRESENT    Basic Metabolic Panel   Result Value Ref Range    Glucose 78 70 - 99 mg/dL    BUN 22 (H) 6 - 20 mg/dL    Creatinine 1.31 (H) 0.50 - 0.90 mg/dL    Calcium 8.4 (L) 8.6 - 10.4 mg/dL    Sodium 141 135 - 144 mmol/L    Potassium 4.2 3.7 - 5.3 mmol/L    Chloride 108 (H) 98 - 107 mmol/L    CO2 23 20 - 31 mmol/L    Anion Gap 10 9 - 17 mmol/L    GFR Non-African American 42 (L) >60 mL/min    GFR African American 50 (L) >60 mL/min    GFR Comment         Magnesium   Result Value Ref Range    Magnesium 2.0 1.6 - 2.6 mg/dL   Brain Natriuretic Peptide   Result Value Ref Range    Pro- <300 pg/mL   Troponin   Result Value Ref Range    Troponin, High Sensitivity 13 0 - 14 ng/L   Troponin   Result Value Ref Range    Troponin, High Sensitivity 13 0 - 14 ng/L   EKG 12 Lead   Result Value Ref Range    Ventricular Rate 73 BPM    Atrial Rate 73 BPM    P-R Interval 150 ms    QRS Duration 120 ms    Q-T Interval 434 ms    QTc Calculation (Bazett) 478 ms    P Axis 73 degrees    R Axis -24 degrees    T Axis 61 degrees       :      1. Post-COVID-19 syndrome manifesting as chronic fatigue    2. Class 3 severe obesity due to excess calories with serious comorbidity and body mass index (BMI) of 50.0 to 59.9 in adult ([facility]    3. COVID-19 virus infection    4. Non-ischemic cardiomyopathy ([facility]    5. Severe persistent asthmatic bronchitis without complication    6. Chronic bronchitis, unspecified chronic bronchitis type ([facility]    7. Medication refill    8. Mild persistent asthma without complication    9.  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, insomnia type (HCC)    Opioid dependence, uncomplicated (HCC)    Sacroiliac joint dysfunction of both sides    Chronic pain disorder    Bulge of cervical disc without myelopathy    DDD (degenerative disc disease), lumbar    Failed back syndrome of cervical spine    Chest pain    Class 3 severe obesity due to excess calories with serious comorbidity and body mass index (BMI) of 45.0 to 49.9 in adult [facility])    Cervical spondylosis with radiculopathy    Status post cervical spinal fusion    History of lumbar fusion    Lumbar radiculopathy    Morbid obesity with BMI of 50.0-59.9, adult (HCC)    Chronic pain of both knees    Myalgia    Chronic use of opiate drug for therapeutic purpose    COVID-19    Hypertension    Gout    GERD (gastroesophageal reflux disease)    Acute on chronic combined systolic and diastolic congestive heart failure (HCC)    Other proteinuria    Nausea and vomiting    Chronic pain    PTSD (post-traumatic stress disorder)    Hyperuricemia    Mild persistent asthma without complication    CHF (congestive heart failure) ([facility])    Chronic bronchitis, unspecified chronic bronchitis type ([facility])    Major depressive disorder, recurrent severe without psychotic features ([facility] [address]    Headache    Suspected sleep apnea    Vitamin D deficiency    CKD (chronic kidney disease) stage 3, GFR 30-59 ml/min ([facility])    COPD exacerbation ([facility])    CRP elevated    High serum fibrinogen         Plan:      Refilled asthma medications. Discussed strategies for management of asthma including escalation and de-escalation of therapy. Discussed treatment strategies strategies for long COVID syndrome. Breathing exercises. Refer to pulmonary rehab program for long COVID dyspnea. [place]-analysis shows improvement in exercise capacity, quality of life, and dyspnea score. Weight loss. Will help asthma control. Encouraged omicron booster and flu shot. Return in 6 months.   Orders Placed This Encounter   Medications    budesonide-formoterol (SYMBICORT) 160-4.5 MCG/ACT AERO     Sig: Inhale 2 puffs into the lungs 2 times daily     Dispense:  3 each     Refill:  3    albuterol sulfate HFA (PROVENTIL;VENTOLIN;PROAIR) 108 (90 Base) MCG/ACT inhaler     Sig: Inhale 2 puffs into the lungs every 4 hours as needed for Shortness of Breath     Dispense:  3 each     Refill:  3    montelukast (SINGULAIR) 10 MG tablet     Sig: Take 1 tablet by mouth nightly     Dispense:  90 tablet     Refill:  3     Orders Placed This Encounter   Procedures    [address]     Referral Priority:   Routine     Referral Type:   Eval and Treat     Referral Reason:   Specialty Services Required     Requested Specialty:   Rehabilitation     Number of Visits Requested:   1     Return in about 6 months (around 4/3/2023).        Electronically signed by Frantz Lopez DO on 10/3/2022at 2:16 PM

## 2022-10-05 DIAGNOSIS — E55.9 VITAMIN D DEFICIENCY: Primary | ICD-10-CM

## 2022-10-05 NOTE — TELEPHONE ENCOUNTER
Health Maintenance   Topic Date Due    DTaP/Tdap/Td vaccine (1 - Tdap) Never done    Cervical cancer screen  Never done    Shingles vaccine (1 of 2) Never done    COVID-19 Vaccine (3 - Booster for Pfizer series) 07/26/2021    Breast cancer screen  12/17/2021    Flu vaccine (1) 08/01/2022    Lipids  10/15/2022    A1C test (Diabetic or Prediabetic)  05/24/2023    Depression Monitoring  07/14/2023    Annual Wellness Visit (AWV)  07/15/2023    Colorectal Cancer Screen  06/24/2031    Pneumococcal 0-64 years Vaccine  Completed    Hepatitis C screen  Completed    HIV screen  Completed    Hepatitis A vaccine  Aged Out    Hepatitis B vaccine  Aged Out    Hib vaccine  Aged Out    Meningococcal (ACWY) vaccine  Aged Out             (applicable per patient's age: Cancer Screenings, Depression Screening, Fall Risk Screening, Immunizations)    Hemoglobin A1C (%)   Date Value   05/24/2022 5.9   02/23/2021 5.8   11/19/2020 6.0     LDL Cholesterol (mg/dL)   Date Value   10/15/2021 115     AST (U/L)   Date Value   06/14/2022 11     ALT (U/L)   Date Value   06/14/2022 10     BUN (mg/dL)   Date Value   09/07/2022 22 (H)      (goal A1C is < 7)   (goal LDL is <100) need 30-50% reduction from baseline     BP Readings from Last 3 Encounters:   10/03/22 120/66   09/07/22 139/71   07/08/22 129/69    (goal /80)      All Future Testing planned in [facility]:  Lab Frequency Next Occurrence   Baseline Diagnostic Sleep Study Once 10/14/2021   DRUG SCREEN, PAIN Once 12/06/2021   Sleep Study with PAP Titration Once 05/18/2022   CHERY DIGITAL SCREEN SELF REFERRAL W OR WO CAD BILATERAL Once 05/18/2022   TSH With Reflex Ft4 Once 06/25/2022       Next Visit Date:  Future Appointments   Date Time Provider Christiano Wren   11/28/2022  1:30 PM ASA Plasencia - CNP [facility]   4/3/2023  1:00 PM Javi Galvez DO Resp Spec [facility]            Patient Active Problem List:     Low back pain     Therapeutic opioid-induced constipation (OIC)     Spondylosis of lumbar region without myelopathy or radiculopathy     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, insomnia type (HCC)     Opioid dependence, uncomplicated (HCC)     Sacroiliac joint dysfunction of both sides     Chronic pain disorder     Bulge of cervical disc without myelopathy     DDD (degenerative disc disease), lumbar     Failed back syndrome of cervical spine     Chest pain     Class 3 severe obesity due to excess calories with serious comorbidity and body mass index (BMI) of 45.0 to 49.9 in adult [facility])     Cervical spondylosis with radiculopathy     Status post cervical spinal fusion     History of lumbar fusion     Lumbar radiculopathy     Morbid obesity with BMI of 50.0-59.9, adult (HCC)     Chronic pain of both knees     Myalgia     Chronic use of opiate drug for therapeutic purpose     COVID-19     Hypertension     Gout     GERD (gastroesophageal reflux disease)     Acute on chronic combined systolic and diastolic congestive heart failure (HCC)     Other proteinuria     Nausea and vomiting     Chronic pain     PTSD (post-traumatic stress disorder)     Hyperuricemia     Mild persistent asthma without complication     CHF (congestive heart failure) (HCC)     Chronic bronchitis, unspecified chronic bronchitis type (HCC)     Major depressive disorder, recurrent severe without psychotic features (HCC)     Headache     Suspected sleep apnea     Vitamin D deficiency     CKD (chronic kidney disease) stage 3, GFR 30-59 ml/min (HCC)     COPD exacerbation (HCC)     CRP elevated     High serum fibrinogen

## 2022-10-07 RX ORDER — ERGOCALCIFEROL 1.25 MG/1
50000 CAPSULE ORAL WEEKLY
Qty: 5 CAPSULE | Refills: 1 | Status: SHIPPED | OUTPATIENT
Start: 2022-10-07

## 2022-10-07 RX ORDER — ERGOCALCIFEROL 1.25 MG/1
CAPSULE ORAL
Qty: 5 CAPSULE | Refills: 0 | OUTPATIENT
Start: 2022-10-07

## 2022-10-07 NOTE — TELEPHONE ENCOUNTER
Please let Ren Daphne know I did refill her vitamin D, but levels need to be rechecked in the next 6 to 8 weeks to evaluate effectiveness and ensure that we are not causing vitamin D toxicity

## 2022-10-10 ENCOUNTER — TELEPHONE (OUTPATIENT)
Dept: PULMONOLOGY | Age: 57
End: 2022-10-10

## 2022-10-10 NOTE — TELEPHONE ENCOUNTER
Patient calling in wanting prednisone. She stated when she seen you on October 3, 2022 you informed her if she needs more to just call. When asked what her symptoms she replied \"it helps with her pain. \"  Please advise or place a script for prednisone.

## 2022-10-10 NOTE — TELEPHONE ENCOUNTER
Was on the phone with the patient started to informed her of Dr. Latonya Posey message as soon as I informed her he would not give prednisone for pain she hung up on me.

## 2022-10-20 NOTE — PROGRESS NOTES
Patient called to let me know that she is so sob that she can't make it out the door, she was on her way.  She is going to try and take a treatment and come a little late, if she can't make it she will call dr and if he can't get her in go to urgent care or hospital.

## 2022-10-20 NOTE — PROGRESS NOTES
Italia Arredondo called to let me know she was feeling better but that the cold is just taking her breath away. She did not feel safe to drive, she did try to contact a sister but could not get a ride. She rescheduled for Wed Oct 26 at 2pm and seems very enthusiastic about starting.

## 2022-10-26 ENCOUNTER — HOSPITAL ENCOUNTER (OUTPATIENT)
Dept: PULMONOLOGY | Age: 57
Setting detail: THERAPIES SERIES
Discharge: HOME | End: 2022-10-26
Payer: MEDICARE

## 2022-10-26 VITALS — WEIGHT: 293 LBS | BODY MASS INDEX: 57.47 KG/M2

## 2022-10-26 PROCEDURE — G0239 OTH RESP PROC, GROUP: HCPCS

## 2022-10-26 ASSESSMENT — PATIENT HEALTH QUESTIONNAIRE - PHQ9
5. POOR APPETITE OR OVEREATING: 3
1. LITTLE INTEREST OR PLEASURE IN DOING THINGS: 3
SUM OF ALL RESPONSES TO PHQ QUESTIONS 1-9: 23
7. TROUBLE CONCENTRATING ON THINGS, SUCH AS READING THE NEWSPAPER OR WATCHING TELEVISION: 2
8. MOVING OR SPEAKING SO SLOWLY THAT OTHER PEOPLE COULD HAVE NOTICED. OR THE OPPOSITE, BEING SO FIGETY OR RESTLESS THAT YOU HAVE BEEN MOVING AROUND A LOT MORE THAN USUAL: 3
6. FEELING BAD ABOUT YOURSELF - OR THAT YOU ARE A FAILURE OR HAVE LET YOURSELF OR YOUR FAMILY DOWN: 3
9. THOUGHTS THAT YOU WOULD BE BETTER OFF DEAD, OR OF HURTING YOURSELF: 0
SUM OF ALL RESPONSES TO PHQ QUESTIONS 1-9: 23
2. FEELING DOWN, DEPRESSED OR HOPELESS: 3
10. IF YOU CHECKED OFF ANY PROBLEMS, HOW DIFFICULT HAVE THESE PROBLEMS MADE IT FOR YOU TO DO YOUR WORK, TAKE CARE OF THINGS AT HOME, OR GET ALONG WITH OTHER PEOPLE: 3
SUM OF ALL RESPONSES TO PHQ9 QUESTIONS 1 & 2: 6
4. FEELING TIRED OR HAVING LITTLE ENERGY: 3
3. TROUBLE FALLING OR STAYING ASLEEP: 3

## 2022-10-26 ASSESSMENT — EXERCISE STRESS TEST
PEAK_HR: 85
PEAK_BP: 136/90
PEAK_RPE: 8

## 2022-10-26 ASSESSMENT — PULMONARY FUNCTION TESTS
FEV1/FVC: 84
FVC (LITERS): 75
FEV1 (%PREDICTED): 79

## 2022-10-26 ASSESSMENT — LIFESTYLE VARIABLES: SMOKELESS_TOBACCO: NO

## 2022-10-26 NOTE — PROGRESS NOTES
Completed pt. evaluation including 6 MWT for participation in the pulmonary rehab program. Patient is under pain management care and also under care for depression.

## 2022-10-29 DIAGNOSIS — G89.4 CHRONIC PAIN SYNDROME: ICD-10-CM

## 2022-10-31 ENCOUNTER — HOSPITAL ENCOUNTER (OUTPATIENT)
Dept: PULMONOLOGY | Age: 57
Setting detail: THERAPIES SERIES
End: 2022-10-31
Payer: MEDICARE

## 2022-10-31 RX ORDER — TIZANIDINE 4 MG/1
TABLET ORAL
Qty: 90 TABLET | Refills: 0 | Status: SHIPPED | OUTPATIENT
Start: 2022-10-31

## 2022-10-31 NOTE — TELEPHONE ENCOUNTER
Health Maintenance   Topic Date Due    DTaP/Tdap/Td vaccine (1 - Tdap) Never done    Shingles vaccine (1 of 2) Never done    COVID-19 Vaccine (3 - Booster for Pfizer series) 04/23/2021    Breast cancer screen  12/17/2021    Flu vaccine (1) 08/01/2022    Lipids  10/15/2022    A1C test (Diabetic or Prediabetic)  05/24/2023    Annual Wellness Visit (AWV)  07/15/2023    Depression Monitoring  10/26/2023    Colorectal Cancer Screen  06/24/2031    Pneumococcal 0-64 years Vaccine  Completed    Hepatitis C screen  Completed    HIV screen  Completed    Hepatitis A vaccine  Aged Out    Hib vaccine  Aged Out    Meningococcal (ACWY) vaccine  Aged Out             (applicable per patient's age: Cancer Screenings, Depression Screening, Fall Risk Screening, Immunizations)    Hemoglobin A1C (%)   Date Value   05/24/2022 5.9   02/23/2021 5.8   11/19/2020 6.0     LDL Cholesterol (mg/dL)   Date Value   10/15/2021 115     AST (U/L)   Date Value   06/14/2022 11     ALT (U/L)   Date Value   06/14/2022 10     BUN (mg/dL)   Date Value   09/07/2022 22 (H)      (goal A1C is < 7)   (goal LDL is <100) need 30-50% reduction from baseline     BP Readings from Last 3 Encounters:   10/03/22 120/66   09/07/22 139/71   07/08/22 129/69    (goal /80)      All Future Testing planned in [facility]:  Lab Frequency Next Occurrence   DRUG SCREEN, PAIN Once 12/06/2021   Sleep Study with PAP Titration Once 05/18/2022   CHERY DIGITAL SCREEN SELF REFERRAL W OR WO CAD BILATERAL Once 05/18/2022   TSH With Reflex Ft4 Once 06/25/2022   Vitamin D 25 Hydroxy Once 10/07/2022       Next Visit Date:  Future Appointments   Date Time Provider Christiano Wren   10/31/2022  1:00 PM [facility] 6 [facility]   11/2/2022  1:00 PM [facility]UL[facility] RE[facility] RM 6 [facility]   11/7/2022  1:00 PM [facility]ULM REHAB RM 6 [facility]   11/9/2022  1:00 PM [facility] PULM REHAB  6 [facility]   11/14/2022  1:00  [facility] PULM REHAB  Sarah Mayen 11/16/2022  1:00 PM [facility] PULM REHAB RM 6 [facility]   11/21/2022  1:00 PM [facility] PULM REHAB RM 6 [facility]   11/23/2022  1:00 PM [facility] PULM REHAB RM 6 [facility]   11/28/2022 12:00 PM [facility] PULM REHAB RM 6 [facility]   11/28/2022  1:30 PM Liset Crews APRN - CNP [facility]ALK IN [facility]   11/30/2022  1:00 PM [facility] PULM REHAB RM 6 [facility]   12/5/2022  1:00 PM [facility] PULM REHAB RM 6 [facility]   12/7/2022  1:00 PM [facility] PULM REHAB RM 6 [facility]   12/12/2022  1:00 PM [facility] PULM REHAB RM 6 [facility]   12/14/2022  1:00 PM [facility] PULM REHAB RM 6 [facility]   12/19/2022  1:00 PM [facility] PULM REHAB RM 6 [facility]   12/21/2022  1:00 PM [facility] PULM REHAB RM 6 [facility]   12/28/2022  1:00 PM [facility] PULM REHAB RM 6 [facility]   1/4/2023  1:00 PM [facility] PULM REHAB RM 6 [facility]   1/9/2023  1:00 PM [facility] PULM REHAB RM 6 [facility]   1/11/2023  1:00 PM [facility] PULM REHAB RM 6 [facility]   1/16/2023  1:00 PM [facility] PULM REHAB RM 6 [facility]   1/18/2023  1:00 PM [facility] PULM REHAB RM 6 [facility]   1/23/2023  1:00 PM [facility] PULM REHAB RM 6 [facility]   1/25/2023  1:00 PM [facility] PULM REHAB RM 6 [facility]   1/30/2023  1:00 PM [facility] PULM REHAB RM 6 [facility]   2/1/2023  1:00 PM [facility] PULM REHAB RM 6 [facility]   2/6/2023  1:00 PM [facility] PULM REHAB RM 6 [facility]   2/8/2023  1:00 PM [facility] PULM REHAB RM 6 [facility]   2/13/2023  1:00 PM [facility] PULM REHAB RM 6 [facility]   2/15/2023  1:00 PM [facility] PULM REHAB RM 6 [facility]   2/20/2023  1:00 PM [facility] PULM REHAB RM 6 [facility]   2/22/2023  1:00 PM [facility] PULM REHAB RM 6 [facility]   2/27/2023  1:00 PM [facility] PULM REHAB RM 6 [facility]   3/1/2023  1:00 PM [facility] PULM REHAB RM 6 [facility]   4/3/2023  1:00 PM Bj Schilling DO Resp Spec [facility]            Patient Active Problem List:     Low back pain     Therapeutic opioid-induced constipation (OIC)     Spondylosis of lumbar region without myelopathy or radiculopathy     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, insomnia type (HCC)     Opioid dependence, uncomplicated (HCC)     Sacroiliac joint dysfunction of both sides     Chronic pain disorder     Bulge of cervical disc without myelopathy     DDD (degenerative disc disease), lumbar     Failed back syndrome of cervical spine     Chest pain     Class 3 severe obesity due to excess calories with serious comorbidity and body mass index (BMI) of 45.0 to 49.9 in adult [facility])     Cervical spondylosis with radiculopathy     Status post cervical spinal fusion     History of lumbar fusion     Lumbar radiculopathy     Morbid obesity with BMI of 50.0-59.9, adult (HCC)     Chronic pain of both knees     Myalgia     Chronic use of opiate drug for therapeutic purpose     COVID-19     Hypertension     Gout     GERD (gastroesophageal reflux disease)     Acute on chronic combined systolic and diastolic congestive heart failure (HCC)     Other proteinuria     Nausea and vomiting     Chronic pain     PTSD (post-traumatic stress disorder)     Hyperuricemia     Mild persistent asthma without complication     CHF (congestive heart failure) (HCC)     Chronic bronchitis, unspecified chronic bronchitis type (HCC)     Major depressive disorder, recurrent severe without psychotic features (HCC)     Headache     Suspected sleep apnea     Vitamin D deficiency     CKD (chronic kidney disease) stage 3, GFR 30-59 ml/min (HCC)     COPD exacerbation (HCC)     CRP elevated     High serum fibrinogen

## 2022-11-07 ENCOUNTER — HOSPITAL ENCOUNTER (OUTPATIENT)
Dept: PULMONOLOGY | Age: 57
Setting detail: THERAPIES SERIES
Discharge: HOME | End: 2022-11-07
Payer: MEDICARE

## 2022-11-07 NOTE — PROGRESS NOTES
Devonte Wynn called to let us know that she had just found out the cab was not coming to get her. She really wanted to be here and excited to start so she is planning on a back up plan for wed.

## 2022-11-09 ENCOUNTER — HOSPITAL ENCOUNTER (OUTPATIENT)
Dept: PULMONOLOGY | Age: 57
Setting detail: THERAPIES SERIES
Discharge: HOME | End: 2022-11-09
Payer: MEDICARE

## 2022-11-09 NOTE — PROGRESS NOTES
Mary's ride did not show and so she was unable to attend today, she is hoping it is sorted out by next week so she can attend Monday.

## 2022-11-14 ENCOUNTER — HOSPITAL ENCOUNTER (OUTPATIENT)
Dept: PULMONOLOGY | Age: 57
Setting detail: THERAPIES SERIES
Discharge: HOME | End: 2022-11-14
Payer: MEDICARE

## 2022-11-14 DIAGNOSIS — Z76.0 MEDICATION REFILL: ICD-10-CM

## 2022-11-14 DIAGNOSIS — F43.10 PTSD (POST-TRAUMATIC STRESS DISORDER): ICD-10-CM

## 2022-11-14 NOTE — PROGRESS NOTES
Annetta Chew called again to let us know the cab had not shown up again. She was on the phone with them and was going to try and get here before 2 so that we didn't have to reschedule again as this is the 3rd time the cab has not shown up. Annetta Chew stated that she was very distressed about the situation because she really feels this will be valueable to her and knows this is the first step in getting better but is having a hard time getting here to exercise.

## 2022-11-15 RX ORDER — TRAZODONE HYDROCHLORIDE 150 MG/1
150 TABLET ORAL NIGHTLY
Qty: 90 TABLET | Refills: 0 | Status: SHIPPED | OUTPATIENT
Start: 2022-11-15

## 2022-11-15 NOTE — TELEPHONE ENCOUNTER
Health Maintenance   Topic Date Due    DTaP/Tdap/Td vaccine (1 - Tdap) Never done    Shingles vaccine (1 of 2) Never done    COVID-19 Vaccine (3 - Booster for Pfizer series) 04/23/2021    Breast cancer screen  12/17/2021    Flu vaccine (1) 08/01/2022    Lipids  10/15/2022    A1C test (Diabetic or Prediabetic)  05/24/2023    Annual Wellness Visit (AWV)  07/15/2023    Depression Monitoring  10/26/2023    Colorectal Cancer Screen  06/24/2031    Pneumococcal 0-64 years Vaccine  Completed    Hepatitis C screen  Completed    HIV screen  Completed    Hepatitis A vaccine  Aged Out    Hib vaccine  Aged Out    Meningococcal (ACWY) vaccine  Aged Out             (applicable per patient's age: Cancer Screenings, Depression Screening, Fall Risk Screening, Immunizations)    Hemoglobin A1C (%)   Date Value   05/24/2022 5.9   02/23/2021 5.8   11/19/2020 6.0     LDL Cholesterol (mg/dL)   Date Value   10/15/2021 115     AST (U/L)   Date Value   06/14/2022 11     ALT (U/L)   Date Value   06/14/2022 10     BUN (mg/dL)   Date Value   09/07/2022 22 (H)      (goal A1C is < 7)   (goal LDL is <100) need 30-50% reduction from baseline     BP Readings from Last 3 Encounters:   10/03/22 120/66   09/07/22 139/71   07/08/22 129/69    (goal /80)      All Future Testing planned in [facility]:  Lab Frequency Next Occurrence   DRUG SCREEN, PAIN Once 12/06/2021   Sleep Study with PAP Titration Once 05/18/2022   CHERY DIGITAL SCREEN SELF REFERRAL W OR WO CAD BILATERAL Once 05/18/2022   TSH With Reflex Ft4 Once 06/25/2022   Vitamin D 25 Hydroxy Once 10/07/2022       Next Visit Date:  Future Appointments   Date Time Provider Christiano Wren   11/16/2022  1:00 PM [facility] 6 [facility]   11/21/2022  1:00 PM [facility] 6 [facility]   11/23/2022  1:00 PM [facility] 6 [facility]   11/28/2022 12:00 PM ST [facility] 6 [facility]   11/28/2022  1:30 PM ASA Dougherty - [facility]   11/30/2022  1:00 PM [facility] RM 6 [facility]   12/5/2022  1:00 PM [facility] RM 6 [facility]   12/7/2022  1:00 PM [facility] RM 6 [facility]   12/12/2022  1:00 PM [facility] RM 6 [facility]   12/14/2022  1:00 PM [facility] RM 6 [facility]   12/19/2022  1:00 PM [facility] RM 6 [facility]   12/21/2022  1:00 PM [facility] RE[facility] RM 6 [facility]   12/28/2022  1:00 PM [facility] RM 6 [facility]   1/4/2023  1:00 PM [facility] RM 6 [facility]   1/9/2023  1:00 PM [facility] RM 6 [facility]   1/11/2023  1:00 PM [facility] RM 6 [facility]   1/16/2023  1:00 PM [facility] RM 6 [facility]   1/18/2023  1:00 PM [facility] RM 6 [facility]   1/23/2023  1:00 PM [facility] RM 6 [facility]   1/25/2023  1:00 PM [facility] RM 6 [facility]   1/30/2023  1:00 PM [facility] RM 6 [facility]   2/1/2023  1:00 PM [facility] 6 [facility]   2/6/2023  1:00 PM [facility] 6 [facility]   2/8/2023  1:00 PM [facility] 6 [facility]   2/13/2023  1:00 PM [facility] 6 [facility]   2/15/2023  1:00 PM [facility] RM 6 [facility]   2/20/2023  1:00 PM [facility] RM 6 [facility]   2/22/2023  1:00 PM [facility] RM 6 [facility]   2/27/2023  1:00 PM [facility] RM 6 [facility]   3/1/2023  1:00 PM [facility] RM 6 [facility]   3/6/2023  1:00 PM [facility] RM 6 [facility]   3/8/2023  1:00 PM [facility] RM 6 [facility]   3/13/2023  1:00 PM [facility] RM 6 [facility]   3/15/2023  1:00 PM [facility] RM 6 [facility]   4/3/2023  1:00 PM Leroy Espinosa, DO Resp Spec [facility]   7/17/2023 12:30 PM Aurora Duty, APRN - CNP [facility]            Patient Active Problem List:     Low back pain     Therapeutic opioid-induced constipation (OIC)     Spondylosis of lumbar region without myelopathy or radiculopathy     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, insomnia type (HCC)     Opioid dependence, uncomplicated (HCC)     Sacroiliac joint dysfunction of both sides     Chronic pain disorder     Bulge of cervical disc without myelopathy     DDD (degenerative disc disease), lumbar     Failed back syndrome of cervical spine     Chest pain     Class 3 severe obesity due to excess calories with serious comorbidity and body mass index (BMI) of 45.0 to 49.9 in adult [facility])     Cervical spondylosis with radiculopathy     Status post cervical spinal fusion     History of lumbar fusion     Lumbar radiculopathy     Morbid obesity with BMI of 50.0-59.9, adult (HCC)     Chronic pain of both knees     Myalgia     Chronic use of opiate drug for therapeutic purpose     COVID-19     Hypertension     Gout     GERD (gastroesophageal reflux disease)     Acute on chronic combined systolic and diastolic congestive heart failure (HCC)     Other proteinuria     Nausea and vomiting     Chronic pain     PTSD (post-traumatic stress disorder)     Hyperuricemia     Mild persistent asthma without complication     CHF (congestive heart failure) (HCC)     Chronic bronchitis, unspecified chronic bronchitis type (HCC)     Major depressive disorder, recurrent severe without psychotic features (HCC)     Headache     Suspected sleep apnea     Vitamin D deficiency     CKD (chronic kidney disease) stage 3, GFR 30-59 ml/min (HCC)     COPD exacerbation (HCC)     CRP elevated     High serum fibrinogen

## 2022-11-16 ENCOUNTER — HOSPITAL ENCOUNTER (OUTPATIENT)
Dept: PULMONOLOGY | Age: 57
Setting detail: THERAPIES SERIES
Discharge: HOME | End: 2022-11-16
Payer: MEDICARE

## 2022-11-16 ENCOUNTER — TELEPHONE (OUTPATIENT)
Dept: PRIMARY CARE CLINIC | Age: 57
End: 2022-11-16

## 2022-11-16 VITALS — WEIGHT: 293 LBS | BODY MASS INDEX: 57.02 KG/M2

## 2022-11-16 PROCEDURE — G0239 OTH RESP PROC, GROUP: HCPCS

## 2022-11-16 NOTE — TELEPHONE ENCOUNTER
Pt calls in and complaining of a rash o stomach. Does she need to be seen or can you send a scrip? Rash present for over 6 weeks.

## 2022-11-21 ENCOUNTER — HOSPITAL ENCOUNTER (OUTPATIENT)
Dept: PULMONOLOGY | Age: 57
Setting detail: THERAPIES SERIES
Discharge: HOME | End: 2022-11-21
Payer: MEDICARE

## 2022-11-21 NOTE — PROGRESS NOTES
Lorie Arellano called to let us know that her ride did not show up. She is working on going to the management that called last week to make sure she can make it Wed. I was in the room with her last Wed when the called and confirmed that she would need a ride mon and wed and that she did indeed make it this time. She did try and find another ride but was unable to get one.

## 2022-11-23 ENCOUNTER — HOSPITAL ENCOUNTER (OUTPATIENT)
Dept: PULMONOLOGY | Age: 57
Setting detail: THERAPIES SERIES
Discharge: HOME | End: 2022-11-23
Payer: MEDICARE

## 2022-11-23 ASSESSMENT — PULMONARY FUNCTION TESTS
FEV1 (%PREDICTED): 79
FVC (LITERS): 75
FEV1/FVC: 84

## 2022-11-23 ASSESSMENT — EXERCISE STRESS TEST
PEAK_RPE: 8
PEAK_BP: 136/90
PEAK_HR: 85

## 2022-11-23 ASSESSMENT — LIFESTYLE VARIABLES: SMOKELESS_TOBACCO: NO

## 2022-11-28 ENCOUNTER — HOSPITAL ENCOUNTER (OUTPATIENT)
Dept: PULMONOLOGY | Age: 57
Setting detail: THERAPIES SERIES
Discharge: HOME | End: 2022-11-28
Payer: MEDICARE

## 2022-11-28 VITALS — BODY MASS INDEX: 57.78 KG/M2 | WEIGHT: 293 LBS

## 2022-11-28 PROCEDURE — G0239 OTH RESP PROC, GROUP: HCPCS

## 2022-11-28 ASSESSMENT — EXERCISE STRESS TEST
PEAK_HR: 87
PEAK_RPE: 4
PEAK_METS: 1.4

## 2022-11-28 NOTE — PROGRESS NOTES
*Instructed pt on exercise equipment use & safety. *Reviewed reconditioning exercises w/ application of PLB DB &RB  *Started exercise w/ use of proper breathing techniques ,KATI scales & pacing work. .  *Instruct on lung diagnosis & disease process. O2 use & respiratory medications dose,    frequency & tech. To help alleviate symptoms.

## 2022-11-30 ENCOUNTER — HOSPITAL ENCOUNTER (OUTPATIENT)
Dept: PULMONOLOGY | Age: 57
Setting detail: THERAPIES SERIES
Discharge: HOME | End: 2022-11-30
Payer: MEDICARE

## 2022-12-02 DIAGNOSIS — I50.42 CHRONIC COMBINED SYSTOLIC AND DIASTOLIC CONGESTIVE HEART FAILURE (HCC): ICD-10-CM

## 2022-12-02 RX ORDER — BUMETANIDE 1 MG/1
TABLET ORAL
Qty: 180 TABLET | Refills: 0 | Status: SHIPPED | OUTPATIENT
Start: 2022-12-02

## 2022-12-02 NOTE — TELEPHONE ENCOUNTER
Health Maintenance   Topic Date Due    DTaP/Tdap/Td vaccine (1 - Tdap) Never done    Shingles vaccine (1 of 2) Never done    COVID-19 Vaccine (3 - Booster for Pfizer series) 04/23/2021    Breast cancer screen  12/17/2021    Flu vaccine (1) 08/01/2022    Lipids  10/15/2022    A1C test (Diabetic or Prediabetic)  05/24/2023    Annual Wellness Visit (AWV)  07/15/2023    Depression Monitoring  10/26/2023    Colorectal Cancer Screen  06/24/2031    Pneumococcal 0-64 years Vaccine  Completed    Hepatitis C screen  Completed    HIV screen  Completed    Hepatitis A vaccine  Aged Out    Hib vaccine  Aged Out    Meningococcal (ACWY) vaccine  Aged Out             (applicable per patient's age: Cancer Screenings, Depression Screening, Fall Risk Screening, Immunizations)    Hemoglobin A1C (%)   Date Value   05/24/2022 5.9   02/23/2021 5.8   11/19/2020 6.0     LDL Cholesterol (mg/dL)   Date Value   10/15/2021 115     AST (U/L)   Date Value   06/14/2022 11     ALT (U/L)   Date Value   06/14/2022 10     BUN (mg/dL)   Date Value   09/07/2022 22 (H)      (goal A1C is < 7)   (goal LDL is <100) need 30-50% reduction from baseline     BP Readings from Last 3 Encounters:   10/03/22 120/66   09/07/22 139/71   07/08/22 129/69    (goal /80)      All Future Testing planned in [facility]:  Lab Frequency Next Occurrence   DRUG SCREEN, PAIN Once 12/06/2021   Sleep Study with PAP Titration Once 05/18/2022   CHERY DIGITAL SCREEN SELF REFERRAL W OR WO CAD BILATERAL Once 05/18/2022   TSH With Reflex Ft4 Once 06/25/2022   Vitamin D 25 Hydroxy Once 10/07/2022       Next Visit Date:  Future Appointments   Date Time Provider Christiano Wren   12/5/2022  1:00 PM [facility] 6 [facility]   12/7/2022  1:00 PM [facility] RM 6 [facility]   12/12/2022  1:00 PM [facility] RM 6 [facility]   12/14/2022  1:00 PM STC PULM REHAB  6 [facility]   12/19/2022  1:00  [facility] PULM REHAB  Sarah Mayen 12/21/2022  1:00 PM ST[facility] PULM REHAB RM 6 [facility]   12/28/2022  1:00 PM [facility]ULM REH[facility] RM 6 [facility]   12/29/2022 12:30 PM Tomasz Farmer, APRN - CNP [facility]   1/4/2023  1:00 PM [facility]ULM REHAB RM 6 [facility]   1/9/2023  1:00 PM [facility] PULM REHAB RM 6 [facility]   1/11/2023  1:00 PM [facility] PULM REHAB RM 6 [facility]   1/16/2023  1:00 PM [facility] PULM REHAB RM 6 [facility]   1/18/2023  1:00 PM [facility] PULM REHAB RM 6 [facility]   1/23/2023  1:00 PM STC PULM REHAB RM 6 [facility]   1/25/2023  1:00 PM [facility] PULM REHAB RM 6 [facility]   1/30/2023  1:00 PM [facility] PULM REHAB RM 6 [facility]   2/1/2023  1:00 PM STC PULM REHAB RM 6 [facility]   2/6/2023  1:00 PM [facility]C PULM REHAB RM 6 [facility]   2/8/2023  1:00 PM STC PULM REHAB RM 6 [facility]   2/13/2023  1:00 PM [facility]C PULM REHAB RM 6 [facility]   2/15/2023  1:00 PM STC PULM REHAB RM 6 [facility]   2/20/2023  1:00 PM STC PULM REHAB RM 6 [facility]   2/22/2023  1:00 PM STC PULM REHAB RM 6 [facility]   2/27/2023  1:00 PM STC PULM REHAB RM 6 [facility]   3/1/2023  1:00 PM STC PULM REHAB RM 6 [facility]   3/6/2023  1:00 PM STC PULM REHAB RM 6 [facility]Z [facility]   3/8/2023  1:00 PM STC PULM REHAB RM 6 [facility]   3/13/2023  1:00 PM STC PULM REHAB RM 6 [facility]   3/15/2023  1:00 PM STC PULM REHAB RM 6 [facility]   3/20/2023  1:00 PM STC PULM REHAB RM 6 [facility]   3/22/2023  1:00 PM STC PULM REHAB RM 6 [facility]   3/27/2023  1:00 PM STC PULM REHAB RM 6 [facility]Z [facility]   4/3/2023  1:00 PM Mauricio Enriquez DO Resp Spec [facility]   7/17/2023 12:30 PM Merry Lynch, APRN - CNP [facility] IN [facility]            Patient Active Problem List:     Low back pain     Therapeutic opioid-induced constipation (OIC)     Spondylosis of lumbar region without myelopathy or radiculopathy     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, insomnia type (HCC)     Opioid dependence, uncomplicated (HCC)     Sacroiliac joint dysfunction of both sides     Chronic pain disorder     Bulge of cervical disc without myelopathy     DDD (degenerative disc disease), lumbar     Failed back syndrome of cervical spine     Chest pain     Class 3 severe obesity due to excess calories with serious comorbidity and body mass index (BMI) of 45.0 to 49.9 in adult [facility])     Cervical spondylosis with radiculopathy     Status post cervical spinal fusion     History of lumbar fusion     Lumbar radiculopathy     Morbid obesity with BMI of 50.0-59.9, adult (HCC)     Chronic pain of both knees     Myalgia     Chronic use of opiate drug for therapeutic purpose     COVID-19     Hypertension     Gout     GERD (gastroesophageal reflux disease)     Acute on chronic combined systolic and diastolic congestive heart failure (HCC)     Other proteinuria     Nausea and vomiting     Chronic pain     PTSD (post-traumatic stress disorder)     Hyperuricemia     Mild persistent asthma without complication     CHF (congestive heart failure) (HCC)     Chronic bronchitis, unspecified chronic bronchitis type (HCC)     Major depressive disorder, recurrent severe without psychotic features (HCC)     Headache     Suspected sleep apnea     Vitamin D deficiency     CKD (chronic kidney disease) stage 3, GFR 30-59 ml/min (HCC)     COPD exacerbation (HCC)     CRP elevated     High serum fibrinogen

## 2022-12-05 ENCOUNTER — HOSPITAL ENCOUNTER (OUTPATIENT)
Dept: PULMONOLOGY | Age: 57
Setting detail: THERAPIES SERIES
Discharge: HOME | End: 2022-12-05
Payer: MEDICARE

## 2022-12-05 VITALS — WEIGHT: 293 LBS | BODY MASS INDEX: 57.93 KG/M2

## 2022-12-05 PROCEDURE — G0239 OTH RESP PROC, GROUP: HCPCS

## 2022-12-05 ASSESSMENT — EXERCISE STRESS TEST
PEAK_RPE: 4
PEAK_HR: 88
PEAK_METS: 1.3

## 2022-12-05 NOTE — PROGRESS NOTES
Lorie Arellano was able to particpate in the weights as well as the nu step today. She was able to exercise longer than she thought she would be able to. Lorie Mcguires had a great attitude and is willing to try and make her situation better.

## 2022-12-07 ENCOUNTER — HOSPITAL ENCOUNTER (OUTPATIENT)
Dept: PULMONOLOGY | Age: 57
Setting detail: THERAPIES SERIES
Discharge: HOME | End: 2022-12-07
Payer: MEDICARE

## 2022-12-07 VITALS — WEIGHT: 293 LBS | BODY MASS INDEX: 57.63 KG/M2

## 2022-12-07 PROCEDURE — G0239 OTH RESP PROC, GROUP: HCPCS

## 2022-12-07 ASSESSMENT — EXERCISE STRESS TEST
PEAK_HR: 88
PEAK_RPE: 4
PEAK_METS: 1.3

## 2022-12-12 ENCOUNTER — HOSPITAL ENCOUNTER (OUTPATIENT)
Dept: PULMONOLOGY | Age: 57
Setting detail: THERAPIES SERIES
Discharge: HOME | End: 2022-12-12
Payer: MEDICARE

## 2022-12-12 VITALS — WEIGHT: 293 LBS | BODY MASS INDEX: 58.39 KG/M2

## 2022-12-12 DIAGNOSIS — G89.4 CHRONIC PAIN SYNDROME: ICD-10-CM

## 2022-12-12 PROCEDURE — G0239 OTH RESP PROC, GROUP: HCPCS

## 2022-12-12 RX ORDER — TIZANIDINE 4 MG/1
TABLET ORAL
Qty: 90 TABLET | Refills: 0 | Status: SHIPPED | OUTPATIENT
Start: 2022-12-12

## 2022-12-12 ASSESSMENT — EXERCISE STRESS TEST
PEAK_RPE: 5
PEAK_HR: 85
PEAK_METS: 1.4

## 2022-12-12 NOTE — TELEPHONE ENCOUNTER
Requested Prescriptions     Pending Prescriptions Disp Refills    tiZANidine (ZANAFLEX) 4 MG tablet [Pharmacy Med Name: tiZANidine HCl 4 MG Oral Tablet] 90 tablet 0     Sig: TAKE 1 TABLET BY MOUTH THREE TIMES DAILY AS NEEDED FOR PAIN

## 2022-12-14 ENCOUNTER — HOSPITAL ENCOUNTER (OUTPATIENT)
Dept: PULMONOLOGY | Age: 57
Setting detail: THERAPIES SERIES
Discharge: HOME | End: 2022-12-14
Payer: MEDICARE

## 2022-12-14 VITALS — BODY MASS INDEX: 57.93 KG/M2 | WEIGHT: 293 LBS

## 2022-12-14 PROCEDURE — G0239 OTH RESP PROC, GROUP: HCPCS

## 2022-12-14 ASSESSMENT — EXERCISE STRESS TEST
PEAK_HR: 101
PEAK_METS: 1.5
PEAK_RPE: 3

## 2022-12-16 DIAGNOSIS — Z76.0 MEDICATION REFILL: ICD-10-CM

## 2022-12-16 DIAGNOSIS — G89.4 CHRONIC PAIN SYNDROME: ICD-10-CM

## 2022-12-16 RX ORDER — IBUPROFEN 800 MG/1
800 TABLET ORAL EVERY 8 HOURS PRN
Qty: 270 TABLET | Refills: 0 | OUTPATIENT
Start: 2022-12-16

## 2022-12-19 ENCOUNTER — HOSPITAL ENCOUNTER (OUTPATIENT)
Dept: PULMONOLOGY | Age: 57
Setting detail: THERAPIES SERIES
Discharge: HOME | End: 2022-12-19
Payer: MEDICARE

## 2022-12-19 VITALS — WEIGHT: 293 LBS | BODY MASS INDEX: 57.78 KG/M2

## 2022-12-19 PROCEDURE — G0239 OTH RESP PROC, GROUP: HCPCS

## 2022-12-19 ASSESSMENT — EXERCISE STRESS TEST
PEAK_HR: 105
PEAK_RPE: 7
PEAK_METS: 1.3

## 2022-12-19 NOTE — PROGRESS NOTES
Lillian Bain had a lot of pain today. She made an attempt at nu step but had to take it slower due to pain today. She broke down and cried due to pain and frustration at living in this type of pain. We called and left a message with her pain management dr. She stated that he didn't believe she was in pain or exercising. She let us know that she thought about staying home but she knows she needs to make this  a habit and that even some exercise is better than no exercise. She is attempting to do exercise at home. She did talk about maybe going to er to be admitted due to pain, assistance was offered but she declined due to being in too much pain to let them run tests. She did cry and laugh at the same time and emotional support was offered.

## 2022-12-21 ENCOUNTER — HOSPITAL ENCOUNTER (OUTPATIENT)
Dept: PULMONOLOGY | Age: 57
Setting detail: THERAPIES SERIES
Discharge: HOME | End: 2022-12-21
Payer: MEDICARE

## 2022-12-21 NOTE — PROGRESS NOTES
Cecy Wood called to let us know that she tried to make it but by the time she got to the parking lot she was in so much pain that she just could not make it in. She is hoping to have the pain under control by wed.

## 2022-12-22 ASSESSMENT — EXERCISE STRESS TEST
PEAK_BP: 136/90
PEAK_HR: 85
PEAK_RPE: 8

## 2022-12-22 ASSESSMENT — PULMONARY FUNCTION TESTS
FVC (LITERS): 75
FEV1 (%PREDICTED): 79
FEV1/FVC: 84

## 2022-12-22 ASSESSMENT — LIFESTYLE VARIABLES: SMOKELESS_TOBACCO: NO

## 2022-12-28 ENCOUNTER — HOSPITAL ENCOUNTER (OUTPATIENT)
Dept: PULMONOLOGY | Age: 57
Setting detail: THERAPIES SERIES
Discharge: HOME | End: 2022-12-28
Payer: MEDICARE

## 2023-01-01 NOTE — PROGRESS NOTES
Visit Information    Have you changed or started any medications since your last visit including any over-the-counter medicines, vitamins, or herbal medicines? no   Are you having any side effects from any of your medications? -  no  Have you stopped taking any of your medications? Is so, why? -  no    Have you seen any other physician or provider since your last visit? Yes - Records Obtained  Have you had any other diagnostic tests since your last visit? Yes - Records Obtained  Have you been seen in the emergency room and/or had an admission to a hospital since we last saw you? Yes - Records Obtained  Have you had your routine dental cleaning in the past 6 months? no    Have you activated your [employer] account? If not, what are your barriers?  Yes     Patient Care Team:  ASA Jim CNP as PCP - General (Family Medicine)  ASA Jim CNP as PCP - [facility]  ASA José CNP as Consulting Physician (Pain Management)  Whit Swanson MD as Consulting Physician (Rheumatology)  Alfonso Thompson DO as Consulting Physician (Pulmonary Disease)  Teetee Hinojosa MD as Consulting Physician (Gastroenterology)  Salvador Mead RN as Care Transitions Nurse    Medical History Review  Past Medical, Family, and Social History reviewed and does not contribute to the patient presenting condition    Health Maintenance   Topic Date Due    DTaP/Tdap/Td vaccine (1 - Tdap) Never done    Shingles vaccine (1 of 2) Never done    COVID-19 Vaccine (3 - Booster for [employer] series) 07/26/2021    Annual Wellness Visit (AWV)  08/14/2021    Pneumococcal 0-64 years Vaccine (2 - PCV) 10/08/2021    Breast cancer screen  12/17/2021    Flu vaccine (Season Ended) 09/01/2022    Lipids  10/15/2022    Depression Monitoring  05/18/2023    A1C test (Diabetic or Prediabetic)  05/24/2023    Colorectal Cancer Screen  06/24/2031    Hepatitis C screen  Completed    HIV screen  Completed  Hepatitis A vaccine  Aged Out    Hepatitis B vaccine  Aged Out    Hib vaccine  Aged Out    Meningococcal (ACWY) vaccine  Aged Out 3.77 3.64

## 2023-01-04 ENCOUNTER — HOSPITAL ENCOUNTER (OUTPATIENT)
Dept: PULMONOLOGY | Age: 58
Setting detail: THERAPIES SERIES
Discharge: HOME | End: 2023-01-04
Payer: MEDICARE

## 2023-01-04 VITALS — WEIGHT: 293 LBS | BODY MASS INDEX: 57.63 KG/M2

## 2023-01-04 PROCEDURE — G0239 OTH RESP PROC, GROUP: HCPCS

## 2023-01-04 ASSESSMENT — EXERCISE STRESS TEST
PEAK_RPE: 7
PEAK_METS: 1.3
PEAK_HR: 100

## 2023-01-04 NOTE — PROGRESS NOTES
Sg Thorpe was feeling a lot of pain and very weak. She wasn't the most active and had a difficult time with exercising but she came and stayed the entire time and tried to exercise every few minutes. She had some bad news about a family member in [place] found frozen to death and she is really struggling with sadness and pain but feels that she is handling it well. She felt a lot of support coming and plans on continuing to come.

## 2023-01-09 ENCOUNTER — OFFICE VISIT (OUTPATIENT)
Dept: PRIMARY CARE CLINIC | Age: 58
End: 2023-01-09
Payer: MEDICARE

## 2023-01-09 ENCOUNTER — HOSPITAL ENCOUNTER (OUTPATIENT)
Dept: PULMONOLOGY | Age: 58
Setting detail: THERAPIES SERIES
Discharge: HOME | End: 2023-01-09
Payer: MEDICARE

## 2023-01-09 VITALS — HEIGHT: 68 IN | BODY MASS INDEX: 44.41 KG/M2 | WEIGHT: 293 LBS

## 2023-01-09 VITALS — BODY MASS INDEX: 57.17 KG/M2 | WEIGHT: 293 LBS

## 2023-01-09 DIAGNOSIS — R20.2 NUMBNESS AND TINGLING IN BOTH HANDS: Primary | ICD-10-CM

## 2023-01-09 DIAGNOSIS — J45.30 MILD PERSISTENT ASTHMA WITHOUT COMPLICATION: ICD-10-CM

## 2023-01-09 DIAGNOSIS — F33.0 MILD EPISODE OF RECURRENT MAJOR DEPRESSIVE DISORDER (HCC): ICD-10-CM

## 2023-01-09 DIAGNOSIS — E55.9 VITAMIN D DEFICIENCY: ICD-10-CM

## 2023-01-09 DIAGNOSIS — N39.41 URGE INCONTINENCE: ICD-10-CM

## 2023-01-09 DIAGNOSIS — F33.2 MAJOR DEPRESSIVE DISORDER, RECURRENT SEVERE WITHOUT PSYCHOTIC FEATURES (HCC): ICD-10-CM

## 2023-01-09 DIAGNOSIS — I10 ESSENTIAL HYPERTENSION: ICD-10-CM

## 2023-01-09 DIAGNOSIS — K21.9 GASTROESOPHAGEAL REFLUX DISEASE WITHOUT ESOPHAGITIS: ICD-10-CM

## 2023-01-09 DIAGNOSIS — F43.10 PTSD (POST-TRAUMATIC STRESS DISORDER): ICD-10-CM

## 2023-01-09 DIAGNOSIS — B37.2 CANDIDIASIS OF SKIN: ICD-10-CM

## 2023-01-09 DIAGNOSIS — R73.03 PREDIABETES: ICD-10-CM

## 2023-01-09 DIAGNOSIS — I50.42 CHRONIC COMBINED SYSTOLIC AND DIASTOLIC CONGESTIVE HEART FAILURE (HCC): ICD-10-CM

## 2023-01-09 DIAGNOSIS — E79.0 HYPERURICEMIA: ICD-10-CM

## 2023-01-09 DIAGNOSIS — I50.42 CHRONIC COMBINED SYSTOLIC AND DIASTOLIC HEART FAILURE (HCC): ICD-10-CM

## 2023-01-09 DIAGNOSIS — K21.9 GASTROESOPHAGEAL REFLUX DISEASE, UNSPECIFIED WHETHER ESOPHAGITIS PRESENT: ICD-10-CM

## 2023-01-09 DIAGNOSIS — Z76.0 MEDICATION REFILL: ICD-10-CM

## 2023-01-09 DIAGNOSIS — R20.0 NUMBNESS AND TINGLING IN BOTH HANDS: Primary | ICD-10-CM

## 2023-01-09 PROCEDURE — 99213 OFFICE O/P EST LOW 20 MIN: CPT | Performed by: NURSE PRACTITIONER

## 2023-01-09 PROCEDURE — G8417 CALC BMI ABV UP PARAM F/U: HCPCS | Performed by: NURSE PRACTITIONER

## 2023-01-09 PROCEDURE — G8484 FLU IMMUNIZE NO ADMIN: HCPCS | Performed by: NURSE PRACTITIONER

## 2023-01-09 PROCEDURE — 1036F TOBACCO NON-USER: CPT | Performed by: NURSE PRACTITIONER

## 2023-01-09 PROCEDURE — G8427 DOCREV CUR MEDS BY ELIG CLIN: HCPCS | Performed by: NURSE PRACTITIONER

## 2023-01-09 PROCEDURE — G0239 OTH RESP PROC, GROUP: HCPCS

## 2023-01-09 PROCEDURE — 3017F COLORECTAL CA SCREEN DOC REV: CPT | Performed by: NURSE PRACTITIONER

## 2023-01-09 RX ORDER — MONTELUKAST SODIUM 10 MG/1
10 TABLET ORAL NIGHTLY
Qty: 90 TABLET | Refills: 3 | Status: SHIPPED | OUTPATIENT
Start: 2023-01-09

## 2023-01-09 RX ORDER — BUPROPION HYDROCHLORIDE 150 MG/1
150 TABLET ORAL EVERY MORNING
Qty: 90 TABLET | Refills: 1 | Status: SHIPPED | OUTPATIENT
Start: 2023-01-09

## 2023-01-09 RX ORDER — NYSTATIN 100000 U/G
CREAM TOPICAL
Qty: 30 G | Refills: 1 | Status: SHIPPED | OUTPATIENT
Start: 2023-01-09

## 2023-01-09 RX ORDER — BUMETANIDE 1 MG/1
TABLET ORAL
Qty: 180 TABLET | Refills: 1 | Status: SHIPPED | OUTPATIENT
Start: 2023-01-09

## 2023-01-09 RX ORDER — DULOXETIN HYDROCHLORIDE 60 MG/1
120 CAPSULE, DELAYED RELEASE ORAL DAILY
Qty: 180 CAPSULE | Refills: 3 | Status: SHIPPED | OUTPATIENT
Start: 2023-01-09

## 2023-01-09 RX ORDER — ALLOPURINOL 300 MG/1
300 TABLET ORAL DAILY
Qty: 90 TABLET | Refills: 1 | Status: SHIPPED | OUTPATIENT
Start: 2023-01-09

## 2023-01-09 RX ORDER — SPIRONOLACTONE 25 MG/1
25 TABLET ORAL DAILY
Qty: 90 TABLET | Refills: 1 | Status: SHIPPED | OUTPATIENT
Start: 2023-01-09

## 2023-01-09 RX ORDER — HYDRALAZINE HYDROCHLORIDE 25 MG/1
25 TABLET, FILM COATED ORAL 2 TIMES DAILY
Qty: 60 TABLET | Refills: 0 | Status: SHIPPED | OUTPATIENT
Start: 2023-01-09

## 2023-01-09 RX ORDER — OMEPRAZOLE 20 MG/1
20 CAPSULE, DELAYED RELEASE ORAL
Qty: 30 CAPSULE | Refills: 0 | Status: SHIPPED | OUTPATIENT
Start: 2023-01-09

## 2023-01-09 RX ORDER — OXYBUTYNIN CHLORIDE 5 MG/1
5 TABLET ORAL 2 TIMES DAILY
Qty: 180 TABLET | Refills: 1 | Status: SHIPPED | OUTPATIENT
Start: 2023-01-09

## 2023-01-09 RX ORDER — PANTOPRAZOLE SODIUM 40 MG/1
40 TABLET, DELAYED RELEASE ORAL DAILY
Qty: 90 TABLET | Refills: 1 | Status: SHIPPED | OUTPATIENT
Start: 2023-01-09

## 2023-01-09 RX ORDER — DULOXETIN HYDROCHLORIDE 60 MG/1
120 CAPSULE, DELAYED RELEASE ORAL DAILY
Qty: 180 CAPSULE | Refills: 1 | Status: SHIPPED | OUTPATIENT
Start: 2023-01-09 | End: 2023-01-09

## 2023-01-09 RX ORDER — ATORVASTATIN CALCIUM 80 MG/1
80 TABLET, FILM COATED ORAL DAILY
Qty: 90 TABLET | Refills: 1 | Status: SHIPPED | OUTPATIENT
Start: 2023-01-09

## 2023-01-09 RX ORDER — METOPROLOL TARTRATE 50 MG/1
50 TABLET, FILM COATED ORAL 2 TIMES DAILY
Qty: 180 TABLET | Refills: 1 | Status: SHIPPED | OUTPATIENT
Start: 2023-01-09

## 2023-01-09 RX ORDER — BUPROPION HYDROCHLORIDE 300 MG/1
300 TABLET ORAL EVERY MORNING
Qty: 90 TABLET | Refills: 1 | Status: SHIPPED | OUTPATIENT
Start: 2023-01-09

## 2023-01-09 RX ORDER — TRAZODONE HYDROCHLORIDE 150 MG/1
150 TABLET ORAL NIGHTLY
Qty: 90 TABLET | Refills: 0 | Status: SHIPPED | OUTPATIENT
Start: 2023-01-09

## 2023-01-09 ASSESSMENT — ENCOUNTER SYMPTOMS
SINUS PRESSURE: 0
ABDOMINAL PAIN: 0
DIARRHEA: 0
SINUS PAIN: 0
SHORTNESS OF BREATH: 0
SORE THROAT: 0
NAUSEA: 0
COUGH: 0
BACK PAIN: 0
CHEST TIGHTNESS: 0
VOMITING: 0
COLOR CHANGE: 0
PHOTOPHOBIA: 0

## 2023-01-09 ASSESSMENT — PATIENT HEALTH QUESTIONNAIRE - PHQ9
SUM OF ALL RESPONSES TO PHQ QUESTIONS 1-9: 17
7. TROUBLE CONCENTRATING ON THINGS, SUCH AS READING THE NEWSPAPER OR WATCHING TELEVISION: 1
6. FEELING BAD ABOUT YOURSELF - OR THAT YOU ARE A FAILURE OR HAVE LET YOURSELF OR YOUR FAMILY DOWN: 3
5. POOR APPETITE OR OVEREATING: 3
2. FEELING DOWN, DEPRESSED OR HOPELESS: 3
10. IF YOU CHECKED OFF ANY PROBLEMS, HOW DIFFICULT HAVE THESE PROBLEMS MADE IT FOR YOU TO DO YOUR WORK, TAKE CARE OF THINGS AT HOME, OR GET ALONG WITH OTHER PEOPLE: 3
3. TROUBLE FALLING OR STAYING ASLEEP: 3
8. MOVING OR SPEAKING SO SLOWLY THAT OTHER PEOPLE COULD HAVE NOTICED. OR THE OPPOSITE, BEING SO FIGETY OR RESTLESS THAT YOU HAVE BEEN MOVING AROUND A LOT MORE THAN USUAL: 1
SUM OF ALL RESPONSES TO PHQ QUESTIONS 1-9: 17
9. THOUGHTS THAT YOU WOULD BE BETTER OFF DEAD, OR OF HURTING YOURSELF: 0
4. FEELING TIRED OR HAVING LITTLE ENERGY: 3
SUM OF ALL RESPONSES TO PHQ QUESTIONS 1-9: 17
SUM OF ALL RESPONSES TO PHQ QUESTIONS 1-9: 17

## 2023-01-09 ASSESSMENT — EXERCISE STRESS TEST
PEAK_HR: 85
PEAK_RPE: 4
PEAK_METS: 1.3

## 2023-01-09 NOTE — PROGRESS NOTES
[facility]  [address] [address]  Dept: [phone]       Dori Bird is a 59 y.o. female who presents today for her  medical conditions/complaintsas noted below. Dori Bird is c/o of Numbness (Pt has been having pain and numbness in hands and feet and is currently looking for a pain management dr )      HPI:     HPI    Patient is here for same-day visit with worsening numbness and tingling to bilateral hands and feet. Patient also states her depression is significantly worsened over the last 2 months and feels as though the numbness and tingling was exacerbated with the changes in her mental health. She denies any suicidal thoughts or ideations, but does describe symptoms of lack of low motivation and decreased enjoyment in previously enjoyable activities. We discussed counseling/mental health therapist and patient states \"I am just tired of talking\". Discussed the option of telehealth visits and patient is agreeable to further exploring this option. Despite numbness and tingling to bilateral hands and feet, there is no hyperreflexia or new extremity weakness. Focal neuro exam is negative. Patient continues to voice frustration with her inability to find a pain management provider who will provide narcotics. As mentioned in previous notes, patient was on narcotics for several years prior to moving to the [facility]. Additionally, she tells me she stopped taking all of her medications 8 days ago because she is \"tired of messing with all the pills\". We did discuss the option of enrolling in pill packs via her pharmacy, and patient is agreeable.     Past Medical History:   Diagnosis Date    Arthritis     Bronchitis     On ICS-LABA, denies asthma, copd    CHF (congestive heart failure) ([facility] [address]     COVID-19     diagnosed in September 2020, hospitilized on oxgyen    Fibromyalgia     GERD (gastroesophageal reflux disease)     Gout 10/2019    History of heart attack     HLD (hyperlipidemia)     Hypertension     Insomnia     Osteoarthritis       Past Surgical History:   Procedure Laterality Date    BACK SURGERY      CARDIAC CATHETERIZATION  07/08/2022    CHOLECYSTECTOMY, LAPAROSCOPIC      HYSTERECTOMY, TOTAL ABDOMINAL (CERVIX REMOVED)      KNEE ARTHROSCOPY Right     KNEE SURGERY Left 2009    NECK SURGERY      SHOULDER SURGERY Right 2009    THYROIDECTOMY       Family History   Problem Relation Age of Onset    Other Mother     Diabetes Mother     Heart Disease Mother     Arthritis Mother     Kidney Disease Mother     Lupus Mother     Arthritis Sister     Diabetes Brother     Asthma Brother     Cancer Maternal Grandfather     Hypertension Sister     Breast Cancer Sister         28s    Breast Cancer Niece         32-31     Social History     Tobacco Use    Smoking status: Never    Smokeless tobacco: Never   Substance Use Topics    Alcohol use: No      Current Outpatient Medications   Medication Sig Dispense Refill    DULoxetine (CYMBALTA) 60 MG extended release capsule Take 2 capsules by mouth daily 180 capsule 3    allopurinol (ZYLOPRIM) 300 MG tablet Take 1 tablet by mouth daily 90 tablet 1    atorvastatin (LIPITOR) 80 MG tablet Take 1 tablet by mouth daily 90 tablet 1    bumetanide (BUMEX) 1 MG tablet Take 2 tablets by mouth once daily 180 tablet 1    buPROPion (WELLBUTRIN XL) 150 MG extended release tablet Take 1 tablet by mouth every morning Take with 300mg for total of 450 mg 90 tablet 1    buPROPion (WELLBUTRIN XL) 300 MG extended release tablet Take 1 tablet by mouth every morning 90 tablet 1    hydrALAZINE (APRESOLINE) 25 MG tablet Take 1 tablet by mouth in the morning and 1 tablet in the evening.  60 tablet 0    metoprolol tartrate (LOPRESSOR) 50 MG tablet Take 1 tablet by mouth 2 times daily 180 tablet 1    montelukast (SINGULAIR) 10 MG tablet Take 1 tablet by mouth nightly 90 tablet 3 oxybutynin (DITROPAN) 5 MG tablet Take 1 tablet by mouth 2 times daily 180 tablet 1    pantoprazole (PROTONIX) 40 MG tablet Take 1 tablet by mouth daily 90 tablet 1    traZODone (DESYREL) 150 MG tablet Take 1 tablet by mouth nightly 90 tablet 0    spironolactone (ALDACTONE) 25 MG tablet Take 1 tablet by mouth daily 90 tablet 1    omeprazole (PRILOSEC) 20 MG delayed release capsule Take 1 capsule by mouth every morning (before breakfast) 30 capsule 0    nystatin (MYCOSTATIN) 855814 UNIT/GM cream Apply topically 2 times daily.  30 g 1    tiZANidine (ZANAFLEX) 4 MG tablet TAKE 1 TABLET BY MOUTH THREE TIMES DAILY AS NEEDED FOR PAIN 90 tablet 0    Vitamin D, Ergocalciferol, 22438 units CAPS Take 50,000 Units by mouth once a week 5 capsule 1    budesonide-formoterol (SYMBICORT) 160-4.5 MCG/ACT AERO Inhale 2 puffs into the lungs 2 times daily 3 each 3    Handicap Placard MISC by Does not apply route 5 year 1 each 0    dextromethorphan-guaiFENesin (ROBITUSSIN-DM)  MG/5ML syrup Take 5 mLs by mouth every 4 hours as needed for Cough 473 mL 1    isosorbide dinitrate (ISORDIL) 10 MG tablet Take 1 tablet by mouth 2 times daily 180 tablet 1    polyethylene glycol (GLYCOLAX) 17 g packet polyethylene glycol 3350 17 gram/dose oral powder 527 g 0    aspirin 81 MG EC tablet Take 81 mg by mouth      sucralfate (CARAFATE) 1 GM tablet Take 1 tablet in AM, 2 tablet in evening daily 90 tablet 3    albuterol (PROVENTIL) (2.5 MG/3ML) 0.083% nebulizer solution Take 3 mLs by nebulization every 6 hours as needed for Wheezing 120 each 3    albuterol sulfate HFA (PROVENTIL;VENTOLIN;PROAIR) 108 (90 Base) MCG/ACT inhaler Inhale 2 puffs into the lungs every 4 hours as needed for Shortness of Breath 3 each 3    levothyroxine (SYNTHROID) 50 MCG tablet Take 1 tablet by mouth Daily 30 tablet 0    Respiratory Therapy Supplies (NEBULIZER COMPRESSOR) KIT Provide insurance covered nebulizer, use daily as needed 1 kit 0     No current facility-administered medications for this visit. Allergies   Allergen Reactions    Entresto [Sacubitril-Valsartan] Other (See Comments)     Acute kidney injury    Food Swelling     peaches    Gabapentin Swelling    Lyrica [Pregabalin] Swelling     Mouth sores    Prunus Persica     Tetracycline     Tetracyclines & Related        Health Maintenance   Topic Date Due    DTaP/Tdap/Td vaccine (1 - Tdap) Never done    Shingles vaccine (1 of 2) Never done    Breast cancer screen  12/17/2021    Lipids  10/15/2022    A1C test (Diabetic or Prediabetic)  05/24/2023    Annual Wellness Visit (AWV)  07/15/2023    GFR test (Diabetes, CKD 3-4, OR last GFR 15-59)  09/07/2023    Depression Monitoring  10/26/2023    Colorectal Cancer Screen  06/24/2031    Flu vaccine  Completed    Pneumococcal 0-64 years Vaccine  Completed    COVID-19 Vaccine  Completed    Hepatitis C screen  Completed    HIV screen  Completed    Hepatitis A vaccine  Aged Out    Hib vaccine  Aged Out    Meningococcal (ACWY) vaccine  Aged Out       Review of Systems   Constitutional:  Negative for activity change, appetite change and fever. HENT:  Negative for sinus pressure, sinus pain and sore throat. Eyes:  Negative for photophobia and visual disturbance. Respiratory:  Negative for cough, chest tightness and shortness of breath. Cardiovascular:  Negative for chest pain. Gastrointestinal:  Negative for abdominal pain, diarrhea, nausea and vomiting. Endocrine: Negative for polydipsia and polyuria. Genitourinary:  Negative for difficulty urinating. Musculoskeletal:  Negative for back pain and neck pain. Skin:  Negative for color change. Neurological:  Positive for numbness. Negative for dizziness, light-headedness and headaches. Psychiatric/Behavioral:  Negative for behavioral problems. Decreased mood       Objective:     Physical Exam  Vitals and nursing note reviewed. Constitutional:       General: She is not in acute distress. Appearance: Normal appearance. She is obese. HENT:      Head: Normocephalic. Right Ear: External ear normal. There is no impacted cerumen. Left Ear: External ear normal. There is no impacted cerumen. Nose: Nose normal. No congestion or rhinorrhea. Mouth/Throat:      Mouth: Mucous membranes are moist.      Pharynx: Oropharynx is clear. Eyes:      Conjunctiva/sclera: Conjunctivae normal.      Pupils: Pupils are equal, round, and reactive to light. Cardiovascular:      Rate and Rhythm: Normal rate and regular rhythm. Pulses: Normal pulses. Heart sounds: Normal heart sounds. No murmur heard. Pulmonary:      Effort: Pulmonary effort is normal. No respiratory distress. Breath sounds: Normal breath sounds. No wheezing. Abdominal:      Palpations: Abdomen is soft. Tenderness: There is no abdominal tenderness. Musculoskeletal:         General: No swelling or signs of injury. Normal range of motion. Cervical back: Normal range of motion. Skin:     General: Skin is warm and dry. Capillary Refill: Capillary refill takes less than 2 seconds. Findings: Rash present. Neurological:      General: No focal deficit present. Mental Status: She is alert and oriented to person, place, and time. Mental status is at baseline. Motor: No weakness. Gait: Gait normal.   Psychiatric:         Behavior: Behavior normal.       Assessment:      No results found for this visit on 01/09/23. Gisel Rodas was seen today for numbness. Diagnoses and all orders for this visit:    Numbness and tingling in both hands  -     CBC with Auto Differential; Future  -     Basic Metabolic Panel; Future  -     Vitamin B12 & Folate; Future  -     Vitamin D 25 Hydroxy; Future  -     Hemoglobin A1C; Future  -     TSH With Reflex Ft4; Future  -     EMG; Future    Medication refill  -     Discontinue: DULoxetine (CYMBALTA) 60 MG extended release capsule;  Take 2 capsules by mouth daily  - DULoxetine (CYMBALTA) 60 MG extended release capsule; Take 2 capsules by mouth daily  -     allopurinol (ZYLOPRIM) 300 MG tablet; Take 1 tablet by mouth daily  -     atorvastatin (LIPITOR) 80 MG tablet; Take 1 tablet by mouth daily  -     buPROPion (WELLBUTRIN XL) 300 MG extended release tablet; Take 1 tablet by mouth every morning  -     metoprolol tartrate (LOPRESSOR) 50 MG tablet; Take 1 tablet by mouth 2 times daily  -     montelukast (SINGULAIR) 10 MG tablet; Take 1 tablet by mouth nightly  -     oxybutynin (DITROPAN) 5 MG tablet; Take 1 tablet by mouth 2 times daily  -     pantoprazole (PROTONIX) 40 MG tablet; Take 1 tablet by mouth daily  -     traZODone (DESYREL) 150 MG tablet; Take 1 tablet by mouth nightly  -     spironolactone (ALDACTONE) 25 MG tablet; Take 1 tablet by mouth daily    Mild episode of recurrent major depressive disorder (HCC)  -     Discontinue: DULoxetine (CYMBALTA) 60 MG extended release capsule; Take 2 capsules by mouth daily  -     DULoxetine (CYMBALTA) 60 MG extended release capsule; Take 2 capsules by mouth daily    Body mass index (BMI) 50.0-59.9, adult ([facility])    Vitamin D deficiency  -     Vitamin D 25 Hydroxy; Future    Prediabetes  -     Hemoglobin A1C; Future    Hyperuricemia  -     allopurinol (ZYLOPRIM) 300 MG tablet; Take 1 tablet by mouth daily    Chronic combined systolic and diastolic congestive heart failure (HCC)  -     bumetanide (BUMEX) 1 MG tablet; Take 2 tablets by mouth once daily    Major depressive disorder, recurrent severe without psychotic features ([facility]  -     buPROPion (WELLBUTRIN XL) 150 MG extended release tablet; Take 1 tablet by mouth every morning Take with 300mg for total of 450 mg    PTSD (post-traumatic stress disorder)  -     buPROPion (WELLBUTRIN XL) 300 MG extended release tablet; Take 1 tablet by mouth every morning  -     traZODone (DESYREL) 150 MG tablet;  Take 1 tablet by mouth nightly    Essential hypertension  -     metoprolol tartrate (LOPRESSOR) 50 MG tablet; Take 1 tablet by mouth 2 times daily    Mild persistent asthma without complication  -     montelukast (SINGULAIR) 10 MG tablet; Take 1 tablet by mouth nightly    Urge incontinence  -     oxybutynin (DITROPAN) 5 MG tablet; Take 1 tablet by mouth 2 times daily    Gastroesophageal reflux disease, unspecified whether esophagitis present  -     pantoprazole (PROTONIX) 40 MG tablet; Take 1 tablet by mouth daily    Chronic combined systolic and diastolic heart failure (HCC)  -     spironolactone (ALDACTONE) 25 MG tablet; Take 1 tablet by mouth daily    Gastroesophageal reflux disease without esophagitis  -     omeprazole (PRILOSEC) 20 MG delayed release capsule; Take 1 capsule by mouth every morning (before breakfast)    Candidiasis of skin  -     nystatin (MYCOSTATIN) 097589 UNIT/GM cream; Apply topically 2 times daily. Other orders  -     hydrALAZINE (APRESOLINE) 25 MG tablet; Take 1 tablet by mouth in the morning and 1 tablet in the evening. Patient also given contact information for both the son and [place] in regards to mental health providers. Will inquire with patient's pharmacy regarding process of getting patient enrolled in daily pill packs. Return in about 8 weeks (around 3/6/2023). Plan of care reviewed with patient. Questions and concerns addressed to patient satisfaction. Follow up as directed.      Electronically signed by Jannetta Greer, APRN - CNP, PACon 1/9/2023 at 12:32 PM

## 2023-01-11 ENCOUNTER — HOSPITAL ENCOUNTER (OUTPATIENT)
Dept: PULMONOLOGY | Age: 58
Setting detail: THERAPIES SERIES
Discharge: HOME | End: 2023-01-11
Payer: MEDICARE

## 2023-01-11 NOTE — PROGRESS NOTES
Brea Russo had the elevator in her apartment break so she can't get to the first floor to catch a cab.

## 2023-01-12 ENCOUNTER — OFFICE VISIT (OUTPATIENT)
Dept: INTERNAL MEDICINE CLINIC | Age: 58
End: 2023-01-12
Payer: MEDICARE

## 2023-01-12 VITALS
OXYGEN SATURATION: 97 % | DIASTOLIC BLOOD PRESSURE: 72 MMHG | WEIGHT: 293 LBS | SYSTOLIC BLOOD PRESSURE: 130 MMHG | HEART RATE: 75 BPM | BODY MASS INDEX: 57.17 KG/M2

## 2023-01-12 DIAGNOSIS — F11.20 OPIOID DEPENDENCE, UNCOMPLICATED (HCC): ICD-10-CM

## 2023-01-12 DIAGNOSIS — E66.01 MORBID OBESITY WITH BMI OF 50.0-59.9, ADULT (HCC): ICD-10-CM

## 2023-01-12 DIAGNOSIS — I50.42 CHRONIC COMBINED SYSTOLIC AND DIASTOLIC CONGESTIVE HEART FAILURE (HCC): Primary | ICD-10-CM

## 2023-01-12 DIAGNOSIS — N18.31 STAGE 3A CHRONIC KIDNEY DISEASE (HCC): ICD-10-CM

## 2023-01-12 DIAGNOSIS — J42 CHRONIC BRONCHITIS, UNSPECIFIED CHRONIC BRONCHITIS TYPE (HCC): ICD-10-CM

## 2023-01-12 DIAGNOSIS — Z76.0 MEDICATION REFILL: ICD-10-CM

## 2023-01-12 DIAGNOSIS — I50.42 CHRONIC COMBINED SYSTOLIC AND DIASTOLIC HEART FAILURE (HCC): ICD-10-CM

## 2023-01-12 DIAGNOSIS — G89.4 CHRONIC PAIN SYNDROME: ICD-10-CM

## 2023-01-12 PROCEDURE — G8427 DOCREV CUR MEDS BY ELIG CLIN: HCPCS | Performed by: INTERNAL MEDICINE

## 2023-01-12 PROCEDURE — G8417 CALC BMI ABV UP PARAM F/U: HCPCS | Performed by: INTERNAL MEDICINE

## 2023-01-12 PROCEDURE — 3078F DIAST BP <80 MM HG: CPT | Performed by: INTERNAL MEDICINE

## 2023-01-12 PROCEDURE — G8484 FLU IMMUNIZE NO ADMIN: HCPCS | Performed by: INTERNAL MEDICINE

## 2023-01-12 PROCEDURE — 3023F SPIROM DOC REV: CPT | Performed by: INTERNAL MEDICINE

## 2023-01-12 PROCEDURE — 3017F COLORECTAL CA SCREEN DOC REV: CPT | Performed by: INTERNAL MEDICINE

## 2023-01-12 PROCEDURE — 1036F TOBACCO NON-USER: CPT | Performed by: INTERNAL MEDICINE

## 2023-01-12 PROCEDURE — 3075F SYST BP GE 130 - 139MM HG: CPT | Performed by: INTERNAL MEDICINE

## 2023-01-12 PROCEDURE — 99203 OFFICE O/P NEW LOW 30 MIN: CPT | Performed by: INTERNAL MEDICINE

## 2023-01-12 RX ORDER — IBUPROFEN 800 MG/1
800 TABLET ORAL EVERY 8 HOURS PRN
Qty: 270 TABLET | Refills: 1 | Status: SHIPPED | OUTPATIENT
Start: 2023-01-12

## 2023-01-12 RX ORDER — CLOPIDOGREL BISULFATE 75 MG/1
75 TABLET ORAL DAILY
Qty: 90 TABLET | Refills: 1 | Status: SHIPPED | OUTPATIENT
Start: 2023-01-12

## 2023-01-12 ASSESSMENT — ENCOUNTER SYMPTOMS
GASTROINTESTINAL NEGATIVE: 1
WHEEZING: 1
EYES NEGATIVE: 1
DIFFICULTY BREATHING: 1
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
## 2023-01-12 NOTE — PROGRESS NOTES
Visit Information    Have you changed or started any medications since your last visit including any over-the-counter medicines, vitamins, or herbal medicines? no   Are you having any side effects from any of your medications? -  no  Have you stopped taking any of your medications? Is so, why? -  no    Have you seen any other physician or provider since your last visit? No  Have you had any other diagnostic tests since your last visit? No  Have you been seen in the emergency room and/or had an admission to a hospital since we last saw you? No  Have you had your routine dental cleaning in the past 6 months? no    Have you activated your [employer] account? If not, what are your barriers?  Yes     Patient Care Team:  ASA Patel CNP as PCP - General (Family Medicine)  [facility]  ASA Lacey CNP as Consulting Physician (Pain Management)  Jameson Fletcher MD as Consulting Physician (Rheumatology)  Charmayne Nodal, DO as Consulting Physician (Pulmonary Disease)  Daren Crews MD as Consulting Physician (Gastroenterology)    Medical History Review  Past Medical, Family, and Social History reviewed and does contribute to the patient presenting condition    Health Maintenance   Topic Date Due    DTaP/Tdap/Td vaccine (1 - Tdap) Never done    Shingles vaccine (1 of 2) Never done    Breast cancer screen  12/17/2021    Lipids  10/15/2022    A1C test (Diabetic or Prediabetic)  05/24/2023    Annual Wellness Visit (AWV)  07/15/2023    GFR test (Diabetes, CKD 3-4, OR last GFR 15-59)  09/07/2023    Depression Monitoring  01/09/2024    Colorectal Cancer Screen  06/24/2031    Flu vaccine  Completed    Pneumococcal 0-64 years Vaccine  Completed    COVID-19 Vaccine  Completed    Hepatitis C screen  Completed    HIV screen  Completed    Hepatitis A vaccine  Aged Out    Hib vaccine  Aged Out    Meningococcal (ACWY) vaccine  Aged Out

## 2023-01-18 ASSESSMENT — LIFESTYLE VARIABLES: SMOKELESS_TOBACCO: NO

## 2023-01-18 ASSESSMENT — PULMONARY FUNCTION TESTS
FEV1 (%PREDICTED): 79
FVC (LITERS): 75
FEV1/FVC: 84

## 2023-01-18 ASSESSMENT — EXERCISE STRESS TEST
PEAK_BP: 136/90
PEAK_RPE: 8
PEAK_HR: 85

## 2023-01-18 NOTE — PROGRESS NOTES
01/18/23 1500   Treatment Diagnosis   Treatment Diagnosis 1 Other (comments)  (post COVID)   Treatment Diagnosis 2 Asthma   Referral Date 10/04/22   Significant Cardiovascular History History of heart failure   Co-morbidities   (fibromyalgia, hx of back injuries with a bad accident)   Oxygen Intervention   Oxygen Use No   O2 Sat Greater Than 90% Yes   Patient Stated Goals  feel less sob, feel able to be out and about and loose weight   Individual Treatment Plan   ITP Visit Type Re-assessment   1st Date of Exercise 10/26/22   ITP Next Review Date 02/18/23   Visit #/Total Visits 10/36   FVC (Liters) 75 liters   FEV1 (%PRED) 79   FEV1/FVC 84   DLCO (mL/mmHg sec) 74 ml/mmHg sec   Exercise   Exercise Test Six minute walk test   Distance Walked in  ft   Peak HR 85   Peak /90   Peak RPE 8  (jesika 6 could not continue)   Stops could only walk for 3 min   SPO2 Range 95%   Exercise Prescription   Mode Ergometer; Stepper   Frequency per week 2   Duration Per Session 60   Progression nu step 1 for 10 min   Weight 2   Reps 1/10   Intensity METS       1.3   Comments Aniket Lynne has not made very much progress because she has been absent a lot due to fibromyalgia pain and ability to get here. Aniket Lynne really gets a lot of support from the group and it mentally helps her to come. Exercise Intervention   Type weights   Frequency every day   Comments Aniket Lynne is using cans at home as weights. She was doing them frequently but has recently been in a lot of pain and not partipating at home like she was   Exercise Target Group   Patient Stated Exercise Goals live with less pain and sob   Psychosocial   Stages of Change Maintenance   Family Support Yes   Comments numbers provided, biggest problem is pain management, sees a clinic but is often in so much pain that it is dificult to move   Psychosocial Intervention   Interventions Currently under treatment for depression   Medication Changes No   Resources Provided Directions to consult; Other (comment)  (phone number for a different pain clinic)   Psychosocial Target Goals   Patient Stated Psychosocial Goals To enjoy life again but struggles with chronic pain. She doesn't want to be labeled as a pain seeker, but she is in a lot of pain and not getting what she needs from her current doctor   Uses Stress Mgmt Techniques Yes   Target Goal(s) Demonstrates appropriate interaction with others   Tobacco   Stages of Change Maintenance   Tobacco Use No   Smokeless Tobacco Use No   Nutrition   Stages of Change Contemplate   Diabetes No   Weight Management   Weight  380 lb (172.4 kg)   Weight Goal Milton Wray has tried to change her diet as well as exercise.  She struggles with depression and pain and enjoys being at rehab with the other patients   Education   Learning Barrier Ready to learn   Pulmonary Total Score 90%   Education Target Goals   Patient Stated Education Goals loosing weight and being less sob

## 2023-01-23 ENCOUNTER — HOSPITAL ENCOUNTER (OUTPATIENT)
Dept: PULMONOLOGY | Age: 58
Setting detail: THERAPIES SERIES
Discharge: HOME | End: 2023-01-23
Payer: MEDICARE

## 2023-01-24 ENCOUNTER — TELEPHONE (OUTPATIENT)
Dept: INTERNAL MEDICINE CLINIC | Age: 58
End: 2023-01-24

## 2023-01-24 DIAGNOSIS — M47.816 SPONDYLOSIS OF LUMBAR REGION WITHOUT MYELOPATHY OR RADICULOPATHY: Primary | ICD-10-CM

## 2023-01-24 DIAGNOSIS — G89.4 CHRONIC PAIN SYNDROME: ICD-10-CM

## 2023-01-24 NOTE — TELEPHONE ENCOUNTER
Patient [facility] a [facility] of [facility] choice , [address] [facility] [address]    Fax # [phone]

## 2023-01-25 ENCOUNTER — HOSPITAL ENCOUNTER (OUTPATIENT)
Dept: PULMONOLOGY | Age: 58
Setting detail: THERAPIES SERIES
Discharge: HOME | End: 2023-01-25
Payer: MEDICARE

## 2023-01-30 ENCOUNTER — APPOINTMENT (OUTPATIENT)
Dept: PULMONOLOGY | Age: 58
End: 2023-01-30
Payer: MEDICARE

## 2023-02-01 ENCOUNTER — HOSPITAL ENCOUNTER (OUTPATIENT)
Dept: PULMONOLOGY | Age: 58
Setting detail: THERAPIES SERIES
Discharge: HOME | End: 2023-02-01
Payer: MEDICARE

## 2023-02-01 VITALS — BODY MASS INDEX: 56.71 KG/M2 | WEIGHT: 293 LBS

## 2023-02-01 PROCEDURE — G0239 OTH RESP PROC, GROUP: HCPCS

## 2023-02-01 ASSESSMENT — EXERCISE STRESS TEST
PEAK_RPE: 5
PEAK_METS: 1.9
PEAK_HR: 83

## 2023-02-08 ENCOUNTER — HOSPITAL ENCOUNTER (OUTPATIENT)
Dept: PULMONOLOGY | Age: 58
Setting detail: THERAPIES SERIES
End: 2023-02-08
Payer: MEDICARE

## 2023-02-09 DIAGNOSIS — G89.4 CHRONIC PAIN SYNDROME: ICD-10-CM

## 2023-02-09 RX ORDER — TIZANIDINE 4 MG/1
TABLET ORAL
Qty: 90 TABLET | Refills: 0 | Status: SHIPPED | OUTPATIENT
Start: 2023-02-09

## 2023-02-10 DIAGNOSIS — F43.10 PTSD (POST-TRAUMATIC STRESS DISORDER): ICD-10-CM

## 2023-02-10 DIAGNOSIS — Z76.0 MEDICATION REFILL: ICD-10-CM

## 2023-02-10 RX ORDER — TRAZODONE HYDROCHLORIDE 150 MG/1
150 TABLET ORAL NIGHTLY
Qty: 30 TABLET | Refills: 0 | Status: SHIPPED | OUTPATIENT
Start: 2023-02-10

## 2023-02-13 ENCOUNTER — HOSPITAL ENCOUNTER (OUTPATIENT)
Dept: PULMONOLOGY | Age: 58
Setting detail: THERAPIES SERIES
Discharge: HOME | End: 2023-02-13
Payer: MEDICARE

## 2023-02-13 VITALS — BODY MASS INDEX: 56.71 KG/M2 | WEIGHT: 293 LBS

## 2023-02-13 PROCEDURE — G0239 OTH RESP PROC, GROUP: HCPCS

## 2023-02-13 ASSESSMENT — EXERCISE STRESS TEST
PEAK_METS: 1.8
PEAK_RPE: 4
PEAK_HR: 79

## 2023-02-15 ENCOUNTER — HOSPITAL ENCOUNTER (OUTPATIENT)
Dept: PULMONOLOGY | Age: 58
Setting detail: THERAPIES SERIES
Discharge: HOME | End: 2023-02-15
Payer: MEDICARE

## 2023-02-15 VITALS — BODY MASS INDEX: 57.54 KG/M2 | WEIGHT: 293 LBS

## 2023-02-15 PROCEDURE — G0239 OTH RESP PROC, GROUP: HCPCS

## 2023-02-15 ASSESSMENT — EXERCISE STRESS TEST
PEAK_METS: 1.6
PEAK_HR: 98
PEAK_RPE: 9

## 2023-02-20 ENCOUNTER — HOSPITAL ENCOUNTER (OUTPATIENT)
Dept: PULMONOLOGY | Age: 58
Setting detail: THERAPIES SERIES
Discharge: HOME | End: 2023-02-20
Payer: MEDICARE

## 2023-02-20 VITALS — WEIGHT: 293 LBS | BODY MASS INDEX: 56.67 KG/M2

## 2023-02-20 PROCEDURE — G0239 OTH RESP PROC, GROUP: HCPCS

## 2023-02-20 ASSESSMENT — EXERCISE STRESS TEST
PEAK_HR: 85
PEAK_METS: 1.6
PEAK_RPE: 4

## 2023-02-27 ENCOUNTER — HOSPITAL ENCOUNTER (OUTPATIENT)
Dept: PULMONOLOGY | Age: 58
Setting detail: THERAPIES SERIES
Discharge: HOME | End: 2023-02-27
Payer: MEDICARE

## 2023-03-01 ENCOUNTER — HOSPITAL ENCOUNTER (OUTPATIENT)
Dept: PULMONOLOGY | Age: 58
Setting detail: THERAPIES SERIES
Discharge: HOME | End: 2023-03-01

## 2023-03-06 ENCOUNTER — HOSPITAL ENCOUNTER (OUTPATIENT)
Dept: PULMONOLOGY | Age: 58
Setting detail: THERAPIES SERIES
Discharge: HOME | End: 2023-03-06
Payer: MEDICARE

## 2023-03-06 VITALS — WEIGHT: 293 LBS | BODY MASS INDEX: 58.23 KG/M2

## 2023-03-06 PROCEDURE — G0239 OTH RESP PROC, GROUP: HCPCS

## 2023-03-06 ASSESSMENT — EXERCISE STRESS TEST
PEAK_RPE: 4
PEAK_METS: 1.3
PEAK_HR: 86

## 2023-03-08 ENCOUNTER — HOSPITAL ENCOUNTER (OUTPATIENT)
Dept: PULMONOLOGY | Age: 58
Setting detail: THERAPIES SERIES
Discharge: HOME | End: 2023-03-08
Payer: MEDICARE

## 2023-03-13 ENCOUNTER — HOSPITAL ENCOUNTER (OUTPATIENT)
Dept: PULMONOLOGY | Age: 58
Setting detail: THERAPIES SERIES
Discharge: HOME | End: 2023-03-13
Payer: MEDICARE

## 2023-03-13 VITALS — WEIGHT: 293 LBS | BODY MASS INDEX: 57.63 KG/M2

## 2023-03-13 PROCEDURE — G0239 OTH RESP PROC, GROUP: HCPCS

## 2023-03-13 ASSESSMENT — EXERCISE STRESS TEST
PEAK_RPE: 4
PEAK_HR: 82
PEAK_METS: 1.4

## 2023-03-15 ENCOUNTER — HOSPITAL ENCOUNTER (OUTPATIENT)
Dept: PULMONOLOGY | Age: 58
Setting detail: THERAPIES SERIES
Discharge: HOME | End: 2023-03-15
Payer: MEDICARE

## 2023-03-15 DIAGNOSIS — F43.10 PTSD (POST-TRAUMATIC STRESS DISORDER): ICD-10-CM

## 2023-03-15 DIAGNOSIS — Z76.0 MEDICATION REFILL: ICD-10-CM

## 2023-03-15 DIAGNOSIS — G89.4 CHRONIC PAIN SYNDROME: ICD-10-CM

## 2023-03-15 RX ORDER — HYDRALAZINE HYDROCHLORIDE 25 MG/1
25 TABLET, FILM COATED ORAL 2 TIMES DAILY
Qty: 60 TABLET | Refills: 0 | Status: SHIPPED | OUTPATIENT
Start: 2023-03-15

## 2023-03-15 RX ORDER — TRAZODONE HYDROCHLORIDE 150 MG/1
150 TABLET ORAL NIGHTLY
Qty: 30 TABLET | Refills: 0 | Status: SHIPPED | OUTPATIENT
Start: 2023-03-15

## 2023-03-15 RX ORDER — TIZANIDINE 4 MG/1
TABLET ORAL
Qty: 90 TABLET | Refills: 0 | Status: SHIPPED | OUTPATIENT
Start: 2023-03-15

## 2023-03-15 ASSESSMENT — EXERCISE STRESS TEST
PEAK_HR: 80
PEAK_METS: 1.7
PEAK_BP: 134/82
PEAK_RPE: 3
PEAK_BP: 134/82

## 2023-03-15 ASSESSMENT — PULMONARY FUNCTION TESTS
FVC (LITERS): 75
FEV1/FVC: 84
FEV1 (%PREDICTED): 79

## 2023-03-15 ASSESSMENT — LIFESTYLE VARIABLES: SMOKELESS_TOBACCO: NO

## 2023-03-15 NOTE — LETTER
March 16, 2023       Aureliano Villalobos YOB: 1965   Liliane Liang Date of Visit:  3/15/2023       To Whom It May Concern: It is my medical opinion that Bob Ponce requires a disability parking placard for the following reasons:  She cannot walk 200 feet without stopping to rest due to chronic pain issues. Duration of need: 5 years    If you have any questions or concerns, please don't hesitate to call.     Sincerely,        Miguel A Hennessy MD

## 2023-03-15 NOTE — PLAN OF CARE
Hospital Based Pulmonary Rehab: [address] [facility], 59 y.o. female, -1965 Today's Date: 3/15/2023    Qualifying Diagnosis-   Treatment Diagnosis 1: Other (Comments) (post COVID)      Treatment Diagnosis 2: Asthma         Treatment Diagnosis   Treatment Diagnosis  Treatment Diagnosis 1: Other (Comments) (post COVID)  Treatment Diagnosis 2: Asthma  Referral Date: 10/04/22  Significant Cardiovascular History: History of heart failure     ITP Visit Type  ITP Visit Type: Re-assessment         Exercise   Exercise  Perry Total Score: 85  Exercise Test: Six minute walk test  Distance Walked in : ft  Distance Walked (ft): 50 ft  Peak HR: 80  Peak BP: 134/82  Peak RPE: 3  Peak Mets: 1.7  O2 Saturation: 96  Stops: takes breaks on nustep due to pain in legs  SPO2 Range: 94-96  BMI (Calculated): 57.18  FEV1 (%PRED): 79       Exercise Prescription   Exercise Prescription  Mode: Stepper; Ergometer; Other (comment) (free weights)  Frequency per week: 2  Duration Per Session: 60  Intensity METS      : 1.3  Progression: Devonte Wynn has increased her workload to 3 and her time to 30 minutes on the nustep  SpO2: 97 %  O2 Device: Room air  Comments: Devonte Wynn still has knee pain but continues to increase her endurance  Symptoms with Exercise: Other (comment) (fatigue)  Resistance Training: Yes  Weight: 2  Reps: 1/15  Assisted Devices: Walker       Exercise Blood Pressures   Exercise Blood Pressures  Resting BP: 124/82  Peak BP: 134/82       Exercise Intervention   Exercise Intervention  Type: weights  Frequency: every few days due to pain  Comments: Devonte Wynn is using cans at home as weights.  She was doing them frequently but has recently been in a lot of pain and not partipating at home as often       Exercise Education   No data recorded   Exercise target group   Exercise Target Group  Patient Stated Exercise Goals: live with less pain and sob         Nutrition   Nutrition  Stages of Change: Contemplate  Diabetes: No       Lipids   No data recorded   Weight Management   Weight Management  Weight : 379 lb (171.9 kg)  Weight Goal: Anuj Moore has tried to change her diet as well as exercise. She struggles with depression and pain and enjoys being at rehab with the other patients       Nutrition Intervention   No data recorded   Nutrition Education   No data recorded   Nutrition Target Goals   No data recorded     Psychosocial   Psychosocial  Stages of Change: Maintenance  Family Support: Yes  Comments: numbers provided, biggest problem is pain management, sees a clinic but is often in so much pain that it is dificult to move       Psychosocial intervention   Psychosocial Intervention  Interventions: Currently under treatment for depression  Resources Provided: Directions to consult; Other (comment) (phone number for a different pain clinic)  Medication Changes: No       Psychosocial education   No data recorded   Psychosocial target goals   Psychosocial Target Goals  Target Goal(s): Demonstrates appropriate interaction with others  Uses Stress Mgmt Techniques: Yes  Patient Stated Psychosocial Goals: To enjoy life again but struggles with chronic pain.  She doesn't want to be labeled as a pain seeker, but she is in a lot of pain and not getting what she needs from her current doctor       Tobacco   Tobacco  Stages of Change: Maintenance  Tobacco Use: No  Smokeless Tobacco Use: No      Tobacco Cessation Intervention   No data recorded   Tobacco Education   No data recorded   Target Goal   No data recorded     Oxygen Saturation Titration   No data recorded   Oxygen Intervention   Oxygen Intervention  Oxygen Use: No  O2 Sat Greater Than 90%: Yes  Patient Stated Goals : feel less sob, feel able to be out and about and loose weight       Individual Treatment Plan   Individual Treatment Plan  ITP Visit Type: Re-assessment  1st Date of Exercise: 10/26/22  ITP Next Review Date: 02/18/23  Visit #/Total Visits: 16/36  FVC (Liters): 75 liters  FEV1 (%PRED): 79  FEV1/FVC: 84  DLCO (mL/mmHg sec): 74 ml/mmHg sec  Risk Stratification: Moderate  Pulmonary ITP: Exercise; Psychosocial; Nutrition; Education           Education   Education  Learning Barrier: Ready to learn  Pulmonary Total Score: 90%       Education Intervention   No data recorded   Patient Education   Patient Education  Education: Activities of daily living; Risk factors       Education Target Goals   Education Target Goals  Patient Stated Education Goals: loosing weight and being less sob       Physician Response   Physician Response  MD Signature: Yue Wright MD             Comments:     [de-identified] Signature:    Date:

## 2023-03-20 ENCOUNTER — HOSPITAL ENCOUNTER (OUTPATIENT)
Dept: PULMONOLOGY | Age: 58
Setting detail: THERAPIES SERIES
Discharge: HOME | End: 2023-03-20
Payer: MEDICARE

## 2023-03-22 ENCOUNTER — APPOINTMENT (OUTPATIENT)
Dept: PULMONOLOGY | Age: 58
End: 2023-03-22
Payer: MEDICARE

## 2023-03-27 ENCOUNTER — HOSPITAL ENCOUNTER (OUTPATIENT)
Dept: PULMONOLOGY | Age: 58
Setting detail: THERAPIES SERIES
Discharge: HOME | End: 2023-03-27
Payer: MEDICARE

## 2023-03-29 ENCOUNTER — HOSPITAL ENCOUNTER (OUTPATIENT)
Dept: PULMONOLOGY | Age: 58
Setting detail: THERAPIES SERIES
Discharge: HOME | End: 2023-03-29
Payer: MEDICARE

## 2023-04-03 ENCOUNTER — INITIAL CONSULT (OUTPATIENT)
Dept: PAIN MANAGEMENT | Age: 58
End: 2023-04-03
Payer: MEDICARE

## 2023-04-03 VITALS — WEIGHT: 293 LBS | HEIGHT: 68 IN | BODY MASS INDEX: 44.41 KG/M2

## 2023-04-03 DIAGNOSIS — Z98.1 HISTORY OF LUMBAR FUSION: Primary | ICD-10-CM

## 2023-04-03 DIAGNOSIS — G89.29 CHRONIC PAIN OF MULTIPLE SITES: ICD-10-CM

## 2023-04-03 DIAGNOSIS — Z98.1 STATUS POST CERVICAL SPINAL FUSION: ICD-10-CM

## 2023-04-03 DIAGNOSIS — G89.29 OTHER CHRONIC PAIN: ICD-10-CM

## 2023-04-03 DIAGNOSIS — R52 CHRONIC PAIN OF MULTIPLE SITES: ICD-10-CM

## 2023-04-03 PROCEDURE — G8417 CALC BMI ABV UP PARAM F/U: HCPCS | Performed by: PAIN MEDICINE

## 2023-04-03 PROCEDURE — G8427 DOCREV CUR MEDS BY ELIG CLIN: HCPCS | Performed by: PAIN MEDICINE

## 2023-04-03 PROCEDURE — 99214 OFFICE O/P EST MOD 30 MIN: CPT | Performed by: PAIN MEDICINE

## 2023-04-03 PROCEDURE — 3017F COLORECTAL CA SCREEN DOC REV: CPT | Performed by: PAIN MEDICINE

## 2023-04-03 PROCEDURE — 1036F TOBACCO NON-USER: CPT | Performed by: PAIN MEDICINE

## 2023-04-03 ASSESSMENT — ENCOUNTER SYMPTOMS
COUGH: 0
BOWEL INCONTINENCE: 0

## 2023-04-03 NOTE — PROGRESS NOTES
Rfl: 1    omeprazole (PRILOSEC) 20 MG delayed release capsule, Take 1 capsule by mouth every morning (before breakfast), Disp: 30 capsule, Rfl: 0    nystatin (MYCOSTATIN) 623682 UNIT/GM cream, Apply topically 2 times daily. , Disp: 30 g, Rfl: 1    Vitamin D, Ergocalciferol, 83656 units CAPS, Take 50,000 Units by mouth once a week, Disp: 5 capsule, Rfl: 1    budesonide-formoterol (SYMBICORT) 160-4.5 MCG/ACT AERO, Inhale 2 puffs into the lungs 2 times daily, Disp: 3 each, Rfl: 3    albuterol sulfate HFA (PROVENTIL;VENTOLIN;PROAIR) 108 (90 Base) MCG/ACT inhaler, Inhale 2 puffs into the lungs every 4 hours as needed for Shortness of Breath, Disp: 3 each, Rfl: 3    Handicap Placard MISC, by Does not apply route 5 year, Disp: 1 each, Rfl: 0    dextromethorphan-guaiFENesin (ROBITUSSIN-DM)  MG/5ML syrup, Take 5 mLs by mouth every 4 hours as needed for Cough, Disp: 473 mL, Rfl: 1    levothyroxine (SYNTHROID) 50 MCG tablet, Take 1 tablet by mouth Daily, Disp: 30 tablet, Rfl: 0    isosorbide dinitrate (ISORDIL) 10 MG tablet, Take 1 tablet by mouth 2 times daily, Disp: 180 tablet, Rfl: 1    polyethylene glycol (GLYCOLAX) 17 g packet, polyethylene glycol 3350 17 gram/dose oral powder, Disp: 527 g, Rfl: 0    aspirin 81 MG EC tablet, Take 81 mg by mouth, Disp: , Rfl:     sucralfate (CARAFATE) 1 GM tablet, Take 1 tablet in AM, 2 tablet in evening daily, Disp: 90 tablet, Rfl: 3    albuterol (PROVENTIL) (2.5 MG/3ML) 0.083% nebulizer solution, Take 3 mLs by nebulization every 6 hours as needed for Wheezing, Disp: 120 each, Rfl: 3    Respiratory Therapy Supplies (NEBULIZER COMPRESSOR) KIT, Provide insurance covered nebulizer, use daily as needed, Disp: 1 kit, Rfl: 0    Family History   Problem Relation Age of Onset    Other Mother     Diabetes Mother     Heart Disease Mother     Arthritis Mother     Kidney Disease Mother     Lupus Mother     Arthritis Sister     Diabetes Brother     Asthma Brother     Cancer Maternal Grandfather

## 2023-04-05 ENCOUNTER — HOSPITAL ENCOUNTER (OUTPATIENT)
Dept: PULMONOLOGY | Age: 58
Setting detail: THERAPIES SERIES
Discharge: HOME | End: 2023-04-05

## 2023-04-05 VITALS — OXYGEN SATURATION: 96 %

## 2023-04-05 ASSESSMENT — EXERCISE STRESS TEST
PEAK_HR: 76
PEAK_RPE: 3
PEAK_METS: 1.7
PEAK_BP: 134/82
PEAK_BP: 134/82

## 2023-04-05 NOTE — PLAN OF CARE
Yes  Patient Stated Goals : feel less sob, feel able to be out and about and loose weight       Individual Treatment Plan   Individual Treatment Plan  ITP Visit Type: Re-assessment  1st Date of Exercise: 10/26/22  ITP Next Review Date: 02/18/23  Visit #/Total Visits: 16/36  FVC (Liters): 75 liters  FEV1 (%PRED): 79  FEV1/FVC: 84  DLCO (mL/mmHg sec): 74 ml/mmHg sec  Risk Stratification: Moderate  Pulmonary ITP: Exercise; Psychosocial; Nutrition; Education           Education   Education  Learning Barrier: Ready to learn  Pulmonary Total Score: 90%       Education Intervention   No data recorded   Patient Education   Patient Education  Education: Risk factors       Education Target Goals   Education Target Goals  Patient Stated Education Goals: loosing weight and being less sob       Physician Response   Physician Response  MD Signature: Negrita Lowery MD             Comments:   Agree with above findings and plan  Physician's Signature:  Kaykay Velázquez MD  Date: April 5, 2023

## 2023-04-17 ENCOUNTER — HOSPITAL ENCOUNTER (OUTPATIENT)
Dept: PULMONOLOGY | Age: 58
Setting detail: THERAPIES SERIES
Discharge: HOME | End: 2023-04-17
Payer: MEDICARE

## 2023-04-17 VITALS — BODY MASS INDEX: 57.47 KG/M2 | WEIGHT: 293 LBS

## 2023-04-17 PROCEDURE — G0239 OTH RESP PROC, GROUP: HCPCS

## 2023-04-17 ASSESSMENT — EXERCISE STRESS TEST
PEAK_RPE: 3.5
PEAK_METS: 1.3
PEAK_HR: 78

## 2023-04-24 ENCOUNTER — TELEPHONE (OUTPATIENT)
Dept: INTERNAL MEDICINE CLINIC | Age: 58
End: 2023-04-24

## 2023-04-24 ENCOUNTER — HOSPITAL ENCOUNTER (OUTPATIENT)
Dept: PULMONOLOGY | Age: 58
Setting detail: THERAPIES SERIES
Discharge: HOME | End: 2023-04-24
Payer: MEDICARE

## 2023-04-24 VITALS — BODY MASS INDEX: 57.81 KG/M2 | WEIGHT: 293 LBS

## 2023-04-24 DIAGNOSIS — I50.42 CHRONIC COMBINED SYSTOLIC AND DIASTOLIC CONGESTIVE HEART FAILURE (HCC): ICD-10-CM

## 2023-04-24 DIAGNOSIS — E66.01 MORBID OBESITY WITH BMI OF 50.0-59.9, ADULT (HCC): ICD-10-CM

## 2023-04-24 DIAGNOSIS — G89.4 CHRONIC PAIN SYNDROME: Primary | ICD-10-CM

## 2023-04-24 PROCEDURE — G0239 OTH RESP PROC, GROUP: HCPCS

## 2023-04-24 RX ORDER — CALCIUM CARBONATE 160(400)MG
TABLET,CHEWABLE ORAL
Qty: 1 EACH | Refills: 0 | Status: SHIPPED | OUTPATIENT
Start: 2023-04-24

## 2023-04-24 ASSESSMENT — EXERCISE STRESS TEST
PEAK_METS: 1.6
PEAK_RPE: 3
PEAK_HR: 73

## 2023-04-24 NOTE — TELEPHONE ENCOUNTER
Patient is in need of a new rollator walker. Her's broke. Patient would like DME order sent to [facility]. I advised patient that she may need a face to face for this. Patient was ok for that.     Please advise

## 2023-04-26 ENCOUNTER — HOSPITAL ENCOUNTER (OUTPATIENT)
Dept: PULMONOLOGY | Age: 58
Setting detail: THERAPIES SERIES
Discharge: HOME | End: 2023-04-26
Payer: MEDICARE

## 2023-04-26 ENCOUNTER — HOSPITAL ENCOUNTER (OUTPATIENT)
Age: 58
Discharge: HOME | End: 2023-04-26
Payer: MEDICARE

## 2023-04-26 VITALS — WEIGHT: 293 LBS | BODY MASS INDEX: 57.47 KG/M2

## 2023-04-26 DIAGNOSIS — R73.03 PREDIABETES: ICD-10-CM

## 2023-04-26 DIAGNOSIS — R20.0 NUMBNESS AND TINGLING IN BOTH HANDS: ICD-10-CM

## 2023-04-26 DIAGNOSIS — R20.2 NUMBNESS AND TINGLING IN BOTH HANDS: ICD-10-CM

## 2023-04-26 DIAGNOSIS — E55.9 VITAMIN D DEFICIENCY: ICD-10-CM

## 2023-04-26 LAB
25(OH)D3 SERPL-MCNC: 38.4 NG/ML
ABSOLUTE EOS #: 0.3 K/UL (ref 0–0.4)
ABSOLUTE LYMPH #: 1.6 K/UL (ref 1–4.8)
ABSOLUTE MONO #: 0.4 K/UL (ref 0.1–1.3)
ANION GAP SERPL CALCULATED.3IONS-SCNC: 8 MMOL/L (ref 9–17)
BASOPHILS # BLD: 1 % (ref 0–2)
BASOPHILS ABSOLUTE: 0.1 K/UL (ref 0–0.2)
BUN SERPL-MCNC: 17 MG/DL (ref 6–20)
CALCIUM SERPL-MCNC: 8.8 MG/DL (ref 8.6–10.4)
CHLORIDE SERPL-SCNC: 103 MMOL/L (ref 98–107)
CO2 SERPL-SCNC: 25 MMOL/L (ref 20–31)
CREAT SERPL-MCNC: 1.22 MG/DL (ref 0.5–0.9)
EOSINOPHILS RELATIVE PERCENT: 4 % (ref 0–4)
FOLATE SERPL-MCNC: 13 NG/ML
GFR SERPL CREATININE-BSD FRML MDRD: 51 ML/MIN/1.73M2
GLUCOSE SERPL-MCNC: 86 MG/DL (ref 70–99)
HCT VFR BLD AUTO: 31.2 % (ref 36–46)
HGB BLD-MCNC: 10 G/DL (ref 12–16)
LYMPHOCYTES # BLD: 23 % (ref 24–44)
MCH RBC QN AUTO: 27 PG (ref 26–34)
MCHC RBC AUTO-ENTMCNC: 31.9 G/DL (ref 31–37)
MCV RBC AUTO: 84.5 FL (ref 80–100)
MONOCYTES # BLD: 5 % (ref 1–7)
PDW BLD-RTO: 16.3 % (ref 11.5–14.9)
PLATELET # BLD AUTO: 241 K/UL (ref 150–450)
PMV BLD AUTO: 7.9 FL (ref 6–12)
POTASSIUM SERPL-SCNC: 4.6 MMOL/L (ref 3.7–5.3)
RBC # BLD: 3.69 M/UL (ref 4–5.2)
SEG NEUTROPHILS: 67 % (ref 36–66)
SEGMENTED NEUTROPHILS ABSOLUTE COUNT: 4.9 K/UL (ref 1.3–9.1)
SODIUM SERPL-SCNC: 136 MMOL/L (ref 135–144)
TSH SERPL-ACNC: 3.53 UIU/ML (ref 0.3–5)
VIT B12 SERPL-MCNC: 767 PG/ML (ref 232–1245)
WBC # BLD AUTO: 7.3 K/UL (ref 3.5–11)

## 2023-04-26 PROCEDURE — G0239 OTH RESP PROC, GROUP: HCPCS

## 2023-04-26 PROCEDURE — 83036 HEMOGLOBIN GLYCOSYLATED A1C: CPT

## 2023-04-26 PROCEDURE — 82306 VITAMIN D 25 HYDROXY: CPT

## 2023-04-26 PROCEDURE — 84443 ASSAY THYROID STIM HORMONE: CPT

## 2023-04-26 PROCEDURE — 36415 COLL VENOUS BLD VENIPUNCTURE: CPT

## 2023-04-26 PROCEDURE — 82746 ASSAY OF FOLIC ACID SERUM: CPT

## 2023-04-26 PROCEDURE — 82607 VITAMIN B-12: CPT

## 2023-04-26 PROCEDURE — 80048 BASIC METABOLIC PNL TOTAL CA: CPT

## 2023-04-26 PROCEDURE — 85025 COMPLETE CBC W/AUTO DIFF WBC: CPT

## 2023-04-26 ASSESSMENT — EXERCISE STRESS TEST
PEAK_HR: 70
PEAK_RPE: 3
PEAK_METS: 107

## 2023-04-27 LAB
EST. AVERAGE GLUCOSE BLD GHB EST-MCNC: 128 MG/DL
HBA1C MFR BLD: 6.1 % (ref 4–6)

## 2023-05-01 ASSESSMENT — EXERCISE STRESS TEST
PEAK_RPE: 3
PEAK_BP: 126/74
PEAK_BP: 126/74
PEAK_METS: 1.7
PEAK_HR: 74

## 2023-05-01 ASSESSMENT — LIFESTYLE VARIABLES: SMOKELESS_TOBACCO: NO

## 2023-05-03 ENCOUNTER — HOSPITAL ENCOUNTER (OUTPATIENT)
Dept: PULMONOLOGY | Age: 58
Setting detail: THERAPIES SERIES
Discharge: HOME | End: 2023-05-03
Payer: MEDICARE

## 2023-05-03 ENCOUNTER — APPOINTMENT (OUTPATIENT)
Dept: GENERAL RADIOLOGY | Age: 58
End: 2023-05-03
Payer: MEDICARE

## 2023-05-03 ENCOUNTER — HOSPITAL ENCOUNTER (EMERGENCY)
Age: 58
Discharge: HOME | End: 2023-05-03
Attending: EMERGENCY MEDICINE
Payer: MEDICARE

## 2023-05-03 VITALS — WEIGHT: 293 LBS | BODY MASS INDEX: 57.47 KG/M2

## 2023-05-03 VITALS
HEART RATE: 66 BPM | OXYGEN SATURATION: 100 % | SYSTOLIC BLOOD PRESSURE: 123 MMHG | TEMPERATURE: 97 F | DIASTOLIC BLOOD PRESSURE: 70 MMHG | RESPIRATION RATE: 24 BRPM

## 2023-05-03 DIAGNOSIS — Z76.0 MEDICATION REFILL: ICD-10-CM

## 2023-05-03 DIAGNOSIS — E03.9 HYPOTHYROIDISM, UNSPECIFIED TYPE: ICD-10-CM

## 2023-05-03 DIAGNOSIS — T73.3XXA FATIGUE DUE TO EXCESSIVE EXERTION, INITIAL ENCOUNTER: ICD-10-CM

## 2023-05-03 DIAGNOSIS — R51.9 NONINTRACTABLE HEADACHE, UNSPECIFIED CHRONICITY PATTERN, UNSPECIFIED HEADACHE TYPE: Primary | ICD-10-CM

## 2023-05-03 LAB
ALBUMIN SERPL-MCNC: 3.9 G/DL (ref 3.5–5.2)
ALP SERPL-CCNC: 166 U/L (ref 35–104)
ALT SERPL-CCNC: 9 U/L (ref 5–33)
ANION GAP SERPL CALCULATED.3IONS-SCNC: 9 MMOL/L (ref 9–17)
AST SERPL-CCNC: 11 U/L
BILIRUB SERPL-MCNC: 0.4 MG/DL (ref 0.3–1.2)
BUN SERPL-MCNC: 26 MG/DL (ref 6–20)
CALCIUM SERPL-MCNC: 9.1 MG/DL (ref 8.6–10.4)
CHLORIDE SERPL-SCNC: 101 MMOL/L (ref 98–107)
CO2 SERPL-SCNC: 25 MMOL/L (ref 20–31)
CREAT SERPL-MCNC: 1.41 MG/DL (ref 0.5–0.9)
GFR SERPL CREATININE-BSD FRML MDRD: 43 ML/MIN/1.73M2
GLUCOSE SERPL-MCNC: 85 MG/DL (ref 70–99)
HCT VFR BLD AUTO: 30.1 % (ref 36–46)
HGB BLD-MCNC: 9.5 G/DL (ref 12–16)
MAGNESIUM SERPL-MCNC: 2.1 MG/DL (ref 1.6–2.6)
MCH RBC QN AUTO: 26.3 PG (ref 26–34)
MCHC RBC AUTO-ENTMCNC: 31.4 G/DL (ref 31–37)
MCV RBC AUTO: 83.8 FL (ref 80–100)
PDW BLD-RTO: 16.4 % (ref 11.5–14.9)
PLATELET # BLD AUTO: 259 K/UL (ref 150–450)
PMV BLD AUTO: 7.8 FL (ref 6–12)
POTASSIUM SERPL-SCNC: 4.2 MMOL/L (ref 3.7–5.3)
PROT SERPL-MCNC: 7.4 G/DL (ref 6.4–8.3)
RBC # BLD: 3.6 M/UL (ref 4–5.2)
SODIUM SERPL-SCNC: 135 MMOL/L (ref 135–144)
T4 FREE SERPL-MCNC: 1 NG/DL (ref 0.9–1.7)
TROPONIN I SERPL DL<=0.01 NG/ML-MCNC: 15 NG/L (ref 0–14)
TROPONIN I SERPL DL<=0.01 NG/ML-MCNC: 15 NG/L (ref 0–14)
TSH SERPL-ACNC: 5.39 UIU/ML (ref 0.3–5)
WBC # BLD AUTO: 8.1 K/UL (ref 3.5–11)

## 2023-05-03 PROCEDURE — 93005 ELECTROCARDIOGRAM TRACING: CPT | Performed by: PEDIATRICS

## 2023-05-03 PROCEDURE — 84439 ASSAY OF FREE THYROXINE: CPT

## 2023-05-03 PROCEDURE — 84443 ASSAY THYROID STIM HORMONE: CPT

## 2023-05-03 PROCEDURE — 83735 ASSAY OF MAGNESIUM: CPT

## 2023-05-03 PROCEDURE — 85027 COMPLETE CBC AUTOMATED: CPT

## 2023-05-03 PROCEDURE — 80053 COMPREHEN METABOLIC PANEL: CPT

## 2023-05-03 PROCEDURE — 6360000002 HC RX W HCPCS: Performed by: PEDIATRICS

## 2023-05-03 PROCEDURE — 36415 COLL VENOUS BLD VENIPUNCTURE: CPT

## 2023-05-03 PROCEDURE — 71045 X-RAY EXAM CHEST 1 VIEW: CPT

## 2023-05-03 PROCEDURE — G0239 OTH RESP PROC, GROUP: HCPCS

## 2023-05-03 PROCEDURE — 6370000000 HC RX 637 (ALT 250 FOR IP): Performed by: PEDIATRICS

## 2023-05-03 PROCEDURE — 84484 ASSAY OF TROPONIN QUANT: CPT

## 2023-05-03 RX ORDER — LEVOTHYROXINE SODIUM 0.05 MG/1
25 TABLET ORAL DAILY
Qty: 15 TABLET | Refills: 0 | Status: SHIPPED | OUTPATIENT
Start: 2023-05-03 | End: 2023-06-02

## 2023-05-03 RX ORDER — ACETAMINOPHEN 325 MG/1
650 TABLET ORAL ONCE
Status: COMPLETED | OUTPATIENT
Start: 2023-05-03 | End: 2023-05-03

## 2023-05-03 RX ORDER — PROCHLORPERAZINE EDISYLATE 5 MG/ML
10 INJECTION INTRAMUSCULAR; INTRAVENOUS ONCE
Status: COMPLETED | OUTPATIENT
Start: 2023-05-03 | End: 2023-05-03

## 2023-05-03 RX ORDER — MECLIZINE HYDROCHLORIDE 25 MG/1
25 TABLET ORAL ONCE
Status: COMPLETED | OUTPATIENT
Start: 2023-05-03 | End: 2023-05-03

## 2023-05-03 RX ORDER — DIPHENHYDRAMINE HYDROCHLORIDE 50 MG/ML
25 INJECTION INTRAMUSCULAR; INTRAVENOUS ONCE
Status: COMPLETED | OUTPATIENT
Start: 2023-05-03 | End: 2023-05-03

## 2023-05-03 RX ADMIN — MECLIZINE HYDROCHLORIDE 25 MG: 25 TABLET ORAL at 16:53

## 2023-05-03 RX ADMIN — DIPHENHYDRAMINE HYDROCHLORIDE 25 MG: 50 INJECTION, SOLUTION INTRAMUSCULAR; INTRAVENOUS at 18:49

## 2023-05-03 RX ADMIN — ACETAMINOPHEN 650 MG: 325 TABLET ORAL at 18:46

## 2023-05-03 RX ADMIN — PROCHLORPERAZINE EDISYLATE 10 MG: 5 INJECTION INTRAMUSCULAR; INTRAVENOUS at 18:48

## 2023-05-03 ASSESSMENT — PAIN DESCRIPTION - DESCRIPTORS: DESCRIPTORS: SHARP;THROBBING

## 2023-05-03 ASSESSMENT — HEART SCORE: ECG: 0

## 2023-05-03 ASSESSMENT — ENCOUNTER SYMPTOMS: CHEST TIGHTNESS: 1

## 2023-05-03 ASSESSMENT — PAIN DESCRIPTION - LOCATION: LOCATION: HEAD

## 2023-05-03 ASSESSMENT — PAIN DESCRIPTION - ORIENTATION: ORIENTATION: ANTERIOR;POSTERIOR

## 2023-05-03 ASSESSMENT — PAIN SCALES - GENERAL: PAINLEVEL_OUTOF10: 10

## 2023-05-03 NOTE — FLOWSHEET NOTE
Patient did not complete the time of Rehab. Patient only exercised for 10 mins because of light headedness and fatigue. Patient went to the ER.

## 2023-05-03 NOTE — PROGRESS NOTES
Patient was in pulmonary rehab. When arrived patient felt light headed and dizzy. BP was 106/76 heart rate was 56. Patient only exercised 10 mins. Patient felt extremely fatigued and light headed. Rechecked BP and it was 106/60 HR was 51. Patient did not feel right and decided she wanted to go to the ER. RN walked patient in wheelchair to the ER.

## 2023-05-04 LAB
EKG ATRIAL RATE: 61 BPM
EKG P AXIS: 68 DEGREES
EKG P-R INTERVAL: 158 MS
EKG Q-T INTERVAL: 474 MS
EKG QRS DURATION: 132 MS
EKG QTC CALCULATION (BAZETT): 477 MS
EKG R AXIS: -16 DEGREES
EKG T AXIS: 13 DEGREES
EKG VENTRICULAR RATE: 61 BPM

## 2023-05-04 PROCEDURE — 93010 ELECTROCARDIOGRAM REPORT: CPT | Performed by: INTERNAL MEDICINE

## 2023-05-08 ENCOUNTER — APPOINTMENT (OUTPATIENT)
Dept: PULMONOLOGY | Age: 58
End: 2023-05-08
Payer: MEDICARE

## 2023-05-10 ENCOUNTER — APPOINTMENT (OUTPATIENT)
Dept: PULMONOLOGY | Age: 58
End: 2023-05-10
Payer: MEDICARE

## 2023-05-15 ENCOUNTER — HOSPITAL ENCOUNTER (OUTPATIENT)
Dept: PULMONOLOGY | Age: 58
Setting detail: THERAPIES SERIES
Discharge: HOME | End: 2023-05-15
Payer: MEDICARE

## 2023-05-16 ENCOUNTER — TELEPHONE (OUTPATIENT)
Dept: INTERNAL MEDICINE CLINIC | Age: 58
End: 2023-05-16

## 2023-05-16 DIAGNOSIS — N28.9 RENAL INSUFFICIENCY: Primary | ICD-10-CM

## 2023-05-17 ENCOUNTER — APPOINTMENT (OUTPATIENT)
Dept: PULMONOLOGY | Age: 58
End: 2023-05-17
Payer: MEDICARE

## 2023-05-17 ENCOUNTER — HOSPITAL ENCOUNTER (OUTPATIENT)
Dept: PULMONOLOGY | Age: 58
Setting detail: THERAPIES SERIES
Discharge: HOME | End: 2023-05-17

## 2023-05-24 ENCOUNTER — HOSPITAL ENCOUNTER (OUTPATIENT)
Dept: PULMONOLOGY | Age: 58
Setting detail: THERAPIES SERIES
Discharge: HOME | End: 2023-05-24
Payer: MEDICARE

## 2023-05-26 DIAGNOSIS — G89.4 CHRONIC PAIN SYNDROME: ICD-10-CM

## 2023-05-30 VITALS — OXYGEN SATURATION: 99 %

## 2023-05-30 RX ORDER — TIZANIDINE 4 MG/1
TABLET ORAL
Qty: 90 TABLET | Refills: 0 | Status: SHIPPED | OUTPATIENT
Start: 2023-05-30

## 2023-05-30 ASSESSMENT — EXERCISE STRESS TEST
PEAK_METS: 1.7
PEAK_BP: 118/80
PEAK_HR: 87
PEAK_RPE: 3

## 2023-05-30 NOTE — PLAN OF CARE
Yes  Patient Stated Goals : decrease SOB       Individual Treatment Plan   Individual Treatment Plan  ITP Visit Type: Re-assessment  1st Date of Exercise: 10/26/22  ITP Next Review Date: 02/18/23  Visit #/Total Visits: 20/36  FVC (Liters): 75 liters  FEV1 (%PRED): 79  FEV1/FVC: 84  DLCO (mL/mmHg sec): 74 ml/mmHg sec  Risk Stratification: Moderate  Pulmonary ITP: Exercise; Psychosocial; Nutrition; Education           Education   Education  Learning Barrier: Ready to learn  Pulmonary Total Score: 90%       Education Intervention   No data recorded   Patient Education   Patient Education  Education: Activities of daily living; Preventing infection; Pulmonary A&P, lung/gas exchange       Education Target Goals   Education Target Goals  Patient Stated Education Goals: loosing weight and being less sob       Physician Response   Physician Response  MD Signature: Shruti Persaud MD             Comments:     [de-identified] Signature:    Date:

## 2023-05-31 ENCOUNTER — HOSPITAL ENCOUNTER (OUTPATIENT)
Dept: PULMONOLOGY | Age: 58
Setting detail: THERAPIES SERIES
Discharge: HOME | End: 2023-05-31
Payer: MEDICARE

## 2023-06-07 ENCOUNTER — HOSPITAL ENCOUNTER (OUTPATIENT)
Dept: PULMONOLOGY | Age: 58
Setting detail: THERAPIES SERIES
Discharge: HOME | End: 2023-06-07

## 2023-06-07 NOTE — PROGRESS NOTES
Patient is still waiting on her scooter to be delivered. There was a mix up with the paperwork. She states she will be back as soon as she gets it.

## 2023-06-19 ENCOUNTER — HOSPITAL ENCOUNTER (OUTPATIENT)
Dept: PULMONOLOGY | Age: 58
Setting detail: THERAPIES SERIES
Discharge: HOME | End: 2023-06-19

## 2023-06-21 ENCOUNTER — HOSPITAL ENCOUNTER (OUTPATIENT)
Dept: PULMONOLOGY | Age: 58
Setting detail: THERAPIES SERIES
Discharge: HOME | End: 2023-06-21

## 2023-06-21 VITALS — OXYGEN SATURATION: 99 %

## 2023-06-21 DIAGNOSIS — G89.4 CHRONIC PAIN SYNDROME: ICD-10-CM

## 2023-06-21 RX ORDER — TIZANIDINE 4 MG/1
TABLET ORAL
Qty: 90 TABLET | Refills: 0 | Status: SHIPPED | OUTPATIENT
Start: 2023-06-21

## 2023-06-21 NOTE — PROGRESS NOTES
Pt did not complete pulmonary rehab and asked to be discharged from the program due to transportation issues and walker unavailable at this time.  She would however like to return in the future with a new referral.

## 2023-06-21 NOTE — PLAN OF CARE
Intervention  Oxygen Use: No  O2 Sat Greater Than 90%: Yes  Patient Stated Goals : decrease SOB       Individual Treatment Plan   Individual Treatment Plan  ITP Visit Type: Discharge, did not complete program  1st Date of Exercise: 10/26/22  ITP Next Review Date: 02/18/23  Visit #/Total Visits: 20/36  FVC (Liters): 75 liters  FEV1 (%PRED): 79  FEV1/FVC: 84  DLCO (mL/mmHg sec): 74 ml/mmHg sec  Risk Stratification: Moderate  Pulmonary ITP: Exercise; Psychosocial; Nutrition; Education           Education   Education  Learning Barrier: Ready to learn  Pulmonary Total Score: 90%       Education Intervention   No data recorded   Patient Education   Patient Education  Education: Activities of daily living; Preventing infection; Pulmonary A&P, lung/gas exchange       Education Target Goals   Education Target Goals  Patient Stated Education Goals: loosing weight and being less sob       Physician Response   Physician Response  MD Signature: Arlyn White MD             Comments: Agree with above findings and plan    Physician's Signature:  Angel Lorenzo MD  Date: June 21, 2023

## 2023-06-28 ENCOUNTER — OFFICE VISIT (OUTPATIENT)
Dept: ORTHOPEDIC SURGERY | Age: 58
End: 2023-06-28

## 2023-06-28 VITALS — HEIGHT: 68 IN | WEIGHT: 293 LBS | BODY MASS INDEX: 44.41 KG/M2 | OXYGEN SATURATION: 100 % | RESPIRATION RATE: 16 BRPM

## 2023-06-28 DIAGNOSIS — M17.12 ARTHRITIS OF LEFT KNEE: ICD-10-CM

## 2023-06-28 DIAGNOSIS — E66.01 CLASS 3 SEVERE OBESITY DUE TO EXCESS CALORIES WITH SERIOUS COMORBIDITY AND BODY MASS INDEX (BMI) OF 45.0 TO 49.9 IN ADULT (HCC): Primary | Chronic | ICD-10-CM

## 2023-06-28 DIAGNOSIS — M25.561 RIGHT KNEE PAIN, UNSPECIFIED CHRONICITY: Primary | ICD-10-CM

## 2023-06-30 ASSESSMENT — ENCOUNTER SYMPTOMS
ABDOMINAL PAIN: 0
EYE DISCHARGE: 0
ROS SKIN COMMENTS: NEGATIVE FOR RASH
SHORTNESS OF BREATH: 0

## 2023-07-01 DIAGNOSIS — I10 ESSENTIAL HYPERTENSION: ICD-10-CM

## 2023-07-01 DIAGNOSIS — Z76.0 MEDICATION REFILL: ICD-10-CM

## 2023-07-01 DIAGNOSIS — I50.42 CHRONIC COMBINED SYSTOLIC AND DIASTOLIC HEART FAILURE (HCC): ICD-10-CM

## 2023-07-01 DIAGNOSIS — F43.10 PTSD (POST-TRAUMATIC STRESS DISORDER): ICD-10-CM

## 2023-07-01 DIAGNOSIS — K21.9 GASTROESOPHAGEAL REFLUX DISEASE, UNSPECIFIED WHETHER ESOPHAGITIS PRESENT: ICD-10-CM

## 2023-07-01 DIAGNOSIS — N39.41 URGE INCONTINENCE: ICD-10-CM

## 2023-07-03 NOTE — TELEPHONE ENCOUNTER
LAST VISIT:   1/9/2023     Future Appointments   Date Time Provider [address]   7/18/2023  1:00 PM Markus Rose MD [address] [place] [address]

## 2023-07-05 RX ORDER — PANTOPRAZOLE SODIUM 40 MG/1
TABLET, DELAYED RELEASE ORAL
Qty: 90 TABLET | Refills: 0 | Status: SHIPPED | OUTPATIENT
Start: 2023-07-05

## 2023-07-05 RX ORDER — BUPROPION HYDROCHLORIDE 300 MG/1
TABLET ORAL
Qty: 90 TABLET | Refills: 0 | Status: SHIPPED | OUTPATIENT
Start: 2023-07-05

## 2023-07-05 RX ORDER — METOPROLOL TARTRATE 50 MG/1
TABLET, FILM COATED ORAL
Qty: 180 TABLET | Refills: 0 | Status: SHIPPED | OUTPATIENT
Start: 2023-07-05

## 2023-07-05 RX ORDER — SPIRONOLACTONE 25 MG/1
TABLET ORAL
Qty: 90 TABLET | Refills: 0 | Status: SHIPPED | OUTPATIENT
Start: 2023-07-05

## 2023-07-05 RX ORDER — OXYBUTYNIN CHLORIDE 5 MG/1
TABLET ORAL
Qty: 180 TABLET | Refills: 0 | Status: SHIPPED | OUTPATIENT
Start: 2023-07-05

## 2023-07-05 RX ORDER — ATORVASTATIN CALCIUM 80 MG/1
TABLET, FILM COATED ORAL
Qty: 90 TABLET | Refills: 0 | Status: SHIPPED | OUTPATIENT
Start: 2023-07-05

## 2023-07-16 DIAGNOSIS — Z76.0 MEDICATION REFILL: ICD-10-CM

## 2023-07-16 DIAGNOSIS — F43.10 PTSD (POST-TRAUMATIC STRESS DISORDER): ICD-10-CM

## 2023-07-17 RX ORDER — TRAZODONE HYDROCHLORIDE 150 MG/1
150 TABLET ORAL NIGHTLY
Qty: 90 TABLET | Refills: 0 | OUTPATIENT
Start: 2023-07-17

## 2023-07-18 ENCOUNTER — OFFICE VISIT (OUTPATIENT)
Dept: INTERNAL MEDICINE CLINIC | Age: 58
End: 2023-07-18
Payer: MEDICARE

## 2023-07-18 VITALS
HEART RATE: 71 BPM | DIASTOLIC BLOOD PRESSURE: 78 MMHG | SYSTOLIC BLOOD PRESSURE: 132 MMHG | WEIGHT: 293 LBS | OXYGEN SATURATION: 97 % | BODY MASS INDEX: 56.11 KG/M2

## 2023-07-18 DIAGNOSIS — Z13.220 SCREENING FOR HYPERLIPIDEMIA: ICD-10-CM

## 2023-07-18 DIAGNOSIS — Z12.11 COLON CANCER SCREENING: ICD-10-CM

## 2023-07-18 DIAGNOSIS — E66.01 MORBID OBESITY WITH BMI OF 50.0-59.9, ADULT (HCC): ICD-10-CM

## 2023-07-18 DIAGNOSIS — G89.4 CHRONIC PAIN SYNDROME: ICD-10-CM

## 2023-07-18 DIAGNOSIS — I20.8 STABLE ANGINA (HCC): ICD-10-CM

## 2023-07-18 DIAGNOSIS — Z12.31 ENCOUNTER FOR SCREENING MAMMOGRAM FOR BREAST CANCER: ICD-10-CM

## 2023-07-18 DIAGNOSIS — Z00.00 MEDICARE ANNUAL WELLNESS VISIT, SUBSEQUENT: Primary | ICD-10-CM

## 2023-07-18 PROCEDURE — 3017F COLORECTAL CA SCREEN DOC REV: CPT | Performed by: INTERNAL MEDICINE

## 2023-07-18 PROCEDURE — G0439 PPPS, SUBSEQ VISIT: HCPCS | Performed by: INTERNAL MEDICINE

## 2023-07-18 PROCEDURE — 3078F DIAST BP <80 MM HG: CPT | Performed by: INTERNAL MEDICINE

## 2023-07-18 PROCEDURE — 3075F SYST BP GE 130 - 139MM HG: CPT | Performed by: INTERNAL MEDICINE

## 2023-07-18 ASSESSMENT — PATIENT HEALTH QUESTIONNAIRE - PHQ9
1. LITTLE INTEREST OR PLEASURE IN DOING THINGS: 3
3. TROUBLE FALLING OR STAYING ASLEEP: 2
2. FEELING DOWN, DEPRESSED OR HOPELESS: 3
7. TROUBLE CONCENTRATING ON THINGS, SUCH AS READING THE NEWSPAPER OR WATCHING TELEVISION: 2
SUM OF ALL RESPONSES TO PHQ9 QUESTIONS 1 & 2: 6
8. MOVING OR SPEAKING SO SLOWLY THAT OTHER PEOPLE COULD HAVE NOTICED. OR THE OPPOSITE, BEING SO FIGETY OR RESTLESS THAT YOU HAVE BEEN MOVING AROUND A LOT MORE THAN USUAL: 1
5. POOR APPETITE OR OVEREATING: 2
6. FEELING BAD ABOUT YOURSELF - OR THAT YOU ARE A FAILURE OR HAVE LET YOURSELF OR YOUR FAMILY DOWN: 2
SUM OF ALL RESPONSES TO PHQ QUESTIONS 1-9: 18
SUM OF ALL RESPONSES TO PHQ QUESTIONS 1-9: 18
10. IF YOU CHECKED OFF ANY PROBLEMS, HOW DIFFICULT HAVE THESE PROBLEMS MADE IT FOR YOU TO DO YOUR WORK, TAKE CARE OF THINGS AT HOME, OR GET ALONG WITH OTHER PEOPLE: 1
SUM OF ALL RESPONSES TO PHQ QUESTIONS 1-9: 18
SUM OF ALL RESPONSES TO PHQ QUESTIONS 1-9: 18
9. THOUGHTS THAT YOU WOULD BE BETTER OFF DEAD, OR OF HURTING YOURSELF: 0
4. FEELING TIRED OR HAVING LITTLE ENERGY: 3

## 2023-07-18 NOTE — PROGRESS NOTES
(Rheumatology)  Isidoro Hanley DO as Consulting Physician (Pulmonary Disease)  Sarah Dale MD as Consulting Physician (Gastroenterology)     Reviewed and updated this visit:  Tobacco  Allergies  Meds  Med Hx  Surg Hx  Soc Hx  Fam Hx

## 2023-07-18 NOTE — PATIENT INSTRUCTIONS
instructions adapted under license by [facility] ([facility]). If you have questions about a medical condition or this instruction, always ask your healthcare professional. [address] any warranty or liability for your use of this information. Personalized Preventive Plan for Dolly White - 7/18/2023  Medicare offers a range of preventive health benefits. Some of the tests and screenings are paid in full while other may be subject to a deductible, co-insurance, and/or copay. Some of these benefits include a comprehensive review of your medical history including lifestyle, illnesses that may run in your family, and various assessments and screenings as appropriate. After reviewing your medical record and screening and assessments performed today your provider may have ordered immunizations, labs, imaging, and/or referrals for you. A list of these orders (if applicable) as well as your Preventive Care list are included within your After Visit Summary for your review. Other Preventive Recommendations:    A preventive eye exam performed by an eye specialist is recommended every 1-2 years to screen for glaucoma; cataracts, macular degeneration, and other eye disorders. A preventive dental visit is recommended every 6 months. Try to get at least 150 minutes of exercise per week or 10,000 steps per day on a pedometer . Order or download the FREE \"Exercise & Physical Activity: Your Everyday Guide\" from [employer]. Call 9-123.458.8439 or search [employer] online. You need 0500-2371 mg of calcium and 1014-0672 IU of vitamin D per day. It is possible to meet your calcium requirement with diet alone, but a vitamin D supplement is usually necessary to meet this goal.  When exposed to the sun, use a sunscreen that protects against both UVA and UVB radiation with an SPF of 30 or greater.  Reapply every 2 to 3 hours or after sweating, drying off with a

## 2023-07-20 DIAGNOSIS — F43.10 PTSD (POST-TRAUMATIC STRESS DISORDER): ICD-10-CM

## 2023-07-20 DIAGNOSIS — I50.42 CHRONIC COMBINED SYSTOLIC AND DIASTOLIC HEART FAILURE (HCC): ICD-10-CM

## 2023-07-20 DIAGNOSIS — Z76.0 MEDICATION REFILL: ICD-10-CM

## 2023-07-20 RX ORDER — ISOSORBIDE DINITRATE 10 MG/1
TABLET ORAL
Qty: 180 TABLET | Refills: 0 | Status: SHIPPED | OUTPATIENT
Start: 2023-07-20

## 2023-07-20 RX ORDER — TRAZODONE HYDROCHLORIDE 150 MG/1
150 TABLET ORAL NIGHTLY
Qty: 30 TABLET | Refills: 0 | Status: SHIPPED | OUTPATIENT
Start: 2023-07-20

## 2023-07-20 RX ORDER — TRAZODONE HYDROCHLORIDE 150 MG/1
150 TABLET ORAL NIGHTLY
Qty: 90 TABLET | Refills: 0 | OUTPATIENT
Start: 2023-07-20

## 2023-07-26 DIAGNOSIS — G89.4 CHRONIC PAIN SYNDROME: ICD-10-CM

## 2023-07-26 DIAGNOSIS — Z76.0 MEDICATION REFILL: ICD-10-CM

## 2023-07-26 DIAGNOSIS — F33.0 MILD EPISODE OF RECURRENT MAJOR DEPRESSIVE DISORDER (HCC): ICD-10-CM

## 2023-07-26 RX ORDER — DULOXETIN HYDROCHLORIDE 60 MG/1
CAPSULE, DELAYED RELEASE ORAL
Qty: 180 CAPSULE | Refills: 0 | OUTPATIENT
Start: 2023-07-26

## 2023-07-26 RX ORDER — DULOXETIN HYDROCHLORIDE 60 MG/1
120 CAPSULE, DELAYED RELEASE ORAL DAILY
Qty: 180 CAPSULE | Refills: 5 | Status: SHIPPED | OUTPATIENT
Start: 2023-07-26

## 2023-07-26 RX ORDER — IBUPROFEN 800 MG/1
800 TABLET ORAL EVERY 8 HOURS PRN
Qty: 270 TABLET | Refills: 0 | Status: SHIPPED | OUTPATIENT
Start: 2023-07-26

## 2023-07-26 NOTE — TELEPHONE ENCOUNTER
Lov 7/18/23    Pt takes this medication for depression and has been on it for a long time. Pt needs it refilled.

## 2023-08-16 DIAGNOSIS — F43.10 PTSD (POST-TRAUMATIC STRESS DISORDER): ICD-10-CM

## 2023-08-16 DIAGNOSIS — Z76.0 MEDICATION REFILL: ICD-10-CM

## 2023-08-16 RX ORDER — TRAZODONE HYDROCHLORIDE 150 MG/1
150 TABLET ORAL NIGHTLY
Qty: 30 TABLET | Refills: 0 | Status: SHIPPED | OUTPATIENT
Start: 2023-08-16

## 2023-09-27 DIAGNOSIS — G89.4 CHRONIC PAIN SYNDROME: ICD-10-CM

## 2023-09-27 DIAGNOSIS — J45.30 MILD PERSISTENT ASTHMA WITHOUT COMPLICATION: ICD-10-CM

## 2023-09-27 DIAGNOSIS — J42 CHRONIC BRONCHITIS, UNSPECIFIED CHRONIC BRONCHITIS TYPE (HCC): ICD-10-CM

## 2023-09-27 RX ORDER — BUDESONIDE AND FORMOTEROL FUMARATE DIHYDRATE 160; 4.5 UG/1; UG/1
2 AEROSOL RESPIRATORY (INHALATION) 2 TIMES DAILY
Qty: 1 EACH | Refills: 0 | Status: SHIPPED | OUTPATIENT
Start: 2023-09-27

## 2023-09-27 RX ORDER — BUDESONIDE AND FORMOTEROL FUMARATE DIHYDRATE 160; 4.5 UG/1; UG/1
2 AEROSOL RESPIRATORY (INHALATION) 2 TIMES DAILY
Qty: 33 G | Refills: 0 | OUTPATIENT
Start: 2023-09-27

## 2023-09-27 NOTE — TELEPHONE ENCOUNTER
LAST VISIT: 10/3/22  No follow up scheduled. Patient has cancelled twice and no showed once since last visit. Medication refused with note to pharmacy to call for an appointment. Please make any changes needed. Thank you.

## 2023-09-28 NOTE — TELEPHONE ENCOUNTER
Writer spoke with patient and explained Dr Tavon Dodson response. Writer scheduled patient for an appointment with Dr Shreya Alberto on 10/2/23. Patient voiced understanding.

## 2023-09-29 RX ORDER — TIZANIDINE 4 MG/1
TABLET ORAL
Qty: 90 TABLET | Refills: 0 | Status: SHIPPED | OUTPATIENT
Start: 2023-09-29

## 2023-10-02 ENCOUNTER — OFFICE VISIT (OUTPATIENT)
Dept: PULMONOLOGY | Age: 58
End: 2023-10-02
Payer: MEDICARE

## 2023-10-02 VITALS
HEART RATE: 73 BPM | WEIGHT: 293 LBS | RESPIRATION RATE: 18 BRPM | SYSTOLIC BLOOD PRESSURE: 91 MMHG | BODY MASS INDEX: 44.41 KG/M2 | DIASTOLIC BLOOD PRESSURE: 59 MMHG | OXYGEN SATURATION: 96 % | HEIGHT: 68 IN

## 2023-10-02 DIAGNOSIS — E66.01 CLASS 3 SEVERE OBESITY DUE TO EXCESS CALORIES WITH SERIOUS COMORBIDITY AND BODY MASS INDEX (BMI) OF 50.0 TO 59.9 IN ADULT (HCC): ICD-10-CM

## 2023-10-02 DIAGNOSIS — I42.8 NON-ISCHEMIC CARDIOMYOPATHY (HCC): ICD-10-CM

## 2023-10-02 DIAGNOSIS — R06.09 POST-COVID-19 SYNDROME MANIFESTING AS CHRONIC DYSPNEA: Primary | ICD-10-CM

## 2023-10-02 DIAGNOSIS — R43.0 ANOSMIA: ICD-10-CM

## 2023-10-02 DIAGNOSIS — J45.30 MILD PERSISTENT ASTHMA WITHOUT COMPLICATION: ICD-10-CM

## 2023-10-02 DIAGNOSIS — U09.9 POST-COVID-19 SYNDROME MANIFESTING AS CHRONIC DYSPNEA: Primary | ICD-10-CM

## 2023-10-02 DIAGNOSIS — M79.7 FIBROMYALGIA: ICD-10-CM

## 2023-10-02 DIAGNOSIS — R43.2 DYSGEUSIA: ICD-10-CM

## 2023-10-02 DIAGNOSIS — Z23 NEED FOR VACCINATION: ICD-10-CM

## 2023-10-02 PROCEDURE — 90674 CCIIV4 VAC NO PRSV 0.5 ML IM: CPT | Performed by: INTERNAL MEDICINE

## 2023-10-02 PROCEDURE — G8482 FLU IMMUNIZE ORDER/ADMIN: HCPCS | Performed by: INTERNAL MEDICINE

## 2023-10-02 PROCEDURE — G8417 CALC BMI ABV UP PARAM F/U: HCPCS | Performed by: INTERNAL MEDICINE

## 2023-10-02 PROCEDURE — 3078F DIAST BP <80 MM HG: CPT | Performed by: INTERNAL MEDICINE

## 2023-10-02 PROCEDURE — 1036F TOBACCO NON-USER: CPT | Performed by: INTERNAL MEDICINE

## 2023-10-02 PROCEDURE — 3017F COLORECTAL CA SCREEN DOC REV: CPT | Performed by: INTERNAL MEDICINE

## 2023-10-02 PROCEDURE — G0008 ADMIN INFLUENZA VIRUS VAC: HCPCS | Performed by: INTERNAL MEDICINE

## 2023-10-02 PROCEDURE — 99213 OFFICE O/P EST LOW 20 MIN: CPT | Performed by: INTERNAL MEDICINE

## 2023-10-02 PROCEDURE — 3074F SYST BP LT 130 MM HG: CPT | Performed by: INTERNAL MEDICINE

## 2023-10-02 PROCEDURE — G8427 DOCREV CUR MEDS BY ELIG CLIN: HCPCS | Performed by: INTERNAL MEDICINE

## 2023-10-02 ASSESSMENT — ENCOUNTER SYMPTOMS
EYES NEGATIVE: 1
CHEST TIGHTNESS: 1
BACK PAIN: 1
SHORTNESS OF BREATH: 1
GASTROINTESTINAL NEGATIVE: 1

## 2023-10-02 NOTE — PROGRESS NOTES
Subjective:      Patient ID: Anderson García is a 59 y.o. female being seen in my clinic for   Chief Complaint   Patient presents with    Asthma     Patient has been having problems wheezing in the middle of the night to where it's waking her up from her sleep       HPI  Follow-up visit for multifactorial dyspnea and asthma. Since her last visit a year ago she states that her symptoms of shortness of breath have not really improved. She describes worsening of her symptoms to an episode of COVID in May 2022. Still reports loss of smell and taste. Also worsening fatigue superimposed on fibromyalgia syndrome. Also fatigue. Uses Symbicort and albuterol. Seems to help some. Denies cough or wheeze. On multiple pain medications including trazodone, Cymbalta, Wellbutrin, and Zanaflex. Much family stress. Grandchild hospitalized in [place] with severe RSV. Review of Systems   Constitutional:  Positive for fatigue. HENT: Negative. Eyes: Negative. Respiratory:  Positive for chest tightness and shortness of breath. Cardiovascular: Negative. Gastrointestinal: Negative. Musculoskeletal:  Positive for arthralgias, back pain, gait problem and myalgias. All other systems reviewed and are negative.       Objective:     Vitals:    10/02/23 1340   BP: (!) 91/59   Site: Right Lower Arm   Position: Sitting   Cuff Size: Medium Adult   Pulse: 73   Resp: 18   SpO2: 96%   Weight: (!) 377 lb (171 kg)   Height: 5' 8\" (1.727 m)     Current Outpatient Medications   Medication Sig Dispense Refill    tiZANidine (ZANAFLEX) 4 MG tablet TAKE 1 TABLET BY MOUTH THREE TIMES DAILY AS NEEDED FOR PAIN 90 tablet 0    budesonide-formoterol (SYMBICORT) 160-4.5 MCG/ACT AERO Inhale 2 puffs into the lungs 2 times daily 1 each 0    traZODone (DESYREL) 150 MG tablet Take 1 tablet by mouth nightly 30 tablet 0    ibuprofen (ADVIL;MOTRIN) 800 MG tablet TAKE 1 TABLET BY MOUTH EVERY 8 HOURS AS NEEDED FOR PAIN 270 tablet 0    DULoxetine

## 2023-10-05 DIAGNOSIS — N39.41 URGE INCONTINENCE: ICD-10-CM

## 2023-10-05 DIAGNOSIS — F43.10 PTSD (POST-TRAUMATIC STRESS DISORDER): ICD-10-CM

## 2023-10-05 DIAGNOSIS — I50.42 CHRONIC COMBINED SYSTOLIC AND DIASTOLIC HEART FAILURE (HCC): ICD-10-CM

## 2023-10-05 DIAGNOSIS — K21.9 GASTROESOPHAGEAL REFLUX DISEASE, UNSPECIFIED WHETHER ESOPHAGITIS PRESENT: ICD-10-CM

## 2023-10-05 DIAGNOSIS — Z76.0 MEDICATION REFILL: ICD-10-CM

## 2023-10-05 DIAGNOSIS — I10 ESSENTIAL HYPERTENSION: ICD-10-CM

## 2023-10-06 RX ORDER — ATORVASTATIN CALCIUM 80 MG/1
80 TABLET, FILM COATED ORAL DAILY
Qty: 90 TABLET | Refills: 0 | Status: SHIPPED | OUTPATIENT
Start: 2023-10-06

## 2023-10-06 RX ORDER — BUPROPION HYDROCHLORIDE 300 MG/1
300 TABLET ORAL EVERY MORNING
Qty: 90 TABLET | Refills: 0 | Status: SHIPPED | OUTPATIENT
Start: 2023-10-06

## 2023-10-06 RX ORDER — PANTOPRAZOLE SODIUM 40 MG/1
40 TABLET, DELAYED RELEASE ORAL DAILY
Qty: 90 TABLET | Refills: 0 | Status: SHIPPED | OUTPATIENT
Start: 2023-10-06

## 2023-10-06 RX ORDER — OXYBUTYNIN CHLORIDE 5 MG/1
5 TABLET ORAL 2 TIMES DAILY
Qty: 180 TABLET | Refills: 0 | Status: SHIPPED | OUTPATIENT
Start: 2023-10-06

## 2023-10-06 RX ORDER — METOPROLOL TARTRATE 50 MG/1
50 TABLET, FILM COATED ORAL 2 TIMES DAILY
Qty: 180 TABLET | Refills: 0 | Status: SHIPPED | OUTPATIENT
Start: 2023-10-06

## 2023-10-06 RX ORDER — SPIRONOLACTONE 25 MG/1
25 TABLET ORAL DAILY
Qty: 90 TABLET | Refills: 0 | Status: SHIPPED | OUTPATIENT
Start: 2023-10-06

## 2023-10-13 DIAGNOSIS — G89.4 CHRONIC PAIN SYNDROME: ICD-10-CM

## 2023-10-13 DIAGNOSIS — Z76.0 MEDICATION REFILL: ICD-10-CM

## 2023-10-16 RX ORDER — IBUPROFEN 800 MG/1
800 TABLET ORAL EVERY 8 HOURS PRN
Qty: 270 TABLET | Refills: 0 | Status: ON HOLD | OUTPATIENT
Start: 2023-10-16

## 2023-10-19 ENCOUNTER — HOSPITAL ENCOUNTER (INPATIENT)
Age: 58
LOS: 5 days | Discharge: NURSING, REHAB OR LONG-TERM CARE | DRG: 308 | End: 2023-10-24
Attending: EMERGENCY MEDICINE | Admitting: INTERNAL MEDICINE
Payer: MEDICARE

## 2023-10-19 ENCOUNTER — APPOINTMENT (OUTPATIENT)
Dept: GENERAL RADIOLOGY | Age: 58
DRG: 308 | End: 2023-10-19
Payer: MEDICARE

## 2023-10-19 DIAGNOSIS — I44.2 THIRD DEGREE HEART BLOCK (HCC): ICD-10-CM

## 2023-10-19 DIAGNOSIS — R00.1 SEVERE SINUS BRADYCARDIA: Primary | ICD-10-CM

## 2023-10-19 DIAGNOSIS — R00.1 BRADYCARDIA: ICD-10-CM

## 2023-10-19 DIAGNOSIS — R79.89 HIGH SERUM FIBRINOGEN: ICD-10-CM

## 2023-10-19 DIAGNOSIS — G47.33 OSA (OBSTRUCTIVE SLEEP APNEA): ICD-10-CM

## 2023-10-19 LAB
ALBUMIN SERPL-MCNC: 3.6 G/DL (ref 3.5–5.2)
ALBUMIN/GLOB SERPL: 1 {RATIO} (ref 1–2.5)
ALP SERPL-CCNC: 184 U/L (ref 35–104)
ALT SERPL-CCNC: 13 U/L (ref 5–33)
ANION GAP SERPL CALCULATED.3IONS-SCNC: 13 MMOL/L (ref 9–17)
AST SERPL-CCNC: 15 U/L
BASOPHILS # BLD: 0.05 K/UL (ref 0–0.2)
BASOPHILS NFR BLD: 1 % (ref 0–2)
BILIRUB SERPL-MCNC: 0.3 MG/DL (ref 0.3–1.2)
BNP SERPL-MCNC: 1299 PG/ML
BUN BLD-MCNC: 22 MG/DL (ref 8–26)
BUN SERPL-MCNC: 23 MG/DL (ref 8–23)
CA-I BLD-SCNC: 1.22 MMOL/L (ref 1.15–1.33)
CALCIUM SERPL-MCNC: 8.9 MG/DL (ref 8.6–10.4)
CHLORIDE BLD-SCNC: 103 MMOL/L (ref 98–107)
CHLORIDE SERPL-SCNC: 99 MMOL/L (ref 98–107)
CO2 BLD CALC-SCNC: 28 MMOL/L (ref 22–30)
CO2 SERPL-SCNC: 24 MMOL/L (ref 20–31)
CREAT SERPL-MCNC: 1.6 MG/DL (ref 0.5–0.9)
EGFR, POC: NORMAL ML/MIN/1.73M2
EOSINOPHIL # BLD: 0.33 K/UL (ref 0–0.44)
EOSINOPHILS RELATIVE PERCENT: 3 % (ref 1–4)
ERYTHROCYTE [DISTWIDTH] IN BLOOD BY AUTOMATED COUNT: 16 % (ref 11.8–14.4)
GFR SERPL CREATININE-BSD FRML MDRD: 37 ML/MIN/1.73M2
GLUCOSE BLD-MCNC: 135 MG/DL (ref 74–100)
GLUCOSE SERPL-MCNC: 131 MG/DL (ref 70–99)
HCO3 VENOUS: 28.1 MMOL/L (ref 22–29)
HCT VFR BLD AUTO: 35.6 % (ref 36.3–47.1)
HCT VFR BLD AUTO: 38 % (ref 36–46)
HGB BLD-MCNC: 10.6 G/DL (ref 11.9–15.1)
IMM GRANULOCYTES # BLD AUTO: 0.07 K/UL (ref 0–0.3)
IMM GRANULOCYTES NFR BLD: 1 %
LACTIC ACID, WHOLE BLOOD: 1.1 MMOL/L (ref 0.7–2.1)
LYMPHOCYTES NFR BLD: 2.6 K/UL (ref 1.1–3.7)
LYMPHOCYTES RELATIVE PERCENT: 24 % (ref 24–43)
MAGNESIUM SERPL-MCNC: 2.2 MG/DL (ref 1.6–2.6)
MCH RBC QN AUTO: 27.8 PG (ref 25.2–33.5)
MCHC RBC AUTO-ENTMCNC: 29.8 G/DL (ref 28.4–34.8)
MCV RBC AUTO: 93.4 FL (ref 82.6–102.9)
MONOCYTES NFR BLD: 0.63 K/UL (ref 0.1–1.2)
MONOCYTES NFR BLD: 6 % (ref 3–12)
NEUTROPHILS NFR BLD: 65 % (ref 36–65)
NEUTS SEG NFR BLD: 7.08 K/UL (ref 1.5–8.1)
NRBC BLD-RTO: 0 PER 100 WBC
O2 SAT, VEN: ABNORMAL % (ref 60–85)
PCO2, VEN: 42.6 MM HG (ref 41–51)
PH VENOUS: 7.43 (ref 7.32–7.43)
PLATELET # BLD AUTO: 296 K/UL (ref 138–453)
PMV BLD AUTO: 10.7 FL (ref 8.1–13.5)
PO2, VEN: ABNORMAL MM HG (ref 30–50)
POC ANION GAP: 10 MMOL/L (ref 7–16)
POC CREATININE: NORMAL MG/DL (ref 0.51–1.19)
POC HEMOGLOBIN (CALC): 13 G/DL (ref 12–16)
POC LACTIC ACID: NORMAL MMOL/L (ref 0.56–1.39)
POSITIVE BASE EXCESS, VEN: 3.2 MMOL/L (ref 0–3)
POTASSIUM BLD-SCNC: 5.1 MMOL/L (ref 3.5–4.5)
POTASSIUM SERPL-SCNC: 5 MMOL/L (ref 3.7–5.3)
PROT SERPL-MCNC: 7.3 G/DL (ref 6.4–8.3)
RBC # BLD AUTO: 3.81 M/UL (ref 3.95–5.11)
RBC # BLD: ABNORMAL 10*6/UL
SODIUM BLD-SCNC: 140 MMOL/L (ref 138–146)
SODIUM SERPL-SCNC: 136 MMOL/L (ref 135–144)
TROPONIN I SERPL HS-MCNC: 17 NG/L (ref 0–14)
TROPONIN I SERPL HS-MCNC: 19 NG/L (ref 0–14)
TSH SERPL DL<=0.05 MIU/L-ACNC: 4.14 UIU/ML (ref 0.3–5)
WBC OTHER # BLD: 10.8 K/UL (ref 3.5–11.3)

## 2023-10-19 PROCEDURE — 87040 BLOOD CULTURE FOR BACTERIA: CPT

## 2023-10-19 PROCEDURE — 80053 COMPREHEN METABOLIC PANEL: CPT

## 2023-10-19 PROCEDURE — 6370000000 HC RX 637 (ALT 250 FOR IP): Performed by: STUDENT IN AN ORGANIZED HEALTH CARE EDUCATION/TRAINING PROGRAM

## 2023-10-19 PROCEDURE — 96367 TX/PROPH/DG ADDL SEQ IV INF: CPT

## 2023-10-19 PROCEDURE — 2500000003 HC RX 250 WO HCPCS: Performed by: EMERGENCY MEDICINE

## 2023-10-19 PROCEDURE — 82565 ASSAY OF CREATININE: CPT

## 2023-10-19 PROCEDURE — 83605 ASSAY OF LACTIC ACID: CPT

## 2023-10-19 PROCEDURE — 82947 ASSAY GLUCOSE BLOOD QUANT: CPT

## 2023-10-19 PROCEDURE — 36415 COLL VENOUS BLD VENIPUNCTURE: CPT

## 2023-10-19 PROCEDURE — 83735 ASSAY OF MAGNESIUM: CPT

## 2023-10-19 PROCEDURE — 6360000002 HC RX W HCPCS: Performed by: STUDENT IN AN ORGANIZED HEALTH CARE EDUCATION/TRAINING PROGRAM

## 2023-10-19 PROCEDURE — 2500000003 HC RX 250 WO HCPCS

## 2023-10-19 PROCEDURE — 84443 ASSAY THYROID STIM HORMONE: CPT

## 2023-10-19 PROCEDURE — 99285 EMERGENCY DEPT VISIT HI MDM: CPT

## 2023-10-19 PROCEDURE — 71045 X-RAY EXAM CHEST 1 VIEW: CPT

## 2023-10-19 PROCEDURE — 82330 ASSAY OF CALCIUM: CPT

## 2023-10-19 PROCEDURE — 96365 THER/PROPH/DIAG IV INF INIT: CPT

## 2023-10-19 PROCEDURE — 3E033XZ INTRODUCTION OF VASOPRESSOR INTO PERIPHERAL VEIN, PERCUTANEOUS APPROACH: ICD-10-PCS | Performed by: EMERGENCY MEDICINE

## 2023-10-19 PROCEDURE — 85014 HEMATOCRIT: CPT

## 2023-10-19 PROCEDURE — 85025 COMPLETE CBC W/AUTO DIFF WBC: CPT

## 2023-10-19 PROCEDURE — 84520 ASSAY OF UREA NITROGEN: CPT

## 2023-10-19 PROCEDURE — 99291 CRITICAL CARE FIRST HOUR: CPT | Performed by: INTERNAL MEDICINE

## 2023-10-19 PROCEDURE — 84484 ASSAY OF TROPONIN QUANT: CPT

## 2023-10-19 PROCEDURE — 94640 AIRWAY INHALATION TREATMENT: CPT

## 2023-10-19 PROCEDURE — 6360000002 HC RX W HCPCS: Performed by: EMERGENCY MEDICINE

## 2023-10-19 PROCEDURE — 83880 ASSAY OF NATRIURETIC PEPTIDE: CPT

## 2023-10-19 PROCEDURE — 2000000000 HC ICU R&B

## 2023-10-19 PROCEDURE — 96375 TX/PRO/DX INJ NEW DRUG ADDON: CPT

## 2023-10-19 PROCEDURE — 96366 THER/PROPH/DIAG IV INF ADDON: CPT

## 2023-10-19 PROCEDURE — 6360000002 HC RX W HCPCS

## 2023-10-19 PROCEDURE — 2580000003 HC RX 258: Performed by: EMERGENCY MEDICINE

## 2023-10-19 PROCEDURE — 93005 ELECTROCARDIOGRAM TRACING: CPT | Performed by: EMERGENCY MEDICINE

## 2023-10-19 PROCEDURE — 82803 BLOOD GASES ANY COMBINATION: CPT

## 2023-10-19 PROCEDURE — 93005 ELECTROCARDIOGRAM TRACING: CPT | Performed by: INTERNAL MEDICINE

## 2023-10-19 PROCEDURE — 80051 ELECTROLYTE PANEL: CPT

## 2023-10-19 PROCEDURE — 5A1223Z PERFORMANCE OF CARDIAC PACING, CONTINUOUS: ICD-10-PCS | Performed by: EMERGENCY MEDICINE

## 2023-10-19 RX ORDER — ATORVASTATIN CALCIUM 80 MG/1
80 TABLET, FILM COATED ORAL DAILY
Status: DISCONTINUED | OUTPATIENT
Start: 2023-10-19 | End: 2023-10-24 | Stop reason: HOSPADM

## 2023-10-19 RX ORDER — ENOXAPARIN SODIUM 100 MG/ML
40 INJECTION SUBCUTANEOUS 2 TIMES DAILY
Status: DISCONTINUED | OUTPATIENT
Start: 2023-10-19 | End: 2023-10-24 | Stop reason: HOSPADM

## 2023-10-19 RX ORDER — BUDESONIDE AND FORMOTEROL FUMARATE DIHYDRATE 160; 4.5 UG/1; UG/1
2 AEROSOL RESPIRATORY (INHALATION) 2 TIMES DAILY
Status: DISCONTINUED | OUTPATIENT
Start: 2023-10-19 | End: 2023-10-24 | Stop reason: HOSPADM

## 2023-10-19 RX ORDER — POLYETHYLENE GLYCOL 3350 17 G/17G
17 POWDER, FOR SOLUTION ORAL DAILY PRN
Status: DISCONTINUED | OUTPATIENT
Start: 2023-10-19 | End: 2023-10-24 | Stop reason: HOSPADM

## 2023-10-19 RX ORDER — DOPAMINE HYDROCHLORIDE 160 MG/100ML
1-20 INJECTION, SOLUTION INTRAVENOUS CONTINUOUS
Status: DISCONTINUED | OUTPATIENT
Start: 2023-10-19 | End: 2023-10-20

## 2023-10-19 RX ORDER — SODIUM CHLORIDE, SODIUM LACTATE, POTASSIUM CHLORIDE, AND CALCIUM CHLORIDE .6; .31; .03; .02 G/100ML; G/100ML; G/100ML; G/100ML
500 INJECTION, SOLUTION INTRAVENOUS ONCE
Status: DISCONTINUED | OUTPATIENT
Start: 2023-10-19 | End: 2023-10-19

## 2023-10-19 RX ORDER — SODIUM CHLORIDE 9 MG/ML
INJECTION, SOLUTION INTRAVENOUS PRN
Status: DISCONTINUED | OUTPATIENT
Start: 2023-10-19 | End: 2023-10-24 | Stop reason: HOSPADM

## 2023-10-19 RX ORDER — NOREPINEPHRINE BITARTRATE 0.06 MG/ML
1-100 INJECTION, SOLUTION INTRAVENOUS CONTINUOUS
Status: DISCONTINUED | OUTPATIENT
Start: 2023-10-19 | End: 2023-10-20

## 2023-10-19 RX ORDER — SODIUM CHLORIDE 0.9 % (FLUSH) 0.9 %
5-40 SYRINGE (ML) INJECTION PRN
Status: DISCONTINUED | OUTPATIENT
Start: 2023-10-19 | End: 2023-10-24 | Stop reason: HOSPADM

## 2023-10-19 RX ORDER — CHOLECALCIFEROL (VITAMIN D3) 125 MCG
5 CAPSULE ORAL NIGHTLY PRN
Status: DISCONTINUED | OUTPATIENT
Start: 2023-10-19 | End: 2023-10-24 | Stop reason: HOSPADM

## 2023-10-19 RX ORDER — KETAMINE HCL IN NACL, ISO-OSM 100MG/10ML
0.2 SYRINGE (ML) INJECTION ONCE
Status: DISCONTINUED | OUTPATIENT
Start: 2023-10-19 | End: 2023-10-19

## 2023-10-19 RX ORDER — ONDANSETRON 2 MG/ML
4 INJECTION INTRAMUSCULAR; INTRAVENOUS EVERY 6 HOURS PRN
Status: DISCONTINUED | OUTPATIENT
Start: 2023-10-19 | End: 2023-10-24 | Stop reason: HOSPADM

## 2023-10-19 RX ORDER — BUPROPION HYDROCHLORIDE 150 MG/1
300 TABLET ORAL EVERY MORNING
Status: DISCONTINUED | OUTPATIENT
Start: 2023-10-20 | End: 2023-10-24 | Stop reason: HOSPADM

## 2023-10-19 RX ORDER — ACETAMINOPHEN 650 MG/1
650 SUPPOSITORY RECTAL EVERY 6 HOURS PRN
Status: DISCONTINUED | OUTPATIENT
Start: 2023-10-19 | End: 2023-10-20

## 2023-10-19 RX ORDER — MAGNESIUM SULFATE 1 G/100ML
1000 INJECTION INTRAVENOUS ONCE
Status: DISCONTINUED | OUTPATIENT
Start: 2023-10-19 | End: 2023-10-19

## 2023-10-19 RX ORDER — SODIUM CHLORIDE 0.9 % (FLUSH) 0.9 %
5-40 SYRINGE (ML) INJECTION EVERY 12 HOURS SCHEDULED
Status: DISCONTINUED | OUTPATIENT
Start: 2023-10-19 | End: 2023-10-24 | Stop reason: HOSPADM

## 2023-10-19 RX ORDER — LEVOTHYROXINE SODIUM 0.03 MG/1
25 TABLET ORAL DAILY
Status: DISCONTINUED | OUTPATIENT
Start: 2023-10-20 | End: 2023-10-24 | Stop reason: HOSPADM

## 2023-10-19 RX ORDER — NOREPINEPHRINE BITARTRATE 0.06 MG/ML
INJECTION, SOLUTION INTRAVENOUS
Status: COMPLETED
Start: 2023-10-19 | End: 2023-10-19

## 2023-10-19 RX ORDER — ONDANSETRON 2 MG/ML
4 INJECTION INTRAMUSCULAR; INTRAVENOUS ONCE
Status: COMPLETED | OUTPATIENT
Start: 2023-10-19 | End: 2023-10-19

## 2023-10-19 RX ORDER — FENTANYL CITRATE 50 UG/ML
50 INJECTION, SOLUTION INTRAMUSCULAR; INTRAVENOUS ONCE
Status: COMPLETED | OUTPATIENT
Start: 2023-10-19 | End: 2023-10-19

## 2023-10-19 RX ORDER — MAGNESIUM SULFATE IN WATER 40 MG/ML
2000 INJECTION, SOLUTION INTRAVENOUS ONCE
Status: COMPLETED | OUTPATIENT
Start: 2023-10-19 | End: 2023-10-19

## 2023-10-19 RX ORDER — ASPIRIN 81 MG/1
81 TABLET ORAL DAILY
Status: DISCONTINUED | OUTPATIENT
Start: 2023-10-19 | End: 2023-10-24 | Stop reason: HOSPADM

## 2023-10-19 RX ORDER — ATROPINE SULFATE 0.1 MG/ML
1 INJECTION INTRAVENOUS ONCE
Status: COMPLETED | OUTPATIENT
Start: 2023-10-19 | End: 2023-10-19

## 2023-10-19 RX ORDER — OXYCODONE HYDROCHLORIDE 5 MG/1
5 TABLET ORAL ONCE
Status: COMPLETED | OUTPATIENT
Start: 2023-10-19 | End: 2023-10-19

## 2023-10-19 RX ORDER — MAGNESIUM SULFATE IN WATER 40 MG/ML
INJECTION, SOLUTION INTRAVENOUS
Status: COMPLETED
Start: 2023-10-19 | End: 2023-10-19

## 2023-10-19 RX ORDER — ONDANSETRON 4 MG/1
4 TABLET, ORALLY DISINTEGRATING ORAL EVERY 8 HOURS PRN
Status: DISCONTINUED | OUTPATIENT
Start: 2023-10-19 | End: 2023-10-24 | Stop reason: HOSPADM

## 2023-10-19 RX ORDER — CALCIUM GLUCONATE 20 MG/ML
1000 INJECTION, SOLUTION INTRAVENOUS ONCE
Status: COMPLETED | OUTPATIENT
Start: 2023-10-19 | End: 2023-10-19

## 2023-10-19 RX ORDER — CALCIUM GLUCONATE 20 MG/ML
INJECTION, SOLUTION INTRAVENOUS
Status: COMPLETED
Start: 2023-10-19 | End: 2023-10-19

## 2023-10-19 RX ORDER — PANTOPRAZOLE SODIUM 40 MG/1
40 TABLET, DELAYED RELEASE ORAL DAILY
Status: DISCONTINUED | OUTPATIENT
Start: 2023-10-19 | End: 2023-10-24 | Stop reason: HOSPADM

## 2023-10-19 RX ORDER — ACETAMINOPHEN 325 MG/1
650 TABLET ORAL EVERY 6 HOURS PRN
Status: DISCONTINUED | OUTPATIENT
Start: 2023-10-19 | End: 2023-10-20

## 2023-10-19 RX ADMIN — Medication 5 MCG/MIN: at 16:32

## 2023-10-19 RX ADMIN — FENTANYL CITRATE 50 MCG: 50 INJECTION, SOLUTION INTRAMUSCULAR; INTRAVENOUS at 15:36

## 2023-10-19 RX ADMIN — OXYCODONE HYDROCHLORIDE 5 MG: 5 TABLET ORAL at 18:01

## 2023-10-19 RX ADMIN — CEFTRIAXONE SODIUM 1000 MG: 1 INJECTION, POWDER, FOR SOLUTION INTRAMUSCULAR; INTRAVENOUS at 17:44

## 2023-10-19 RX ADMIN — ONDANSETRON 4 MG: 2 INJECTION INTRAMUSCULAR; INTRAVENOUS at 14:29

## 2023-10-19 RX ADMIN — BUDESONIDE AND FORMOTEROL FUMARATE DIHYDRATE 2 PUFF: 160; 4.5 AEROSOL RESPIRATORY (INHALATION) at 20:58

## 2023-10-19 RX ADMIN — Medication 5 MG: at 21:41

## 2023-10-19 RX ADMIN — GLUCAGON HYDROCHLORIDE 2 MG: KIT at 14:29

## 2023-10-19 RX ADMIN — ATROPINE SULFATE 1 MG: 0.1 INJECTION INTRAVENOUS at 14:04

## 2023-10-19 RX ADMIN — AZITHROMYCIN MONOHYDRATE 500 MG: 500 INJECTION, POWDER, LYOPHILIZED, FOR SOLUTION INTRAVENOUS at 18:33

## 2023-10-19 RX ADMIN — ASPIRIN 81 MG: 81 TABLET, COATED ORAL at 19:47

## 2023-10-19 RX ADMIN — ACETAMINOPHEN 650 MG: 325 TABLET ORAL at 21:41

## 2023-10-19 RX ADMIN — ENOXAPARIN SODIUM 40 MG: 100 INJECTION SUBCUTANEOUS at 21:03

## 2023-10-19 RX ADMIN — MAGNESIUM SULFATE HEPTAHYDRATE 2000 MG: 40 INJECTION, SOLUTION INTRAVENOUS at 14:30

## 2023-10-19 RX ADMIN — PANTOPRAZOLE SODIUM 40 MG: 40 TABLET, DELAYED RELEASE ORAL at 19:46

## 2023-10-19 RX ADMIN — MAGNESIUM SULFATE IN WATER 2000 MG: 40 INJECTION, SOLUTION INTRAVENOUS at 14:30

## 2023-10-19 RX ADMIN — CALCIUM GLUCONATE 1000 MG: 20 INJECTION, SOLUTION INTRAVENOUS at 14:07

## 2023-10-19 RX ADMIN — FENTANYL CITRATE 50 MCG: 50 INJECTION, SOLUTION INTRAMUSCULAR; INTRAVENOUS at 16:40

## 2023-10-19 RX ADMIN — ATORVASTATIN CALCIUM 80 MG: 80 TABLET, FILM COATED ORAL at 19:46

## 2023-10-19 RX ADMIN — NOREPINEPHRINE BITARTRATE 5 MCG/MIN: 0.06 INJECTION, SOLUTION INTRAVENOUS at 16:32

## 2023-10-19 RX ADMIN — DOPAMINE HYDROCHLORIDE 5 MCG/KG/MIN: 160 INJECTION, SOLUTION INTRAVENOUS at 14:53

## 2023-10-19 ASSESSMENT — ENCOUNTER SYMPTOMS
NAUSEA: 0
CONSTIPATION: 0
SHORTNESS OF BREATH: 1
VOMITING: 0
ABDOMINAL PAIN: 0
ABDOMINAL PAIN: 1
COUGH: 0
DIARRHEA: 1

## 2023-10-19 ASSESSMENT — PAIN SCALES - GENERAL
PAINLEVEL_OUTOF10: 10
PAINLEVEL_OUTOF10: 10
PAINLEVEL_OUTOF10: 4
PAINLEVEL_OUTOF10: 10

## 2023-10-19 NOTE — H&P
[facility]     Department of Internal Medicine - Staff Internal Medicine Teaching Service          ADMISSION NOTE/HISTORY AND PHYSICAL EXAMINATION   Date: 10/19/2023  Patient Name: Ramsey Meraz  Date of admission: 10/19/2023  1:47 PM  YOB: 1965  PCP: Tee Poe MD  History Obtained From:  patient, electronic medical record    CHIEF COMPLAINT     Chief complaint: Chest pain, shortness of breath    HISTORY OF PRESENTING ILLNESS     The patient is a pleasant 59 y.o. female with PMHx significant for combined systolic and diastolic CHF with LVEF 30 to 35% on TTE 2020 with grade 2 DD, asthma (Symbicort and albuterol), COVID in May 2022 ,fibromyalgia, GERD, gout (allopurinol 300), CKD stage III, HTN, HLD, morbid obesity, poorly mobile at baseline uses wheeled walker presents with a chief complaint of not feeling good over the past 3 days along with shortness of breath, chest pain left-sided pressure-like and lightheadedness, near pass out, in ED patient was found to have severe bradycardia with ventricular escape rhythm, received glucagon x2. Cardiology were consulted, was started on dopamine drip, became hypotensive, got temporary transvenous pacer in ER at bedside, drip switched to levo, plan per for stat echo by cardiology with possible AICD versus PPM based on EF results, patient to be admitted to cardiac ICU with medicine as primary and cardiology on board. Currently patient is afebrile, hemodynamically stable, soft BP 95/53 on Levophed, HR 80 paced, saturating well on 4 L NC, denies any SOB or CP at this time, labs significant for creatinine 1.6 (baseline 1.1-1.3), troponin 19-17, proBNP 1299, CBC with hemoglobin 10.6, mag 2.2, potassium 5.0, LA 1.1, TSH 4.14. CXR showing cardiomegaly with perihilar congestion, no pulmonary edema left lower lobe opacity infiltrate VS atelectasis VS possible small pleural effusion.     Home meds: Aspirin, Lopressor 50 twice

## 2023-10-19 NOTE — ED NOTES
Pt refusing to get into stretcher on arrival to room due to \"feeling like I'm going to pass out. \" Pt offered help with transfer with multiple staff members due to pt heart rate being 40 on ekg done in triage. Pt became hostile and said \"I don't want your help you all can leave. \" Attempted to educate pt on the worry by staff about her symptoms, pt still refusing to get out of her walker seat.       Mariza Jordan RN  10/19/23 9571

## 2023-10-19 NOTE — FLOWSHEET NOTE
Patient admitted to [facility]. Transvenous pacemaker 80BPM, 50 mAmps, sensitivity 2.0. Patient Alert and oriented. Requesting dinner. Patient transferred to [facility] on 4 liters oxygen. Vital signs obtained.   Martin Rai RN

## 2023-10-19 NOTE — CONSULTS
[facility]  In Patient Cardiology Consult             Date:   10/19/2023  Patient name: Alyx Thompson  Date of admission:  10/19/2023  1:47 PM  MRN:   [id number]  YOB: 1965    Reason for Admission: Lightheadedness    CHIEF COMPLAINT: Lightheadedness    History Obtained From: The patient and chart    HISTORY OF PRESENT ILLNESS:    This is a 78-year-old female. She presents to the emergency room. She was not feeling good. Over the past 3 days has not felt well. She is been having some shortness of breath, chest pain, lightheadedness. She is also been having some nausea and diarrhea. She does have known history of nonischemic cardiomyopathy. She is normally on metoprolol. She came in and was found to be bradycardic. I reviewed her EKG it appears to be a ventricular escape rhythm. I do not see any clear P waves/sinus activity. She was given atropine, given dopamine, given glucagon. She was on a dopamine drip and was becoming more hypotensive. Therefore in the ER a temporary transvenous IJ pacemaker was placed. She was set to 80 bpm.  She would be admitted to the cardiac ICU with medicine as primary and cardiology on consult.     Past Medical History:    Past Medical History:   Diagnosis Date    Arthritis     Bronchitis     On ICS-LABA, denies asthma, copd    CHF (congestive heart failure) ([address])     COVID-19     diagnosed in September 2020, hospitilized on oxgyen    Fibromyalgia     GERD (gastroesophageal reflux disease)     Gout 10/2019    History of heart attack     HLD (hyperlipidemia)     Hypertension     Insomnia     Osteoarthritis          Past Surgical History:    Past Surgical History:   Procedure Laterality Date    BACK SURGERY      CARDIAC CATHETERIZATION  07/08/2022    CHOLECYSTECTOMY, LAPAROSCOPIC      HYSTERECTOMY, TOTAL ABDOMINAL (CERVIX REMOVED)      KNEE ARTHROSCOPY Right     KNEE SURGERY Left 2009    NECK SURGERY      SHOULDER SURGERY Right 2009

## 2023-10-19 NOTE — ED NOTES
Pt moved closer to nurses station in preporation for temporary pacer     Anthony Sequeira RN  10/19/23 8405

## 2023-10-19 NOTE — ED NOTES
Phlebotomy contacted for blood culture draw, states they will down soon     Mariza Jordan RN  10/19/23 0843

## 2023-10-20 ENCOUNTER — APPOINTMENT (OUTPATIENT)
Age: 58
DRG: 308 | End: 2023-10-20
Attending: INTERNAL MEDICINE
Payer: MEDICARE

## 2023-10-20 LAB
ANION GAP SERPL CALCULATED.3IONS-SCNC: 11 MMOL/L (ref 9–17)
BASOPHILS # BLD: <0.03 K/UL (ref 0–0.2)
BASOPHILS NFR BLD: 0 % (ref 0–2)
BUN SERPL-MCNC: 25 MG/DL (ref 8–23)
CALCIUM SERPL-MCNC: 8.3 MG/DL (ref 8.6–10.4)
CHLORIDE SERPL-SCNC: 102 MMOL/L (ref 98–107)
CO2 SERPL-SCNC: 25 MMOL/L (ref 20–31)
CREAT SERPL-MCNC: 1.6 MG/DL (ref 0.5–0.9)
ECHO AO ROOT DIAM: 2.8 CM
ECHO AV MEAN GRADIENT: 5 MMHG
ECHO AV MEAN VELOCITY: 1 M/S
ECHO AV PEAK GRADIENT: 10 MMHG
ECHO AV PEAK VELOCITY: 1.6 M/S
ECHO AV VELOCITY RATIO: 0.69
ECHO AV VTI: 30.1 CM
ECHO LA AREA 2C: 21.3 CM2
ECHO LA AREA 4C: 21.9 CM2
ECHO LA DIAMETER: 4.5 CM
ECHO LA MAJOR AXIS: 6.3 CM
ECHO LA MINOR AXIS: 5.4 CM
ECHO LA TO AORTIC ROOT RATIO: 1.61
ECHO LA VOL 2C: 71 ML (ref 22–52)
ECHO LA VOL 4C: 55 ML (ref 22–52)
ECHO LA VOL BP: 67 ML (ref 22–52)
ECHO LV E' LATERAL VELOCITY: 11 CM/S
ECHO LV E' SEPTAL VELOCITY: 8 CM/S
ECHO LV EDV A2C: 83 ML
ECHO LV EDV A4C: 112 ML
ECHO LV EJECTION FRACTION A2C: 36 %
ECHO LV EJECTION FRACTION A4C: 60 %
ECHO LV EJECTION FRACTION BIPLANE: 48 % (ref 55–100)
ECHO LV ESV A2C: 53 ML
ECHO LV ESV A4C: 45 ML
ECHO LV FRACTIONAL SHORTENING: 16 % (ref 28–44)
ECHO LV INTERNAL DIMENSION DIASTOLIC: 6.4 CM (ref 3.9–5.3)
ECHO LV INTERNAL DIMENSION SYSTOLIC: 5.4 CM
ECHO LV IVSD: 1 CM (ref 0.6–0.9)
ECHO LV MASS 2D: 275.6 G (ref 67–162)
ECHO LV POSTERIOR WALL DIASTOLIC: 1 CM (ref 0.6–0.9)
ECHO LV RELATIVE WALL THICKNESS RATIO: 0.31
ECHO LVOT AV VTI INDEX: 0.73
ECHO LVOT MEAN GRADIENT: 2 MMHG
ECHO LVOT PEAK GRADIENT: 4 MMHG
ECHO LVOT PEAK VELOCITY: 1.1 M/S
ECHO LVOT VTI: 21.9 CM
ECHO MV A VELOCITY: 0.91 M/S
ECHO MV E DECELERATION TIME (DT): 213 MS
ECHO MV E VELOCITY: 1.12 M/S
ECHO MV E/A RATIO: 1.23
ECHO MV E/E' LATERAL: 10.18
ECHO MV E/E' RATIO (AVERAGED): 12.09
ECHO MV E/E' SEPTAL: 14
ECHO MV LVOT VTI INDEX: 1.34
ECHO MV MAX VELOCITY: 1.2 M/S
ECHO MV MEAN GRADIENT: 3 MMHG
ECHO MV MEAN VELOCITY: 0.8 M/S
ECHO MV PEAK GRADIENT: 6 MMHG
ECHO MV VTI: 29.3 CM
ECHO PV MAX VELOCITY: 1.1 M/S
ECHO PV PEAK GRADIENT: 5 MMHG
ECHO RV FREE WALL PEAK S': 14 CM/S
ECHO RV INTERNAL DIMENSION: 3.4 CM
ECHO RV TAPSE: 2.7 CM (ref 1.7–?)
EKG ATRIAL RATE: 0 BPM
EKG Q-T INTERVAL: 478 MS
EKG QRS DURATION: 120 MS
EKG QTC CALCULATION (BAZETT): 448 MS
EKG R AXIS: -32 DEGREES
EKG T AXIS: -29 DEGREES
EKG VENTRICULAR RATE: 53 BPM
EOSINOPHIL # BLD: 0.22 K/UL (ref 0–0.44)
EOSINOPHILS RELATIVE PERCENT: 3 % (ref 1–4)
ERYTHROCYTE [DISTWIDTH] IN BLOOD BY AUTOMATED COUNT: 16.1 % (ref 11.8–14.4)
GFR SERPL CREATININE-BSD FRML MDRD: 37 ML/MIN/1.73M2
GLUCOSE SERPL-MCNC: 142 MG/DL (ref 70–99)
HCT VFR BLD AUTO: 31.8 % (ref 36.3–47.1)
HGB BLD-MCNC: 9.3 G/DL (ref 11.9–15.1)
IMM GRANULOCYTES # BLD AUTO: 0.03 K/UL (ref 0–0.3)
IMM GRANULOCYTES NFR BLD: 0 %
LYMPHOCYTES NFR BLD: 1.81 K/UL (ref 1.1–3.7)
LYMPHOCYTES RELATIVE PERCENT: 22 % (ref 24–43)
MCH RBC QN AUTO: 28.2 PG (ref 25.2–33.5)
MCHC RBC AUTO-ENTMCNC: 29.2 G/DL (ref 28.4–34.8)
MCV RBC AUTO: 96.4 FL (ref 82.6–102.9)
MONOCYTES NFR BLD: 0.39 K/UL (ref 0.1–1.2)
MONOCYTES NFR BLD: 5 % (ref 3–12)
NEUTROPHILS NFR BLD: 70 % (ref 36–65)
NEUTS SEG NFR BLD: 5.65 K/UL (ref 1.5–8.1)
NRBC BLD-RTO: 0 PER 100 WBC
PLATELET # BLD AUTO: 204 K/UL (ref 138–453)
PMV BLD AUTO: 10.6 FL (ref 8.1–13.5)
POTASSIUM SERPL-SCNC: 4.9 MMOL/L (ref 3.7–5.3)
RBC # BLD AUTO: 3.3 M/UL (ref 3.95–5.11)
RBC # BLD: ABNORMAL 10*6/UL
SODIUM SERPL-SCNC: 138 MMOL/L (ref 135–144)
WBC OTHER # BLD: 8.1 K/UL (ref 3.5–11.3)

## 2023-10-20 PROCEDURE — 93005 ELECTROCARDIOGRAM TRACING: CPT | Performed by: INTERNAL MEDICINE

## 2023-10-20 PROCEDURE — 85025 COMPLETE CBC W/AUTO DIFF WBC: CPT

## 2023-10-20 PROCEDURE — 80048 BASIC METABOLIC PNL TOTAL CA: CPT

## 2023-10-20 PROCEDURE — 93306 TTE W/DOPPLER COMPLETE: CPT | Performed by: INTERNAL MEDICINE

## 2023-10-20 PROCEDURE — 2580000003 HC RX 258: Performed by: STUDENT IN AN ORGANIZED HEALTH CARE EDUCATION/TRAINING PROGRAM

## 2023-10-20 PROCEDURE — 6370000000 HC RX 637 (ALT 250 FOR IP)

## 2023-10-20 PROCEDURE — 99223 1ST HOSP IP/OBS HIGH 75: CPT | Performed by: INTERNAL MEDICINE

## 2023-10-20 PROCEDURE — 94640 AIRWAY INHALATION TREATMENT: CPT

## 2023-10-20 PROCEDURE — 93010 ELECTROCARDIOGRAM REPORT: CPT | Performed by: INTERNAL MEDICINE

## 2023-10-20 PROCEDURE — 36415 COLL VENOUS BLD VENIPUNCTURE: CPT

## 2023-10-20 PROCEDURE — 93306 TTE W/DOPPLER COMPLETE: CPT

## 2023-10-20 PROCEDURE — 6360000002 HC RX W HCPCS: Performed by: STUDENT IN AN ORGANIZED HEALTH CARE EDUCATION/TRAINING PROGRAM

## 2023-10-20 PROCEDURE — 6370000000 HC RX 637 (ALT 250 FOR IP): Performed by: STUDENT IN AN ORGANIZED HEALTH CARE EDUCATION/TRAINING PROGRAM

## 2023-10-20 PROCEDURE — 2000000000 HC ICU R&B

## 2023-10-20 PROCEDURE — 2700000000 HC OXYGEN THERAPY PER DAY

## 2023-10-20 RX ORDER — TIZANIDINE 4 MG/1
4 TABLET ORAL EVERY 8 HOURS PRN
Status: DISCONTINUED | OUTPATIENT
Start: 2023-10-20 | End: 2023-10-24 | Stop reason: HOSPADM

## 2023-10-20 RX ORDER — ACETAMINOPHEN 650 MG/1
650 SUPPOSITORY RECTAL EVERY 6 HOURS PRN
Status: DISCONTINUED | OUTPATIENT
Start: 2023-10-20 | End: 2023-10-24 | Stop reason: HOSPADM

## 2023-10-20 RX ORDER — ACETAMINOPHEN 325 MG/1
650 TABLET ORAL EVERY 4 HOURS PRN
Status: DISCONTINUED | OUTPATIENT
Start: 2023-10-20 | End: 2023-10-24 | Stop reason: HOSPADM

## 2023-10-20 RX ORDER — SODIUM CHLORIDE, SODIUM LACTATE, POTASSIUM CHLORIDE, CALCIUM CHLORIDE 600; 310; 30; 20 MG/100ML; MG/100ML; MG/100ML; MG/100ML
INJECTION, SOLUTION INTRAVENOUS CONTINUOUS
Status: DISCONTINUED | OUTPATIENT
Start: 2023-10-20 | End: 2023-10-20

## 2023-10-20 RX ORDER — OXYCODONE HYDROCHLORIDE 5 MG/1
5 TABLET ORAL EVERY 4 HOURS PRN
Status: COMPLETED | OUTPATIENT
Start: 2023-10-20 | End: 2023-10-21

## 2023-10-20 RX ADMIN — Medication 5 MG: at 21:44

## 2023-10-20 RX ADMIN — BUDESONIDE AND FORMOTEROL FUMARATE DIHYDRATE 2 PUFF: 160; 4.5 AEROSOL RESPIRATORY (INHALATION) at 19:47

## 2023-10-20 RX ADMIN — BUDESONIDE AND FORMOTEROL FUMARATE DIHYDRATE 2 PUFF: 160; 4.5 AEROSOL RESPIRATORY (INHALATION) at 09:00

## 2023-10-20 RX ADMIN — ACETAMINOPHEN 650 MG: 325 TABLET ORAL at 09:01

## 2023-10-20 RX ADMIN — ATORVASTATIN CALCIUM 80 MG: 80 TABLET, FILM COATED ORAL at 09:00

## 2023-10-20 RX ADMIN — ACETAMINOPHEN 650 MG: 325 TABLET ORAL at 21:44

## 2023-10-20 RX ADMIN — ENOXAPARIN SODIUM 40 MG: 100 INJECTION SUBCUTANEOUS at 09:01

## 2023-10-20 RX ADMIN — BUPROPION HYDROCHLORIDE 300 MG: 150 TABLET, EXTENDED RELEASE ORAL at 09:02

## 2023-10-20 RX ADMIN — ASPIRIN 81 MG: 81 TABLET, COATED ORAL at 09:00

## 2023-10-20 RX ADMIN — ACETAMINOPHEN 650 MG: 325 TABLET ORAL at 15:01

## 2023-10-20 RX ADMIN — OXYCODONE HYDROCHLORIDE 5 MG: 5 TABLET ORAL at 21:43

## 2023-10-20 RX ADMIN — PANTOPRAZOLE SODIUM 40 MG: 40 TABLET, DELAYED RELEASE ORAL at 09:00

## 2023-10-20 RX ADMIN — LEVOTHYROXINE SODIUM 25 MCG: 25 TABLET ORAL at 09:07

## 2023-10-20 RX ADMIN — ACETAMINOPHEN 650 MG: 325 TABLET ORAL at 03:15

## 2023-10-20 RX ADMIN — ENOXAPARIN SODIUM 40 MG: 100 INJECTION SUBCUTANEOUS at 21:44

## 2023-10-20 RX ADMIN — SODIUM CHLORIDE, PRESERVATIVE FREE 10 ML: 5 INJECTION INTRAVENOUS at 09:02

## 2023-10-20 RX ADMIN — SODIUM CHLORIDE, POTASSIUM CHLORIDE, SODIUM LACTATE AND CALCIUM CHLORIDE: 600; 310; 30; 20 INJECTION, SOLUTION INTRAVENOUS at 09:07

## 2023-10-20 ASSESSMENT — ENCOUNTER SYMPTOMS
DIARRHEA: 0
NAUSEA: 0
WHEEZING: 0
ABDOMINAL PAIN: 0
VOMITING: 0
SHORTNESS OF BREATH: 0
CONSTIPATION: 0
CHEST TIGHTNESS: 0

## 2023-10-20 ASSESSMENT — PAIN SCALES - GENERAL
PAINLEVEL_OUTOF10: 9
PAINLEVEL_OUTOF10: 7
PAINLEVEL_OUTOF10: 9

## 2023-10-20 NOTE — CARE COORDINATION
Case Management Assessment  Initial Evaluation    Date/Time of Evaluation: 10/20/2023 8:58 AM  Assessment Completed by: Mulu Bowman RN    If patient is discharged prior to next notation, then this note serves as note for discharge by case management. Patient Name: Marlon Osborne                   YOB: 1965  Diagnosis: Severe sinus bradycardia [R00.1]  Third degree heart block ([address]) [I44.2]  Bradycardia [R00.1]                   Date / Time: 10/19/2023  1:47 PM    Patient Admission Status: Inpatient   Readmission Risk (Low < 19, Mod (19-27), High > 27): Readmission Risk Score: 15.1    Current PCP: Maxim Acevedo MD  PCP verified by CM? Yes    Chart Reviewed: Yes      History Provided by: Patient  Patient Orientation: Alert and Oriented    Patient Cognition: Alert    Hospitalization in the last 30 days (Readmission):  No    If yes, Readmission Assessment in CM Navigator will be completed. Advance Directives:      Code Status: Full Code   Patient's Primary Decision Maker is: Legal Next of Kin      Discharge Planning:    Patient lives with: Alone Type of Home: Apartment  Primary Care Giver: Self  Patient Support Systems include: None   Current Financial resources: Medicaid, Medicare  Current community resources:    Current services prior to admission: Durable Medical Equipment            Current DME: (P) Other (Comment) (shower chair, rollator)            Type of Home Care services:  (P) None    ADLS  Prior functional level: Assistance with the following:, Cooking, Housework (has someone who comes in to do this)  Current functional level: Assistance with the following:, Cooking, Housework    PT AM-PAC:   /24  OT AM-PAC:   /24    Family can provide assistance at DC: No  Would you like Case Management to discuss the discharge plan with any other family members/significant others, and if so, who?  No  Plans to Return to Present Housing: Yes  Other Identified Issues/Barriers to RETURNING to

## 2023-10-21 ENCOUNTER — APPOINTMENT (OUTPATIENT)
Dept: VASCULAR LAB | Age: 58
DRG: 308 | End: 2023-10-21
Payer: MEDICARE

## 2023-10-21 LAB
ANION GAP SERPL CALCULATED.3IONS-SCNC: 7 MMOL/L (ref 9–17)
BASOPHILS # BLD: <0.03 K/UL (ref 0–0.2)
BASOPHILS NFR BLD: 0 % (ref 0–2)
BUN SERPL-MCNC: 24 MG/DL (ref 8–23)
CALCIUM SERPL-MCNC: 8.2 MG/DL (ref 8.6–10.4)
CHLORIDE SERPL-SCNC: 102 MMOL/L (ref 98–107)
CO2 SERPL-SCNC: 27 MMOL/L (ref 20–31)
CREAT SERPL-MCNC: 1.4 MG/DL (ref 0.5–0.9)
D DIMER PPP FEU-MCNC: 1.33 UG/ML FEU (ref 0–0.57)
EKG ATRIAL RATE: 220 BPM
EKG ATRIAL RATE: 73 BPM
EKG P AXIS: 77 DEGREES
EKG P-R INTERVAL: 160 MS
EKG Q-T INTERVAL: 400 MS
EKG Q-T INTERVAL: 486 MS
EKG QRS DURATION: 100 MS
EKG QRS DURATION: 120 MS
EKG QTC CALCULATION (BAZETT): 410 MS
EKG QTC CALCULATION (BAZETT): 440 MS
EKG R AXIS: -23 DEGREES
EKG R AXIS: -25 DEGREES
EKG T AXIS: -12 DEGREES
EKG T AXIS: -26 DEGREES
EKG VENTRICULAR RATE: 43 BPM
EKG VENTRICULAR RATE: 73 BPM
EOSINOPHIL # BLD: 0.25 K/UL (ref 0–0.44)
EOSINOPHILS RELATIVE PERCENT: 4 % (ref 1–4)
ERYTHROCYTE [DISTWIDTH] IN BLOOD BY AUTOMATED COUNT: 16.3 % (ref 11.8–14.4)
GFR SERPL CREATININE-BSD FRML MDRD: 43 ML/MIN/1.73M2
GLUCOSE SERPL-MCNC: 148 MG/DL (ref 70–99)
HCT VFR BLD AUTO: 32.4 % (ref 36.3–47.1)
HGB BLD-MCNC: 9.4 G/DL (ref 11.9–15.1)
IMM GRANULOCYTES # BLD AUTO: 0.04 K/UL (ref 0–0.3)
IMM GRANULOCYTES NFR BLD: 1 %
LYMPHOCYTES NFR BLD: 1.75 K/UL (ref 1.1–3.7)
LYMPHOCYTES RELATIVE PERCENT: 25 % (ref 24–43)
MCH RBC QN AUTO: 28.4 PG (ref 25.2–33.5)
MCHC RBC AUTO-ENTMCNC: 29 G/DL (ref 28.4–34.8)
MCV RBC AUTO: 97.9 FL (ref 82.6–102.9)
MONOCYTES NFR BLD: 0.43 K/UL (ref 0.1–1.2)
MONOCYTES NFR BLD: 6 % (ref 3–12)
NEUTROPHILS NFR BLD: 64 % (ref 36–65)
NEUTS SEG NFR BLD: 4.53 K/UL (ref 1.5–8.1)
NRBC BLD-RTO: 0 PER 100 WBC
PLATELET # BLD AUTO: 209 K/UL (ref 138–453)
PMV BLD AUTO: 10.5 FL (ref 8.1–13.5)
POTASSIUM SERPL-SCNC: 5 MMOL/L (ref 3.7–5.3)
RBC # BLD AUTO: 3.31 M/UL (ref 3.95–5.11)
RBC # BLD: ABNORMAL 10*6/UL
SODIUM SERPL-SCNC: 136 MMOL/L (ref 135–144)
WBC OTHER # BLD: 7 K/UL (ref 3.5–11.3)

## 2023-10-21 PROCEDURE — 85379 FIBRIN DEGRADATION QUANT: CPT

## 2023-10-21 PROCEDURE — 6370000000 HC RX 637 (ALT 250 FOR IP): Performed by: STUDENT IN AN ORGANIZED HEALTH CARE EDUCATION/TRAINING PROGRAM

## 2023-10-21 PROCEDURE — 93970 EXTREMITY STUDY: CPT

## 2023-10-21 PROCEDURE — 36415 COLL VENOUS BLD VENIPUNCTURE: CPT

## 2023-10-21 PROCEDURE — 93010 ELECTROCARDIOGRAM REPORT: CPT | Performed by: INTERNAL MEDICINE

## 2023-10-21 PROCEDURE — 85025 COMPLETE CBC W/AUTO DIFF WBC: CPT

## 2023-10-21 PROCEDURE — 2000000000 HC ICU R&B

## 2023-10-21 PROCEDURE — 94761 N-INVAS EAR/PLS OXIMETRY MLT: CPT

## 2023-10-21 PROCEDURE — 6360000002 HC RX W HCPCS: Performed by: STUDENT IN AN ORGANIZED HEALTH CARE EDUCATION/TRAINING PROGRAM

## 2023-10-21 PROCEDURE — 6370000000 HC RX 637 (ALT 250 FOR IP)

## 2023-10-21 PROCEDURE — 2580000003 HC RX 258: Performed by: STUDENT IN AN ORGANIZED HEALTH CARE EDUCATION/TRAINING PROGRAM

## 2023-10-21 PROCEDURE — 80048 BASIC METABOLIC PNL TOTAL CA: CPT

## 2023-10-21 PROCEDURE — 2700000000 HC OXYGEN THERAPY PER DAY

## 2023-10-21 PROCEDURE — 94640 AIRWAY INHALATION TREATMENT: CPT

## 2023-10-21 PROCEDURE — 93970 EXTREMITY STUDY: CPT | Performed by: SURGERY

## 2023-10-21 PROCEDURE — 99233 SBSQ HOSP IP/OBS HIGH 50: CPT | Performed by: INTERNAL MEDICINE

## 2023-10-21 RX ORDER — FENTANYL CITRATE 50 UG/ML
50 INJECTION, SOLUTION INTRAMUSCULAR; INTRAVENOUS ONCE
Status: COMPLETED | OUTPATIENT
Start: 2023-10-21 | End: 2023-10-21

## 2023-10-21 RX ADMIN — ENOXAPARIN SODIUM 40 MG: 100 INJECTION SUBCUTANEOUS at 08:22

## 2023-10-21 RX ADMIN — FENTANYL CITRATE 50 MCG: 50 INJECTION, SOLUTION INTRAMUSCULAR; INTRAVENOUS at 21:41

## 2023-10-21 RX ADMIN — ASPIRIN 81 MG: 81 TABLET, COATED ORAL at 08:22

## 2023-10-21 RX ADMIN — ENOXAPARIN SODIUM 40 MG: 100 INJECTION SUBCUTANEOUS at 20:32

## 2023-10-21 RX ADMIN — Medication 5 MG: at 21:40

## 2023-10-21 RX ADMIN — PANTOPRAZOLE SODIUM 40 MG: 40 TABLET, DELAYED RELEASE ORAL at 08:22

## 2023-10-21 RX ADMIN — BUPROPION HYDROCHLORIDE 300 MG: 150 TABLET, EXTENDED RELEASE ORAL at 08:27

## 2023-10-21 RX ADMIN — ACETAMINOPHEN 650 MG: 325 TABLET ORAL at 18:59

## 2023-10-21 RX ADMIN — BUDESONIDE AND FORMOTEROL FUMARATE DIHYDRATE 2 PUFF: 160; 4.5 AEROSOL RESPIRATORY (INHALATION) at 19:45

## 2023-10-21 RX ADMIN — SODIUM CHLORIDE, PRESERVATIVE FREE 10 ML: 5 INJECTION INTRAVENOUS at 08:23

## 2023-10-21 RX ADMIN — ACETAMINOPHEN 650 MG: 325 TABLET ORAL at 08:31

## 2023-10-21 RX ADMIN — OXYCODONE HYDROCHLORIDE 5 MG: 5 TABLET ORAL at 02:38

## 2023-10-21 RX ADMIN — LEVOTHYROXINE SODIUM 25 MCG: 25 TABLET ORAL at 05:55

## 2023-10-21 RX ADMIN — TIZANIDINE 4 MG: 4 TABLET ORAL at 21:40

## 2023-10-21 RX ADMIN — BUDESONIDE AND FORMOTEROL FUMARATE DIHYDRATE 2 PUFF: 160; 4.5 AEROSOL RESPIRATORY (INHALATION) at 09:19

## 2023-10-21 RX ADMIN — ATORVASTATIN CALCIUM 80 MG: 80 TABLET, FILM COATED ORAL at 08:22

## 2023-10-21 RX ADMIN — SODIUM CHLORIDE, PRESERVATIVE FREE 5 ML: 5 INJECTION INTRAVENOUS at 20:32

## 2023-10-21 ASSESSMENT — PAIN SCALES - GENERAL
PAINLEVEL_OUTOF10: 6
PAINLEVEL_OUTOF10: 9
PAINLEVEL_OUTOF10: 5
PAINLEVEL_OUTOF10: 4
PAINLEVEL_OUTOF10: 9
PAINLEVEL_OUTOF10: 7
PAINLEVEL_OUTOF10: 8
PAINLEVEL_OUTOF10: 6

## 2023-10-21 ASSESSMENT — PAIN DESCRIPTION - LOCATION
LOCATION: BACK;KNEE
LOCATION: GENERALIZED;LEG

## 2023-10-21 ASSESSMENT — ENCOUNTER SYMPTOMS
NAUSEA: 0
VOMITING: 0
CHEST TIGHTNESS: 0
ABDOMINAL PAIN: 0
WHEEZING: 0
SHORTNESS OF BREATH: 0
CONSTIPATION: 0
DIARRHEA: 0

## 2023-10-21 ASSESSMENT — PAIN DESCRIPTION - ORIENTATION: ORIENTATION: RIGHT

## 2023-10-22 LAB
ANION GAP SERPL CALCULATED.3IONS-SCNC: 7 MMOL/L (ref 9–17)
BASOPHILS # BLD: <0.03 K/UL (ref 0–0.2)
BASOPHILS NFR BLD: 0 % (ref 0–2)
BUN SERPL-MCNC: 21 MG/DL (ref 8–23)
CALCIUM SERPL-MCNC: 8.5 MG/DL (ref 8.6–10.4)
CHLORIDE SERPL-SCNC: 103 MMOL/L (ref 98–107)
CO2 SERPL-SCNC: 28 MMOL/L (ref 20–31)
CREAT SERPL-MCNC: 1.3 MG/DL (ref 0.5–0.9)
EOSINOPHIL # BLD: 0.29 K/UL (ref 0–0.44)
EOSINOPHILS RELATIVE PERCENT: 4 % (ref 1–4)
ERYTHROCYTE [DISTWIDTH] IN BLOOD BY AUTOMATED COUNT: 16.2 % (ref 11.8–14.4)
GFR SERPL CREATININE-BSD FRML MDRD: 47 ML/MIN/1.73M2
GLUCOSE SERPL-MCNC: 120 MG/DL (ref 70–99)
HCT VFR BLD AUTO: 32.6 % (ref 36.3–47.1)
HGB BLD-MCNC: 9.7 G/DL (ref 11.9–15.1)
IMM GRANULOCYTES # BLD AUTO: 0.03 K/UL (ref 0–0.3)
IMM GRANULOCYTES NFR BLD: 0 %
LYMPHOCYTES NFR BLD: 1.89 K/UL (ref 1.1–3.7)
LYMPHOCYTES RELATIVE PERCENT: 28 % (ref 24–43)
MCH RBC QN AUTO: 28.6 PG (ref 25.2–33.5)
MCHC RBC AUTO-ENTMCNC: 29.8 G/DL (ref 28.4–34.8)
MCV RBC AUTO: 96.2 FL (ref 82.6–102.9)
MONOCYTES NFR BLD: 0.47 K/UL (ref 0.1–1.2)
MONOCYTES NFR BLD: 7 % (ref 3–12)
NEUTROPHILS NFR BLD: 61 % (ref 36–65)
NEUTS SEG NFR BLD: 4.11 K/UL (ref 1.5–8.1)
NRBC BLD-RTO: 0 PER 100 WBC
PLATELET # BLD AUTO: 206 K/UL (ref 138–453)
PMV BLD AUTO: 10.3 FL (ref 8.1–13.5)
POTASSIUM SERPL-SCNC: 4.9 MMOL/L (ref 3.7–5.3)
RBC # BLD AUTO: 3.39 M/UL (ref 3.95–5.11)
RBC # BLD: ABNORMAL 10*6/UL
SODIUM SERPL-SCNC: 138 MMOL/L (ref 135–144)
WBC OTHER # BLD: 6.8 K/UL (ref 3.5–11.3)

## 2023-10-22 PROCEDURE — 6370000000 HC RX 637 (ALT 250 FOR IP)

## 2023-10-22 PROCEDURE — 6360000002 HC RX W HCPCS: Performed by: STUDENT IN AN ORGANIZED HEALTH CARE EDUCATION/TRAINING PROGRAM

## 2023-10-22 PROCEDURE — 94640 AIRWAY INHALATION TREATMENT: CPT

## 2023-10-22 PROCEDURE — 2580000003 HC RX 258: Performed by: STUDENT IN AN ORGANIZED HEALTH CARE EDUCATION/TRAINING PROGRAM

## 2023-10-22 PROCEDURE — 6370000000 HC RX 637 (ALT 250 FOR IP): Performed by: STUDENT IN AN ORGANIZED HEALTH CARE EDUCATION/TRAINING PROGRAM

## 2023-10-22 PROCEDURE — 85025 COMPLETE CBC W/AUTO DIFF WBC: CPT

## 2023-10-22 PROCEDURE — 36415 COLL VENOUS BLD VENIPUNCTURE: CPT

## 2023-10-22 PROCEDURE — 2060000000 HC ICU INTERMEDIATE R&B

## 2023-10-22 PROCEDURE — 80048 BASIC METABOLIC PNL TOTAL CA: CPT

## 2023-10-22 PROCEDURE — 99233 SBSQ HOSP IP/OBS HIGH 50: CPT | Performed by: INTERNAL MEDICINE

## 2023-10-22 RX ORDER — OXYCODONE HYDROCHLORIDE 5 MG/1
5 TABLET ORAL ONCE
Status: COMPLETED | OUTPATIENT
Start: 2023-10-22 | End: 2023-10-22

## 2023-10-22 RX ORDER — DIAPER,BRIEF,INFANT-TODD,DISP
EACH MISCELLANEOUS 2 TIMES DAILY PRN
Status: DISCONTINUED | OUTPATIENT
Start: 2023-10-22 | End: 2023-10-24 | Stop reason: HOSPADM

## 2023-10-22 RX ADMIN — ATORVASTATIN CALCIUM 80 MG: 80 TABLET, FILM COATED ORAL at 08:20

## 2023-10-22 RX ADMIN — SODIUM CHLORIDE, PRESERVATIVE FREE 10 ML: 5 INJECTION INTRAVENOUS at 21:06

## 2023-10-22 RX ADMIN — TIZANIDINE 4 MG: 4 TABLET ORAL at 21:08

## 2023-10-22 RX ADMIN — LEVOTHYROXINE SODIUM 25 MCG: 25 TABLET ORAL at 08:21

## 2023-10-22 RX ADMIN — ENOXAPARIN SODIUM 40 MG: 100 INJECTION SUBCUTANEOUS at 08:24

## 2023-10-22 RX ADMIN — ASPIRIN 81 MG: 81 TABLET, COATED ORAL at 08:21

## 2023-10-22 RX ADMIN — SODIUM CHLORIDE, PRESERVATIVE FREE 10 ML: 5 INJECTION INTRAVENOUS at 08:23

## 2023-10-22 RX ADMIN — BUPROPION HYDROCHLORIDE 300 MG: 150 TABLET, EXTENDED RELEASE ORAL at 08:21

## 2023-10-22 RX ADMIN — ACETAMINOPHEN 650 MG: 325 TABLET ORAL at 13:59

## 2023-10-22 RX ADMIN — ACETAMINOPHEN 650 MG: 325 TABLET ORAL at 08:20

## 2023-10-22 RX ADMIN — ENOXAPARIN SODIUM 40 MG: 100 INJECTION SUBCUTANEOUS at 21:06

## 2023-10-22 RX ADMIN — PANTOPRAZOLE SODIUM 40 MG: 40 TABLET, DELAYED RELEASE ORAL at 08:21

## 2023-10-22 RX ADMIN — ACETAMINOPHEN 650 MG: 325 TABLET ORAL at 21:13

## 2023-10-22 RX ADMIN — BUDESONIDE AND FORMOTEROL FUMARATE DIHYDRATE 2 PUFF: 160; 4.5 AEROSOL RESPIRATORY (INHALATION) at 09:14

## 2023-10-22 RX ADMIN — Medication 5 MG: at 21:13

## 2023-10-22 RX ADMIN — OXYCODONE HYDROCHLORIDE 5 MG: 5 TABLET ORAL at 13:59

## 2023-10-22 ASSESSMENT — PAIN SCALES - GENERAL
PAINLEVEL_OUTOF10: 3
PAINLEVEL_OUTOF10: 4

## 2023-10-22 ASSESSMENT — PAIN DESCRIPTION - PAIN TYPE
TYPE: CHRONIC PAIN
TYPE: CHRONIC PAIN

## 2023-10-22 ASSESSMENT — PAIN - FUNCTIONAL ASSESSMENT
PAIN_FUNCTIONAL_ASSESSMENT: ACTIVITIES ARE NOT PREVENTED
PAIN_FUNCTIONAL_ASSESSMENT: PREVENTS OR INTERFERES SOME ACTIVE ACTIVITIES AND ADLS

## 2023-10-22 ASSESSMENT — ENCOUNTER SYMPTOMS
SHORTNESS OF BREATH: 0
ABDOMINAL PAIN: 0
WHEEZING: 0
NAUSEA: 0
DIARRHEA: 0
CHEST TIGHTNESS: 0
VOMITING: 0
CONSTIPATION: 0

## 2023-10-22 ASSESSMENT — PAIN DESCRIPTION - LOCATION
LOCATION: GENERALIZED
LOCATION: GENERALIZED

## 2023-10-22 ASSESSMENT — PAIN DESCRIPTION - DESCRIPTORS
DESCRIPTORS: ACHING
DESCRIPTORS: ACHING

## 2023-10-22 ASSESSMENT — PAIN DESCRIPTION - ORIENTATION
ORIENTATION: RIGHT;LEFT;LOWER;UPPER
ORIENTATION: RIGHT;LEFT;LOWER;UPPER

## 2023-10-22 NOTE — CONSULTS
[facility]  Inpatient Cardiology Consult             Date:   10/21/2023  Patient name: Maximo Lott  Date of admission:  10/19/2023  1:47 PM  MRN:   [id number]  YOB: 1965      Reason for consultation:  Bradycardia    CHIEF COMPLAINT:   Lightheadedness    History Obtained From:  Patient and medical record    HISTORY OF PRESENT ILLNESS:      The patient is a 59 y.o woman with h/o HTN, HLP and CKD who had presented on 10/19 with recent diarrhea, fatigue, lightheadedness and atypical chest pain. She was found to have junctional bradycardia and hypotension and was given glucagon and dopamine, and had placement of a temporary transvenous pacemaker. She had been taking 50 mg metoprolol BID, which was discontinued. Over the past 24 hrs she has maintained sinus rhythm with HR 70-80 bpm,     Past Medical History:   has a past medical history of Arthritis, Bronchitis, CHF (congestive heart failure) ([address]), COVID-19, Fibromyalgia, GERD (gastroesophageal reflux disease), Gout, History of heart attack, HLD (hyperlipidemia), Hypertension, Insomnia, and Osteoarthritis. Past Surgical History:   has a past surgical history that includes back surgery; shoulder surgery (Right, 2009); knee surgery (Left, 2009); Cholecystectomy, laparoscopic; Knee arthroscopy (Right); Hysterectomy, total abdominal; Neck surgery; Thyroidectomy; and Cardiac catheterization (07/08/2022). Home Medications:    Prior to Admission medications    Medication Sig Start Date End Date Taking?  Authorizing Provider   ibuprofen (ADVIL;MOTRIN) 800 MG tablet Take 1 tablet by mouth every 8 hours as needed for Pain 10/16/23   Cat Alcaraz MD   buPROPion (WELLBUTRIN XL) 300 MG extended release tablet Take 1 tablet by mouth every morning 10/6/23   Cat Alcaraz MD   oxybutynin (DITROPAN) 5 MG tablet Take 1 tablet by mouth 2 times daily 10/6/23   Cat Alcaraz MD   spironolactone (ALDACTONE) 25 MG tablet Take 1 tablet by

## 2023-10-23 LAB
ANION GAP SERPL CALCULATED.3IONS-SCNC: 8 MMOL/L (ref 9–17)
BASOPHILS # BLD: 0.03 K/UL (ref 0–0.2)
BASOPHILS NFR BLD: 0 % (ref 0–2)
BUN SERPL-MCNC: 23 MG/DL (ref 8–23)
CALCIUM SERPL-MCNC: 8.6 MG/DL (ref 8.6–10.4)
CHLORIDE SERPL-SCNC: 101 MMOL/L (ref 98–107)
CO2 SERPL-SCNC: 27 MMOL/L (ref 20–31)
CREAT SERPL-MCNC: 1.1 MG/DL (ref 0.5–0.9)
EOSINOPHIL # BLD: 0.29 K/UL (ref 0–0.44)
EOSINOPHILS RELATIVE PERCENT: 4 % (ref 1–4)
ERYTHROCYTE [DISTWIDTH] IN BLOOD BY AUTOMATED COUNT: 16.4 % (ref 11.8–14.4)
GFR SERPL CREATININE-BSD FRML MDRD: 58 ML/MIN/1.73M2
GLUCOSE SERPL-MCNC: 111 MG/DL (ref 70–99)
HCT VFR BLD AUTO: 33.3 % (ref 36.3–47.1)
HGB BLD-MCNC: 10 G/DL (ref 11.9–15.1)
IMM GRANULOCYTES # BLD AUTO: 0.03 K/UL (ref 0–0.3)
IMM GRANULOCYTES NFR BLD: 0 %
LYMPHOCYTES NFR BLD: 1.79 K/UL (ref 1.1–3.7)
LYMPHOCYTES RELATIVE PERCENT: 24 % (ref 24–43)
MCH RBC QN AUTO: 28.3 PG (ref 25.2–33.5)
MCHC RBC AUTO-ENTMCNC: 30 G/DL (ref 28.4–34.8)
MCV RBC AUTO: 94.3 FL (ref 82.6–102.9)
MONOCYTES NFR BLD: 0.52 K/UL (ref 0.1–1.2)
MONOCYTES NFR BLD: 7 % (ref 3–12)
NEUTROPHILS NFR BLD: 65 % (ref 36–65)
NEUTS SEG NFR BLD: 4.95 K/UL (ref 1.5–8.1)
NRBC BLD-RTO: 0 PER 100 WBC
PLATELET # BLD AUTO: 218 K/UL (ref 138–453)
PMV BLD AUTO: 10.4 FL (ref 8.1–13.5)
POTASSIUM SERPL-SCNC: 4.7 MMOL/L (ref 3.7–5.3)
RBC # BLD AUTO: 3.53 M/UL (ref 3.95–5.11)
RBC # BLD: ABNORMAL 10*6/UL
SODIUM SERPL-SCNC: 136 MMOL/L (ref 135–144)
WBC OTHER # BLD: 7.6 K/UL (ref 3.5–11.3)

## 2023-10-23 PROCEDURE — 6370000000 HC RX 637 (ALT 250 FOR IP)

## 2023-10-23 PROCEDURE — 94640 AIRWAY INHALATION TREATMENT: CPT

## 2023-10-23 PROCEDURE — 85025 COMPLETE CBC W/AUTO DIFF WBC: CPT

## 2023-10-23 PROCEDURE — 2060000000 HC ICU INTERMEDIATE R&B

## 2023-10-23 PROCEDURE — 99291 CRITICAL CARE FIRST HOUR: CPT | Performed by: INTERNAL MEDICINE

## 2023-10-23 PROCEDURE — 80048 BASIC METABOLIC PNL TOTAL CA: CPT

## 2023-10-23 PROCEDURE — 2580000003 HC RX 258: Performed by: STUDENT IN AN ORGANIZED HEALTH CARE EDUCATION/TRAINING PROGRAM

## 2023-10-23 PROCEDURE — 99233 SBSQ HOSP IP/OBS HIGH 50: CPT | Performed by: SURGERY

## 2023-10-23 PROCEDURE — 6370000000 HC RX 637 (ALT 250 FOR IP): Performed by: STUDENT IN AN ORGANIZED HEALTH CARE EDUCATION/TRAINING PROGRAM

## 2023-10-23 PROCEDURE — 6360000002 HC RX W HCPCS: Performed by: STUDENT IN AN ORGANIZED HEALTH CARE EDUCATION/TRAINING PROGRAM

## 2023-10-23 PROCEDURE — 36415 COLL VENOUS BLD VENIPUNCTURE: CPT

## 2023-10-23 PROCEDURE — 97162 PT EVAL MOD COMPLEX 30 MIN: CPT

## 2023-10-23 PROCEDURE — 97530 THERAPEUTIC ACTIVITIES: CPT

## 2023-10-23 PROCEDURE — 97166 OT EVAL MOD COMPLEX 45 MIN: CPT

## 2023-10-23 PROCEDURE — 97535 SELF CARE MNGMENT TRAINING: CPT

## 2023-10-23 RX ORDER — OXYCODONE HYDROCHLORIDE 5 MG/1
5 TABLET ORAL ONCE
Status: COMPLETED | OUTPATIENT
Start: 2023-10-23 | End: 2023-10-23

## 2023-10-23 RX ORDER — ISOSORBIDE DINITRATE 10 MG/1
10 TABLET ORAL 2 TIMES DAILY
Status: DISCONTINUED | OUTPATIENT
Start: 2023-10-23 | End: 2023-10-24 | Stop reason: HOSPADM

## 2023-10-23 RX ORDER — CETIRIZINE HYDROCHLORIDE 10 MG/1
10 TABLET ORAL ONCE
Status: COMPLETED | OUTPATIENT
Start: 2023-10-23 | End: 2023-10-23

## 2023-10-23 RX ORDER — SPIRONOLACTONE 25 MG/1
25 TABLET ORAL DAILY
Status: DISCONTINUED | OUTPATIENT
Start: 2023-10-23 | End: 2023-10-24 | Stop reason: HOSPADM

## 2023-10-23 RX ADMIN — Medication 5 MG: at 21:25

## 2023-10-23 RX ADMIN — LEVOTHYROXINE SODIUM 25 MCG: 25 TABLET ORAL at 06:27

## 2023-10-23 RX ADMIN — ATORVASTATIN CALCIUM 80 MG: 80 TABLET, FILM COATED ORAL at 08:41

## 2023-10-23 RX ADMIN — SODIUM CHLORIDE, PRESERVATIVE FREE 10 ML: 5 INJECTION INTRAVENOUS at 21:15

## 2023-10-23 RX ADMIN — CETIRIZINE HYDROCHLORIDE 10 MG: 10 TABLET ORAL at 14:23

## 2023-10-23 RX ADMIN — SODIUM CHLORIDE, PRESERVATIVE FREE 10 ML: 5 INJECTION INTRAVENOUS at 08:40

## 2023-10-23 RX ADMIN — ENOXAPARIN SODIUM 40 MG: 100 INJECTION SUBCUTANEOUS at 21:15

## 2023-10-23 RX ADMIN — OXYCODONE HYDROCHLORIDE 5 MG: 5 TABLET ORAL at 09:43

## 2023-10-23 RX ADMIN — SPIRONOLACTONE 25 MG: 25 TABLET ORAL at 09:43

## 2023-10-23 RX ADMIN — ASPIRIN 81 MG: 81 TABLET, COATED ORAL at 08:40

## 2023-10-23 RX ADMIN — ISOSORBIDE DINITRATE 10 MG: 10 TABLET ORAL at 09:43

## 2023-10-23 RX ADMIN — ISOSORBIDE DINITRATE 10 MG: 10 TABLET ORAL at 21:15

## 2023-10-23 RX ADMIN — BUPROPION HYDROCHLORIDE 300 MG: 150 TABLET, EXTENDED RELEASE ORAL at 08:41

## 2023-10-23 RX ADMIN — PANTOPRAZOLE SODIUM 40 MG: 40 TABLET, DELAYED RELEASE ORAL at 08:40

## 2023-10-23 RX ADMIN — BUDESONIDE AND FORMOTEROL FUMARATE DIHYDRATE 2 PUFF: 160; 4.5 AEROSOL RESPIRATORY (INHALATION) at 20:06

## 2023-10-23 RX ADMIN — OXYCODONE HYDROCHLORIDE 5 MG: 5 TABLET ORAL at 21:15

## 2023-10-23 RX ADMIN — ENOXAPARIN SODIUM 40 MG: 100 INJECTION SUBCUTANEOUS at 08:40

## 2023-10-23 ASSESSMENT — ENCOUNTER SYMPTOMS
DIARRHEA: 0
CONSTIPATION: 0
ABDOMINAL PAIN: 0
VOMITING: 0
CHEST TIGHTNESS: 0
NAUSEA: 0
SHORTNESS OF BREATH: 0
WHEEZING: 0

## 2023-10-23 ASSESSMENT — PAIN DESCRIPTION - LOCATION: LOCATION: LEG

## 2023-10-23 ASSESSMENT — PAIN SCALES - GENERAL
PAINLEVEL_OUTOF10: 0
PAINLEVEL_OUTOF10: 0
PAINLEVEL_OUTOF10: 10
PAINLEVEL_OUTOF10: 9

## 2023-10-23 NOTE — DISCHARGE INSTRUCTIONS
-Follow-up with your PCP in 1 week or less.   If you do not have a PCP please establish with 1 at the [facility] internal medicine clinic.  -Please do not take any beta-blockers including Lopressor also known as metoprolol or trazodone  -Please wear Holter monitor and follow-up with cardiology Dr. Sammy Patel  -Please have follow-up with Dr. Reese Morrison for sleep study results

## 2023-10-24 VITALS
SYSTOLIC BLOOD PRESSURE: 151 MMHG | BODY MASS INDEX: 58.26 KG/M2 | HEART RATE: 95 BPM | DIASTOLIC BLOOD PRESSURE: 83 MMHG | RESPIRATION RATE: 16 BRPM | OXYGEN SATURATION: 98 % | WEIGHT: 293 LBS | TEMPERATURE: 98.2 F

## 2023-10-24 LAB
ANION GAP SERPL CALCULATED.3IONS-SCNC: 11 MMOL/L (ref 9–17)
BASOPHILS # BLD: 0.04 K/UL (ref 0–0.2)
BASOPHILS NFR BLD: 1 % (ref 0–2)
BUN SERPL-MCNC: 19 MG/DL (ref 8–23)
CALCIUM SERPL-MCNC: 8.3 MG/DL (ref 8.6–10.4)
CHLORIDE SERPL-SCNC: 101 MMOL/L (ref 98–107)
CO2 SERPL-SCNC: 23 MMOL/L (ref 20–31)
CREAT SERPL-MCNC: 1.2 MG/DL (ref 0.5–0.9)
EOSINOPHIL # BLD: 0.32 K/UL (ref 0–0.44)
EOSINOPHILS RELATIVE PERCENT: 4 % (ref 1–4)
ERYTHROCYTE [DISTWIDTH] IN BLOOD BY AUTOMATED COUNT: 16.3 % (ref 11.8–14.4)
GFR SERPL CREATININE-BSD FRML MDRD: 52 ML/MIN/1.73M2
GLUCOSE SERPL-MCNC: 107 MG/DL (ref 70–99)
HCT VFR BLD AUTO: 35.6 % (ref 36.3–47.1)
HGB BLD-MCNC: 10.4 G/DL (ref 11.9–15.1)
IMM GRANULOCYTES # BLD AUTO: 0.03 K/UL (ref 0–0.3)
IMM GRANULOCYTES NFR BLD: 0 %
LYMPHOCYTES NFR BLD: 2.21 K/UL (ref 1.1–3.7)
LYMPHOCYTES RELATIVE PERCENT: 27 % (ref 24–43)
MCH RBC QN AUTO: 28.7 PG (ref 25.2–33.5)
MCHC RBC AUTO-ENTMCNC: 29.2 G/DL (ref 28.4–34.8)
MCV RBC AUTO: 98.1 FL (ref 82.6–102.9)
MICROORGANISM SPEC CULT: NORMAL
MICROORGANISM SPEC CULT: NORMAL
MONOCYTES NFR BLD: 0.47 K/UL (ref 0.1–1.2)
MONOCYTES NFR BLD: 6 % (ref 3–12)
NEUTROPHILS NFR BLD: 62 % (ref 36–65)
NEUTS SEG NFR BLD: 5 K/UL (ref 1.5–8.1)
NRBC BLD-RTO: 0 PER 100 WBC
PLATELET # BLD AUTO: 194 K/UL (ref 138–453)
PMV BLD AUTO: 9.9 FL (ref 8.1–13.5)
POTASSIUM SERPL-SCNC: 4.3 MMOL/L (ref 3.7–5.3)
RBC # BLD AUTO: 3.63 M/UL (ref 3.95–5.11)
RBC # BLD: ABNORMAL 10*6/UL
SERVICE CMNT-IMP: NORMAL
SERVICE CMNT-IMP: NORMAL
SODIUM SERPL-SCNC: 135 MMOL/L (ref 135–144)
SPECIMEN DESCRIPTION: NORMAL
SPECIMEN DESCRIPTION: NORMAL
WBC OTHER # BLD: 8.1 K/UL (ref 3.5–11.3)

## 2023-10-24 PROCEDURE — 6370000000 HC RX 637 (ALT 250 FOR IP)

## 2023-10-24 PROCEDURE — 6370000000 HC RX 637 (ALT 250 FOR IP): Performed by: STUDENT IN AN ORGANIZED HEALTH CARE EDUCATION/TRAINING PROGRAM

## 2023-10-24 PROCEDURE — 99291 CRITICAL CARE FIRST HOUR: CPT | Performed by: INTERNAL MEDICINE

## 2023-10-24 PROCEDURE — 85025 COMPLETE CBC W/AUTO DIFF WBC: CPT

## 2023-10-24 PROCEDURE — 80048 BASIC METABOLIC PNL TOTAL CA: CPT

## 2023-10-24 PROCEDURE — 6360000002 HC RX W HCPCS: Performed by: STUDENT IN AN ORGANIZED HEALTH CARE EDUCATION/TRAINING PROGRAM

## 2023-10-24 PROCEDURE — 36415 COLL VENOUS BLD VENIPUNCTURE: CPT

## 2023-10-24 PROCEDURE — 2580000003 HC RX 258: Performed by: STUDENT IN AN ORGANIZED HEALTH CARE EDUCATION/TRAINING PROGRAM

## 2023-10-24 PROCEDURE — 94640 AIRWAY INHALATION TREATMENT: CPT

## 2023-10-24 RX ORDER — OXYCODONE HYDROCHLORIDE 5 MG/1
5 TABLET ORAL ONCE
Status: COMPLETED | OUTPATIENT
Start: 2023-10-24 | End: 2023-10-24

## 2023-10-24 RX ADMIN — LEVOTHYROXINE SODIUM 25 MCG: 25 TABLET ORAL at 07:13

## 2023-10-24 RX ADMIN — BUDESONIDE AND FORMOTEROL FUMARATE DIHYDRATE 2 PUFF: 160; 4.5 AEROSOL RESPIRATORY (INHALATION) at 08:52

## 2023-10-24 RX ADMIN — SODIUM CHLORIDE, PRESERVATIVE FREE 10 ML: 5 INJECTION INTRAVENOUS at 09:17

## 2023-10-24 RX ADMIN — ACETAMINOPHEN 650 MG: 325 TABLET ORAL at 09:16

## 2023-10-24 RX ADMIN — PANTOPRAZOLE SODIUM 40 MG: 40 TABLET, DELAYED RELEASE ORAL at 09:16

## 2023-10-24 RX ADMIN — SPIRONOLACTONE 25 MG: 25 TABLET ORAL at 09:16

## 2023-10-24 RX ADMIN — OXYCODONE HYDROCHLORIDE 5 MG: 5 TABLET ORAL at 09:16

## 2023-10-24 RX ADMIN — ISOSORBIDE DINITRATE 10 MG: 10 TABLET ORAL at 09:17

## 2023-10-24 RX ADMIN — ENOXAPARIN SODIUM 40 MG: 100 INJECTION SUBCUTANEOUS at 09:16

## 2023-10-24 RX ADMIN — ATORVASTATIN CALCIUM 80 MG: 80 TABLET, FILM COATED ORAL at 09:16

## 2023-10-24 RX ADMIN — ASPIRIN 81 MG: 81 TABLET, COATED ORAL at 09:16

## 2023-10-24 RX ADMIN — BUPROPION HYDROCHLORIDE 300 MG: 150 TABLET, EXTENDED RELEASE ORAL at 09:17

## 2023-10-24 ASSESSMENT — PAIN SCALES - GENERAL
PAINLEVEL_OUTOF10: 9
PAINLEVEL_OUTOF10: 4

## 2023-10-24 ASSESSMENT — ENCOUNTER SYMPTOMS
ABDOMINAL PAIN: 0
WHEEZING: 0
NAUSEA: 0
SHORTNESS OF BREATH: 0
CONSTIPATION: 0
DIARRHEA: 0
CHEST TIGHTNESS: 0
VOMITING: 0

## 2023-10-24 NOTE — PLAN OF CARE
BRONCHOSPASM/BRONCHOCONSTRICTION     [x]         IMPROVE AERATION/BREATH SOUNDS  [x]   ADMINISTER BRONCHODILATOR THERAPY AS APPROPRIATE  [x]   ASSESS BREATH SOUNDS  []   IMPLEMENT AEROSOL/MDI PROTOCOL  [x]   PATIENT EDUCATION AS NEEDED
Internal medicine senior note    23-year-old female with past medical history CHF ejection fraction 30 to 35% in 2020 with grade 2 diastolic dysfunction, asthma, fibromyalgia, CKD stage III, GERD, gout, MI, hyperlipidemia, hypertension presents to the ED for not feeling good over the past 3 days associated with shortness of breath chest pain and lightheadedness. Patient was found to have severe bradycardia with ventricular escape rhythm and cardiology was consulted. She was started on dopamine and given glucagon x2 then became hypotensive. She got temporary transvenous pacer placed by the emergency room at bedside. Cardiology has been consulted and stat echo has been ordered with plan for AICD versus PPM.  Patient will be admitted to the cardiac ICU with cardiology consult and medicine as primary. Currently patient is afebrile, heart rate is 67, map is 76, saturating 97% on room air. She is on Levophed. Lab work-up significant for creatinine 1.6 with a baseline 1.1-1.3. Troponin 19 with no repeat. BNP 1300. Unremarkable LFTs. Hemoglobin 10.6 which is better than baseline, no leukocytosis, platelets adequate. VBG shows pH 7.42. Chest x-ray before procedure shows cardiomegaly with no edema and possible small left pleural effusion. After procedure chest x-ray pending. On my exam patient is in no acute distress at this time. Her heart rate is in the 80s. She does not feel any shortness of breath currently. She states the chest pain that she experienced started about 3 days ago and was a left-sided chest pressure. She also states that she has not been taking her levothyroxine for at least a month as it fell onto her bed and she could not reach it. She does state that she takes beta-blockers and did not take more than she was supposed to.     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
Problem: Discharge Planning  Goal: Discharge to home or other facility with appropriate resources  10/20/2023 2335 by Zeenat Yepez RN  Outcome: Progressing     Problem: Respiratory - Adult  Goal: Achieves optimal ventilation and oxygenation  10/20/2023 2335 by Zeenat Yepez RN  Outcome: Progressing     Problem: Cardiovascular - Adult  Goal: Maintains optimal cardiac output and hemodynamic stability  10/20/2023 2335 by Zeenat Yepez RN  Outcome: Progressing     Problem: Safety - Adult  Goal: Free from fall injury  10/20/2023 2335 by Zeenat Yepez RN  Outcome: Progressing     Problem: Pain  Goal: Verbalizes/displays adequate comfort level or baseline comfort level  Outcome: Progressing
Problem: Discharge Planning  Goal: Discharge to home or other facility with appropriate resources  10/22/2023 0329 by Romeo Marroquin RN  Outcome: Progressing     Problem: Respiratory - Adult  Goal: Achieves optimal ventilation and oxygenation  10/22/2023 0329 by Romeo Marroquin RN  Outcome: Progressing     Problem: Cardiovascular - Adult  Goal: Maintains optimal cardiac output and hemodynamic stability  10/22/2023 0329 by Romeo Marroquin RN  Outcome: Progressing     Problem: Safety - Adult  Goal: Free from fall injury  10/22/2023 0329 by Romeo Marroquin RN  Outcome: Progressing     Problem: Pain  Goal: Verbalizes/displays adequate comfort level or baseline comfort level  10/22/2023 0329 by Romeo Marroquin RN  Outcome: Progressing
Problem: Discharge Planning  Goal: Discharge to home or other facility with appropriate resources  10/22/2023 1100 by Tin Salvador RN  Outcome: Progressing  10/22/2023 0329 by Rocio Lopez RN  Outcome: Progressing     Problem: Respiratory - Adult  Goal: Achieves optimal ventilation and oxygenation  10/22/2023 1100 by Tin Salvador RN  Outcome: Progressing  10/22/2023 0329 by Rocio Lopez RN  Outcome: Progressing     Problem: Cardiovascular - Adult  Goal: Maintains optimal cardiac output and hemodynamic stability  10/22/2023 1100 by Tin Salvador RN  Outcome: Progressing  10/22/2023 0329 by Rocio Lopez RN  Outcome: Progressing     Problem: Safety - Adult  Goal: Free from fall injury  10/22/2023 1100 by Tin Salvador RN  Outcome: Progressing  10/22/2023 0329 by Rocio Lopez RN  Outcome: Progressing     Problem: Pain  Goal: Verbalizes/displays adequate comfort level or baseline comfort level  10/22/2023 1100 by Tin Salvador RN  Outcome: Progressing  10/22/2023 0329 by Rocio Lopez RN  Outcome: Progressing     Problem: Skin/Tissue Integrity  Goal: Absence of new skin breakdown  Description: 1. Monitor for areas of redness and/or skin breakdown  2. Assess vascular access sites hourly  3. Every 4-6 hours minimum:  Change oxygen saturation probe site  4. Every 4-6 hours:  If on nasal continuous positive airway pressure, respiratory therapy assess nares and determine need for appliance change or resting period.   Outcome: Progressing
Problem: Discharge Planning  Goal: Discharge to home or other facility with appropriate resources  10/24/2023 1201 by Laura Valentine RN  Outcome: Completed  10/24/2023 0945 by Laura Valentine RN  Outcome: Progressing  Flowsheets (Taken 10/24/2023 0803)  Discharge to home or other facility with appropriate resources: Identify barriers to discharge with patient and caregiver  10/24/2023 0201 by Matt Smyth RN  Outcome: Progressing     Problem: Respiratory - Adult  Goal: Achieves optimal ventilation and oxygenation  10/24/2023 1201 by Laura Valentine RN  Outcome: Completed  10/24/2023 0945 by Laura Valentine RN  Outcome: Progressing  Flowsheets (Taken 10/24/2023 0803)  Achieves optimal ventilation and oxygenation: Assess for changes in respiratory status  10/24/2023 0201 by Matt Smyth RN  Outcome: Progressing     Problem: Cardiovascular - Adult  Goal: Maintains optimal cardiac output and hemodynamic stability  10/24/2023 1201 by Laura Valentine RN  Outcome: Completed  10/24/2023 0945 by Laura Valentine RN  Outcome: Progressing  Flowsheets (Taken 10/24/2023 0803)  Maintains optimal cardiac output and hemodynamic stability: Monitor blood pressure and heart rate  10/24/2023 0201 by Matt Smyth RN  Outcome: Progressing     Problem: Safety - Adult  Goal: Free from fall injury  10/24/2023 1201 by Laura Valentine RN  Outcome: Completed  10/24/2023 0945 by Laura Valentine RN  Outcome: Progressing  [address] (Taken 10/24/2023 0942)  Free From Fall Injury: Instruct family/caregiver on patient safety  10/24/2023 0201 by Matt Smyth RN  Outcome: Progressing     Problem: Pain  Goal: Verbalizes/displays adequate comfort level or baseline comfort level  10/24/2023 1201 by Laura Valentine RN  Outcome: Completed  10/24/2023 0945 by Laura Valentine RN  Outcome: Progressing  Flowsheets (Taken 10/24/2023 0745)  Verbalizes/displays adequate comfort level or baseline comfort level: Encourage patient to monitor pain and
Problem: Discharge Planning  Goal: Discharge to home or other facility with appropriate resources  Outcome: Progressing     Problem: Respiratory - Adult  Goal: Achieves optimal ventilation and oxygenation  10/24/2023 0201 by Tomas Park RN  Outcome: Progressing  10/23/2023 1958 by Alexis Epps RCP  Outcome: Progressing     Problem: Cardiovascular - Adult  Goal: Maintains optimal cardiac output and hemodynamic stability  Outcome: Progressing     Problem: Safety - Adult  Goal: Free from fall injury  Outcome: Progressing     Problem: Pain  Goal: Verbalizes/displays adequate comfort level or baseline comfort level  Outcome: Progressing     Problem: Skin/Tissue Integrity  Goal: Absence of new skin breakdown  Description: 1. Monitor for areas of redness and/or skin breakdown  2. Assess vascular access sites hourly  3. Every 4-6 hours minimum:  Change oxygen saturation probe site  4. Every 4-6 hours:  If on nasal continuous positive airway pressure, respiratory therapy assess nares and determine need for appliance change or resting period.   Outcome: Progressing
Problem: Discharge Planning  Goal: Discharge to home or other facility with appropriate resources  Outcome: Progressing     Problem: Respiratory - Adult  Goal: Achieves optimal ventilation and oxygenation  Outcome: Progressing     Problem: Cardiovascular - Adult  Goal: Maintains optimal cardiac output and hemodynamic stability  Outcome: Progressing
Problem: Discharge Planning  Goal: Discharge to home or other facility with appropriate resources  Outcome: Progressing     Problem: Respiratory - Adult  Goal: Achieves optimal ventilation and oxygenation  Outcome: Progressing     Problem: Cardiovascular - Adult  Goal: Maintains optimal cardiac output and hemodynamic stability  Outcome: Progressing     Problem: Safety - Adult  Goal: Free from fall injury  Outcome: Progressing     Problem: Pain  Goal: Verbalizes/displays adequate comfort level or baseline comfort level  Outcome: Progressing
Problem: Discharge Planning  Goal: Discharge to home or other facility with appropriate resources  Outcome: Progressing  Flowsheets (Taken 10/20/2023 0800)  Discharge to home or other facility with appropriate resources:   Identify barriers to discharge with patient and caregiver   Arrange for needed discharge resources and transportation as appropriate   Identify discharge learning needs (meds, wound care, etc)     Problem: Respiratory - Adult  Goal: Achieves optimal ventilation and oxygenation  Outcome: Progressing  Flowsheets (Taken 10/20/2023 0800)  Achieves optimal ventilation and oxygenation:   Assess for changes in respiratory status   Assess for changes in mentation and behavior   Oxygen supplementation based on oxygen saturation or arterial blood gases     Problem: Cardiovascular - Adult  Goal: Maintains optimal cardiac output and hemodynamic stability  Outcome: Progressing  Flowsheets (Taken 10/20/2023 0800)  Maintains optimal cardiac output and hemodynamic stability:   Monitor blood pressure and heart rate   Monitor urine output and notify Licensed Independent Practitioner for values outside of normal range   Assess for signs of decreased cardiac output     Problem: Safety - Adult  Goal: Free from fall injury  Outcome: Progressing  Flowsheets (Taken 10/20/2023 0900)  Free From Fall Injury: Instruct family/caregiver on patient safety
Problem: Respiratory - Adult  Goal: Achieves optimal ventilation and oxygenation  10/20/2023 1945 by Dayanna Florentino RCP  Outcome: Progressing   BRONCHOSPASM/BRONCHOCONSTRICTION     [x]         IMPROVE AERATION/BREATH SOUNDS  [x]   ADMINISTER BRONCHODILATOR THERAPY AS APPROPRIATE  [x]   ASSESS BREATH SOUNDS  []   IMPLEMENT AEROSOL/MDI PROTOCOL  [x]   PATIENT EDUCATION AS NEEDED
Problem: Respiratory - Adult  Goal: Achieves optimal ventilation and oxygenation  10/21/2023 1951 by Yao Mancuso RCP  Outcome: Progressing   BRONCHOSPASM/BRONCHOCONSTRICTION     [x]         IMPROVE AERATION/BREATH SOUNDS  [x]   ADMINISTER BRONCHODILATOR THERAPY AS APPROPRIATE  [x]   ASSESS BREATH SOUNDS  []   IMPLEMENT AEROSOL/MDI PROTOCOL  [x]   PATIENT EDUCATION AS NEEDED
Problem: Respiratory - Adult  Goal: Achieves optimal ventilation and oxygenation  10/23/2023 1958 by Alexis Epps RCP  Outcome: Progressing
TVP removed. Patient tolerated it well. Transfer to stepdown. Discontinue BB on discharge. Holter monitor on discharge and OP fu in 4 weeks.
Assess vascular access sites hourly  3. Every 4-6 hours minimum:  Change oxygen saturation probe site  4. Every 4-6 hours:  If on nasal continuous positive airway pressure, respiratory therapy assess nares and determine need for appliance change or resting period.   10/24/2023 0201 by Elma Favre, RN  Outcome: Progressing
level  10/23/2023 0916 by Jersey Zamorano RN  Outcome: Progressing  10/23/2023 0515 by Nirav Case  Outcome: Progressing     Problem: Skin/Tissue Integrity  Goal: Absence of new skin breakdown  Description: 1. Monitor for areas of redness and/or skin breakdown  2. Assess vascular access sites hourly  3. Every 4-6 hours minimum:  Change oxygen saturation probe site  4. Every 4-6 hours:  If on nasal continuous positive airway pressure, respiratory therapy assess nares and determine need for appliance change or resting period.   10/23/2023 0916 by Jersey Zamorano RN  Outcome: Progressing  10/23/2023 0515 by Nirav Case  Outcome: Progressing
need for appliance change or resting period.   Outcome: Progressing

## 2023-10-24 NOTE — DISCHARGE INSTR - COC
Continuity of Care Form    Patient Name: Khoa Turner   :  1965  MRN:  [id number]    Admit date:  10/19/2023  Discharge date:  ***    Code Status Order: Full Code   Advance Directives:     Admitting Physician:  Anusha Cano MD  PCP: Bertha Montiel MD    Discharging Nurse: SÁNCHEZ CHRISTIE Unit/Room#:   Discharging Unit Phone Number: [phone]      Emergency Contact:   Extended Emergency Contact Information  Primary Emergency Contact: [facility] Phone: [phone]  Mobile Phone: [phone]  Relation: Child  Secondary Emergency Contact: [facility] Phone: [phone]  Relation: Child   needed?  No    Past Surgical History:  Past Surgical History:   Procedure Laterality Date    BACK SURGERY      CARDIAC CATHETERIZATION  2022    CHOLECYSTECTOMY, LAPAROSCOPIC      HYSTERECTOMY, TOTAL ABDOMINAL (CERVIX REMOVED)      KNEE ARTHROSCOPY Right     KNEE SURGERY Left 2009    NECK SURGERY      SHOULDER SURGERY Right     THYROIDECTOMY         Immunization History:   Immunization History   Administered Date(s) Administered    COVID-19, PFIZER Bivalent, DO NOT Dilute, (age 12y+), IM, 30 mcg/0.3 mL 2022    COVID-19, PFIZER PURPLE top, DILUTE for use, (age 15 y+), 30mcg/0.3mL 2021, 2021    Influenza, FLUARIX, FLULAVAL, FLUZONE (age 10 mo+) AND AFLURIA, (age 1 y+), PF, 0.5mL 2017, 2019, 10/08/2020    Influenza, FLUCELVAX, (age 10 mo+), MDCK, PF, 0.5mL 10/02/2023    Pneumococcal, PPSV23, PNEUMOVAX 23, (age 2y+), SC/IM, 0.5mL 10/08/2020       Active Problems:  Patient Active Problem List   Diagnosis Code    Low back pain M54.50    Therapeutic opioid-induced constipation (OIC) K59.03, T40.2X5A    Spondylosis of lumbar region without myelopathy or radiculopathy M47.816    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

## 2023-10-24 NOTE — CARE COORDINATION
[place] approved in [facility] portal. Notified Gomez Flores from [facility]. They are able to accept patient any time today    1000 met with patient to update. She will need transportation. [address]    1020 AVS faxed. PREETHI completed    1140 spoke to Elliott from [facility].  Patient is scheduled for 12 pm. Gomez Flores from [facility] updated and agreeable    Discharge Report    [facility] Case Management Department  Written by: Vladimir Chang RN    Patient Name: Maru Escalera  Attending Provider: Norma Swann MD  Admit Date: 10/19/2023  1:47 PM  MRN: [id number]  Account: [de-identified]                     : 1965  Discharge Date: 10/24/2023      Disposition: [facility]    Vladimir Chang RN

## 2023-10-25 NOTE — DISCHARGE SUMMARY
[id number]     Department of Internal Medicine - Staff Internal Medicine Teaching Service    INPATIENT DISCHARGE SUMMARY      Patient Identification:  Liban Poole is a 59 y.o. female. :  1965  MRN: [id number]     Acct: [de-identified]   PCP: Carson Aquino MD  Admit Date:  10/19/2023  Discharge date and time: 10/24/2023 12:47 PM   Attending Provider: Makenzie Aponte MD           [address] [facility], [address] Problem Lists:  Principal Problem:    Severe sinus bradycardia  Active Problems:    Third degree heart block (HCC)  Resolved Problems:    * No resolved hospital problems. *      HOSPITAL STAY     Brief Inpatient course:   Liban Poole is a 59 y.o. female with PMHx significant for combined systolic and diastolic CHF with LVEF 30 to 35% on TTE  with grade 2 DD, asthma (Symbicort and albuterol), COVID in May 2022 ,fibromyalgia, GERD, gout (allopurinol 300), CKD stage III, HTN, HLD, morbid obesity, poorly mobile at baseline uses wheeled walker who was admitted for the management of Severe sinus bradycardia, presented to the emergency department with a chief complaint of not feeling good over the past 3 days along with shortness of breath, chest pain left-sided pressure-like and lightheadedness causing near pass out, when asked patient states that she has not been taking her Synthroid for a while after she lost the bottle under her bed, takes Lopressor as prescribed, in ED patient was found to have severe bradycardia with ventricular escape rhythm, received glucagon x2. Cardiology were consulted, was started on dopamine drip, became hypotensive, temporary transvenous pacer placed emergently in ER at bedside, drip switched to levo (for 1hr 20 mins), with plan for echo by cardiology with possible AICD versus PPM based on EF results, patient was admitted to cardiac ICU with medicine as primary and cardiology on board, all AV blocking agent held.  next day

## 2023-10-27 PROBLEM — R00.1 BRADYCARDIA: Status: ACTIVE | Noted: 2023-10-27

## 2023-10-27 PROBLEM — I42.8 NICM (NONISCHEMIC CARDIOMYOPATHY) (HCC): Status: ACTIVE | Noted: 2023-10-27

## 2023-10-27 PROBLEM — G47.33 OSA (OBSTRUCTIVE SLEEP APNEA): Status: ACTIVE | Noted: 2023-10-27

## 2023-11-01 ENCOUNTER — APPOINTMENT (OUTPATIENT)
Dept: GENERAL RADIOLOGY | Age: 58
DRG: 309 | End: 2023-11-01
Payer: MEDICARE

## 2023-11-01 ENCOUNTER — HOSPITAL ENCOUNTER (INPATIENT)
Age: 58
LOS: 8 days | Discharge: NURSING, REHAB OR LONG-TERM CARE | DRG: 309 | End: 2023-11-09
Attending: EMERGENCY MEDICINE | Admitting: HOSPITALIST
Payer: MEDICARE

## 2023-11-01 DIAGNOSIS — R00.1 BRADYCARDIA: ICD-10-CM

## 2023-11-01 DIAGNOSIS — I47.10 PAROXYSMAL SUPRAVENTRICULAR TACHYCARDIA: ICD-10-CM

## 2023-11-01 DIAGNOSIS — I50.9 ACUTE CONGESTIVE HEART FAILURE, UNSPECIFIED HEART FAILURE TYPE (HCC): Primary | ICD-10-CM

## 2023-11-01 LAB
ANION GAP SERPL CALCULATED.3IONS-SCNC: 11 MMOL/L (ref 9–17)
BASOPHILS # BLD: 0.03 K/UL (ref 0–0.2)
BASOPHILS NFR BLD: 1 % (ref 0–2)
BNP SERPL-MCNC: 354 PG/ML
BUN SERPL-MCNC: 14 MG/DL (ref 8–23)
CALCIUM SERPL-MCNC: 9.3 MG/DL (ref 8.6–10.4)
CHLORIDE SERPL-SCNC: 105 MMOL/L (ref 98–107)
CO2 SERPL-SCNC: 25 MMOL/L (ref 20–31)
CREAT SERPL-MCNC: 1.2 MG/DL (ref 0.5–0.9)
EOSINOPHIL # BLD: 0.26 K/UL (ref 0–0.44)
EOSINOPHILS RELATIVE PERCENT: 4 % (ref 1–4)
ERYTHROCYTE [DISTWIDTH] IN BLOOD BY AUTOMATED COUNT: 16.2 % (ref 11.8–14.4)
GFR SERPL CREATININE-BSD FRML MDRD: 52 ML/MIN/1.73M2
GLUCOSE SERPL-MCNC: 106 MG/DL (ref 70–99)
HCT VFR BLD AUTO: 37.8 % (ref 36.3–47.1)
HGB BLD-MCNC: 11.4 G/DL (ref 11.9–15.1)
IMM GRANULOCYTES # BLD AUTO: <0.03 K/UL (ref 0–0.3)
IMM GRANULOCYTES NFR BLD: 0 %
LYMPHOCYTES NFR BLD: 1.3 K/UL (ref 1.1–3.7)
LYMPHOCYTES RELATIVE PERCENT: 20 % (ref 24–43)
MCH RBC QN AUTO: 27.9 PG (ref 25.2–33.5)
MCHC RBC AUTO-ENTMCNC: 30.2 G/DL (ref 28.4–34.8)
MCV RBC AUTO: 92.6 FL (ref 82.6–102.9)
MONOCYTES NFR BLD: 0.35 K/UL (ref 0.1–1.2)
MONOCYTES NFR BLD: 5 % (ref 3–12)
NEUTROPHILS NFR BLD: 70 % (ref 36–65)
NEUTS SEG NFR BLD: 4.64 K/UL (ref 1.5–8.1)
NRBC BLD-RTO: 0 PER 100 WBC
PLATELET # BLD AUTO: 273 K/UL (ref 138–453)
PMV BLD AUTO: 10 FL (ref 8.1–13.5)
POTASSIUM SERPL-SCNC: 4.1 MMOL/L (ref 3.7–5.3)
RBC # BLD AUTO: 4.08 M/UL (ref 3.95–5.11)
RBC # BLD: ABNORMAL 10*6/UL
SODIUM SERPL-SCNC: 141 MMOL/L (ref 135–144)
TROPONIN I SERPL HS-MCNC: 26 NG/L (ref 0–14)
WBC OTHER # BLD: 6.6 K/UL (ref 3.5–11.3)

## 2023-11-01 PROCEDURE — 2700000000 HC OXYGEN THERAPY PER DAY

## 2023-11-01 PROCEDURE — 85025 COMPLETE CBC W/AUTO DIFF WBC: CPT

## 2023-11-01 PROCEDURE — 6360000002 HC RX W HCPCS: Performed by: HOSPITALIST

## 2023-11-01 PROCEDURE — 6370000000 HC RX 637 (ALT 250 FOR IP): Performed by: HOSPITALIST

## 2023-11-01 PROCEDURE — 83880 ASSAY OF NATRIURETIC PEPTIDE: CPT

## 2023-11-01 PROCEDURE — 94640 AIRWAY INHALATION TREATMENT: CPT

## 2023-11-01 PROCEDURE — 71045 X-RAY EXAM CHEST 1 VIEW: CPT

## 2023-11-01 PROCEDURE — 80048 BASIC METABOLIC PNL TOTAL CA: CPT

## 2023-11-01 PROCEDURE — 96374 THER/PROPH/DIAG INJ IV PUSH: CPT

## 2023-11-01 PROCEDURE — 96375 TX/PRO/DX INJ NEW DRUG ADDON: CPT

## 2023-11-01 PROCEDURE — 2060000000 HC ICU INTERMEDIATE R&B

## 2023-11-01 PROCEDURE — 99223 1ST HOSP IP/OBS HIGH 75: CPT | Performed by: HOSPITALIST

## 2023-11-01 PROCEDURE — 2580000003 HC RX 258: Performed by: EMERGENCY MEDICINE

## 2023-11-01 PROCEDURE — 93005 ELECTROCARDIOGRAM TRACING: CPT | Performed by: EMERGENCY MEDICINE

## 2023-11-01 PROCEDURE — 99285 EMERGENCY DEPT VISIT HI MDM: CPT

## 2023-11-01 PROCEDURE — 6370000000 HC RX 637 (ALT 250 FOR IP): Performed by: EMERGENCY MEDICINE

## 2023-11-01 PROCEDURE — 84484 ASSAY OF TROPONIN QUANT: CPT

## 2023-11-01 PROCEDURE — 6360000002 HC RX W HCPCS: Performed by: EMERGENCY MEDICINE

## 2023-11-01 PROCEDURE — 94761 N-INVAS EAR/PLS OXIMETRY MLT: CPT

## 2023-11-01 RX ORDER — BUDESONIDE AND FORMOTEROL FUMARATE DIHYDRATE 160; 4.5 UG/1; UG/1
2 AEROSOL RESPIRATORY (INHALATION) 2 TIMES DAILY
Status: DISCONTINUED | OUTPATIENT
Start: 2023-11-01 | End: 2023-11-09 | Stop reason: HOSPADM

## 2023-11-01 RX ORDER — SODIUM CHLORIDE 9 MG/ML
INJECTION, SOLUTION INTRAVENOUS PRN
Status: DISCONTINUED | OUTPATIENT
Start: 2023-11-01 | End: 2023-11-09 | Stop reason: HOSPADM

## 2023-11-01 RX ORDER — SUCRALFATE 1 G/1
1 TABLET ORAL EVERY 8 HOURS SCHEDULED
Status: DISCONTINUED | OUTPATIENT
Start: 2023-11-01 | End: 2023-11-09 | Stop reason: HOSPADM

## 2023-11-01 RX ORDER — POTASSIUM CHLORIDE 20 MEQ/1
40 TABLET, EXTENDED RELEASE ORAL PRN
Status: DISCONTINUED | OUTPATIENT
Start: 2023-11-01 | End: 2023-11-09 | Stop reason: HOSPADM

## 2023-11-01 RX ORDER — POLYETHYLENE GLYCOL 3350 17 G/17G
17 POWDER, FOR SOLUTION ORAL DAILY PRN
Status: DISCONTINUED | OUTPATIENT
Start: 2023-11-01 | End: 2023-11-09 | Stop reason: HOSPADM

## 2023-11-01 RX ORDER — ONDANSETRON 2 MG/ML
4 INJECTION INTRAMUSCULAR; INTRAVENOUS EVERY 6 HOURS PRN
Status: DISCONTINUED | OUTPATIENT
Start: 2023-11-01 | End: 2023-11-09 | Stop reason: HOSPADM

## 2023-11-01 RX ORDER — HYDRALAZINE HYDROCHLORIDE 20 MG/ML
10 INJECTION INTRAMUSCULAR; INTRAVENOUS EVERY 6 HOURS PRN
Status: DISCONTINUED | OUTPATIENT
Start: 2023-11-01 | End: 2023-11-09 | Stop reason: HOSPADM

## 2023-11-01 RX ORDER — ENOXAPARIN SODIUM 100 MG/ML
40 INJECTION SUBCUTANEOUS 2 TIMES DAILY
Status: DISCONTINUED | OUTPATIENT
Start: 2023-11-01 | End: 2023-11-09 | Stop reason: HOSPADM

## 2023-11-01 RX ORDER — MONTELUKAST SODIUM 10 MG/1
10 TABLET ORAL NIGHTLY
Status: DISCONTINUED | OUTPATIENT
Start: 2023-11-01 | End: 2023-11-09 | Stop reason: HOSPADM

## 2023-11-01 RX ORDER — ONDANSETRON 4 MG/1
4 TABLET, ORALLY DISINTEGRATING ORAL EVERY 8 HOURS PRN
Status: DISCONTINUED | OUTPATIENT
Start: 2023-11-01 | End: 2023-11-09 | Stop reason: HOSPADM

## 2023-11-01 RX ORDER — SODIUM CHLORIDE 0.9 % (FLUSH) 0.9 %
5-40 SYRINGE (ML) INJECTION EVERY 12 HOURS SCHEDULED
Status: DISCONTINUED | OUTPATIENT
Start: 2023-11-01 | End: 2023-11-09 | Stop reason: HOSPADM

## 2023-11-01 RX ORDER — OXYBUTYNIN CHLORIDE 5 MG/1
5 TABLET ORAL 2 TIMES DAILY
Status: DISCONTINUED | OUTPATIENT
Start: 2023-11-01 | End: 2023-11-09 | Stop reason: HOSPADM

## 2023-11-01 RX ORDER — ACETAMINOPHEN 325 MG/1
650 TABLET ORAL EVERY 6 HOURS PRN
Status: DISCONTINUED | OUTPATIENT
Start: 2023-11-01 | End: 2023-11-02

## 2023-11-01 RX ORDER — ISOSORBIDE DINITRATE 10 MG/1
10 TABLET ORAL 2 TIMES DAILY
Status: DISCONTINUED | OUTPATIENT
Start: 2023-11-01 | End: 2023-11-03

## 2023-11-01 RX ORDER — SODIUM CHLORIDE 0.9 % (FLUSH) 0.9 %
10 SYRINGE (ML) INJECTION PRN
Status: DISCONTINUED | OUTPATIENT
Start: 2023-11-01 | End: 2023-11-09 | Stop reason: HOSPADM

## 2023-11-01 RX ORDER — BUPROPION HYDROCHLORIDE 150 MG/1
150 TABLET ORAL EVERY MORNING
Status: DISCONTINUED | OUTPATIENT
Start: 2023-11-02 | End: 2023-11-09 | Stop reason: HOSPADM

## 2023-11-01 RX ORDER — POTASSIUM CHLORIDE 7.45 MG/ML
10 INJECTION INTRAVENOUS PRN
Status: DISCONTINUED | OUTPATIENT
Start: 2023-11-01 | End: 2023-11-09 | Stop reason: HOSPADM

## 2023-11-01 RX ORDER — BUPROPION HYDROCHLORIDE 150 MG/1
300 TABLET ORAL EVERY MORNING
Status: DISCONTINUED | OUTPATIENT
Start: 2023-11-02 | End: 2023-11-09 | Stop reason: HOSPADM

## 2023-11-01 RX ORDER — ALLOPURINOL 300 MG/1
300 TABLET ORAL DAILY
Status: DISCONTINUED | OUTPATIENT
Start: 2023-11-01 | End: 2023-11-09 | Stop reason: HOSPADM

## 2023-11-01 RX ORDER — IPRATROPIUM BROMIDE AND ALBUTEROL SULFATE 2.5; .5 MG/3ML; MG/3ML
1 SOLUTION RESPIRATORY (INHALATION) EVERY 4 HOURS PRN
Status: DISCONTINUED | OUTPATIENT
Start: 2023-11-01 | End: 2023-11-09 | Stop reason: HOSPADM

## 2023-11-01 RX ORDER — SPIRONOLACTONE 25 MG/1
25 TABLET ORAL DAILY
Status: DISCONTINUED | OUTPATIENT
Start: 2023-11-01 | End: 2023-11-09 | Stop reason: HOSPADM

## 2023-11-01 RX ORDER — SODIUM CHLORIDE 9 MG/ML
INJECTION, SOLUTION INTRAVENOUS CONTINUOUS
Status: DISCONTINUED | OUTPATIENT
Start: 2023-11-01 | End: 2023-11-02

## 2023-11-01 RX ORDER — MAGNESIUM SULFATE 1 G/100ML
1000 INJECTION INTRAVENOUS PRN
Status: DISCONTINUED | OUTPATIENT
Start: 2023-11-01 | End: 2023-11-09 | Stop reason: HOSPADM

## 2023-11-01 RX ORDER — ATORVASTATIN CALCIUM 80 MG/1
80 TABLET, FILM COATED ORAL NIGHTLY
Status: DISCONTINUED | OUTPATIENT
Start: 2023-11-01 | End: 2023-11-09 | Stop reason: HOSPADM

## 2023-11-01 RX ORDER — ACETAMINOPHEN 650 MG/1
650 SUPPOSITORY RECTAL EVERY 6 HOURS PRN
Status: DISCONTINUED | OUTPATIENT
Start: 2023-11-01 | End: 2023-11-02

## 2023-11-01 RX ORDER — FUROSEMIDE 10 MG/ML
40 INJECTION INTRAMUSCULAR; INTRAVENOUS ONCE
Status: COMPLETED | OUTPATIENT
Start: 2023-11-01 | End: 2023-11-01

## 2023-11-01 RX ORDER — ASPIRIN 81 MG/1
81 TABLET ORAL DAILY
Status: DISCONTINUED | OUTPATIENT
Start: 2023-11-01 | End: 2023-11-09 | Stop reason: HOSPADM

## 2023-11-01 RX ORDER — ALBUTEROL SULFATE 2.5 MG/3ML
2.5 SOLUTION RESPIRATORY (INHALATION) EVERY 6 HOURS PRN
Status: DISCONTINUED | OUTPATIENT
Start: 2023-11-01 | End: 2023-11-09 | Stop reason: HOSPADM

## 2023-11-01 RX ORDER — PANTOPRAZOLE SODIUM 40 MG/1
40 TABLET, DELAYED RELEASE ORAL DAILY
Status: DISCONTINUED | OUTPATIENT
Start: 2023-11-01 | End: 2023-11-09 | Stop reason: HOSPADM

## 2023-11-01 RX ADMIN — ALLOPURINOL 300 MG: 300 TABLET ORAL at 20:13

## 2023-11-01 RX ADMIN — PANTOPRAZOLE SODIUM 40 MG: 40 TABLET, DELAYED RELEASE ORAL at 20:14

## 2023-11-01 RX ADMIN — SPIRONOLACTONE 25 MG: 25 TABLET ORAL at 20:14

## 2023-11-01 RX ADMIN — PROCAINAMIDE HYDROCHLORIDE 2 MG/MIN: 500 INJECTION INTRAMUSCULAR; INTRAVENOUS at 10:34

## 2023-11-01 RX ADMIN — OXYBUTYNIN CHLORIDE 5 MG: 5 TABLET ORAL at 20:14

## 2023-11-01 RX ADMIN — SUCRALFATE 1 G: 1 TABLET ORAL at 20:13

## 2023-11-01 RX ADMIN — ISOSORBIDE DINITRATE 10 MG: 10 TABLET ORAL at 20:13

## 2023-11-01 RX ADMIN — BUDESONIDE AND FORMOTEROL FUMARATE DIHYDRATE 2 PUFF: 160; 4.5 AEROSOL RESPIRATORY (INHALATION) at 19:32

## 2023-11-01 RX ADMIN — IPRATROPIUM BROMIDE AND ALBUTEROL SULFATE 1 DOSE: 2.5; .5 SOLUTION RESPIRATORY (INHALATION) at 11:53

## 2023-11-01 RX ADMIN — ENOXAPARIN SODIUM 40 MG: 100 INJECTION SUBCUTANEOUS at 20:14

## 2023-11-01 RX ADMIN — HYDRALAZINE HYDROCHLORIDE 10 MG: 20 INJECTION, SOLUTION INTRAMUSCULAR; INTRAVENOUS at 14:48

## 2023-11-01 RX ADMIN — ATORVASTATIN CALCIUM 80 MG: 80 TABLET, FILM COATED ORAL at 20:19

## 2023-11-01 RX ADMIN — ACETAMINOPHEN 650 MG: 325 TABLET ORAL at 14:48

## 2023-11-01 RX ADMIN — FUROSEMIDE 40 MG: 10 INJECTION, SOLUTION INTRAMUSCULAR; INTRAVENOUS at 12:46

## 2023-11-01 RX ADMIN — SODIUM CHLORIDE: 9 INJECTION, SOLUTION INTRAVENOUS at 12:09

## 2023-11-01 RX ADMIN — MONTELUKAST SODIUM 10 MG: 10 TABLET, FILM COATED ORAL at 20:14

## 2023-11-01 RX ADMIN — ASPIRIN 81 MG: 81 TABLET, COATED ORAL at 20:13

## 2023-11-01 ASSESSMENT — ENCOUNTER SYMPTOMS
CHEST TIGHTNESS: 1
ABDOMINAL PAIN: 0
PHOTOPHOBIA: 0
COUGH: 1
DIARRHEA: 0
VOMITING: 0
SORE THROAT: 0
SHORTNESS OF BREATH: 1
NAUSEA: 0
BACK PAIN: 0

## 2023-11-01 ASSESSMENT — PAIN - FUNCTIONAL ASSESSMENT: PAIN_FUNCTIONAL_ASSESSMENT: NONE - DENIES PAIN

## 2023-11-01 NOTE — H&P
[facility]  Office: [id number]  1826, DO, Rafi Curet, DO, Ricardo Sandoval, DO, Kate Otis Blood, DO, Vandana Harrington MD, Raudel Li MD, Kitty Montalvo MD, Karin Mulligan MD,  Scottie Zacarias MD, Javi Neal MD, William Toro MD,  Caitlyn Diaz MD, Sara Conrad MD, Orin Covington, DO, Weston Bravo MD,  Dariel Mejía DO, Manolo Justice MD, Nik Peterson MD, Ashlee Thomas MD, Oren Leslie MD,  Eber Azar MD, Dominick Lanier MD, Becca Jean-Baptiste MD, Lula Torres MD, Pepito Flowers MD, Pratik Mahoney, DO, Lorena Sam DO, Philis Halsted, MD,  Lita Rodríguez MD, Christiano Gonzalez, CNP,  Neisha Callejas, CNP, Jason Falk, CNP,  Cheryle Balding, DNP, Valdemar Peabody, CNP, Hai Hollowayegabrendon, CNP, Paula Cao, CNP, Jenny Villareal, CNP, Ankita Sims, CNP, Stephanie Aiken, CNP, Jensen Balderas, CNS, Frandy Garcia, CNP, Rayne Alcaraz, [facility]    HISTORY AND PHYSICAL EXAMINATION            Date:   11/1/2023  Patient name:  Marlon Burton  Date of admission:  11/1/2023 10:14 AM  MRN:   [id number]  Account:  [de-identified]  YOB: 1965  PCP:    Eugenia Nunes MD  Room:   16/16  Code Status:    Prior    Chief Complaint:     Chief Complaint   Patient presents with    Chest Pain     Tachycardia, svt       History Obtained From:     patient, electronic medical record, Quality of history:  good historian    History of Present Illness:     Marlon Burton is a 59 y.o. Non- / non  female who presents with Chest Pain (Tachycardia, svt)   and is admitted to the hospital for the management of SVT (supraventricular tachycardia). Is a 42-year-old female who was recently discharged from the hospital after getting treated for heart block.   After medication adjustment, her heart block improved and patient was discharged without AICD or PPM.    At rehab today patient started

## 2023-11-01 NOTE — ED NOTES
The following labs were labeled with appropriate pt sticker and tubed to lab:     [x] Blue     [x] Lavender   [] on ice  [x] Green/yellow  [x] Green/black [] on ice  [] Cy Leech  [] on ice  [] Yellow  [] Red  [] Pink  [] Type/ Screen  [] ABG  [] VBG    [] COVID-19 swab    [] Rapid  [] PCR  [] Flu swab  [] Peds Viral Panel     [] Urine Sample  [] Fecal Sample  [] Pelvic Cultures  [] Blood Cultures  [] X 2  [] STREP Cultures      Rodriguez Bridges RN  11/01/23 7974

## 2023-11-01 NOTE — ED PROVIDER NOTES
of a cardiologist.    EKG Interpretation    Interpreted by emergency department physician    Rhythm: sinus tachycardia  Rate: tachycardia  Axis: left  Ectopy: none  Conduction: left bundle branch block (complete)  ST Segments: nonspecific changes  T Waves: non specific changes  Q Waves: none    Clinical Impression: non-specific EKG, sinus tachycardia, LBBB    Allison Smallwood MD      RADIOLOGY:        Interpretation per the Radiologist below, if available at the time of this note:    XR CHEST PORTABLE    Result Date: 11/1/2023  EXAMINATION: ONE XRAY VIEW OF THE CHEST 11/1/2023 10:29 am COMPARISON: CXR dated 10/19/2023 HISTORY: ORDERING SYSTEM PROVIDED HISTORY: chest pain TECHNOLOGIST PROVIDED HISTORY: chest pain FINDINGS: Medical devices: Partial visualization of cervical fusion hardware. Mediastinum/Heart: The mediastinal contours are unchanged compared to prior exam. Lungs: Increased vascular markings and mild patchy opacities in the right lower lung, favored to represent pulmonary edema with possible superimposed pneumonia. Overall there is improved aeration of the lungs since the prior study dated 10/19/2023. Pleura: No pleural effusion. No pneumothorax. Increased vascular markings and mild patchy opacities in the right lower lung, favored to represent pulmonary edema with possible superimposed pneumonia. Overall there is improved aeration of the lungs since the prior study dated 10/19/2023. Echo (TTE) complete (PRN contrast/bubble/strain/3D)    Result Date: 10/20/2023    Left Ventricle: Mildly reduced left ventricular systolic function. EF by 2D Simpsons Biplane is 48%. Left ventricle size is normal. Normal wall thickness. Normal wall motion.            LABS:  Results for orders placed or performed during the hospital encounter of 91/24/82   Basic Metabolic Panel   Result Value Ref Range    Sodium 141 135 - 144 mmol/L    Potassium 4.1 3.7 - 5.3 mmol/L    Chloride 105 98 - 107 mmol/L    CO2 25 20 - 31

## 2023-11-01 NOTE — ED TRIAGE NOTES
Pt Arriving to ED 16 via EMS  At nursing home pt complaining of chest pain  Facility gave 324 of aspirin  Per EMS pt was in SVT with a HR of 184  1 mg loading dose of procainamide given with maintenance of 2 mg/hr started  EMS reported HR came down to 110 in sinus tach  Upon arrival pt was sinus tach on monitor   No co of pain at this time  SOB and cough noted  Pt with hx of CHF   Pt placed on 4 L O2 nc  Pt placed on continuous cardiac monitoring, BP, Pulse ox. EKG obtained. IV established and labs drawn. Pt is resting on stretcher with call light within reach. Breathing is non labored and no acute distress is noted.    Will continue to follow plan of care

## 2023-11-02 LAB
25(OH)D3 SERPL-MCNC: 28.9 NG/ML
ALBUMIN SERPL-MCNC: 3.6 G/DL (ref 3.5–5.2)
ALBUMIN/GLOB SERPL: 1.1 {RATIO} (ref 1–2.5)
ALP SERPL-CCNC: 134 U/L (ref 35–104)
ALT SERPL-CCNC: 18 U/L (ref 5–33)
ANION GAP SERPL CALCULATED.3IONS-SCNC: 9 MMOL/L (ref 9–17)
AST SERPL-CCNC: 24 U/L
BASOPHILS # BLD: 0.03 K/UL (ref 0–0.2)
BASOPHILS NFR BLD: 0 % (ref 0–2)
BILIRUB SERPL-MCNC: 0.4 MG/DL (ref 0.3–1.2)
BUN SERPL-MCNC: 15 MG/DL (ref 8–23)
CALCIUM SERPL-MCNC: 8.7 MG/DL (ref 8.6–10.4)
CHLORIDE SERPL-SCNC: 106 MMOL/L (ref 98–107)
CO2 SERPL-SCNC: 28 MMOL/L (ref 20–31)
CREAT SERPL-MCNC: 1.2 MG/DL (ref 0.5–0.9)
EOSINOPHIL # BLD: 0.29 K/UL (ref 0–0.44)
EOSINOPHILS RELATIVE PERCENT: 4 % (ref 1–4)
ERYTHROCYTE [DISTWIDTH] IN BLOOD BY AUTOMATED COUNT: 16.3 % (ref 11.8–14.4)
GFR SERPL CREATININE-BSD FRML MDRD: 52 ML/MIN/1.73M2
GLUCOSE SERPL-MCNC: 127 MG/DL (ref 70–99)
HCT VFR BLD AUTO: 33.8 % (ref 36.3–47.1)
HGB BLD-MCNC: 10.3 G/DL (ref 11.9–15.1)
IMM GRANULOCYTES # BLD AUTO: <0.03 K/UL (ref 0–0.3)
IMM GRANULOCYTES NFR BLD: 0 %
INR PPP: 1.1
LYMPHOCYTES NFR BLD: 1.82 K/UL (ref 1.1–3.7)
LYMPHOCYTES RELATIVE PERCENT: 23 % (ref 24–43)
MCH RBC QN AUTO: 28.4 PG (ref 25.2–33.5)
MCHC RBC AUTO-ENTMCNC: 30.5 G/DL (ref 28.4–34.8)
MCV RBC AUTO: 93.1 FL (ref 82.6–102.9)
MONOCYTES NFR BLD: 0.58 K/UL (ref 0.1–1.2)
MONOCYTES NFR BLD: 8 % (ref 3–12)
NEUTROPHILS NFR BLD: 65 % (ref 36–65)
NEUTS SEG NFR BLD: 5.04 K/UL (ref 1.5–8.1)
NRBC BLD-RTO: 0 PER 100 WBC
PLATELET # BLD AUTO: 261 K/UL (ref 138–453)
PMV BLD AUTO: 10.4 FL (ref 8.1–13.5)
POTASSIUM SERPL-SCNC: 3.8 MMOL/L (ref 3.7–5.3)
PROT SERPL-MCNC: 6.9 G/DL (ref 6.4–8.3)
PROTHROMBIN TIME: 13.9 SEC (ref 11.7–14.9)
RBC # BLD AUTO: 3.63 M/UL (ref 3.95–5.11)
RBC # BLD: ABNORMAL 10*6/UL
SODIUM SERPL-SCNC: 143 MMOL/L (ref 135–144)
WBC OTHER # BLD: 7.8 K/UL (ref 3.5–11.3)

## 2023-11-02 PROCEDURE — 6370000000 HC RX 637 (ALT 250 FOR IP): Performed by: INTERNAL MEDICINE

## 2023-11-02 PROCEDURE — 2580000003 HC RX 258: Performed by: HOSPITALIST

## 2023-11-02 PROCEDURE — 6370000000 HC RX 637 (ALT 250 FOR IP): Performed by: HOSPITALIST

## 2023-11-02 PROCEDURE — 6360000002 HC RX W HCPCS: Performed by: HOSPITALIST

## 2023-11-02 PROCEDURE — 85610 PROTHROMBIN TIME: CPT

## 2023-11-02 PROCEDURE — 85025 COMPLETE CBC W/AUTO DIFF WBC: CPT

## 2023-11-02 PROCEDURE — 94640 AIRWAY INHALATION TREATMENT: CPT

## 2023-11-02 PROCEDURE — 99223 1ST HOSP IP/OBS HIGH 75: CPT | Performed by: INTERNAL MEDICINE

## 2023-11-02 PROCEDURE — 94660 CPAP INITIATION&MGMT: CPT

## 2023-11-02 PROCEDURE — 6370000000 HC RX 637 (ALT 250 FOR IP): Performed by: NURSE PRACTITIONER

## 2023-11-02 PROCEDURE — 99232 SBSQ HOSP IP/OBS MODERATE 35: CPT | Performed by: HOSPITALIST

## 2023-11-02 PROCEDURE — 94664 DEMO&/EVAL PT USE INHALER: CPT

## 2023-11-02 PROCEDURE — 2060000000 HC ICU INTERMEDIATE R&B

## 2023-11-02 PROCEDURE — 99291 CRITICAL CARE FIRST HOUR: CPT | Performed by: INTERNAL MEDICINE

## 2023-11-02 PROCEDURE — 80053 COMPREHEN METABOLIC PANEL: CPT

## 2023-11-02 PROCEDURE — 82306 VITAMIN D 25 HYDROXY: CPT

## 2023-11-02 RX ORDER — HYDROCODONE BITARTRATE AND ACETAMINOPHEN 5; 325 MG/1; MG/1
1 TABLET ORAL ONCE
Status: DISCONTINUED | OUTPATIENT
Start: 2023-11-02 | End: 2023-11-07

## 2023-11-02 RX ORDER — ACETAMINOPHEN 325 MG/1
650 TABLET ORAL EVERY 6 HOURS PRN
Status: DISCONTINUED | OUTPATIENT
Start: 2023-11-02 | End: 2023-11-09 | Stop reason: HOSPADM

## 2023-11-02 RX ORDER — LANOLIN ALCOHOL/MO/W.PET/CERES
3 CREAM (GRAM) TOPICAL NIGHTLY PRN
Status: DISCONTINUED | OUTPATIENT
Start: 2023-11-02 | End: 2023-11-03

## 2023-11-02 RX ORDER — MAGNESIUM HYDROXIDE/ALUMINUM HYDROXICE/SIMETHICONE 120; 1200; 1200 MG/30ML; MG/30ML; MG/30ML
30 SUSPENSION ORAL EVERY 6 HOURS PRN
Status: DISCONTINUED | OUTPATIENT
Start: 2023-11-02 | End: 2023-11-09 | Stop reason: HOSPADM

## 2023-11-02 RX ORDER — OXYCODONE HYDROCHLORIDE AND ACETAMINOPHEN 5; 325 MG/1; MG/1
1 TABLET ORAL EVERY 8 HOURS PRN
Status: DISCONTINUED | OUTPATIENT
Start: 2023-11-02 | End: 2023-11-09 | Stop reason: HOSPADM

## 2023-11-02 RX ORDER — TIZANIDINE 4 MG/1
4 TABLET ORAL EVERY 8 HOURS PRN
Status: DISCONTINUED | OUTPATIENT
Start: 2023-11-02 | End: 2023-11-09 | Stop reason: HOSPADM

## 2023-11-02 RX ORDER — NITROGLYCERIN 0.4 MG/1
0.4 TABLET SUBLINGUAL EVERY 5 MIN PRN
Status: DISCONTINUED | OUTPATIENT
Start: 2023-11-02 | End: 2023-11-09 | Stop reason: HOSPADM

## 2023-11-02 RX ORDER — ACETAMINOPHEN 650 MG/1
650 SUPPOSITORY RECTAL EVERY 6 HOURS PRN
Status: DISCONTINUED | OUTPATIENT
Start: 2023-11-02 | End: 2023-11-09 | Stop reason: HOSPADM

## 2023-11-02 RX ADMIN — MONTELUKAST SODIUM 10 MG: 10 TABLET, FILM COATED ORAL at 22:23

## 2023-11-02 RX ADMIN — PANTOPRAZOLE SODIUM 40 MG: 40 TABLET, DELAYED RELEASE ORAL at 10:12

## 2023-11-02 RX ADMIN — SPIRONOLACTONE 25 MG: 25 TABLET ORAL at 10:12

## 2023-11-02 RX ADMIN — SUCRALFATE 1 G: 1 TABLET ORAL at 16:08

## 2023-11-02 RX ADMIN — SUCRALFATE 1 G: 1 TABLET ORAL at 22:24

## 2023-11-02 RX ADMIN — ALLOPURINOL 300 MG: 300 TABLET ORAL at 10:11

## 2023-11-02 RX ADMIN — ENOXAPARIN SODIUM 40 MG: 100 INJECTION SUBCUTANEOUS at 21:45

## 2023-11-02 RX ADMIN — ASPIRIN 81 MG: 81 TABLET, COATED ORAL at 10:14

## 2023-11-02 RX ADMIN — ENOXAPARIN SODIUM 40 MG: 100 INJECTION SUBCUTANEOUS at 10:13

## 2023-11-02 RX ADMIN — SODIUM CHLORIDE, PRESERVATIVE FREE 10 ML: 5 INJECTION INTRAVENOUS at 10:15

## 2023-11-02 RX ADMIN — TIZANIDINE 4 MG: 4 TABLET ORAL at 16:08

## 2023-11-02 RX ADMIN — TIZANIDINE 4 MG: 4 TABLET ORAL at 02:51

## 2023-11-02 RX ADMIN — SODIUM CHLORIDE, PRESERVATIVE FREE 10 ML: 5 INJECTION INTRAVENOUS at 22:24

## 2023-11-02 RX ADMIN — OXYBUTYNIN CHLORIDE 5 MG: 5 TABLET ORAL at 10:13

## 2023-11-02 RX ADMIN — BUPROPION HYDROCHLORIDE 150 MG: 150 TABLET, EXTENDED RELEASE ORAL at 10:11

## 2023-11-02 RX ADMIN — HYDRALAZINE HYDROCHLORIDE 10 MG: 20 INJECTION, SOLUTION INTRAMUSCULAR; INTRAVENOUS at 10:14

## 2023-11-02 RX ADMIN — OXYBUTYNIN CHLORIDE 5 MG: 5 TABLET ORAL at 22:23

## 2023-11-02 RX ADMIN — ALUMINUM HYDROXIDE, MAGNESIUM HYDROXIDE, AND SIMETHICONE: 200; 200; 20 SUSPENSION ORAL at 02:59

## 2023-11-02 RX ADMIN — BUPROPION HYDROCHLORIDE 300 MG: 150 TABLET, EXTENDED RELEASE ORAL at 10:12

## 2023-11-02 RX ADMIN — OXYCODONE AND ACETAMINOPHEN 1 TABLET: 5; 325 TABLET ORAL at 21:33

## 2023-11-02 RX ADMIN — BUDESONIDE AND FORMOTEROL FUMARATE DIHYDRATE 2 PUFF: 160; 4.5 AEROSOL RESPIRATORY (INHALATION) at 09:16

## 2023-11-02 RX ADMIN — ISOSORBIDE DINITRATE 10 MG: 10 TABLET ORAL at 22:24

## 2023-11-02 RX ADMIN — ATORVASTATIN CALCIUM 80 MG: 80 TABLET, FILM COATED ORAL at 21:33

## 2023-11-02 RX ADMIN — SUCRALFATE 1 G: 1 TABLET ORAL at 10:14

## 2023-11-02 RX ADMIN — ISOSORBIDE DINITRATE 10 MG: 10 TABLET ORAL at 10:15

## 2023-11-02 RX ADMIN — BUDESONIDE AND FORMOTEROL FUMARATE DIHYDRATE 2 PUFF: 160; 4.5 AEROSOL RESPIRATORY (INHALATION) at 19:58

## 2023-11-02 ASSESSMENT — PAIN DESCRIPTION - PAIN TYPE
TYPE: CHRONIC PAIN

## 2023-11-02 ASSESSMENT — PAIN DESCRIPTION - ONSET
ONSET: ON-GOING

## 2023-11-02 ASSESSMENT — PAIN SCALES - GENERAL
PAINLEVEL_OUTOF10: 8
PAINLEVEL_OUTOF10: 8
PAINLEVEL_OUTOF10: 10
PAINLEVEL_OUTOF10: 4
PAINLEVEL_OUTOF10: 4
PAINLEVEL_OUTOF10: 10

## 2023-11-02 ASSESSMENT — PAIN DESCRIPTION - DESCRIPTORS
DESCRIPTORS: DISCOMFORT
DESCRIPTORS: SORE;DISCOMFORT;ACHING
DESCRIPTORS: SORE;ACHING;DISCOMFORT
DESCRIPTORS: SORE;DISCOMFORT;ACHING
DESCRIPTORS: SORE;DISCOMFORT

## 2023-11-02 ASSESSMENT — PAIN DESCRIPTION - LOCATION
LOCATION: GENERALIZED
LOCATION: CHEST
LOCATION: GENERALIZED;BACK

## 2023-11-02 ASSESSMENT — PAIN - FUNCTIONAL ASSESSMENT
PAIN_FUNCTIONAL_ASSESSMENT: ACTIVITIES ARE NOT PREVENTED

## 2023-11-02 ASSESSMENT — PAIN DESCRIPTION - ORIENTATION
ORIENTATION: MID

## 2023-11-02 ASSESSMENT — PAIN DESCRIPTION - FREQUENCY
FREQUENCY: INTERMITTENT
FREQUENCY: CONTINUOUS
FREQUENCY: INTERMITTENT
FREQUENCY: CONTINUOUS

## 2023-11-02 NOTE — ED NOTES
ED to inpatient nurses report      Chief Complaint:  Chief Complaint   Patient presents with    Chest Pain     Tachycardia, svt     Present to ED from: Home    MOA:     LOC: alert and orientated to name, place, date  Mobility: Requires assistance * 1  Oxygen Baseline: Room air     Current needs required:  1L NC  Pending ED orders: None  Present condition: stable    Why did the patient come to the ED? Chest pain at facility ad EMS reported SVT on their monitor   Procainamide bolus and drip given by ems  What is the plan? admit  Any procedures or intervention occur? Labs, imaging  Any safety concerns? ?     Mental Status:  Level of Consciousness: Alert (0)    Psych Assessment:   Psychosocial  Psychosocial (WDL): Within Defined Limits  Vital signs   Vitals:    11/02/23 0001 11/02/23 0028 11/02/23 0100 11/02/23 0127   BP:  (!) 150/81 (!) 152/82 (!) 154/70   Pulse: 90 98 92 92   Resp: 20 26 29 17   Temp:       SpO2: 97% 96% 95% 95%   Weight:       Height:            Vitals:  Patient Vitals for the past 24 hrs:   BP Temp Pulse Resp SpO2 Height Weight   11/02/23 0127 (!) 154/70 -- 92 17 95 % -- --   11/02/23 0100 (!) 152/82 -- 92 29 95 % -- --   11/02/23 0028 (!) 150/81 -- 98 26 96 % -- --   11/02/23 0001 -- -- 90 20 97 % -- --   11/02/23 0000 (!) 171/73 -- 89 22 98 % -- --   11/01/23 2313 (!) 161/80 -- 87 24 97 % -- --   11/01/23 1934 -- -- -- -- 95 % -- --   11/01/23 1548 (!) 152/94 -- 96 26 94 % -- --   11/01/23 1533 135/66 -- 94 18 96 % -- --   11/01/23 1518 (!) 157/93 -- 93 18 97 % -- --   11/01/23 1448 (!) 171/99 -- 95 25 97 % -- --   11/01/23 1433 (!) 161/85 -- 100 28 94 % -- --   11/01/23 1418 (!) 180/96 -- 90 27 97 % -- --   11/01/23 1403 (!) 180/95 -- 97 19 95 % -- --   11/01/23 1348 (!) 177/108 -- 93 15 97 % -- --   11/01/23 1333 (!) 181/136 -- 93 16 97 % -- --   11/01/23 1318 (!) 166/93 -- 94 12 98 % -- --   11/01/23 1303 (!) 164/99 -- 92 21 94 % -- --   11/01/23 1248 (!) 183/95 -- (!) 101 24 96 % -- --

## 2023-11-02 NOTE — ED NOTES
The following labs were labeled with appropriate pt sticker and tubed to lab:     [x] Blue     [x] Lavender   [] on ice  [x] Green/yellow  [] Green/black [] on ice  [] Rik [place]  [] on ice  [] Yellow  [] Red  [] Pink  [] Type/ Screen  [] ABG  [] VBG    [] COVID-19 swab    [] Rapid  [] PCR  [] Flu swab  [] Peds Viral Panel     [] Urine Sample  [] Fecal Sample  [] Pelvic Cultures  [] Blood Cultures  [] X 2  [] STREP Cultures       Greg Bush RN  11/02/23 1012

## 2023-11-02 NOTE — ED NOTES
ED to inpatient nurses report      Chief Complaint:  Chief Complaint   Patient presents with    Chest Pain     Tachycardia, svt     Present to ED from: home    MOA:     LOC: alert and orientated to name, place, date  Mobility: Requires assistance * 1  Oxygen Baseline: Room air    Current needs required: c Pap   Pending ED orders: None  Present condition: stable    Why did the patient come to the ED? Chest pain at facility ad EMS reported SVT on their monitor   Procainamide bolus and drip given by ems  What is the plan? Admit  Any procedures or intervention occur? Cpap, labs, imaging  Any safety concerns? ?     Mental Status:  Level of Consciousness: Alert (0)    Psych Assessment:   Psychosocial  Psychosocial (WDL): Within Defined Limits  Vital signs   Vitals:    11/02/23 0127 11/02/23 0354 11/02/23 0430 11/02/23 0512   BP: (!) 154/70 128/75 (!) 158/88    Pulse: 92 75 81 75   Resp: 17 23 27 22   Temp:       SpO2: 95% 95% 100% 100%   Weight:       Height:            Vitals:  Patient Vitals for the past 24 hrs:   BP Temp Pulse Resp SpO2 Height Weight   11/02/23 0512 -- -- 75 22 100 % -- --   11/02/23 0430 (!) 158/88 -- 81 27 100 % -- --   11/02/23 0354 128/75 -- 75 23 95 % -- --   11/02/23 0127 (!) 154/70 -- 92 17 95 % -- --   11/02/23 0100 (!) 152/82 -- 92 29 95 % -- --   11/02/23 0028 (!) 150/81 -- 98 26 96 % -- --   11/02/23 0001 -- -- 90 20 97 % -- --   11/02/23 0000 (!) 171/73 -- 89 22 98 % -- --   11/01/23 2313 (!) 161/80 -- 87 24 97 % -- --   11/01/23 1934 -- -- -- -- 95 % -- --   11/01/23 1548 (!) 152/94 -- 96 26 94 % -- --   11/01/23 1533 135/66 -- 94 18 96 % -- --   11/01/23 1518 (!) 157/93 -- 93 18 97 % -- --   11/01/23 1448 (!) 171/99 -- 95 25 97 % -- --   11/01/23 1433 (!) 161/85 -- 100 28 94 % -- --   11/01/23 1418 (!) 180/96 -- 90 27 97 % -- --   11/01/23 1403 (!) 180/95 -- 97 19 95 % -- --   11/01/23 1348 (!) 177/108 -- 93 15 97 % -- --   11/01/23 1333 (!) 181/136 -- 93 16 97 % -- --   11/01/23 1318 (!)

## 2023-11-02 NOTE — ED NOTES
RN attempted to medication pt for pain with norco d/t pt refusing tylenol, but pt refused. Pt states, \"I need something stronger and that does not work for me. \" Pt also reported, \"I want something to sleep too. \" Pt had to be woken up by RN to try to medicate patient.            Staci Higgins RN  11/02/23 1685

## 2023-11-02 NOTE — ED NOTES
Pt removed self from cardiac monitor. RN attempted to put pt on cardiac monitor and pt became upset with RN stating \"not until I speak with a physician. \"     Beau Bowser RN  11/02/23 5549

## 2023-11-02 NOTE — ED NOTES
Perfectserved admitting team d/t pt demanding to speak with them     Latrice Pineda RN  11/02/23 0216

## 2023-11-02 NOTE — ED NOTES
ED to inpatient nurses report      Chief Complaint:  Chief Complaint   Patient presents with    Chest Pain     Tachycardia, svt     Present to ED from: nursing home    MOA:     LOC: alert and orientated to name, place, date  Mobility: Requires assistance * 1  Oxygen Baseline: 88 RA    Current needs required: 4 L NC at 98%   Pending ED orders: na  Present condition: stable    Why did the patient come to the ED? Chest pain at facility ad EMS reported SVT on their monitor   Procainamide bolus and drip given by ems  What is the plan? Continue Procainamide drip, control HR an rhythm  Any procedures or intervention occur?  Procainamide drip, breathing treatment, LABS, ekg    Mental Status:  Level of Consciousness: Alert (0)    Psych Assessment:   Psychosocial  Psychosocial (WDL): Within Defined Limits  Vital signs   Vitals:    11/01/23 1518 11/01/23 1533 11/01/23 1548 11/01/23 1934   BP: (!) 157/93 135/66 (!) 152/94    Pulse: 93 94 96    Resp: 18 18 26    Temp:       SpO2: 97% 96% 94% 95%   Weight:       Height:            Vitals:  Patient Vitals for the past 24 hrs:   BP Temp Pulse Resp SpO2 Height Weight   11/01/23 1934 -- -- -- -- 95 % -- --   11/01/23 1548 (!) 152/94 -- 96 26 94 % -- --   11/01/23 1533 135/66 -- 94 18 96 % -- --   11/01/23 1518 (!) 157/93 -- 93 18 97 % -- --   11/01/23 1448 (!) 171/99 -- 95 25 97 % -- --   11/01/23 1433 (!) 161/85 -- 100 28 94 % -- --   11/01/23 1418 (!) 180/96 -- 90 27 97 % -- --   11/01/23 1403 (!) 180/95 -- 97 19 95 % -- --   11/01/23 1348 (!) 177/108 -- 93 15 97 % -- --   11/01/23 1333 (!) 181/136 -- 93 16 97 % -- --   11/01/23 1318 (!) 166/93 -- 94 12 98 % -- --   11/01/23 1303 (!) 164/99 -- 92 21 94 % -- --   11/01/23 1248 (!) 183/95 -- (!) 101 24 96 % -- --   11/01/23 1246 (!) 183/95 -- 98 16 96 % -- --   11/01/23 1234 (!) 168/94 -- 99 24 90 % -- --   11/01/23 1214 (!) 165/88 -- 100 22 91 % -- --   11/01/23 1204 (!) 165/108 -- (!) 104 20 95 % -- --   11/01/23 1153 -- -- 92 16 --

## 2023-11-02 NOTE — PLAN OF CARE
Problem: Respiratory - Adult  Goal: Achieves optimal ventilation and oxygenation  Outcome: Progressing     Problem: Discharge Planning  Goal: Discharge to home or other facility with appropriate resources  Outcome: Progressing  Flowsheets (Taken 11/2/2023 1306)  Discharge to home or other facility with appropriate resources:   Identify barriers to discharge with patient and caregiver   Arrange for needed discharge resources and transportation as appropriate     Problem: Safety - Adult  Goal: Free from fall injury  Outcome: Progressing     Problem: Pain  Goal: Verbalizes/displays adequate comfort level or baseline comfort level  Outcome: Progressing

## 2023-11-02 NOTE — DISCHARGE INSTR - COC
Continuity of Care Form    Patient Name: Kate Mayes   :  1965  MRN:  [id number]    Admit date:  2023  Discharge date:  23    Code Status Order: Full Code   Advance Directives:     Admitting Physician:  Main Sterling DO  PCP: Ariadna Neal MD    Discharging Nurse: [facility] Unit/Room#: 36/36  Discharging Unit Phone Number: [phone]    Emergency Contact:   Extended Emergency Contact Information  Primary Emergency Contact: [facility] Phone: [phone]  Mobile Phone: [phone]  Relation: Child  Secondary Emergency Contact: [address] [place] [address] Phone: [phone]  Relation: Child   needed?  No    Past Surgical History:  Past Surgical History:   Procedure Laterality Date    BACK SURGERY      CARDIAC CATHETERIZATION  2022    CHOLECYSTECTOMY, LAPAROSCOPIC      HYSTERECTOMY, TOTAL ABDOMINAL (CERVIX REMOVED)      KNEE ARTHROSCOPY Right     KNEE SURGERY Left 2009    NECK SURGERY      SHOULDER SURGERY Right     THYROIDECTOMY         Immunization History:   Immunization History   Administered Date(s) Administered    COVID-19, PFIZER Bivalent, DO NOT Dilute, (age 12y+), IM, 30 mcg/0.3 mL 2022    COVID-19, PFIZER PURPLE top, DILUTE for use, (age 15 y+), 30mcg/0.3mL 2021, 2021    Influenza, FLUARIX, FLULAVAL, FLUZONE (age 10 mo+) AND AFLURIA, (age 1 y+), PF, 0.5mL 2017, 2019, 10/08/2020    Influenza, FLUCELVAX, (age 10 mo+), MDCK, PF, 0.5mL 10/02/2023    Pneumococcal, PPSV23, PNEUMOVAX 23, (age 2y+), SC/IM, 0.5mL 10/08/2020       Active Problems:  Patient Active Problem List   Diagnosis Code    Low back pain M54.50    Therapeutic opioid-induced constipation (OIC) K59.03, T40.2X5A    Spondylosis of lumbar region without myelopathy or radiculopathy M47.816    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

## 2023-11-02 NOTE — PLAN OF CARE
Inhaler / Aerosol Education        [x] Served spacer      [x] Good return demonstration per patient   [x] Aerosolized Medications:     Verbal education has been provided in the use, benefits and possible adverse reactions of aerosolized medications used in the treatment of this patient.

## 2023-11-02 NOTE — ED NOTES
The following labs were labeled with patient stickers & tubed to lab;    []Lavender   []On Ice  []Blue  [x]Green/ Yellow  []Green/ Black []On Ice  []Pink  []Red  []Yellow    []COVID-19 Swab []Rapid    []Urine Sample  []Pelvic Cultures    []Blood Cultures       Nereyda Castro LPN  05/33/94 6864

## 2023-11-02 NOTE — ED NOTES
ED to inpatient nurses report      Chief Complaint:  Chief Complaint   Patient presents with    Chest Pain     Tachycardia, svt     Present to ED from: Home    MOA:     LOC: alert and orientated to name, place, date  Mobility: Requires assistance * 1  Oxygen Baseline: room air    Current needs required: 1L NC   Pending ED orders: Stable  Present condition: HTN    Why did the patient come to the ED? Chest pain at facility ad EMS reported SVT on their monitor   Procainamide bolus and drip given by ems  What is the plan? Admit, monitor  Any procedures or intervention occur? Labs, imaging  Any safety concerns? ?     Mental Status:  Level of Consciousness: Alert (0)    Psych Assessment:   Psychosocial  Psychosocial (WDL): Within Defined Limits  Vital signs   Vitals:    11/01/23 2313 11/02/23 0000 11/02/23 0001 11/02/23 0028   BP: (!) 161/80 (!) 171/73  (!) 150/81   Pulse: 87 89 90 98   Resp: 24 22 20 26   Temp:       SpO2: 97% 98% 97% 96%   Weight:       Height:            Vitals:  Patient Vitals for the past 24 hrs:   BP Temp Pulse Resp SpO2 Height Weight   11/02/23 0028 (!) 150/81 -- 98 26 96 % -- --   11/02/23 0001 -- -- 90 20 97 % -- --   11/02/23 0000 (!) 171/73 -- 89 22 98 % -- --   11/01/23 2313 (!) 161/80 -- 87 24 97 % -- --   11/01/23 1934 -- -- -- -- 95 % -- --   11/01/23 1548 (!) 152/94 -- 96 26 94 % -- --   11/01/23 1533 135/66 -- 94 18 96 % -- --   11/01/23 1518 (!) 157/93 -- 93 18 97 % -- --   11/01/23 1448 (!) 171/99 -- 95 25 97 % -- --   11/01/23 1433 (!) 161/85 -- 100 28 94 % -- --   11/01/23 1418 (!) 180/96 -- 90 27 97 % -- --   11/01/23 1403 (!) 180/95 -- 97 19 95 % -- --   11/01/23 1348 (!) 177/108 -- 93 15 97 % -- --   11/01/23 1333 (!) 181/136 -- 93 16 97 % -- --   11/01/23 1318 (!) 166/93 -- 94 12 98 % -- --   11/01/23 1303 (!) 164/99 -- 92 21 94 % -- --   11/01/23 1248 (!) 183/95 -- (!) 101 24 96 % -- --   11/01/23 1246 (!) 183/95 -- 98 16 96 % -- --   11/01/23 1234 (!) 168/94 -- 99 24 90 % -- --

## 2023-11-03 PROBLEM — Z87.09 HISTORY OF COPD: Status: ACTIVE | Noted: 2023-11-03

## 2023-11-03 LAB
ANION GAP SERPL CALCULATED.3IONS-SCNC: 9 MMOL/L (ref 9–17)
BUN SERPL-MCNC: 15 MG/DL (ref 8–23)
CALCIUM SERPL-MCNC: 8.9 MG/DL (ref 8.6–10.4)
CHLORIDE SERPL-SCNC: 106 MMOL/L (ref 98–107)
CO2 SERPL-SCNC: 26 MMOL/L (ref 20–31)
CREAT SERPL-MCNC: 1.2 MG/DL (ref 0.5–0.9)
GFR SERPL CREATININE-BSD FRML MDRD: 52 ML/MIN/1.73M2
GLUCOSE SERPL-MCNC: 188 MG/DL (ref 70–99)
POTASSIUM SERPL-SCNC: 4.5 MMOL/L (ref 3.7–5.3)
SODIUM SERPL-SCNC: 141 MMOL/L (ref 135–144)

## 2023-11-03 PROCEDURE — 80048 BASIC METABOLIC PNL TOTAL CA: CPT

## 2023-11-03 PROCEDURE — 94660 CPAP INITIATION&MGMT: CPT

## 2023-11-03 PROCEDURE — 99233 SBSQ HOSP IP/OBS HIGH 50: CPT | Performed by: STUDENT IN AN ORGANIZED HEALTH CARE EDUCATION/TRAINING PROGRAM

## 2023-11-03 PROCEDURE — 6370000000 HC RX 637 (ALT 250 FOR IP): Performed by: NURSE PRACTITIONER

## 2023-11-03 PROCEDURE — 6370000000 HC RX 637 (ALT 250 FOR IP): Performed by: INTERNAL MEDICINE

## 2023-11-03 PROCEDURE — 36415 COLL VENOUS BLD VENIPUNCTURE: CPT

## 2023-11-03 PROCEDURE — 94640 AIRWAY INHALATION TREATMENT: CPT

## 2023-11-03 PROCEDURE — 6370000000 HC RX 637 (ALT 250 FOR IP): Performed by: HOSPITALIST

## 2023-11-03 PROCEDURE — 6360000002 HC RX W HCPCS: Performed by: HOSPITALIST

## 2023-11-03 PROCEDURE — 2580000003 HC RX 258: Performed by: HOSPITALIST

## 2023-11-03 PROCEDURE — 99233 SBSQ HOSP IP/OBS HIGH 50: CPT

## 2023-11-03 PROCEDURE — 2060000000 HC ICU INTERMEDIATE R&B

## 2023-11-03 PROCEDURE — 97530 THERAPEUTIC ACTIVITIES: CPT

## 2023-11-03 PROCEDURE — 97162 PT EVAL MOD COMPLEX 30 MIN: CPT

## 2023-11-03 RX ORDER — PROPAFENONE HYDROCHLORIDE 150 MG/1
225 TABLET, COATED ORAL EVERY 8 HOURS SCHEDULED
Status: DISCONTINUED | OUTPATIENT
Start: 2023-11-03 | End: 2023-11-09 | Stop reason: HOSPADM

## 2023-11-03 RX ORDER — LANOLIN ALCOHOL/MO/W.PET/CERES
6 CREAM (GRAM) TOPICAL NIGHTLY PRN
Status: DISCONTINUED | OUTPATIENT
Start: 2023-11-03 | End: 2023-11-09 | Stop reason: HOSPADM

## 2023-11-03 RX ADMIN — SUCRALFATE 1 G: 1 TABLET ORAL at 16:48

## 2023-11-03 RX ADMIN — ENOXAPARIN SODIUM 40 MG: 100 INJECTION SUBCUTANEOUS at 09:41

## 2023-11-03 RX ADMIN — ATORVASTATIN CALCIUM 80 MG: 80 TABLET, FILM COATED ORAL at 22:13

## 2023-11-03 RX ADMIN — BUDESONIDE AND FORMOTEROL FUMARATE DIHYDRATE 2 PUFF: 160; 4.5 AEROSOL RESPIRATORY (INHALATION) at 22:30

## 2023-11-03 RX ADMIN — PROPAFENONE HYDROCHLORIDE 225 MG: 150 TABLET, COATED ORAL at 22:14

## 2023-11-03 RX ADMIN — OXYBUTYNIN CHLORIDE 5 MG: 5 TABLET ORAL at 09:43

## 2023-11-03 RX ADMIN — SUCRALFATE 1 G: 1 TABLET ORAL at 22:13

## 2023-11-03 RX ADMIN — SPIRONOLACTONE 25 MG: 25 TABLET ORAL at 09:40

## 2023-11-03 RX ADMIN — TIZANIDINE 4 MG: 4 TABLET ORAL at 22:59

## 2023-11-03 RX ADMIN — MONTELUKAST SODIUM 10 MG: 10 TABLET, FILM COATED ORAL at 22:13

## 2023-11-03 RX ADMIN — OXYCODONE AND ACETAMINOPHEN 1 TABLET: 5; 325 TABLET ORAL at 18:15

## 2023-11-03 RX ADMIN — PROPAFENONE HYDROCHLORIDE 225 MG: 150 TABLET, COATED ORAL at 09:43

## 2023-11-03 RX ADMIN — OXYBUTYNIN CHLORIDE 5 MG: 5 TABLET ORAL at 22:59

## 2023-11-03 RX ADMIN — BUPROPION HYDROCHLORIDE 150 MG: 150 TABLET, EXTENDED RELEASE ORAL at 09:44

## 2023-11-03 RX ADMIN — Medication 3 MG: at 02:55

## 2023-11-03 RX ADMIN — Medication 6 MG: at 22:59

## 2023-11-03 RX ADMIN — BUPROPION HYDROCHLORIDE 300 MG: 150 TABLET, EXTENDED RELEASE ORAL at 09:43

## 2023-11-03 RX ADMIN — SODIUM CHLORIDE, PRESERVATIVE FREE 10 ML: 5 INJECTION INTRAVENOUS at 22:14

## 2023-11-03 RX ADMIN — ASPIRIN 81 MG: 81 TABLET, COATED ORAL at 09:41

## 2023-11-03 RX ADMIN — PANTOPRAZOLE SODIUM 40 MG: 40 TABLET, DELAYED RELEASE ORAL at 09:41

## 2023-11-03 RX ADMIN — ENOXAPARIN SODIUM 40 MG: 100 INJECTION SUBCUTANEOUS at 22:14

## 2023-11-03 RX ADMIN — PROPAFENONE HYDROCHLORIDE 225 MG: 150 TABLET, COATED ORAL at 16:48

## 2023-11-03 RX ADMIN — TIZANIDINE 4 MG: 4 TABLET ORAL at 03:21

## 2023-11-03 RX ADMIN — ALLOPURINOL 300 MG: 300 TABLET ORAL at 09:43

## 2023-11-03 RX ADMIN — OXYCODONE AND ACETAMINOPHEN 1 TABLET: 5; 325 TABLET ORAL at 09:41

## 2023-11-03 ASSESSMENT — PAIN DESCRIPTION - LOCATION
LOCATION: GENERALIZED

## 2023-11-03 ASSESSMENT — PAIN DESCRIPTION - PAIN TYPE
TYPE: CHRONIC PAIN
TYPE: CHRONIC PAIN

## 2023-11-03 ASSESSMENT — PAIN SCALES - GENERAL
PAINLEVEL_OUTOF10: 4
PAINLEVEL_OUTOF10: 8
PAINLEVEL_OUTOF10: 3

## 2023-11-03 ASSESSMENT — PAIN DESCRIPTION - ONSET
ONSET: ON-GOING
ONSET: ON-GOING

## 2023-11-03 ASSESSMENT — PAIN DESCRIPTION - DESCRIPTORS
DESCRIPTORS: SORE;ACHING
DESCRIPTORS: SORE;DISCOMFORT

## 2023-11-03 ASSESSMENT — PAIN - FUNCTIONAL ASSESSMENT
PAIN_FUNCTIONAL_ASSESSMENT: ACTIVITIES ARE NOT PREVENTED
PAIN_FUNCTIONAL_ASSESSMENT: ACTIVITIES ARE NOT PREVENTED

## 2023-11-03 ASSESSMENT — PAIN DESCRIPTION - FREQUENCY
FREQUENCY: INTERMITTENT
FREQUENCY: INTERMITTENT

## 2023-11-03 NOTE — PLAN OF CARE
Problem: Respiratory - Adult  Goal: Achieves optimal ventilation and oxygenation  11/2/2023 2018 by Gudelia Casey ROSANA  Outcome: Progressing   BRONCHOSPASM/BRONCHOCONSTRICTION     [x]         IMPROVE AERATION/BREATH SOUNDS  [x]   ADMINISTER BRONCHODILATOR THERAPY AS APPROPRIATE  [x]   ASSESS BREATH SOUNDS  []   IMPLEMENT AEROSOL/MDI PROTOCOL  [x]   PATIENT EDUCATION AS NEEDED   PROVIDE ADEQUATE OXYGENATION WITH ACCEPTABLE SP02/ABG'S    [x]  IDENTIFY APPROPRIATE OXYGEN THERAPY  [x]   MONITOR SP02/ABG'S AS NEEDED   [x]   PATIENT EDUCATION AS NEEDED

## 2023-11-03 NOTE — CONSULTS
[facility] Cardiology Cardiology    Consult               Today's Date: 11/2/2023  Patient Name: Dolly White  Date of admission: 11/1/2023 10:14 AM  Patient's age: 59 y.o., 1965  Admission Dx: SVT (supraventricular tachycardia) [I47.10]    Requesting Physician: Gil Mcfarland, DO    Cardiac Evaluation Reason: SVT    History Obtained From: patient and chart review     History of Present Illness: This patient 59y.o. years old with past medical history given below. Patient presented to the emergency department from nursing facility with chest pressure which was felt in the retrosternal area. On arrival of the EMS patient was still complaining of chest pressure and she was given aspirin 325 mg. Patient then went into SVT with heart rate of 184-190 and patient was started on procainamide with loading dose of 1 mg and maintenance dose of 2 mg/h. Patient then converted to normal sinus rhythm with sinus tachycardia. Patient chest pressure subsided with control of heart rate. Cardiology consulted for SVT. Troponins are 26 and proBNP of 354. EKG showed SVT initially and then LBBB with sinus tacycardia. Past Medical History:   has a past medical history of Arthritis, Bronchitis, CHF (congestive heart failure) ([address]), COVID-19, Fibromyalgia, GERD (gastroesophageal reflux disease), Gout, History of heart attack, HLD (hyperlipidemia), Hypertension, Insomnia, and Osteoarthritis. Past Surgical History:   has a past surgical history that includes back surgery; shoulder surgery (Right, 2009); knee surgery (Left, 2009); Cholecystectomy, laparoscopic; Knee arthroscopy (Right); Hysterectomy, total abdominal; Neck surgery; Thyroidectomy; and Cardiac catheterization (07/08/2022). Home Medications:    Prior to Admission medications    Medication Sig Start Date End Date Taking?  Authorizing Provider   ibuprofen (ADVIL;MOTRIN) 800 MG tablet Take 1 tablet by mouth every 8 hours as needed for Pain 10/16/23   Doreen Rahman
10/16/23   Dmitry Terry MD   buPROPion (WELLBUTRIN XL) 300 MG extended release tablet Take 1 tablet by mouth every morning 10/6/23   Dmitry Terry MD   oxybutynin (DITROPAN) 5 MG tablet Take 1 tablet by mouth 2 times daily 10/6/23   Dmitry Terry MD   spironolactone (ALDACTONE) 25 MG tablet Take 1 tablet by mouth daily 10/6/23   Dmitry Terry MD   atorvastatin (LIPITOR) 80 MG tablet Take 1 tablet by mouth daily 10/6/23   Dmitry Terry MD   pantoprazole (PROTONIX) 40 MG tablet Take 1 tablet by mouth daily 10/6/23   Dmitry Terry MD   tiZANidine (ZANAFLEX) 4 MG tablet TAKE 1 TABLET BY MOUTH THREE TIMES DAILY AS NEEDED FOR PAIN 9/29/23   Dmitry Terry MD   budesonide-formoterol [facility]) 160-4.5 MCG/ACT AERO Inhale 2 puffs into the lungs 2 times daily 9/27/23   Donnie CAMACHO DO   isosorbide dinitrate (ISORDIL) 10 MG tablet Take 1 tablet by mouth twice daily 7/20/23   Kan Bean MD   albuterol (PROVENTIL) (2.5 MG/3ML) 0.083% nebulizer solution Take 3 mLs by nebulization every 6 hours as needed for Wheezing 6/12/23   Kan Bean MD   levothyroxine (SYNTHROID) 50 MCG tablet Take 0.5 tablets by mouth Daily 5/3/23 10/2/23  Sunni Zuniga MD   [facility] (ROLLATOR ULTRA-LIGHT) MISC Use daily to ambulate in the home. 4/24/23   Kan Bean MD   allopurinol (ZYLOPRIM) 300 MG tablet Take 1 tablet by mouth daily 1/9/23   ASA Starr CNP   buPROPion (WELLBUTRIN XL) 150 MG extended release tablet Take 1 tablet by mouth every morning Take with 300mg for total of 450 mg 1/9/23   ASA Starr CNP   montelukast (SINGULAIR) 10 MG tablet Take 1 tablet by mouth nightly 1/9/23   ASA Starr CNP   nystatin (MYCOSTATIN) 770146 UNIT/GM cream Apply topically 2 times daily.  1/9/23   ASA Starr CNP   Vitamin D, Ergocalciferol, 77655 units CAPS Take 50,000 Units by mouth once a week 10/7/22   ASA Prado CNP   albuterol sulfate HFA

## 2023-11-03 NOTE — PLAN OF CARE
Problem: Respiratory - Adult  Goal: Achieves optimal ventilation and oxygenation  11/3/2023 0046 by Frankie Orozco RCP  Outcome: Progressing   NONINVASIVE VENTILATION    PROVIDE OPTIMAL VENTILATION/ACCEPTABLE SPO2   IMPLEMENT NONINVASIVE VENTILATION PROTOCOL   MAINTAIN ACCEPTABLE SPO2   ASSESS SKIN INTEGRITY/BREAKDOWN SCORE   PATIENT EDUCATION AS NEEDED   BIPAP AS NEEDED

## 2023-11-03 NOTE — PLAN OF CARE
Problem: Respiratory - Adult  Goal: Achieves optimal ventilation and oxygenation  11/3/2023 0834 by Shawanda Lowe RCP  Outcome: Progressing

## 2023-11-04 LAB
EKG ATRIAL RATE: 108 BPM
EKG P AXIS: 76 DEGREES
EKG P-R INTERVAL: 158 MS
EKG Q-T INTERVAL: 378 MS
EKG QRS DURATION: 142 MS
EKG QTC CALCULATION (BAZETT): 506 MS
EKG R AXIS: -33 DEGREES
EKG T AXIS: 95 DEGREES
EKG VENTRICULAR RATE: 108 BPM

## 2023-11-04 PROCEDURE — 2060000000 HC ICU INTERMEDIATE R&B

## 2023-11-04 PROCEDURE — 6360000002 HC RX W HCPCS: Performed by: HOSPITALIST

## 2023-11-04 PROCEDURE — 99233 SBSQ HOSP IP/OBS HIGH 50: CPT | Performed by: SURGERY

## 2023-11-04 PROCEDURE — 6370000000 HC RX 637 (ALT 250 FOR IP): Performed by: NURSE PRACTITIONER

## 2023-11-04 PROCEDURE — 6370000000 HC RX 637 (ALT 250 FOR IP): Performed by: INTERNAL MEDICINE

## 2023-11-04 PROCEDURE — 6370000000 HC RX 637 (ALT 250 FOR IP): Performed by: HOSPITALIST

## 2023-11-04 PROCEDURE — 93010 ELECTROCARDIOGRAM REPORT: CPT | Performed by: INTERNAL MEDICINE

## 2023-11-04 PROCEDURE — 2580000003 HC RX 258: Performed by: HOSPITALIST

## 2023-11-04 PROCEDURE — 99233 SBSQ HOSP IP/OBS HIGH 50: CPT | Performed by: STUDENT IN AN ORGANIZED HEALTH CARE EDUCATION/TRAINING PROGRAM

## 2023-11-04 RX ADMIN — SPIRONOLACTONE 25 MG: 25 TABLET ORAL at 08:39

## 2023-11-04 RX ADMIN — OXYCODONE AND ACETAMINOPHEN 1 TABLET: 5; 325 TABLET ORAL at 04:05

## 2023-11-04 RX ADMIN — ENOXAPARIN SODIUM 40 MG: 100 INJECTION SUBCUTANEOUS at 20:48

## 2023-11-04 RX ADMIN — SODIUM CHLORIDE, PRESERVATIVE FREE 10 ML: 5 INJECTION INTRAVENOUS at 20:37

## 2023-11-04 RX ADMIN — PROPAFENONE HYDROCHLORIDE 225 MG: 150 TABLET, COATED ORAL at 14:31

## 2023-11-04 RX ADMIN — ATORVASTATIN CALCIUM 80 MG: 80 TABLET, FILM COATED ORAL at 20:48

## 2023-11-04 RX ADMIN — BUPROPION HYDROCHLORIDE 300 MG: 150 TABLET, EXTENDED RELEASE ORAL at 08:40

## 2023-11-04 RX ADMIN — ENOXAPARIN SODIUM 40 MG: 100 INJECTION SUBCUTANEOUS at 08:42

## 2023-11-04 RX ADMIN — MONTELUKAST SODIUM 10 MG: 10 TABLET, FILM COATED ORAL at 20:37

## 2023-11-04 RX ADMIN — TIZANIDINE 4 MG: 4 TABLET ORAL at 14:30

## 2023-11-04 RX ADMIN — BUPROPION HYDROCHLORIDE 150 MG: 150 TABLET, EXTENDED RELEASE ORAL at 08:41

## 2023-11-04 RX ADMIN — PROPAFENONE HYDROCHLORIDE 225 MG: 150 TABLET, COATED ORAL at 06:15

## 2023-11-04 RX ADMIN — OXYCODONE AND ACETAMINOPHEN 1 TABLET: 5; 325 TABLET ORAL at 23:54

## 2023-11-04 RX ADMIN — SUCRALFATE 1 G: 1 TABLET ORAL at 23:43

## 2023-11-04 RX ADMIN — OXYBUTYNIN CHLORIDE 5 MG: 5 TABLET ORAL at 20:36

## 2023-11-04 RX ADMIN — OXYBUTYNIN CHLORIDE 5 MG: 5 TABLET ORAL at 08:40

## 2023-11-04 RX ADMIN — SODIUM CHLORIDE, PRESERVATIVE FREE 10 ML: 5 INJECTION INTRAVENOUS at 08:42

## 2023-11-04 RX ADMIN — OXYCODONE AND ACETAMINOPHEN 1 TABLET: 5; 325 TABLET ORAL at 12:28

## 2023-11-04 RX ADMIN — Medication 6 MG: at 23:42

## 2023-11-04 RX ADMIN — ASPIRIN 81 MG: 81 TABLET, COATED ORAL at 08:39

## 2023-11-04 RX ADMIN — SODIUM CHLORIDE, PRESERVATIVE FREE 10 ML: 5 INJECTION INTRAVENOUS at 20:44

## 2023-11-04 RX ADMIN — PANTOPRAZOLE SODIUM 40 MG: 40 TABLET, DELAYED RELEASE ORAL at 08:40

## 2023-11-04 RX ADMIN — ALLOPURINOL 300 MG: 300 TABLET ORAL at 08:40

## 2023-11-04 RX ADMIN — SUCRALFATE 1 G: 1 TABLET ORAL at 06:15

## 2023-11-04 RX ADMIN — SUCRALFATE 1 G: 1 TABLET ORAL at 14:30

## 2023-11-04 RX ADMIN — PROPAFENONE HYDROCHLORIDE 225 MG: 150 TABLET, COATED ORAL at 20:36

## 2023-11-04 ASSESSMENT — PAIN SCALES - GENERAL
PAINLEVEL_OUTOF10: 3
PAINLEVEL_OUTOF10: 7
PAINLEVEL_OUTOF10: 7
PAINLEVEL_OUTOF10: 3
PAINLEVEL_OUTOF10: 8

## 2023-11-04 ASSESSMENT — PAIN DESCRIPTION - ORIENTATION
ORIENTATION: OTHER (COMMENT)
ORIENTATION: MID
ORIENTATION: MID
ORIENTATION: OTHER (COMMENT)

## 2023-11-04 ASSESSMENT — PAIN DESCRIPTION - PAIN TYPE
TYPE: CHRONIC PAIN
TYPE: CHRONIC PAIN;OTHER (COMMENT)

## 2023-11-04 ASSESSMENT — PAIN DESCRIPTION - DESCRIPTORS
DESCRIPTORS: SORE;DISCOMFORT;ACHING
DESCRIPTORS: OTHER (COMMENT)
DESCRIPTORS: SORE
DESCRIPTORS: SORE;DISCOMFORT;ACHING

## 2023-11-04 ASSESSMENT — PAIN DESCRIPTION - FREQUENCY
FREQUENCY: INTERMITTENT
FREQUENCY: INTERMITTENT
FREQUENCY: CONTINUOUS
FREQUENCY: CONTINUOUS
FREQUENCY: INTERMITTENT

## 2023-11-04 ASSESSMENT — PAIN - FUNCTIONAL ASSESSMENT
PAIN_FUNCTIONAL_ASSESSMENT: ACTIVITIES ARE NOT PREVENTED
PAIN_FUNCTIONAL_ASSESSMENT: ACTIVITIES ARE NOT PREVENTED
PAIN_FUNCTIONAL_ASSESSMENT: PREVENTS OR INTERFERES SOME ACTIVE ACTIVITIES AND ADLS
PAIN_FUNCTIONAL_ASSESSMENT: ACTIVITIES ARE NOT PREVENTED
PAIN_FUNCTIONAL_ASSESSMENT: PREVENTS OR INTERFERES SOME ACTIVE ACTIVITIES AND ADLS

## 2023-11-04 ASSESSMENT — PAIN DESCRIPTION - LOCATION
LOCATION: GENERALIZED

## 2023-11-04 ASSESSMENT — PAIN DESCRIPTION - ONSET
ONSET: ON-GOING

## 2023-11-04 NOTE — PLAN OF CARE
Problem: Respiratory - Adult  Goal: Achieves optimal ventilation and oxygenation  11/4/2023 1012 by Bobby Ulloa RN  Outcome: Progressing  Flowsheets (Taken 11/4/2023 0800)  Achieves optimal ventilation and oxygenation: Assess for changes in respiratory status  11/3/2023 2236 by Corbin Holstein, RCP  Outcome: Progressing     Problem: Discharge Planning  Goal: Discharge to home or other facility with appropriate resources  Outcome: Progressing  Flowsheets  Taken 11/4/2023 0800 by Bobby Ulloa RN  Discharge to home or other facility with appropriate resources: Identify barriers to discharge with patient and caregiver  Taken 11/3/2023 2031 by Dariel Borrero RN  Discharge to home or other facility with appropriate resources:   Identify barriers to discharge with patient and caregiver   Identify discharge learning needs (meds, wound care, etc)     Problem: Safety - Adult  Goal: Free from fall injury  Outcome: Progressing     Problem: Pain  Goal: Verbalizes/displays adequate comfort level or baseline comfort level  Outcome: Progressing  Flowsheets (Taken 11/3/2023 2031 by Dariel Borrero RN)  Verbalizes/displays adequate comfort level or baseline comfort level:   Encourage patient to monitor pain and request assistance   Assess pain using appropriate pain scale   Administer analgesics based on type and severity of pain and evaluate response   Implement non-pharmacological measures as appropriate and evaluate response   Consider cultural and social influences on pain and pain management   Notify Licensed Independent Practitioner if interventions unsuccessful or patient reports new pain     Problem: Skin/Tissue Integrity  Goal: Absence of new skin breakdown  Description: 1. Monitor for areas of redness and/or skin breakdown  2. Assess vascular access sites hourly  3. Every 4-6 hours minimum:  Change oxygen saturation probe site  4.   Every 4-6 hours:  If on nasal continuous positive airway pressure,

## 2023-11-04 NOTE — PLAN OF CARE
Problem: Respiratory - Adult  Goal: Achieves optimal ventilation and oxygenation  Outcome: Progressing   BRONCHOSPASM/BRONCHOCONSTRICTION     [x]         IMPROVE AERATION/BREATH SOUNDS  [x]   ADMINISTER BRONCHODILATOR THERAPY AS APPROPRIATE  [x]   ASSESS BREATH SOUNDS  []   IMPLEMENT AEROSOL/MDI PROTOCOL  [x]   PATIENT EDUCATION AS NEEDED   NON INVASIVE VENTILATION  PROVIDE OPTIMAL VENTILATION/ACCEPTABLE SP02  IMPLEMENT NON INVASIVE VENTILATION PROTOCOL  ASSESSMENT SKIN INTEGRITY  PATIENT EDUCATION AS NEEDED  BIPAP AS NEEDED

## 2023-11-05 PROCEDURE — 2580000003 HC RX 258: Performed by: HOSPITALIST

## 2023-11-05 PROCEDURE — 6360000002 HC RX W HCPCS: Performed by: HOSPITALIST

## 2023-11-05 PROCEDURE — 94761 N-INVAS EAR/PLS OXIMETRY MLT: CPT

## 2023-11-05 PROCEDURE — 99233 SBSQ HOSP IP/OBS HIGH 50: CPT | Performed by: SURGERY

## 2023-11-05 PROCEDURE — 6370000000 HC RX 637 (ALT 250 FOR IP): Performed by: HOSPITALIST

## 2023-11-05 PROCEDURE — 6370000000 HC RX 637 (ALT 250 FOR IP): Performed by: INTERNAL MEDICINE

## 2023-11-05 PROCEDURE — 97166 OT EVAL MOD COMPLEX 45 MIN: CPT

## 2023-11-05 PROCEDURE — 2060000000 HC ICU INTERMEDIATE R&B

## 2023-11-05 PROCEDURE — 94640 AIRWAY INHALATION TREATMENT: CPT

## 2023-11-05 PROCEDURE — 6370000000 HC RX 637 (ALT 250 FOR IP): Performed by: NURSE PRACTITIONER

## 2023-11-05 PROCEDURE — 99233 SBSQ HOSP IP/OBS HIGH 50: CPT | Performed by: STUDENT IN AN ORGANIZED HEALTH CARE EDUCATION/TRAINING PROGRAM

## 2023-11-05 PROCEDURE — 97535 SELF CARE MNGMENT TRAINING: CPT

## 2023-11-05 RX ADMIN — PROPAFENONE HYDROCHLORIDE 225 MG: 150 TABLET, COATED ORAL at 14:25

## 2023-11-05 RX ADMIN — ATORVASTATIN CALCIUM 80 MG: 80 TABLET, FILM COATED ORAL at 21:00

## 2023-11-05 RX ADMIN — SODIUM CHLORIDE, PRESERVATIVE FREE 10 ML: 5 INJECTION INTRAVENOUS at 08:45

## 2023-11-05 RX ADMIN — SUCRALFATE 1 G: 1 TABLET ORAL at 21:00

## 2023-11-05 RX ADMIN — BUPROPION HYDROCHLORIDE 150 MG: 150 TABLET, EXTENDED RELEASE ORAL at 08:43

## 2023-11-05 RX ADMIN — SPIRONOLACTONE 25 MG: 25 TABLET ORAL at 08:42

## 2023-11-05 RX ADMIN — PANTOPRAZOLE SODIUM 40 MG: 40 TABLET, DELAYED RELEASE ORAL at 08:42

## 2023-11-05 RX ADMIN — BUDESONIDE AND FORMOTEROL FUMARATE DIHYDRATE 2 PUFF: 160; 4.5 AEROSOL RESPIRATORY (INHALATION) at 22:03

## 2023-11-05 RX ADMIN — ASPIRIN 81 MG: 81 TABLET, COATED ORAL at 08:41

## 2023-11-05 RX ADMIN — SUCRALFATE 1 G: 1 TABLET ORAL at 06:45

## 2023-11-05 RX ADMIN — PROPAFENONE HYDROCHLORIDE 225 MG: 150 TABLET, COATED ORAL at 21:01

## 2023-11-05 RX ADMIN — PROPAFENONE HYDROCHLORIDE 225 MG: 150 TABLET, COATED ORAL at 06:45

## 2023-11-05 RX ADMIN — MONTELUKAST SODIUM 10 MG: 10 TABLET, FILM COATED ORAL at 21:00

## 2023-11-05 RX ADMIN — ENOXAPARIN SODIUM 40 MG: 100 INJECTION SUBCUTANEOUS at 21:00

## 2023-11-05 RX ADMIN — BUDESONIDE AND FORMOTEROL FUMARATE DIHYDRATE 2 PUFF: 160; 4.5 AEROSOL RESPIRATORY (INHALATION) at 10:30

## 2023-11-05 RX ADMIN — ENOXAPARIN SODIUM 40 MG: 100 INJECTION SUBCUTANEOUS at 08:42

## 2023-11-05 RX ADMIN — BUPROPION HYDROCHLORIDE 300 MG: 150 TABLET, EXTENDED RELEASE ORAL at 08:42

## 2023-11-05 RX ADMIN — ALLOPURINOL 300 MG: 300 TABLET ORAL at 08:43

## 2023-11-05 RX ADMIN — TIZANIDINE 4 MG: 4 TABLET ORAL at 17:28

## 2023-11-05 RX ADMIN — SODIUM CHLORIDE, PRESERVATIVE FREE 10 ML: 5 INJECTION INTRAVENOUS at 21:01

## 2023-11-05 RX ADMIN — OXYBUTYNIN CHLORIDE 5 MG: 5 TABLET ORAL at 08:43

## 2023-11-05 RX ADMIN — OXYCODONE AND ACETAMINOPHEN 1 TABLET: 5; 325 TABLET ORAL at 16:47

## 2023-11-05 RX ADMIN — OXYCODONE AND ACETAMINOPHEN 1 TABLET: 5; 325 TABLET ORAL at 08:42

## 2023-11-05 RX ADMIN — SUCRALFATE 1 G: 1 TABLET ORAL at 14:25

## 2023-11-05 RX ADMIN — OXYBUTYNIN CHLORIDE 5 MG: 5 TABLET ORAL at 21:00

## 2023-11-05 ASSESSMENT — PAIN SCALES - GENERAL
PAINLEVEL_OUTOF10: 4
PAINLEVEL_OUTOF10: 0

## 2023-11-05 NOTE — PLAN OF CARE
Problem: Respiratory - Adult  Goal: Achieves optimal ventilation and oxygenation  11/5/2023 1837 by Eben Aquino RCP  Outcome: Progressing

## 2023-11-05 NOTE — PLAN OF CARE
Problem: Respiratory - Adult  Goal: Achieves optimal ventilation and oxygenation  Outcome: Progressing  Flowsheets (Taken 11/5/2023 0800)  Achieves optimal ventilation and oxygenation: Assess for changes in respiratory status     Problem: Discharge Planning  Goal: Discharge to home or other facility with appropriate resources  Outcome: Progressing  Flowsheets (Taken 11/5/2023 0800)  Discharge to home or other facility with appropriate resources: Identify barriers to discharge with patient and caregiver     Problem: Safety - Adult  Goal: Free from fall injury  Outcome: Progressing     Problem: Pain  Goal: Verbalizes/displays adequate comfort level or baseline comfort level  Outcome: Progressing     Problem: Skin/Tissue Integrity  Goal: Absence of new skin breakdown  Description: 1. Monitor for areas of redness and/or skin breakdown  2. Assess vascular access sites hourly  3. Every 4-6 hours minimum:  Change oxygen saturation probe site  4. Every 4-6 hours:  If on nasal continuous positive airway pressure, respiratory therapy assess nares and determine need for appliance change or resting period.   Outcome: Progressing     Problem: Chronic Conditions and Co-morbidities  Goal: Patient's chronic conditions and co-morbidity symptoms are monitored and maintained or improved  Outcome: Progressing  Flowsheets (Taken 11/5/2023 0800)  Care Plan - Patient's Chronic Conditions and Co-Morbidity Symptoms are Monitored and Maintained or Improved: Monitor and assess patient's chronic conditions and comorbid symptoms for stability, deterioration, or improvement

## 2023-11-06 PROCEDURE — 6370000000 HC RX 637 (ALT 250 FOR IP): Performed by: INTERNAL MEDICINE

## 2023-11-06 PROCEDURE — 6370000000 HC RX 637 (ALT 250 FOR IP): Performed by: HOSPITALIST

## 2023-11-06 PROCEDURE — 94761 N-INVAS EAR/PLS OXIMETRY MLT: CPT

## 2023-11-06 PROCEDURE — 94640 AIRWAY INHALATION TREATMENT: CPT

## 2023-11-06 PROCEDURE — 6360000002 HC RX W HCPCS: Performed by: HOSPITALIST

## 2023-11-06 PROCEDURE — 6370000000 HC RX 637 (ALT 250 FOR IP): Performed by: NURSE PRACTITIONER

## 2023-11-06 PROCEDURE — 2580000003 HC RX 258: Performed by: HOSPITALIST

## 2023-11-06 PROCEDURE — 2060000000 HC ICU INTERMEDIATE R&B

## 2023-11-06 PROCEDURE — 99233 SBSQ HOSP IP/OBS HIGH 50: CPT | Performed by: STUDENT IN AN ORGANIZED HEALTH CARE EDUCATION/TRAINING PROGRAM

## 2023-11-06 RX ADMIN — SUCRALFATE 1 G: 1 TABLET ORAL at 22:46

## 2023-11-06 RX ADMIN — PANTOPRAZOLE SODIUM 40 MG: 40 TABLET, DELAYED RELEASE ORAL at 08:45

## 2023-11-06 RX ADMIN — BUDESONIDE AND FORMOTEROL FUMARATE DIHYDRATE 2 PUFF: 160; 4.5 AEROSOL RESPIRATORY (INHALATION) at 21:24

## 2023-11-06 RX ADMIN — SUCRALFATE 1 G: 1 TABLET ORAL at 14:49

## 2023-11-06 RX ADMIN — BUDESONIDE AND FORMOTEROL FUMARATE DIHYDRATE 2 PUFF: 160; 4.5 AEROSOL RESPIRATORY (INHALATION) at 09:01

## 2023-11-06 RX ADMIN — OXYCODONE AND ACETAMINOPHEN 1 TABLET: 5; 325 TABLET ORAL at 00:49

## 2023-11-06 RX ADMIN — OXYCODONE AND ACETAMINOPHEN 1 TABLET: 5; 325 TABLET ORAL at 19:20

## 2023-11-06 RX ADMIN — PROPAFENONE HYDROCHLORIDE 225 MG: 150 TABLET, COATED ORAL at 14:49

## 2023-11-06 RX ADMIN — ASPIRIN 81 MG: 81 TABLET, COATED ORAL at 08:45

## 2023-11-06 RX ADMIN — MONTELUKAST SODIUM 10 MG: 10 TABLET, FILM COATED ORAL at 20:22

## 2023-11-06 RX ADMIN — OXYCODONE AND ACETAMINOPHEN 1 TABLET: 5; 325 TABLET ORAL at 08:55

## 2023-11-06 RX ADMIN — SODIUM CHLORIDE, PRESERVATIVE FREE 10 ML: 5 INJECTION INTRAVENOUS at 09:00

## 2023-11-06 RX ADMIN — ALLOPURINOL 300 MG: 300 TABLET ORAL at 08:48

## 2023-11-06 RX ADMIN — TIZANIDINE 4 MG: 4 TABLET ORAL at 01:18

## 2023-11-06 RX ADMIN — Medication 6 MG: at 23:55

## 2023-11-06 RX ADMIN — SUCRALFATE 1 G: 1 TABLET ORAL at 07:13

## 2023-11-06 RX ADMIN — BUPROPION HYDROCHLORIDE 300 MG: 150 TABLET, EXTENDED RELEASE ORAL at 10:08

## 2023-11-06 RX ADMIN — ATORVASTATIN CALCIUM 80 MG: 80 TABLET, FILM COATED ORAL at 20:22

## 2023-11-06 RX ADMIN — TIZANIDINE 4 MG: 4 TABLET ORAL at 10:07

## 2023-11-06 RX ADMIN — SODIUM CHLORIDE, PRESERVATIVE FREE 10 ML: 5 INJECTION INTRAVENOUS at 20:23

## 2023-11-06 RX ADMIN — BUPROPION HYDROCHLORIDE 150 MG: 150 TABLET, EXTENDED RELEASE ORAL at 08:49

## 2023-11-06 RX ADMIN — PROPAFENONE HYDROCHLORIDE 225 MG: 150 TABLET, COATED ORAL at 20:22

## 2023-11-06 RX ADMIN — SPIRONOLACTONE 25 MG: 25 TABLET ORAL at 08:45

## 2023-11-06 RX ADMIN — OXYBUTYNIN CHLORIDE 5 MG: 5 TABLET ORAL at 20:22

## 2023-11-06 RX ADMIN — Medication 6 MG: at 00:49

## 2023-11-06 RX ADMIN — ENOXAPARIN SODIUM 40 MG: 100 INJECTION SUBCUTANEOUS at 08:50

## 2023-11-06 RX ADMIN — PROPAFENONE HYDROCHLORIDE 225 MG: 150 TABLET, COATED ORAL at 07:13

## 2023-11-06 RX ADMIN — TIZANIDINE 4 MG: 4 TABLET ORAL at 20:13

## 2023-11-06 RX ADMIN — ENOXAPARIN SODIUM 40 MG: 100 INJECTION SUBCUTANEOUS at 20:22

## 2023-11-06 RX ADMIN — OXYBUTYNIN CHLORIDE 5 MG: 5 TABLET ORAL at 08:48

## 2023-11-06 ASSESSMENT — PAIN SCALES - GENERAL
PAINLEVEL_OUTOF10: 0
PAINLEVEL_OUTOF10: 9
PAINLEVEL_OUTOF10: 9
PAINLEVEL_OUTOF10: 0
PAINLEVEL_OUTOF10: 9

## 2023-11-06 ASSESSMENT — PAIN DESCRIPTION - PAIN TYPE
TYPE: CHRONIC PAIN

## 2023-11-06 ASSESSMENT — PAIN DESCRIPTION - ORIENTATION
ORIENTATION: LOWER

## 2023-11-06 ASSESSMENT — PAIN DESCRIPTION - LOCATION
LOCATION: BACK

## 2023-11-06 ASSESSMENT — PAIN DESCRIPTION - DESCRIPTORS
DESCRIPTORS: THROBBING
DESCRIPTORS: ACHING;SHARP;SHOOTING;SORE
DESCRIPTORS: THROBBING;ACHING

## 2023-11-06 ASSESSMENT — PAIN DESCRIPTION - FREQUENCY
FREQUENCY: CONTINUOUS
FREQUENCY: CONTINUOUS

## 2023-11-06 ASSESSMENT — PAIN DESCRIPTION - ONSET: ONSET: ON-GOING

## 2023-11-06 NOTE — PLAN OF CARE
Problem: Respiratory - Adult  Goal: Achieves optimal ventilation and oxygenation  11/6/2023 1014 by Rayne Chance RN  Outcome: Progressing  Flowsheets (Taken 11/6/2023 0800)  Achieves optimal ventilation and oxygenation: Assess for changes in respiratory status  62/1/0133 7522 by Jfef Albarran RN  Outcome: Progressing     Problem: Discharge Planning  Goal: Discharge to home or other facility with appropriate resources  11/6/2023 1014 by Rayne Chance RN  Outcome: Progressing  Flowsheets (Taken 11/6/2023 0800)  Discharge to home or other facility with appropriate resources: Identify barriers to discharge with patient and caregiver  91/0/8055 8054 by Jeff Albarran RN  Outcome: Progressing     Problem: Safety - Adult  Goal: Free from fall injury  11/6/2023 1014 by Rayne Chance RN  Outcome: Progressing  53/9/5854 3749 by Jeff Albarran RN  Outcome: Progressing     Problem: Pain  Goal: Verbalizes/displays adequate comfort level or baseline comfort level  11/6/2023 1014 by Rayne Chance RN  Outcome: Progressing  37/1/0546 4563 by Jeff Albarran RN  Outcome: Progressing     Problem: Skin/Tissue Integrity  Goal: Absence of new skin breakdown  Description: 1. Monitor for areas of redness and/or skin breakdown  2. Assess vascular access sites hourly  3. Every 4-6 hours minimum:  Change oxygen saturation probe site  4. Every 4-6 hours:  If on nasal continuous positive airway pressure, respiratory therapy assess nares and determine need for appliance change or resting period.   11/6/2023 1014 by Rayne Chance RN  Outcome: Progressing  12/2/1076 8402 by Jeff Albarran RN  Outcome: Progressing     Problem: Chronic Conditions and Co-morbidities  Goal: Patient's chronic conditions and co-morbidity symptoms are monitored and maintained or improved  11/6/2023 1014 by Rayne Chance RN  Outcome: Progressing  Flowsheets (Taken 11/6/2023 0800)  Care Plan - Patient's

## 2023-11-06 NOTE — PLAN OF CARE
Problem: Respiratory - Adult  Goal: Achieves optimal ventilation and oxygenation  24/0/9708 7837 by Sugey Ball RN  Outcome: Progressing  11/5/2023 1837 by Isabella Munguia RCP  Outcome: Progressing     Problem: Discharge Planning  Goal: Discharge to home or other facility with appropriate resources  Outcome: Progressing     Problem: Safety - Adult  Goal: Free from fall injury  Outcome: Progressing     Problem: Pain  Goal: Verbalizes/displays adequate comfort level or baseline comfort level  Outcome: Progressing     Problem: Skin/Tissue Integrity  Goal: Absence of new skin breakdown  Description: 1. Monitor for areas of redness and/or skin breakdown  2. Assess vascular access sites hourly  3. Every 4-6 hours minimum:  Change oxygen saturation probe site  4. Every 4-6 hours:  If on nasal continuous positive airway pressure, respiratory therapy assess nares and determine need for appliance change or resting period.   Outcome: Progressing     Problem: Chronic Conditions and Co-morbidities  Goal: Patient's chronic conditions and co-morbidity symptoms are monitored and maintained or improved  Outcome: Progressing

## 2023-11-07 PROCEDURE — 97530 THERAPEUTIC ACTIVITIES: CPT

## 2023-11-07 PROCEDURE — 6360000002 HC RX W HCPCS: Performed by: HOSPITALIST

## 2023-11-07 PROCEDURE — 97535 SELF CARE MNGMENT TRAINING: CPT

## 2023-11-07 PROCEDURE — 2500000003 HC RX 250 WO HCPCS: Performed by: STUDENT IN AN ORGANIZED HEALTH CARE EDUCATION/TRAINING PROGRAM

## 2023-11-07 PROCEDURE — 6370000000 HC RX 637 (ALT 250 FOR IP): Performed by: HOSPITALIST

## 2023-11-07 PROCEDURE — 94761 N-INVAS EAR/PLS OXIMETRY MLT: CPT

## 2023-11-07 PROCEDURE — 94640 AIRWAY INHALATION TREATMENT: CPT

## 2023-11-07 PROCEDURE — 97110 THERAPEUTIC EXERCISES: CPT

## 2023-11-07 PROCEDURE — 2580000003 HC RX 258: Performed by: HOSPITALIST

## 2023-11-07 PROCEDURE — 99231 SBSQ HOSP IP/OBS SF/LOW 25: CPT | Performed by: STUDENT IN AN ORGANIZED HEALTH CARE EDUCATION/TRAINING PROGRAM

## 2023-11-07 PROCEDURE — 6370000000 HC RX 637 (ALT 250 FOR IP): Performed by: INTERNAL MEDICINE

## 2023-11-07 PROCEDURE — 2060000000 HC ICU INTERMEDIATE R&B

## 2023-11-07 PROCEDURE — 6370000000 HC RX 637 (ALT 250 FOR IP): Performed by: NURSE PRACTITIONER

## 2023-11-07 PROCEDURE — 97116 GAIT TRAINING THERAPY: CPT

## 2023-11-07 RX ADMIN — PROPAFENONE HYDROCHLORIDE 225 MG: 150 TABLET, COATED ORAL at 21:42

## 2023-11-07 RX ADMIN — SODIUM CHLORIDE, PRESERVATIVE FREE 10 ML: 5 INJECTION INTRAVENOUS at 19:59

## 2023-11-07 RX ADMIN — BUDESONIDE AND FORMOTEROL FUMARATE DIHYDRATE 2 PUFF: 160; 4.5 AEROSOL RESPIRATORY (INHALATION) at 09:02

## 2023-11-07 RX ADMIN — OXYCODONE AND ACETAMINOPHEN 1 TABLET: 5; 325 TABLET ORAL at 21:43

## 2023-11-07 RX ADMIN — Medication 6 MG: at 21:43

## 2023-11-07 RX ADMIN — ATORVASTATIN CALCIUM 80 MG: 80 TABLET, FILM COATED ORAL at 19:59

## 2023-11-07 RX ADMIN — PROPAFENONE HYDROCHLORIDE 225 MG: 150 TABLET, COATED ORAL at 13:59

## 2023-11-07 RX ADMIN — OXYBUTYNIN CHLORIDE 5 MG: 5 TABLET ORAL at 19:59

## 2023-11-07 RX ADMIN — BUDESONIDE AND FORMOTEROL FUMARATE DIHYDRATE 2 PUFF: 160; 4.5 AEROSOL RESPIRATORY (INHALATION) at 20:26

## 2023-11-07 RX ADMIN — SUCRALFATE 1 G: 1 TABLET ORAL at 13:59

## 2023-11-07 RX ADMIN — PROPAFENONE HYDROCHLORIDE 225 MG: 150 TABLET, COATED ORAL at 05:59

## 2023-11-07 RX ADMIN — SPIRONOLACTONE 25 MG: 25 TABLET ORAL at 08:21

## 2023-11-07 RX ADMIN — SUCRALFATE 1 G: 1 TABLET ORAL at 21:42

## 2023-11-07 RX ADMIN — ALLOPURINOL 300 MG: 300 TABLET ORAL at 08:24

## 2023-11-07 RX ADMIN — SUCRALFATE 1 G: 1 TABLET ORAL at 05:59

## 2023-11-07 RX ADMIN — ENOXAPARIN SODIUM 40 MG: 100 INJECTION SUBCUTANEOUS at 20:00

## 2023-11-07 RX ADMIN — BUPROPION HYDROCHLORIDE 300 MG: 150 TABLET, EXTENDED RELEASE ORAL at 08:23

## 2023-11-07 RX ADMIN — ENOXAPARIN SODIUM 40 MG: 100 INJECTION SUBCUTANEOUS at 08:21

## 2023-11-07 RX ADMIN — OXYCODONE AND ACETAMINOPHEN 1 TABLET: 5; 325 TABLET ORAL at 05:04

## 2023-11-07 RX ADMIN — ANTI-FUNGAL POWDER MICONAZOLE NITRATE TALC FREE: 1.42 POWDER TOPICAL at 15:53

## 2023-11-07 RX ADMIN — MONTELUKAST SODIUM 10 MG: 10 TABLET, FILM COATED ORAL at 19:59

## 2023-11-07 RX ADMIN — BUPROPION HYDROCHLORIDE 150 MG: 150 TABLET, EXTENDED RELEASE ORAL at 08:23

## 2023-11-07 RX ADMIN — OXYCODONE AND ACETAMINOPHEN 1 TABLET: 5; 325 TABLET ORAL at 13:33

## 2023-11-07 RX ADMIN — OXYBUTYNIN CHLORIDE 5 MG: 5 TABLET ORAL at 08:23

## 2023-11-07 RX ADMIN — SODIUM CHLORIDE, PRESERVATIVE FREE 10 ML: 5 INJECTION INTRAVENOUS at 08:24

## 2023-11-07 RX ADMIN — ASPIRIN 81 MG: 81 TABLET, COATED ORAL at 08:21

## 2023-11-07 RX ADMIN — PANTOPRAZOLE SODIUM 40 MG: 40 TABLET, DELAYED RELEASE ORAL at 08:21

## 2023-11-07 RX ADMIN — ANTI-FUNGAL POWDER MICONAZOLE NITRATE TALC FREE: 1.42 POWDER TOPICAL at 20:00

## 2023-11-07 ASSESSMENT — PAIN DESCRIPTION - DESCRIPTORS
DESCRIPTORS: ACHING
DESCRIPTORS: ACHING;SHARP;SHOOTING;SORE
DESCRIPTORS: ACHING;SHARP;SHOOTING
DESCRIPTORS: ACHING;SHARP;SHOOTING;SORE

## 2023-11-07 ASSESSMENT — PAIN DESCRIPTION - PAIN TYPE
TYPE: CHRONIC PAIN
TYPE: CHRONIC PAIN

## 2023-11-07 ASSESSMENT — PAIN DESCRIPTION - LOCATION
LOCATION: GENERALIZED;BACK
LOCATION: BACK;GENERALIZED
LOCATION: BACK
LOCATION: BACK;GENERALIZED
LOCATION: GENERALIZED
LOCATION: BACK

## 2023-11-07 ASSESSMENT — PAIN SCALES - GENERAL
PAINLEVEL_OUTOF10: 1
PAINLEVEL_OUTOF10: 8
PAINLEVEL_OUTOF10: 9
PAINLEVEL_OUTOF10: 9
PAINLEVEL_OUTOF10: 0
PAINLEVEL_OUTOF10: 7
PAINLEVEL_OUTOF10: 9

## 2023-11-07 ASSESSMENT — PAIN DESCRIPTION - FREQUENCY: FREQUENCY: INTERMITTENT

## 2023-11-07 ASSESSMENT — PAIN DESCRIPTION - ORIENTATION
ORIENTATION: LOWER

## 2023-11-07 NOTE — PLAN OF CARE
Problem: Respiratory - Adult  Goal: Achieves optimal ventilation and oxygenation  11/7/2023 1004 by Geena Ruiz RN  Outcome: Progressing  Flowsheets (Taken 11/7/2023 0800)  Achieves optimal ventilation and oxygenation: Assess for changes in respiratory status  11/7/2023 0526 by Dariela Austin RN  Outcome: Progressing  11/6/2023 2128 by Marianna Weeks RCP  Outcome: Progressing     Problem: Discharge Planning  Goal: Discharge to home or other facility with appropriate resources  11/7/2023 1004 by Geena Ruiz RN  Outcome: Progressing  Flowsheets (Taken 11/7/2023 0800)  Discharge to home or other facility with appropriate resources: Identify barriers to discharge with patient and caregiver  11/7/2023 0526 by Dariela Austin RN  Outcome: Progressing     Problem: Safety - Adult  Goal: Free from fall injury  11/7/2023 1004 by Geena Ruiz RN  Outcome: Progressing  11/7/2023 0526 by Dariela Austin RN  Outcome: Progressing     Problem: Pain  Goal: Verbalizes/displays adequate comfort level or baseline comfort level  11/7/2023 1004 by Geena Ruiz RN  Outcome: Progressing  11/7/2023 0526 by Dariela Austin RN  Outcome: Progressing     Problem: Skin/Tissue Integrity  Goal: Absence of new skin breakdown  Description: 1. Monitor for areas of redness and/or skin breakdown  2. Assess vascular access sites hourly  3. Every 4-6 hours minimum:  Change oxygen saturation probe site  4. Every 4-6 hours:  If on nasal continuous positive airway pressure, respiratory therapy assess nares and determine need for appliance change or resting period.   11/7/2023 1004 by Geena Ruiz RN  Outcome: Progressing  11/7/2023 0526 by Dariela Austin RN  Outcome: Progressing     Problem: Chronic Conditions and Co-morbidities  Goal: Patient's chronic conditions and co-morbidity symptoms are monitored and maintained or improved  11/7/2023 1004 by Geena Ruiz RN  Outcome: Progressing  Flowsheets (Taken 11/7/2023

## 2023-11-07 NOTE — PLAN OF CARE
Problem: Respiratory - Adult  Goal: Achieves optimal ventilation and oxygenation  11/7/2023 0526 by Asya La RN  Outcome: Progressing  11/6/2023 2128 by Antoni Muñoz RCP  Outcome: Progressing     Problem: Discharge Planning  Goal: Discharge to home or other facility with appropriate resources  Outcome: Progressing     Problem: Safety - Adult  Goal: Free from fall injury  Outcome: Progressing     Problem: Pain  Goal: Verbalizes/displays adequate comfort level or baseline comfort level  Outcome: Progressing     Problem: Skin/Tissue Integrity  Goal: Absence of new skin breakdown  Description: 1. Monitor for areas of redness and/or skin breakdown  2. Assess vascular access sites hourly  3. Every 4-6 hours minimum:  Change oxygen saturation probe site  4. Every 4-6 hours:  If on nasal continuous positive airway pressure, respiratory therapy assess nares and determine need for appliance change or resting period.   Outcome: Progressing     Problem: Chronic Conditions and Co-morbidities  Goal: Patient's chronic conditions and co-morbidity symptoms are monitored and maintained or improved  Outcome: Progressing

## 2023-11-07 NOTE — PLAN OF CARE
Problem: Respiratory - Adult  Goal: Achieves optimal ventilation and oxygenation  11/6/2023 2128 by Shayy Steward RCP  Outcome: Progressing   BRONCHOSPASM/BRONCHOCONSTRICTION     [x]         IMPROVE AERATION/BREATH SOUNDS     ADMINISTER BRONCHODILATOR THERAPY AS APPROPRIATE  [x]   ASSESS BREATH SOUNDS  [x]   IMPLEMENT AEROSOL/MDI PROTOCOL  [x]   PATIENT EDUCATION AS NEEDED   PROVIDE ADEQUATE OXYGENATION WITH ACCEPTABLE SP02/ABG'S    [x]  IDENTIFY APPROPRIATE OXYGEN THERAPY  [x]   MONITOR SP02/ABG'S AS NEEDED   [x]   PATIENT EDUCATION AS NEEDED     NONINVASIVE VENTILATION    PROVIDE OPTIMAL VENTILATION/ACCEPTABLE SPO2   IMPLEMENT NONINVASIVE VENTILATION PROTOCOL   MAINTAIN ACCEPTABLE SPO2   ASSESS SKIN INTEGRITY/BREAKDOWN SCORE   PATIENT EDUCATION AS NEEDED   BIPAP AS NEEDED

## 2023-11-08 PROCEDURE — 2500000003 HC RX 250 WO HCPCS: Performed by: STUDENT IN AN ORGANIZED HEALTH CARE EDUCATION/TRAINING PROGRAM

## 2023-11-08 PROCEDURE — 6370000000 HC RX 637 (ALT 250 FOR IP): Performed by: INTERNAL MEDICINE

## 2023-11-08 PROCEDURE — 93005 ELECTROCARDIOGRAM TRACING: CPT | Performed by: INTERNAL MEDICINE

## 2023-11-08 PROCEDURE — 94761 N-INVAS EAR/PLS OXIMETRY MLT: CPT

## 2023-11-08 PROCEDURE — 6360000002 HC RX W HCPCS: Performed by: HOSPITALIST

## 2023-11-08 PROCEDURE — 6370000000 HC RX 637 (ALT 250 FOR IP): Performed by: HOSPITALIST

## 2023-11-08 PROCEDURE — 2580000003 HC RX 258: Performed by: HOSPITALIST

## 2023-11-08 PROCEDURE — 94640 AIRWAY INHALATION TREATMENT: CPT

## 2023-11-08 PROCEDURE — 6370000000 HC RX 637 (ALT 250 FOR IP): Performed by: NURSE PRACTITIONER

## 2023-11-08 PROCEDURE — 2060000000 HC ICU INTERMEDIATE R&B

## 2023-11-08 PROCEDURE — 99231 SBSQ HOSP IP/OBS SF/LOW 25: CPT | Performed by: STUDENT IN AN ORGANIZED HEALTH CARE EDUCATION/TRAINING PROGRAM

## 2023-11-08 RX ORDER — PROPAFENONE HYDROCHLORIDE 225 MG/1
225 TABLET, FILM COATED ORAL EVERY 8 HOURS SCHEDULED
Qty: 90 TABLET | Refills: 3 | DISCHARGE
Start: 2023-11-08

## 2023-11-08 RX ORDER — ATROPINE SULFATE 0.1 MG/ML
1 INJECTION INTRAVENOUS
Status: DISCONTINUED | OUTPATIENT
Start: 2023-11-08 | End: 2023-11-09 | Stop reason: HOSPADM

## 2023-11-08 RX ADMIN — SPIRONOLACTONE 25 MG: 25 TABLET ORAL at 09:20

## 2023-11-08 RX ADMIN — ALLOPURINOL 300 MG: 300 TABLET ORAL at 09:21

## 2023-11-08 RX ADMIN — OXYCODONE AND ACETAMINOPHEN 1 TABLET: 5; 325 TABLET ORAL at 05:57

## 2023-11-08 RX ADMIN — ASPIRIN 81 MG: 81 TABLET, COATED ORAL at 09:20

## 2023-11-08 RX ADMIN — ANTI-FUNGAL POWDER MICONAZOLE NITRATE TALC FREE: 1.42 POWDER TOPICAL at 21:42

## 2023-11-08 RX ADMIN — SUCRALFATE 1 G: 1 TABLET ORAL at 21:42

## 2023-11-08 RX ADMIN — PROPAFENONE HYDROCHLORIDE 225 MG: 150 TABLET, COATED ORAL at 05:57

## 2023-11-08 RX ADMIN — TIZANIDINE 4 MG: 4 TABLET ORAL at 05:58

## 2023-11-08 RX ADMIN — ENOXAPARIN SODIUM 40 MG: 100 INJECTION SUBCUTANEOUS at 21:41

## 2023-11-08 RX ADMIN — OXYCODONE AND ACETAMINOPHEN 1 TABLET: 5; 325 TABLET ORAL at 23:12

## 2023-11-08 RX ADMIN — TIZANIDINE 4 MG: 4 TABLET ORAL at 15:46

## 2023-11-08 RX ADMIN — OXYBUTYNIN CHLORIDE 5 MG: 5 TABLET ORAL at 21:42

## 2023-11-08 RX ADMIN — ACETAMINOPHEN 650 MG: 325 TABLET ORAL at 21:41

## 2023-11-08 RX ADMIN — OXYBUTYNIN CHLORIDE 5 MG: 5 TABLET ORAL at 09:20

## 2023-11-08 RX ADMIN — PROPAFENONE HYDROCHLORIDE 225 MG: 150 TABLET, COATED ORAL at 15:04

## 2023-11-08 RX ADMIN — Medication 6 MG: at 21:41

## 2023-11-08 RX ADMIN — MONTELUKAST SODIUM 10 MG: 10 TABLET, FILM COATED ORAL at 21:42

## 2023-11-08 RX ADMIN — ENOXAPARIN SODIUM 40 MG: 100 INJECTION SUBCUTANEOUS at 09:20

## 2023-11-08 RX ADMIN — OXYCODONE AND ACETAMINOPHEN 1 TABLET: 5; 325 TABLET ORAL at 15:03

## 2023-11-08 RX ADMIN — BUPROPION HYDROCHLORIDE 300 MG: 150 TABLET, EXTENDED RELEASE ORAL at 09:22

## 2023-11-08 RX ADMIN — ANTI-FUNGAL POWDER MICONAZOLE NITRATE TALC FREE: 1.42 POWDER TOPICAL at 09:24

## 2023-11-08 RX ADMIN — SODIUM CHLORIDE, PRESERVATIVE FREE 10 ML: 5 INJECTION INTRAVENOUS at 21:42

## 2023-11-08 RX ADMIN — SUCRALFATE 1 G: 1 TABLET ORAL at 15:03

## 2023-11-08 RX ADMIN — BUDESONIDE AND FORMOTEROL FUMARATE DIHYDRATE 2 PUFF: 160; 4.5 AEROSOL RESPIRATORY (INHALATION) at 09:05

## 2023-11-08 RX ADMIN — BUPROPION HYDROCHLORIDE 150 MG: 150 TABLET, EXTENDED RELEASE ORAL at 09:21

## 2023-11-08 RX ADMIN — SODIUM CHLORIDE, PRESERVATIVE FREE 10 ML: 5 INJECTION INTRAVENOUS at 09:22

## 2023-11-08 RX ADMIN — SUCRALFATE 1 G: 1 TABLET ORAL at 05:56

## 2023-11-08 RX ADMIN — PANTOPRAZOLE SODIUM 40 MG: 40 TABLET, DELAYED RELEASE ORAL at 09:20

## 2023-11-08 RX ADMIN — ATORVASTATIN CALCIUM 80 MG: 80 TABLET, FILM COATED ORAL at 21:40

## 2023-11-08 ASSESSMENT — PAIN SCALES - GENERAL
PAINLEVEL_OUTOF10: 8
PAINLEVEL_OUTOF10: 8
PAINLEVEL_OUTOF10: 4
PAINLEVEL_OUTOF10: 7
PAINLEVEL_OUTOF10: 9
PAINLEVEL_OUTOF10: 7
PAINLEVEL_OUTOF10: 6
PAINLEVEL_OUTOF10: 8
PAINLEVEL_OUTOF10: 8

## 2023-11-08 ASSESSMENT — PAIN DESCRIPTION - ORIENTATION
ORIENTATION: LOWER
ORIENTATION: LOWER
ORIENTATION: MID

## 2023-11-08 ASSESSMENT — PAIN DESCRIPTION - LOCATION
LOCATION: BACK;GENERALIZED
LOCATION: BACK;GENERALIZED
LOCATION: HEAD
LOCATION: GENERALIZED
LOCATION: GENERALIZED;KNEE
LOCATION: BACK;KNEE;NECK
LOCATION: GENERALIZED

## 2023-11-08 ASSESSMENT — PAIN DESCRIPTION - DESCRIPTORS
DESCRIPTORS: ACHING
DESCRIPTORS: ACHING
DESCRIPTORS: ACHING;SHARP;SHOOTING
DESCRIPTORS: ACHING;SHOOTING;SHARP

## 2023-11-08 ASSESSMENT — PAIN - FUNCTIONAL ASSESSMENT
PAIN_FUNCTIONAL_ASSESSMENT: ACTIVITIES ARE NOT PREVENTED
PAIN_FUNCTIONAL_ASSESSMENT: PREVENTS OR INTERFERES SOME ACTIVE ACTIVITIES AND ADLS
PAIN_FUNCTIONAL_ASSESSMENT: ACTIVITIES ARE NOT PREVENTED

## 2023-11-08 ASSESSMENT — PAIN DESCRIPTION - PAIN TYPE: TYPE: CHRONIC PAIN

## 2023-11-08 NOTE — DISCHARGE SUMMARY
[facility]  Office: [address], DO, Sharita Lre, DO, [place] Me, DO, Oralee Fondlinda Blood, DO, Catarina Sneed MD, Steph Espinosa MD, Reyna [place] MD, Kaitlin Hernandez MD,  Desi Bernabe MD, Amna Holm MD, Kathia Rdz MD,  Karthik Spencer MD, Melia Abreu MD, Skip Guzman DO, Rhonda Cadena MD,  Brenton DO Jose Alejandro, Omid Patterson MD, Jey Seth MD, Jazmin Santa MD, Sterling Rivera MD,  Marialuisa Gloria MD, Caryl Lopez MD, Ambika Nails MD, Jess Ramos MD, Tawana Bowers MD, Karlo Julien DO, Kimberly Diez DO, Eduar Phillips MD,  Jennifer Estes MD, Kristi Jacques, CNP,  Enrrique Bailey, CNP, Girma Durham, CNP,  Joss Aquino, [facility], Abelardo Cummings, CNP, Pancho Yip, CNP, Julia Joyner, CNP, Bertha Santo, CNP, Loren Julio, CNP, Brooke Engel, CNP, Coleman Bowers, CNS, Bonny Anthony, CNP, Britany Mason, [address] [facility]    Discharge Summary     Patient ID: Sean Doran  :  1965   MRN: [id number]     ACCOUNT:  [de-identified]   Patient's PCP: Jase Voss MD  Admit Date: 2023   Discharge Date: 2023    Length of Stay: 7  Code Status:  Full Code  Admitting Physician: No admitting provider for patient encounter.   Discharge Physician: Brenton Blount DO     Active Discharge Diagnoses:     Hospital Problem Lists:  Principal Problem:    Paroxysmal supraventricular tachycardia  Active Problems:    SMILEY (obstructive sleep apnea)    NICM (nonischemic cardiomyopathy) ([address])    COPD without exacerbation (HCC)    Opioid dependence, uncomplicated (HCC)    Chronic pain disorder    DDD (degenerative disc disease), lumbar    Morbid obesity with BMI of 50.0-59.9, adult (HCC)    GERD (gastroesophageal reflux disease)    PTSD (post-traumatic stress disorder)    Third degree heart block ([address])    History of COPD  Resolved Problems:    * No resolved hospital

## 2023-11-08 NOTE — PLAN OF CARE
Problem: Respiratory - Adult  Goal: Achieves optimal ventilation and oxygenation  Outcome: Progressing     Problem: Discharge Planning  Goal: Discharge to home or other facility with appropriate resources  Outcome: Progressing     Problem: Safety - Adult  Goal: Free from fall injury  Outcome: Progressing     Problem: Pain  Goal: Verbalizes/displays adequate comfort level or baseline comfort level  Outcome: Progressing     Problem: Skin/Tissue Integrity  Goal: Absence of new skin breakdown  Description: 1. Monitor for areas of redness and/or skin breakdown  2. Assess vascular access sites hourly  3. Every 4-6 hours minimum:  Change oxygen saturation probe site  4. Every 4-6 hours:  If on nasal continuous positive airway pressure, respiratory therapy assess nares and determine need for appliance change or resting period.   Outcome: Progressing     Problem: Chronic Conditions and Co-morbidities  Goal: Patient's chronic conditions and co-morbidity symptoms are monitored and maintained or improved  Outcome: Progressing

## 2023-11-09 ENCOUNTER — APPOINTMENT (OUTPATIENT)
Age: 58
DRG: 309 | End: 2023-11-09
Payer: MEDICARE

## 2023-11-09 VITALS
HEART RATE: 75 BPM | HEIGHT: 68 IN | RESPIRATION RATE: 16 BRPM | SYSTOLIC BLOOD PRESSURE: 137 MMHG | OXYGEN SATURATION: 97 % | TEMPERATURE: 98.6 F | BODY MASS INDEX: 44.41 KG/M2 | WEIGHT: 293 LBS | DIASTOLIC BLOOD PRESSURE: 83 MMHG

## 2023-11-09 LAB — ECHO BSA: 2.85 M2

## 2023-11-09 PROCEDURE — 2580000003 HC RX 258: Performed by: HOSPITALIST

## 2023-11-09 PROCEDURE — 6370000000 HC RX 637 (ALT 250 FOR IP): Performed by: NURSE PRACTITIONER

## 2023-11-09 PROCEDURE — 94640 AIRWAY INHALATION TREATMENT: CPT

## 2023-11-09 PROCEDURE — 93225 XTRNL ECG REC<48 HRS REC: CPT

## 2023-11-09 PROCEDURE — 6370000000 HC RX 637 (ALT 250 FOR IP): Performed by: HOSPITALIST

## 2023-11-09 PROCEDURE — 99232 SBSQ HOSP IP/OBS MODERATE 35: CPT | Performed by: STUDENT IN AN ORGANIZED HEALTH CARE EDUCATION/TRAINING PROGRAM

## 2023-11-09 PROCEDURE — 6370000000 HC RX 637 (ALT 250 FOR IP): Performed by: INTERNAL MEDICINE

## 2023-11-09 PROCEDURE — 6360000002 HC RX W HCPCS: Performed by: HOSPITALIST

## 2023-11-09 RX ADMIN — BUPROPION HYDROCHLORIDE 150 MG: 150 TABLET, EXTENDED RELEASE ORAL at 08:36

## 2023-11-09 RX ADMIN — OXYCODONE AND ACETAMINOPHEN 1 TABLET: 5; 325 TABLET ORAL at 15:41

## 2023-11-09 RX ADMIN — OXYCODONE AND ACETAMINOPHEN 1 TABLET: 5; 325 TABLET ORAL at 08:31

## 2023-11-09 RX ADMIN — BUDESONIDE AND FORMOTEROL FUMARATE DIHYDRATE 2 PUFF: 160; 4.5 AEROSOL RESPIRATORY (INHALATION) at 08:25

## 2023-11-09 RX ADMIN — SUCRALFATE 1 G: 1 TABLET ORAL at 06:48

## 2023-11-09 RX ADMIN — ANTI-FUNGAL POWDER MICONAZOLE NITRATE TALC FREE: 1.42 POWDER TOPICAL at 08:38

## 2023-11-09 RX ADMIN — TIZANIDINE 4 MG: 4 TABLET ORAL at 15:41

## 2023-11-09 RX ADMIN — ASPIRIN 81 MG: 81 TABLET, COATED ORAL at 08:32

## 2023-11-09 RX ADMIN — SUCRALFATE 1 G: 1 TABLET ORAL at 14:30

## 2023-11-09 RX ADMIN — BUPROPION HYDROCHLORIDE 300 MG: 150 TABLET, EXTENDED RELEASE ORAL at 08:37

## 2023-11-09 RX ADMIN — SODIUM CHLORIDE, PRESERVATIVE FREE 10 ML: 5 INJECTION INTRAVENOUS at 08:38

## 2023-11-09 RX ADMIN — PANTOPRAZOLE SODIUM 40 MG: 40 TABLET, DELAYED RELEASE ORAL at 08:32

## 2023-11-09 RX ADMIN — OXYBUTYNIN CHLORIDE 5 MG: 5 TABLET ORAL at 08:36

## 2023-11-09 RX ADMIN — SPIRONOLACTONE 25 MG: 25 TABLET ORAL at 08:31

## 2023-11-09 RX ADMIN — ENOXAPARIN SODIUM 40 MG: 100 INJECTION SUBCUTANEOUS at 08:31

## 2023-11-09 RX ADMIN — TIZANIDINE 4 MG: 4 TABLET ORAL at 08:32

## 2023-11-09 RX ADMIN — ALLOPURINOL 300 MG: 300 TABLET ORAL at 08:36

## 2023-11-09 ASSESSMENT — PAIN DESCRIPTION - LOCATION
LOCATION: GENERALIZED

## 2023-11-09 ASSESSMENT — PAIN SCALES - GENERAL
PAINLEVEL_OUTOF10: 8

## 2023-11-09 ASSESSMENT — PAIN DESCRIPTION - PAIN TYPE: TYPE: CHRONIC PAIN

## 2023-11-09 ASSESSMENT — PAIN DESCRIPTION - ONSET: ONSET: ON-GOING

## 2023-11-09 NOTE — PLAN OF CARE
Problem: Nutrition Deficit:  Goal: Optimize nutritional status  11/8/2023 1128 by Suad Hu RD  Outcome: Adequate for Discharge  Flowsheets (Taken 11/8/2023 1128)  Nutrient intake appropriate for improving, restoring, or maintaining nutritional needs:   Assess nutritional status and recommend course of action   Monitor oral intake, labs, and treatment plans   Recommend appropriate diets, oral nutritional supplements, and vitamin/mineral supplements     Problem: Respiratory - Adult  Goal: Achieves optimal ventilation and oxygenation  Recent Flowsheet Documentation  Taken 11/8/2023 2000 by Iam Oreilly RN  Achieves optimal ventilation and oxygenation:   Assess for changes in respiratory status   Assess for changes in mentation and behavior   Position to facilitate oxygenation and minimize respiratory effort   Oxygen supplementation based on oxygen saturation or arterial blood gases   Encourage broncho-pulmonary hygiene including cough, deep breathe, incentive spirometry   Assess the need for suctioning and aspirate as needed   Assess and instruct to report shortness of breath or any respiratory difficulty   Respiratory therapy support as indicated  Taken 11/8/2023 0930 by Tg Newton RN  Achieves optimal ventilation and oxygenation:   Assess for changes in respiratory status   Assess for changes in mentation and behavior   Position to facilitate oxygenation and minimize respiratory effort   Oxygen supplementation based on oxygen saturation or arterial blood gases   Initiate smoking cessation protocol as indicated   Encourage broncho-pulmonary hygiene including cough, deep breathe, incentive spirometry   Assess the need for suctioning and aspirate as needed   Assess and instruct to report shortness of breath or any respiratory difficulty     Problem: Discharge Planning  Goal: Discharge to home or other facility with appropriate resources  Recent Flowsheet Documentation  Taken 11/8/2023 2000 by

## 2023-11-09 NOTE — CARE COORDINATION
11/02/23 1022   Readmission Assessment   Number of Days since last admission? 8-30 days   Previous Disposition SNF   Who is being Interviewed Patient   What was the patient's/caregiver's perception as to why they think they needed to return back to the hospital? Other (Comment)  (sent to ED from [facility])   Did you visit your Primary Care Physician after you left the hospital, before you returned this time? No   Why weren't you able to visit your PCP? Other (Comment)  (seen SNF MD)   Did you see a specialist, such as Cardiac, Pulmonary, Orthopedic Physician, etc. after you left the hospital? No   Who advised the patient to return to the hospital? Physician   Does the patient report anything that got in the way of taking their medications? No   In our efforts to provide the best possible care to you and others like you, can you think of anything that we could have done to help you after you left the hospital the first time, so that you might not have needed to return so soon?  Improved written discharge instructions
Kyle Lau Quality Flow/Interdisciplinary Rounds Progress Note    Quality Flow Rounds held on November 6, 2023 at [address] [facility] Attending:  Bedside Nurse, , and Nursing Unit Leadership    Barriers to Discharge:     Anticipated Discharge Date:       Anticipated Discharge Disposition:snf     Readmission Risk              Risk of Unplanned Readmission:  16           Discussed patient goal for the day, patient clinical progression, and barriers to discharge.   The following Goal(s) of the Day/Commitment(s) have been identified:   Plan will need precert to return [facility]  ps Dr Isaias Alejandro for update regarding EPS plan   Need updated pt/ot notes      Pablo Sweet RN  November 6, 2023
Precert submitted [employer] portal for [facility]. Auth ID [id number]. Patient updated. Abril Chavez from [facility] updated    [id number] precert approved in [employer] portal starting tomorrow 11/9 until 11/13.  Patient updated
Spoke to Sam Gray from [facility]. They are able to accept at discharge.  Patient will need a new precert
Transportation request faxed to [address]. Voicemail left for heart station requesting return call to notify of holter monitor order and ask what time it will be applied so that transportation can be scheduled for after    1230 patient scheduled for 3 pm for transportation. Patient updated and agreeable.  Voicemail left for Cindy Benavidez from [facility] to update    1500 [address]S faxed to [phone] Case Management Department  Written by: Kannan Pemberton RN    Patient Name: Maximo Lott  Attending Provider: Chante Livingston DO  Admit Date: 2023 10:14 AM  MRN: [id number]  Account: [de-identified]                     : 1965  Discharge Date: 2023      Disposition: [facility]    Kannan Pemberton RN
Assistance needed at discharge: (P) N/A            Potential DME:    Patient expects to discharge to: (P) [address] for transportation at discharge: (P) Self    Financial    Payor: [id number] / Plan: Abelino Del Castillo / Product Type: *No Product type* /     Does insurance require precert for SNF: Yes    Potential assistance Purchasing Medications: (P) No  Meds-to-Beds request:          [address], [address]  [address] [place] [address]  [address] [facility] [address]  Phone: [phone] Fax: [phone]    [place] [address] [phone] - [address] [place] [address]  Phone: [phone] Fax: [phone]      Notes:    Factors facilitating achievement of predicted outcomes: Cooperative and Pleasant    Barriers to discharge: medical clearance    Additional Case Management Notes: goal is return to rehab at [facility] for Transition of Care is related to the following treatment goals of SVT (supraventricular tachycardia) [R91.90]    IF APPLICABLE: The Patient and/or patient representative Karon Raya and her family were provided with a choice of provider and agrees with the discharge plan. Freedom of choice list with basic dialogue that supports the patient's individualized plan of care/goals and shares the quality data associated with the providers was provided to: (P) Patient   Patient Representative Name:       The Patient and/or Patient Representative Agree with the Discharge Plan?  (P) Yes    Dorris Bumpers, RN  Case Management Department  Ph: [phone]

## 2023-11-09 NOTE — DISCHARGE INSTR - DIET
fats such as canola, olive, and flaxseed oil, and foods high in heart-healthy fats, such as nuts, seeds, soybeans, tofu, and fish. Eat fish at least twice per week; the fish highest in omega-3 fatty acids and lowest in mercury include salmon, herring, mackerel, sardines, and canned chunk light tuna. If you eat fish less than twice per week or have high triglycerides, talk to your doctor about taking fish oil supplements. Read food labels. For products low in fat and cholesterol, look for fat free, low-fat, cholesterol free, saturated fat free, and trans fat freeAlso scan the Nutrition Facts Label, which lists saturated fat, trans fat, and cholesterol amounts. For products low in sodium, look for sodium free, very low sodium, low sodium, no added salt, and unsalted   Skip the salt when cooking or at the table; if food needs more flavor, get creative and try out different herbs and spices. Garlic and onion also add substantial flavor to foods. Trim any visible fat off meat and poultry before cooking, and drain the fat off after escobar. Use cooking methods that require little or no added fat, such as grilling, boiling, baking, poaching, broiling, roasting, steaming, stir-frying, and sauting. Avoid fast food and convenience food. They tend to be high in saturated and trans fat and have a lot of added salt. Talk to a registered dietitian for individualized diet advice.       Last Reviewed: March 2011 Nolberto Escobar MS, MPH, RD   Updated: 3/29/2011

## 2023-11-10 LAB
EKG ATRIAL RATE: 57 BPM
EKG P AXIS: 69 DEGREES
EKG P-R INTERVAL: 188 MS
EKG Q-T INTERVAL: 484 MS
EKG QRS DURATION: 160 MS
EKG QTC CALCULATION (BAZETT): 471 MS
EKG R AXIS: -50 DEGREES
EKG T AXIS: 70 DEGREES
EKG VENTRICULAR RATE: 57 BPM

## 2023-11-13 ENCOUNTER — APPOINTMENT (OUTPATIENT)
Dept: NUCLEAR MEDICINE | Age: 58
End: 2023-11-13
Payer: MEDICARE

## 2023-11-13 ENCOUNTER — HOSPITAL ENCOUNTER (OUTPATIENT)
Age: 58
Setting detail: OBSERVATION
Discharge: NURSING, REHAB OR LONG-TERM CARE | End: 2023-11-15
Attending: EMERGENCY MEDICINE | Admitting: INTERNAL MEDICINE
Payer: MEDICARE

## 2023-11-13 ENCOUNTER — APPOINTMENT (OUTPATIENT)
Dept: GENERAL RADIOLOGY | Age: 58
End: 2023-11-13
Payer: MEDICARE

## 2023-11-13 DIAGNOSIS — Z87.09 HISTORY OF COPD: ICD-10-CM

## 2023-11-13 DIAGNOSIS — R00.1 BRADYCARDIA: ICD-10-CM

## 2023-11-13 DIAGNOSIS — R07.9 CHEST PAIN, UNSPECIFIED TYPE: Primary | ICD-10-CM

## 2023-11-13 DIAGNOSIS — I47.10 SVT (SUPRAVENTRICULAR TACHYCARDIA): ICD-10-CM

## 2023-11-13 LAB
ALBUMIN SERPL-MCNC: 3.9 G/DL (ref 3.5–5.2)
ALP SERPL-CCNC: 170 U/L (ref 35–104)
ALT SERPL-CCNC: 17 U/L (ref 5–33)
ANION GAP SERPL CALCULATED.3IONS-SCNC: 9 MMOL/L (ref 9–17)
AST SERPL-CCNC: 15 U/L
BASOPHILS # BLD: 0.1 K/UL (ref 0–0.2)
BASOPHILS NFR BLD: 1 % (ref 0–2)
BILIRUB SERPL-MCNC: 0.4 MG/DL (ref 0.3–1.2)
BUN SERPL-MCNC: 19 MG/DL (ref 8–23)
CALCIUM SERPL-MCNC: 9.3 MG/DL (ref 8.6–10.4)
CHLORIDE SERPL-SCNC: 101 MMOL/L (ref 98–107)
CO2 SERPL-SCNC: 25 MMOL/L (ref 20–31)
CREAT SERPL-MCNC: 1.4 MG/DL (ref 0.5–0.9)
EKG ATRIAL RATE: 73 BPM
EKG P AXIS: 67 DEGREES
EKG P-R INTERVAL: 184 MS
EKG Q-T INTERVAL: 484 MS
EKG QRS DURATION: 176 MS
EKG QTC CALCULATION (BAZETT): 533 MS
EKG R AXIS: -53 DEGREES
EKG T AXIS: 84 DEGREES
EKG VENTRICULAR RATE: 73 BPM
EOSINOPHIL # BLD: 0.2 K/UL (ref 0–0.4)
EOSINOPHILS RELATIVE PERCENT: 2 % (ref 0–4)
ERYTHROCYTE [DISTWIDTH] IN BLOOD BY AUTOMATED COUNT: 16.6 % (ref 11.5–14.9)
GFR SERPL CREATININE-BSD FRML MDRD: 43 ML/MIN/1.73M2
GLUCOSE SERPL-MCNC: 133 MG/DL (ref 70–99)
HCT VFR BLD AUTO: 33.8 % (ref 36–46)
HGB BLD-MCNC: 10.7 G/DL (ref 12–16)
INR PPP: 1
LIPASE SERPL-CCNC: 13 U/L (ref 13–60)
LYMPHOCYTES NFR BLD: 2.6 K/UL (ref 1–4.8)
LYMPHOCYTES RELATIVE PERCENT: 27 % (ref 24–44)
MAGNESIUM SERPL-MCNC: 2.2 MG/DL (ref 1.6–2.6)
MCH RBC QN AUTO: 28.5 PG (ref 26–34)
MCHC RBC AUTO-ENTMCNC: 31.8 G/DL (ref 31–37)
MCV RBC AUTO: 89.5 FL (ref 80–100)
MONOCYTES NFR BLD: 0.6 K/UL (ref 0.1–1.3)
MONOCYTES NFR BLD: 7 % (ref 1–7)
NEUTROPHILS NFR BLD: 63 % (ref 36–66)
NEUTS SEG NFR BLD: 6 K/UL (ref 1.3–9.1)
PLATELET # BLD AUTO: 287 K/UL (ref 150–450)
PMV BLD AUTO: 8.1 FL (ref 6–12)
POTASSIUM SERPL-SCNC: 4.2 MMOL/L (ref 3.7–5.3)
PROT SERPL-MCNC: 7.5 G/DL (ref 6.4–8.3)
PROTHROMBIN TIME: 13.5 SEC (ref 11.8–14.6)
RBC # BLD AUTO: 3.77 M/UL (ref 4–5.2)
SODIUM SERPL-SCNC: 135 MMOL/L (ref 135–144)
TROPONIN I SERPL HS-MCNC: 18 NG/L (ref 0–14)
TROPONIN I SERPL HS-MCNC: 21 NG/L (ref 0–14)
WBC OTHER # BLD: 9.5 K/UL (ref 3.5–11)

## 2023-11-13 PROCEDURE — 85610 PROTHROMBIN TIME: CPT

## 2023-11-13 PROCEDURE — 83735 ASSAY OF MAGNESIUM: CPT

## 2023-11-13 PROCEDURE — G0378 HOSPITAL OBSERVATION PER HR: HCPCS

## 2023-11-13 PROCEDURE — 96372 THER/PROPH/DIAG INJ SC/IM: CPT

## 2023-11-13 PROCEDURE — 99223 1ST HOSP IP/OBS HIGH 75: CPT | Performed by: INTERNAL MEDICINE

## 2023-11-13 PROCEDURE — 84484 ASSAY OF TROPONIN QUANT: CPT

## 2023-11-13 PROCEDURE — A9500 TC99M SESTAMIBI: HCPCS | Performed by: NURSE PRACTITIONER

## 2023-11-13 PROCEDURE — 93005 ELECTROCARDIOGRAM TRACING: CPT | Performed by: EMERGENCY MEDICINE

## 2023-11-13 PROCEDURE — 6360000002 HC RX W HCPCS: Performed by: INTERNAL MEDICINE

## 2023-11-13 PROCEDURE — 71045 X-RAY EXAM CHEST 1 VIEW: CPT

## 2023-11-13 PROCEDURE — 85025 COMPLETE CBC W/AUTO DIFF WBC: CPT

## 2023-11-13 PROCEDURE — 93010 ELECTROCARDIOGRAM REPORT: CPT | Performed by: INTERNAL MEDICINE

## 2023-11-13 PROCEDURE — 6370000000 HC RX 637 (ALT 250 FOR IP): Performed by: NURSE PRACTITIONER

## 2023-11-13 PROCEDURE — 78451 HT MUSCLE IMAGE SPECT SING: CPT

## 2023-11-13 PROCEDURE — 80053 COMPREHEN METABOLIC PANEL: CPT

## 2023-11-13 PROCEDURE — 2580000003 HC RX 258: Performed by: INTERNAL MEDICINE

## 2023-11-13 PROCEDURE — 3430000000 HC RX DIAGNOSTIC RADIOPHARMACEUTICAL: Performed by: NURSE PRACTITIONER

## 2023-11-13 PROCEDURE — 99285 EMERGENCY DEPT VISIT HI MDM: CPT

## 2023-11-13 PROCEDURE — 6370000000 HC RX 637 (ALT 250 FOR IP): Performed by: INTERNAL MEDICINE

## 2023-11-13 PROCEDURE — 94640 AIRWAY INHALATION TREATMENT: CPT

## 2023-11-13 PROCEDURE — 83690 ASSAY OF LIPASE: CPT

## 2023-11-13 PROCEDURE — 36415 COLL VENOUS BLD VENIPUNCTURE: CPT

## 2023-11-13 PROCEDURE — 94664 DEMO&/EVAL PT USE INHALER: CPT

## 2023-11-13 PROCEDURE — 94761 N-INVAS EAR/PLS OXIMETRY MLT: CPT

## 2023-11-13 RX ORDER — OXYBUTYNIN CHLORIDE 5 MG/1
5 TABLET ORAL 2 TIMES DAILY
Status: DISCONTINUED | OUTPATIENT
Start: 2023-11-13 | End: 2023-11-15 | Stop reason: HOSPADM

## 2023-11-13 RX ORDER — PANTOPRAZOLE SODIUM 40 MG/1
40 TABLET, DELAYED RELEASE ORAL DAILY
Status: DISCONTINUED | OUTPATIENT
Start: 2023-11-13 | End: 2023-11-15 | Stop reason: HOSPADM

## 2023-11-13 RX ORDER — SODIUM CHLORIDE 9 MG/ML
500 INJECTION, SOLUTION INTRAVENOUS CONTINUOUS PRN
Status: CANCELLED | OUTPATIENT
Start: 2023-11-13 | End: 2023-11-13

## 2023-11-13 RX ORDER — SODIUM CHLORIDE 0.9 % (FLUSH) 0.9 %
5-40 SYRINGE (ML) INJECTION PRN
Status: CANCELLED | OUTPATIENT
Start: 2023-11-13 | End: 2023-11-13

## 2023-11-13 RX ORDER — MONTELUKAST SODIUM 10 MG/1
10 TABLET ORAL NIGHTLY
Status: DISCONTINUED | OUTPATIENT
Start: 2023-11-13 | End: 2023-11-15 | Stop reason: HOSPADM

## 2023-11-13 RX ORDER — ENOXAPARIN SODIUM 100 MG/ML
30 INJECTION SUBCUTANEOUS 2 TIMES DAILY
Status: DISCONTINUED | OUTPATIENT
Start: 2023-11-13 | End: 2023-11-13

## 2023-11-13 RX ORDER — NITROGLYCERIN 0.4 MG/1
0.4 TABLET SUBLINGUAL EVERY 5 MIN PRN
Status: CANCELLED | OUTPATIENT
Start: 2023-11-13 | End: 2023-11-13

## 2023-11-13 RX ORDER — SODIUM CHLORIDE 0.9 % (FLUSH) 0.9 %
10 SYRINGE (ML) INJECTION PRN
Status: DISCONTINUED | OUTPATIENT
Start: 2023-11-13 | End: 2023-11-15 | Stop reason: HOSPADM

## 2023-11-13 RX ORDER — OXYCODONE HYDROCHLORIDE AND ACETAMINOPHEN 5; 325 MG/1; MG/1
1 TABLET ORAL EVERY 6 HOURS PRN
Status: ON HOLD | COMMUNITY
End: 2023-11-14 | Stop reason: SDUPTHER

## 2023-11-13 RX ORDER — SUCRALFATE 1 G/1
1 TABLET ORAL 2 TIMES DAILY
Status: DISCONTINUED | OUTPATIENT
Start: 2023-11-13 | End: 2023-11-15 | Stop reason: HOSPADM

## 2023-11-13 RX ORDER — REGADENOSON 0.08 MG/ML
0.4 INJECTION, SOLUTION INTRAVENOUS
Status: CANCELLED | OUTPATIENT
Start: 2023-11-13

## 2023-11-13 RX ORDER — PROPAFENONE HYDROCHLORIDE 150 MG/1
150 TABLET, COATED ORAL EVERY 8 HOURS SCHEDULED
Status: DISCONTINUED | OUTPATIENT
Start: 2023-11-13 | End: 2023-11-15 | Stop reason: HOSPADM

## 2023-11-13 RX ORDER — BUPROPION HYDROCHLORIDE 300 MG/1
300 TABLET ORAL EVERY MORNING
Status: DISCONTINUED | OUTPATIENT
Start: 2023-11-13 | End: 2023-11-15 | Stop reason: HOSPADM

## 2023-11-13 RX ORDER — ENOXAPARIN SODIUM 100 MG/ML
60 INJECTION SUBCUTANEOUS 2 TIMES DAILY
Status: DISCONTINUED | OUTPATIENT
Start: 2023-11-13 | End: 2023-11-14

## 2023-11-13 RX ORDER — SODIUM CHLORIDE 0.9 % (FLUSH) 0.9 %
5-40 SYRINGE (ML) INJECTION 2 TIMES DAILY
Status: DISCONTINUED | OUTPATIENT
Start: 2023-11-13 | End: 2023-11-15 | Stop reason: HOSPADM

## 2023-11-13 RX ORDER — ALBUTEROL SULFATE 90 UG/1
2 AEROSOL, METERED RESPIRATORY (INHALATION) PRN
Status: CANCELLED | OUTPATIENT
Start: 2023-11-13 | End: 2023-11-13

## 2023-11-13 RX ORDER — LEVOTHYROXINE SODIUM 0.05 MG/1
50 TABLET ORAL DAILY
Status: DISCONTINUED | OUTPATIENT
Start: 2023-11-13 | End: 2023-11-15 | Stop reason: HOSPADM

## 2023-11-13 RX ORDER — ASPIRIN 81 MG/1
81 TABLET ORAL DAILY
Status: DISCONTINUED | OUTPATIENT
Start: 2023-11-13 | End: 2023-11-15 | Stop reason: HOSPADM

## 2023-11-13 RX ORDER — ALLOPURINOL 300 MG/1
300 TABLET ORAL DAILY
Status: DISCONTINUED | OUTPATIENT
Start: 2023-11-13 | End: 2023-11-15 | Stop reason: HOSPADM

## 2023-11-13 RX ORDER — SPIRONOLACTONE 25 MG/1
25 TABLET ORAL DAILY
Status: DISCONTINUED | OUTPATIENT
Start: 2023-11-13 | End: 2023-11-15 | Stop reason: HOSPADM

## 2023-11-13 RX ORDER — ATORVASTATIN CALCIUM 80 MG/1
80 TABLET, FILM COATED ORAL DAILY
Status: DISCONTINUED | OUTPATIENT
Start: 2023-11-13 | End: 2023-11-15 | Stop reason: HOSPADM

## 2023-11-13 RX ORDER — METOPROLOL TARTRATE 5 MG/5ML
5 INJECTION INTRAVENOUS EVERY 5 MIN PRN
Status: CANCELLED | OUTPATIENT
Start: 2023-11-13 | End: 2023-11-13

## 2023-11-13 RX ORDER — BUDESONIDE AND FORMOTEROL FUMARATE DIHYDRATE 160; 4.5 UG/1; UG/1
2 AEROSOL RESPIRATORY (INHALATION) 2 TIMES DAILY
Status: DISCONTINUED | OUTPATIENT
Start: 2023-11-13 | End: 2023-11-15 | Stop reason: HOSPADM

## 2023-11-13 RX ORDER — BUPROPION HYDROCHLORIDE 150 MG/1
150 TABLET ORAL EVERY MORNING
Status: DISCONTINUED | OUTPATIENT
Start: 2023-11-13 | End: 2023-11-13

## 2023-11-13 RX ORDER — PROPAFENONE HYDROCHLORIDE 150 MG/1
225 TABLET, COATED ORAL EVERY 8 HOURS SCHEDULED
Status: DISCONTINUED | OUTPATIENT
Start: 2023-11-13 | End: 2023-11-13

## 2023-11-13 RX ORDER — ISOSORBIDE DINITRATE 10 MG/1
10 TABLET ORAL 2 TIMES DAILY
Status: DISCONTINUED | OUTPATIENT
Start: 2023-11-13 | End: 2023-11-15 | Stop reason: HOSPADM

## 2023-11-13 RX ORDER — TETRAKIS(2-METHOXYISOBUTYLISOCYANIDE)COPPER(I) TETRAFLUOROBORATE 1 MG/ML
35 INJECTION, POWDER, LYOPHILIZED, FOR SOLUTION INTRAVENOUS
Status: COMPLETED | OUTPATIENT
Start: 2023-11-13 | End: 2023-11-13

## 2023-11-13 RX ORDER — POLYETHYLENE GLYCOL 3350 17 G/17G
17 POWDER, FOR SOLUTION ORAL DAILY PRN
Status: DISCONTINUED | OUTPATIENT
Start: 2023-11-13 | End: 2023-11-15 | Stop reason: HOSPADM

## 2023-11-13 RX ORDER — AMINOPHYLLINE 25 MG/ML
50 INJECTION, SOLUTION INTRAVENOUS PRN
Status: CANCELLED | OUTPATIENT
Start: 2023-11-13 | End: 2023-11-13

## 2023-11-13 RX ORDER — ALBUTEROL SULFATE 90 UG/1
2 AEROSOL, METERED RESPIRATORY (INHALATION) EVERY 4 HOURS PRN
Status: DISCONTINUED | OUTPATIENT
Start: 2023-11-13 | End: 2023-11-15 | Stop reason: HOSPADM

## 2023-11-13 RX ORDER — OXYCODONE HYDROCHLORIDE AND ACETAMINOPHEN 5; 325 MG/1; MG/1
1 TABLET ORAL EVERY 6 HOURS PRN
Status: DISCONTINUED | OUTPATIENT
Start: 2023-11-13 | End: 2023-11-15 | Stop reason: HOSPADM

## 2023-11-13 RX ORDER — TIZANIDINE 4 MG/1
4 TABLET ORAL 3 TIMES DAILY PRN
Status: DISCONTINUED | OUTPATIENT
Start: 2023-11-13 | End: 2023-11-15 | Stop reason: HOSPADM

## 2023-11-13 RX ORDER — ATROPINE SULFATE 0.1 MG/ML
0.5 INJECTION INTRAVENOUS EVERY 5 MIN PRN
Status: CANCELLED | OUTPATIENT
Start: 2023-11-13 | End: 2023-11-13

## 2023-11-13 RX ADMIN — PROPAFENONE HYDROCHLORIDE 150 MG: 150 TABLET, FILM COATED ORAL at 20:42

## 2023-11-13 RX ADMIN — MICONAZOLE NITRATE: 20 CREAM TOPICAL at 11:16

## 2023-11-13 RX ADMIN — SODIUM CHLORIDE, PRESERVATIVE FREE 10 ML: 5 INJECTION INTRAVENOUS at 09:54

## 2023-11-13 RX ADMIN — SPIRONOLACTONE 25 MG: 25 TABLET ORAL at 09:41

## 2023-11-13 RX ADMIN — PANTOPRAZOLE SODIUM 40 MG: 40 TABLET, DELAYED RELEASE ORAL at 09:41

## 2023-11-13 RX ADMIN — ALLOPURINOL 300 MG: 300 TABLET ORAL at 09:40

## 2023-11-13 RX ADMIN — SODIUM CHLORIDE, PRESERVATIVE FREE 10 ML: 5 INJECTION INTRAVENOUS at 20:43

## 2023-11-13 RX ADMIN — OXYCODONE AND ACETAMINOPHEN 1 TABLET: 5; 325 TABLET ORAL at 20:41

## 2023-11-13 RX ADMIN — ENOXAPARIN SODIUM 60 MG: 100 INJECTION SUBCUTANEOUS at 20:41

## 2023-11-13 RX ADMIN — OXYCODONE AND ACETAMINOPHEN 1 TABLET: 5; 325 TABLET ORAL at 13:26

## 2023-11-13 RX ADMIN — OXYBUTYNIN CHLORIDE 5 MG: 5 TABLET ORAL at 09:40

## 2023-11-13 RX ADMIN — BUPROPION HYDROCHLORIDE 300 MG: 300 TABLET, FILM COATED, EXTENDED RELEASE ORAL at 09:40

## 2023-11-13 RX ADMIN — Medication 40.7 MILLICURIE: at 11:20

## 2023-11-13 RX ADMIN — MICONAZOLE NITRATE: 20 CREAM TOPICAL at 20:42

## 2023-11-13 RX ADMIN — OXYBUTYNIN CHLORIDE 5 MG: 5 TABLET ORAL at 20:41

## 2023-11-13 RX ADMIN — LEVOTHYROXINE SODIUM 50 MCG: 0.05 TABLET ORAL at 10:18

## 2023-11-13 RX ADMIN — TIZANIDINE 4 MG: 4 TABLET ORAL at 18:03

## 2023-11-13 RX ADMIN — ISOSORBIDE DINITRATE 10 MG: 10 TABLET ORAL at 20:41

## 2023-11-13 RX ADMIN — ISOSORBIDE DINITRATE 10 MG: 10 TABLET ORAL at 09:41

## 2023-11-13 RX ADMIN — PROPAFENONE HYDROCHLORIDE 225 MG: 150 TABLET, FILM COATED ORAL at 09:46

## 2023-11-13 RX ADMIN — ASPIRIN 81 MG: 81 TABLET, COATED ORAL at 17:06

## 2023-11-13 RX ADMIN — ATORVASTATIN CALCIUM 80 MG: 80 TABLET, FILM COATED ORAL at 09:41

## 2023-11-13 RX ADMIN — MONTELUKAST 10 MG: 10 TABLET, FILM COATED ORAL at 20:41

## 2023-11-13 RX ADMIN — SUCRALFATE 1 G: 1 TABLET ORAL at 09:41

## 2023-11-13 RX ADMIN — BUDESONIDE AND FORMOTEROL FUMARATE DIHYDRATE 2 PUFF: 160; 4.5 AEROSOL RESPIRATORY (INHALATION) at 08:39

## 2023-11-13 RX ADMIN — BUDESONIDE AND FORMOTEROL FUMARATE DIHYDRATE 2 PUFF: 160; 4.5 AEROSOL RESPIRATORY (INHALATION) at 20:01

## 2023-11-13 RX ADMIN — SUCRALFATE 1 G: 1 TABLET ORAL at 13:26

## 2023-11-13 ASSESSMENT — PAIN DESCRIPTION - ORIENTATION
ORIENTATION: LOWER

## 2023-11-13 ASSESSMENT — ENCOUNTER SYMPTOMS
COLOR CHANGE: 0
SHORTNESS OF BREATH: 0
EYE PAIN: 0
ABDOMINAL PAIN: 0
BACK PAIN: 0

## 2023-11-13 ASSESSMENT — PAIN SCALES - GENERAL
PAINLEVEL_OUTOF10: 8
PAINLEVEL_OUTOF10: 10
PAINLEVEL_OUTOF10: 1
PAINLEVEL_OUTOF10: 1
PAINLEVEL_OUTOF10: 7

## 2023-11-13 ASSESSMENT — HEART SCORE: ECG: 1

## 2023-11-13 ASSESSMENT — LIFESTYLE VARIABLES
HOW OFTEN DO YOU HAVE A DRINK CONTAINING ALCOHOL: NEVER
HOW MANY STANDARD DRINKS CONTAINING ALCOHOL DO YOU HAVE ON A TYPICAL DAY: PATIENT DOES NOT DRINK

## 2023-11-13 ASSESSMENT — PAIN DESCRIPTION - LOCATION
LOCATION: CHEST
LOCATION: BACK
LOCATION: CHEST
LOCATION: BACK
LOCATION: BACK

## 2023-11-13 ASSESSMENT — PAIN DESCRIPTION - PAIN TYPE: TYPE: ACUTE PAIN

## 2023-11-13 ASSESSMENT — PAIN DESCRIPTION - DESCRIPTORS
DESCRIPTORS: DISCOMFORT
DESCRIPTORS: PRESSURE;BURNING
DESCRIPTORS: DISCOMFORT

## 2023-11-13 ASSESSMENT — PAIN DESCRIPTION - FREQUENCY: FREQUENCY: INTERMITTENT

## 2023-11-13 ASSESSMENT — PAIN - FUNCTIONAL ASSESSMENT
PAIN_FUNCTIONAL_ASSESSMENT: 0-10
PAIN_FUNCTIONAL_ASSESSMENT: 0-10

## 2023-11-13 NOTE — CARE COORDINATION
Case Management Assessment  Initial Evaluation    Date/Time of Evaluation: 11/13/2023 12:19 PM  Assessment Completed by: Bennie Evangelista RN    If patient is discharged prior to next notation, then this note serves as note for discharge by case management. Patient Name: Slim Gay                   YOB: 1965  Diagnosis: Chest pain [R07.9]  Chest pain, unspecified type [R07.9]                   Date / Time: 11/13/2023  1:47 AM    Patient Admission Status: Observation   Readmission Risk (Low < 19, Mod (19-27), High > 27): Readmission Risk Score: 14.4    Current PCP: Luis A Clark MD  PCP verified by CM? Yes    Chart Reviewed: Yes      History Provided by: Patient  Patient Orientation: Alert and Oriented    Patient Cognition: Alert    Hospitalization in the last 30 days (Readmission):  No    If yes, Readmission Assessment in CM Navigator will be completed. Advance Directives:      Code Status: Prior   Patient's Primary Decision Maker is: Legal Next of Kin      Discharge Planning:    Patient lives with: Alone Type of Home: Skilled Nursing Facility  Primary Care Giver: Self  Patient Support Systems include: Family Members   Current Financial resources: Medicare  Current community resources:    Current services prior to admission: [address]            Current DME: [employer]            Type of Home Care services:  None    ADLS  Prior functional level: Independent in ADLs/IADLs  Current functional level: Independent in ADLs/IADLs    PT AM-PAC:   /24  OT AM-PAC:   /24    Family can provide assistance at DC: No  Would you like Case Management to discuss the discharge plan with any other family members/significant others, and if so, who?  No  Plans to Return to Present Housing: Yes  Other Identified Issues/Barriers to RETURNING to current housing: no  Potential Assistance needed at discharge: [address]            Potential DME:    Patient expects to discharge to:

## 2023-11-13 NOTE — PLAN OF CARE
Problem: Discharge Planning  Goal: Discharge to home or other facility with appropriate resources  Outcome: Progressing  Flowsheets (Taken 11/13/2023 4767)  Discharge to home or other facility with appropriate resources:   Identify barriers to discharge with patient and caregiver   Arrange for needed discharge resources and transportation as appropriate   Refer to discharge planning if patient needs post-hospital services based on physician order or complex needs related to functional status, cognitive ability or social support system     Problem: Pain  Goal: Verbalizes/displays adequate comfort level or baseline comfort level  Outcome: Progressing     Problem: Skin/Tissue Integrity  Goal: Absence of new skin breakdown  Description: 1. Monitor for areas of redness and/or skin breakdown  2. Assess vascular access sites hourly  3. Every 4-6 hours minimum:  Change oxygen saturation probe site  4. Every 4-6 hours:  If on nasal continuous positive airway pressure, respiratory therapy assess nares and determine need for appliance change or resting period.   Outcome: Progressing     Problem: Safety - Adult  Goal: Free from fall injury  Outcome: Progressing

## 2023-11-13 NOTE — H&P
[address]    HISTORY AND PHYSICAL EXAMINATION            Date:   11/13/2023  Patient name:  Claudia Beth  Date of admission:  11/13/2023  1:47 AM  MRN:   [id number]  Account:  [de-identified]  YOB: 1965  PCP:    Franky Bunn MD  Room:   2092/2092-01  Code Status:    Prior    Chief Complaint:     Chief Complaint   Patient presents with    Chest Pain     Chest pressure started at 07 pm yesterday        History Obtained From:     patient, electronic medical record    History of Present Illness: The patient is a 59 y.o. Non- / non  female who presents with Chest Pain (Chest pressure started at 07 pm yesterday )   and she is admitted to the hospital for the management of chest pain.    Patient has past medical is multiple medical problems which include SMILEY not on CPAP, hypertension, heart failure with reduced ejection fraction, history of bradycardia, GERD, chronic opiate dependence, follows with EP physician as outpatient on Rythmol  Patient presented with complaints of chest pain, chest pain going on intermittently for last 6-week, as per patient she has hospitalization in the past with chest pain  Chest pain status for few minutes and then resolved  Patient had cardiac cath in July of last year which was completely negative          Past Medical History:     Past Medical History:   Diagnosis Date    Arthritis     Bronchitis     On ICS-LABA, denies asthma, copd    CHF (congestive heart failure) ([address])     COVID-19     diagnosed in September 2020, hospitilized on oxgyen    Fibromyalgia     GERD (gastroesophageal reflux disease)     Gout 10/2019    History of heart attack     HLD (hyperlipidemia)     Hypertension     Insomnia     Osteoarthritis         Past Surgical History:     Past Surgical History:   Procedure Laterality Date    BACK SURGERY      CARDIAC CATHETERIZATION  07/08/2022    CHOLECYSTECTOMY, LAPAROSCOPIC

## 2023-11-13 NOTE — FLOWSHEET NOTE
Dr Tunde Taylor perfect serve messaged: pt needs a diet order still. Stress tomorrow. Also will need code status.

## 2023-11-13 NOTE — DISCHARGE INSTR - DIET

## 2023-11-13 NOTE — ED TRIAGE NOTES
Mode of arrival (squad #, walk in, police, etc) : Squad     Chief complaint(s): Chest pain     Arrival Note (brief scenario, treatment PTA, etc). : brought by squad with complains of Chest pain , describes the pain as  Chest pressure started at 07 pm yesterday . Pain  score 01 on 0-10 on arrival   History of CHF , was admitted  to  [facility] for irregular heart Beat , discharge to  [facility]. Patient is  Alert and oriented A&O X 4  on arrival .     C \"Have you ever felt that you should Cut down on your drinking? \"  No  A= \"Have people Annoyed you by criticizing your drinking? \"  No  G= \"Have you ever felt bad or Guilty about your drinking? \"  No  E= \"Have you ever had a drink as an Eye-opener first thing in the morning to steady your nerves or to help a hangover? \"  No    Deferred []    Reason for deferring: N/A    *If yes to two or more: probable alcohol abuse. *

## 2023-11-13 NOTE — PLAN OF CARE
Problem: Discharge Planning  Goal: Discharge to home or other facility with appropriate resources  11/13/2023 1542 by Mely Sharma RN  Outcome: Progressing  Dc barrier: stress test 11/14     Problem: Pain  Goal: Verbalizes/displays adequate comfort level or baseline comfort level  11/13/2023 1542 by Mely Sharma RN  Outcome: Progressing  Pt medicated with pain medication prn. Assessed all pain characteristics including level, type, location, frequency, and onset. Non-pharmacologic interventions offered to pt as well. Pt states pain is tolerable at this time. Denies chest pressure through shift     Problem: Skin/Tissue Integrity  Goal: Absence of new skin breakdown  Description: 1. Monitor for areas of redness and/or skin breakdown  2. Assess vascular access sites hourly  3. Every 4-6 hours minimum:  Change oxygen saturation probe site  4. Every 4-6 hours:  If on nasal continuous positive airway pressure, respiratory therapy assess nares and determine need for appliance change or resting period. 11/13/2023 1542 by Mely Sharma RN  Outcome: Progressing   Skin assessment complete. Waffle mattress in place. Coccyx reddened. Sensicare applied PRN. Turns and repositions self every two hours. Area kept free from moisture. Proper nourishment and fluids encouraged, as appropriate. Will continue to monitor for additional needs and changes in skin breakdown. Problem: Safety - Adult  Goal: Free from fall injury  11/13/2023 1542 by Mely Sharma RN  Outcome: Progressing  Pt assessed as a fall risk this shift. Remains free from falls and accidental injury at this time. Fall precautions in place, including falling star sign and fall risk band on pt. Floor free from obstacles, and bed is locked and in lowest position. Adequate lighting provided. Pt encouraged to call before getting OOB for any need. Bed alarm activated.  Will continue to monitor needs during hourly rounding, and reinforce education on use of

## 2023-11-13 NOTE — FLOWSHEET NOTE
Dr Gerri frank served: Cardiology dc'd stress. can dc per cardiology standpoint, ordered halter monitor outpatient. may dc w/o monitor. recommends sleep study also. she's excited to go home.

## 2023-11-13 NOTE — CONSULTS
7/2022  Recommend event monitor. Patient to follow up with EP. Recommend sleep study. Ok to discharge from cardiology perspective. Discussed with patient and Nurse.     Huber Hope MD, MD [facility] Consult           [phone]

## 2023-11-13 NOTE — ED PROVIDER NOTES
EMERGENCY DEPARTMENT ENCOUNTER    Pt Name: Maximo Lott  MRN: [id number]  [address] [facility] [address] 1965  Date of evaluation: 11/13/23  CHIEF COMPLAINT       Chief Complaint   Patient presents with    Chest Pain     Chest pressure started at 07 pm yesterday      HISTORY OF PRESENT ILLNESS   68-year-old female presents for complaints of chest pain. Reports chest pain started yesterday evening while she was lying in bed. Describes as a pressure on the left side of her chest.  Denies radiation denies any making it better or worse denies any shortness of breath nausea or vomiting. Reports history of CHF. The history is provided by the patient. REVIEW OF SYSTEMS     Review of Systems   Constitutional:  Negative for fever. HENT:  Negative for congestion and ear pain. Eyes:  Negative for pain. Respiratory:  Negative for shortness of breath. Cardiovascular:  Positive for chest pain. Negative for palpitations and leg swelling. Gastrointestinal:  Negative for abdominal pain. Genitourinary:  Negative for dysuria and flank pain. Musculoskeletal:  Negative for back pain. Skin:  Negative for color change. Neurological:  Negative for numbness and headaches. Psychiatric/Behavioral:  Negative for confusion. All other systems reviewed and are negative.     PASTMEDICAL HISTORY     Past Medical History:   Diagnosis Date    Arthritis     Bronchitis     On ICS-LABA, denies asthma, copd    CHF (congestive heart failure) ([address])     COVID-19     diagnosed in September 2020, hospitilized on oxgyen    Fibromyalgia     GERD (gastroesophageal reflux disease)     Gout 10/2019    History of heart attack     HLD (hyperlipidemia)     Hypertension     Insomnia     Osteoarthritis      Past Problem List  Patient Active Problem List   Diagnosis Code    Low back pain M54.50    Therapeutic opioid-induced constipation (OIC) K59.03, T40.2X5A    Spondylosis of lumbar region without myelopathy or radiculopathy M47.816

## 2023-11-14 ENCOUNTER — HOSPITAL ENCOUNTER (OUTPATIENT)
Age: 58
Setting detail: OBSERVATION
Discharge: HOME | End: 2023-11-16
Payer: MEDICARE

## 2023-11-14 LAB — STRESS TARGET HR: 160 BPM

## 2023-11-14 PROCEDURE — 97166 OT EVAL MOD COMPLEX 45 MIN: CPT

## 2023-11-14 PROCEDURE — 6370000000 HC RX 637 (ALT 250 FOR IP): Performed by: INTERNAL MEDICINE

## 2023-11-14 PROCEDURE — 96372 THER/PROPH/DIAG INJ SC/IM: CPT

## 2023-11-14 PROCEDURE — 97162 PT EVAL MOD COMPLEX 30 MIN: CPT

## 2023-11-14 PROCEDURE — 6360000002 HC RX W HCPCS: Performed by: INTERNAL MEDICINE

## 2023-11-14 PROCEDURE — 6370000000 HC RX 637 (ALT 250 FOR IP): Performed by: NURSE PRACTITIONER

## 2023-11-14 PROCEDURE — 97530 THERAPEUTIC ACTIVITIES: CPT

## 2023-11-14 PROCEDURE — G0378 HOSPITAL OBSERVATION PER HR: HCPCS

## 2023-11-14 PROCEDURE — 2580000003 HC RX 258: Performed by: INTERNAL MEDICINE

## 2023-11-14 PROCEDURE — 94760 N-INVAS EAR/PLS OXIMETRY 1: CPT

## 2023-11-14 PROCEDURE — 99232 SBSQ HOSP IP/OBS MODERATE 35: CPT | Performed by: INTERNAL MEDICINE

## 2023-11-14 PROCEDURE — 94640 AIRWAY INHALATION TREATMENT: CPT

## 2023-11-14 RX ORDER — REGADENOSON 0.08 MG/ML
0.4 INJECTION, SOLUTION INTRAVENOUS
Status: DISCONTINUED | OUTPATIENT
Start: 2023-11-14 | End: 2023-11-17 | Stop reason: HOSPADM

## 2023-11-14 RX ORDER — ATROPINE SULFATE 0.1 MG/ML
0.5 INJECTION INTRAVENOUS EVERY 5 MIN PRN
Status: ACTIVE | OUTPATIENT
Start: 2023-11-14 | End: 2023-11-14

## 2023-11-14 RX ORDER — ENOXAPARIN SODIUM 100 MG/ML
40 INJECTION SUBCUTANEOUS 2 TIMES DAILY
Status: DISCONTINUED | OUTPATIENT
Start: 2023-11-14 | End: 2023-11-15 | Stop reason: HOSPADM

## 2023-11-14 RX ORDER — NITROGLYCERIN 0.4 MG/1
0.4 TABLET SUBLINGUAL EVERY 5 MIN PRN
Status: ACTIVE | OUTPATIENT
Start: 2023-11-14 | End: 2023-11-14

## 2023-11-14 RX ORDER — METOPROLOL TARTRATE 5 MG/5ML
5 INJECTION INTRAVENOUS EVERY 5 MIN PRN
Status: ACTIVE | OUTPATIENT
Start: 2023-11-14 | End: 2023-11-14

## 2023-11-14 RX ORDER — ALBUTEROL SULFATE 90 UG/1
2 AEROSOL, METERED RESPIRATORY (INHALATION) PRN
Status: ACTIVE | OUTPATIENT
Start: 2023-11-14 | End: 2023-11-14

## 2023-11-14 RX ORDER — SODIUM CHLORIDE 9 MG/ML
500 INJECTION, SOLUTION INTRAVENOUS CONTINUOUS PRN
Status: ACTIVE | OUTPATIENT
Start: 2023-11-14 | End: 2023-11-14

## 2023-11-14 RX ORDER — AMINOPHYLLINE 25 MG/ML
50 INJECTION, SOLUTION INTRAVENOUS PRN
Status: ACTIVE | OUTPATIENT
Start: 2023-11-14 | End: 2023-11-14

## 2023-11-14 RX ORDER — OXYCODONE HYDROCHLORIDE AND ACETAMINOPHEN 5; 325 MG/1; MG/1
1 TABLET ORAL EVERY 6 HOURS PRN
Qty: 12 TABLET | Refills: 0 | Status: SHIPPED | OUTPATIENT
Start: 2023-11-14 | End: 2023-11-17

## 2023-11-14 RX ORDER — LANOLIN ALCOHOL/MO/W.PET/CERES
3 CREAM (GRAM) TOPICAL NIGHTLY PRN
Status: DISCONTINUED | OUTPATIENT
Start: 2023-11-14 | End: 2023-11-15 | Stop reason: HOSPADM

## 2023-11-14 RX ORDER — SODIUM CHLORIDE 0.9 % (FLUSH) 0.9 %
5-40 SYRINGE (ML) INJECTION PRN
Status: ACTIVE | OUTPATIENT
Start: 2023-11-14 | End: 2023-11-14

## 2023-11-14 RX ADMIN — SODIUM CHLORIDE, PRESERVATIVE FREE 10 ML: 5 INJECTION INTRAVENOUS at 08:27

## 2023-11-14 RX ADMIN — PROPAFENONE HYDROCHLORIDE 150 MG: 150 TABLET, FILM COATED ORAL at 13:19

## 2023-11-14 RX ADMIN — Medication 3 MG: at 21:29

## 2023-11-14 RX ADMIN — OXYBUTYNIN CHLORIDE 5 MG: 5 TABLET ORAL at 21:24

## 2023-11-14 RX ADMIN — SUCRALFATE 1 G: 1 TABLET ORAL at 13:16

## 2023-11-14 RX ADMIN — LEVOTHYROXINE SODIUM 50 MCG: 0.05 TABLET ORAL at 05:24

## 2023-11-14 RX ADMIN — MICONAZOLE NITRATE: 20 CREAM TOPICAL at 21:25

## 2023-11-14 RX ADMIN — MONTELUKAST 10 MG: 10 TABLET, FILM COATED ORAL at 21:23

## 2023-11-14 RX ADMIN — BUPROPION HYDROCHLORIDE 300 MG: 300 TABLET, FILM COATED, EXTENDED RELEASE ORAL at 08:18

## 2023-11-14 RX ADMIN — SODIUM CHLORIDE, PRESERVATIVE FREE 10 ML: 5 INJECTION INTRAVENOUS at 21:24

## 2023-11-14 RX ADMIN — BUDESONIDE AND FORMOTEROL FUMARATE DIHYDRATE 2 PUFF: 160; 4.5 AEROSOL RESPIRATORY (INHALATION) at 08:17

## 2023-11-14 RX ADMIN — ENOXAPARIN SODIUM 60 MG: 100 INJECTION SUBCUTANEOUS at 08:20

## 2023-11-14 RX ADMIN — ENOXAPARIN SODIUM 40 MG: 100 INJECTION SUBCUTANEOUS at 21:24

## 2023-11-14 RX ADMIN — OXYBUTYNIN CHLORIDE 5 MG: 5 TABLET ORAL at 08:18

## 2023-11-14 RX ADMIN — OXYCODONE AND ACETAMINOPHEN 1 TABLET: 5; 325 TABLET ORAL at 18:27

## 2023-11-14 RX ADMIN — PANTOPRAZOLE SODIUM 40 MG: 40 TABLET, DELAYED RELEASE ORAL at 08:19

## 2023-11-14 RX ADMIN — PROPAFENONE HYDROCHLORIDE 150 MG: 150 TABLET, FILM COATED ORAL at 21:23

## 2023-11-14 RX ADMIN — OXYCODONE AND ACETAMINOPHEN 1 TABLET: 5; 325 TABLET ORAL at 03:20

## 2023-11-14 RX ADMIN — TIZANIDINE 4 MG: 4 TABLET ORAL at 10:51

## 2023-11-14 RX ADMIN — SUCRALFATE 1 G: 1 TABLET ORAL at 08:18

## 2023-11-14 RX ADMIN — ISOSORBIDE DINITRATE 10 MG: 10 TABLET ORAL at 08:19

## 2023-11-14 RX ADMIN — TIZANIDINE 4 MG: 4 TABLET ORAL at 18:28

## 2023-11-14 RX ADMIN — ASPIRIN 81 MG: 81 TABLET, COATED ORAL at 08:19

## 2023-11-14 RX ADMIN — ALLOPURINOL 300 MG: 300 TABLET ORAL at 08:19

## 2023-11-14 RX ADMIN — Medication 3 MG: at 00:46

## 2023-11-14 RX ADMIN — SPIRONOLACTONE 25 MG: 25 TABLET ORAL at 08:18

## 2023-11-14 RX ADMIN — MICONAZOLE NITRATE: 20 CREAM TOPICAL at 08:33

## 2023-11-14 RX ADMIN — ISOSORBIDE DINITRATE 10 MG: 10 TABLET ORAL at 21:24

## 2023-11-14 RX ADMIN — ATORVASTATIN CALCIUM 80 MG: 80 TABLET, FILM COATED ORAL at 08:19

## 2023-11-14 RX ADMIN — PROPAFENONE HYDROCHLORIDE 150 MG: 150 TABLET, FILM COATED ORAL at 05:24

## 2023-11-14 RX ADMIN — OXYCODONE AND ACETAMINOPHEN 1 TABLET: 5; 325 TABLET ORAL at 10:51

## 2023-11-14 ASSESSMENT — PAIN DESCRIPTION - DESCRIPTORS
DESCRIPTORS: DISCOMFORT;THROBBING
DESCRIPTORS: THROBBING
DESCRIPTORS: THROBBING;DISCOMFORT
DESCRIPTORS: DULL;ACHING

## 2023-11-14 ASSESSMENT — PAIN SCALES - GENERAL
PAINLEVEL_OUTOF10: 9

## 2023-11-14 ASSESSMENT — PAIN DESCRIPTION - ORIENTATION
ORIENTATION: LOWER
ORIENTATION: RIGHT;LEFT

## 2023-11-14 ASSESSMENT — PAIN DESCRIPTION - LOCATION
LOCATION: KNEE;NECK
LOCATION: BACK

## 2023-11-14 NOTE — DISCHARGE INSTR - COC
dysfunction of both sides M53.3    Chronic pain disorder G89.4    Bulge of cervical disc without myelopathy M50.30    DDD (degenerative disc disease), lumbar M51.36    Failed back syndrome of cervical spine M96.1    Chest pain R07.9    Class 3 severe obesity due to excess calories with serious comorbidity and body mass index (BMI) of 45.0 to 49.9 in adult ([facility]) E66.01, Z68.42    Cervical spondylosis with radiculopathy M47.22    Status post cervical spinal fusion Z98.1    History of lumbar fusion Z98.1    Lumbar radiculopathy M54.16    Morbid obesity with BMI of 50.0-59.9, adult ([facility]) E66.01, Z68.43    Chronic pain of both knees M25.561, M25.562, G89.29    Myalgia M79.10    Chronic use of opiate drug for therapeutic purpose Z79.891    COVID-19 U07.1    Hypertension I10    Gout M10.9    GERD (gastroesophageal reflux disease) K21.9    Acute on chronic combined systolic and diastolic congestive heart failure ([facility]) I50.43    Other proteinuria R80.8    Nausea and vomiting R11.2    Stable angina I20.89    PTSD (post-traumatic stress disorder) F43.10    Hyperuricemia E79.0    Mild persistent asthma without complication T76.53    CHF (congestive heart failure) ([facility]) I50.9    Chronic bronchitis, unspecified chronic bronchitis type ([facility]) J42    Major depressive disorder, recurrent severe without psychotic features ([address]) F33.2    Headache R51.9    Suspected sleep apnea R29.818    Vitamin D deficiency E55.9    CKD (chronic kidney disease) stage 3, GFR 30-59 ml/min ([facility]) N18.30    COPD without exacerbation ([facility]) J44.9    CRP elevated R79.82    High serum fibrinogen R79.89    Severe sinus bradycardia R00.1    Third degree heart block ([facility]) I44.2    SMILEY (obstructive sleep apnea) G47.33    NICM (nonischemic cardiomyopathy) ([facility]) I42.8    Bradycardia R00.1    SVT (supraventricular tachycardia) I47.10    History of COPD Z87.09       Isolation/Infection:   Isolation            No Isolation          Patient Infection Status

## 2023-11-14 NOTE — PLAN OF CARE
Problem: Discharge Planning  Goal: Discharge to home or other facility with appropriate resources  Outcome: Not Progressing  Barrier to dc: precert to go back to [place]     Problem: Pain  Goal: Verbalizes/displays adequate comfort level or baseline comfort level  Outcome: Progressing  Pt medicated with pain medication prn. Assessed all pain characteristics including level, type, location, frequency, and onset. Non-pharmacologic interventions offered to pt as well. Pt states pain is tolerable at this time. Problem: Skin/Tissue Integrity  Goal: Absence of new skin breakdown  Description: 1. Monitor for areas of redness and/or skin breakdown  2. Assess vascular access sites hourly  3. Every 4-6 hours minimum:  Change oxygen saturation probe site  4. Every 4-6 hours:  If on nasal continuous positive airway pressure, respiratory therapy assess nares and determine need for appliance change or resting period. Outcome: Progressing  Skin assessment complete. Waffle mattress in place. Coccyx reddened. Sensicare applied PRN. Turned and repositioned every two hours. Area kept free from moisture. Proper nourishment and fluids encouraged, as appropriate. Will continue to monitor for additional needs and changes in skin breakdown. Problem: Safety - Adult  Goal: Free from fall injury  Outcome: Progressing  Pt assessed as a fall risk this shift. Remains free from falls and accidental injury at this time. Fall precautions in place, including falling star sign and fall risk band on pt. Floor free from obstacles, and bed is locked and in lowest position. Adequate lighting provided. Pt encouraged to call before getting OOB for any need. Bed alarm activated. Will continue to monitor needs during hourly rounding, and reinforce education on use of call light.

## 2023-11-14 NOTE — CARE COORDINATION
Writer submitted authorization through [place].   Trang Soliz ID: [id number]  Electronically signed by LAINE Yousif on 11/14/2023 at 3:32 PM

## 2023-11-14 NOTE — PROGRESS NOTES
Writer spoke with RN [employer] regarding stress test; stress test is cancelled at this point. Stress lab will not move forward with testing. If any questions/changes to plan please call [phone].     Electronically signed by Hilton Ariza on 11/14/2023 at 9:16 AM

## 2023-11-15 VITALS
WEIGHT: 293 LBS | TEMPERATURE: 98 F | OXYGEN SATURATION: 93 % | HEIGHT: 68 IN | DIASTOLIC BLOOD PRESSURE: 58 MMHG | SYSTOLIC BLOOD PRESSURE: 101 MMHG | HEART RATE: 77 BPM | RESPIRATION RATE: 18 BRPM | BODY MASS INDEX: 44.41 KG/M2

## 2023-11-15 PROCEDURE — 6370000000 HC RX 637 (ALT 250 FOR IP): Performed by: INTERNAL MEDICINE

## 2023-11-15 PROCEDURE — 6370000000 HC RX 637 (ALT 250 FOR IP): Performed by: NURSE PRACTITIONER

## 2023-11-15 PROCEDURE — 6360000002 HC RX W HCPCS: Performed by: INTERNAL MEDICINE

## 2023-11-15 PROCEDURE — 96372 THER/PROPH/DIAG INJ SC/IM: CPT

## 2023-11-15 PROCEDURE — G0378 HOSPITAL OBSERVATION PER HR: HCPCS

## 2023-11-15 PROCEDURE — 2580000003 HC RX 258: Performed by: INTERNAL MEDICINE

## 2023-11-15 PROCEDURE — 94640 AIRWAY INHALATION TREATMENT: CPT

## 2023-11-15 PROCEDURE — 94761 N-INVAS EAR/PLS OXIMETRY MLT: CPT

## 2023-11-15 RX ORDER — PROPAFENONE HYDROCHLORIDE 150 MG/1
150 TABLET, COATED ORAL EVERY 8 HOURS SCHEDULED
Qty: 90 TABLET | Refills: 2 | DISCHARGE
Start: 2023-11-15

## 2023-11-15 RX ADMIN — ASPIRIN 81 MG: 81 TABLET, COATED ORAL at 07:56

## 2023-11-15 RX ADMIN — BUPROPION HYDROCHLORIDE 300 MG: 300 TABLET, FILM COATED, EXTENDED RELEASE ORAL at 07:55

## 2023-11-15 RX ADMIN — TIZANIDINE 4 MG: 4 TABLET ORAL at 06:24

## 2023-11-15 RX ADMIN — ISOSORBIDE DINITRATE 10 MG: 10 TABLET ORAL at 07:56

## 2023-11-15 RX ADMIN — BUDESONIDE AND FORMOTEROL FUMARATE DIHYDRATE 2 PUFF: 160; 4.5 AEROSOL RESPIRATORY (INHALATION) at 07:49

## 2023-11-15 RX ADMIN — PANTOPRAZOLE SODIUM 40 MG: 40 TABLET, DELAYED RELEASE ORAL at 07:56

## 2023-11-15 RX ADMIN — OXYCODONE AND ACETAMINOPHEN 1 TABLET: 5; 325 TABLET ORAL at 07:54

## 2023-11-15 RX ADMIN — LEVOTHYROXINE SODIUM 50 MCG: 0.05 TABLET ORAL at 05:33

## 2023-11-15 RX ADMIN — SPIRONOLACTONE 25 MG: 25 TABLET ORAL at 07:56

## 2023-11-15 RX ADMIN — SODIUM CHLORIDE, PRESERVATIVE FREE 10 ML: 5 INJECTION INTRAVENOUS at 07:55

## 2023-11-15 RX ADMIN — OXYBUTYNIN CHLORIDE 5 MG: 5 TABLET ORAL at 07:56

## 2023-11-15 RX ADMIN — MICONAZOLE NITRATE: 20 CREAM TOPICAL at 07:57

## 2023-11-15 RX ADMIN — SUCRALFATE 1 G: 1 TABLET ORAL at 07:56

## 2023-11-15 RX ADMIN — PROPAFENONE HYDROCHLORIDE 150 MG: 150 TABLET, FILM COATED ORAL at 06:24

## 2023-11-15 RX ADMIN — ENOXAPARIN SODIUM 40 MG: 100 INJECTION SUBCUTANEOUS at 07:56

## 2023-11-15 RX ADMIN — ATORVASTATIN CALCIUM 80 MG: 80 TABLET, FILM COATED ORAL at 07:55

## 2023-11-15 RX ADMIN — OXYCODONE AND ACETAMINOPHEN 1 TABLET: 5; 325 TABLET ORAL at 01:42

## 2023-11-15 RX ADMIN — ALLOPURINOL 300 MG: 300 TABLET ORAL at 07:56

## 2023-11-15 ASSESSMENT — PAIN DESCRIPTION - DESCRIPTORS: DESCRIPTORS: BURNING;SHARP

## 2023-11-15 ASSESSMENT — PAIN SCALES - GENERAL
PAINLEVEL_OUTOF10: 9
PAINLEVEL_OUTOF10: 8
PAINLEVEL_OUTOF10: 7
PAINLEVEL_OUTOF10: 9
PAINLEVEL_OUTOF10: 10

## 2023-11-15 ASSESSMENT — PAIN DESCRIPTION - LOCATION
LOCATION: BACK
LOCATION: BACK

## 2023-11-15 ASSESSMENT — PAIN DESCRIPTION - ORIENTATION
ORIENTATION: LOWER
ORIENTATION: LOWER

## 2023-11-15 NOTE — CARE COORDINATION
Transportation arranged for patient to go to [address] [facility] [employer] via [employer]. Facility informed. Bedside nurse informed.      Number for Report:  [phone]  Electronically signed by LAINE Rudd on 11/15/2023 at 9:35 AM

## 2023-11-15 NOTE — CARE COORDINATION
Writer checked authorization status in [place]. Pt has been approved 11/15/2023-11/17/2023. Writer placed perfectserve message to bedside RN Jayro Santos regarding this, agreeable to set up transport. Writer faxed papers to [facility].   Electronically signed by LAINE Izquierdo on 11/15/2023 at 8:57 AM

## 2023-11-15 NOTE — CARE COORDINATION
IMM letter provided to patient. Patient offered four hours to make informed decision regarding appeal process; patient agreeable to discharge.      Electronically signed by LAINE Alonso on 11/15/2023 at 9:35 AM

## 2023-11-15 NOTE — CARE COORDINATION
Writer met with pt to discuss discharge plans. Pt does not wish for writer to call anyone regarding discharge plans. No further questions at this time.   Electronically signed by LAINE Vásquez on 11/15/2023 at 9:37 AM

## 2023-11-15 NOTE — PLAN OF CARE
Problem: Discharge Planning  Goal: Discharge to home or other facility with appropriate resources  11/15/2023 0334 by Glo Gtz RN  Outcome: Progressing     Problem: Pain  Goal: Verbalizes/displays adequate comfort level or baseline comfort level  11/15/2023 0334 by Glo Gtz RN  Outcome: Progressing     Problem: Skin/Tissue Integrity  Goal: Absence of new skin breakdown  Description: 1. Monitor for areas of redness and/or skin breakdown  2. Assess vascular access sites hourly  3. Every 4-6 hours minimum:  Change oxygen saturation probe site  4. Every 4-6 hours:  If on nasal continuous positive airway pressure, respiratory therapy assess nares and determine need for appliance change or resting period.   11/15/2023 0334 by Glo Gtz RN  Outcome: Progressing     Problem: Safety - Adult  Goal: Free from fall injury  11/15/2023 0334 by Glo Gtz RN  Outcome: Progressing     Problem: Chronic Conditions and Co-morbidities  Goal: Patient's chronic conditions and co-morbidity symptoms are monitored and maintained or improved  Outcome: Progressing     Problem: Discharge Planning  Goal: Discharge to home or other facility with appropriate resources  11/15/2023 0334 by Glo Gtz RN  Outcome: Progressing  11/14/2023 1650 by Ivanna Ram RN  Outcome: Not Progressing

## 2023-11-16 LAB — ECHO BSA: 2.85 M2

## 2023-11-16 NOTE — DISCHARGE SUMMARY
[address] [facility] Internal Medicine  Ryan Tijerina MD; Karen Tabor MD; Joe Vazquez MD; MD Stefan Seaman MD; Can Bustos MD      [facility]  [address]    Discharge Summary     Patient ID: Yesenia Luna  :  1965   MRN: [id number]     ACCOUNT:  [de-identified]   Patient's PCP: Benito Guerra MD  Admit Date: 2023   Discharge Date: 11/15/2023     Length of Stay: 0  Code Status:  Prior  Admitting Physician: Kobi Macdonald MD  Discharge Physician: Karina Reyna MD     Active Discharge Diagnoses:     Hospital Problem Lists:  Principal Problem:    Chest pain  Resolved Problems:    * No resolved hospital problems. *      Admission Condition:  fair     Discharged Condition: fair    Hospital Stay:     Hospital Course:  Yesenia Luna is a 59 y.o. female who was admitted for the management of   Chest pain , presented to ER with Chest Pain (Chest pressure started at 07 pm yesterday )    59-year-old female with past medical history of SMILEY not on CPAP, hypertension, heart failure with reduced ejection fraction, history of bradycardia, GERD, chronic opioid dependence, history of SVT on Rythmol presented to the ED with chief complaint of chest pain. Chest pain was going on intermittently for the last 6 weeks. She had cardiac cath in 2022 which was negative. Cardiology was consulted, will reduce the dose of Rythmol to 150 p.o. 3 times daily. Recommended event monitor and follow-up with the EP. Patient became chest pain-free and was eventually discharged home with a follow-up with EP and outpatient sleep study.     Review of system:  Denies any nausea vomiting fever chills,  Denies any headaches or blurred vision,  Denies any chest pain   Denies any cough phlegm hemoptysis,  Denies any abdominal pain diarrhea constipation,  Denies any tingling tingling numbness weakness of arms or legs,   Skin no

## 2023-12-06 SDOH — HEALTH STABILITY: PHYSICAL HEALTH: ON AVERAGE, HOW MANY MINUTES DO YOU ENGAGE IN EXERCISE AT THIS LEVEL?: 10 MIN

## 2023-12-06 SDOH — HEALTH STABILITY: PHYSICAL HEALTH: ON AVERAGE, HOW MANY DAYS PER WEEK DO YOU ENGAGE IN MODERATE TO STRENUOUS EXERCISE (LIKE A BRISK WALK)?: 5 DAYS

## 2023-12-08 ENCOUNTER — OFFICE VISIT (OUTPATIENT)
Age: 58
End: 2023-12-08
Payer: MEDICARE

## 2023-12-08 DIAGNOSIS — J45.30 MILD PERSISTENT ASTHMA WITHOUT COMPLICATION: ICD-10-CM

## 2023-12-08 DIAGNOSIS — I47.10 SUPRAVENTRICULAR TACHYCARDIA: Primary | ICD-10-CM

## 2023-12-08 DIAGNOSIS — Z76.0 MEDICATION REFILL: ICD-10-CM

## 2023-12-08 DIAGNOSIS — E79.0 HYPERURICEMIA: ICD-10-CM

## 2023-12-08 DIAGNOSIS — K21.9 GASTROESOPHAGEAL REFLUX DISEASE, UNSPECIFIED WHETHER ESOPHAGITIS PRESENT: ICD-10-CM

## 2023-12-08 DIAGNOSIS — I50.42 CHRONIC COMBINED SYSTOLIC AND DIASTOLIC HEART FAILURE (HCC): ICD-10-CM

## 2023-12-08 PROCEDURE — 99214 OFFICE O/P EST MOD 30 MIN: CPT

## 2023-12-08 RX ORDER — PROPAFENONE HYDROCHLORIDE 150 MG/1
150 TABLET, COATED ORAL EVERY 8 HOURS SCHEDULED
Qty: 90 TABLET | Refills: 0 | Status: SHIPPED | OUTPATIENT
Start: 2023-12-08

## 2023-12-08 RX ORDER — PANTOPRAZOLE SODIUM 40 MG/1
40 TABLET, DELAYED RELEASE ORAL DAILY
Qty: 90 TABLET | Refills: 0 | Status: SHIPPED | OUTPATIENT
Start: 2023-12-08

## 2023-12-08 RX ORDER — SUCRALFATE 1 G/1
TABLET ORAL
Qty: 90 TABLET | Refills: 3 | Status: SHIPPED | OUTPATIENT
Start: 2023-12-08

## 2023-12-08 RX ORDER — SPIRONOLACTONE 25 MG/1
25 TABLET ORAL DAILY
Qty: 90 TABLET | Refills: 0 | Status: SHIPPED | OUTPATIENT
Start: 2023-12-08

## 2023-12-08 RX ORDER — ALLOPURINOL 300 MG/1
300 TABLET ORAL DAILY
Qty: 90 TABLET | Refills: 1 | Status: SHIPPED | OUTPATIENT
Start: 2023-12-08

## 2023-12-08 RX ORDER — MONTELUKAST SODIUM 10 MG/1
10 TABLET ORAL NIGHTLY
Qty: 90 TABLET | Refills: 3 | Status: SHIPPED | OUTPATIENT
Start: 2023-12-08

## 2023-12-08 RX ORDER — ISOSORBIDE DINITRATE 10 MG/1
10 TABLET ORAL 2 TIMES DAILY
Qty: 180 TABLET | Refills: 0 | Status: SHIPPED | OUTPATIENT
Start: 2023-12-08 | End: 2024-03-07

## 2023-12-08 SDOH — ECONOMIC STABILITY: HOUSING INSECURITY
IN THE LAST 12 MONTHS, WAS THERE A TIME WHEN YOU DID NOT HAVE A STEADY PLACE TO SLEEP OR SLEPT IN A SHELTER (INCLUDING NOW)?: NO

## 2023-12-08 SDOH — ECONOMIC STABILITY: FOOD INSECURITY: WITHIN THE PAST 12 MONTHS, YOU WORRIED THAT YOUR FOOD WOULD RUN OUT BEFORE YOU GOT MONEY TO BUY MORE.: SOMETIMES TRUE

## 2023-12-08 SDOH — ECONOMIC STABILITY: FOOD INSECURITY: WITHIN THE PAST 12 MONTHS, THE FOOD YOU BOUGHT JUST DIDN'T LAST AND YOU DIDN'T HAVE MONEY TO GET MORE.: SOMETIMES TRUE

## 2023-12-08 SDOH — ECONOMIC STABILITY: INCOME INSECURITY: HOW HARD IS IT FOR YOU TO PAY FOR THE VERY BASICS LIKE FOOD, HOUSING, MEDICAL CARE, AND HEATING?: NOT HARD AT ALL

## 2023-12-08 NOTE — PATIENT INSTRUCTIONS
Thank you for following up with us at [facility] outpatient residency clinic. It was a pleasure to see you today. Our plan is the following:  -A referral has been provided for cardiology as well as for the electrophysiologist.  I will have my staff call their office to try and get you scheduled sooner. I have included the contact information for the electrophysiologist below.  -A refill for the spironolactone was provided and sent to the pharmacy. -Refills for your other medications have been sent to the pharmacy as well.  -Lets follow-up after you have had an opportunity to see the different specialists for an annual physical and to review how you are doing.  - [address] [facility] [place]  [facility].  [facility], [address]    If you have any additional questions or concerns, please call the office ([phone]) and speak to one of the staff. They will triage and forward the message to the doctors. Have a great rest of your day.

## 2023-12-08 NOTE — PROGRESS NOTES
[address] [facility]. S.W Family Medicine Residency  [address] [facility], [facility]  [place]nez  Phone: [phone]  Fax: [phone]      Date of Visit:  2023  Patient Name: Radhika Murray   Patient :  1965     HPI:     Radhika Murray is a 59 y.o. female who presents today to discuss   Chief Complaint   Patient presents with    New Patient     F/u hospital and rehab- had a temporary pacemaker  Will see pulmonology and cardiology soon  Will be going to pain clinic     Patient presents to establish care. She was recently admitted at [address] [place] [address] for paroxysmal supraventricular tachycardia with history of nonischemic cardiomyopathy, third-degree heart block, COPD, systolic and diastolic heart failure. Patient has a temporary pacemaker was recently discharged from cardiac rehab. During admission patient's SVT resolved with procainamide infusion. She was evaluated by both cardiology and electrophysiology and was placed on propafenone 3 times daily on discharge. It was recommended that patient follow-up with cardiology and electrophysiology about 1 week after discharge. Patient has been in cardiac rehab for the past few weeks and has not had an opportunity to schedule an appointment with either of these providers. She does have an appointment scheduled with pulmonology on 2023. Patient stated it was also recommended that she have a Holter monitor to further assess her heart rhythm. No previous stent placement. Patient requesting refills for a number for different medications including levothyroxine, propafenone, montelukast, isosorbide dinitrate, pantoprazole, allopurinol and sucralfate. Patient was advised by pharmacy to hold her tizanidine until speaking with a provider due to the potential interaction with the propafenone. Patient also planning to see pain management in the near future.   Patient denies any chest pain, shortness of

## 2023-12-10 VITALS
TEMPERATURE: 98.2 F | SYSTOLIC BLOOD PRESSURE: 146 MMHG | HEART RATE: 58 BPM | RESPIRATION RATE: 18 BRPM | WEIGHT: 293 LBS | DIASTOLIC BLOOD PRESSURE: 71 MMHG | HEIGHT: 68 IN | BODY MASS INDEX: 44.41 KG/M2

## 2023-12-11 NOTE — PROGRESS NOTES
cardiomyopathy) ([address])          Plan:      Medications reviewed, continue as ordered. Educated and clarified the medication use. Refills sent to Rx if requested. Recommend influenza, pneumococcal and COVID vaccines per CDC guidelines. Discussed strategies to protect against Covid 19. Maintain an active lifestyle. Patient's questions were answered to her satisfaction. Lifelong non-smoker. Pulmonary function tests were reviewed   CT scan of the chest was reviewed  Polysomnogram with CPAP/BiPAP titration if needed. Brochure provided on sleep apnea. Weight loss was recommended and discussed. Recommended following good sleep hygiene instructions. Sleep hygieneinstructions were given to the patient. We'll see the patient back after the sleep study. We'll see the patient back in 5 months with 30-day compliance data. Electronically signed by ASA Acevedo CNP on 12/12/2023 at 1:34 PM    Note completed with voice recognition software. Typographical errors, occasional wrong word or sound alike substitutions may have occurred due to the inherent limitations of voice recognition software. Read the chart carefully and recognize using context where substitutions may have occurred.   If any questions please do not hesitate to contact me for clarification

## 2023-12-12 ENCOUNTER — OFFICE VISIT (OUTPATIENT)
Dept: PULMONOLOGY | Age: 58
End: 2023-12-12
Payer: MEDICARE

## 2023-12-12 VITALS
RESPIRATION RATE: 20 BRPM | HEIGHT: 68 IN | SYSTOLIC BLOOD PRESSURE: 156 MMHG | BODY MASS INDEX: 44.41 KG/M2 | OXYGEN SATURATION: 99 % | DIASTOLIC BLOOD PRESSURE: 95 MMHG | WEIGHT: 293 LBS | HEART RATE: 88 BPM

## 2023-12-12 DIAGNOSIS — G47.33 OSA (OBSTRUCTIVE SLEEP APNEA): ICD-10-CM

## 2023-12-12 DIAGNOSIS — J45.30 MILD PERSISTENT ASTHMA WITHOUT COMPLICATION: ICD-10-CM

## 2023-12-12 DIAGNOSIS — I42.8 NICM (NONISCHEMIC CARDIOMYOPATHY) (HCC): ICD-10-CM

## 2023-12-12 DIAGNOSIS — E66.01 CLASS 3 SEVERE OBESITY DUE TO EXCESS CALORIES WITH SERIOUS COMORBIDITY AND BODY MASS INDEX (BMI) OF 50.0 TO 59.9 IN ADULT (HCC): Primary | ICD-10-CM

## 2023-12-12 PROCEDURE — 3017F COLORECTAL CA SCREEN DOC REV: CPT | Performed by: NURSE PRACTITIONER

## 2023-12-12 PROCEDURE — G8417 CALC BMI ABV UP PARAM F/U: HCPCS | Performed by: NURSE PRACTITIONER

## 2023-12-12 PROCEDURE — 1036F TOBACCO NON-USER: CPT | Performed by: NURSE PRACTITIONER

## 2023-12-12 PROCEDURE — G8427 DOCREV CUR MEDS BY ELIG CLIN: HCPCS | Performed by: NURSE PRACTITIONER

## 2023-12-12 PROCEDURE — G8482 FLU IMMUNIZE ORDER/ADMIN: HCPCS | Performed by: NURSE PRACTITIONER

## 2023-12-12 PROCEDURE — 3077F SYST BP >= 140 MM HG: CPT | Performed by: NURSE PRACTITIONER

## 2023-12-12 PROCEDURE — 3080F DIAST BP >= 90 MM HG: CPT | Performed by: NURSE PRACTITIONER

## 2023-12-12 PROCEDURE — 99214 OFFICE O/P EST MOD 30 MIN: CPT | Performed by: NURSE PRACTITIONER

## 2023-12-12 RX ORDER — ALBUTEROL SULFATE 90 UG/1
2 AEROSOL, METERED RESPIRATORY (INHALATION) EVERY 4 HOURS PRN
Qty: 3 EACH | Refills: 3 | Status: SHIPPED | OUTPATIENT
Start: 2023-12-12 | End: 2024-03-11

## 2023-12-12 ASSESSMENT — SLEEP AND FATIGUE QUESTIONNAIRES
HOW LIKELY ARE YOU TO NOD OFF OR FALL ASLEEP WHEN YOU ARE A PASSENGER IN A CAR FOR AN HOUR WITHOUT A BREAK: 0
HOW LIKELY ARE YOU TO NOD OFF OR FALL ASLEEP WHILE SITTING AND READING: 2
HOW LIKELY ARE YOU TO NOD OFF OR FALL ASLEEP IN A CAR, WHILE STOPPED FOR A FEW MINUTES IN TRAFFIC: 0
HOW LIKELY ARE YOU TO NOD OFF OR FALL ASLEEP WHILE LYING DOWN TO REST IN THE AFTERNOON WHEN CIRCUMSTANCES PERMIT: 2
ESS TOTAL SCORE: 8
HOW LIKELY ARE YOU TO NOD OFF OR FALL ASLEEP WHILE SITTING QUIETLY AFTER LUNCH WITHOUT ALCOHOL: 2
HOW LIKELY ARE YOU TO NOD OFF OR FALL ASLEEP WHILE SITTING AND TALKING TO SOMEONE: 0
HOW LIKELY ARE YOU TO NOD OFF OR FALL ASLEEP WHILE SITTING INACTIVE IN A PUBLIC PLACE: 0
HOW LIKELY ARE YOU TO NOD OFF OR FALL ASLEEP WHILE WATCHING TV: 2

## 2023-12-12 ASSESSMENT — ENCOUNTER SYMPTOMS
ALLERGIC/IMMUNOLOGIC NEGATIVE: 1
GASTROINTESTINAL NEGATIVE: 1
EYES NEGATIVE: 1

## 2023-12-12 NOTE — PATIENT INSTRUCTIONS
[email or web address]@        YOU ARE BEING REFERRED TO:    SHARONA PENNINGTON (a department [facility])    [address] [facility], [facility]    Main Phone Number: [phone]    Phone number for scheduling sleep study: [phone]      Please contact the above numbers to schedule your sleep study. Once you have completed the FIRST NIGHT you may be asked to return for a SECOND NIGHT if necessary. After the SECOND NIGHT the sleep center will order a CPAP/BiPAP machine for you. An order for the machine will be sent to a durable medical equipment company ([employer]). The sleep center will provide you with the [employer] companies information. Once you have your machine please contact our office to schedule a follow up appointment. Your follow up will be scheduled for a date 30-90 days after you have received your machine. At that follow up visit we will need to have a compliance download from your DME company. Please contact your [employer] company to have the compliance download faxed to our office at   [phone] for your follow up appointment. IF YOU HAVE ANY QUESTIONS ABOUT YOUR SLEEP STUDY   PLEASE CONTACT THE SLEEP CENTER.

## 2024-01-10 ENCOUNTER — OFFICE VISIT (OUTPATIENT)
Age: 59
End: 2024-01-10
Payer: MEDICARE

## 2024-01-10 VITALS
SYSTOLIC BLOOD PRESSURE: 152 MMHG | DIASTOLIC BLOOD PRESSURE: 87 MMHG | TEMPERATURE: 98.8 F | HEART RATE: 52 BPM | HEIGHT: 68 IN | OXYGEN SATURATION: 97 % | RESPIRATION RATE: 18 BRPM | WEIGHT: 293 LBS | BODY MASS INDEX: 44.41 KG/M2

## 2024-01-10 DIAGNOSIS — I47.10 SUPRAVENTRICULAR TACHYCARDIA: ICD-10-CM

## 2024-01-10 DIAGNOSIS — I50.20 SYSTOLIC CONGESTIVE HEART FAILURE, UNSPECIFIED HF CHRONICITY (HCC): Primary | ICD-10-CM

## 2024-01-10 DIAGNOSIS — I50.42 CHRONIC COMBINED SYSTOLIC AND DIASTOLIC HEART FAILURE (HCC): ICD-10-CM

## 2024-01-10 DIAGNOSIS — R00.1 SEVERE SINUS BRADYCARDIA: ICD-10-CM

## 2024-01-10 DIAGNOSIS — I47.10 SVT (SUPRAVENTRICULAR TACHYCARDIA): ICD-10-CM

## 2024-01-10 PROCEDURE — 1036F TOBACCO NON-USER: CPT | Performed by: SPECIALIST

## 2024-01-10 PROCEDURE — 3077F SYST BP >= 140 MM HG: CPT | Performed by: SPECIALIST

## 2024-01-10 PROCEDURE — G8482 FLU IMMUNIZE ORDER/ADMIN: HCPCS | Performed by: SPECIALIST

## 2024-01-10 PROCEDURE — 99215 OFFICE O/P EST HI 40 MIN: CPT | Performed by: SPECIALIST

## 2024-01-10 PROCEDURE — G8417 CALC BMI ABV UP PARAM F/U: HCPCS | Performed by: SPECIALIST

## 2024-01-10 PROCEDURE — 3079F DIAST BP 80-89 MM HG: CPT | Performed by: SPECIALIST

## 2024-01-10 PROCEDURE — G8427 DOCREV CUR MEDS BY ELIG CLIN: HCPCS | Performed by: SPECIALIST

## 2024-01-10 PROCEDURE — 3017F COLORECTAL CA SCREEN DOC REV: CPT | Performed by: SPECIALIST

## 2024-01-10 RX ORDER — ISOSORBIDE DINITRATE 10 MG/1
10 TABLET ORAL 2 TIMES DAILY
Qty: 180 TABLET | Refills: 0 | Status: SHIPPED | OUTPATIENT
Start: 2024-01-10 | End: 2024-04-09

## 2024-01-10 RX ORDER — PROPAFENONE HYDROCHLORIDE 150 MG/1
150 TABLET, COATED ORAL EVERY 8 HOURS SCHEDULED
Qty: 90 TABLET | Refills: 0 | Status: SHIPPED | OUTPATIENT
Start: 2024-01-10

## 2024-01-10 ASSESSMENT — ENCOUNTER SYMPTOMS
SHORTNESS OF BREATH: 1
EYES NEGATIVE: 1
COUGH: 1
WHEEZING: 1
BACK PAIN: 1
GASTROINTESTINAL NEGATIVE: 1
CHEST TIGHTNESS: 1
ALLERGIC/IMMUNOLOGIC NEGATIVE: 1

## 2024-01-10 NOTE — PROGRESS NOTES
[facility] CARDIAC ELECTROPHYSIOLOGY  [address]  [place]  [address]    Date of Visit:  1/10/2024  Patient Name: Mary Bills   Patient :  1965   Referring By:  No ref. provider found     CHIEF COMPLAINT/HPI:     Mary Bills is a 58 y.o. female who presents today for an general visit to be evaluated for the following condition(s):  Chief Complaint   Patient presents with    Follow-Up from Hospital     SVT   58 years old female was admitted to the hospital on November with reported SVT.  There is no available documentation of that SVT.  Everybody in the emergency room and the consults talks about SVT but there is nobody apparently kept a copy of the documentation.  Dr. Ugarte describes the rhythm strips but he himself also has not seen it.  It is reported that they have to start procainamide and I am not sure why procainamide was picked and they describe that the tachycardia terminated again and not documented the way it terminated.  The patient was admitted to the hospital with a diagnosis of SVT.  Usually different interpretation that ranges from SVT to atrial fibrillation to wide QRS tachycardia.    The patient herself admits to having episodes of palpitation for years that they are sudden in onset sustained and last for sometimes.  In general they happen once or twice a year.  She thinks the intensity of the episodes are more now than they used to be before but at no point she fainted with them or got into significant problems.    I reviewed all the EKGs available on this patient for the year  and the system does not provide me with any EKG of tachycardia other than sinus tachycardia.    Patient was in the rehab center when the reported tachycardia happened, squad was called.  And the squad brought the patient to the emergency room.  Again no documentation.    A week or 2 prior to that patient was admitted with significant bradycardia.  Patient was symptomatic with

## 2024-01-19 DIAGNOSIS — Z76.0 MEDICATION REFILL: ICD-10-CM

## 2024-01-19 DIAGNOSIS — E03.9 HYPOTHYROIDISM, UNSPECIFIED TYPE: ICD-10-CM

## 2024-01-19 RX ORDER — LEVOTHYROXINE SODIUM 0.05 MG/1
50 TABLET ORAL DAILY
Qty: 90 TABLET | Refills: 0 | Status: SHIPPED | OUTPATIENT
Start: 2024-01-19 | End: 2024-04-18

## 2024-01-19 RX ORDER — BUMETANIDE 1 MG/1
1 TABLET ORAL DAILY
COMMUNITY
End: 2024-01-19 | Stop reason: SDUPTHER

## 2024-01-19 RX ORDER — BUMETANIDE 1 MG/1
1 TABLET ORAL DAILY
Qty: 90 TABLET | Refills: 0 | Status: SHIPPED | OUTPATIENT
Start: 2024-01-19

## 2024-01-19 NOTE — TELEPHONE ENCOUNTER
Can you clarify dose of synthroid as the computer charts from different specialists have two different orders one says synthroid 50 mcg 1 tablet daily and other notes say 0.5 tablet daily

## 2024-01-22 ENCOUNTER — TELEPHONE (OUTPATIENT)
Dept: VASCULAR SURGERY | Age: 59
End: 2024-01-22

## 2024-01-22 NOTE — TELEPHONE ENCOUNTER
Renetta from pre cert called.  Patient's insurance wants patient to have a 3 week holter monitor before they will approve the LOOP procedure.  Renetta is going to call the insurance company today to verify this, but as of now, the LOOP scheduled for Weds 1/24/2024 is canceled, patient is aware.

## 2024-02-02 DIAGNOSIS — I47.10 SUPRAVENTRICULAR TACHYCARDIA: ICD-10-CM

## 2024-02-02 RX ORDER — PROPAFENONE HYDROCHLORIDE 150 MG/1
150 TABLET, COATED ORAL EVERY 8 HOURS SCHEDULED
Qty: 90 TABLET | Refills: 0 | Status: SHIPPED | OUTPATIENT
Start: 2024-02-02

## 2024-02-06 DIAGNOSIS — K21.9 GASTROESOPHAGEAL REFLUX DISEASE, UNSPECIFIED WHETHER ESOPHAGITIS PRESENT: ICD-10-CM

## 2024-02-06 DIAGNOSIS — Z76.0 MEDICATION REFILL: ICD-10-CM

## 2024-02-06 NOTE — TELEPHONE ENCOUNTER
Patient requesting refill.  Also, needs a referral for pulmonary rehab at [facility].

## 2024-02-07 DIAGNOSIS — I50.42 CHRONIC COMBINED SYSTOLIC AND DIASTOLIC HEART FAILURE (HCC): ICD-10-CM

## 2024-02-07 DIAGNOSIS — Z76.0 MEDICATION REFILL: ICD-10-CM

## 2024-02-07 RX ORDER — SUCRALFATE 1 G/1
TABLET ORAL
Qty: 270 TABLET | Refills: 1 | Status: SHIPPED | OUTPATIENT
Start: 2024-02-07

## 2024-02-09 ENCOUNTER — TELEPHONE (OUTPATIENT)
Dept: PULMONOLOGY | Age: 59
End: 2024-02-09

## 2024-02-09 DIAGNOSIS — R06.09 POST-COVID-19 SYNDROME MANIFESTING AS CHRONIC DYSPNEA: Primary | ICD-10-CM

## 2024-02-09 DIAGNOSIS — U09.9 POST-COVID-19 SYNDROME MANIFESTING AS CHRONIC DYSPNEA: Primary | ICD-10-CM

## 2024-02-09 RX ORDER — SPIRONOLACTONE 25 MG/1
25 TABLET ORAL DAILY
Qty: 90 TABLET | Refills: 0 | Status: SHIPPED | OUTPATIENT
Start: 2024-02-09

## 2024-02-09 NOTE — TELEPHONE ENCOUNTER
Pt called to request another Pulmonary Rehab referral for post COVID syndrome.   Last seen: 12/12/23  Next visit: 4/1/24.  Please advise and writer will f/u with pt. Thank you.

## 2024-02-20 ENCOUNTER — HOSPITAL ENCOUNTER (OUTPATIENT)
Age: 59
Setting detail: OUTPATIENT SURGERY
Discharge: HOME | End: 2024-02-20
Attending: SPECIALIST | Admitting: SPECIALIST
Payer: MEDICARE

## 2024-02-20 VITALS
OXYGEN SATURATION: 100 % | HEART RATE: 78 BPM | SYSTOLIC BLOOD PRESSURE: 124 MMHG | DIASTOLIC BLOOD PRESSURE: 86 MMHG | RESPIRATION RATE: 18 BRPM | TEMPERATURE: 97.7 F

## 2024-02-20 DIAGNOSIS — R00.1 BRADYCARDIA: Primary | ICD-10-CM

## 2024-02-20 DIAGNOSIS — I47.10 SVT (SUPRAVENTRICULAR TACHYCARDIA): ICD-10-CM

## 2024-02-20 PROBLEM — I48.91 ATRIAL FIBRILLATION AND FLUTTER (HCC): Status: ACTIVE | Noted: 2024-02-20

## 2024-02-20 PROBLEM — I48.92 ATRIAL FIBRILLATION AND FLUTTER (HCC): Status: ACTIVE | Noted: 2024-02-20

## 2024-02-20 PROCEDURE — 7100000010 HC PHASE II RECOVERY - FIRST 15 MIN: Performed by: SPECIALIST

## 2024-02-20 PROCEDURE — 99234 HOSP IP/OBS SM DT SF/LOW 45: CPT | Performed by: SPECIALIST

## 2024-02-20 PROCEDURE — 33285 INSJ SUBQ CAR RHYTHM MNTR: CPT | Performed by: SPECIALIST

## 2024-02-20 PROCEDURE — C1764 EVENT RECORDER, CARDIAC: HCPCS | Performed by: SPECIALIST

## 2024-02-20 PROCEDURE — 99152 MOD SED SAME PHYS/QHP 5/>YRS: CPT | Performed by: SPECIALIST

## 2024-02-20 PROCEDURE — 7100000011 HC PHASE II RECOVERY - ADDTL 15 MIN: Performed by: SPECIALIST

## 2024-02-20 PROCEDURE — 2500000003 HC RX 250 WO HCPCS: Performed by: SPECIALIST

## 2024-02-20 PROCEDURE — 6360000002 HC RX W HCPCS: Performed by: SPECIALIST

## 2024-02-20 PROCEDURE — 2709999900 HC NON-CHARGEABLE SUPPLY: Performed by: SPECIALIST

## 2024-02-20 PROCEDURE — 99153 MOD SED SAME PHYS/QHP EA: CPT | Performed by: SPECIALIST

## 2024-02-20 DEVICE — LUX-DX™ INSERTABLE CARDIAC MONITOR
Type: IMPLANTABLE DEVICE | Status: FUNCTIONAL
Brand: LUX-DX™ INSERTABLE CARDIAC MONITOR

## 2024-02-20 RX ORDER — SODIUM CHLORIDE 9 MG/ML
INJECTION, SOLUTION INTRAVENOUS CONTINUOUS
Status: DISCONTINUED | OUTPATIENT
Start: 2024-02-20 | End: 2024-02-26 | Stop reason: HOSPADM

## 2024-02-20 RX ORDER — MIDAZOLAM HYDROCHLORIDE 1 MG/ML
INJECTION INTRAMUSCULAR; INTRAVENOUS PRN
Status: DISCONTINUED | OUTPATIENT
Start: 2024-02-20 | End: 2024-02-20 | Stop reason: HOSPADM

## 2024-02-20 ASSESSMENT — PAIN DESCRIPTION - PAIN TYPE: TYPE: CHRONIC PAIN

## 2024-02-20 ASSESSMENT — PAIN SCALES - GENERAL: PAINLEVEL_OUTOF10: 10

## 2024-02-20 ASSESSMENT — PAIN DESCRIPTION - LOCATION: LOCATION: GENERALIZED

## 2024-02-20 NOTE — DISCHARGE INSTRUCTIONS
DISCHARGE INSTRUCTIONS     INTERNAL LOOP RECORDER             - NO DRIVING 24 HOURS IF SEDATION RECEIVED          - DRESSING TO BE REMOVED when seen in office AND LEFT OPEN TO THE AIR          - GLUE USED TO CLOSE INCISION AND WILL PEEL OFF DO NOT PICK AT IT           - OK TO SHOWER THE FOLLOWING DAY BUT DO NOT LET WATER HIT DIRECTLY           ON SITE FOR ANY LENGTH OF TIME FOR FIRST WEEK          - DO NOT RUB/SCRUB SITE          - NO SWIMMING IN POOLS/HOT TUBS FOR 7 TO 10 DAYS           - AVOID CLOTHING THAT MIGHT RUB ON INCISION          - WATCH FOR SIGNS OF INFECTION - REDNESS, SWELLING, DRAINAGE AT SITE          AND /OR TEMPERATURE GREATER THAN 101          - NOTIFY YOUR PHYSICIAN IMMEDIATELY IF SIGNS OF INFECTION OTHERWISE                    WHAT IS AN INSERTABLE CARDIAC MONITOR    An insertable cardiac monitor is a small device that continuously monitors heart rhythms and records them automatically or by using a hand-held patient assistant. The device is inserted just beneath the skin, during a short outpatient procedure.    HOW DOES AN INSERTABLE CARDIAC MONITOR WORK?  An implantable loop recorder, like a Holter monitor and event monitor, reads your heart’s electrical activity. Your heart has an electrical system that functions like a natural pacemaker. Electrical signals travel through the chambers of the heart to cause it to beat. If the heart’s natural electrical system is not functioning correctly, an abnormal heartbeat (arrhythmia) may result.  The implantable loop recorder continuously monitors the electrical activity of the heart and automatically begins recording information when the heart’s patterns change. Alternately, you may be instructed by your physician to turn on the device’s recording function by using an external“activator,” a hand-held device that you hold over the site on your chest where the recorder has been implanted.    Once the cardiac monitor is inserted, it is programmed to

## 2024-02-20 NOTE — PROGRESS NOTES
Patient admitted, consent signature verified and questions answered. Patient ready for procedure. Call light to reach with side rails up 2 of 2. Family2% Chlorhexidine cloths used to prep chest site at bedside with patient.  History and physical needs completed..

## 2024-02-20 NOTE — DISCHARGE SUMMARY
3  Associated Diagnoses:Mild persistent asthma without complication    Misc. Devices (ROLLATOR ULTRA-LIGHT) MISC  Use daily to ambulate in the home.  Qty: 1 each Refills: 0  Associated Diagnoses:Chronic pain syndrome; Chronic combined systolic and diastolic congestive heart failure (HCC); Morbid obesity with BMI of 50.0-59.9, adult (HCC)    nystatin (MYCOSTATIN) 096089 UNIT/GM cream  Apply topically 2 times daily.  Qty: 30 g Refills: 1  Associated Diagnoses:Candidiasis of skin    Handicap Placard MISC  by Does not apply route 5 year  Qty: 1 each Refills: 0  Associated Diagnoses:Chronic pain syndrome    polyethylene glycol (GLYCOLAX) 17 g packet  polyethylene glycol 3350 17 gram/dose oral powder  Qty: 527 g Refills: 0    aspirin 81 MG EC tablet  Take 1 tablet by mouth    Respiratory Therapy Supplies (NEBULIZER COMPRESSOR) KIT  Provide insurance covered nebulizer, use daily as needed  Qty: 1 kit Refills: 0  Associated Diagnoses:Mild persistent asthma without complication          Current Discharge Medication List        Time Spent on Discharge:  20 minutes were spent in patient examination, evaluation, counseling as well as medication reconciliation, prescriptions for required medications, discharge plan, and follow up.    Electronically signed by Deion Lopez MD on 2/20/24 at 1:44 PM EST

## 2024-02-20 NOTE — PROGRESS NOTES
Patient returned to room . Post ILR insertion recovery initiated.    Patient awake and alert, denies any complaints.    Dressing intact to mid chest. No drainage noted. Family at bedside. Side rail usp times 2, call light with in reach.

## 2024-02-20 NOTE — PROGRESS NOTES
Patient states she will be leaving at 3pm . Patient agreeable to sign AMA papers. Dr Andrews informed.

## 2024-02-20 NOTE — PROGRESS NOTES
Patient left per w/c with instructions.All discharge instructions reviewed with patient, questions answered.  Patient discharged per w/c with family and belongings.AMA paper signed.Patient anxious to get to an appointment. Verbalized understanding of instructions.

## 2024-02-20 NOTE — PROCEDURES
Procedure is implantable loop.    Preoperative diagnosis is reported SVT but no available documentation off of that.  Sinus bradycardia systolic congestive heart failure.    Postoperative diagnosis is the same.    Patient was brought to the EP lab in the postabsorptive state.  Vitals were stable.  Informed consent was obtained.  The left hemithorax was prepared and draped in the usual manner.  The fourth intercostal space was infiltrated with 2% Xylocaine.  [employer] implantable loop model number M301 serial number of [id number] was implanted using the tools by [employer].  R waves of 0.2 mV.  Hemostasis was secured.  The wound was bandaged in the usual fashion.  Fluoroscopy was done.  Patient tolerated the procedure well and was going to be observed and discharged home later today.

## 2024-02-20 NOTE — H&P
58 years old female presented to the hospital in November with reported SVT no available documentation of the tachycardia although everybody in the emergency room talked about SVT.  Dr. Ugarte described the rhythm strips but he admitted himself that it was not available.  There is different descriptions of the tachycardia from SVT to atrial fibrillation to wide QRS tachycardia.    The patient describes episodes of sudden palpitation for years.  EKG also reported significant bradycardia.    Ejection fraction reported 45%.    Reviewing of other systems are noncontributory.    Allergies      Entresto [Sacubitril-Valsartan] Other (See Comments)        Acute kidney injury    Food Swelling       peaches    Gabapentin Swelling    Lyrica [Pregabalin] Swelling       Mouth sores    Prunus Persica      Tetracycline      Tetracyclines & Related              Past medical history   Arthritis      Bronchitis       On ICS-LABA, denies asthma, copd    CHF (congestive heart failure) (HCC)      Chronic back pain      COVID-19       diagnosed in September 2020, hospitilized on oxgyen    Depression      Fibromyalgia      GERD (gastroesophageal reflux disease)      Gout 10/2019    History of heart attack      HLD (hyperlipidemia)      Hypertension      Insomnia      Osteoarthritis      Thyroid disorder        Past surgical history      BACK SURGERY         CARDIAC CATHETERIZATION   07/08/2022    CHOLECYSTECTOMY, LAPAROSCOPIC        HYSTERECTOMY, TOTAL ABDOMINAL (CERVIX REMOVED)        KNEE ARTHROSCOPY Right      KNEE SURGERY Left 2009    NECK SURGERY        PACEMAKER PLACEMENT        SHOULDER SURGERY Right 2009    THYROIDECTOMY                   On examination alert oriented cooperative morbidly obese female.  BMI of 53.2 kg/m².  Sinus rhythm.  JVP is not elevated fair and equal bilateral air entry.  No gallop rub or murmur no hepatosplenomegaly and no pedal edema.    ECG showed sinus rhythm first-degree AV block incomplete left bundle

## 2024-02-28 ENCOUNTER — OFFICE VISIT (OUTPATIENT)
Age: 59
End: 2024-02-28

## 2024-02-28 VITALS
BODY MASS INDEX: 44.41 KG/M2 | SYSTOLIC BLOOD PRESSURE: 156 MMHG | WEIGHT: 293 LBS | DIASTOLIC BLOOD PRESSURE: 90 MMHG | TEMPERATURE: 97.7 F | RESPIRATION RATE: 20 BRPM | HEART RATE: 95 BPM | OXYGEN SATURATION: 98 % | HEIGHT: 68 IN

## 2024-02-28 DIAGNOSIS — R00.2 PALPITATION: Primary | ICD-10-CM

## 2024-02-28 DIAGNOSIS — I44.7 LBBB (LEFT BUNDLE BRANCH BLOCK): ICD-10-CM

## 2024-02-28 DIAGNOSIS — I47.10 SVT (SUPRAVENTRICULAR TACHYCARDIA): ICD-10-CM

## 2024-02-28 NOTE — PROGRESS NOTES
[facility] CARDIAC ELECTROPHYSIOLOGY  [address]  [facility]  [address]    Date of Visit:  2024  Patient Name: Mary Bills   Patient :  1965   Referring By:  No ref. provider found     CHIEF COMPLAINT/HPI:     Mary Bills is a 58 y.o. female who presents today for an general visit to be evaluated for the following condition(s):  Chief Complaint   Patient presents with    Post-Op Check     Loop implant   Patient did have her loop implanted recently.  It went uncomplicated.  She presents today for follow-up.  No symptoms to suggest complication.  No symptoms of events.  The monitor by [employer] was interrogated and there is been 0 events from the day of the implant till now.  The wound is still healing.  We removed the bandage she replaced and will put a new 1 Steri-Strips.  Rest of the examination is otherwise within acceptable range of normality.    REVIEW OF SYSTEM      Review of Systems noncontributory    REVIEWED INFORMATION      Allergies   Allergen Reactions    Entresto [Sacubitril-Valsartan] Other (See Comments)     Acute kidney injury    Food Swelling     peaches    Gabapentin Swelling    Lyrica [Pregabalin] Swelling     Mouth sores    Prunus Persica     Savella [Milnacipran]     Tetracycline     Tetracyclines & Related        Current Outpatient Medications   Medication Sig Dispense Refill    spironolactone (ALDACTONE) 25 MG tablet Take 1 tablet by mouth once daily 90 tablet 0    sucralfate (CARAFATE) 1 GM tablet Take 1 tablet in AM, 2 tablet in evening daily 270 tablet 1    propafenone (RYTHMOL) 150 MG tablet TAKE 1 TABLET BY MOUTH EVERY 8 HOURS 90 tablet 0    levothyroxine (SYNTHROID) 50 MCG tablet Take 1 tablet by mouth Daily 90 tablet 0    bumetanide (BUMEX) 1 MG tablet Take 1 tablet by mouth daily PRN 90 tablet 0    isosorbide dinitrate (ISORDIL) 10 MG tablet Take 1 tablet by mouth 2 times daily 180 tablet 0    albuterol sulfate HFA

## 2024-03-03 DIAGNOSIS — N39.41 URGE INCONTINENCE: ICD-10-CM

## 2024-03-03 DIAGNOSIS — I47.10 SUPRAVENTRICULAR TACHYCARDIA: ICD-10-CM

## 2024-03-03 DIAGNOSIS — Z76.0 MEDICATION REFILL: ICD-10-CM

## 2024-03-04 ENCOUNTER — OFFICE VISIT (OUTPATIENT)
Age: 59
End: 2024-03-04
Payer: MEDICARE

## 2024-03-04 VITALS
BODY MASS INDEX: 44.41 KG/M2 | RESPIRATION RATE: 18 BRPM | WEIGHT: 293 LBS | SYSTOLIC BLOOD PRESSURE: 162 MMHG | DIASTOLIC BLOOD PRESSURE: 95 MMHG | OXYGEN SATURATION: 93 % | HEART RATE: 99 BPM | HEIGHT: 68 IN

## 2024-03-04 DIAGNOSIS — R00.2 PALPITATION: Primary | ICD-10-CM

## 2024-03-04 PROCEDURE — 3077F SYST BP >= 140 MM HG: CPT | Performed by: SPECIALIST

## 2024-03-04 PROCEDURE — 99213 OFFICE O/P EST LOW 20 MIN: CPT | Performed by: SPECIALIST

## 2024-03-04 PROCEDURE — 3017F COLORECTAL CA SCREEN DOC REV: CPT | Performed by: SPECIALIST

## 2024-03-04 PROCEDURE — G8427 DOCREV CUR MEDS BY ELIG CLIN: HCPCS | Performed by: SPECIALIST

## 2024-03-04 PROCEDURE — G8482 FLU IMMUNIZE ORDER/ADMIN: HCPCS | Performed by: SPECIALIST

## 2024-03-04 PROCEDURE — G8417 CALC BMI ABV UP PARAM F/U: HCPCS | Performed by: SPECIALIST

## 2024-03-04 PROCEDURE — 3080F DIAST BP >= 90 MM HG: CPT | Performed by: SPECIALIST

## 2024-03-04 PROCEDURE — 1036F TOBACCO NON-USER: CPT | Performed by: SPECIALIST

## 2024-03-04 RX ORDER — PROPAFENONE HYDROCHLORIDE 150 MG/1
150 TABLET, COATED ORAL EVERY 8 HOURS SCHEDULED
Qty: 90 TABLET | Refills: 0 | Status: SHIPPED | OUTPATIENT
Start: 2024-03-04

## 2024-03-04 RX ORDER — CEPHALEXIN 500 MG/1
500 CAPSULE ORAL 3 TIMES DAILY
Qty: 30 CAPSULE | Refills: 2 | Status: SHIPPED | OUTPATIENT
Start: 2024-03-04

## 2024-03-04 RX ORDER — OXYBUTYNIN CHLORIDE 5 MG/1
5 TABLET ORAL 2 TIMES DAILY
Qty: 180 TABLET | Refills: 0 | OUTPATIENT
Start: 2024-03-04

## 2024-03-04 NOTE — PROGRESS NOTES
[facility] CARDIAC ELECTROPHYSIOLOGY  [address]  [place]  [address]    Date of Visit:  3/4/2024  Patient Name: Mary Bills   Patient :  1965   Referring By:  No ref. provider found     CHIEF COMPLAINT/HPI:     Mary Bills is a 58 y.o. female who presents today for an general visit to be evaluated for the following condition(s):  Chief Complaint   Patient presents with    Follow-up     Andie feels like it is coming out and green fluid leaking      Patient reports drainage from the incision and that there is the loop is kind of visible.  It may need to be explanted is not healing at this supposed to.  We agreed we will start her on antibiotics and proceed from there.  Will see her in a week or so sooner if the problem is worse at    No reported symptoms of tachycardia  REVIEW OF SYSTEM      Review of Systems noncontributory    REVIEWED INFORMATION      Allergies   Allergen Reactions    Entresto [Sacubitril-Valsartan] Other (See Comments)     Acute kidney injury    Food Swelling     peaches    Gabapentin Swelling    Lyrica [Pregabalin] Swelling     Mouth sores    Prunus Persica     Savella [Milnacipran]     Tetracycline     Tetracyclines & Related        Current Outpatient Medications   Medication Sig Dispense Refill    propafenone (RYTHMOL) 150 MG tablet TAKE 1 TABLET BY MOUTH EVERY 8 HOURS 90 tablet 0    spironolactone (ALDACTONE) 25 MG tablet Take 1 tablet by mouth once daily 90 tablet 0    sucralfate (CARAFATE) 1 GM tablet Take 1 tablet in AM, 2 tablet in evening daily 270 tablet 1    levothyroxine (SYNTHROID) 50 MCG tablet Take 1 tablet by mouth Daily 90 tablet 0    bumetanide (BUMEX) 1 MG tablet Take 1 tablet by mouth daily PRN 90 tablet 0    isosorbide dinitrate (ISORDIL) 10 MG tablet Take 1 tablet by mouth 2 times daily 180 tablet 0    albuterol sulfate HFA (PROVENTIL;VENTOLIN;PROAIR) 108 (90 Base) MCG/ACT inhaler Inhale 2 puffs into the lungs every 4 hours as needed for

## 2024-03-06 ENCOUNTER — OFFICE VISIT (OUTPATIENT)
Age: 59
End: 2024-03-06
Payer: MEDICARE

## 2024-03-06 VITALS
HEART RATE: 103 BPM | HEIGHT: 68 IN | WEIGHT: 293 LBS | DIASTOLIC BLOOD PRESSURE: 88 MMHG | BODY MASS INDEX: 44.41 KG/M2 | SYSTOLIC BLOOD PRESSURE: 134 MMHG | TEMPERATURE: 98.3 F

## 2024-03-06 DIAGNOSIS — T82.7XXD INFECTION AND INFLAMMATORY REACTION DUE TO CARDIAC DEVICE, IMPLANT, AND GRAFT, SUBSEQUENT ENCOUNTER: Primary | ICD-10-CM

## 2024-03-06 PROCEDURE — G8482 FLU IMMUNIZE ORDER/ADMIN: HCPCS | Performed by: SPECIALIST

## 2024-03-06 PROCEDURE — 1036F TOBACCO NON-USER: CPT | Performed by: SPECIALIST

## 2024-03-06 PROCEDURE — G8428 CUR MEDS NOT DOCUMENT: HCPCS | Performed by: SPECIALIST

## 2024-03-06 PROCEDURE — 99213 OFFICE O/P EST LOW 20 MIN: CPT | Performed by: SPECIALIST

## 2024-03-06 PROCEDURE — 3075F SYST BP GE 130 - 139MM HG: CPT | Performed by: SPECIALIST

## 2024-03-06 PROCEDURE — G8417 CALC BMI ABV UP PARAM F/U: HCPCS | Performed by: SPECIALIST

## 2024-03-06 PROCEDURE — 3017F COLORECTAL CA SCREEN DOC REV: CPT | Performed by: SPECIALIST

## 2024-03-06 PROCEDURE — 3079F DIAST BP 80-89 MM HG: CPT | Performed by: SPECIALIST

## 2024-03-06 NOTE — PROGRESS NOTES
[facility] CARDIAC ELECTROPHYSIOLOGY  [address]  [facility]  [address]    Date of Visit:  3/6/2024  Patient Name: Mary Bills   Patient :  1965   Referring By:  No ref. provider found     CHIEF COMPLAINT/HPI:     Mary Bills is a 58 y.o. female who presents today for an general visit to be evaluated for the following condition(s):  Chief Complaint   Patient presents with    Follow-up     1 week f/u     Patient called earlier today that the loop came out of the incision spontaneously.  She felt better since the loop came out.  There was some discharge but it has stopped.  She brought everything back to the office today.  Not much of secretions coming out of the incision.  There is no foreign bodies left and.  She is already on oral antibiotic.    We had a lengthy discussion with the patient.  We are going to wait 4 months before implanting a different loop.  The lady has significant amount of upper body weight and breast mass and if we end up implanting new 1 then we will have to avoid going closer to the breast tissues.    The lady gets episodes of palpitation and because of the significant weight issues with BMI of 53 kg/m² would like to have an idea about her tachycardia and reasons of her palpitation before we go in with invasive procedures.  She understands and she agrees  REVIEW OF SYSTEM      Review of Systems no symptoms to suggest systemic infection.    REVIEWED INFORMATION      Allergies   Allergen Reactions    Entresto [Sacubitril-Valsartan] Other (See Comments)     Acute kidney injury    Food Swelling     peaches    Gabapentin Swelling    Lyrica [Pregabalin] Swelling     Mouth sores    Prunus Persica     Savella [Milnacipran]     Tetracycline     Tetracyclines & Related        Current Outpatient Medications   Medication Sig Dispense Refill    propafenone (RYTHMOL) 150 MG tablet TAKE 1 TABLET BY MOUTH EVERY 8 HOURS 90 tablet 0    cephALEXin (KEFLEX) 500 MG capsule Take

## 2024-03-07 ENCOUNTER — HOSPITAL ENCOUNTER (OUTPATIENT)
Dept: PULMONOLOGY | Age: 59
Setting detail: THERAPIES SERIES
Discharge: HOME | End: 2024-03-07
Attending: INTERNAL MEDICINE

## 2024-03-07 VITALS — BODY MASS INDEX: 52.7 KG/M2 | WEIGHT: 293 LBS | OXYGEN SATURATION: 97 %

## 2024-03-07 ASSESSMENT — EXERCISE STRESS TEST
PEAK_BP: 138/86
PEAK_RPE: 5
PEAK_HR: 128
PEAK_BP: 138/86

## 2024-03-07 ASSESSMENT — PATIENT HEALTH QUESTIONNAIRE - PHQ9
5. POOR APPETITE OR OVEREATING: 2
SUM OF ALL RESPONSES TO PHQ QUESTIONS 1-9: 15
4. FEELING TIRED OR HAVING LITTLE ENERGY: 3
10. IF YOU CHECKED OFF ANY PROBLEMS, HOW DIFFICULT HAVE THESE PROBLEMS MADE IT FOR YOU TO DO YOUR WORK, TAKE CARE OF THINGS AT HOME, OR GET ALONG WITH OTHER PEOPLE: 2
7. TROUBLE CONCENTRATING ON THINGS, SUCH AS READING THE NEWSPAPER OR WATCHING TELEVISION: 1
1. LITTLE INTEREST OR PLEASURE IN DOING THINGS: 0
2. FEELING DOWN, DEPRESSED OR HOPELESS: 1
SUM OF ALL RESPONSES TO PHQ9 QUESTIONS 1 & 2: 1
SUM OF ALL RESPONSES TO PHQ QUESTIONS 1-9: 14
SUM OF ALL RESPONSES TO PHQ QUESTIONS 1-9: 15
3. TROUBLE FALLING OR STAYING ASLEEP: 3
9. THOUGHTS THAT YOU WOULD BE BETTER OFF DEAD, OR OF HURTING YOURSELF: 1
SUM OF ALL RESPONSES TO PHQ QUESTIONS 1-9: 15
8. MOVING OR SPEAKING SO SLOWLY THAT OTHER PEOPLE COULD HAVE NOTICED. OR THE OPPOSITE, BEING SO FIGETY OR RESTLESS THAT YOU HAVE BEEN MOVING AROUND A LOT MORE THAN USUAL: 2
6. FEELING BAD ABOUT YOURSELF - OR THAT YOU ARE A FAILURE OR HAVE LET YOURSELF OR YOUR FAMILY DOWN: 2

## 2024-03-07 ASSESSMENT — PULMONARY FUNCTION TESTS
FVC (LITERS): 2.68
FEV1/FVC: 80
FEV1 (%PREDICTED): 79

## 2024-03-07 NOTE — PLAN OF CARE
appropriate interaction with others  Uses Stress Mgmt Techniques: Yes  Patient Stated Psychosocial Goals: To enjoy life again but struggles with chronic pain. She doesn't want to be labeled as a pain seeker, but she is in a lot of pain and not getting what she needs from her current doctor       Tobacco   Tobacco  Stages of Change: Maintenance  Tobacco Use: No  Smokeless Tobacco Use: No      Tobacco Cessation Intervention   No data recorded   Tobacco Education   No data recorded   Target Goal   No data recorded     Oxygen Saturation Titration     Agree with above  Girma Buckley MD  March 8, 2024

## 2024-03-07 NOTE — PROGRESS NOTES
Completed pt. evaluation including 6 MWT for participation in the pulmonary rehab program.  Patient was able to complete 50 feet walking and was unable to continue

## 2024-03-14 ENCOUNTER — HOSPITAL ENCOUNTER (OUTPATIENT)
Dept: PULMONOLOGY | Age: 59
Setting detail: THERAPIES SERIES
Discharge: HOME | End: 2024-03-14
Payer: MEDICARE

## 2024-03-14 ENCOUNTER — APPOINTMENT (OUTPATIENT)
Dept: PULMONOLOGY | Age: 59
End: 2024-03-14
Payer: MEDICARE

## 2024-03-14 VITALS — WEIGHT: 293 LBS | BODY MASS INDEX: 52.75 KG/M2

## 2024-03-14 PROCEDURE — 94625 PHY/QHP OP PULM RHB W/O MNTR: CPT

## 2024-03-14 ASSESSMENT — EXERCISE STRESS TEST
PEAK_RPE: 1
PEAK_METS: 1.6
PEAK_BP: 138/90
PEAK_HR: 90

## 2024-03-14 NOTE — PROGRESS NOTES
*Completed initial instruct on basic O2 CO2 transport, pulmonary muscle structure.  *Breathing re-training/ application of PLB & DB w/ reconditioning exercises.   *Pt oriented to the use of KATI scales during program exercise & ADL's.   *Handouts provided with patient education packet.    Discussed medications, spacer instruct, nebulizer cleaning.

## 2024-03-19 ENCOUNTER — APPOINTMENT (OUTPATIENT)
Dept: PULMONOLOGY | Age: 59
End: 2024-03-19
Payer: MEDICARE

## 2024-03-21 ENCOUNTER — HOSPITAL ENCOUNTER (OUTPATIENT)
Dept: PULMONOLOGY | Age: 59
Setting detail: THERAPIES SERIES
Discharge: HOME | End: 2024-03-21
Payer: MEDICARE

## 2024-03-21 VITALS — WEIGHT: 293 LBS | BODY MASS INDEX: 52.34 KG/M2

## 2024-03-21 PROCEDURE — 94625 PHY/QHP OP PULM RHB W/O MNTR: CPT

## 2024-03-21 ASSESSMENT — EXERCISE STRESS TEST
PEAK_HR: 91
PEAK_BP: 140/82
PEAK_METS: 2.7
PEAK_RPE: 5

## 2024-03-26 ENCOUNTER — HOSPITAL ENCOUNTER (OUTPATIENT)
Dept: PULMONOLOGY | Age: 59
Setting detail: THERAPIES SERIES
Discharge: HOME | End: 2024-03-26
Payer: MEDICARE

## 2024-03-26 VITALS — WEIGHT: 293 LBS | BODY MASS INDEX: 51.88 KG/M2

## 2024-03-26 PROCEDURE — 94625 PHY/QHP OP PULM RHB W/O MNTR: CPT

## 2024-03-26 ASSESSMENT — EXERCISE STRESS TEST
PEAK_RPE: 4
PEAK_METS: 1.9
PEAK_HR: 90
PEAK_BP: 140/84

## 2024-03-28 ENCOUNTER — HOSPITAL ENCOUNTER (OUTPATIENT)
Dept: PULMONOLOGY | Age: 59
Setting detail: THERAPIES SERIES
Discharge: HOME | End: 2024-03-28
Payer: MEDICARE

## 2024-03-28 VITALS — WEIGHT: 293 LBS | BODY MASS INDEX: 52.03 KG/M2

## 2024-03-28 PROCEDURE — 94625 PHY/QHP OP PULM RHB W/O MNTR: CPT

## 2024-03-28 ASSESSMENT — EXERCISE STRESS TEST
PEAK_BP: 160/80
PEAK_METS: 2.1
PEAK_HR: 96
PEAK_RPE: 4

## 2024-04-01 DIAGNOSIS — I47.10 SUPRAVENTRICULAR TACHYCARDIA (HCC): ICD-10-CM

## 2024-04-01 RX ORDER — PROPAFENONE HYDROCHLORIDE 150 MG/1
150 TABLET, COATED ORAL EVERY 8 HOURS SCHEDULED
Qty: 90 TABLET | Refills: 0 | Status: SHIPPED | OUTPATIENT
Start: 2024-04-01

## 2024-04-02 ENCOUNTER — HOSPITAL ENCOUNTER (OUTPATIENT)
Dept: PULMONOLOGY | Age: 59
Setting detail: THERAPIES SERIES
Discharge: HOME | End: 2024-04-02
Payer: MEDICARE

## 2024-04-02 VITALS — OXYGEN SATURATION: 97 %

## 2024-04-02 ASSESSMENT — EXERCISE STRESS TEST
PEAK_RPE: 4
PEAK_BP: 16/80
PEAK_METS: 2.7
PEAK_BP: 140/76
PEAK_HR: 96

## 2024-04-02 NOTE — PLAN OF CARE
Hospital Based Pulmonary Rehab: Individual Treatment Plan  [facility] Deyvi Bills, 58 y.o. female, -1965 Today's Date: 2024    Qualifying Diagnosis-   Treatment Diagnosis 1: Covid      Treatment Diagnosis 2: Other (Comments) (SOB)       Education completed on proper pursed lip and diaphragmatic breathing. Reviewed energy conservation techniques as they pertain to ADL's, lung anatomy. Reviewed respiratory medications, frequency and dosage. Furthermore, reviewed MDI and nebulizer cleaning procedures. Infection control reviewed. Exercise orientation and safety completed. Patient verbalized understanding, education completed on 3/14/24.      Treatment Diagnosis   Treatment Diagnosis  Treatment Diagnosis 1: Covid  Treatment Diagnosis 2: Other (Comments) (SOB)  Significant Cardiovascular History: History of heart failure, Previous myocardial infarction  Co-morbidities: Pulmonary disease, Previous MI     ITP Visit Type  ITP Visit Type: Re-assessment         Exercise   Exercise  Perry Total Score: 20  Stages of Change: Action  Exercise Test: Six minute walk test  Distance Walked in : ft  Distance Walked (ft): 50 ft  Peak HR: 96  Peak BP: 140/76  Peak RPE: 4  Peak Mets: 2.7  O2 Saturation: 97  Stops: takes breaks on nustep due to pain in legs  SPO2 Range: 96-98  BMI (Calculated): 52.62  FEV1 (%PRED): 79       Exercise Prescription   Exercise Prescription  Mode: Stepper; Ergometer; Other (comment) (free weights)  Frequency per week: 2x per week  Duration Per Session: up to 60 minutes  Intensity METS      : RPE<15/RPD<5  Progression: Increase aerobic resistance and weight training gradually based on orthopedic restrictions pain and established parameters for Sp02, Max HR, BP, RPD, and RPE.  SpO2: 97 %  O2 Device: Room air  Comments: Patient is unable to do the treadmill due to bad hips  Symptoms with Exercise: Shortness of breath; Other (comment) (hip pain)  Target Heart Rate: 151 (Max

## 2024-04-04 ENCOUNTER — HOSPITAL ENCOUNTER (OUTPATIENT)
Dept: PULMONOLOGY | Age: 59
Setting detail: THERAPIES SERIES
Discharge: HOME | End: 2024-04-04
Payer: MEDICARE

## 2024-04-04 VITALS — WEIGHT: 293 LBS | BODY MASS INDEX: 51.41 KG/M2

## 2024-04-04 PROCEDURE — 94625 PHY/QHP OP PULM RHB W/O MNTR: CPT

## 2024-04-04 ASSESSMENT — EXERCISE STRESS TEST
PEAK_RPE: 4
PEAK_BP: 140/82
PEAK_HR: 90
PEAK_METS: 2.1

## 2024-04-09 ENCOUNTER — HOSPITAL ENCOUNTER (OUTPATIENT)
Dept: PULMONOLOGY | Age: 59
Setting detail: THERAPIES SERIES
Discharge: HOME | End: 2024-04-09
Payer: MEDICARE

## 2024-04-09 VITALS — WEIGHT: 293 LBS | BODY MASS INDEX: 51.58 KG/M2

## 2024-04-09 PROCEDURE — 94625 PHY/QHP OP PULM RHB W/O MNTR: CPT

## 2024-04-09 ASSESSMENT — EXERCISE STRESS TEST
PEAK_HR: 92
PEAK_RPE: 14
PEAK_METS: 1.7

## 2024-04-10 VITALS — OXYGEN SATURATION: 97 %

## 2024-04-10 ASSESSMENT — EXERCISE STRESS TEST
PEAK_RPE: 4
PEAK_BP: 16/80
PEAK_BP: 140/76
PEAK_HR: 96
PEAK_METS: 2.7

## 2024-04-10 NOTE — PLAN OF CARE
No       Psychosocial education   Psychosocial Education  Education: Coping techniques; Environmental triggers; Relaxation techniques       Psychosocial target goals   Psychosocial Target Goals  Target Goal(s): Demonstrates appropriate interaction with others  Uses Stress Mgmt Techniques: Yes       Tobacco   Tobacco  Stages of Change: Maintenance  Tobacco Use: No  Smokeless Tobacco Use: No      Tobacco Cessation Intervention   No data recorded   Tobacco Education   No data recorded   Target Goal   No data recorded     Oxygen Saturation Titration      agree with above findings  and plan     Girma Buckley MD     April 10, 2024

## 2024-04-11 ENCOUNTER — HOSPITAL ENCOUNTER (OUTPATIENT)
Dept: PULMONOLOGY | Age: 59
Setting detail: THERAPIES SERIES
Discharge: HOME | End: 2024-04-11
Payer: MEDICARE

## 2024-04-11 NOTE — PROGRESS NOTES
Patient called and will not be in today due to pain. She will not be here the 16 due to another appointment.

## 2024-04-16 ENCOUNTER — HOSPITAL ENCOUNTER (OUTPATIENT)
Dept: PULMONOLOGY | Age: 59
Setting detail: THERAPIES SERIES
Discharge: HOME | End: 2024-04-16
Payer: MEDICARE

## 2024-04-18 ENCOUNTER — HOSPITAL ENCOUNTER (OUTPATIENT)
Dept: PULMONOLOGY | Age: 59
Setting detail: THERAPIES SERIES
Discharge: HOME | End: 2024-04-18
Payer: MEDICARE

## 2024-04-18 VITALS — BODY MASS INDEX: 51.3 KG/M2 | WEIGHT: 293 LBS

## 2024-04-18 PROCEDURE — 94625 PHY/QHP OP PULM RHB W/O MNTR: CPT

## 2024-04-18 ASSESSMENT — EXERCISE STRESS TEST
PEAK_METS: 2.2
PEAK_HR: 90
PEAK_RPE: 12

## 2024-04-23 ENCOUNTER — APPOINTMENT (OUTPATIENT)
Dept: PULMONOLOGY | Age: 59
End: 2024-04-23
Payer: MEDICARE

## 2024-04-23 NOTE — PROGRESS NOTES
2Subjective:      Patient ID: Mary Bills is a 58 y.o. female.     HPI  Patient here for follow-up for history of COVID-19 in May 2022, obesity, mild persistent asthma,. Last seen in office per me in December 2023 and per Dr. Brothers in October 2023    Patient was scheduled to follow-up with Dr. Brothers on 4/1/2023 but was rescheduled, and was rescheduled with me.    At last appointment we had discussed a sleep study, patient unfortunately has not yet completed.  Stating that she has difficulty with transportation issues.  Since she was last seen in the office patient was admitted in February with bradycardia, she had an implantable loop recorder placed however her body rejected it and we were not able to obtain any data.  Lengthy discussion with patient that untreated sleep apnea can provoke arrhythmias, strongly advised that she work with her transportation company in order to get sleep study completed.  Patient voices understanding but states that insurance transportation is difficulty and she is reluctant to ask her family for help.  As they unfortunately trigger mental health issues for her    Medications:   Singulair montelukast 10 mg  Symbicort 160-4.5 mcg:  Albuterol      Smoking status:  Lifelong non-smoker    Occupational:  Office/Healthcare  Chemical exposure: No  Pets at home: No  Birds/Turtles: No  Indoor Hot tubs or saunas: No  Family Hx: No lung cancer        PRIOR WORKUP:  PFT:  PFT 1/7/2021: Restrictive flow-volume loop FEV1 is 79% of predicted, 4% bronchodilator change to 82% of predicted.  FVC 75% of predicted with no bronchodilator change.  FEV1/FVC ratio is 84.  Diffusion capacity 74% of predicted, corrected for alveolar volume is 112% of predicted.  No lung volumes were available for review.  Normal spirometry.    CT Imaging:  CT chest 6/17/2022: No mediastinal adenopathy.  No consolidations, pleural effusion or pneumothorax.  No groundglass opacities.    CT chest 12/1/2022: Negative for

## 2024-04-25 ENCOUNTER — HOSPITAL ENCOUNTER (OUTPATIENT)
Dept: PULMONOLOGY | Age: 59
Setting detail: THERAPIES SERIES
Discharge: HOME | End: 2024-04-25
Payer: MEDICARE

## 2024-04-25 VITALS — BODY MASS INDEX: 51.1 KG/M2 | WEIGHT: 293 LBS

## 2024-04-25 PROCEDURE — 94625 PHY/QHP OP PULM RHB W/O MNTR: CPT

## 2024-04-25 ASSESSMENT — EXERCISE STRESS TEST
PEAK_METS: 2.3
PEAK_HR: 112
PEAK_RPE: 12
PEAK_BP: 140/92
PEAK_RPE: 12
PEAK_HR: 92

## 2024-04-25 NOTE — PROGRESS NOTES
Exercise session paused for patient to attend Nutrition class.  Exercise resumed once class completed.

## 2024-04-30 ENCOUNTER — HOSPITAL ENCOUNTER (OUTPATIENT)
Dept: PULMONOLOGY | Age: 59
Setting detail: THERAPIES SERIES
Discharge: HOME | End: 2024-04-30
Payer: MEDICARE

## 2024-05-02 ENCOUNTER — HOSPITAL ENCOUNTER (OUTPATIENT)
Dept: PULMONOLOGY | Age: 59
Setting detail: THERAPIES SERIES
Discharge: HOME | End: 2024-05-02

## 2024-05-06 ENCOUNTER — OFFICE VISIT (OUTPATIENT)
Dept: PULMONOLOGY | Age: 59
End: 2024-05-06
Payer: MEDICARE

## 2024-05-06 VITALS
WEIGHT: 293 LBS | SYSTOLIC BLOOD PRESSURE: 132 MMHG | DIASTOLIC BLOOD PRESSURE: 83 MMHG | HEIGHT: 68 IN | RESPIRATION RATE: 20 BRPM | BODY MASS INDEX: 44.41 KG/M2 | HEART RATE: 80 BPM | OXYGEN SATURATION: 96 %

## 2024-05-06 DIAGNOSIS — M79.7 FIBROMYALGIA: ICD-10-CM

## 2024-05-06 DIAGNOSIS — E66.01 CLASS 3 SEVERE OBESITY DUE TO EXCESS CALORIES WITH SERIOUS COMORBIDITY AND BODY MASS INDEX (BMI) OF 50.0 TO 59.9 IN ADULT (HCC): ICD-10-CM

## 2024-05-06 DIAGNOSIS — J45.30 MILD PERSISTENT ASTHMA WITHOUT COMPLICATION: ICD-10-CM

## 2024-05-06 DIAGNOSIS — G47.33 OSA (OBSTRUCTIVE SLEEP APNEA): Primary | ICD-10-CM

## 2024-05-06 DIAGNOSIS — J42 CHRONIC BRONCHITIS, UNSPECIFIED CHRONIC BRONCHITIS TYPE (HCC): ICD-10-CM

## 2024-05-06 PROCEDURE — 99214 OFFICE O/P EST MOD 30 MIN: CPT | Performed by: NURSE PRACTITIONER

## 2024-05-06 PROCEDURE — G8417 CALC BMI ABV UP PARAM F/U: HCPCS | Performed by: NURSE PRACTITIONER

## 2024-05-06 PROCEDURE — 3017F COLORECTAL CA SCREEN DOC REV: CPT | Performed by: NURSE PRACTITIONER

## 2024-05-06 PROCEDURE — 1036F TOBACCO NON-USER: CPT | Performed by: NURSE PRACTITIONER

## 2024-05-06 PROCEDURE — G8427 DOCREV CUR MEDS BY ELIG CLIN: HCPCS | Performed by: NURSE PRACTITIONER

## 2024-05-06 PROCEDURE — 3023F SPIROM DOC REV: CPT | Performed by: NURSE PRACTITIONER

## 2024-05-06 PROCEDURE — 3079F DIAST BP 80-89 MM HG: CPT | Performed by: NURSE PRACTITIONER

## 2024-05-06 PROCEDURE — 3075F SYST BP GE 130 - 139MM HG: CPT | Performed by: NURSE PRACTITIONER

## 2024-05-06 RX ORDER — ALBUTEROL SULFATE 90 UG/1
2 AEROSOL, METERED RESPIRATORY (INHALATION) EVERY 4 HOURS PRN
Qty: 3 EACH | Refills: 3 | Status: SHIPPED | OUTPATIENT
Start: 2024-05-06 | End: 2024-08-04

## 2024-05-06 RX ORDER — BUDESONIDE AND FORMOTEROL FUMARATE DIHYDRATE 160; 4.5 UG/1; UG/1
2 AEROSOL RESPIRATORY (INHALATION) 2 TIMES DAILY
Qty: 3 EACH | Refills: 3 | Status: SHIPPED | OUTPATIENT
Start: 2024-05-06

## 2024-05-06 ASSESSMENT — SLEEP AND FATIGUE QUESTIONNAIRES
HOW LIKELY ARE YOU TO NOD OFF OR FALL ASLEEP WHILE SITTING INACTIVE IN A PUBLIC PLACE: WOULD NEVER DOZE
HOW LIKELY ARE YOU TO NOD OFF OR FALL ASLEEP WHILE SITTING AND TALKING TO SOMEONE: WOULD NEVER DOZE
HOW LIKELY ARE YOU TO NOD OFF OR FALL ASLEEP WHILE WATCHING TV: SLIGHT CHANCE OF DOZING
HOW LIKELY ARE YOU TO NOD OFF OR FALL ASLEEP WHEN YOU ARE A PASSENGER IN A CAR FOR AN HOUR WITHOUT A BREAK: WOULD NEVER DOZE
HOW LIKELY ARE YOU TO NOD OFF OR FALL ASLEEP IN A CAR, WHILE STOPPED FOR A FEW MINUTES IN TRAFFIC: WOULD NEVER DOZE
HOW LIKELY ARE YOU TO NOD OFF OR FALL ASLEEP WHILE SITTING AND READING: SLIGHT CHANCE OF DOZING
HOW LIKELY ARE YOU TO NOD OFF OR FALL ASLEEP WHILE SITTING QUIETLY AFTER LUNCH WITHOUT ALCOHOL: WOULD NEVER DOZE
HOW LIKELY ARE YOU TO NOD OFF OR FALL ASLEEP WHILE LYING DOWN TO REST IN THE AFTERNOON WHEN CIRCUMSTANCES PERMIT: WOULD NEVER DOZE
ESS TOTAL SCORE: 2

## 2024-05-07 ENCOUNTER — HOSPITAL ENCOUNTER (OUTPATIENT)
Dept: PULMONOLOGY | Age: 59
Setting detail: THERAPIES SERIES
Discharge: HOME | End: 2024-05-07

## 2024-05-07 VITALS — OXYGEN SATURATION: 99 %

## 2024-05-07 ASSESSMENT — EXERCISE STRESS TEST
PEAK_BP: 140/92
PEAK_METS: 2.3
PEAK_HR: 112
PEAK_BP: 140/92
PEAK_RPE: 14

## 2024-05-07 NOTE — PLAN OF CARE
Hospital Based Pulmonary Rehab: Individual Treatment Plan  [facility] Deyvi Bills, 58 y.o. female, -1965 Today's Date: 2024    Qualifying Diagnosis-   Treatment Diagnosis 1: Covid      Treatment Diagnosis 2: Other (Comments) (SOB)           Treatment Diagnosis   Treatment Diagnosis  Treatment Diagnosis 1: Covid  Treatment Diagnosis 2: Other (Comments) (SOB)  Significant Cardiovascular History: History of heart failure, Previous myocardial infarction  Co-morbidities: Pulmonary disease, Previous MI     ITP Visit Type  ITP Visit Type: Re-assessment         Exercise   Exercise  Perry Total Score: 20  Stages of Change: Action  Exercise Test: Six minute walk test  Distance Walked in : ft  Distance Walked (ft): 50 ft  Peak HR: 112  Peak BP: 140/92  Peak RPE: 14  Peak Mets: 2.3  O2 Saturation: 99  SPO2 Range: 96-99  BMI (Calculated): 50.83  FEV1 (%PRED): 79       Exercise Prescription   Exercise Prescription  Mode: Stepper; Ergometer; Other (comment) (free weights)  Frequency per week: 2x per week  Duration Per Session: up to 60 minutes  Intensity METS      : RPE<15/RPD<5  Progression: Increase aerobic resistance and weight training gradually based on orthopedic restrictions pain and established parameters for Sp02, Max HR, BP, RPD, and RPE.  SpO2: 99 %  O2 Device: Room air  Comments: Patient cannot do the treadmill due to bad hips  Symptoms with Exercise: Shortness of breath; Other (comment) (hip pain)  Target Heart Rate: 151 max  Resistance Training: Yes  Weight: 2  Reps: 1/6  Assisted Devices: Walker       Exercise Blood Pressures   Exercise Blood Pressures  Resting BP: 124/82  Peak BP: 140/92  Is BP WDL: Yes       Exercise Intervention   Exercise Intervention  Type: Nustep, Treadmill, Ergometer, Free weights  Frequency: 2x week  Duration: up to 60 minutes  Resistance Training: Yes  Comments: Patient is doing 16 minutes on the nustep 12 minutes on the arm ergometer       Exercise

## 2024-05-08 ENCOUNTER — OFFICE VISIT (OUTPATIENT)
Age: 59
End: 2024-05-08
Payer: MEDICARE

## 2024-05-08 VITALS
HEIGHT: 68 IN | RESPIRATION RATE: 18 BRPM | OXYGEN SATURATION: 98 % | WEIGHT: 293 LBS | DIASTOLIC BLOOD PRESSURE: 68 MMHG | BODY MASS INDEX: 44.41 KG/M2 | TEMPERATURE: 98 F | SYSTOLIC BLOOD PRESSURE: 110 MMHG | HEART RATE: 93 BPM

## 2024-05-08 DIAGNOSIS — I47.10 SVT (SUPRAVENTRICULAR TACHYCARDIA) (HCC): ICD-10-CM

## 2024-05-08 DIAGNOSIS — R00.2 PALPITATION: Primary | ICD-10-CM

## 2024-05-08 PROCEDURE — G8427 DOCREV CUR MEDS BY ELIG CLIN: HCPCS | Performed by: SPECIALIST

## 2024-05-08 PROCEDURE — 3074F SYST BP LT 130 MM HG: CPT | Performed by: SPECIALIST

## 2024-05-08 PROCEDURE — 3078F DIAST BP <80 MM HG: CPT | Performed by: SPECIALIST

## 2024-05-08 PROCEDURE — 1036F TOBACCO NON-USER: CPT | Performed by: SPECIALIST

## 2024-05-08 PROCEDURE — 99213 OFFICE O/P EST LOW 20 MIN: CPT | Performed by: SPECIALIST

## 2024-05-08 PROCEDURE — G8417 CALC BMI ABV UP PARAM F/U: HCPCS | Performed by: SPECIALIST

## 2024-05-08 PROCEDURE — 3017F COLORECTAL CA SCREEN DOC REV: CPT | Performed by: SPECIALIST

## 2024-05-08 NOTE — PROGRESS NOTES
[facility] CARDIAC ELECTROPHYSIOLOGY  [address], [address]  [facility]  [address]    Date of Visit:  2024  Patient Name: Mary Bills   Patient :  1965   Referring By:  No ref. provider found     CHIEF COMPLAINT/HPI:     Mary Bills is a 58 y.o. female who presents today for an general visit to be evaluated for the following condition(s):  Chief Complaint   Patient presents with    Follow-up     1 month follow up wound check      The wound around the area of the infected loop has healed completely.    Patient has had no recurrence of events.  But she had significant history of palpitation.  The patient has significant other comorbidities that would make the diagnosis of her tachycardia very important before we attempt to do the ablation procedure so the other loop is very helpful in making the proper preparation for the ablation procedure.  Patient understands and she agrees.    Patient is not on oral anticoagulants.  And she wants to proceed with the loop.  REVIEW OF SYSTEM      Review of Systems noncontributory.    REVIEWED INFORMATION      Allergies   Allergen Reactions    Entresto [Sacubitril-Valsartan] Other (See Comments)     Acute kidney injury    Food Swelling     peaches    Gabapentin Swelling    Lyrica [Pregabalin] Swelling     Mouth sores    Prunus Persica     Savella [Milnacipran]     Tetracycline     Tetracyclines & Related        Current Outpatient Medications   Medication Sig Dispense Refill    albuterol sulfate HFA (PROVENTIL;VENTOLIN;PROAIR) 108 (90 Base) MCG/ACT inhaler Inhale 2 puffs into the lungs every 4 hours as needed for Shortness of Breath 3 each 3    budesonide-formoterol (SYMBICORT) 160-4.5 MCG/ACT AERO Inhale 2 puffs into the lungs 2 times daily 3 each 3    propafenone (RYTHMOL) 150 MG tablet TAKE 1 TABLET BY MOUTH EVERY 8 HOURS 90 tablet 0    spironolactone (ALDACTONE) 25 MG tablet Take 1 tablet by mouth once daily 90 tablet 0    sucralfate (CARAFATE) 1 GM

## 2024-05-09 ENCOUNTER — HOSPITAL ENCOUNTER (OUTPATIENT)
Dept: PULMONOLOGY | Age: 59
Setting detail: THERAPIES SERIES
Discharge: HOME | End: 2024-05-09

## 2024-05-09 NOTE — PROGRESS NOTES
Patient called and will not attend today due to back pain and next Tuesday as she has an appointment at the pain clinic.

## 2024-05-14 ENCOUNTER — APPOINTMENT (OUTPATIENT)
Dept: PULMONOLOGY | Age: 59
End: 2024-05-14
Payer: MEDICARE

## 2024-05-16 ENCOUNTER — HOSPITAL ENCOUNTER (OUTPATIENT)
Dept: PULMONOLOGY | Age: 59
Setting detail: THERAPIES SERIES
Discharge: HOME | End: 2024-05-16

## 2024-05-17 ENCOUNTER — HOSPITAL ENCOUNTER (OUTPATIENT)
Age: 59
Setting detail: OUTPATIENT SURGERY
Discharge: HOME | End: 2024-05-17
Attending: SPECIALIST | Admitting: SPECIALIST
Payer: MEDICARE

## 2024-05-17 VITALS
RESPIRATION RATE: 21 BRPM | HEART RATE: 74 BPM | SYSTOLIC BLOOD PRESSURE: 156 MMHG | OXYGEN SATURATION: 100 % | DIASTOLIC BLOOD PRESSURE: 93 MMHG

## 2024-05-17 DIAGNOSIS — I47.10 SVT (SUPRAVENTRICULAR TACHYCARDIA) (HCC): ICD-10-CM

## 2024-05-17 DIAGNOSIS — R00.2 PALPITATIONS: ICD-10-CM

## 2024-05-17 LAB
BUN BLD-MCNC: 20 MG/DL (ref 8–26)
CHLORIDE BLD-SCNC: 112 MMOL/L (ref 98–107)
EGFR, POC: 64 ML/MIN/1.73M2
GLUCOSE BLD-MCNC: 84 MG/DL (ref 74–100)
HCT VFR BLD AUTO: 33 % (ref 36–46)
POC CREATININE: 1 MG/DL (ref 0.51–1.19)
POC HEMOGLOBIN (CALC): 11.3 G/DL (ref 12–16)
POTASSIUM BLD-SCNC: 4.1 MMOL/L (ref 3.5–4.5)
SODIUM BLD-SCNC: 144 MMOL/L (ref 138–146)

## 2024-05-17 PROCEDURE — 7100000011 HC PHASE II RECOVERY - ADDTL 15 MIN: Performed by: SPECIALIST

## 2024-05-17 PROCEDURE — 7100000010 HC PHASE II RECOVERY - FIRST 15 MIN: Performed by: SPECIALIST

## 2024-05-17 PROCEDURE — 33285 INSJ SUBQ CAR RHYTHM MNTR: CPT | Performed by: SPECIALIST

## 2024-05-17 PROCEDURE — C1892 INTRO/SHEATH,FIXED,PEEL-AWAY: HCPCS | Performed by: SPECIALIST

## 2024-05-17 PROCEDURE — 6360000002 HC RX W HCPCS: Performed by: SPECIALIST

## 2024-05-17 PROCEDURE — 82947 ASSAY GLUCOSE BLOOD QUANT: CPT

## 2024-05-17 PROCEDURE — 84132 ASSAY OF SERUM POTASSIUM: CPT

## 2024-05-17 PROCEDURE — 99152 MOD SED SAME PHYS/QHP 5/>YRS: CPT | Performed by: SPECIALIST

## 2024-05-17 PROCEDURE — 2500000003 HC RX 250 WO HCPCS: Performed by: SPECIALIST

## 2024-05-17 PROCEDURE — 99234 HOSP IP/OBS SM DT SF/LOW 45: CPT | Performed by: SPECIALIST

## 2024-05-17 PROCEDURE — 2709999900 HC NON-CHARGEABLE SUPPLY: Performed by: SPECIALIST

## 2024-05-17 PROCEDURE — 84520 ASSAY OF UREA NITROGEN: CPT

## 2024-05-17 PROCEDURE — 84295 ASSAY OF SERUM SODIUM: CPT

## 2024-05-17 PROCEDURE — 2580000003 HC RX 258: Performed by: SPECIALIST

## 2024-05-17 PROCEDURE — 82565 ASSAY OF CREATININE: CPT

## 2024-05-17 PROCEDURE — 82435 ASSAY OF BLOOD CHLORIDE: CPT

## 2024-05-17 PROCEDURE — 85014 HEMATOCRIT: CPT

## 2024-05-17 PROCEDURE — C1764 EVENT RECORDER, CARDIAC: HCPCS | Performed by: SPECIALIST

## 2024-05-17 PROCEDURE — 99153 MOD SED SAME PHYS/QHP EA: CPT | Performed by: SPECIALIST

## 2024-05-17 DEVICE — INSERTABLE CARDIAC MONITOR
Type: IMPLANTABLE DEVICE | Status: FUNCTIONAL
Brand: LUX-DX II+™

## 2024-05-17 RX ORDER — FENTANYL CITRATE 50 UG/ML
INJECTION, SOLUTION INTRAMUSCULAR; INTRAVENOUS PRN
Status: DISCONTINUED | OUTPATIENT
Start: 2024-05-17 | End: 2024-05-17 | Stop reason: HOSPADM

## 2024-05-17 RX ORDER — MIDAZOLAM HYDROCHLORIDE 1 MG/ML
INJECTION INTRAMUSCULAR; INTRAVENOUS PRN
Status: DISCONTINUED | OUTPATIENT
Start: 2024-05-17 | End: 2024-05-17 | Stop reason: HOSPADM

## 2024-05-17 RX ORDER — SODIUM CHLORIDE 9 MG/ML
INJECTION, SOLUTION INTRAVENOUS CONTINUOUS
Status: DISCONTINUED | OUTPATIENT
Start: 2024-05-17 | End: 2024-05-17 | Stop reason: HOSPADM

## 2024-05-17 RX ADMIN — SODIUM CHLORIDE: 9 INJECTION, SOLUTION INTRAVENOUS at 10:37

## 2024-05-17 NOTE — PROGRESS NOTES
Received post Loop recorder procedure to PCC room 3. Assessment obtained. Restrictions reviewed with patient. Post procedure pathway initiated.  Left chest site soft , no dressing in place, site clean and dry, glue intact.

## 2024-05-17 NOTE — DISCHARGE INSTRUCTIONS
DISCHARGE INSTRUCTIONS FOR LOOP RECORDER IMPLANTS / DR MELODY WALSH    -DRESSING REMAINS ON UNTIL SEEN FOR FOLLOW UP APPOINTMENT ONE WEEK    - SPONGE BATH ONLY    -DO NO RUB THE INCISIONAL SITE     -NO DRIVING FOR 24 HOURS    -WATCH FOR SIGNS OF INFECTION--INCREASE SWELLING AND / OR REDNESS / OR TENDERNESS   DRAINAGE FROM INCISION   YOU DEVELOP A FEVER AND DO NOT HAVE A COLD OR THE FLU  -CALL THE OFFICE IMMEDIATELY OR SEEK HELP    -AVOID CLOTHING THAT WILL RUB OR IRRITATE INCISION    -FOLLOW UP Dr Stone  ONE WEEK /  PLEASE CALL FOR APPOINTMENT        What is an implantable heart monitor?     An implantable heart monitor is a small device placed under the skin of your chest. It records the electrical signals from your heart. A monitor is used to look for irregular heartbeats. It can help your doctor find out what is causing your fainting, lightheadedness, or other symptoms. It also can help your doctor check to see if treatment for an irregular heartbeat is working.  The monitor may be placed near the middle of your chest. The monitor may be about the size of a paper clip.  These monitors are used if an irregular heart rhythm or your symptoms don't happen very often. They also help your doctor monitor your heart for long time

## 2024-05-17 NOTE — PROGRESS NOTES
Patient admitted, consent signed and questions answered. Patient ready for procedure. Call light to reach with side rails up 2 of 2. No hair clipped with writer and fernando present.  uncle at bedside with patient.  History and physical needs updated.

## 2024-05-17 NOTE — DISCHARGE SUMMARY
Discharge Summary    Date: 5/17/2024  Patient Name: Mary Bills    YOB: 1965     Age: 58 y.o.    Admit Date: 5/17/2024  Discharge Date: 5/17/2024  Discharge Condition: Good    Admission Diagnosis  SVT (supraventricular tachycardia) (HCC) [I47.10];Palpitations [R00.2]      Discharge Diagnosis  Active Problems:    * No active hospital problems. *  Resolved Problems:    * No resolved hospital problems. *      Hospital Stay  Narrative of Hospital Course:  Implantable loop.    Consultants:  IP CONSULT TO IV TEAM    Surgeries/procedures Performed:      Treatments:    Procedures        Discharge Plan/Disposition:  Home    Hospital/Incidental Findings Requiring Follow Up:    Patient Instructions:    Diet: Low Fat, Low Cholesterol Diet    Activity:Activiity as Tolerated, No Driving for Today  For number of days (if applicable):      Other Instructions:    Provider Follow-Up:   No follow-ups on file.     Significant Diagnostic Studies:    Recent Labs:  Admission on 05/17/2024  POC Hemoglobin (calc)                         Date: 05/17/2024  Value: 11.3 (L)    Ref range: 12.0 - 16.0 g/dL   Status: Final  POC Hematocrit                                Date: 05/17/2024  Value: 33 (L)      Ref range: 36 - 46 %          Status: Final  POC Creatinine                                Date: 05/17/2024  Value: 1.0         Ref range: 0.51 - 1.19 mg/dL  Status: Final  eGFR, POC                                     Date: 05/17/2024  Value: 64          Ref range: mL/min/1.73m2      Status: Final                Comment:       These results are not intended for use in patients <18 years of age.        eGFR results are calculated without a race factor using the 2021 CKD-EPI equation.  Careful clinical correlation is recommended, particularly when comparing to results   calculated using previous equations.  The CKD-EPI equation is less accurate in patients with extremes of muscle mass, extra-renal   metabolism of creatine,

## 2024-05-17 NOTE — PROGRESS NOTES
Ambulated to bathroom and in halls.  Gait steady..     All discharge instructions reviewed, questions answered, paper signed and given copy. Patient discharged per wheelchair with uncle and belongings.

## 2024-05-17 NOTE — PROCEDURES
PROCEDURE NOTE  Date: 5/17/2024   Name: Mary Bills  YOB: 1965    Procedures        Procedure is implantable loop.    Preoperative diagnosis is episodes of palpitations and reported history of SVT but no available documentation.  In the past an implantable loop was implanted and it got probably infected and came off spontaneously.    Postoperative diagnoses is the same.    Patient was brought to the EP lab in the postabsorptive state.  Vitals were stable.  Informed consent was obtained.  Vitals were stable.  The left hemithorax was prepared and draped in the usual manner.  We prepared and pulled out the breast tissue and taped it because there is significant amount of that.  We opted to go higher than the old site to avoid the issue of what happened last time.  The area was infiltrated with 2% Xylocaine.  Using Eferio loop model number DARWIN X DX 2 and #312 and serial number of [id number] was implanted with the intention of deeper implantation.  Patient tolerated the procedure well.  3-0 Vicryl was used to keep the ends of the incision coapted.  Wound was bandaged in the usual fashion.  Patient tolerated the procedure well and discharged from the lab in a stable condition.

## 2024-05-21 ENCOUNTER — HOSPITAL ENCOUNTER (OUTPATIENT)
Dept: PULMONOLOGY | Age: 59
Setting detail: THERAPIES SERIES
Discharge: HOME | End: 2024-05-21

## 2024-05-21 NOTE — PROGRESS NOTES
Patient called and she cannot attend Pulmonary Rehab this week. She had a procedure done last week and is not allowed to exercise.

## 2024-05-22 ENCOUNTER — OFFICE VISIT (OUTPATIENT)
Age: 59
End: 2024-05-22
Payer: MEDICARE

## 2024-05-22 VITALS
BODY MASS INDEX: 44.41 KG/M2 | SYSTOLIC BLOOD PRESSURE: 147 MMHG | TEMPERATURE: 99.1 F | WEIGHT: 293 LBS | HEIGHT: 68 IN | HEART RATE: 96 BPM | OXYGEN SATURATION: 98 % | DIASTOLIC BLOOD PRESSURE: 96 MMHG

## 2024-05-22 DIAGNOSIS — R00.2 PALPITATIONS: Primary | ICD-10-CM

## 2024-05-22 PROCEDURE — 99213 OFFICE O/P EST LOW 20 MIN: CPT | Performed by: SPECIALIST

## 2024-05-22 PROCEDURE — G8417 CALC BMI ABV UP PARAM F/U: HCPCS | Performed by: SPECIALIST

## 2024-05-22 PROCEDURE — 3080F DIAST BP >= 90 MM HG: CPT | Performed by: SPECIALIST

## 2024-05-22 PROCEDURE — G8427 DOCREV CUR MEDS BY ELIG CLIN: HCPCS | Performed by: SPECIALIST

## 2024-05-22 PROCEDURE — 1036F TOBACCO NON-USER: CPT | Performed by: SPECIALIST

## 2024-05-22 PROCEDURE — 3017F COLORECTAL CA SCREEN DOC REV: CPT | Performed by: SPECIALIST

## 2024-05-22 PROCEDURE — 3077F SYST BP >= 140 MM HG: CPT | Performed by: SPECIALIST

## 2024-05-22 PROCEDURE — 93291 INTERROG DEV EVAL SCRMS IP: CPT | Performed by: SPECIALIST

## 2024-05-22 NOTE — PROGRESS NOTES
tablet 0    oxybutynin (DITROPAN) 5 MG tablet Take 1 tablet by mouth 2 times daily 180 tablet 0    atorvastatin (LIPITOR) 80 MG tablet Take 1 tablet by mouth daily (Patient taking differently: Take 1 tablet by mouth nightly) 90 tablet 0    albuterol (PROVENTIL) (2.5 MG/3ML) 0.083% nebulizer solution Take 3 mLs by nebulization every 6 hours as needed for Wheezing 120 each 3    Misc. Devices (ROLLATOR ULTRA-LIGHT) MISC Use daily to ambulate in the home. 1 each 0    Handicap Placard MISC by Does not apply route 5 year 1 each 0    polyethylene glycol (GLYCOLAX) 17 g packet polyethylene glycol 3350 17 gram/dose oral powder 527 g 0    aspirin 81 MG EC tablet Take 1 tablet by mouth      levothyroxine (SYNTHROID) 50 MCG tablet Take 1 tablet by mouth Daily 90 tablet 0    isosorbide dinitrate (ISORDIL) 10 MG tablet Take 1 tablet by mouth 2 times daily 180 tablet 0    Respiratory Therapy Supplies (NEBULIZER COMPRESSOR) KIT Provide insurance covered nebulizer, use daily as needed 1 kit 0     No current facility-administered medications for this visit.        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, insomnia type (HCC)    Opioid dependence, uncomplicated (HCC)    Sacroiliac joint dysfunction of both sides    Chronic pain disorder    Bulge of cervical disc without myelopathy    DDD (degenerative disc disease), lumbar    Failed back syndrome of cervical spine    Chest pain    Class 3 severe obesity due to excess calories with serious comorbidity and body mass index (BMI) of 45.0 to 49.9 in adult (HCC)    Cervical spondylosis with radiculopathy    Status post cervical spinal fusion    History of lumbar fusion    Lumbar radiculopathy    Morbid obesity with BMI of 50.0-59.9, adult (HCC)    Chronic pain of both knees    Myalgia    Chronic use of

## 2024-05-23 ENCOUNTER — APPOINTMENT (OUTPATIENT)
Dept: PULMONOLOGY | Age: 59
End: 2024-05-23
Payer: MEDICARE

## 2024-05-28 ENCOUNTER — HOSPITAL ENCOUNTER (OUTPATIENT)
Dept: PULMONOLOGY | Age: 59
Setting detail: THERAPIES SERIES
Discharge: HOME | End: 2024-05-28
Payer: MEDICARE

## 2024-05-28 PROCEDURE — 94625 PHY/QHP OP PULM RHB W/O MNTR: CPT

## 2024-05-28 ASSESSMENT — EXERCISE STRESS TEST
PEAK_METS: 2.2
PEAK_BP: 128/84
PEAK_HR: 85
PEAK_RPE: 13

## 2024-05-29 DIAGNOSIS — E79.0 HYPERURICEMIA: ICD-10-CM

## 2024-05-29 DIAGNOSIS — Z76.0 MEDICATION REFILL: ICD-10-CM

## 2024-05-30 ENCOUNTER — HOSPITAL ENCOUNTER (OUTPATIENT)
Dept: PULMONOLOGY | Age: 59
Setting detail: THERAPIES SERIES
Discharge: HOME | End: 2024-05-30
Payer: MEDICARE

## 2024-05-30 VITALS — OXYGEN SATURATION: 97 % | WEIGHT: 293 LBS | BODY MASS INDEX: 50.27 KG/M2

## 2024-05-30 RX ORDER — ALLOPURINOL 300 MG/1
300 TABLET ORAL DAILY
Qty: 90 TABLET | Refills: 0 | Status: SHIPPED | OUTPATIENT
Start: 2024-05-30

## 2024-05-30 ASSESSMENT — EXERCISE STRESS TEST
PEAK_METS: 2.2
PEAK_RPE: 13
PEAK_HR: 85
PEAK_BP: 128/84
PEAK_BP: 128/84

## 2024-05-30 NOTE — PLAN OF CARE
Exercise Education   Exercise Education  Education: Energy conservation; Physically active; Purse lip/abdominal breathing; RPE/RPD scale; Signs/symptoms to report       Exercise target group   Exercise Target Group  Target Goal(s): Individual exercise RX; SaO2>89%  Patient Stated Exercise Goals: To increase strength and endurance         Nutrition   Nutrition  Stages of Change: Action  Diabetes: No       Lipids   No data recorded   Weight Management   Weight Management  Weight : 150 kg (330 lb 9.6 oz)  Height : 1.727 m (5' 8\")  BMI: 50.37  Alcohol: None       Nutrition Intervention   No data recorded   Nutrition Education   Nutrition Education  Education: Overall healthy eating pattern that emphasizes vegetables, fruits, whole grains, healthy proteins and non-tropical oils  Comments: Patient continues to lose weight       Nutrition Target Goals   Nutrition Target Goals  Target Goals: Weight loss of 5-10%  Patient Stated Nutrition Goals: To continue eating healthy         Psychosocial   Psychosocial  Stages of Change: Action  Family Support: No  Comments: PHQ9= 15 (patient is taking medication for depression)       Psychosocial intervention   Psychosocial Intervention  Interventions: Currently under treatment for depression  Currently Taking Psychotropic Meds: Yes  Medication Changes: No       Psychosocial education   Psychosocial Education  Education: Coping techniques; Environmental triggers; Relaxation techniques       Psychosocial target goals   Psychosocial Target Goals  Target Goal(s): Demonstrates appropriate interaction with others       Tobacco   Tobacco  Stages of Change: Maintenance  Tobacco Use: No      Tobacco Cessation Intervention   No data recorded   Tobacco Education   No data recorded   Target Goal   No data recorded     Oxygen Saturation Titration

## 2024-05-31 ENCOUNTER — OFFICE VISIT (OUTPATIENT)
Age: 59
End: 2024-05-31
Payer: MEDICARE

## 2024-05-31 VITALS
SYSTOLIC BLOOD PRESSURE: 156 MMHG | OXYGEN SATURATION: 98 % | HEART RATE: 87 BPM | HEIGHT: 68 IN | TEMPERATURE: 98.2 F | DIASTOLIC BLOOD PRESSURE: 75 MMHG | WEIGHT: 293 LBS | BODY MASS INDEX: 44.41 KG/M2

## 2024-05-31 DIAGNOSIS — L03.313 CELLULITIS OF CHEST WALL: Primary | ICD-10-CM

## 2024-05-31 PROCEDURE — G8427 DOCREV CUR MEDS BY ELIG CLIN: HCPCS | Performed by: SPECIALIST

## 2024-05-31 PROCEDURE — 99212 OFFICE O/P EST SF 10 MIN: CPT | Performed by: SPECIALIST

## 2024-05-31 PROCEDURE — 3017F COLORECTAL CA SCREEN DOC REV: CPT | Performed by: SPECIALIST

## 2024-05-31 PROCEDURE — 3077F SYST BP >= 140 MM HG: CPT | Performed by: SPECIALIST

## 2024-05-31 PROCEDURE — G8417 CALC BMI ABV UP PARAM F/U: HCPCS | Performed by: SPECIALIST

## 2024-05-31 PROCEDURE — 1036F TOBACCO NON-USER: CPT | Performed by: SPECIALIST

## 2024-05-31 PROCEDURE — 3078F DIAST BP <80 MM HG: CPT | Performed by: SPECIALIST

## 2024-05-31 RX ORDER — CEPHALEXIN 500 MG/1
500 CAPSULE ORAL 3 TIMES DAILY
Qty: 21 CAPSULE | Refills: 0 | Status: SHIPPED | OUTPATIENT
Start: 2024-05-31

## 2024-05-31 NOTE — PROGRESS NOTES
[facility] CARDIAC ELECTROPHYSIOLOGY  [address], [address]  [facility]  [address]    Date of Visit:  2024  Patient Name: Mary Bills   Patient :  1965   Referring By:  No ref. provider found     CHIEF COMPLAINT/HPI:     Mary Bills is a 58 y.o. female who presents today for an general visit to be evaluated for the following condition(s):  Chief Complaint   Patient presents with    Post-Op Check     Possible infected loop     Patient called yesterday complaining about the site of the loop.  We asked her to come today.  There is some erythema.  There is a suture which I removed.  The skin is otherwise as expected at this stage.  Patient was reassured.  REVIEW OF SYSTEM      Review of Systems noncontributory.    REVIEWED INFORMATION      Allergies   Allergen Reactions    Entresto [Sacubitril-Valsartan] Other (See Comments)     Acute kidney injury    Food Swelling     peaches    Gabapentin Swelling    Lyrica [Pregabalin] Swelling     Mouth sores    Prunus Persica      peaches    Savella [Milnacipran]     Tetracycline     Tetracyclines & Related        Current Outpatient Medications   Medication Sig Dispense Refill    allopurinol (ZYLOPRIM) 300 MG tablet Take 1 tablet by mouth once daily 90 tablet 0    albuterol sulfate HFA (PROVENTIL;VENTOLIN;PROAIR) 108 (90 Base) MCG/ACT inhaler Inhale 2 puffs into the lungs every 4 hours as needed for Shortness of Breath 3 each 3    budesonide-formoterol (SYMBICORT) 160-4.5 MCG/ACT AERO Inhale 2 puffs into the lungs 2 times daily 3 each 3    propafenone (RYTHMOL) 150 MG tablet TAKE 1 TABLET BY MOUTH EVERY 8 HOURS 90 tablet 0    spironolactone (ALDACTONE) 25 MG tablet Take 1 tablet by mouth once daily 90 tablet 0    bumetanide (BUMEX) 1 MG tablet Take 1 tablet by mouth daily PRN 90 tablet 0    pantoprazole (PROTONIX) 40 MG tablet Take 1 tablet by mouth daily 90 tablet 0    montelukast (SINGULAIR) 10 MG tablet Take 1 tablet by mouth nightly 90 tablet 3

## 2024-06-04 ENCOUNTER — HOSPITAL ENCOUNTER (OUTPATIENT)
Dept: PULMONOLOGY | Age: 59
Setting detail: THERAPIES SERIES
Discharge: HOME | End: 2024-06-04
Payer: MEDICARE

## 2024-06-04 VITALS — WEIGHT: 293 LBS | BODY MASS INDEX: 49.93 KG/M2

## 2024-06-04 PROCEDURE — 94625 PHY/QHP OP PULM RHB W/O MNTR: CPT

## 2024-06-04 ASSESSMENT — EXERCISE STRESS TEST
PEAK_METS: 2.3
PEAK_HR: 83
PEAK_RPE: 12
PEAK_BP: 122/80

## 2024-06-06 ENCOUNTER — HOSPITAL ENCOUNTER (OUTPATIENT)
Dept: PULMONOLOGY | Age: 59
Setting detail: THERAPIES SERIES
Discharge: HOME | End: 2024-06-06
Payer: MEDICARE

## 2024-06-06 VITALS — WEIGHT: 293 LBS | BODY MASS INDEX: 49.32 KG/M2

## 2024-06-06 PROCEDURE — 94625 PHY/QHP OP PULM RHB W/O MNTR: CPT

## 2024-06-06 ASSESSMENT — EXERCISE STRESS TEST
PEAK_HR: 86
PEAK_METS: 2.4
PEAK_BP: 134/84
PEAK_RPE: 12

## 2024-06-11 ENCOUNTER — APPOINTMENT (OUTPATIENT)
Dept: PULMONOLOGY | Age: 59
End: 2024-06-11
Payer: MEDICARE

## 2024-06-11 ENCOUNTER — TELEPHONE (OUTPATIENT)
Dept: GASTROENTEROLOGY | Age: 59
End: 2024-06-11

## 2024-06-13 ENCOUNTER — HOSPITAL ENCOUNTER (OUTPATIENT)
Dept: PULMONOLOGY | Age: 59
Setting detail: THERAPIES SERIES
Discharge: HOME | End: 2024-06-13
Payer: MEDICARE

## 2024-06-13 VITALS — BODY MASS INDEX: 49.28 KG/M2 | WEIGHT: 293 LBS

## 2024-06-13 PROCEDURE — 94625 PHY/QHP OP PULM RHB W/O MNTR: CPT

## 2024-06-13 ASSESSMENT — EXERCISE STRESS TEST
PEAK_BP: 128/86
PEAK_METS: 2.3
PEAK_HR: 85
PEAK_RPE: 14

## 2024-06-18 ENCOUNTER — HOSPITAL ENCOUNTER (OUTPATIENT)
Dept: PULMONOLOGY | Age: 59
Setting detail: THERAPIES SERIES
End: 2024-06-18
Payer: MEDICARE

## 2024-06-20 ENCOUNTER — APPOINTMENT (OUTPATIENT)
Dept: PULMONOLOGY | Age: 59
End: 2024-06-20
Payer: MEDICARE

## 2024-06-25 ENCOUNTER — HOSPITAL ENCOUNTER (OUTPATIENT)
Dept: PULMONOLOGY | Age: 59
Setting detail: THERAPIES SERIES
Discharge: HOME | End: 2024-06-25
Payer: MEDICARE

## 2024-06-25 VITALS — OXYGEN SATURATION: 97 %

## 2024-06-25 ASSESSMENT — EXERCISE STRESS TEST
PEAK_BP: 134/84
PEAK_BP: 134/84
PEAK_RPE: 14
PEAK_METS: 2.3
PEAK_HR: 86

## 2024-06-25 NOTE — PLAN OF CARE
Hospital Based Pulmonary Rehab: Individual Treatment Plan  [facility] Deyvi Bills, 58 y.o. female, -1965 Today's Date: 2024    Qualifying Diagnosis-   Treatment Diagnosis 1: Covid      Treatment Diagnosis 2: Other (Comments) (SOB)       COPD symptom  and travel and sleep reviewed.      Treatment Diagnosis   Treatment Diagnosis  Treatment Diagnosis 1: Covid  Treatment Diagnosis 2: Other (Comments) (SOB)  Significant Cardiovascular History: History of heart failure, Previous myocardial infarction  Co-morbidities: Pulmonary disease, Previous MI     ITP Visit Type  ITP Visit Type: Re-assessment         Exercise   Exercise  Perry Total Score: 20  Stages of Change: Action  Exercise Test: Six minute walk test  Distance Walked in : ft  Distance Walked (ft): 50 ft  Peak HR: 86  Peak BP: 134/84  Peak RPE: 14  Peak Mets: 2.3  O2 Saturation: 97  SPO2 Range: 94-99  BMI (Calculated): 50.28  FEV1 (%PRED): 79       Exercise Prescription   Exercise Prescription  Mode: Stepper; Ergometer; Other (comment) (free weights)  Frequency per week: 2x per week  Duration Per Session: up to 60 minutes  Intensity METS      : RPE<15/RPD<5  Progression: Increase aerobic resistance and weight training gradually based on orthopedic restrictions pain and established parameters for Sp02, Max HR, BP, RPD, and RPE.  SpO2: 97 %  O2 Device: Room air  Comments: Patient was having problems with her cyatic nerve but has begun to fell better and is back to improving her times  Symptoms with Exercise: Other (comment) (hip pain)  Target Heart Rate: 151 max  Resistance Training: No (limited to cyatic pain)  Weight: 2  Reps: 2/12  Assisted Devices: Walker       Exercise Blood Pressures   Exercise Blood Pressures  Resting BP: 126/78  Peak BP: 134/84  Is BP WDL: Yes       Exercise Intervention   Exercise Intervention  Type: Nustep, Treadmill, Ergometer, Free weights  Frequency: 2x week  Duration: up to 60

## 2024-06-27 ENCOUNTER — HOSPITAL ENCOUNTER (OUTPATIENT)
Dept: PULMONOLOGY | Age: 59
Setting detail: THERAPIES SERIES
Discharge: HOME | End: 2024-06-27
Payer: MEDICARE

## 2024-06-27 VITALS — WEIGHT: 293 LBS | BODY MASS INDEX: 49.48 KG/M2

## 2024-06-27 PROCEDURE — 94625 PHY/QHP OP PULM RHB W/O MNTR: CPT

## 2024-06-27 ASSESSMENT — EXERCISE STRESS TEST
PEAK_METS: 1.8
PEAK_RPE: 12
PEAK_HR: 88

## 2024-07-03 ENCOUNTER — OFFICE VISIT (OUTPATIENT)
Age: 59
End: 2024-07-03

## 2024-07-03 VITALS
DIASTOLIC BLOOD PRESSURE: 77 MMHG | OXYGEN SATURATION: 92 % | RESPIRATION RATE: 16 BRPM | HEART RATE: 75 BPM | SYSTOLIC BLOOD PRESSURE: 122 MMHG | WEIGHT: 293 LBS | HEIGHT: 68 IN | BODY MASS INDEX: 44.41 KG/M2

## 2024-07-03 DIAGNOSIS — I49.5 SSS (SICK SINUS SYNDROME) (HCC): Primary | ICD-10-CM

## 2024-07-03 NOTE — PROGRESS NOTES
[facility] CARDIAC ELECTROPHYSIOLOGY  [address]  [facility]  [address]    Date of Visit:  7/3/2024  Patient Name: Mary Bills   Patient :  1965   Referring By:  No ref. provider found     CHIEF COMPLAINT/HPI:     Mary Bills is a 59 y.o. female who presents today for an general visit to be evaluated for the following condition(s):  Chief Complaint   Patient presents with    Follow-up     2 week f/u for wound     Incision over the new loop is healed properly.  No evidence of complication.    On interrogating the loop history there is an event on 2024 around 1130 7 in the morning where she had significant bradycardia.  The patient tells me around that time she is usually still in bed she does not get up till after 12 noon.  And she thinks she remembers not feeling well at that point.    This is her first event of documented such bradycardia on the monitor.  And we discussed that.    She knows now that this is an important symptoms that we need to follow.  And if she continues to have this kind of issues then she will need a permanent pacemaker.  REVIEW OF SYSTEM      Review of Systems noncontributory.    REVIEWED INFORMATION      Allergies   Allergen Reactions    Entresto [Sacubitril-Valsartan] Other (See Comments)     Acute kidney injury    Food Swelling     peaches    Gabapentin Swelling    Lyrica [Pregabalin] Swelling     Mouth sores    Prunus Persica      peaches    Savella [Milnacipran]     Tetracycline     Tetracyclines & Related        Current Outpatient Medications   Medication Sig Dispense Refill    cephALEXin (KEFLEX) 500 MG capsule Take 1 capsule by mouth 3 times daily 21 capsule 0    allopurinol (ZYLOPRIM) 300 MG tablet Take 1 tablet by mouth once daily 90 tablet 0    albuterol sulfate HFA (PROVENTIL;VENTOLIN;PROAIR) 108 (90 Base) MCG/ACT inhaler Inhale 2 puffs into the lungs every 4 hours as needed for Shortness of Breath 3 each 3    budesonide-formoterol

## 2024-07-08 RX ORDER — CEPHALEXIN 500 MG/1
500 CAPSULE ORAL 3 TIMES DAILY
Qty: 21 CAPSULE | Refills: 0 | OUTPATIENT
Start: 2024-07-08

## 2024-07-10 NOTE — PROGRESS NOTES
Occupational History    Not on file   Tobacco Use    Smoking status: Never    Smokeless tobacco: Never   Vaping Use    Vaping Use: Never used   Substance and Sexual Activity    Alcohol use: No    Drug use: No    Sexual activity: Not on file   Other Topics Concern    Not on file   Social History Narrative    Not on file     Social Determinants of Health     Financial Resource Strain: Low Risk  (12/8/2023)    Overall Financial Resource Strain (CARDIA)     Difficulty of Paying Living Expenses: Not hard at all   Food Insecurity: Food Insecurity Present (12/8/2023)    Hunger Vital Sign     Worried About Running Out of Food in the Last Year: Sometimes true     Ran Out of Food in the Last Year: Sometimes true   Transportation Needs: No Transportation Needs (12/8/2023)    PRAPARE - Transportation     Lack of Transportation (Medical): No     Lack of Transportation (Non-Medical): No   Physical Activity: Insufficiently Active (12/6/2023)    Exercise Vital Sign     Days of Exercise per Week: 5 days     Minutes of Exercise per Session: 10 min   Stress: Not on file   Social Connections: Not on file   Intimate Partner Violence: Not on file   Housing Stability: Low Risk  (12/8/2023)    Housing Stability Vital Sign     Unable to Pay for Housing in the Last Year: No     Number of Places Lived in the Last Year: 1     Unstable Housing in the Last Year: No       REVIEW OF SYSTEMS:     A 12-point review of systems was obtained and pertinent positives and negatives were listed below.   Constitutional: No fever, no chills, no lethargy, no weakness.  HEENT:  No headache, otalgia, itchy eyes, nasal discharge or sore throat.  Cardiac:  No chest pain, dyspnea, orthopnea or PND.  Chest:   No cough, phlegm or wheezing.  Abdomen:      Detailed by MA and HPI above  Neuro:  No focal weakness, abnormal movements or seizure like activity.  Skin:   No rashes, no itching.  :   No hematuria, no pyuria, no dysuria, no flank pain.  Extremities:  No

## 2024-07-11 ENCOUNTER — OFFICE VISIT (OUTPATIENT)
Dept: GASTROENTEROLOGY | Age: 59
End: 2024-07-11
Payer: MEDICARE

## 2024-07-11 VITALS
WEIGHT: 293 LBS | SYSTOLIC BLOOD PRESSURE: 143 MMHG | TEMPERATURE: 97.4 F | RESPIRATION RATE: 16 BRPM | HEART RATE: 50 BPM | DIASTOLIC BLOOD PRESSURE: 83 MMHG | BODY MASS INDEX: 49.2 KG/M2

## 2024-07-11 DIAGNOSIS — Z12.11 ENCOUNTER FOR SCREENING COLONOSCOPY: ICD-10-CM

## 2024-07-11 DIAGNOSIS — R63.4 WEIGHT LOSS: ICD-10-CM

## 2024-07-11 DIAGNOSIS — R10.84 GENERALIZED ABDOMINAL PAIN: Primary | ICD-10-CM

## 2024-07-11 PROCEDURE — G8417 CALC BMI ABV UP PARAM F/U: HCPCS | Performed by: STUDENT IN AN ORGANIZED HEALTH CARE EDUCATION/TRAINING PROGRAM

## 2024-07-11 PROCEDURE — 1036F TOBACCO NON-USER: CPT | Performed by: STUDENT IN AN ORGANIZED HEALTH CARE EDUCATION/TRAINING PROGRAM

## 2024-07-11 PROCEDURE — 3079F DIAST BP 80-89 MM HG: CPT | Performed by: STUDENT IN AN ORGANIZED HEALTH CARE EDUCATION/TRAINING PROGRAM

## 2024-07-11 PROCEDURE — 3077F SYST BP >= 140 MM HG: CPT | Performed by: STUDENT IN AN ORGANIZED HEALTH CARE EDUCATION/TRAINING PROGRAM

## 2024-07-11 PROCEDURE — G8427 DOCREV CUR MEDS BY ELIG CLIN: HCPCS | Performed by: STUDENT IN AN ORGANIZED HEALTH CARE EDUCATION/TRAINING PROGRAM

## 2024-07-11 PROCEDURE — 99204 OFFICE O/P NEW MOD 45 MIN: CPT | Performed by: STUDENT IN AN ORGANIZED HEALTH CARE EDUCATION/TRAINING PROGRAM

## 2024-07-11 PROCEDURE — 3017F COLORECTAL CA SCREEN DOC REV: CPT | Performed by: STUDENT IN AN ORGANIZED HEALTH CARE EDUCATION/TRAINING PROGRAM

## 2024-07-18 ENCOUNTER — HOSPITAL ENCOUNTER (OUTPATIENT)
Dept: PULMONOLOGY | Age: 59
Setting detail: THERAPIES SERIES
Discharge: HOME | End: 2024-07-18

## 2024-07-18 VITALS — OXYGEN SATURATION: 95 %

## 2024-07-18 ASSESSMENT — EXERCISE STRESS TEST
PEAK_RPE: 12
PEAK_HR: 88
PEAK_BP: 128/86
PEAK_BP: 128/86
PEAK_METS: 1.8

## 2024-07-18 NOTE — PROGRESS NOTES
Patient called and will not be in pulmonary rehab class today. She said she is dealing with a lot of pain and is seeing the doctor tomorrow. She will get back with us.

## 2024-07-24 ENCOUNTER — HOSPITAL ENCOUNTER (OUTPATIENT)
Age: 59
Setting detail: SPECIMEN
Discharge: HOME | End: 2024-07-24

## 2024-07-24 ENCOUNTER — OFFICE VISIT (OUTPATIENT)
Dept: OBGYN CLINIC | Age: 59
End: 2024-07-24
Payer: MEDICARE

## 2024-07-24 VITALS
HEIGHT: 68 IN | DIASTOLIC BLOOD PRESSURE: 78 MMHG | WEIGHT: 293 LBS | HEART RATE: 91 BPM | BODY MASS INDEX: 44.41 KG/M2 | SYSTOLIC BLOOD PRESSURE: 122 MMHG

## 2024-07-24 DIAGNOSIS — N76.0 ACUTE VAGINITIS: ICD-10-CM

## 2024-07-24 DIAGNOSIS — Z90.710 STATUS POST HYSTERECTOMY: ICD-10-CM

## 2024-07-24 DIAGNOSIS — Z01.419 WELL WOMAN EXAM: Primary | ICD-10-CM

## 2024-07-24 DIAGNOSIS — Z11.3 SCREEN FOR STD (SEXUALLY TRANSMITTED DISEASE): ICD-10-CM

## 2024-07-24 DIAGNOSIS — Z12.31 SCREENING MAMMOGRAM FOR BREAST CANCER: ICD-10-CM

## 2024-07-24 LAB
CANDIDA SPECIES: NEGATIVE
GARDNERELLA VAGINALIS: NEGATIVE
SOURCE: NORMAL
TRICHOMONAS: NEGATIVE

## 2024-07-24 PROCEDURE — 3074F SYST BP LT 130 MM HG: CPT | Performed by: CLINICAL NURSE SPECIALIST

## 2024-07-24 PROCEDURE — 99386 PREV VISIT NEW AGE 40-64: CPT | Performed by: CLINICAL NURSE SPECIALIST

## 2024-07-24 PROCEDURE — 3078F DIAST BP <80 MM HG: CPT | Performed by: CLINICAL NURSE SPECIALIST

## 2024-07-25 LAB
C TRACH DNA SPEC QL PROBE+SIG AMP: NEGATIVE
N GONORRHOEA DNA SPEC QL PROBE+SIG AMP: NEGATIVE
SPECIMEN DESCRIPTION: NORMAL

## 2024-08-13 ENCOUNTER — HOSPITAL ENCOUNTER (OUTPATIENT)
Dept: PULMONOLOGY | Age: 59
Setting detail: THERAPIES SERIES
Discharge: HOME | End: 2024-08-13

## 2024-08-13 VITALS — OXYGEN SATURATION: 95 %

## 2024-08-13 ASSESSMENT — EXERCISE STRESS TEST
PEAK_RPE: 12
PEAK_BP: 128/86
PEAK_BP: 128/86
PEAK_HR: 88
PEAK_METS: 1.8

## 2024-08-13 NOTE — PLAN OF CARE
Hospital Based Pulmonary Rehab: Individual Treatment Plan  [facility] Deyvi Bills, 59 y.o. female, -1965 Today's Date: 2024    Qualifying Diagnosis-   Treatment Diagnosis 1: Covid      Treatment Diagnosis 2: Other (Comments) (SOB)         Program has been on hold due to back issues and pain.    Treatment Diagnosis   Oxygen Intervention  Oxygen Use: No        Individual Treatment Plan  ITP Visit Type: Re-assessment  1st Date of Exercise: 24  Visit #/Total Visits: 34/36 (Patient has been dealing with back issues and has not been able to attend.)  FVC (Liters): 2.68 liters  FEV1 (%PRED): 79  FEV1/FVC: 80  DLCO (mL/mmHg sec): 74 ml/mmHg sec  Pulmonary ITP: Exercise; Psychosocial; Tobacco; Nutrition; Education         Exercise  Perry Total Score: 20  Stages of Change: Relapse  Exercise Test: Six minute walk test  Distance Walked in : ft  Distance Walked (ft): 50 ft  Peak HR: 88  Peak BP: 128/86  Peak RPE: 12  Peak Mets: 1.8  O2 Saturation: 95  SPO2 Range: 95-97  BMI (Calculated): 49.43  FEV1 (%PRED): 79          Exercise Prescription  Mode: Stepper; Ergometer; Other (comment) (free weights)  Frequency per week: 2x per week  Duration Per Session: up to 60 minutes  Intensity METS      : RPE<15/RPD<5  Progression: Increase aerobic resistance and weight training gradually based on orthopedic restrictions pain and established parameters for Sp02, Max HR, BP, RPD, and RPE.  SpO2: 95 %  O2 Device: Room air  Comments: Patient has continued back issues and has not been able to attend.  Symptoms with Exercise: Other (comment) (hip pain)  Target Heart Rate: 151 max  Resistance Training: No (limited due to cyatic pain)  Weight: 2  Reps: 2/12  Assisted Devices: Walker         Exercise Blood Pressures  Resting BP: 119/81  Peak BP: 128/86  Is BP WDL: Yes         Exercise Intervention  Type: Nustep, Treadmill, Ergometer, Free weights  Frequency: 2x week  Duration: up to 60 minutes  Resistance

## 2024-08-29 ENCOUNTER — HOSPITAL ENCOUNTER (OUTPATIENT)
Dept: PULMONOLOGY | Age: 59
Discharge: HOME | End: 2024-08-29
Payer: MEDICARE

## 2024-08-29 DIAGNOSIS — R06.09 DYSPNEA ON EXERTION: ICD-10-CM

## 2024-08-29 PROCEDURE — 94729 DIFFUSING CAPACITY: CPT

## 2024-08-29 PROCEDURE — 94010 BREATHING CAPACITY TEST: CPT

## 2024-08-29 PROCEDURE — 94664 DEMO&/EVAL PT USE INHALER: CPT

## 2024-08-29 PROCEDURE — 94060 EVALUATION OF WHEEZING: CPT

## 2024-08-29 PROCEDURE — 94640 AIRWAY INHALATION TREATMENT: CPT

## 2024-08-30 ENCOUNTER — HOSPITAL ENCOUNTER (OUTPATIENT)
Dept: SLEEP CENTER | Age: 59
End: 2024-08-30
Payer: MEDICARE

## 2024-08-30 VITALS
OXYGEN SATURATION: 96 % | HEIGHT: 68 IN | RESPIRATION RATE: 31 BRPM | BODY MASS INDEX: 44.41 KG/M2 | WEIGHT: 293 LBS | HEART RATE: 77 BPM

## 2024-08-30 DIAGNOSIS — E66.01 CLASS 3 SEVERE OBESITY DUE TO EXCESS CALORIES WITH SERIOUS COMORBIDITY AND BODY MASS INDEX (BMI) OF 50.0 TO 59.9 IN ADULT (HCC): ICD-10-CM

## 2024-08-30 DIAGNOSIS — G47.33 OSA (OBSTRUCTIVE SLEEP APNEA): ICD-10-CM

## 2024-08-30 PROCEDURE — 95810 POLYSOM 6/> YRS 4/> PARAM: CPT

## 2024-08-30 ASSESSMENT — SLEEP AND FATIGUE QUESTIONNAIRES
ESS TOTAL SCORE: 4
HOW LIKELY ARE YOU TO NOD OFF OR FALL ASLEEP WHILE SITTING AND READING: SLIGHT CHANCE OF DOZING
HOW LIKELY ARE YOU TO NOD OFF OR FALL ASLEEP WHILE WATCHING TV: SLIGHT CHANCE OF DOZING
HOW LIKELY ARE YOU TO NOD OFF OR FALL ASLEEP WHILE LYING DOWN TO REST IN THE AFTERNOON WHEN CIRCUMSTANCES PERMIT: SLIGHT CHANCE OF DOZING
HOW LIKELY ARE YOU TO NOD OFF OR FALL ASLEEP WHEN YOU ARE A PASSENGER IN A CAR FOR AN HOUR WITHOUT A BREAK: WOULD NEVER DOZE
HOW LIKELY ARE YOU TO NOD OFF OR FALL ASLEEP WHILE SITTING INACTIVE IN A PUBLIC PLACE: WOULD NEVER DOZE
HOW LIKELY ARE YOU TO NOD OFF OR FALL ASLEEP WHILE SITTING QUIETLY AFTER LUNCH WITHOUT ALCOHOL: SLIGHT CHANCE OF DOZING
HOW LIKELY ARE YOU TO NOD OFF OR FALL ASLEEP WHILE SITTING AND TALKING TO SOMEONE: WOULD NEVER DOZE
HOW LIKELY ARE YOU TO NOD OFF OR FALL ASLEEP IN A CAR, WHILE STOPPED FOR A FEW MINUTES IN TRAFFIC: WOULD NEVER DOZE

## 2024-09-03 DIAGNOSIS — Z76.0 MEDICATION REFILL: ICD-10-CM

## 2024-09-03 DIAGNOSIS — E79.0 HYPERURICEMIA: ICD-10-CM

## 2024-09-04 RX ORDER — ALLOPURINOL 300 MG/1
300 TABLET ORAL DAILY
Qty: 90 TABLET | Refills: 0 | Status: SHIPPED | OUTPATIENT
Start: 2024-09-04

## 2024-09-06 LAB — STATUS: NORMAL

## 2024-09-09 DIAGNOSIS — G47.33 OSA (OBSTRUCTIVE SLEEP APNEA): Primary | ICD-10-CM

## 2024-09-12 ENCOUNTER — HOSPITAL ENCOUNTER (OUTPATIENT)
Dept: PULMONOLOGY | Age: 59
Setting detail: THERAPIES SERIES
Discharge: HOME | End: 2024-09-12

## 2024-09-12 VITALS — OXYGEN SATURATION: 95 %

## 2024-09-12 ASSESSMENT — EXERCISE STRESS TEST
PEAK_RPE: 12
PEAK_METS: 1.8
PEAK_HR: 88
PEAK_BP: 128/86
PEAK_BP: 128/86

## 2024-09-16 RX ORDER — SUCRALFATE 1 G/1
TABLET ORAL
Qty: 270 TABLET | Refills: 1 | Status: SHIPPED | OUTPATIENT
Start: 2024-09-16

## 2024-09-16 RX ORDER — SUCRALFATE 1 G/1
1 TABLET ORAL 4 TIMES DAILY
COMMUNITY
End: 2024-09-16 | Stop reason: SDUPTHER

## 2024-09-23 ENCOUNTER — OFFICE VISIT (OUTPATIENT)
Dept: PULMONOLOGY | Age: 59
End: 2024-09-23
Payer: MEDICARE

## 2024-09-23 VITALS
BODY MASS INDEX: 44.41 KG/M2 | RESPIRATION RATE: 16 BRPM | HEART RATE: 73 BPM | SYSTOLIC BLOOD PRESSURE: 121 MMHG | HEIGHT: 68 IN | WEIGHT: 293 LBS | DIASTOLIC BLOOD PRESSURE: 79 MMHG | OXYGEN SATURATION: 97 %

## 2024-09-23 DIAGNOSIS — E66.01 CLASS 3 SEVERE OBESITY DUE TO EXCESS CALORIES WITH SERIOUS COMORBIDITY AND BODY MASS INDEX (BMI) OF 50.0 TO 59.9 IN ADULT: ICD-10-CM

## 2024-09-23 DIAGNOSIS — J45.40 MODERATE PERSISTENT ASTHMA WITHOUT COMPLICATION: ICD-10-CM

## 2024-09-23 DIAGNOSIS — U09.9 POST-COVID-19 SYNDROME MANIFESTING AS CHRONIC DYSPNEA: ICD-10-CM

## 2024-09-23 DIAGNOSIS — R06.09 POST-COVID-19 SYNDROME MANIFESTING AS CHRONIC DYSPNEA: ICD-10-CM

## 2024-09-23 DIAGNOSIS — G47.33 OSA (OBSTRUCTIVE SLEEP APNEA): Primary | ICD-10-CM

## 2024-09-23 PROCEDURE — 90661 CCIIV3 VAC ABX FR 0.5 ML IM: CPT | Performed by: INTERNAL MEDICINE

## 2024-09-23 PROCEDURE — 3074F SYST BP LT 130 MM HG: CPT | Performed by: INTERNAL MEDICINE

## 2024-09-23 PROCEDURE — G8427 DOCREV CUR MEDS BY ELIG CLIN: HCPCS | Performed by: INTERNAL MEDICINE

## 2024-09-23 PROCEDURE — G0008 ADMIN INFLUENZA VIRUS VAC: HCPCS | Performed by: INTERNAL MEDICINE

## 2024-09-23 PROCEDURE — 3017F COLORECTAL CA SCREEN DOC REV: CPT | Performed by: INTERNAL MEDICINE

## 2024-09-23 PROCEDURE — G8417 CALC BMI ABV UP PARAM F/U: HCPCS | Performed by: INTERNAL MEDICINE

## 2024-09-23 PROCEDURE — 3078F DIAST BP <80 MM HG: CPT | Performed by: INTERNAL MEDICINE

## 2024-09-23 PROCEDURE — 1036F TOBACCO NON-USER: CPT | Performed by: INTERNAL MEDICINE

## 2024-09-23 PROCEDURE — 99214 OFFICE O/P EST MOD 30 MIN: CPT | Performed by: INTERNAL MEDICINE

## 2024-09-23 ASSESSMENT — SLEEP AND FATIGUE QUESTIONNAIRES
HOW LIKELY ARE YOU TO NOD OFF OR FALL ASLEEP WHILE SITTING AND TALKING TO SOMEONE: WOULD NEVER DOZE
HOW LIKELY ARE YOU TO NOD OFF OR FALL ASLEEP IN A CAR, WHILE STOPPED FOR A FEW MINUTES IN TRAFFIC: WOULD NEVER DOZE
HOW LIKELY ARE YOU TO NOD OFF OR FALL ASLEEP WHILE WATCHING TV: WOULD NEVER DOZE
HOW LIKELY ARE YOU TO NOD OFF OR FALL ASLEEP WHILE SITTING INACTIVE IN A PUBLIC PLACE: WOULD NEVER DOZE
ESS TOTAL SCORE: 0
HOW LIKELY ARE YOU TO NOD OFF OR FALL ASLEEP WHILE LYING DOWN TO REST IN THE AFTERNOON WHEN CIRCUMSTANCES PERMIT: WOULD NEVER DOZE
HOW LIKELY ARE YOU TO NOD OFF OR FALL ASLEEP WHILE SITTING AND READING: WOULD NEVER DOZE
HOW LIKELY ARE YOU TO NOD OFF OR FALL ASLEEP WHILE SITTING QUIETLY AFTER LUNCH WITHOUT ALCOHOL: WOULD NEVER DOZE
HOW LIKELY ARE YOU TO NOD OFF OR FALL ASLEEP WHEN YOU ARE A PASSENGER IN A CAR FOR AN HOUR WITHOUT A BREAK: WOULD NEVER DOZE

## 2024-09-24 ENCOUNTER — HOSPITAL ENCOUNTER (OUTPATIENT)
Dept: SLEEP CENTER | Age: 59
Discharge: HOME | End: 2024-09-26
Payer: MEDICARE

## 2024-09-24 DIAGNOSIS — G47.33 OSA (OBSTRUCTIVE SLEEP APNEA): ICD-10-CM

## 2024-09-24 PROCEDURE — 95811 POLYSOM 6/>YRS CPAP 4/> PARM: CPT

## 2024-09-25 VITALS
OXYGEN SATURATION: 97 % | RESPIRATION RATE: 21 BRPM | BODY MASS INDEX: 44.41 KG/M2 | WEIGHT: 293 LBS | HEIGHT: 68 IN | HEART RATE: 61 BPM

## 2024-10-10 ENCOUNTER — TELEPHONE (OUTPATIENT)
Age: 59
End: 2024-10-10

## 2024-10-12 LAB — STATUS: NORMAL

## 2024-11-01 ENCOUNTER — TELEPHONE (OUTPATIENT)
Age: 59
End: 2024-11-01

## 2024-11-18 ENCOUNTER — TELEPHONE (OUTPATIENT)
Age: 59
End: 2024-11-18

## 2024-11-29 ENCOUNTER — TELEPHONE (OUTPATIENT)
Age: 59
End: 2024-11-29

## 2024-12-23 PROCEDURE — 93297 REM INTERROG DEV EVAL ICPMS: CPT | Performed by: SPECIALIST

## 2025-01-02 ENCOUNTER — TELEPHONE (OUTPATIENT)
Age: 60
End: 2025-01-02
Payer: MEDICARE

## 2025-01-24 ENCOUNTER — OFFICE VISIT (OUTPATIENT)
Dept: PULMONOLOGY | Age: 60
End: 2025-01-24

## 2025-01-24 VITALS
WEIGHT: 293 LBS | BODY MASS INDEX: 44.41 KG/M2 | RESPIRATION RATE: 12 BRPM | OXYGEN SATURATION: 98 % | DIASTOLIC BLOOD PRESSURE: 89 MMHG | SYSTOLIC BLOOD PRESSURE: 139 MMHG | HEART RATE: 82 BPM | HEIGHT: 68 IN

## 2025-01-24 DIAGNOSIS — J45.30 MILD PERSISTENT ASTHMA WITHOUT COMPLICATION: ICD-10-CM

## 2025-01-24 DIAGNOSIS — E66.01 CLASS 3 SEVERE OBESITY DUE TO EXCESS CALORIES WITH SERIOUS COMORBIDITY AND BODY MASS INDEX (BMI) OF 50.0 TO 59.9 IN ADULT: ICD-10-CM

## 2025-01-24 DIAGNOSIS — E66.813 CLASS 3 SEVERE OBESITY DUE TO EXCESS CALORIES WITH SERIOUS COMORBIDITY AND BODY MASS INDEX (BMI) OF 50.0 TO 59.9 IN ADULT: ICD-10-CM

## 2025-01-24 DIAGNOSIS — G47.33 OSA (OBSTRUCTIVE SLEEP APNEA): Primary | ICD-10-CM

## 2025-01-24 RX ORDER — MONTELUKAST SODIUM 10 MG/1
10 TABLET ORAL NIGHTLY
Qty: 90 TABLET | Refills: 3 | Status: SHIPPED | OUTPATIENT
Start: 2025-01-24

## 2025-01-24 RX ORDER — BUDESONIDE AND FORMOTEROL FUMARATE DIHYDRATE 160; 4.5 UG/1; UG/1
2 AEROSOL RESPIRATORY (INHALATION) 2 TIMES DAILY
Qty: 3 EACH | Refills: 3 | Status: SHIPPED | OUTPATIENT
Start: 2025-01-24

## 2025-01-24 RX ORDER — ALBUTEROL SULFATE 90 UG/1
2 INHALANT RESPIRATORY (INHALATION) EVERY 4 HOURS PRN
Qty: 3 EACH | Refills: 3 | Status: SHIPPED | OUTPATIENT
Start: 2025-01-24 | End: 2025-04-24

## 2025-01-24 ASSESSMENT — SLEEP AND FATIGUE QUESTIONNAIRES
HOW LIKELY ARE YOU TO NOD OFF OR FALL ASLEEP WHILE WATCHING TV: SLIGHT CHANCE OF DOZING
HOW LIKELY ARE YOU TO NOD OFF OR FALL ASLEEP WHEN YOU ARE A PASSENGER IN A CAR FOR AN HOUR WITHOUT A BREAK: WOULD NEVER DOZE
HOW LIKELY ARE YOU TO NOD OFF OR FALL ASLEEP WHILE SITTING INACTIVE IN A PUBLIC PLACE: WOULD NEVER DOZE
HOW LIKELY ARE YOU TO NOD OFF OR FALL ASLEEP WHILE LYING DOWN TO REST IN THE AFTERNOON WHEN CIRCUMSTANCES PERMIT: SLIGHT CHANCE OF DOZING
HOW LIKELY ARE YOU TO NOD OFF OR FALL ASLEEP WHILE SITTING QUIETLY AFTER LUNCH WITHOUT ALCOHOL: WOULD NEVER DOZE
HOW LIKELY ARE YOU TO NOD OFF OR FALL ASLEEP WHILE SITTING AND TALKING TO SOMEONE: WOULD NEVER DOZE
HOW LIKELY ARE YOU TO NOD OFF OR FALL ASLEEP WHILE SITTING AND READING: SLIGHT CHANCE OF DOZING
HOW LIKELY ARE YOU TO NOD OFF OR FALL ASLEEP IN A CAR, WHILE STOPPED FOR A FEW MINUTES IN TRAFFIC: WOULD NEVER DOZE
ESS TOTAL SCORE: 3

## 2025-01-24 ASSESSMENT — ENCOUNTER SYMPTOMS
EYES NEGATIVE: 1
GASTROINTESTINAL NEGATIVE: 1
ALLERGIC/IMMUNOLOGIC NEGATIVE: 1

## 2025-01-24 NOTE — PROGRESS NOTES
[facility]  [address], [address], [address] [facility]      Pulmonary Progress Note      SUBJECTIVE:  sitting up at side of bed on RA, claims doing ok with breathing.     OBJECTIVE    Medications    Continuous Infusions:    Scheduled Meds:   levothyroxine  150 mcg Oral Daily    amLODIPine  10 mg Oral Daily    fluticasone  1 spray Each Nostril Daily    magnesium oxide  400 mg Oral Daily    metoprolol succinate  25 mg Oral Daily    montelukast  10 mg Oral Nightly    sertraline  50 mg Oral Daily    docusate sodium  100 mg Oral Nightly    [Held by provider] enoxaparin  40 mg SubCUTAneous Daily    pantoprazole  40 mg Oral QAM AC    budesonide  0.25 mg Nebulization BID    And    Arformoterol Tartrate  15 mcg Nebulization BID    traZODone  50 mg Oral Nightly       PRN Meds:guaiFENesin, acetaminophen, albuterol, clonazePAM, ondansetron    Physical    VITALS:  /74   Pulse 80   Temp 98.2 °F (36.8 °C) (Temporal)   Resp 18   Ht 5' 2\" (1.575 m)   Wt 161 lb (73 kg)   SpO2 94%   BMI 29.45 kg/m²     24HR INTAKE/OUTPUT:      Intake/Output Summary (Last 24 hours) at 2022 1544  Last data filed at 2022 1256  Gross per 24 hour   Intake 480 ml   Output --   Net 480 ml         24HR PULSE OXIMETRY RANGE:    SpO2  Av %  Min: 93 %  Max: 100 %    General appearance: alert, appears stated age, and cooperative  Lungs: rhonchi bilaterally minimal  Heart: regular rate and rhythm, S1, S2 normal, no murmur, click, rub or gallop  Abdomen: soft, non-tender; bowel sounds normal; no masses,  no organomegaly  Extremities: extremities normal, atraumatic, no cyanosis or edema  Neurologic: Mental status: Alert, oriented, thought content appropriate    Data    CBC:   Recent Labs     22  1300   WBC 5.8   HGB 13.4   HCT 39.6   MCV 87.0            BMP:  Recent Labs     22  1301 22  0452   * 128*   K 3.3* 3.5   CL 93* 91*   CO2 22 24 Subjective:      Patient ID: Mary Bills is a 59 y.o. female.     Patient must see physician at next appointment    HPI: Patient here for follow-up for obesity, SMILEY and asthma was last seen in July per         [] Dr. Campbell  [] Dr. Barger  [x] Dr. Landin  [] Dr. Higgins  [] Dr. Chambers  [] Dr. Eller  [] Dr. Brothers  [] MILAGROS Gutierres APRN- CNP        History of Present Illness  The patient presents for evaluation of sleep apnea, asthma, and fibromyalgia.    She has been using a CPAP machine for her sleep apnea, which she reports has improved the quality of her sleep. She has been using the machine consistently, except on days when she experiences headaches or illness. She uses a nasal mask and is requesting a new hose.    Compliance data reviewed from 10/26/2024 through 1/23/2025, 80 had a 90 days used, 70 days for more than 4 hours putting patient at 78% compliant.  She is on 8 cm H2O CPAP.  DME provider is [facility].  AHI is 2.0.  Patient reports that she does feel that she is benefiting from using her machine.  Reporting refreshing sleep.      MEDICATIONS  Singulair, Symbicort, albuterol        Smoking status:  Lifelong non-smoker     Occupational:  Office/Healthcare  Chemical exposure: No  Pets at home: No  Birds/Turtles: No  Indoor Hot tubs or saunas: No  Family Hx: No lung cancer           PRIOR WORKUP:  PFT:  PFT August 2024: FEV1 73% of predicted with no positive postbronchodilator change.  FVC 63% of predicted with a 3% bronchodilator change to 66% of predicted.  FEV1/FVC ratio 91, postbronchodilator 87.  Body box was not completed.  Diffusion capacity 73% of predicted.  Final impression: Moderate restrictive ventilatory dysfunction secondary to intrapulmonic.    PFT 1/7/2021: Restrictive flow-volume loop FEV1 is 79% of predicted, 4% bronchodilator change to 82% of predicted.  FVC 75% of predicted with no bronchodilator change.  FEV1/FVC ratio is 84.  Diffusion capacity 74% of predicted, corrected for alveolar  BUN 14 14   CREATININE 0.7 0.7      ALB:3,BILIDIR:3,BILITOT:3,ALKPHOS:3)@    PT/INR:   Recent Labs     09/06/22  0429   PROTIME 12.1   INR 1.1         ABG:   No results for input(s): PH, PO2, PCO2, HCO3, BE, O2SAT, METHB, O2HB, COHB, O2CON, HHB, THB in the last 72 hours. Radiology/Other tests reviewed: none    Assessment:     Principal Problem:    Hyponatremia  Resolved Problems:    * No resolved hospital problems. *  Lung and liver nodules  COPD      Plan:       Await pathology of biopsy from liver, may come back either Friday or more likely next week  Hyponatremia as per Renal  Cont with nebs, should be able to resume usual medications as out-pt  Needs to stop smoking  Can be discharged from pulmonary pov, ffup in office in 2 weeks. Time at the bedside, reviewing labs and radiographs, reviewing notes and consultations, discussing with staff and family was more than 35 minutes. Thanks for letting us see this patient in consultation. Please contact us with any questions. Office [phone] or after hours through [employer], x [phone].

## 2025-01-27 ENCOUNTER — OFFICE VISIT (OUTPATIENT)
Age: 60
End: 2025-01-27
Payer: MEDICARE

## 2025-01-27 VITALS
SYSTOLIC BLOOD PRESSURE: 111 MMHG | BODY MASS INDEX: 51.7 KG/M2 | HEART RATE: 93 BPM | DIASTOLIC BLOOD PRESSURE: 79 MMHG | OXYGEN SATURATION: 94 % | WEIGHT: 293 LBS

## 2025-01-27 DIAGNOSIS — I49.5 SSS (SICK SINUS SYNDROME) (HCC): Primary | ICD-10-CM

## 2025-01-27 DIAGNOSIS — E66.01 MORBID OBESITY WITH BMI OF 50.0-59.9, ADULT: ICD-10-CM

## 2025-01-27 DIAGNOSIS — I47.10 SVT (SUPRAVENTRICULAR TACHYCARDIA) (HCC): ICD-10-CM

## 2025-01-27 DIAGNOSIS — R00.2 PALPITATIONS: ICD-10-CM

## 2025-01-27 PROCEDURE — 93298 REM INTERROG DEV EVAL SCRMS: CPT | Performed by: SPECIALIST

## 2025-01-27 PROCEDURE — 3078F DIAST BP <80 MM HG: CPT | Performed by: SPECIALIST

## 2025-01-27 PROCEDURE — 99214 OFFICE O/P EST MOD 30 MIN: CPT | Performed by: SPECIALIST

## 2025-01-27 PROCEDURE — 3074F SYST BP LT 130 MM HG: CPT | Performed by: SPECIALIST

## 2025-01-27 RX ORDER — OXYCODONE AND ACETAMINOPHEN 7.5; 325 MG/1; MG/1
1 TABLET ORAL EVERY 4 HOURS PRN
COMMUNITY

## 2025-01-27 NOTE — PROGRESS NOTES
[facility] CARDIAC ELECTROPHYSIOLOGY  [address], [address]  [place] [facility]  [address]    Date of Visit:  2025  Patient Name: Mary Bills   Patient :  1965   Referring By:  Deion Lopez MD     CHIEF COMPLAINT/HPI:     Mayr Bills is a 59 y.o. female who presents today for an general visit to be evaluated for the following condition(s):  Chief Complaint   Patient presents with    Follow-up     6 month f/u Palpitation,Sick sinus syndrome   Patient presents for routine follow-up on her implantable loop.  No significant symptoms or findings following up her loop by [employer] DARWIN X DX 2 and serial number of [id number].  We reviewed the findings this time.  No significant events.  Questions were answered.    Patient tells me she is in the process of moving south to [place] and we discussed the set ups of transferring the loop findings if that is the case.    REVIEW OF SYSTEM      Review of Systems  Noncontributory  REVIEWED INFORMATION      Allergies   Allergen Reactions    Entresto [Sacubitril-Valsartan] Other (See Comments)     Acute kidney injury    Food Swelling     peaches    Gabapentin Swelling    Lyrica [Pregabalin] Swelling     Mouth sores    Prunus Persica      peaches    Savella [Milnacipran]     Tetracycline     Tetracyclines & Related        Current Outpatient Medications   Medication Sig Dispense Refill    oxyCODONE-acetaminophen (PERCOCET) 7.5-325 MG per tablet Take 1 tablet by mouth every 4 hours as needed for Pain. Max Daily Amount: 6 tablets      montelukast (SINGULAIR) 10 MG tablet Take 1 tablet by mouth nightly 90 tablet 3    budesonide-formoterol (SYMBICORT) 160-4.5 MCG/ACT AERO Inhale 2 puffs into the lungs 2 times daily 3 each 3    albuterol sulfate HFA (PROVENTIL;VENTOLIN;PROAIR) 108 (90 Base) MCG/ACT inhaler Inhale 2 puffs into the lungs every 4 hours as needed for Shortness of Breath 3 each 3    sucralfate (CARAFATE) 1 GM tablet Take one tablet by mouth every

## 2025-01-30 ENCOUNTER — TRANSCRIBE ORDERS (OUTPATIENT)
Dept: ADMINISTRATIVE | Age: 60
End: 2025-01-30

## 2025-01-30 DIAGNOSIS — I50.42 CHRONIC COMBINED SYSTOLIC AND DIASTOLIC HEART FAILURE (HCC): ICD-10-CM

## 2025-01-30 DIAGNOSIS — I25.118 ATHEROSCLEROSIS OF NATIVE CORONARY ARTERY OF NATIVE HEART WITH OTHER FORM OF ANGINA PECTORIS (HCC): ICD-10-CM

## 2025-01-30 DIAGNOSIS — I27.20 PULMONARY HYPERTENSION (HCC): ICD-10-CM

## 2025-01-30 DIAGNOSIS — R06.09 DYSPNEA ON EXERTION: Primary | ICD-10-CM

## 2025-02-06 ENCOUNTER — HOSPITAL ENCOUNTER (OUTPATIENT)
Dept: ULTRASOUND IMAGING | Age: 60
Discharge: HOME | End: 2025-02-08
Payer: MEDICARE

## 2025-02-06 ENCOUNTER — HOSPITAL ENCOUNTER (OUTPATIENT)
Age: 60
Discharge: HOME | End: 2025-02-08
Payer: MEDICARE

## 2025-02-06 VITALS
DIASTOLIC BLOOD PRESSURE: 79 MMHG | HEART RATE: 93 BPM | BODY MASS INDEX: 44.41 KG/M2 | SYSTOLIC BLOOD PRESSURE: 111 MMHG | WEIGHT: 293 LBS | HEIGHT: 68 IN

## 2025-02-06 DIAGNOSIS — R06.09 DYSPNEA ON EXERTION: ICD-10-CM

## 2025-02-06 DIAGNOSIS — I50.42 CHRONIC COMBINED SYSTOLIC AND DIASTOLIC HEART FAILURE (HCC): ICD-10-CM

## 2025-02-06 DIAGNOSIS — I25.118 ATHEROSCLEROSIS OF NATIVE CORONARY ARTERY OF NATIVE HEART WITH OTHER FORM OF ANGINA PECTORIS (HCC): ICD-10-CM

## 2025-02-06 DIAGNOSIS — E04.1 NONTOXIC UNINODULAR GOITER: ICD-10-CM

## 2025-02-06 DIAGNOSIS — E89.0 POSTSURGICAL HYPOTHYROIDISM: ICD-10-CM

## 2025-02-06 DIAGNOSIS — I27.20 PULMONARY HYPERTENSION (HCC): ICD-10-CM

## 2025-02-06 LAB
ECHO AO ROOT DIAM: 3.2 CM
ECHO AO ROOT INDEX: 1.25 CM/M2
ECHO AV AREA PEAK VELOCITY: 2.4 CM2
ECHO AV AREA VTI: 2.2 CM2
ECHO AV AREA/BSA PEAK VELOCITY: 0.9 CM2/M2
ECHO AV AREA/BSA VTI: 0.9 CM2/M2
ECHO AV MEAN GRADIENT: 6 MMHG
ECHO AV MEAN VELOCITY: 1.2 M/S
ECHO AV PEAK GRADIENT: 10 MMHG
ECHO AV PEAK VELOCITY: 1.6 M/S
ECHO AV VELOCITY RATIO: 0.56
ECHO AV VTI: 34.6 CM
ECHO BSA: 2.72 M2
ECHO LA AREA 2C: 18.6 CM2
ECHO LA AREA 4C: 19.9 CM2
ECHO LA DIAMETER INDEX: 1.84 CM/M2
ECHO LA DIAMETER: 4.7 CM
ECHO LA MAJOR AXIS: 5.7 CM
ECHO LA MINOR AXIS: 5.4 CM
ECHO LA TO AORTIC ROOT RATIO: 1.47
ECHO LA VOL BP: 55 ML (ref 22–52)
ECHO LA VOL MOD A2C: 53 ML (ref 22–52)
ECHO LA VOL MOD A4C: 56 ML (ref 22–52)
ECHO LA VOL/BSA BIPLANE: 21 ML/M2 (ref 16–34)
ECHO LA VOLUME INDEX MOD A2C: 21 ML/M2 (ref 16–34)
ECHO LA VOLUME INDEX MOD A4C: 22 ML/M2 (ref 16–34)
ECHO LV E' LATERAL VELOCITY: 12.1 CM/S
ECHO LV E' SEPTAL VELOCITY: 5.66 CM/S
ECHO LV EDV A2C: 204 ML
ECHO LV EDV A4C: 232 ML
ECHO LV EDV INDEX A4C: 91 ML/M2
ECHO LV EDV NDEX A2C: 80 ML/M2
ECHO LV EF PHYSICIAN: 40 %
ECHO LV EJECTION FRACTION A2C: 35 %
ECHO LV EJECTION FRACTION A4C: 50 %
ECHO LV EJECTION FRACTION BIPLANE: 43 % (ref 55–100)
ECHO LV ESV A2C: 133 ML
ECHO LV ESV A4C: 116 ML
ECHO LV ESV INDEX A2C: 52 ML/M2
ECHO LV ESV INDEX A4C: 45 ML/M2
ECHO LV FRACTIONAL SHORTENING: 21 % (ref 28–44)
ECHO LV GLOBAL LONGITUDINAL STRAIN (GLS): -11 %
ECHO LV INTERNAL DIMENSION DIASTOLE INDEX: 2.42 CM/M2
ECHO LV INTERNAL DIMENSION DIASTOLIC: 6.2 CM (ref 3.9–5.3)
ECHO LV INTERNAL DIMENSION SYSTOLIC INDEX: 1.91 CM/M2
ECHO LV INTERNAL DIMENSION SYSTOLIC: 4.9 CM
ECHO LV IVSD: 1.4 CM (ref 0.6–0.9)
ECHO LV MASS 2D: 408.8 G (ref 67–162)
ECHO LV MASS INDEX 2D: 159.7 G/M2 (ref 43–95)
ECHO LV POSTERIOR WALL DIASTOLIC: 1.4 CM (ref 0.6–0.9)
ECHO LV RELATIVE WALL THICKNESS RATIO: 0.45
ECHO LVOT AREA: 4.2 CM2
ECHO LVOT AV VTI INDEX: 0.53
ECHO LVOT DIAM: 2.3 CM
ECHO LVOT MEAN GRADIENT: 2 MMHG
ECHO LVOT PEAK GRADIENT: 3 MMHG
ECHO LVOT PEAK VELOCITY: 0.9 M/S
ECHO LVOT STROKE VOLUME INDEX: 30 ML/M2
ECHO LVOT SV: 76.8 ML
ECHO LVOT VTI: 18.5 CM
ECHO MV A VELOCITY: 0.8 M/S
ECHO MV AREA VTI: 2.3 CM2
ECHO MV E DECELERATION TIME (DT): 232 MS
ECHO MV E VELOCITY: 0.94 M/S
ECHO MV E/A RATIO: 1.18
ECHO MV E/E' LATERAL: 7.77
ECHO MV E/E' RATIO (AVERAGED): 12.19
ECHO MV E/E' SEPTAL: 16.61
ECHO MV LVOT VTI INDEX: 1.83
ECHO MV MAX VELOCITY: 1 M/S
ECHO MV MEAN GRADIENT: 2 MMHG
ECHO MV MEAN VELOCITY: 0.6 M/S
ECHO MV PEAK GRADIENT: 4 MMHG
ECHO MV VTI: 33.8 CM
ECHO RA AREA 4C: 15.8 CM2
ECHO RA END SYSTOLIC VOLUME APICAL 4 CHAMBER INDEX BSA: 16 ML/M2
ECHO RA VOLUME: 40 ML
ECHO RV TAPSE: 2 CM (ref 1.7–?)

## 2025-02-06 PROCEDURE — 76536 US EXAM OF HEAD AND NECK: CPT

## 2025-02-06 PROCEDURE — 6360000004 HC RX CONTRAST MEDICATION: Performed by: FAMILY MEDICINE

## 2025-02-06 PROCEDURE — C8929 TTE W OR WO FOL WCON,DOPPLER: HCPCS

## 2025-02-06 RX ADMIN — SULFUR HEXAFLUORIDE 5 ML: KIT at 13:19

## 2025-02-11 ENCOUNTER — TELEPHONE (OUTPATIENT)
Dept: GASTROENTEROLOGY | Age: 60
End: 2025-02-11

## 2025-02-11 NOTE — TELEPHONE ENCOUNTER
TBS/colonoscopy Edward  Needs cardiac clearance sent on 6-11-24  Pt. Stated she was just seen by Cardiologist & will be reaching out to them to let them know she needs a clearance.  Attempt 1- spoke to patient.

## 2025-02-11 NOTE — TELEPHONE ENCOUNTER
Writer called the provider regarding previous clearance sent per provider office they do not complete clearances-pt see's [facility] writer called [facility] stated patient has not been seen since 2023 so patient will need an OV with their office prior to being cleared for procedure-writer will notify patient. Patient provided information stated she will give [facility] a phone call.

## 2025-02-28 DIAGNOSIS — I47.10 SVT (SUPRAVENTRICULAR TACHYCARDIA): Primary | ICD-10-CM

## 2025-03-04 DIAGNOSIS — I47.10 SVT (SUPRAVENTRICULAR TACHYCARDIA): ICD-10-CM

## 2025-03-14 NOTE — TELEPHONE ENCOUNTER
Patient presents to the ER for complaints of a fall. Per patient report, patient was out walking the dog today when he tripped over a cable that was on the sidewalk. This happened around noon and then around 5 pm started to have wrist pain. Took 650 of tylenol PTA     Triage Assessment (Adult)       Row Name 03/14/25 3226          Triage Assessment    Airway WDL WDL        Respiratory WDL    Respiratory WDL WDL        Skin Circulation/Temperature WDL    Skin Circulation/Temperature WDL X        Cardiac WDL    Cardiac WDL WDL        Peripheral/Neurovascular WDL    Peripheral Neurovascular WDL X        Cognitive/Neuro/Behavioral WDL    Cognitive/Neuro/Behavioral WDL WDL                      Pt has been informed

## 2025-03-21 ENCOUNTER — HOSPITAL ENCOUNTER (OUTPATIENT)
Dept: GENERAL RADIOLOGY | Age: 60
Discharge: HOME | End: 2025-03-23
Attending: STUDENT IN AN ORGANIZED HEALTH CARE EDUCATION/TRAINING PROGRAM
Payer: MEDICARE

## 2025-03-21 DIAGNOSIS — R13.19 OTHER DYSPHAGIA: ICD-10-CM

## 2025-03-21 PROCEDURE — 74230 X-RAY XM SWLNG FUNCJ C+: CPT

## 2025-03-21 PROCEDURE — 92611 MOTION FLUOROSCOPY/SWALLOW: CPT

## 2025-03-21 PROCEDURE — 74220 X-RAY XM ESOPHAGUS 1CNTRST: CPT

## 2025-03-21 NOTE — PROCEDURES
INSTRUMENTAL SWALLOW REPORT  MODIFIED BARIUM SWALLOW    NAME: Mary Bills   : 1965  MRN: [id number]       Date of Eval: 3/21/2025              Referring Diagnosis(es):  dysphagia    Past Medical History:  has a past medical history of Arthritis, Bronchitis, CHF (congestive heart failure) (HCC), Chronic back pain, COVID-19, Depression, Fibromyalgia, GERD (gastroesophageal reflux disease), Gout, History of heart attack, HLD (hyperlipidemia), Hypertension, Insomnia, Osteoarthritis, and Thyroid disorder.  Past Surgical History:  has a past surgical history that includes back surgery; shoulder surgery (Right, ); knee surgery (Left, ); Cholecystectomy, laparoscopic; Knee arthroscopy (Right); Hysterectomy, total abdominal; Neck surgery; Thyroidectomy; Cardiac catheterization (2022); other surgical history (2024); ep device procedure (N/A, 2024); Insertable Cardiac Monitor (2024); and ep device procedure (N/A, 2024).               Type of Study: Initial MBS       Recent CXR/CT of Chest: Date n/a    Patient Complaints/Reason for Referral:  Mary Bills was referred for a MBS to assess the efficiency of his/her swallow function, assess for aspiration, and to make recommendations regarding safe dietary consistencies, effective compensatory strategies, and safe eating environment.       Onset of problem:      Pt. Reports feeling of drier foods \"getting stuck\" in her throat when swallowing.    Pt. C h/o anterior cervical disc fusion and thyroid surgery          Behavior/Cognition/Vision/Hearing:  Behavior/Cognition: Alert;Cooperative  Vision: Within Functional Limits  Hearing: Within functional limits    Impressions:     Patient Position: Lateral;A-P       Consistencies Administered: Regular;Pureed;Thin cup;Thin straw;Mildly Thick cup          Oral Phase:  premature vallecular spillage.       Pharyngeal Phase:    mod amt vallecular residue c dry solids that cleared c  reswallow/liquid wash.   Pt. Able to independently clear.    Cervical Osteophyte noted at C2 (superior to cervical hardware at C3-7)  that could contribute to this issue.    A-P view taken c thin liquid trial was unremarkable for significant findings.        Upper Esophageal Screen: unremarkable    Dysphagia Outcome Severity Scale: Level 6: Within functional limits/Modified independence  Penetration-Aspiration Scale (PAS): 1 - Material does not enter the airway    Recommended Diet:         Thin liquids, regular solids as tolerated.  Pt. Educ. She may want to avoid drier, dense foods.    Stick with moist, softer foods and alternate liquids and solids.          Safe Swallow Protocol:     Compensatory Swallowing Strategies : Alternate solids and liquids;Eat/Feed slowly;Upright as possible for all oral intake;Small bites/sips              Recommendations/Treatment  Requires SLP Intervention: No               Education: Results and recommendations were reviewed with pt.  following this exam.      Patient Education Response: Verbalizes understanding              Pain       None reported      Therapy Time:   Individual Concurrent Group Co-treatment   Time In 1345         Time Out 1400         Minutes 15               Lacy Sargent M.A.CCC/SLP    3/21/2025, 2:15 PM

## 2025-03-24 ENCOUNTER — RESULTS FOLLOW-UP (OUTPATIENT)
Dept: GENERAL RADIOLOGY | Age: 60
End: 2025-03-24

## 2025-03-24 ENCOUNTER — HOSPITAL ENCOUNTER (OUTPATIENT)
Dept: INTERVENTIONAL RADIOLOGY/VASCULAR | Age: 60
Discharge: HOME | End: 2025-03-26
Payer: MEDICARE

## 2025-03-24 DIAGNOSIS — E04.1 THYROID NODULE: ICD-10-CM

## 2025-03-24 LAB
CASE NUMBER:: NORMAL
SPECIMEN DESCRIPTION: NORMAL

## 2025-03-24 PROCEDURE — 10005 FNA BX W/US GDN 1ST LES: CPT

## 2025-03-24 PROCEDURE — 2709999900 IR BIOPSY THYROID PERC CORE NEEDLE

## 2025-03-24 PROCEDURE — 88305 TISSUE EXAM BY PATHOLOGIST: CPT

## 2025-03-24 PROCEDURE — 88173 CYTOPATH EVAL FNA REPORT: CPT

## 2025-03-24 PROCEDURE — 88172 CYTP DX EVAL FNA 1ST EA SITE: CPT

## 2025-03-24 NOTE — TELEPHONE ENCOUNTER
----- Message from Dr. Gilberto Chicas MD sent at 3/23/2025  3:24 PM EDT -----  Hi Team,    Can one of you please call Ms Bills regarding the results of her swallow studies?  I have reviewed these images and agree that there is no obvious area of compression or stenosis that would explain her difficulties swallowing and sensation of food getting stuck.  It looks like she was given home exercises and management strategies by speech pathology to help with swallowing.  We will monitor her swallowing symptoms at this time.    Please remind her to get her blood drawn in lab at her earliest convenience for thyroid function testing    She is scheduled for thyroid FNA tomorrow.  We will call her again with these results    Thank you,  CS  ----- Message -----  From: Won Trammell Incoming Radiant Results From [facility]  Sent: 3/21/2025   9:37 PM EDT  To: Gilberto Chicas MD

## 2025-03-24 NOTE — PROGRESS NOTES
Patient tolerated left thyroid biopsy without distress. Dressing to site. Discharge instructions given, no questions at this time. Patient discharged home with sister.

## 2025-03-24 NOTE — TELEPHONE ENCOUNTER
[facility] for patient reporting swallow study results reviewed by Dr. Chicas who agrees that there is no obvious area of compression or stenosis that would explain her difficulties swallowing and sensation of food getting stuck. Writer also reports Dr. Chicas was able to review she was given home exercises and management strategies by speech pathology to help with swallowing and Dr. Chicas would like to monitor her swallowing symptoms at this time. Writer also reminded patient to obtain labs for thyroid function testing at her earliest convenience, and Dr. Chicas will call to follow up with thyroid FNA results as she is scheduled for FNA tomorrow. Writer encouraged patient to call back with any questions/concerns, no additional needs.

## 2025-03-25 LAB — SURGICAL PATHOLOGY REPORT: NORMAL

## 2025-03-27 ENCOUNTER — RESULTS FOLLOW-UP (OUTPATIENT)
Dept: OTOLARYNGOLOGY | Age: 60
End: 2025-03-27

## 2025-03-27 NOTE — TELEPHONE ENCOUNTER
I called Ms. Bills regarding the results of her thyroid biopsy.  We reviewed that it is benign and without concerning findings.  Dr. Chicas recommends continued surveillance.  She can follow-up with him in clinic in 1 year.  She states understanding and denies further needs.

## 2025-04-07 PROCEDURE — 93297 REM INTERROG DEV EVAL ICPMS: CPT | Performed by: SPECIALIST

## 2025-04-14 DIAGNOSIS — I47.10 SVT (SUPRAVENTRICULAR TACHYCARDIA): ICD-10-CM

## 2025-05-08 ENCOUNTER — TELEPHONE (OUTPATIENT)
Dept: GASTROENTEROLOGY | Age: 60
End: 2025-05-08

## 2025-05-21 ENCOUNTER — HOSPITAL ENCOUNTER (INPATIENT)
Age: 60
LOS: 2 days | Discharge: HOME HEALTH | End: 2025-05-23
Attending: STUDENT IN AN ORGANIZED HEALTH CARE EDUCATION/TRAINING PROGRAM | Admitting: INTERNAL MEDICINE
Payer: MEDICARE

## 2025-05-21 ENCOUNTER — APPOINTMENT (OUTPATIENT)
Dept: GENERAL RADIOLOGY | Age: 60
End: 2025-05-21
Payer: MEDICARE

## 2025-05-21 DIAGNOSIS — I20.0 UNSTABLE ANGINA (HCC): ICD-10-CM

## 2025-05-21 DIAGNOSIS — I21.4 NSTEMI (NON-ST ELEVATED MYOCARDIAL INFARCTION) (HCC): Primary | ICD-10-CM

## 2025-05-21 PROBLEM — E03.9 ACQUIRED HYPOTHYROIDISM: Status: ACTIVE | Noted: 2025-05-21

## 2025-05-21 PROBLEM — I44.7 LEFT BUNDLE BRANCH BLOCK (LBBB): Status: ACTIVE | Noted: 2025-05-21

## 2025-05-21 LAB
ALBUMIN SERPL-MCNC: 3.9 G/DL (ref 3.5–5.2)
ALBUMIN/GLOB SERPL: 1.1 {RATIO} (ref 1–2.5)
ALP SERPL-CCNC: 176 U/L (ref 35–104)
ALT SERPL-CCNC: 18 U/L (ref 10–35)
ANION GAP SERPL CALCULATED.3IONS-SCNC: 8 MMOL/L (ref 9–16)
AST SERPL-CCNC: 19 U/L (ref 10–35)
BASOPHILS # BLD: 0.03 K/UL (ref 0–0.2)
BASOPHILS NFR BLD: 0 % (ref 0–2)
BILIRUB DIRECT SERPL-MCNC: 0.1 MG/DL (ref 0–0.2)
BILIRUB INDIRECT SERPL-MCNC: 0.2 MG/DL (ref 0–1)
BILIRUB SERPL-MCNC: 0.3 MG/DL (ref 0–1.2)
BNP SERPL-MCNC: 337 PG/ML (ref 0–125)
BUN SERPL-MCNC: 15 MG/DL (ref 8–23)
CALCIUM SERPL-MCNC: 8.9 MG/DL (ref 8.6–10.4)
CHLORIDE SERPL-SCNC: 107 MMOL/L (ref 98–107)
CO2 SERPL-SCNC: 24 MMOL/L (ref 20–31)
CREAT SERPL-MCNC: 1 MG/DL (ref 0.6–0.9)
EOSINOPHIL # BLD: 0.18 K/UL (ref 0–0.44)
EOSINOPHILS RELATIVE PERCENT: 2 % (ref 1–4)
ERYTHROCYTE [DISTWIDTH] IN BLOOD BY AUTOMATED COUNT: 16.9 % (ref 11.8–14.4)
FOLATE SERPL-MCNC: 13.6 NG/ML (ref 4.8–24.2)
GFR, ESTIMATED: 64 ML/MIN/1.73M2
GLOBULIN SER CALC-MCNC: 3.6 G/DL
GLUCOSE SERPL-MCNC: 88 MG/DL (ref 74–99)
HCT VFR BLD AUTO: 35.5 % (ref 36.3–47.1)
HGB BLD-MCNC: 10.6 G/DL (ref 11.9–15.1)
IMM GRANULOCYTES # BLD AUTO: 0.03 K/UL (ref 0–0.3)
IMM GRANULOCYTES NFR BLD: 0 %
IRON SATN MFR SERPL: 9 % (ref 20–55)
IRON SERPL-MCNC: 29 UG/DL (ref 37–145)
LIPASE SERPL-CCNC: 30 U/L (ref 13–60)
LYMPHOCYTES NFR BLD: 1.99 K/UL (ref 1.1–3.7)
LYMPHOCYTES RELATIVE PERCENT: 27 % (ref 24–43)
MCH RBC QN AUTO: 24.8 PG (ref 25.2–33.5)
MCHC RBC AUTO-ENTMCNC: 29.9 G/DL (ref 28.4–34.8)
MCV RBC AUTO: 83.1 FL (ref 82.6–102.9)
MONOCYTES NFR BLD: 0.38 K/UL (ref 0.1–1.2)
MONOCYTES NFR BLD: 5 % (ref 3–12)
NEUTROPHILS NFR BLD: 66 % (ref 36–65)
NEUTS SEG NFR BLD: 4.84 K/UL (ref 1.5–8.1)
NRBC BLD-RTO: 0 PER 100 WBC
PLATELET # BLD AUTO: 279 K/UL (ref 138–453)
PMV BLD AUTO: 9.8 FL (ref 8.1–13.5)
POTASSIUM SERPL-SCNC: 4.2 MMOL/L (ref 3.7–5.3)
PROT SERPL-MCNC: 7.5 G/DL (ref 6.6–8.7)
RBC # BLD AUTO: 4.27 M/UL (ref 3.95–5.11)
RBC # BLD: ABNORMAL 10*6/UL
SODIUM SERPL-SCNC: 139 MMOL/L (ref 136–145)
TIBC SERPL-MCNC: 320 UG/DL (ref 250–450)
TROPONIN I SERPL HS-MCNC: 17 NG/L (ref 0–14)
TROPONIN I SERPL HS-MCNC: 18 NG/L (ref 0–14)
TSH SERPL DL<=0.05 MIU/L-ACNC: 1.99 UIU/ML (ref 0.27–4.2)
UNSATURATED IRON BINDING CAPACITY: 291 UG/DL (ref 112–347)
VIT B12 SERPL-MCNC: 688 PG/ML (ref 232–1245)
WBC OTHER # BLD: 7.5 K/UL (ref 3.5–11.3)

## 2025-05-21 PROCEDURE — 2060000000 HC ICU INTERMEDIATE R&B

## 2025-05-21 PROCEDURE — 6370000000 HC RX 637 (ALT 250 FOR IP)

## 2025-05-21 PROCEDURE — 6370000000 HC RX 637 (ALT 250 FOR IP): Performed by: STUDENT IN AN ORGANIZED HEALTH CARE EDUCATION/TRAINING PROGRAM

## 2025-05-21 PROCEDURE — 99223 1ST HOSP IP/OBS HIGH 75: CPT | Performed by: INTERNAL MEDICINE

## 2025-05-21 PROCEDURE — 84443 ASSAY THYROID STIM HORMONE: CPT

## 2025-05-21 PROCEDURE — 83550 IRON BINDING TEST: CPT

## 2025-05-21 PROCEDURE — 93005 ELECTROCARDIOGRAM TRACING: CPT | Performed by: STUDENT IN AN ORGANIZED HEALTH CARE EDUCATION/TRAINING PROGRAM

## 2025-05-21 PROCEDURE — 80048 BASIC METABOLIC PNL TOTAL CA: CPT

## 2025-05-21 PROCEDURE — 83036 HEMOGLOBIN GLYCOSYLATED A1C: CPT

## 2025-05-21 PROCEDURE — 82746 ASSAY OF FOLIC ACID SERUM: CPT

## 2025-05-21 PROCEDURE — 71045 X-RAY EXAM CHEST 1 VIEW: CPT

## 2025-05-21 PROCEDURE — 36415 COLL VENOUS BLD VENIPUNCTURE: CPT

## 2025-05-21 PROCEDURE — 83880 ASSAY OF NATRIURETIC PEPTIDE: CPT

## 2025-05-21 PROCEDURE — 2500000003 HC RX 250 WO HCPCS

## 2025-05-21 PROCEDURE — 82607 VITAMIN B-12: CPT

## 2025-05-21 PROCEDURE — 99285 EMERGENCY DEPT VISIT HI MDM: CPT

## 2025-05-21 PROCEDURE — 83690 ASSAY OF LIPASE: CPT

## 2025-05-21 PROCEDURE — 83540 ASSAY OF IRON: CPT

## 2025-05-21 PROCEDURE — 6360000002 HC RX W HCPCS

## 2025-05-21 PROCEDURE — 96374 THER/PROPH/DIAG INJ IV PUSH: CPT

## 2025-05-21 PROCEDURE — 85025 COMPLETE CBC W/AUTO DIFF WBC: CPT

## 2025-05-21 PROCEDURE — 84484 ASSAY OF TROPONIN QUANT: CPT

## 2025-05-21 PROCEDURE — 94761 N-INVAS EAR/PLS OXIMETRY MLT: CPT

## 2025-05-21 PROCEDURE — 94640 AIRWAY INHALATION TREATMENT: CPT

## 2025-05-21 PROCEDURE — 80076 HEPATIC FUNCTION PANEL: CPT

## 2025-05-21 RX ORDER — OXYBUTYNIN CHLORIDE 5 MG/1
5 TABLET ORAL 2 TIMES DAILY
Status: DISCONTINUED | OUTPATIENT
Start: 2025-05-21 | End: 2025-05-23 | Stop reason: HOSPADM

## 2025-05-21 RX ORDER — LEVOTHYROXINE SODIUM 50 UG/1
50 TABLET ORAL DAILY
Status: DISCONTINUED | OUTPATIENT
Start: 2025-05-22 | End: 2025-05-23 | Stop reason: HOSPADM

## 2025-05-21 RX ORDER — TROSPIUM CHLORIDE 20 MG/1
20 TABLET, FILM COATED ORAL NIGHTLY
Status: DISCONTINUED | OUTPATIENT
Start: 2025-05-21 | End: 2025-05-23 | Stop reason: HOSPADM

## 2025-05-21 RX ORDER — POTASSIUM CHLORIDE 1500 MG/1
40 TABLET, EXTENDED RELEASE ORAL PRN
Status: DISCONTINUED | OUTPATIENT
Start: 2025-05-21 | End: 2025-05-23 | Stop reason: HOSPADM

## 2025-05-21 RX ORDER — OXYCODONE AND ACETAMINOPHEN 10; 325 MG/1; MG/1
1 TABLET ORAL EVERY 4 HOURS PRN
Refills: 0 | Status: DISCONTINUED | OUTPATIENT
Start: 2025-05-21 | End: 2025-05-21

## 2025-05-21 RX ORDER — ATORVASTATIN CALCIUM 80 MG/1
80 TABLET, FILM COATED ORAL NIGHTLY
Status: DISCONTINUED | OUTPATIENT
Start: 2025-05-21 | End: 2025-05-23 | Stop reason: HOSPADM

## 2025-05-21 RX ORDER — ACETAMINOPHEN 325 MG/1
650 TABLET ORAL EVERY 6 HOURS PRN
Status: DISCONTINUED | OUTPATIENT
Start: 2025-05-21 | End: 2025-05-21

## 2025-05-21 RX ORDER — ASPIRIN 81 MG/1
81 TABLET ORAL DAILY
Status: DISCONTINUED | OUTPATIENT
Start: 2025-05-22 | End: 2025-05-23 | Stop reason: HOSPADM

## 2025-05-21 RX ORDER — OXYCODONE AND ACETAMINOPHEN 5; 325 MG/1; MG/1
1 TABLET ORAL EVERY 4 HOURS PRN
Refills: 0 | Status: DISCONTINUED | OUTPATIENT
Start: 2025-05-21 | End: 2025-05-21

## 2025-05-21 RX ORDER — SODIUM CHLORIDE 0.9 % (FLUSH) 0.9 %
5-40 SYRINGE (ML) INJECTION PRN
Status: DISCONTINUED | OUTPATIENT
Start: 2025-05-21 | End: 2025-05-23 | Stop reason: HOSPADM

## 2025-05-21 RX ORDER — ALBUTEROL SULFATE 0.83 MG/ML
2.5 SOLUTION RESPIRATORY (INHALATION) EVERY 6 HOURS PRN
Status: DISCONTINUED | OUTPATIENT
Start: 2025-05-21 | End: 2025-05-23 | Stop reason: HOSPADM

## 2025-05-21 RX ORDER — ONDANSETRON 4 MG/1
4 TABLET, ORALLY DISINTEGRATING ORAL EVERY 8 HOURS PRN
Status: DISCONTINUED | OUTPATIENT
Start: 2025-05-21 | End: 2025-05-23 | Stop reason: HOSPADM

## 2025-05-21 RX ORDER — FUROSEMIDE 10 MG/ML
20 INJECTION INTRAMUSCULAR; INTRAVENOUS ONCE
Status: COMPLETED | OUTPATIENT
Start: 2025-05-21 | End: 2025-05-21

## 2025-05-21 RX ORDER — BUDESONIDE AND FORMOTEROL FUMARATE DIHYDRATE 160; 4.5 UG/1; UG/1
2 AEROSOL RESPIRATORY (INHALATION) 2 TIMES DAILY
Status: DISCONTINUED | OUTPATIENT
Start: 2025-05-21 | End: 2025-05-23 | Stop reason: HOSPADM

## 2025-05-21 RX ORDER — SODIUM CHLORIDE 0.9 % (FLUSH) 0.9 %
5-40 SYRINGE (ML) INJECTION EVERY 12 HOURS SCHEDULED
Status: DISCONTINUED | OUTPATIENT
Start: 2025-05-21 | End: 2025-05-23 | Stop reason: HOSPADM

## 2025-05-21 RX ORDER — ALLOPURINOL 300 MG/1
300 TABLET ORAL DAILY
Status: DISCONTINUED | OUTPATIENT
Start: 2025-05-21 | End: 2025-05-23 | Stop reason: HOSPADM

## 2025-05-21 RX ORDER — OXYCODONE HYDROCHLORIDE 5 MG/1
5 TABLET ORAL EVERY 4 HOURS PRN
Refills: 0 | Status: DISCONTINUED | OUTPATIENT
Start: 2025-05-21 | End: 2025-05-22

## 2025-05-21 RX ORDER — ASPIRIN 81 MG/1
324 TABLET, CHEWABLE ORAL ONCE
Status: COMPLETED | OUTPATIENT
Start: 2025-05-21 | End: 2025-05-21

## 2025-05-21 RX ORDER — ENOXAPARIN SODIUM 100 MG/ML
40 INJECTION SUBCUTANEOUS 2 TIMES DAILY
Status: DISCONTINUED | OUTPATIENT
Start: 2025-05-21 | End: 2025-05-23 | Stop reason: HOSPADM

## 2025-05-21 RX ORDER — ONDANSETRON 2 MG/ML
4 INJECTION INTRAMUSCULAR; INTRAVENOUS EVERY 6 HOURS PRN
Status: DISCONTINUED | OUTPATIENT
Start: 2025-05-21 | End: 2025-05-23 | Stop reason: HOSPADM

## 2025-05-21 RX ORDER — SODIUM CHLORIDE 9 MG/ML
INJECTION, SOLUTION INTRAVENOUS PRN
Status: DISCONTINUED | OUTPATIENT
Start: 2025-05-21 | End: 2025-05-23 | Stop reason: HOSPADM

## 2025-05-21 RX ORDER — ACETAMINOPHEN 650 MG/1
650 SUPPOSITORY RECTAL EVERY 6 HOURS PRN
Status: DISCONTINUED | OUTPATIENT
Start: 2025-05-21 | End: 2025-05-23 | Stop reason: HOSPADM

## 2025-05-21 RX ORDER — BUMETANIDE 1 MG/1
1 TABLET ORAL DAILY
Status: DISCONTINUED | OUTPATIENT
Start: 2025-05-22 | End: 2025-05-23 | Stop reason: HOSPADM

## 2025-05-21 RX ORDER — MONTELUKAST SODIUM 10 MG/1
10 TABLET ORAL NIGHTLY
Status: DISCONTINUED | OUTPATIENT
Start: 2025-05-21 | End: 2025-05-23 | Stop reason: HOSPADM

## 2025-05-21 RX ORDER — POLYETHYLENE GLYCOL 3350 17 G/17G
17 POWDER, FOR SOLUTION ORAL DAILY PRN
Status: DISCONTINUED | OUTPATIENT
Start: 2025-05-21 | End: 2025-05-23 | Stop reason: HOSPADM

## 2025-05-21 RX ORDER — POTASSIUM CHLORIDE 7.45 MG/ML
10 INJECTION INTRAVENOUS PRN
Status: DISCONTINUED | OUTPATIENT
Start: 2025-05-21 | End: 2025-05-23 | Stop reason: HOSPADM

## 2025-05-21 RX ORDER — ISOSORBIDE DINITRATE 10 MG/1
10 TABLET ORAL 2 TIMES DAILY
Status: DISCONTINUED | OUTPATIENT
Start: 2025-05-21 | End: 2025-05-23 | Stop reason: HOSPADM

## 2025-05-21 RX ORDER — SPIRONOLACTONE 25 MG/1
25 TABLET ORAL DAILY
Status: DISCONTINUED | OUTPATIENT
Start: 2025-05-21 | End: 2025-05-23 | Stop reason: HOSPADM

## 2025-05-21 RX ORDER — PANTOPRAZOLE SODIUM 40 MG/1
40 TABLET, DELAYED RELEASE ORAL DAILY
Status: DISCONTINUED | OUTPATIENT
Start: 2025-05-21 | End: 2025-05-23 | Stop reason: HOSPADM

## 2025-05-21 RX ORDER — OXYCODONE AND ACETAMINOPHEN 5; 325 MG/1; MG/1
1 TABLET ORAL EVERY 4 HOURS PRN
Refills: 0 | Status: DISCONTINUED | OUTPATIENT
Start: 2025-05-21 | End: 2025-05-22

## 2025-05-21 RX ORDER — MAGNESIUM SULFATE IN WATER 40 MG/ML
2000 INJECTION, SOLUTION INTRAVENOUS PRN
Status: DISCONTINUED | OUTPATIENT
Start: 2025-05-21 | End: 2025-05-23 | Stop reason: HOSPADM

## 2025-05-21 RX ADMIN — OXYBUTYNIN CHLORIDE 5 MG: 5 TABLET ORAL at 20:25

## 2025-05-21 RX ADMIN — ASPIRIN 324 MG: 81 TABLET, CHEWABLE ORAL at 15:33

## 2025-05-21 RX ADMIN — OXYCODONE HYDROCHLORIDE AND ACETAMINOPHEN 1 TABLET: 5; 325 TABLET ORAL at 23:46

## 2025-05-21 RX ADMIN — TROSPIUM CHLORIDE 20 MG: 20 TABLET, FILM COATED ORAL at 20:26

## 2025-05-21 RX ADMIN — Medication 5 MG: at 23:46

## 2025-05-21 RX ADMIN — FUROSEMIDE 20 MG: 10 INJECTION, SOLUTION INTRAMUSCULAR; INTRAVENOUS at 14:36

## 2025-05-21 RX ADMIN — OXYCODONE HYDROCHLORIDE AND ACETAMINOPHEN 1 TABLET: 5; 325 TABLET ORAL at 19:28

## 2025-05-21 RX ADMIN — ATORVASTATIN CALCIUM 80 MG: 80 TABLET, FILM COATED ORAL at 20:26

## 2025-05-21 RX ADMIN — MONTELUKAST 10 MG: 10 TABLET, FILM COATED ORAL at 20:25

## 2025-05-21 RX ADMIN — SODIUM CHLORIDE, PRESERVATIVE FREE 10 ML: 5 INJECTION INTRAVENOUS at 20:26

## 2025-05-21 RX ADMIN — BUDESONIDE AND FORMOTEROL FUMARATE DIHYDRATE 2 PUFF: 160; 4.5 AEROSOL RESPIRATORY (INHALATION) at 20:55

## 2025-05-21 RX ADMIN — ENOXAPARIN SODIUM 40 MG: 100 INJECTION SUBCUTANEOUS at 20:30

## 2025-05-21 ASSESSMENT — PAIN DESCRIPTION - ONSET: ONSET: ON-GOING

## 2025-05-21 ASSESSMENT — PAIN DESCRIPTION - FREQUENCY: FREQUENCY: CONTINUOUS

## 2025-05-21 ASSESSMENT — PAIN DESCRIPTION - LOCATION
LOCATION: BACK
LOCATION: BACK
LOCATION: BACK;KNEE

## 2025-05-21 ASSESSMENT — PAIN SCALES - GENERAL
PAINLEVEL_OUTOF10: 8
PAINLEVEL_OUTOF10: 5
PAINLEVEL_OUTOF10: 10
PAINLEVEL_OUTOF10: 8
PAINLEVEL_OUTOF10: 5

## 2025-05-21 ASSESSMENT — PAIN DESCRIPTION - ORIENTATION
ORIENTATION: RIGHT;LEFT;POSTERIOR
ORIENTATION: LOWER

## 2025-05-21 ASSESSMENT — PAIN DESCRIPTION - DESCRIPTORS
DESCRIPTORS: ACHING
DESCRIPTORS: ACHING

## 2025-05-21 ASSESSMENT — PAIN DESCRIPTION - PAIN TYPE: TYPE: CHRONIC PAIN

## 2025-05-21 ASSESSMENT — PAIN - FUNCTIONAL ASSESSMENT: PAIN_FUNCTIONAL_ASSESSMENT: ACTIVITIES ARE NOT PREVENTED

## 2025-05-21 NOTE — ED PROVIDER NOTES
FACULTY SIGN-OUT  ADDENDUM       Patient: Mary Bills   MRN: [id number]  PCP:  Valerie Ojeda MD  Note Started: 5/21/25, 3:11 PM EDT  Attestation  I was available and discussed any additional care issues that arose and coordinated the management plans with the resident(s) caring for the patient during my duty period. Any areas of disagreement with resident's documentation of care or procedures are noted on the chart. I was personally present for the key portions of any/all procedures during my duty period. I have documented in the chart those procedures where I was not present during the key portions.   The patient's initial evaluation and plan have been discussed with the prior provider who initially evaluated the patient.      Pertinent Comments:  The patient is a 59 y.o. female taken in signout with upper thoracic pain as well as associated shortness of breath with EKG changes that cardiology is well aware of and wished to take the cath tomorrow.    We are awaiting further workup and admission for catheterization tomorrow    ED COURSE      The patient was given the following medications:  Orders Placed This Encounter   Medications    furosemide (LASIX) injection 20 mg    aspirin chewable tablet 324 mg       RECENT VITALS:   BP: (!) 148/86  Pulse: 60  Respirations: 16  Temp: 98 °F (36.7 °C) SpO2: 100 %    (Please note that portions of this note were completed with a voice recognition program.  Efforts were made to edit the dictations but occasionally words are mis-transcribed.)    Thang Cedillo MD [facility]  Attending Emergency Medicine Physician       Thang Cedillo MD  05/21/25 7318

## 2025-05-21 NOTE — ED NOTES
ED to inpatient nurses report      Chief Complaint:  No chief complaint on file.    Present to ED from: home    MOA:     LOC: alert and orientated to name, place, date  Mobility: Independent  Oxygen Baseline: room air    Current needs required: room air   Pending ED orders: none  Present condition: stable    Why did the patient come to the ED? EKG abnormality from Cardiologist office  What is the plan? Admit and observe  Any procedures or intervention occur? none  Any safety concerns??    Mental Status:       Psych Assessment:   Psychosocial  Psychosocial (WDL): Within Defined Limits  Vital signs   Vitals:    05/21/25 1341 05/21/25 1343   BP: (!) 148/86    Pulse: 60    Resp:  16   Temp: 98 °F (36.7 °C)    TempSrc: Oral    SpO2: 100%         Vitals:  Patient Vitals for the past 24 hrs:   BP Temp Temp src Pulse Resp SpO2   05/21/25 1343 -- -- -- -- 16 --   05/21/25 1341 (!) 148/86 98 °F (36.7 °C) Oral 60 -- 100 %      Visit Vitals  BP (!) 148/86   Pulse 60   Temp 98 °F (36.7 °C) (Oral)   Resp 16   SpO2 100%        LDAs:   Peripheral IV 05/21/25 Right Forearm (Active)       Ambulatory Status:  Presents to emergency department  because of falls (Syncope, seizure, or loss of consciousness): No, Age > 70: No, Altered Mental Status, Intoxication with alcohol or substance confusion (Disorientation, impaired judgment, poor safety awaremess, or inability to follow instructions): No, Impaired Mobility: Ambulates or transfers with assistive devices or assistance; Unable to ambulate or transer.: No, Nursing Judgement: No    Diagnosis:  DISPOSITION Decision To Admit 05/21/2025 02:54:27 PM   Final diagnoses:   Unstable angina (HCC)        Consults:  IP CONSULT TO INTERNAL MEDICINE     Treatment Team:   Treatment Team:   Godfrey Sanders DO Bernhard, Bobbie, RN Rauf, Uzma, MD Katragadda, Srinivas, MD    Treatment:  ED Course as of 05/21/25 1503   Wed May 21, 2025   1450 BP(!): 148/86 [UR]   1450 Pulse: 60 [UR]   1450 SpO2: 100 %  Transportation     Lack of Transportation (Non-Medical): No   Physical Activity: Insufficiently Active (12/6/2023)    Exercise Vital Sign     Days of Exercise per Week: 5 days     Minutes of Exercise per Session: 10 min    Received from [facility] Safety & Environment   Housing Stability: Low Risk  (12/8/2023)    Housing Stability Vital Sign     Unable to Pay for Housing in the Last Year: No     Number of Places Lived in the Last Year: 1     Unstable Housing in the Last Year: No       FAMILY HISTORY       Family History   Problem Relation Age of Onset    Other Mother     Diabetes Mother     Heart Disease Mother     Arthritis Mother     Kidney Disease Mother     Lupus Mother     Dementia Mother     Osteoporosis Mother     Hypertension Mother     Tuberculosis Mother     Alcohol Abuse Father     Arthritis Sister     Hypertension Sister     Breast Cancer Sister         35s    Diabetes Brother     Asthma Brother     Cancer Maternal Grandfather     Dementia Paternal Grandmother     Breast Cancer Niece         30-35       ALLERGIES     Entresto [sacubitril-valsartan], Food, Gabapentin, Lyrica [pregabalin], Prunus persica, Savella [milnacipran], Tetracycline, and Tetracyclines & related    CURRENT MEDICATIONS       Previous Medications    ALBUTEROL (PROVENTIL) (2.5 MG/3ML) 0.083% NEBULIZER SOLUTION    Take 3 mLs by nebulization every 6 hours as needed for Wheezing    ALBUTEROL SULFATE HFA (PROVENTIL;VENTOLIN;PROAIR) 108 (90 BASE) MCG/ACT INHALER    Inhale 2 puffs into the lungs every 4 hours as needed for Shortness of Breath    ALLOPURINOL (ZYLOPRIM) 300 MG TABLET    Take 1 tablet by mouth daily    ASPIRIN 81 MG EC TABLET    Take 1 tablet by mouth    ATORVASTATIN (LIPITOR) 80 MG TABLET    Take 1 tablet by mouth daily    BUDESONIDE-FORMOTEROL (SYMBICORT) 160-4.5 MCG/ACT AERO    Inhale 2 puffs into the lungs 2 times daily    BUMETANIDE (BUMEX) 1 MG TABLET    Take 1 tablet by mouth

## 2025-05-21 NOTE — ED PROVIDER NOTES
[facility] ED  Emergency Department Encounter  Emergency Medicine Resident     Pt Name:Mary Bills  MRN: [id number]  Birthdate 1965  Date of evaluation: 5/21/25  PCP:  Valerie Ojeda MD  Note Started: 12:39 PM EDT      CHIEF COMPLAINT       No chief complaint on file.      HISTORY OF PRESENT ILLNESS  (Location/Symptom, Timing/Onset, Context/Setting, Quality, Duration, Modifying Factors, Severity.)      Mary Bills is a 59 y.o. female  Past medical history of ischemic cardiomyopathy with HFrEF,  Hypertension, hyperlipidemia,presented from the cardiology clinic which was a routine checkup.  She was found to have dynamic changes on the EKG in the clinic.  Per cardiology notes patient had ST segment elevation in the lateral leads and ST segment depression in the inferior lead initially,EKG showed resolution of the symptoms in the clinic.  Patient was sent down to the ER for further evaluation.  Also patient has been complaining of upper mid back pain which she states is chronic due to history of degenerative disc disorder and spinal surgeries also have history of fibromyalgia  The patient states that she has been feeling more short of breath for the past 2 days, also complaining of upper back pain   She denies any chest pain, productive cough, palpitation  Patient states that she has been advised bumetanide as needed for her CHF but has not taken any recently.  Also states that she has noticed that her lower limbs are more swollen than usual but states that swelling goes away when she lies down in elevate her legs  She denies any dysuria, abdominal pain or diarrhea denies any lower back pain.    PAST MEDICAL / SURGICAL / SOCIAL / FAMILY HISTORY      has a past medical history of Arthritis, Bronchitis, CHF (congestive heart failure) (HCC), Chronic back pain, COVID-19, Depression, Fibromyalgia, GERD (gastroesophageal reflux disease), Gout, History of heart attack, HLD

## 2025-05-21 NOTE — H&P
[facility]     Department of Internal Medicine - Staff Internal Medicine Teaching Service          ADMISSION NOTE/HISTORY AND PHYSICAL EXAMINATION   Date: 5/21/2025  Patient Name: Mary Bills  Date of admission: 5/21/2025 12:23 PM  YOB: 1965  PCP: Valerie Ojeda MD  History Obtained From:  patient, electronic medical record    CHIEF COMPLAINT     Chief complaint: Back pain -    HISTORY OF PRESENTING ILLNESS     The patient is a pleasant 59 y.o. female with a PMHx significant for     Coronary artery disease   Left bundle branch block  Heart failure reduced ejection fraction  History of SVT  History of severe bradycardia  Hypertension  Hyperlipidemia  COPD  Morbid obesity  Hypothyroidism  Fibromyalgia  Post COVID syndrome  GERD  Gout  Osteoarthritis  Chronic pain syndrome  Cervical spondylosis    presents with a chief complaint of back pain.  Patient was reviewed in cardiology clinic today and advised to return to the ER after dynamic changes were noted on her EKG and the fact that she had similar pain with her heart attack the last time.     Patient informs that she has been having episodes of this back pain for the past week.  Pain is located over the medial aspect of the scapula and each episode lasts for around 2 minutes.  There is no change with position but may increase on inspiration.  There are no other obvious issues or symptoms.  Patient does feel short of breath on exertion and movement.  This shortness of breath has been ongoing for the past month. She does report an increase in leg swelling.  She informs that she was advised in the heart failure clinic to start Bumex but she has not started yet.  Patient denies orthopnea. She also denies cough, cold, sore throat, fever, chest pain, palpitations, dizziness, syncope, abdominal pain, nausea, vomiting, issues with passing urine or opening bowels.    On my assessment in ER, patient is sitting up in  in February 2024  Keep Mg above 2 and potassium > 4  Continue telemetry    Hypertension  Continue Aldactone and Imdur  Monitor BP - consider adding ACE/ARB/Entresto if remaining high    Chronic normocytic anemia  Hb around baseline of 10  Check iron, B12 and folate    COPD/asthma  Continue albuterol and montelukast  Oxygen as needed  Saturating well on room air  Chest x-ray -  nil acute    Hyperlipidemia  Continue Lipitor 80 mg  Last  in 2021 -recheck    Hypothyroidism  Continue levothyroxine 50 mcg  Check latest TSH -previous normal from 2023    Morbid obesity   on weight loss and healthy diet    GERD  Continue Protonix    Gout  Continue allopurinol    Chronic kidney disease stage II  Creatinine around baseline  Monitor with BMP  Avoid nephrotoxic    Cervical spondylosis  Osteoarthritis  Continue Percocet as needed  Physical therapy    DVT ppx: Lovenox  GI ppx: Protonix    PT/OT/SW consulted  Discharge Planning: As per cardiology    Mejia Olivares MD  Internal Medicine Resident, PGY-1   [facility]; [place], [place]  5/21/2025, 8:22 PM

## 2025-05-21 NOTE — PLAN OF CARE
Problem: Chronic Conditions and Co-morbidities  Goal: Patient's chronic conditions and co-morbidity symptoms are monitored and maintained or improved  Outcome: Progressing  Flowsheets (Taken 5/21/2025 1730)  Care Plan - Patient's Chronic Conditions and Co-Morbidity Symptoms are Monitored and Maintained or Improved: Monitor and assess patient's chronic conditions and comorbid symptoms for stability, deterioration, or improvement     Problem: Discharge Planning  Goal: Discharge to home or other facility with appropriate resources  Outcome: Progressing  Flowsheets (Taken 5/21/2025 1730)  Discharge to home or other facility with appropriate resources: Identify barriers to discharge with patient and caregiver     Problem: Pain  Goal: Verbalizes/displays adequate comfort level or baseline comfort level  Outcome: Progressing

## 2025-05-21 NOTE — ED PROVIDER NOTES
[facility]     Emergency Department     Faculty Attestation    I performed a history and physical examination of the patient and discussed management with the resident. I have reviewed and agree with the resident’s findings including all diagnostic interpretations, and treatment plans as written at the time of my review. Any areas of disagreement are noted on the chart. I was personally present for the key portions of any procedures. I have documented in the chart those procedures where I was not present during the key portions. For Physician Assistant/ Nurse Practitioner cases/documentation I have personally evaluated this patient and have completed at least one if not all key elements of the E/M (history, physical exam, and MDM). Additional findings are as noted.    PtName: Mary Bills  MRN: [id number]  Birthdate 1965  Date of evaluation: 5/21/25  Note Started: 12:47 PM EDT    Primary Care Physician: Valerie Ojeda MD    Brief HPI:  59-year-old female presents emergency department with upper back pain.  The patient reports symptoms have been on and off for the past 1 week associated with shortness of breath.  The patient was evaluated by cardiology today.  She discussed her symptoms and EKG was obtained which revealed ST elevation in the lateral leads with ST depression in the inferior leads.  There was a repeat EKG which revealed resolved changes.  She was sent to the emergency department for evaluation given her dynamic EKG changes and back pain or shortness of breath.    Pertinent Physical Exam Findings:  There were no vitals filed for this visit.   Appears well, no acute distress, generalized tenderness to the upper thoracic region, regular rate and rhythm, lungs are clear to auscultation      Medical Decision Making: Patient is a 59 y.o. female presenting to the emergency department with upper back pain and shortness of breath. The chart was

## 2025-05-22 LAB
ANION GAP SERPL CALCULATED.3IONS-SCNC: 13 MMOL/L (ref 9–16)
BASOPHILS # BLD: <0.03 K/UL (ref 0–0.2)
BASOPHILS NFR BLD: 0 % (ref 0–2)
BUN SERPL-MCNC: 20 MG/DL (ref 8–23)
CALCIUM SERPL-MCNC: 8.8 MG/DL (ref 8.6–10.4)
CHLORIDE SERPL-SCNC: 104 MMOL/L (ref 98–107)
CHOLEST SERPL-MCNC: 100 MG/DL (ref 0–199)
CHOLESTEROL/HDL RATIO: 2.8
CO2 SERPL-SCNC: 21 MMOL/L (ref 20–31)
CREAT SERPL-MCNC: 1.2 MG/DL (ref 0.6–0.9)
ECHO BSA: 2.75 M2
EKG ATRIAL RATE: 78 BPM
EKG P AXIS: 76 DEGREES
EKG P-R INTERVAL: 158 MS
EKG Q-T INTERVAL: 432 MS
EKG QRS DURATION: 144 MS
EKG QTC CALCULATION (BAZETT): 507 MS
EKG R AXIS: -43 DEGREES
EKG T AXIS: 99 DEGREES
EKG VENTRICULAR RATE: 83 BPM
EOSINOPHIL # BLD: 0.21 K/UL (ref 0–0.44)
EOSINOPHILS RELATIVE PERCENT: 3 % (ref 1–4)
ERYTHROCYTE [DISTWIDTH] IN BLOOD BY AUTOMATED COUNT: 16.8 % (ref 11.8–14.4)
EST. AVERAGE GLUCOSE BLD GHB EST-MCNC: 128 MG/DL
GFR, ESTIMATED: 52 ML/MIN/1.73M2
GLUCOSE BLD-MCNC: 100 MG/DL (ref 65–105)
GLUCOSE BLD-MCNC: 122 MG/DL (ref 65–105)
GLUCOSE BLD-MCNC: 62 MG/DL (ref 65–105)
GLUCOSE SERPL-MCNC: 104 MG/DL (ref 74–99)
HBA1C MFR BLD: 6.1 % (ref 4–6)
HCT VFR BLD AUTO: 36.2 % (ref 36.3–47.1)
HDLC SERPL-MCNC: 36 MG/DL
HGB BLD-MCNC: 10.6 G/DL (ref 11.9–15.1)
IMM GRANULOCYTES # BLD AUTO: 0.03 K/UL (ref 0–0.3)
IMM GRANULOCYTES NFR BLD: 0 %
LDLC SERPL CALC-MCNC: 46 MG/DL (ref 0–100)
LYMPHOCYTES NFR BLD: 2.46 K/UL (ref 1.1–3.7)
LYMPHOCYTES RELATIVE PERCENT: 30 % (ref 24–43)
MCH RBC QN AUTO: 24.6 PG (ref 25.2–33.5)
MCHC RBC AUTO-ENTMCNC: 29.3 G/DL (ref 28.4–34.8)
MCV RBC AUTO: 84 FL (ref 82.6–102.9)
MONOCYTES NFR BLD: 0.38 K/UL (ref 0.1–1.2)
MONOCYTES NFR BLD: 5 % (ref 3–12)
NEUTROPHILS NFR BLD: 62 % (ref 36–65)
NEUTS SEG NFR BLD: 5.11 K/UL (ref 1.5–8.1)
NRBC BLD-RTO: 0 PER 100 WBC
PLATELET # BLD AUTO: 276 K/UL (ref 138–453)
PMV BLD AUTO: 10.3 FL (ref 8.1–13.5)
POTASSIUM SERPL-SCNC: 4.2 MMOL/L (ref 3.7–5.3)
RBC # BLD AUTO: 4.31 M/UL (ref 3.95–5.11)
RBC # BLD: ABNORMAL 10*6/UL
SODIUM SERPL-SCNC: 138 MMOL/L (ref 136–145)
TRIGL SERPL-MCNC: 92 MG/DL
TROPONIN I SERPL HS-MCNC: 16 NG/L (ref 0–14)
VLDLC SERPL CALC-MCNC: 18 MG/DL (ref 1–30)
WBC OTHER # BLD: 8.2 K/UL (ref 3.5–11.3)

## 2025-05-22 PROCEDURE — 85025 COMPLETE CBC W/AUTO DIFF WBC: CPT

## 2025-05-22 PROCEDURE — 2709999900 HC NON-CHARGEABLE SUPPLY: Performed by: INTERNAL MEDICINE

## 2025-05-22 PROCEDURE — 6360000002 HC RX W HCPCS

## 2025-05-22 PROCEDURE — 99152 MOD SED SAME PHYS/QHP 5/>YRS: CPT | Performed by: INTERNAL MEDICINE

## 2025-05-22 PROCEDURE — 76937 US GUIDE VASCULAR ACCESS: CPT | Performed by: INTERNAL MEDICINE

## 2025-05-22 PROCEDURE — C1892 INTRO/SHEATH,FIXED,PEEL-AWAY: HCPCS | Performed by: INTERNAL MEDICINE

## 2025-05-22 PROCEDURE — 99153 MOD SED SAME PHYS/QHP EA: CPT | Performed by: INTERNAL MEDICINE

## 2025-05-22 PROCEDURE — 6370000000 HC RX 637 (ALT 250 FOR IP)

## 2025-05-22 PROCEDURE — B2151ZZ FLUOROSCOPY OF LEFT HEART USING LOW OSMOLAR CONTRAST: ICD-10-PCS | Performed by: INTERNAL MEDICINE

## 2025-05-22 PROCEDURE — 99151 MOD SED SAME PHYS/QHP <5 YRS: CPT | Performed by: INTERNAL MEDICINE

## 2025-05-22 PROCEDURE — 99233 SBSQ HOSP IP/OBS HIGH 50: CPT | Performed by: INTERNAL MEDICINE

## 2025-05-22 PROCEDURE — 99223 1ST HOSP IP/OBS HIGH 75: CPT | Performed by: INTERNAL MEDICINE

## 2025-05-22 PROCEDURE — 94640 AIRWAY INHALATION TREATMENT: CPT

## 2025-05-22 PROCEDURE — C1769 GUIDE WIRE: HCPCS | Performed by: INTERNAL MEDICINE

## 2025-05-22 PROCEDURE — 80061 LIPID PANEL: CPT

## 2025-05-22 PROCEDURE — 82947 ASSAY GLUCOSE BLOOD QUANT: CPT

## 2025-05-22 PROCEDURE — 6360000004 HC RX CONTRAST MEDICATION: Performed by: INTERNAL MEDICINE

## 2025-05-22 PROCEDURE — 93458 L HRT ARTERY/VENTRICLE ANGIO: CPT | Performed by: INTERNAL MEDICINE

## 2025-05-22 PROCEDURE — 4A023N7 MEASUREMENT OF CARDIAC SAMPLING AND PRESSURE, LEFT HEART, PERCUTANEOUS APPROACH: ICD-10-PCS | Performed by: INTERNAL MEDICINE

## 2025-05-22 PROCEDURE — C1760 CLOSURE DEV, VASC: HCPCS | Performed by: INTERNAL MEDICINE

## 2025-05-22 PROCEDURE — 2500000003 HC RX 250 WO HCPCS

## 2025-05-22 PROCEDURE — 93010 ELECTROCARDIOGRAM REPORT: CPT | Performed by: INTERNAL MEDICINE

## 2025-05-22 PROCEDURE — 6360000002 HC RX W HCPCS: Performed by: INTERNAL MEDICINE

## 2025-05-22 PROCEDURE — 36415 COLL VENOUS BLD VENIPUNCTURE: CPT

## 2025-05-22 PROCEDURE — C1894 INTRO/SHEATH, NON-LASER: HCPCS | Performed by: INTERNAL MEDICINE

## 2025-05-22 PROCEDURE — 2060000000 HC ICU INTERMEDIATE R&B

## 2025-05-22 PROCEDURE — 80048 BASIC METABOLIC PNL TOTAL CA: CPT

## 2025-05-22 PROCEDURE — 2580000003 HC RX 258

## 2025-05-22 PROCEDURE — B2111ZZ FLUOROSCOPY OF MULTIPLE CORONARY ARTERIES USING LOW OSMOLAR CONTRAST: ICD-10-PCS | Performed by: INTERNAL MEDICINE

## 2025-05-22 PROCEDURE — 94761 N-INVAS EAR/PLS OXIMETRY MLT: CPT

## 2025-05-22 PROCEDURE — 84484 ASSAY OF TROPONIN QUANT: CPT

## 2025-05-22 RX ORDER — OXYCODONE HYDROCHLORIDE 5 MG/1
10 TABLET ORAL EVERY 4 HOURS PRN
Status: DISCONTINUED | OUTPATIENT
Start: 2025-05-22 | End: 2025-05-23 | Stop reason: HOSPADM

## 2025-05-22 RX ORDER — IOPAMIDOL 755 MG/ML
INJECTION, SOLUTION INTRAVASCULAR PRN
Status: DISCONTINUED | OUTPATIENT
Start: 2025-05-22 | End: 2025-05-22 | Stop reason: HOSPADM

## 2025-05-22 RX ORDER — MORPHINE SULFATE 2 MG/ML
2 INJECTION, SOLUTION INTRAMUSCULAR; INTRAVENOUS ONCE
Status: COMPLETED | OUTPATIENT
Start: 2025-05-22 | End: 2025-05-22

## 2025-05-22 RX ORDER — MIDAZOLAM 1 MG/ML
INJECTION INTRAMUSCULAR; INTRAVENOUS PRN
Status: DISCONTINUED | OUTPATIENT
Start: 2025-05-22 | End: 2025-05-22 | Stop reason: HOSPADM

## 2025-05-22 RX ORDER — OXYCODONE AND ACETAMINOPHEN 5; 325 MG/1; MG/1
1 TABLET ORAL EVERY 4 HOURS PRN
Status: DISCONTINUED | OUTPATIENT
Start: 2025-05-22 | End: 2025-05-23 | Stop reason: HOSPADM

## 2025-05-22 RX ORDER — FERROUS SULFATE 325(65) MG
325 TABLET, DELAYED RELEASE (ENTERIC COATED) ORAL
Status: DISCONTINUED | OUTPATIENT
Start: 2025-05-23 | End: 2025-05-23 | Stop reason: HOSPADM

## 2025-05-22 RX ADMIN — OXYBUTYNIN CHLORIDE 5 MG: 5 TABLET ORAL at 09:17

## 2025-05-22 RX ADMIN — OXYBUTYNIN CHLORIDE 5 MG: 5 TABLET ORAL at 21:58

## 2025-05-22 RX ADMIN — OXYCODONE 10 MG: 5 TABLET ORAL at 14:11

## 2025-05-22 RX ADMIN — OXYCODONE 10 MG: 5 TABLET ORAL at 09:55

## 2025-05-22 RX ADMIN — BUDESONIDE AND FORMOTEROL FUMARATE DIHYDRATE 2 PUFF: 160; 4.5 AEROSOL RESPIRATORY (INHALATION) at 20:50

## 2025-05-22 RX ADMIN — MONTELUKAST 10 MG: 10 TABLET, FILM COATED ORAL at 21:58

## 2025-05-22 RX ADMIN — OXYCODONE 10 MG: 5 TABLET ORAL at 05:59

## 2025-05-22 RX ADMIN — SPIRONOLACTONE 25 MG: 25 TABLET, FILM COATED ORAL at 09:16

## 2025-05-22 RX ADMIN — Medication 5 MG: at 21:58

## 2025-05-22 RX ADMIN — ISOSORBIDE DINITRATE 10 MG: 10 TABLET ORAL at 16:22

## 2025-05-22 RX ADMIN — PANTOPRAZOLE SODIUM 40 MG: 40 TABLET, DELAYED RELEASE ORAL at 09:17

## 2025-05-22 RX ADMIN — SODIUM CHLORIDE, PRESERVATIVE FREE 10 ML: 5 INJECTION INTRAVENOUS at 09:17

## 2025-05-22 RX ADMIN — ASPIRIN 81 MG: 81 TABLET, COATED ORAL at 09:16

## 2025-05-22 RX ADMIN — ISOSORBIDE DINITRATE 10 MG: 10 TABLET ORAL at 09:17

## 2025-05-22 RX ADMIN — IRON SUCROSE 200 MG: 20 INJECTION, SOLUTION INTRAVENOUS at 17:02

## 2025-05-22 RX ADMIN — ALLOPURINOL 300 MG: 300 TABLET ORAL at 09:17

## 2025-05-22 RX ADMIN — OXYCODONE 10 MG: 5 TABLET ORAL at 21:58

## 2025-05-22 RX ADMIN — ATORVASTATIN CALCIUM 80 MG: 80 TABLET, FILM COATED ORAL at 21:58

## 2025-05-22 RX ADMIN — SODIUM CHLORIDE, PRESERVATIVE FREE 10 ML: 5 INJECTION INTRAVENOUS at 21:56

## 2025-05-22 RX ADMIN — BUDESONIDE AND FORMOTEROL FUMARATE DIHYDRATE 2 PUFF: 160; 4.5 AEROSOL RESPIRATORY (INHALATION) at 07:48

## 2025-05-22 RX ADMIN — OXYCODONE 10 MG: 5 TABLET ORAL at 18:06

## 2025-05-22 RX ADMIN — OXYCODONE HYDROCHLORIDE AND ACETAMINOPHEN 1 TABLET: 5; 325 TABLET ORAL at 04:20

## 2025-05-22 RX ADMIN — TROSPIUM CHLORIDE 20 MG: 20 TABLET, FILM COATED ORAL at 21:58

## 2025-05-22 RX ADMIN — LEVOTHYROXINE SODIUM 50 MCG: 0.05 TABLET ORAL at 05:59

## 2025-05-22 RX ADMIN — SERTRALINE HYDROCHLORIDE 100 MG: 50 TABLET ORAL at 09:17

## 2025-05-22 RX ADMIN — ENOXAPARIN SODIUM 40 MG: 100 INJECTION SUBCUTANEOUS at 21:58

## 2025-05-22 RX ADMIN — MORPHINE SULFATE 2 MG: 2 INJECTION, SOLUTION INTRAMUSCULAR; INTRAVENOUS at 16:54

## 2025-05-22 ASSESSMENT — PAIN DESCRIPTION - FREQUENCY
FREQUENCY: CONTINUOUS

## 2025-05-22 ASSESSMENT — PAIN DESCRIPTION - LOCATION
LOCATION: BACK;KNEE
LOCATION: GENERALIZED
LOCATION: GENERALIZED
LOCATION: BACK
LOCATION: BACK;KNEE

## 2025-05-22 ASSESSMENT — PAIN DESCRIPTION - PAIN TYPE
TYPE: CHRONIC PAIN
TYPE: CHRONIC PAIN
TYPE: ACUTE PAIN
TYPE: CHRONIC PAIN
TYPE: CHRONIC PAIN

## 2025-05-22 ASSESSMENT — PAIN DESCRIPTION - DESCRIPTORS
DESCRIPTORS: THROBBING
DESCRIPTORS: ACHING
DESCRIPTORS: ACHING
DESCRIPTORS: THROBBING;ACHING
DESCRIPTORS: ACHING
DESCRIPTORS: ACHING;THROBBING

## 2025-05-22 ASSESSMENT — PAIN SCALES - GENERAL
PAINLEVEL_OUTOF10: 10
PAINLEVEL_OUTOF10: 6
PAINLEVEL_OUTOF10: 9
PAINLEVEL_OUTOF10: 10
PAINLEVEL_OUTOF10: 10
PAINLEVEL_OUTOF10: 7
PAINLEVEL_OUTOF10: 9
PAINLEVEL_OUTOF10: 10
PAINLEVEL_OUTOF10: 3
PAINLEVEL_OUTOF10: 9
PAINLEVEL_OUTOF10: 7
PAINLEVEL_OUTOF10: 9
PAINLEVEL_OUTOF10: 8

## 2025-05-22 ASSESSMENT — PAIN DESCRIPTION - ONSET
ONSET: ON-GOING

## 2025-05-22 ASSESSMENT — PAIN DESCRIPTION - ORIENTATION
ORIENTATION: RIGHT;LEFT;LOWER
ORIENTATION: RIGHT;LEFT;LOWER

## 2025-05-22 NOTE — H&P
Attending Physician Statement  I have discussed the case of Mary Bills, including pertinent history and exam findings with the resident/fellow/medical student/NP/PA. I have seen and examined the patient and the key elements of the encounter have been performed by me.  I agree with the assessment, plan and orders as documented by the resident/fellow/medical student/NP/PA  With changes made to the note as needed.     Pt was seen during rounds.  Review of Systems:   In addition to the pertinent positives and negatives as stated within HPI and the review of systems as documented in their notes, all other systems were reviewed when able to and are reported negative.  Patient admitted with back pain  Concern for acute coronary syndrome  Primary hypertension  Unspecified hyperlipidemia  Chronic systolic and diastolic congestive heart failure with an ejection fraction of 30 to 35%  COPD  History of COVID-19 infection  Morbid obesity  Fibromyalgia  Depression  Hypothyroidism  History of bradycardia  Anemia  Gout  ?  SMILEY  History of A-fib/flutter    Resume home medications  Patient have a cardiac catheterization tomorrow  Cardiology following patient  Continue bronchodilators  Continue thyroid supplementation  Weight loss would be recommended  Continue cardiac monitoring  Avoid dehydration  Patient is on Lovenox          Tyler Higgins MD  5/21/2025  8:21 PM

## 2025-05-22 NOTE — CONSULTS
OAB (overactive bladder)  -     Case Request Operating Room: REMOVAL, ELECTRODE LEAD, SACRAL NERVE STIMULATOR, INSERTION, ELECTRODE LEADS AND PULSE GENERATOR, NEUROSTIMULATOR, SACRAL, REVISION, IMPLANTED DEVICE    MRI contraindicated due to metal implant  -     Case Request Operating Room: REMOVAL, ELECTRODE LEAD, SACRAL NERVE STIMULATOR, INSERTION, ELECTRODE LEADS AND PULSE GENERATOR, NEUROSTIMULATOR, SACRAL, REVISION, IMPLANTED DEVICE    Neurostimulator device in situ, InterStim Therapy, sacral  -     Case Request Operating Room: REMOVAL, ELECTRODE LEAD, SACRAL NERVE STIMULATOR, INSERTION, ELECTRODE LEADS AND PULSE GENERATOR, NEUROSTIMULATOR, SACRAL, REVISION, IMPLANTED DEVICE        [facility]   Consult Note                 Date:   5/22/2025  Patient name: Mary Bills  Date of admission:  5/21/2025 12:23 PM  MRN:   [id number]  YOB: 1965    Reason for Consult: Car evaluation      CHIEF COMPLAINT: Abnormal EKG    History Obtained From:  Patient     HISTORY OF PRESENT ILLNESS:    The patient is a 59 y.o. female presented to the hospital due to back pain with dynamic EKG changes noted at the cardiology office.  Patient was seen by cardiology on 5/21/2020 for and she reported having back pain between her scapula lasting for about 2 minutes, nonpositional.  She does have dyspnea on exertion with lower extremity swelling.  She did not start Bumex as recommended by her cardiologist.  Patient sent from cardiology office for left heart cath.      Past Medical History:   has a past medical history of Arthritis, Bronchitis, CHF (congestive heart failure) (HCC), Chronic back pain, COVID-19, Depression, Fibromyalgia, GERD (gastroesophageal reflux disease), Gout, History of heart attack, HLD (hyperlipidemia), Hypertension, Insomnia, Osteoarthritis, and Thyroid disorder.    Past Surgical History:   has a past surgical history that includes back surgery; shoulder surgery (Right, 2009); knee surgery (Left, 2009); Cholecystectomy, laparoscopic; Knee arthroscopy (Right); Hysterectomy, total abdominal; Neck surgery; Thyroidectomy; Cardiac catheterization (07/08/2022); other surgical history (02/20/2024); ep device procedure (N/A, 02/20/2024); Insertable Cardiac Monitor (05/17/2024); ep device procedure (N/A, 5/17/2024); and IR BIOPSY THYROID PERC CORE NEEDLE (3/24/2025).     Home Medications:    Prior to Admission medications    Medication Sig Start Date End Date Taking? Authorizing Provider   oxyCODONE-acetaminophen (PERCOCET)  MG per tablet Take 1 tablet by mouth every 4 hours as needed. 5/6/25   Provider, MD Marcus   sertraline (ZOLOFT) 100 MG tablet Take 1 tablet  \"TROPONINI\" in the last 72 hours.  FASTING LIPID PANEL:  Lab Results   Component Value Date/Time    HDL 36 10/15/2021 04:20 PM    TRIG 149 07/24/2020 12:20 PM     LIVER PROFILE:  Recent Labs     05/21/25  1252   AST 19   ALT 18       DATA:    Diagnostics:      PMHx  1. Essential hypertension  2. Hyperlipidemia  3. chronic systolic heart failure, unclear etiology, diagnosed several years ago at [place], last LVEF 30-35%  4. COVID-19 infection in September 2020  5. 2D echocardiogram 11/08/2020, moderately reduced LV systolic function with EF 30-35%, global hypokinesia, grade 2 diastolic dysfunction, no significant valve abnormality  6. Morbid obesity  7. Fibromyalgia  8. Acute exacerbation of CHF June 2022 secondary to COVID-19 infection     EKG  Sinus rhythm, left bundle branch block    Cath  2022  Angiographic Findings      Cardiac Arteries and Lesion Findings     LMCA: Normal 0% stenosis.     LAD: Normal 0% stenosis.     LCx: Normal 0% stenosis.OM is large vessel and normal     RCA: Normal 0% stenosis.      Coronary Tree      Dominance: Right    Echo  2025 TTE  The left ventricle appears mildly dilated.  Moderately increased LV wall thickness  Mild to moderately reduced LV systolic function ejection fraction 40 to 45%  Global hypokinesis  The right ventricle appears normal in size and function  The left atrium appears dilated  The aortic valve is trileaflet with no stenosis no regurgitation  The mitral valve structure appears normal mild regurgitation no stenosis  Trace tricuspid regurgitation no stenosis  The IVC not well-visualized  Normal aortic root dimensions    Stress    IMPRESSION:    Back pain with dynamic EKG changes normal coronaries left heart cath 2022  LVEF 40 to 45% with general hypokinesis  History of SVT status post conversion to normal sinus rhythm with procainamide  Intolerant to procainamide; itching  Nonischemic cardiomyopathy with improved LVEF  Bradycardia with ventricular escape

## 2025-05-22 NOTE — PLAN OF CARE
Problem: Chronic Conditions and Co-morbidities  Goal: Patient's chronic conditions and co-morbidity symptoms are monitored and maintained or improved  5/22/2025 1011 by Samantha Leong RN  Outcome: Progressing  5/21/2025 2042 by Jacquelyn Ferreira RN  Outcome: Progressing  Flowsheets (Taken 5/21/2025 1945)  Care Plan - Patient's Chronic Conditions and Co-Morbidity Symptoms are Monitored and Maintained or Improved:   Monitor and assess patient's chronic conditions and comorbid symptoms for stability, deterioration, or improvement   Collaborate with multidisciplinary team to address chronic and comorbid conditions and prevent exacerbation or deterioration   Update acute care plan with appropriate goals if chronic or comorbid symptoms are exacerbated and prevent overall improvement and discharge     Problem: Discharge Planning  Goal: Discharge to home or other facility with appropriate resources  5/22/2025 1011 by Samantha Leong RN  Outcome: Progressing  5/21/2025 2042 by Jacquelyn Ferreira RN  Outcome: Progressing  Flowsheets (Taken 5/21/2025 1945)  Discharge to home or other facility with appropriate resources:   Identify barriers to discharge with patient and caregiver   Arrange for needed discharge resources and transportation as appropriate   Identify discharge learning needs (meds, wound care, etc)   Refer to discharge planning if patient needs post-hospital services based on physician order or complex needs related to functional status, cognitive ability or social support system     Problem: Pain  Goal: Verbalizes/displays adequate comfort level or baseline comfort level  5/22/2025 1011 by Samantha Leong RN  Outcome: Progressing  5/21/2025 2042 by Jacquelyn Ferreira RN  Outcome: Progressing  Flowsheets (Taken 5/21/2025 1945)  Verbalizes/displays adequate comfort level or baseline comfort level:   Encourage patient to monitor pain and request assistance   Assess pain using appropriate pain scale   Administer analgesics

## 2025-05-22 NOTE — CARE COORDINATION
Case Management Assessment  Initial Evaluation    Date/Time of Evaluation: 5/22/2025 11:18 AM  Assessment Completed by: NELDA RICHARDSON RN    If patient is discharged prior to next notation, then this note serves as note for discharge by case management.    Patient Name: Mary Bills                   YOB: 1965  Diagnosis: Unstable angina (HCC) [I20.0]  NSTEMI (non-ST elevated myocardial infarction) (HCC) [I21.4]                   Date / Time: 5/21/2025 12:23 PM    Patient Admission Status: Inpatient   Readmission Risk (Low < 19, Mod (19-27), High > 27): Readmission Risk Score: 11.1    Current PCP: Valerie Ojeda MD  PCP verified by CM? Yes    Chart Reviewed: Yes      History Provided by: Patient  Patient Orientation: Alert and Oriented    Patient Cognition: Alert    Hospitalization in the last 30 days (Readmission):  No    If yes, Readmission Assessment in CM Navigator will be completed.    Advance Directives:      Code Status: Full Code   Patient's Primary Decision Maker is: Legal Next of Kin      Discharge Planning:    Patient lives with: (P) Alone Type of Home: (P) Apartment  Primary Care Giver: Self  Patient Support Systems include: None   Current Financial resources: (P) Medicare, Medicaid  Current community resources:    Current services prior to admission: (P) Durable Medical Equipment, C-pap            Current DME: (P) Home Aerosol, Walker            Type of Home Care services:  (P) None    ADLS  Prior functional level: Independent in ADLs/IADLs  Current functional level: (P) Other (see comment) (Await PT/OT evals)    PT AM-PAC:   /24  OT AM-PAC:   /24    Family can provide assistance at DC: (P) No  Would you like Case Management to discuss the discharge plan with any other family members/significant others, and if so, who? (P) No  Plans to Return to Present Housing: (P) Unknown at present  Other Identified Issues/Barriers to RETURNING to current housing: Lives alone  Potential

## 2025-05-22 NOTE — PLAN OF CARE
Problem: Chronic Conditions and Co-morbidities  Goal: Patient's chronic conditions and co-morbidity symptoms are monitored and maintained or improved  5/21/2025 2042 by Jacquelyn Ferreira RN  Outcome: Progressing  Flowsheets (Taken 5/21/2025 1945)  Care Plan - Patient's Chronic Conditions and Co-Morbidity Symptoms are Monitored and Maintained or Improved:   Monitor and assess patient's chronic conditions and comorbid symptoms for stability, deterioration, or improvement   Collaborate with multidisciplinary team to address chronic and comorbid conditions and prevent exacerbation or deterioration   Update acute care plan with appropriate goals if chronic or comorbid symptoms are exacerbated and prevent overall improvement and discharge  5/21/2025 1825 by Hernán Mejia RN  Outcome: Progressing  Flowsheets (Taken 5/21/2025 1730)  Care Plan - Patient's Chronic Conditions and Co-Morbidity Symptoms are Monitored and Maintained or Improved: Monitor and assess patient's chronic conditions and comorbid symptoms for stability, deterioration, or improvement     Problem: Discharge Planning  Goal: Discharge to home or other facility with appropriate resources  5/21/2025 2042 by Jacquelyn Ferreira RN  Outcome: Progressing  Flowsheets (Taken 5/21/2025 1945)  Discharge to home or other facility with appropriate resources:   Identify barriers to discharge with patient and caregiver   Arrange for needed discharge resources and transportation as appropriate   Identify discharge learning needs (meds, wound care, etc)   Refer to discharge planning if patient needs post-hospital services based on physician order or complex needs related to functional status, cognitive ability or social support system  5/21/2025 1825 by Hernán Mejia RN  Outcome: Progressing  Flowsheets (Taken 5/21/2025 1730)  Discharge to home or other facility with appropriate resources: Identify barriers to discharge with patient and caregiver     Problem: Pain  Goal:

## 2025-05-22 NOTE — PROCEDURES
[facility]        Date:   5/22/2025  Patient name: Mary Bills  Date of admission:  5/21/2025 12:23 PM  MRN:   [id number]  YOB: 1965    CARDIAC CATHETERIZATION    Operators:  Primary: Brayan Payne MD.  Assistant:     Indications for cath: NSTEMI    Procedure performed: Cardiac cath.    Access: Right Femoral artery      Procedure: After informed consent was obtained with explanation of the risks and benefits, patient was brought to the cath lab. The right groin were prepped and draped in sterile fashion. 1% lidocaine was used for local block. The Femoral artery was cannulated with 6  Fr sheath with brisk arterial blood return. The side port was frequently flushed and aspirated with normal saline.    EBL is 5 mL    Dominance is right    Findings:    Left main: Normal with 0% stenosis.    LAD: Normal with 0% stenosis.    LCX: Normal with 0% stenosis.    RCA: Normal with 0% stenosis.    The LV gram was performed in the GOEL 30 position.   LVEF: 50%. LV Wall Motion: Normal.    Conclusions:  Normal Coronaries.  Overall preserved LV function    Recommendation:  Medical treatments.  Risk factors modifications.        Electronically signed by Brayan Payne MD on 5/22/2025 at 3:37 PM      [facility]  [phone]

## 2025-05-22 NOTE — PROGRESS NOTES
[facility]  Internal Medicine Teaching Residency Program  Inpatient Daily Progress Note  ______________________________________________________________________________    Patient: Mary Bills  YOB: 1965   MRN:[id number]    Acct: [id number]     Room: 2006/2006-01  Admit date: 5/21/2025  Today's date: 05/22/25  Number of days in the hospital: 1    SUBJECTIVE   Admitting Diagnosis: NSTEMI (non-ST elevated myocardial infarction) (HCC)  CC: Back pain - EKG changes and concern for ACS     Pt examined at bedside. Chart & results reviewed.   Vitals remaining stable  Heart rate around 60 and saturating well on room air  Stable and no active/new symptoms or concerns  Ongoing body aches and pains    ROS:  Constitutional:  negative for chills, fevers, sweats  Respiratory:  negative for cough, dyspnea on exertion, hemoptysis, shortness of breath, wheezing  Cardiovascular:  negative for chest pain, chest pressure/discomfort, lower extremity edema, palpitations  Gastrointestinal:  negative for abdominal pain, constipation, diarrhea, nausea, vomiting  Neurological:  negative for dizziness, headache  BRIEF HISTORY     The patient is a pleasant 59 y.o. female with a PMHx significant for      Coronary artery disease   Left bundle branch block  Heart failure reduced ejection fraction  History of SVT  History of severe bradycardia  Hypertension  Hyperlipidemia  COPD  Morbid obesity  Hypothyroidism  Fibromyalgia  Post COVID syndrome  GERD  Gout  Osteoarthritis  Chronic pain syndrome  Cervical spondylosis     presents with a chief complaint of back pain.  Patient was reviewed in cardiology clinic today and advised to return to the ER after dynamic changes were noted on her EKG and the fact that she had similar pain with her heart attack the last time.      Patient informs that she has been having episodes of this back pain for the past week.  Pain is located over the medial

## 2025-05-23 VITALS
HEIGHT: 68 IN | SYSTOLIC BLOOD PRESSURE: 118 MMHG | HEART RATE: 73 BPM | WEIGHT: 293 LBS | RESPIRATION RATE: 20 BRPM | DIASTOLIC BLOOD PRESSURE: 67 MMHG | OXYGEN SATURATION: 99 % | BODY MASS INDEX: 44.41 KG/M2 | TEMPERATURE: 98.2 F

## 2025-05-23 LAB
ANION GAP SERPL CALCULATED.3IONS-SCNC: 11 MMOL/L (ref 9–16)
BASOPHILS # BLD: <0.03 K/UL (ref 0–0.2)
BASOPHILS NFR BLD: 0 % (ref 0–2)
BUN SERPL-MCNC: 20 MG/DL (ref 8–23)
CALCIUM SERPL-MCNC: 8.7 MG/DL (ref 8.6–10.4)
CHLORIDE SERPL-SCNC: 106 MMOL/L (ref 98–107)
CO2 SERPL-SCNC: 22 MMOL/L (ref 20–31)
CREAT SERPL-MCNC: 1.2 MG/DL (ref 0.6–0.9)
EOSINOPHIL # BLD: 0.24 K/UL (ref 0–0.44)
EOSINOPHILS RELATIVE PERCENT: 3 % (ref 1–4)
ERYTHROCYTE [DISTWIDTH] IN BLOOD BY AUTOMATED COUNT: 16.7 % (ref 11.8–14.4)
GFR, ESTIMATED: 52 ML/MIN/1.73M2
GLUCOSE SERPL-MCNC: 100 MG/DL (ref 74–99)
HCT VFR BLD AUTO: 34.3 % (ref 36.3–47.1)
HGB BLD-MCNC: 9.8 G/DL (ref 11.9–15.1)
IMM GRANULOCYTES # BLD AUTO: 0.03 K/UL (ref 0–0.3)
IMM GRANULOCYTES NFR BLD: 0 %
LYMPHOCYTES NFR BLD: 1.9 K/UL (ref 1.1–3.7)
LYMPHOCYTES RELATIVE PERCENT: 24 % (ref 24–43)
MCH RBC QN AUTO: 24.6 PG (ref 25.2–33.5)
MCHC RBC AUTO-ENTMCNC: 28.6 G/DL (ref 28.4–34.8)
MCV RBC AUTO: 86 FL (ref 82.6–102.9)
MONOCYTES NFR BLD: 0.42 K/UL (ref 0.1–1.2)
MONOCYTES NFR BLD: 5 % (ref 3–12)
NEUTROPHILS NFR BLD: 68 % (ref 36–65)
NEUTS SEG NFR BLD: 5.36 K/UL (ref 1.5–8.1)
NRBC BLD-RTO: 0 PER 100 WBC
PLATELET # BLD AUTO: 246 K/UL (ref 138–453)
PMV BLD AUTO: 10 FL (ref 8.1–13.5)
POTASSIUM SERPL-SCNC: 4.5 MMOL/L (ref 3.7–5.3)
RBC # BLD AUTO: 3.99 M/UL (ref 3.95–5.11)
RBC # BLD: ABNORMAL 10*6/UL
SODIUM SERPL-SCNC: 139 MMOL/L (ref 136–145)
WBC OTHER # BLD: 8 K/UL (ref 3.5–11.3)

## 2025-05-23 PROCEDURE — 6360000002 HC RX W HCPCS

## 2025-05-23 PROCEDURE — 6370000000 HC RX 637 (ALT 250 FOR IP)

## 2025-05-23 PROCEDURE — 36415 COLL VENOUS BLD VENIPUNCTURE: CPT

## 2025-05-23 PROCEDURE — 97535 SELF CARE MNGMENT TRAINING: CPT

## 2025-05-23 PROCEDURE — 99233 SBSQ HOSP IP/OBS HIGH 50: CPT | Performed by: NURSE PRACTITIONER

## 2025-05-23 PROCEDURE — 97166 OT EVAL MOD COMPLEX 45 MIN: CPT

## 2025-05-23 PROCEDURE — 97162 PT EVAL MOD COMPLEX 30 MIN: CPT

## 2025-05-23 PROCEDURE — 80048 BASIC METABOLIC PNL TOTAL CA: CPT

## 2025-05-23 PROCEDURE — 94640 AIRWAY INHALATION TREATMENT: CPT

## 2025-05-23 PROCEDURE — 97530 THERAPEUTIC ACTIVITIES: CPT

## 2025-05-23 PROCEDURE — 99239 HOSP IP/OBS DSCHRG MGMT >30: CPT | Performed by: INTERNAL MEDICINE

## 2025-05-23 PROCEDURE — 2500000003 HC RX 250 WO HCPCS

## 2025-05-23 PROCEDURE — 85025 COMPLETE CBC W/AUTO DIFF WBC: CPT

## 2025-05-23 PROCEDURE — 94761 N-INVAS EAR/PLS OXIMETRY MLT: CPT

## 2025-05-23 RX ORDER — FERROUS SULFATE 325(65) MG
325 TABLET, DELAYED RELEASE (ENTERIC COATED) ORAL
Qty: 90 TABLET | Refills: 3 | Status: SHIPPED | OUTPATIENT
Start: 2025-05-24

## 2025-05-23 RX ADMIN — SODIUM CHLORIDE, PRESERVATIVE FREE 10 ML: 5 INJECTION INTRAVENOUS at 09:31

## 2025-05-23 RX ADMIN — OXYCODONE 10 MG: 5 TABLET ORAL at 16:55

## 2025-05-23 RX ADMIN — BUDESONIDE AND FORMOTEROL FUMARATE DIHYDRATE 2 PUFF: 160; 4.5 AEROSOL RESPIRATORY (INHALATION) at 07:56

## 2025-05-23 RX ADMIN — FERROUS SULFATE TAB EC 325 MG (65 MG FE EQUIVALENT) 325 MG: 325 (65 FE) TABLET DELAYED RESPONSE at 09:28

## 2025-05-23 RX ADMIN — ASPIRIN 81 MG: 81 TABLET, COATED ORAL at 09:28

## 2025-05-23 RX ADMIN — OXYCODONE 10 MG: 5 TABLET ORAL at 11:32

## 2025-05-23 RX ADMIN — ALLOPURINOL 300 MG: 300 TABLET ORAL at 09:28

## 2025-05-23 RX ADMIN — LEVOTHYROXINE SODIUM 50 MCG: 0.05 TABLET ORAL at 06:58

## 2025-05-23 RX ADMIN — ISOSORBIDE DINITRATE 10 MG: 10 TABLET ORAL at 16:55

## 2025-05-23 RX ADMIN — OXYCODONE 10 MG: 5 TABLET ORAL at 06:58

## 2025-05-23 RX ADMIN — EMPAGLIFLOZIN 10 MG: 10 TABLET, FILM COATED ORAL at 09:28

## 2025-05-23 RX ADMIN — ENOXAPARIN SODIUM 40 MG: 100 INJECTION SUBCUTANEOUS at 09:30

## 2025-05-23 RX ADMIN — OXYCODONE 10 MG: 5 TABLET ORAL at 02:33

## 2025-05-23 RX ADMIN — OXYBUTYNIN CHLORIDE 5 MG: 5 TABLET ORAL at 09:28

## 2025-05-23 RX ADMIN — SPIRONOLACTONE 25 MG: 25 TABLET, FILM COATED ORAL at 09:28

## 2025-05-23 RX ADMIN — ISOSORBIDE DINITRATE 10 MG: 10 TABLET ORAL at 09:28

## 2025-05-23 RX ADMIN — SERTRALINE HYDROCHLORIDE 100 MG: 50 TABLET ORAL at 09:28

## 2025-05-23 RX ADMIN — PANTOPRAZOLE SODIUM 40 MG: 40 TABLET, DELAYED RELEASE ORAL at 09:28

## 2025-05-23 ASSESSMENT — PAIN DESCRIPTION - LOCATION
LOCATION: BACK
LOCATION: BACK

## 2025-05-23 ASSESSMENT — PAIN DESCRIPTION - DESCRIPTORS
DESCRIPTORS: DISCOMFORT;SORE;POUNDING
DESCRIPTORS: DISCOMFORT;SORE;SHOOTING

## 2025-05-23 ASSESSMENT — PAIN SCALES - GENERAL
PAINLEVEL_OUTOF10: 9
PAINLEVEL_OUTOF10: 9
PAINLEVEL_OUTOF10: 8
PAINLEVEL_OUTOF10: 6
PAINLEVEL_OUTOF10: 9

## 2025-05-23 ASSESSMENT — PAIN DESCRIPTION - ORIENTATION
ORIENTATION: MID;LOWER
ORIENTATION: MID;LOWER

## 2025-05-23 NOTE — PROGRESS NOTES
Occupational Therapy  Occupational Therapy Initial Evaluation  Facility/Department: [facility] CAR 2- STEPDOWN   Patient Name: Mary Bills        MRN: [id number]    : 1965    Date of Service: 2025    Per chart: \"presents with a chief complaint of back pain. Patient was reviewed in cardiology clinic today and advised to return to the ER after dynamic changes were noted on her EKG and the fact that she had similar pain with her heart attack the last time. \"    Past Medical History:  has a past medical history of Arthritis, Bronchitis, CHF (congestive heart failure) (HCC), Chronic back pain, COVID-19, Depression, Fibromyalgia, GERD (gastroesophageal reflux disease), Gout, History of heart attack, HLD (hyperlipidemia), Hypertension, Insomnia, Osteoarthritis, and Thyroid disorder.  Past Surgical History:  has a past surgical history that includes back surgery; shoulder surgery (Right, ); knee surgery (Left, ); Cholecystectomy, laparoscopic; Knee arthroscopy (Right); Hysterectomy, total abdominal; Neck surgery; Thyroidectomy; Cardiac catheterization (2022); other surgical history (2024); ep device procedure (N/A, 2024); Insertable Cardiac Monitor (2024); ep device procedure (N/A, 2024); IR BIOPSY THYROID PERC CORE NEEDLE (3/24/2025); Cardiac procedure (N/A, 2025); and invasive vascular (N/A, 2025).    Discharge Recommendations  Discharge Recommendations: Patient would benefit from continued therapy after discharge  OT Equipment Recommendations  Equipment Needed: No    Assessment  Performance deficits / Impairments: Decreased functional mobility ;Decreased balance;Decreased ADL status;Decreased ROM;Decreased endurance;Decreased high-level IADLs;Decreased strength  Assessment: Pt agreed to occupational therapy evaluation with education provided on purpose and role of occupational therapy services in the acute care setting. OT facilitated assessing functional mobility  Assessment  Gross Assessment  AROM: Generally decreased, functional (shoulder flexion bilaterally ~90 degrees, WFL other available planes.)  PROM: Within functional limits  Strength: Generally decreased, functional (Grossly 4-/5 arm strength, 4/5  strength bilaterally)  Coordination: Within functional limits  Tone: Normal  Sensation: Impaired (Reports n/t in R foot)  Hand Dominance: Right     Objective  Orientation  Overall Orientation Status: Within Functional Limits  Cognition  Overall Cognitive Status: WFL    Activities of Daily Living  Feeding: Independent;Based on clinical judgement  Feeding Skilled Clinical Factors: Pt drinking from cup with straw multiple times during session IND.  Grooming: Independent;Based on clinical judgement  UE Bathing: Supervision;Based on clinical judgement  LE Bathing: Contact guard assistance;Based on clinical judgement  UE Dressing: Supervision;Based on clinical judgement  UE Dressing Skilled Clinical Factors: Pt donned gown over back simulating UB dressing at SUP.  LE Dressing: Minimal assistance;Based on clinical judgement  Putting On/Taking Off Footwear: Stand by assistance;Increased time to complete  Putting On/Taking Off Footwear Skilled Clinical Factors: Seated EOB, pt donned B  socks, doffed at end of session, SBA for safety, utilizing modified figure four tech, increased time required.  Toileting: Contact guard assistance;Based on clinical judgement  Toileting Skilled Clinical Factors: CGA for toilet transfer.    Balance  Balance  Sitting: Without support (Seated EOB unsupported for LB dressing task and unsupported on toilet static-IND dynamic-SUP ~30 minutes total.)  Standing: With support (Standing with rollator support for functional mobility/transfers static-SUP dynamic-CGA ~8 minutes total.)    Transfers/Mobility  Bed mobility  Supine to Sit: Modified independent  Sit to Supine:  (pt ends sitting on EOB)  Scooting: Supervision  Bed Mobility Comments: HOB

## 2025-05-23 NOTE — PROGRESS NOTES
Physical Therapy  Facility/Department: [facility] CAR 2- STEPDOWN   Physical Therapy Initial Evaluation    Patient Name: Mary Bills        MRN: [id number]    : 1965    Date of Service: 2025    No chief complaint on file.       Pt presents with a chief complaint of back pain.  Patient was reviewed in cardiology clinic today and advised to return to the ER after dynamic changes were noted on her EKG and the fact that she had similar pain with her heart attack the last time.     Past Medical History:  has a past medical history of Arthritis, Bronchitis, CHF (congestive heart failure) (HCC), Chronic back pain, COVID-19, Depression, Fibromyalgia, GERD (gastroesophageal reflux disease), Gout, History of heart attack, HLD (hyperlipidemia), Hypertension, Insomnia, Osteoarthritis, and Thyroid disorder.  Past Surgical History:  has a past surgical history that includes back surgery; shoulder surgery (Right, ); knee surgery (Left, ); Cholecystectomy, laparoscopic; Knee arthroscopy (Right); Hysterectomy, total abdominal; Neck surgery; Thyroidectomy; Cardiac catheterization (2022); other surgical history (2024); ep device procedure (N/A, 2024); Insertable Cardiac Monitor (2024); ep device procedure (N/A, 2024); and IR BIOPSY THYROID PERC CORE NEEDLE (3/24/2025).    Discharge Recommendations   Further therapy recommended at discharge.    PT Equipment Recommendations  Equipment Needed: No (pt owns rollator)    Assessment  Body Structures, Functions, Activity Limitations Requiring Skilled Therapeutic Intervention: Decreased functional mobility , Decreased ADL status, Decreased body mechanics, Decreased strength, Decreased coordination, Decreased balance, Decreased endurance, Increased pain, Decreased posture  Assessment: Pt ambulates 20 ft x3 with rollator and CGA. pt takes two standing rest breaks x30 s for endurance. Pt is a fall risk d/t decreased balance and endurance. Pt would

## 2025-05-23 NOTE — PROGRESS NOTES
CLINICAL PHARMACY NOTE: MEDS TO BEDS    Total # of Prescriptions Filled: 1   The following medications were delivered to the patient:  ferosul    Additional Documentation:

## 2025-05-23 NOTE — PLAN OF CARE
Patient discharged in stable condition. IV and telemetry removed at discharge. AVS reviewed with patient. Patient verbalized understanding of instructions. Patient left with all belongings. Patient transported to private vehicle with staff via wheelchair.         Problem: Chronic Conditions and Co-morbidities  Goal: Patient's chronic conditions and co-morbidity symptoms are monitored and maintained or improved  Outcome: Completed  Flowsheets (Taken 5/23/2025 0800)  Care Plan - Patient's Chronic Conditions and Co-Morbidity Symptoms are Monitored and Maintained or Improved:   Monitor and assess patient's chronic conditions and comorbid symptoms for stability, deterioration, or improvement   Collaborate with multidisciplinary team to address chronic and comorbid conditions and prevent exacerbation or deterioration     Problem: Discharge Planning  Goal: Discharge to home or other facility with appropriate resources  Outcome: Completed  Flowsheets (Taken 5/23/2025 0800)  Discharge to home or other facility with appropriate resources:   Identify barriers to discharge with patient and caregiver   Arrange for needed discharge resources and transportation as appropriate   Identify discharge learning needs (meds, wound care, etc)     Problem: Pain  Goal: Verbalizes/displays adequate comfort level or baseline comfort level  Outcome: Completed     Problem: Respiratory - Adult  Goal: Achieves optimal ventilation and oxygenation  Outcome: Completed     Problem: Safety - Adult  Goal: Free from fall injury  Outcome: Completed  Flowsheets (Taken 5/23/2025 1223)  Free From Fall Injury: Instruct family/caregiver on patient safety     Problem: Neurosensory - Adult  Goal: Achieves maximal functionality and self care  Outcome: Completed     Problem: Cardiovascular - Adult  Goal: Maintains optimal cardiac output and hemodynamic stability  Outcome: Completed  Goal: Absence of cardiac dysrhythmias or at baseline  Outcome: Completed    intake as appropriate   Instruct patient on fluid and nutrition restrictions as appropriate  Goal: Glucose maintained within prescribed range  Outcome: Completed  Flowsheets (Taken 5/23/2025 0800)  Glucose maintained within prescribed range:   Monitor blood glucose as ordered   Assess for signs and symptoms of hyperglycemia and hypoglycemia   Administer ordered medications to maintain glucose within target range   Assess barriers to adequate nutritional intake and initiate nutrition consult as needed   Instruct patient on self management of diabetes and initiate consult as needed     Problem: ABCDS Injury Assessment  Goal: Absence of physical injury  Outcome: Completed

## 2025-05-23 NOTE — DISCHARGE INSTRUCTIONS
You were advised to come to the hospital for left heart catheterization as there were concerning changes on your EKG in the clinic.  Your left heart catheterization did not show any stenosis in the large vessels.  No therapeutic intervention was carried out.  Therefore, you are advised to continue taking your medications regularly.  Please follow-up with cardiology in the clinic as previously scheduled.    Please continue taking Bumex as needed.  If you notice swelling in your legs or worsening shortness of breath, you can start taking it until improvement.    You are found to have anemia and low iron.  You are given intravenous iron and have also been started on oral iron replacement.  The iron replacement can discolor your stool from dark to black.  It can also cause constipation.    You have not been taking Wellbutrin, Mobic, Rythmol and Carafate.  These have been removed as part of cleaning the list.

## 2025-05-23 NOTE — PLAN OF CARE
Problem: Respiratory - Adult  Goal: Achieves optimal ventilation and oxygenation  5/22/2025 2102 by Syl Dang, JOYCE  Outcome: Progressing   BRONCHOSPASM/BRONCHOCONSTRICTION     [x]         IMPROVE AERATION/BREATH SOUNDS  [x]   ADMINISTER BRONCHODILATOR THERAPY AS APPROPRIATE  [x]   ASSESS BREATH SOUNDS  []   IMPLEMENT AEROSOL/MDI PROTOCOL  [x]   PATIENT EDUCATION AS NEEDED

## 2025-05-23 NOTE — PROGRESS NOTES
Room: 2006/2006-01  Admit date: 5/21/2025  Today's date: 05/23/25  Number of days in the hospital: 2    SUBJECTIVE   Admitting Diagnosis: NSTEMI (non-ST elevated myocardial infarction) ([facility])  CC: Back pain - EKG changes and concern for ACS     Pt examined at bedside. Chart & results reviewed.   Vitals remaining stable  Heart rate around 60 and saturating well on room air  Stable and no active/new symptoms or concerns  Patient has been feeling well, informed about [facility] findings    ROS:  Constitutional:  negative for chills, fevers, sweats  Respiratory:  negative for cough, dyspnea on exertion, hemoptysis, shortness of breath, wheezing  Cardiovascular:  negative for chest pain, chest pressure/discomfort, lower extremity edema, palpitations  Gastrointestinal:  negative for abdominal pain, constipation, diarrhea, nausea, vomiting  Neurological:  negative for dizziness, headache  BRIEF HISTORY     The patient is a pleasant 59 y.o. female with a PMHx significant for      Coronary artery disease   Left bundle branch block  Heart failure reduced ejection fraction  History of SVT  History of severe bradycardia  Hypertension  Hyperlipidemia  COPD  Morbid obesity  Hypothyroidism  Fibromyalgia  Post COVID syndrome  GERD  Gout  Osteoarthritis  Chronic pain syndrome  Cervical spondylosis     presents with a chief complaint of back pain.  Patient was reviewed in cardiology clinic today and advised to return to the ER after dynamic changes were noted on her EKG and the fact that she had similar pain with her heart attack the last time.      Patient informs that she has been having episodes of this back pain for the past week.  Pain is located over the medial aspect of the scapula and each episode lasts for around 2 minutes.  There is no change with position but may increase on inspiration.  There are no other obvious issues or symptoms.  Patient does feel short of breath on exertion and movement.  This shortness of breath has  baseline  Monitor with BMP  Avoid nephrotoxic     Cervical spondylosis  Osteoarthritis  Continue Percocet as needed  Physical therapy     DVT ppx: Lovenox  GI ppx: Protonix     PT/OT/SW consulted  Discharge Planning: Discharge today with outpatient follow-up with cardiology    Mejia Olivares MD  Internal Medicine Resident, PGY-1  [facility]; [place], [place]  5/23/2025, 8:15 AM

## 2025-05-23 NOTE — CARE COORDINATION
Case Management   Daily Progress Note       Patient Name: Mary Bills                   YOB: 1965  Diagnosis: Unstable angina (HCC) [I20.0]  NSTEMI (non-ST elevated myocardial infarction) (HCC) [I21.4]                       GMLOS: 3.2 days  Length of Stay: 2  days    Anticipated Discharge Date: Ready for discharge    Readmission Risk (Low < 19, Mod (19-27), High > 27): Readmission Risk Score: 12.8        Current Transitional Plan    [] Home Independently    [x] Home with HC    [] Skilled Nursing Facility    [] Acute Rehabilitation    [] Long Term Acute Care (LTAC)    [] Other:     Plan for the Stay (Medical Management) : DC planning          Workflow Continuation (Additional Notes) : Plan is to return home, pt requesting HC for PT/OT, stated she has had [facility] previously, referral sent via [employer] Message received in [email or web address], [facility] able to accept.  PS sent for order and [facility]ASS[facility]    Cathy Gasca RN  May 23, 2025

## 2025-05-23 NOTE — PROGRESS NOTES
[facility]   Progress Note                   Date:   5/23/2025  Patient name: Mary Bills  Date of admission:  5/21/2025 12:23 PM  MRN:   [id number]  YOB: 1965  PCP: Valerie Ojeda MD    Reason for Admission: Unstable angina (HCC) [I20.0]  NSTEMI (non-ST elevated myocardial infarction) (HCC) [I21.4]    Subjective:       Clinical Changes / Abnormalities: Pt seen and examined in the room.  Patient resting in bed with RN at bedside. Pt denies any CP. Reports improving shortness of breath.  Labs, vitals and tele reviewed- SR 80  S/P LHC via femoral access    Medications:   Scheduled Meds:   iron sucrose  200 mg IntraVENous Q24H    ferrous sulfate  325 mg Oral Daily with breakfast    sodium chloride flush  5-40 mL IntraVENous 2 times per day    aspirin  81 mg Oral Daily    allopurinol  300 mg Oral Daily    atorvastatin  80 mg Oral Nightly    budesonide-formoterol  2 puff Inhalation BID    [Held by provider] bumetanide  1 mg Oral Daily    isosorbide dinitrate  10 mg Oral BID    empagliflozin  10 mg Oral Daily    levothyroxine  50 mcg Oral Daily    trospium  20 mg Oral Nightly    montelukast  10 mg Oral Nightly    oxyBUTYnin  5 mg Oral BID    pantoprazole  40 mg Oral Daily    sertraline  100 mg Oral Daily    spironolactone  25 mg Oral Daily    enoxaparin  40 mg SubCUTAneous BID     Continuous Infusions:   sodium chloride       CBC:   Recent Labs     05/21/25  1252 05/22/25  0800 05/23/25  0544   WBC 7.5 8.2 8.0   HGB 10.6* 10.6* 9.8*    276 246     BMP:    Recent Labs     05/21/25  1252 05/22/25  0800 05/23/25  0544    138 139   K 4.2 4.2 4.5    104 106   CO2 24 21 22   BUN 15 20 20   CREATININE 1.0* 1.2* 1.2*   GLUCOSE 88 104* 100*     Hepatic:   Recent Labs     05/21/25  1252   AST 19   ALT 18   BILITOT 0.3   ALKPHOS 176*     Troponin:   Recent Labs     05/21/25  1252 05/21/25  1341 05/22/25  0800   TROPHS 17* 18* 16*     BNP: No results for input(s): \"BNP\"

## 2025-05-23 NOTE — DISCHARGE INSTR - ACTIVITY
Up as tolerated. A body in motion will remain in motion. Water aerobics is great for heart and joint health.

## 2025-05-23 NOTE — DISCHARGE INSTR - DIET
Good nutrition is important when healing from an illness, injury, or surgery.  Follow any nutrition recommendations given to you during your hospital stay.   If you were given an oral nutrition supplement while in the hospital, continue to take this supplement at home.  You can take it with meals, in-between meals, and/or before bedtime. These supplements can be purchased at most local grocery stores, pharmacies, and chain [employer]-stores.   If you have any questions about your diet or nutrition, call the hospital and ask for the dietitian.  Eat a heart healthy diet low in cholesterol, trans fat and processed foods.

## 2025-05-23 NOTE — PLAN OF CARE
Problem: Chronic Conditions and Co-morbidities  Goal: Patient's chronic conditions and co-morbidity symptoms are monitored and maintained or improved  Outcome: Progressing  Flowsheets (Taken 5/22/2025 1920)  Care Plan - Patient's Chronic Conditions and Co-Morbidity Symptoms are Monitored and Maintained or Improved:   Monitor and assess patient's chronic conditions and comorbid symptoms for stability, deterioration, or improvement   Collaborate with multidisciplinary team to address chronic and comorbid conditions and prevent exacerbation or deterioration   Update acute care plan with appropriate goals if chronic or comorbid symptoms are exacerbated and prevent overall improvement and discharge     Problem: Discharge Planning  Goal: Discharge to home or other facility with appropriate resources  Outcome: Progressing  Flowsheets (Taken 5/22/2025 1920)  Discharge to home or other facility with appropriate resources:   Identify barriers to discharge with patient and caregiver   Arrange for needed discharge resources and transportation as appropriate   Identify discharge learning needs (meds, wound care, etc)   Refer to discharge planning if patient needs post-hospital services based on physician order or complex needs related to functional status, cognitive ability or social support system     Problem: Pain  Goal: Verbalizes/displays adequate comfort level or baseline comfort level  Outcome: Progressing  Flowsheets (Taken 5/22/2025 1920)  Verbalizes/displays adequate comfort level or baseline comfort level:   Encourage patient to monitor pain and request assistance   Assess pain using appropriate pain scale   Administer analgesics based on type and severity of pain and evaluate response   Implement non-pharmacological measures as appropriate and evaluate response   Notify Licensed Independent Practitioner if interventions unsuccessful or patient reports new pain     Problem: Respiratory - Adult  Goal: Achieves optimal  ventilation and oxygenation  5/23/2025 0250 by Jacquelyn Ferreira RN  Outcome: Progressing  5/22/2025 2102 by Syl Dang RCP  Outcome: Progressing     Problem: Safety - Adult  Goal: Free from fall injury  Outcome: Progressing  Flowsheets (Taken 5/22/2025 1920)  Free From Fall Injury: Instruct family/caregiver on patient safety     Problem: Neurosensory - Adult  Goal: Achieves maximal functionality and self care  Outcome: Progressing     Problem: Cardiovascular - Adult  Goal: Maintains optimal cardiac output and hemodynamic stability  Outcome: Progressing  Flowsheets (Taken 5/22/2025 1920)  Maintains optimal cardiac output and hemodynamic stability:   Monitor blood pressure and heart rate   Monitor urine output and notify Licensed Independent Practitioner for values outside of normal range  Goal: Absence of cardiac dysrhythmias or at baseline  Outcome: Progressing  Flowsheets (Taken 5/22/2025 1920)  Absence of cardiac dysrhythmias or at baseline:   Monitor cardiac rate and rhythm   Assess for signs of decreased cardiac output   Administer antiarrhythmia medication and electrolyte replacement as ordered     Problem: Gastrointestinal - Adult  Goal: Maintains or returns to baseline bowel function  Outcome: Progressing  Flowsheets (Taken 5/22/2025 1920)  Maintains or returns to baseline bowel function: Assess bowel function  Goal: Maintains adequate nutritional intake  Outcome: Progressing  Flowsheets (Taken 5/22/2025 1920)  Maintains adequate nutritional intake:   Monitor percentage of each meal consumed   Identify factors contributing to decreased intake, treat as appropriate   Assist with meals as needed     Problem: Metabolic/Fluid and Electrolytes - Adult  Goal: Electrolytes maintained within normal limits  Outcome: Progressing  Flowsheets (Taken 5/22/2025 1920)  Electrolytes maintained within normal limits:   Monitor labs and assess patient for signs and symptoms of electrolyte imbalances   Administer  electrolyte replacement as ordered   Monitor response to electrolyte replacements, including repeat lab results as appropriate   Instruct patient on fluid and nutrition restrictions as appropriate  Goal: Hemodynamic stability and optimal renal function maintained  Outcome: Progressing  Flowsheets (Taken 5/22/2025 1920)  Hemodynamic stability and optimal renal function maintained:   Monitor labs and assess for signs and symptoms of volume excess or deficit   Monitor intake, output and patient weight  Goal: Glucose maintained within prescribed range  Outcome: Progressing  Flowsheets (Taken 5/22/2025 1920)  Glucose maintained within prescribed range:   Monitor blood glucose as ordered   Assess for signs and symptoms of hyperglycemia and hypoglycemia   Administer ordered medications to maintain glucose within target range   Instruct patient on self management of diabetes and initiate consult as needed

## 2025-05-23 NOTE — DISCHARGE INSTR - COC
Continuity of Care Form    Patient Name: Mary Bills   :  1965  MRN:  [id number]    Admit date:  2025  Discharge date:  2025    Code Status Order: Full Code   Advance Directives:     Admitting Physician:  Tyler Higgins MD  PCP: Valerie Ojeda MD    Discharging Nurse: DEVEN Marte   Discharging Hospital Unit/Room#:   Discharging Unit Phone Number: [phone]    Emergency Contact:   Extended Emergency Contact Information  Primary Emergency Contact: Kareen Noriega  Home Phone: [phone]  Mobile Phone: [phone]  Relation: Child  Secondary Emergency Contact: Rossy Hernandez  Home Phone: [phone]  Relation: Child   needed? No    Past Surgical History:  Past Surgical History:   Procedure Laterality Date    BACK SURGERY      CARDIAC CATHETERIZATION  2022    CHOLECYSTECTOMY, LAPAROSCOPIC      EP DEVICE PROCEDURE N/A 2024    El-Atassi / Loop recorder insert ([place]) performed by Deion Lopez MD at [facility] CARDIAC CATH LAB    EP DEVICE PROCEDURE N/A 2024    el-atassi / Loop recorder insert ([place]) performed by Deion Lopez MD at [facility] CARDIAC CATH LAB    HYSTERECTOMY, TOTAL ABDOMINAL (CERVIX REMOVED)      INSERTABLE CARDIAC MONITOR  2024    IR BIOPSY THYROID PERC CORE NEEDLE  3/24/2025    IR BIOPSY THYROID PERC CORE NEEDLE 3/24/2025 [facility] SPECIAL PROCEDURES    KNEE ARTHROSCOPY Right     KNEE SURGERY Left 2009    NECK SURGERY      OTHER SURGICAL HISTORY  2024    loop recorder    SHOULDER SURGERY Right 2009    THYROIDECTOMY         Immunization History:   Immunization History   Administered Date(s) Administered    COVID-19, PFIZER Bivalent, DO NOT Dilute, (age 12y+), IM, 30 mcg/0.3 mL 2022    COVID-19, PFIZER PURPLE top, DILUTE for use, (age 12 y+), 30mcg/0.3mL 2021, 2021    Influenza, FLUARIX, FLULAVAL, FLUZONE (age 6 mo+) and AFLURIA, (age 3 y+), Quadv PF, 0.5mL 2017, 2019, 10/08/2020,  Manager/ signature: Electronically signed by Cathy Gasca RN on 5/23/25 at 12:18 PM EDT    PHYSICIAN SECTION    Prognosis: Fair    Condition at Discharge: Stable    Rehab Potential (if transferring to Rehab): Fair    Recommended Labs or Other Treatments After Discharge:   You were advised to come to the hospital for left heart catheterization as there were concerning changes on your EKG in the clinic.  Your left heart catheterization did not show any stenosis in the large vessels.  No therapeutic intervention was carried out.  Therefore, you are advised to continue taking your medications regularly.  Please follow-up with cardiology in the clinic as previously scheduled.    Please continue taking Bumex as needed.  If you notice swelling in your legs or worsening shortness of breath, you can start taking it until improvement.    You are found to have anemia and low iron.  You are given intravenous iron and have also been started on oral iron replacement.  The iron replacement can discolor your stool from dark to black.  It can also cause constipation.    You have not been taking Wellbutrin, Mobic, Rythmol and Carafate.  These have been removed as part of cleaning the list.    Please follow-up with your primary care provider within 5 to 7 days for continued care.   If you have been given medication please take them as prescribed. Do not take more medication than recommended at any given time.   If you begin to experience any symptoms such as chest pain shortness of breath nausea vomiting dizziness drowsiness abdominal pain loss of consciousness or any other symptoms you find concerning please return to the ED for follow-up evaluation.  Please feel free return to the hospital if your symptoms worsen or any new concerning symptoms develop.  Follow-up with your primary care physician as needed for all other the concerns.       Physician Certification: I certify the above information and transfer of Mary CAMACHO

## 2025-05-27 ENCOUNTER — TELEPHONE (OUTPATIENT)
Age: 60
End: 2025-05-27

## 2025-05-27 NOTE — TELEPHONE ENCOUNTER
Resident Carolyn tried to call patient and she said that she doesn't follow here anymore.  Please remove Dr. Ojeda as primary.

## 2025-05-30 NOTE — DISCHARGE SUMMARY
intervention was carried out.  Patient is advised to continue taking her regular medications and follow-up with cardiology as outpatient.    Patient is also known to have anemia and her iron was found to be low.  She was given IV iron replacement and has been started on oral replacement.  She is advised to follow-up with PCP for further monitoring/optimization of iron therapy.    Patient was advised to continue taking Bumex as needed based on her shortness of breath and edema//swelling in the legs.    Patient has not been taking Wellbutrin, Mobic, Rythmol or Carafate.  These have been removed from as part cleaning the list.      Procedures/ Significant Interventions:    Left heart cath    Consults:     Consults:     Final Specialist Recommendations/Findings:   IP CONSULT TO INTERNAL MEDICINE  IP CONSULT TO CASE MANAGEMENT  IP CONSULT TO CARDIOLOGY  IP CONSULT TO HOME CARE NEEDS      Any Hospital Acquired Infections: none    Discharge Functional Status:  stable    DISCHARGE PLAN     Disposition: home    Patient Instructions:   Discharge Medication List as of 5/23/2025  1:33 PM        START taking these medications    Details   ferrous sulfate (FE TABS 325) 325 (65 Fe) MG EC tablet Take 1 tablet by mouth daily (with breakfast), Disp-90 tablet, R-3Normal           CONTINUE these medications which have NOT CHANGED    Details   oxyCODONE-acetaminophen (PERCOCET)  MG per tablet Take 1 tablet by mouth every 4 hours as needed.Historical Med      sertraline (ZOLOFT) 100 MG tablet Take 1 tablet by mouth dailyHistorical Med      JARDIANCE 10 MG tablet Take 1 tablet by mouth daily, DAWHistorical Med      mirabegron (MYRBETRIQ) 25 MG TB24 Take 1 tablet by mouth dailyHistorical Med      montelukast (SINGULAIR) 10 MG tablet Take 1 tablet by mouth nightly, Disp-90 tablet, R-3Normal      budesonide-formoterol (SYMBICORT) 160-4.5 MCG/ACT AERO Inhale 2 puffs into the lungs 2 times daily, Disp-3 each, R-3Normal      albuterol

## 2025-06-16 DIAGNOSIS — I47.10 SVT (SUPRAVENTRICULAR TACHYCARDIA): ICD-10-CM

## 2025-06-16 DIAGNOSIS — I47.10 SVT (SUPRAVENTRICULAR TACHYCARDIA): Primary | ICD-10-CM

## 2025-06-16 PROCEDURE — 93297 REM INTERROG DEV EVAL ICPMS: CPT | Performed by: SPECIALIST

## 2025-06-16 PROCEDURE — NBSRV INTERROGATION EVAL REMOTE </30 D CV MNTR SYS: Performed by: SPECIALIST

## 2025-07-21 PROCEDURE — 93297 REM INTERROG DEV EVAL ICPMS: CPT | Performed by: SPECIALIST

## 2025-07-22 DIAGNOSIS — I47.10 SVT (SUPRAVENTRICULAR TACHYCARDIA): ICD-10-CM

## 2025-07-22 PROCEDURE — NBSRV INTERROGATION EVAL REMOTE </30 D CV MNTR SYS: Performed by: SPECIALIST

## 2025-08-05 DIAGNOSIS — I47.10 SVT (SUPRAVENTRICULAR TACHYCARDIA): ICD-10-CM

## 2025-08-18 ENCOUNTER — OFFICE VISIT (OUTPATIENT)
Age: 60
End: 2025-08-18

## 2025-08-18 VITALS
SYSTOLIC BLOOD PRESSURE: 136 MMHG | OXYGEN SATURATION: 96 % | DIASTOLIC BLOOD PRESSURE: 84 MMHG | WEIGHT: 293 LBS | HEART RATE: 92 BPM | BODY MASS INDEX: 52.62 KG/M2

## 2025-08-18 DIAGNOSIS — I49.5 SSS (SICK SINUS SYNDROME) (HCC): Primary | ICD-10-CM

## 2025-08-18 DIAGNOSIS — R00.2 PALPITATIONS: ICD-10-CM

## 2025-08-18 DIAGNOSIS — I47.10 SVT (SUPRAVENTRICULAR TACHYCARDIA): ICD-10-CM

## 2025-08-21 DIAGNOSIS — I47.10 SVT (SUPRAVENTRICULAR TACHYCARDIA): ICD-10-CM

## 2025-08-26 ENCOUNTER — TELEPHONE (OUTPATIENT)
Age: 60
End: 2025-08-26

## 2025-08-26 ENCOUNTER — OFFICE VISIT (OUTPATIENT)
Dept: PULMONOLOGY | Age: 60
End: 2025-08-26
Payer: MEDICARE

## 2025-08-26 VITALS
BODY MASS INDEX: 44.41 KG/M2 | WEIGHT: 293 LBS | HEIGHT: 68 IN | SYSTOLIC BLOOD PRESSURE: 130 MMHG | OXYGEN SATURATION: 98 % | HEART RATE: 78 BPM | RESPIRATION RATE: 16 BRPM | DIASTOLIC BLOOD PRESSURE: 78 MMHG

## 2025-08-26 DIAGNOSIS — E66.01 MORBID OBESITY WITH BMI OF 50.0-59.9, ADULT (HCC): ICD-10-CM

## 2025-08-26 DIAGNOSIS — R06.09 DYSPNEA ON EXERTION: ICD-10-CM

## 2025-08-26 DIAGNOSIS — J45.40 MODERATE PERSISTENT ASTHMA WITHOUT COMPLICATION: ICD-10-CM

## 2025-08-26 DIAGNOSIS — G47.33 OSA (OBSTRUCTIVE SLEEP APNEA): Primary | ICD-10-CM

## 2025-08-26 DIAGNOSIS — J42 CHRONIC BRONCHITIS, UNSPECIFIED CHRONIC BRONCHITIS TYPE (HCC): ICD-10-CM

## 2025-08-26 PROCEDURE — 99214 OFFICE O/P EST MOD 30 MIN: CPT | Performed by: INTERNAL MEDICINE

## 2025-08-26 PROCEDURE — 3075F SYST BP GE 130 - 139MM HG: CPT | Performed by: INTERNAL MEDICINE

## 2025-08-26 PROCEDURE — 3078F DIAST BP <80 MM HG: CPT | Performed by: INTERNAL MEDICINE

## (undated) DEVICE — GLIDESHEATH SLENDER STAINLESS STEEL KIT: Brand: GLIDESHEATH SLENDER

## (undated) DEVICE — SPONGE LAP W18XL18IN WHT COT 4 PLY FLD STRUNG RADPQ DISP ST 2 PER PACK

## (undated) DEVICE — ELECTRODE PT RET AD L9FT HI MOIST COND ADH HYDRGEL CORDED

## (undated) DEVICE — KIT MICRO INTRO 4FR STIFF 40CM NIGHTENALL TUNGSTEN 7SMT

## (undated) DEVICE — DRESSING ALGINATE POST OPERATIVE 12X3.5 IN RECT PRIMASEAL

## (undated) DEVICE — SUTURE VICRYL 2-0 L27IN ABSRB CT BRAID COAT UD J275H

## (undated) DEVICE — PERCUTANEOUS ENTRY THINWALL NEEDLE  ONE-PART: Brand: COOK

## (undated) DEVICE — INTRODUCER SHTH 6FR L11CM 0.038IN STD SIDEPRT EXTN 3 W

## (undated) DEVICE — Device

## (undated) DEVICE — TRAY SURG CUST CRD CATH TOLEDO

## (undated) DEVICE — STRAP ARMBRD W1.5XL32IN FOAM STR YET SFT W/ HK AND LOOP

## (undated) DEVICE — Z DISCONTINUED USE 2859063 SUTURE VICRYL 3-0 L27IN ABSRB UD PSL L30MM 1/2 CIR TAPERPOINT J502H

## (undated) DEVICE — CATHETER DIAG 6FR L100CM COR NYL JUDKINS L 5 RADPQ W/O SIDE

## (undated) DEVICE — MEDI-TRACE CADENCE ADULT, DEFIBRILLATION ELECTRODE -RTS  (10 PR/PK) - PHYSIO-CONTROL: Brand: MEDI-TRACE CADENCE

## (undated) DEVICE — GUIDEWIRE 35/260/FC/PTFE/3J: Brand: GUIDEWIRE

## (undated) DEVICE — CATHETER DIAG 6FR L100CM LUMN ID0.056IN JL4 CRV 0 SIDE H

## (undated) DEVICE — CATHETER ANGIO 6FR L100CM DIA0.056IN FR4 CRV VASC ACCS EXPO